# Patient Record
Sex: MALE | Race: WHITE | NOT HISPANIC OR LATINO | Employment: OTHER | URBAN - METROPOLITAN AREA
[De-identification: names, ages, dates, MRNs, and addresses within clinical notes are randomized per-mention and may not be internally consistent; named-entity substitution may affect disease eponyms.]

---

## 2017-02-21 ENCOUNTER — OFFICE VISIT (OUTPATIENT)
Dept: INTERNAL MEDICINE | Facility: CLINIC | Age: 82
End: 2017-02-21
Payer: COMMERCIAL

## 2017-02-21 VITALS
HEART RATE: 41 BPM | OXYGEN SATURATION: 99 % | BODY MASS INDEX: 27.32 KG/M2 | DIASTOLIC BLOOD PRESSURE: 72 MMHG | HEIGHT: 70 IN | TEMPERATURE: 97.6 F | SYSTOLIC BLOOD PRESSURE: 118 MMHG | WEIGHT: 190.8 LBS

## 2017-02-21 DIAGNOSIS — Z00.00 MEDICARE ANNUAL WELLNESS VISIT, SUBSEQUENT: Primary | ICD-10-CM

## 2017-02-21 DIAGNOSIS — I21.4 NON-STEMI (NON-ST ELEVATED MYOCARDIAL INFARCTION) (H): ICD-10-CM

## 2017-02-21 DIAGNOSIS — Z23 NEED FOR VACCINATION: ICD-10-CM

## 2017-02-21 DIAGNOSIS — D47.2 MGUS (MONOCLONAL GAMMOPATHY OF UNKNOWN SIGNIFICANCE): ICD-10-CM

## 2017-02-21 DIAGNOSIS — I71.21 ASCENDING AORTIC ANEURYSM (H): ICD-10-CM

## 2017-02-21 DIAGNOSIS — I25.810 CORONARY ARTERY DISEASE INVOLVING CORONARY BYPASS GRAFT OF NATIVE HEART WITHOUT ANGINA PECTORIS: ICD-10-CM

## 2017-02-21 DIAGNOSIS — E03.9 HYPOTHYROIDISM, UNSPECIFIED TYPE: ICD-10-CM

## 2017-02-21 DIAGNOSIS — G25.0 FAMILIAL TREMOR: ICD-10-CM

## 2017-02-21 DIAGNOSIS — E78.5 HYPERLIPIDEMIA LDL GOAL <100: ICD-10-CM

## 2017-02-21 LAB
ALBUMIN SERPL-MCNC: 3.7 G/DL (ref 3.4–5)
ALP SERPL-CCNC: 73 U/L (ref 40–150)
ALT SERPL W P-5'-P-CCNC: 30 U/L (ref 0–70)
ANION GAP SERPL CALCULATED.3IONS-SCNC: 7 MMOL/L (ref 3–14)
AST SERPL W P-5'-P-CCNC: 33 U/L (ref 0–45)
BASOPHILS # BLD AUTO: 0.1 10E9/L (ref 0–0.2)
BASOPHILS NFR BLD AUTO: 0.7 %
BILIRUB SERPL-MCNC: 1.2 MG/DL (ref 0.2–1.3)
BUN SERPL-MCNC: 23 MG/DL (ref 7–30)
CALCIUM SERPL-MCNC: 9.1 MG/DL (ref 8.5–10.1)
CHLORIDE SERPL-SCNC: 106 MMOL/L (ref 94–109)
CHOLEST SERPL-MCNC: 109 MG/DL
CO2 SERPL-SCNC: 28 MMOL/L (ref 20–32)
CREAT SERPL-MCNC: 1.14 MG/DL (ref 0.66–1.25)
CREAT UR-MCNC: 273 MG/DL
DIFFERENTIAL METHOD BLD: NORMAL
EOSINOPHIL # BLD AUTO: 0.4 10E9/L (ref 0–0.7)
EOSINOPHIL NFR BLD AUTO: 5.8 %
ERYTHROCYTE [DISTWIDTH] IN BLOOD BY AUTOMATED COUNT: 12.4 % (ref 10–15)
GFR SERPL CREATININE-BSD FRML MDRD: 61 ML/MIN/1.7M2
GLUCOSE SERPL-MCNC: 90 MG/DL (ref 70–99)
HCT VFR BLD AUTO: 43.4 % (ref 40–53)
HDLC SERPL-MCNC: 46 MG/DL
HGB BLD-MCNC: 14.6 G/DL (ref 13.3–17.7)
LDLC SERPL CALC-MCNC: 50 MG/DL
LYMPHOCYTES # BLD AUTO: 1.9 10E9/L (ref 0.8–5.3)
LYMPHOCYTES NFR BLD AUTO: 26.9 %
MCH RBC QN AUTO: 30.2 PG (ref 26.5–33)
MCHC RBC AUTO-ENTMCNC: 33.6 G/DL (ref 31.5–36.5)
MCV RBC AUTO: 90 FL (ref 78–100)
MICROALBUMIN UR-MCNC: 10 MG/L
MICROALBUMIN/CREAT UR: 3.59 MG/G CR (ref 0–17)
MONOCYTES # BLD AUTO: 0.6 10E9/L (ref 0–1.3)
MONOCYTES NFR BLD AUTO: 8.9 %
NEUTROPHILS # BLD AUTO: 4.2 10E9/L (ref 1.6–8.3)
NEUTROPHILS NFR BLD AUTO: 57.7 %
NONHDLC SERPL-MCNC: 63 MG/DL
PLATELET # BLD AUTO: 155 10E9/L (ref 150–450)
POTASSIUM SERPL-SCNC: 4 MMOL/L (ref 3.4–5.3)
PROT SERPL-MCNC: 7.2 G/DL (ref 6.8–8.8)
RBC # BLD AUTO: 4.83 10E12/L (ref 4.4–5.9)
SODIUM SERPL-SCNC: 141 MMOL/L (ref 133–144)
TRIGL SERPL-MCNC: 66 MG/DL
TSH SERPL DL<=0.005 MIU/L-ACNC: 3.78 MU/L (ref 0.4–4)
WBC # BLD AUTO: 7.2 10E9/L (ref 4–11)

## 2017-02-21 PROCEDURE — 80053 COMPREHEN METABOLIC PANEL: CPT | Performed by: INTERNAL MEDICINE

## 2017-02-21 PROCEDURE — 90670 PCV13 VACCINE IM: CPT | Performed by: INTERNAL MEDICINE

## 2017-02-21 PROCEDURE — G0009 ADMIN PNEUMOCOCCAL VACCINE: HCPCS | Performed by: INTERNAL MEDICINE

## 2017-02-21 PROCEDURE — 85025 COMPLETE CBC W/AUTO DIFF WBC: CPT | Performed by: INTERNAL MEDICINE

## 2017-02-21 PROCEDURE — 99397 PER PM REEVAL EST PAT 65+ YR: CPT | Mod: 25 | Performed by: INTERNAL MEDICINE

## 2017-02-21 PROCEDURE — 36415 COLL VENOUS BLD VENIPUNCTURE: CPT | Performed by: INTERNAL MEDICINE

## 2017-02-21 PROCEDURE — 82043 UR ALBUMIN QUANTITATIVE: CPT | Performed by: INTERNAL MEDICINE

## 2017-02-21 PROCEDURE — 84443 ASSAY THYROID STIM HORMONE: CPT | Performed by: INTERNAL MEDICINE

## 2017-02-21 PROCEDURE — 80061 LIPID PANEL: CPT | Performed by: INTERNAL MEDICINE

## 2017-02-21 RX ORDER — LEVOTHYROXINE SODIUM 100 UG/1
100 TABLET ORAL DAILY
Qty: 90 TABLET | Refills: 3 | Status: SHIPPED | OUTPATIENT
Start: 2017-02-21 | End: 2018-03-02

## 2017-02-21 RX ORDER — ATORVASTATIN CALCIUM 40 MG/1
40 TABLET, FILM COATED ORAL DAILY
Qty: 90 TABLET | Refills: 3 | Status: SHIPPED | OUTPATIENT
Start: 2017-02-21 | End: 2018-03-02

## 2017-02-21 RX ORDER — PROPRANOLOL HYDROCHLORIDE 20 MG/1
20 TABLET ORAL DAILY
Qty: 90 TABLET | Refills: 3 | Status: SHIPPED | OUTPATIENT
Start: 2017-02-21 | End: 2017-05-16

## 2017-02-21 NOTE — PATIENT INSTRUCTIONS
"  *  Continue all medications at the same doses.  Contact your usual pharmacy if you need refills.     *  Be sure to compare prices of your many medications and check specifically the prices at [employer] because you will find that these are MUCH cheaper    *  Follow up with Cardiology clinic as planned this May.       5 GOALS TO PREVENT VASCULAR DISEASE:     1.  Aggressive blood pressure control, under 130/80 ideally.  Using medications if needed.    Your blood pressure is under good control    BP Readings from Last 4 Encounters:   02/21/17 118/72   05/20/16 108/58   05/10/16 114/72   02/09/16 134/66       2.  Aggressive LDL cholesterol (\"bad cholesterol\") lowering as indicated.    Your goal is an LDL under 130 for sure, preferably under 100.  (If you have diabetes or previous vascular disease, the the LDL goals would be under 100 for sure, preferably under 70.)    New guidelines identify four high-risk groups who could benefit from statins:   *people with pre-existing heart disease, such as those who have had a heart attack;   *people ages 40 to 75 who have diabetes of any type  *patients ages 40 to 75 with at least a 7.5% risk of developing cardiovascular disease over the next decade, according to a formula described in the guidelines  *patients with the sort of super-high cholesterol that sometimes runs in families, as evidenced by an LDL of 190 milligrams per deciliter or higher    Your cholesterol levels are well controlled.    Recent Labs   Lab Test  02/09/16   0847  02/03/15   0845  07/08/14   0722  05/15/14   0522   CHOL  115   --   102  118   HDL  49   --   35*  34*   LDL  53  74  52*  63   TRIG  64   --   73  101   CHOLHDLRATIO   --    --   2.9  3.5       3.  Aggressive diabetic prevention, screening and/or management.      You do not have diabetes as of the most recent blood tests.     4.  No smoking    5.  Consider taking low dose aspirin (81 mg) tablet once per day over the age of 50, every " "day unless there is a specific reason that you cannot take aspirin (such as side effect, allergy, or you are on another \"blood thinner\").        --Based on your current cardiac risk factors, you should take Aspirin 81 mg once per day if you are over 50 years of age.           Preventive Health Recommendations:       Male Ages 65 and over    Yearly exam:             See your health care provider every year in order to  o   Review health changes.   o   Discuss preventive care.    o   Review your medicines if your doctor has prescribed any.    Talk with your health care provider about whether you should have a test to screen for prostate cancer (PSA).    Every 3 years, have a diabetes test (fasting glucose). If you are at risk for diabetes, you should have this test more often.    Every 5 years, have a cholesterol test. Have this test more often if you are at risk for high cholesterol or heart disease.     Every 10 years, have a colonoscopy. Or, have a yearly FIT test (stool test). These exams will check for colon cancer.    Talk to with your health care provider about screening for Abdominal Aortic Aneurysm if you have a family history of AAA or have a history of smoking.  Shots:     Get a flu shot each year.     Get a tetanus shot every 10 years.     Talk to your doctor about your pneumonia vaccines. There are now two you should receive - Pneumovax (PPSV 23) and Prevnar (PCV 13).    Talk to your doctor about a shingles vaccine.     Talk to your doctor about the hepatitis B vaccine.  Nutrition:     Eat at least 5 servings of fruits and vegetables each day.     Eat whole-grain bread, whole-wheat pasta and brown rice instead of white grains and rice.     Talk to your doctor about Calcium and Vitamin D.   Lifestyle    Exercise for at least 150 minutes a week (30 minutes a day, 5 days a week). This will help you control your weight and prevent disease.     Limit alcohol to one drink per day.     No smoking.     Wear " sunscreen to prevent skin cancer.     See your dentist every six months for an exam and cleaning.     See your eye doctor every 1 to 2 years to screen for conditions such as glaucoma, macular degeneration and cataracts.

## 2017-02-21 NOTE — NURSING NOTE
"Chief Complaint   Patient presents with     Medicare Visit     pt is fasting       Initial /72  Pulse (!) 41  Temp 97.6  F (36.4  C) (Oral)  Ht 5' 10\" (1.778 m)  Wt 190 lb 12.8 oz (86.5 kg)  SpO2 99%  BMI 27.38 kg/m2 Estimated body mass index is 27.38 kg/(m^2) as calculated from the following:    Height as of this encounter: 5' 10\" (1.778 m).    Weight as of this encounter: 190 lb 12.8 oz (86.5 kg).  Medication Reconciliation: complete.  Enriqueta Alvares CMA    Screening Questionnaire for Adult Immunization    Are you sick today?   No   Do you have allergies to medications, food, a vaccine component or latex?   No   Have you ever had a serious reaction after receiving a vaccination?   No   Do you have a long-term health problem with heart disease, lung disease, asthma, kidney disease, metabolic disease (e.g. diabetes), anemia, or other blood disorder?   No   Do you have cancer, leukemia, HIV/AIDS, or any other immune system problem?   No   In the past 3 months, have you taken medications that affect  your immune system, such as prednisone, other steroids, or anticancer drugs; drugs for the treatment of rheumatoid arthritis, Crohn s disease, or psoriasis; or have you had radiation treatments?   No   Have you had a seizure, or a brain or other nervous system problem?   No   During the past year, have you received a transfusion of blood or blood     products, or been given immune (gamma) globulin or antiviral drug?   No   For women: Are you pregnant or is there a chance you could become        pregnant during the next month?   No   Have you received any vaccinations in the past 4 weeks?   No     Immunization questionnaire answers were all negative.      MNVFC doesn't apply on this patient    Per orders of Dr. mckee, injection of prevnar given by Enriqueta Alvares. Patient instructed to remain in clinic for 20 minutes afterwards, and to report any adverse reaction to me immediately.       Screening performed by " Enriqueta Alvares on 2/21/2017 at 8:12 AM.

## 2017-02-21 NOTE — LETTER
[facility]  [address]  [address]  [phone]        [facility]  [address] [place] [id number]      2/24/2017      Dear Mr. Moses Sandhuman:    I am writing to inform you of the results of the laboratory tests you had done recently.    Total Cholesterol:   Lab Results   Component Value Date    CHOL 109 02/21/2017          (Recommended: below 200)        HDL (good) Cholesterol :   Lab Results   Component Value Date    HDL 46 02/21/2017         (Recommended: 40 or more)  LDL (bad) Cholesterol:    Lab Results   Component Value Date    LDL 50 02/21/2017    LDL 74 02/03/2015          (Recommended: below 130, below 100 if heart disease or diabetes is diagnosed)   Triglycerides:    Lab Results   Component Value Date    TRIG 66 02/21/2017       (Recommended: below 180)  Non HDL cholesterol (Cholesterol ratio:   Lab Results   Component Value Date    CHOLHDLRATIO 2.9 07/08/2014    NHDL 63 02/21/2017     (Ideally below 130, Acceptable below 160).    Additional results of your recent labs are as noted.   Liver function: NORMAL  Kidney  function: NORMAL  Hemoglobin: NORMAL  Thyroid function: NORMAL  Electrolytes: NORMAL  Glucose: NORMAL    Your labs all looked good.  Continue all medications at the same doses.  Contact your usual pharmacy if you need refills.     Thank you for allowing me to participate in your care. If you have any further questions or problems, please contact me via our nurse line at [phone]    Sincerely,          Steven Wagoner M.D.  Department of Internal Medicine  [facility]

## 2017-02-21 NOTE — MR AVS SNAPSHOT
"              After Visit Summary   2/21/2017    [facility]    MRN: [id number]           Patient Information     Date Of Birth          10/19/1933        Visit Information        Provider Department      2/21/2017 8:00 AM Steven Wagoner MD [facility]        Today's Diagnoses     Medicare annual wellness visit, subsequent    -  1    Hypothyroidism, unspecified type        Non-STEMI (non-ST elevated myocardial infarction) (H)        Coronary artery disease        Familial tremor        MGUS (monoclonal gammopathy of unknown significance)        Hyperlipidemia LDL goal <100        Need for vaccination        Ascending aortic aneurysm (H)          Care Instructions      *  Continue all medications at the same doses.  Contact your usual pharmacy if you need refills.     *  Be sure to compare prices of your many medications and check specifically the prices at [email or web address] because you will find that these are MUCH cheaper    *  Follow up with Cardiology clinic as planned this May.       5 GOALS TO PREVENT VASCULAR DISEASE:     1.  Aggressive blood pressure control, under 130/80 ideally.  Using medications if needed.    Your blood pressure is under good control    BP Readings from Last 4 Encounters:   02/21/17 118/72   05/20/16 108/58   05/10/16 114/72   02/09/16 134/66       2.  Aggressive LDL cholesterol (\"bad cholesterol\") lowering as indicated.    Your goal is an LDL under 130 for sure, preferably under 100.  (If you have diabetes or previous vascular disease, the the LDL goals would be under 100 for sure, preferably under 70.)    New guidelines identify four high-risk groups who could benefit from statins:   *people with pre-existing heart disease, such as those who have had a heart attack;   *people ages 40 to 75 who have diabetes of any type  *patients ages 40 to 75 with at least a 7.5% risk of developing cardiovascular disease over the next decade, according to a " "formula described in the guidelines  *patients with the sort of super-high cholesterol that sometimes runs in families, as evidenced by an LDL of 190 milligrams per deciliter or higher    Your cholesterol levels are well controlled.    Recent Labs   Lab Test  02/09/16   0847  02/03/15   0845  07/08/14   0722  05/15/14   0522   CHOL  115   --   102  118   HDL  49   --   35*  34*   LDL  53  74  52*  63   TRIG  64   --   73  101   CHOLHDLRATIO   --    --   2.9  3.5       3.  Aggressive diabetic prevention, screening and/or management.      You do not have diabetes as of the most recent blood tests.     4.  No smoking    5.  Consider taking low dose aspirin (81 mg) tablet once per day over the age of 50, every day unless there is a specific reason that you cannot take aspirin (such as side effect, allergy, or you are on another \"blood thinner\").        --Based on your current cardiac risk factors, you should take Aspirin 81 mg once per day if you are over 50 years of age.           Preventive Health Recommendations:       Male Ages 65 and over    Yearly exam:             See your health care provider every year in order to  o   Review health changes.   o   Discuss preventive care.    o   Review your medicines if your doctor has prescribed any.    Talk with your health care provider about whether you should have a test to screen for prostate cancer (PSA).    Every 3 years, have a diabetes test (fasting glucose). If you are at risk for diabetes, you should have this test more often.    Every 5 years, have a cholesterol test. Have this test more often if you are at risk for high cholesterol or heart disease.     Every 10 years, have a colonoscopy. Or, have a yearly FIT test (stool test). These exams will check for colon cancer.    Talk to with your health care provider about screening for Abdominal Aortic Aneurysm if you have a family history of AAA or have a history of smoking.  Shots:     Get a flu shot each year. "     Get a tetanus shot every 10 years.     Talk to your doctor about your pneumonia vaccines. There are now two you should receive - Pneumovax (PPSV 23) and Prevnar (PCV 13).    Talk to your doctor about a shingles vaccine.     Talk to your doctor about the hepatitis B vaccine.  Nutrition:     Eat at least 5 servings of fruits and vegetables each day.     Eat whole-grain bread, whole-wheat pasta and brown rice instead of white grains and rice.     Talk to your doctor about Calcium and Vitamin D.   Lifestyle    Exercise for at least 150 minutes a week (30 minutes a day, 5 days a week). This will help you control your weight and prevent disease.     Limit alcohol to one drink per day.     No smoking.     Wear sunscreen to prevent skin cancer.     See your dentist every six months for an exam and cleaning.     See your eye doctor every 1 to 2 years to screen for conditions such as glaucoma, macular degeneration and cataracts.        Follow-ups after your visit        Your next 10 appointments already scheduled     May 16, 2017  8:15 AM CDT   Return Visit with Unrluy Huggins MD   [facility] PHYSICIANS [facility] AT [place] ([facility])    [address] [place] [id number] [phone]              Who to contact     If you have questions or need follow up information about today's clinic visit or your schedule please contact [facility] directly at [phone].  Normal or non-critical lab and imaging results will be communicated to you by MyChart, letter or phone within 4 business days after the clinic has received the results. If you do not hear from us within 7 days, please contact the clinic through MyChart or phone. If you have a critical or abnormal lab result, we will notify you by phone as soon as possible.  Submit refill requests through [employer] or call your pharmacy and they will forward the refill request to us. Please allow 3 business days  "for your refill to be completed.          Additional Information About Your Visit        [employer] Information     "[employer] lets you send messages to your doctor, view your test results, renew your prescriptions, schedule appointments and more. To sign up, go to [email or web address] . Click on \"Log in\" on the left side of the screen, which will take you to the Welcome page. Then click on \"Sign up Now\" on the right side of the page.     You will be asked to enter the access code listed below, as well as some personal information. Please follow the directions to create your username and password.     Your access code is: [email or web address]  Expires: 2017  8:42 AM     Your access code will  in 90 days. If you need help or a new code, please call your [facility] clinic or [phone].        Care EveryWhere ID     This is your Care EveryWhere ID. This could be used by other organizations to access your [facility] medical records  [id number]        Your Vitals Were     Pulse Temperature Height Pulse Oximetry BMI (Body Mass Index)       41 97.6  F (36.4  C) (Oral) 5' 10\" (1.778 m) 99% 27.38 kg/m2        Blood Pressure from Last 3 Encounters:   17 118/72   16 108/58   05/10/16 114/72    Weight from Last 3 Encounters:   17 190 lb 12.8 oz (86.5 kg)   16 191 lb (86.6 kg)   05/10/16 194 lb 3.2 oz (88.1 kg)              We Performed the Following     **Lipid panel reflex to direct LDL FUTURE 6mo     Albumin Random Urine Quantitative     CBC with platelets and differential     Comprehensive metabolic panel (BMP + Alb, Alk Phos, ALT, AST, Total. Bili, TP)     PNEUMOCOCCAL CONJ VACCINE 13 VALENT IM     TSH with free T4 reflex          Where to get your medicines      Some of these will need a paper prescription and others can be bought over the counter.  Ask your nurse if you have questions.     Bring a paper prescription for each of these medications     atorvastatin 40 MG tablet    levothyroxine 100 " MCG tablet    propranolol 20 MG tablet          Primary Care Provider Office Phone # Fax #    Steven Rio Wagoner [phone]       [facility] [address] [facility] [address]        Goals        General    I will make a future appointment with Cardiac Rehabilitation  (pt-stated)     Notes - Note edited  5/23/2014 11:47 AM by Rita Gonzalez RN    As of today's date 5/23/2014 goal is met at 76 - 100%.   Goal Status:  Complete      I will review heart attack educational materials given to me by the hospital staff  (pt-stated)     Notes - Note edited  5/23/2014 11:48 AM by Rita Gonzalez, RN    As of today's date 5/23/2014 goal is met at 51 - 75%.   Goal Status:  Active        Thank you!     Thank you for choosing [facility]  for your care. Our goal is always to provide you with excellent care. Hearing back from our patients is one way we can continue to improve our services. Please take a few minutes to complete the written survey that you may receive in the mail after your visit with us. Thank you!             Your Updated Medication List - Protect others around you: Learn how to safely use, store and throw away your medicines at www.disposemymeds.org.          This list is accurate as of: 2/21/17  8:42 AM.  Always use your most recent med list.                   Brand Name Dispense Instructions for use    ASPIRIN EC PO      Take 81 mg by mouth daily       atorvastatin 40 MG tablet    LIPITOR    90 tablet    Take 1 tablet (40 mg) by mouth daily       CENTRUM SILVER PO      Take 1 tablet by mouth daily.       levothyroxine 100 MCG tablet    SYNTHROID/LEVOTHROID    90 tablet    Take 1 tablet (100 mcg) by mouth daily       MIRALAX PO      Take by mouth as needed       nitroglycerin 0.4 MG sublingual tablet    NITROSTAT    25 tablet    Place 1 tablet (0.4 mg) under the tongue every 5 minutes as needed for chest pain       propranolol 20 MG tablet    INDERAL     90 tablet    Take 1 tablet (20 mg) by mouth daily

## 2017-02-21 NOTE — PROGRESS NOTES
SUBJECTIVE:                                                            Moses Elizondo is a 83 year old male who presents for Preventive Visit.  Are you in the first 12 months of your Medicare Part B coverage?  No    Healthy Habits:    Do you get at least three servings of calcium containing foods daily (dairy, green leafy vegetables, etc.)? yes    Amount of exercise or daily activities, outside of work: tried to be active everyday    Problems taking medications regularly No    Medication side effects: No    Have you had an eye exam in the past two years? yes    Do you see a dentist twice per year? yes    Do you have sleep apnea, excessive snoring or daytime drowsiness?no    COGNITIVE SCREEN  1) Repeat 3 items (Banana, Sunrise, Chair)    2) Clock draw: NORMAL  3) 3 item recall: Recalls 3 objects  Results: 3 items recalled: COGNITIVE IMPAIRMENT LESS LIKELY    Mini-CogTM Copyright S Evelyn. Licensed by the author for use in [employer]; reprinted with permission (audelia@[facility]). All rights reserved.      C/O NEUROPATHY ON FEET. Has done laser treatments and massage w/ out relief for the past year        1.  Prior of coronary artery disease as listed in medical history.  No current or recent cardiovascaulr symptoms.   No shortness of breath, no episodes of chest pain/pressure, no dyspnea on exertion, no changes in his abiliy to perform physical exertion or tasks.  Takes the same amount of time to perform similar physical tasks.      2.  History of hypothyroidism.  On replacement therapy.  He has not experienced any significant side effects of this medication.   The patient denies of fatigue, weight changes, heat/cold intolerance, hair changes, nail changes, bowel changes.     Latest labs reviewed:    Lab Results   Component Value Date    TSH 3.67 02/09/2016    TSH 3.04 02/03/2015    TSH 2.39 01/27/2014    TSH 2.94 12/19/2012    TSH 2.35 01/25/2012    TSH 5.75 12/13/2011    TSH 4.52 12/09/2010    TSH  0.98 02/15/2010    TSH 3.22 02/17/2009    TSH 6.85 12/08/2008    TSH 4.73 09/30/2008    TSH 0.09 07/10/2008    TSH 1.13 05/28/2008    TSH 20.20 03/28/2008    TSH 1.02 12/21/2007    TSH 7.77 10/26/2007         3.  Has history of hyperlipidemia.  On statin for this, denies any significant side effects of this medication.      Latest labs reviewed:    Recent Labs   Lab Test  02/09/16   0847  02/03/15   0845  07/08/14   0722  05/15/14   0522   CHOL  115   --   102  118   HDL  49   --   35*  34*   LDL  53  74  52*  63   TRIG  64   --   73  101   CHOLHDLRATIO   --    --   2.9  3.5        Lab Results   Component Value Date    AST 27 02/09/2016           All Histories reviewed and updated in [facility] as appropriate.  Social History   Substance Use Topics     Smoking status: Former Smoker     Packs/day: 0.50     Years: 16.00     Quit date: 1/1/1967     Smokeless tobacco: Never Used     Alcohol use 0.0 oz/week     0 Standard drinks or equivalent per week      Comment: 2-3 times per week       The patient does not drink >3 drinks per day nor >7 drinks per week.    Today's PHQ-2 Score:   PHQ-2 ( 1999 [employer]) 2/21/2017 2/9/2016   Q1: Little interest or pleasure in doing things 0 0   Q2: Feeling down, depressed or hopeless 0 0   PHQ-2 Score 0 0       Do you feel safe in your environment - Yes    Do you have a Health Care Directive?: No: Advance care planning reviewed with patient; information given to patient to review.    Current providers sharing in care for this patient include:   Patient Care Team:  Steven Wagoner MD as PCP - General (Internal Medicine)  Rita Gonzalez RN as Clinic Care Coordinator      Hearing impairment: Yes, Feel that people are mumbling or not speaking clearly. VERY LITTLE    Ability to successfully perform activities of daily living: Yes, no assistance needed     Fall risk:  Fallen 2 or more times in the past year?: No  Any fall with injury in the past year?: No    Home safety:  none  identified      The following health maintenance items are reviewed in Epic and correct as of today:  Health Maintenance   Topic Date Due     ADVANCE DIRECTIVE PLANNING Q5 YRS (NO INBASKET)  10/19/1951     PNEUMOCOCCAL (2 of 2 - PCV13) 11/08/2006     COLONOSCOPY Q10 YR INBASKET MESSAGE  09/19/2011     INFLUENZA VACCINE (SYSTEM ASSIGNED)  09/01/2016     TSH Q1 YEAR (NO INBASKET)  02/09/2017     LIPID MONITORING Q1 YEAR( NO INBASKET)  02/09/2017     FALL RISK ASSESSMENT  02/09/2017     TETANUS IMMUNIZATION (SYSTEM ASSIGNED)  12/19/2022           Past Medical History:  ---------------------------  Past Medical History   Diagnosis Date     Bradycardia      CAD (coronary artery disease) 5/13/14     NSTEMI; PTCA/stent to OM1     Degeneration of lumbar or lumbosacral intervertebral disc      Familial tremor      Herpes zoster without mention of complication      Hyperlipidaemia      MGUS (monoclonal gammopathy of unknown significance)      Myocardial infarction (H)      NSTEMI 5/14     Other and unspecified hyperlipidemia      Prostatitis, unspecified      Sensorineural hearing loss, unspecified      Unspecified hypothyroidism        Past Surgical History:  ---------------------------  Past Surgical History   Procedure Laterality Date     C nonspecific procedure  2010     Laparoscopic cholecystectomy.      Cholecystectomy       Coronary angiography adult order  5/14/14     PTCA w/YOGESH to OM1     Heart cath, angioplasty  5/14/14     YOGESH to OM1       Current Medications:  ---------------------------  Current Outpatient Prescriptions   Medication Sig Dispense Refill     atorvastatin (LIPITOR) 40 MG tablet Take 1 tablet (40 mg) by mouth daily 90 tablet 3     levothyroxine (SYNTHROID,LEVOTHROID) 100 MCG tablet Take 1 tablet (100 mcg) by mouth daily 90 tablet 3     propranolol (INDERAL) 20 MG tablet Take 1 tablet (20 mg) by mouth daily 90 tablet 3     Polyethylene Glycol 3350 (MIRALAX PO) Take by mouth as needed        "nitroglycerin (NITROSTAT) 0.4 MG SL tablet Place 1 tablet (0.4 mg) under the tongue every 5 minutes as needed for chest pain 25 tablet 0     ASPIRIN EC PO Take 81 mg by mouth daily       Multiple Vitamins-Minerals (CENTRUM SILVER PO) Take 1 tablet by mouth daily.         Allergies:  -------------  Allergies   Allergen Reactions     No Known Drug Allergies        Social History:  -------------------  Social History     Social History     Marital status:      Spouse name: N/A     Number of children: N/A     Years of education: N/A     Occupational History     Not on file.     Social History Main Topics     Smoking status: Former Smoker     Packs/day: 0.50     Years: 16.00     Quit date: 1/1/1967     Smokeless tobacco: Never Used     Alcohol use 0.0 oz/week     0 Standard drinks or equivalent per week      Comment: 2-3 times per week     Drug use: No     Sexual activity: No     Other Topics Concern     Caffeine Concern Yes     2 cups coffee/day     Sleep Concern No     Weight Concern Yes     limiting alcohol to watch calories     Exercise Yes     Stationary bike  weights and aerobics 4 times week     Seat Belt Yes     Parent/Sibling W/ Cabg, Mi Or Angioplasty Before 65f 55m? No     Social History Narrative       Family Medical History:  ------------------------------  Family History   Problem Relation Age of Onset     CANCER Mother      Genitourinary Problems Father 99     complications from surgery     HEART DISEASE Brother 72     MI         ROS:  Constitutional, HEENT, cardiovascular, pulmonary, gi and gu systems are negative, except as otherwise noted.      OBJECTIVE:                                                            /72  Pulse (!) 41  Temp 97.6  F (36.4  C) (Oral)  Ht 5' 10\" (1.778 m)  Wt 190 lb 12.8 oz (86.5 kg)  SpO2 99%  BMI 27.38 kg/m2 Estimated body mass index is 27.38 kg/(m^2) as calculated from the following:    Height as of this encounter: 5' 10\" (1.778 m).    Weight as of this " encounter: 190 lb 12.8 oz (86.5 kg).  EXAM:   GENERAL alert and no distress.  EYES conjunctivae/corneas clear. PERRL, EOM's intact  HENT: NCAT,oral and posterior pharynx without lesions or erythema, facies symmetric  NECK: Neck supple. No LAD, without thyroidmegaly or JVD.  RESP: Clear to ausculation bilaterally without wheezes or crackles. Normal BS in all fields.  CV: RRR normal S1S2 without  murmurs, rubs or gallops. PMI normal  LYMPH: no cervical lymph adenopathy appreciated  GI: NTND, no organomegaly, normal BS in all quadrants, without rebound or guarding  MS: No cyanosis, clubbing or edema noted bilaterally in Upper and/or Lower Extremities  SKIN: no significant ulcers, lesions or rashes on the visualized portions of the skin  NEURO: Alert and Oriented x 3, Gait normal. Reflexes normal and symmetric. Sensation grossly WNL..  PSYCH: Alert and oriented times 3; speech- coherent , normal rate and volume; able to articulate logical thoughts, able to abstract reason, no tangential thoughts, no hallucinations or delusions, affect- normal     ASSESSMENT / PLAN:                                                              (Z00.00) Medicare annual wellness visit, subsequent  (primary encounter diagnosis)  Comment: Discussed cardiac disease risk factor modification including screening for and treating HTN, lipids, DM, and smoking cessation.  Also discussed age appropriate cancer screening recommendations including testicular, prostate, colon and lung cancer as dictated by age group.  Recommended low fat, low salt diet and moderation in any alcohol intake.  Recommended always using seatbelts when in a car.  Recommended never driving after drinking or riding with someone who has been drinking as well.       Plan:     (E03.9) Hypothyroidism, unspecified type  Comment: Discussed signs and symptoms of hypo and hyperthyroidism.  Reviewed treatment options.   Recommended absolute medication compliance to avoid adding any  additionial variance to the labs.   Plan: levothyroxine (SYNTHROID/LEVOTHROID) 100 MCG         tablet, TSH with free T4 reflex            (I21.4) Non-STEMI (non-ST elevated myocardial infarction) (H)  Comment: Discussed secondary risk factor modification and recommended continuing aggressive management of these items.   Plan: atorvastatin (LIPITOR) 40 MG tablet            (I25.810) Coronary artery disease  Comment: The patient does not report any signs or symptoms of angina or active cardiac ischemia.   They do not report any relative changes in their ability to perform physical activities over the past year.    We discussed aggressive secondary risk factor modification, including aggressive BP control (under 130/ideally), aggressive LDL lowering with statin (goal under 100 for sure/under 70 ideally), no smoking, diabetes prevention/management, no smoking, and use of either ASA or similar anti platelet agent if tolerated.     Plan: atorvastatin (LIPITOR) 40 MG tablet,         propranolol (INDERAL) 20 MG tablet, CBC with         platelets and differential, Comprehensive         metabolic panel (BMP + Alb, Alk Phos, ALT, AST,        Total. Bili, TP), **Lipid panel reflex to         direct LDL FUTURE 6mo            (G25.0) Familial tremor  Comment: This condition is currently controlled on the current medical regimen.  Continue current therapy.   Plan: propranolol (INDERAL) 20 MG tablet            (D47.2) MGUS (monoclonal gammopathy of unknown significance)  Comment: repeat urine  Plan: Comprehensive metabolic panel (BMP + Alb, Alk         Phos, ALT, AST, Total. Bili, TP), Albumin         Random Urine Quantitative            (E78.5) Hyperlipidemia LDL goal <100  Comment: Discussed new guidelines recommending a statin cholesterol lowering medication for any patient with either diabetes and/or vascular disease, aiming for a LDL goal under 100 for sure, ideally under 70.    Reviewed statins and their side effects  "including muscle pain, muscle inflammation, GI upset.  Told the patient to stop the medication in question and to call if any side effects develop.   Recommended CoQ10 200-300 mg at the same time as taking the statin medication to help reduce the chance of muscle side effects from the statin.    Plan: Comprehensive metabolic panel (BMP + Alb, Alk         Phos, ALT, AST, Total. Bili, TP), **Lipid panel        reflex to direct LDL FUTURE 6mo            (Z23) Need for vaccination  Comment:   Plan: PNEUMOCOCCAL CONJ VACCINE 13 VALENT IM            (I71.2) Ascending aortic aneurysm (H)  Comment: follow up Echo probably this year.   Plan:        End of Life Planning:  Patient currently has an advanced directive: Yes.  Practitioner is supportive of decision.    COUNSELING:  Reviewed preventive health counseling, as reflected in patient instructions       Regular exercise       Healthy diet/nutrition       Vision screening       Hearing screening       Colon cancer screening       Prostate cancer screening        Estimated body mass index is 27.38 kg/(m^2) as calculated from the following:    Height as of this encounter: 5' 10\" (1.778 m).    Weight as of this encounter: 190 lb 12.8 oz (86.5 kg).     reports that he quit smoking about 50 years ago. He has a 8.00 pack-year smoking history. He has never used smokeless tobacco.      Appropriate preventive services were discussed with this patient, including applicable screening as appropriate for cardiovascular disease, diabetes, osteopenia/osteoporosis, and glaucoma.  As appropriate for age/gender, discussed screening for colorectal cancer, prostate cancer, breast cancer, and cervical cancer. Checklist reviewing preventive services available has been given to the patient.    Reviewed patients plan of care and provided an AVS. The  sheri Lopes meets the Care Plan requirement. This Care Plan has been established and reviewed with the .    Counseling Resources:  ATP IV " Guidelines  Pooled Cohorts Equation Calculator  Breast Cancer Risk Calculator  FRAX Risk Assessment  ICSI Preventive Guidelines  Dietary Guidelines for Americans, 2010  [employer] My[employer]ate  ASA Prophylaxis  Lung CA Screening    Steven Wagoner MD  [facility]

## 2017-05-16 ENCOUNTER — OFFICE VISIT (OUTPATIENT)
Dept: CARDIOLOGY | Facility: CLINIC | Age: 82
End: 2017-05-16
Attending: INTERNAL MEDICINE
Payer: COMMERCIAL

## 2017-05-16 VITALS
HEIGHT: 72 IN | BODY MASS INDEX: 26.19 KG/M2 | HEART RATE: 44 BPM | WEIGHT: 193.4 LBS | DIASTOLIC BLOOD PRESSURE: 62 MMHG | SYSTOLIC BLOOD PRESSURE: 106 MMHG

## 2017-05-16 DIAGNOSIS — I25.10 CORONARY ARTERY DISEASE INVOLVING NATIVE CORONARY ARTERY OF NATIVE HEART WITHOUT ANGINA PECTORIS: ICD-10-CM

## 2017-05-16 DIAGNOSIS — I49.5 SICK SINUS SYNDROME (H): Primary | ICD-10-CM

## 2017-05-16 DIAGNOSIS — R00.1 SINUS BRADYCARDIA: ICD-10-CM

## 2017-05-16 DIAGNOSIS — E78.5 HYPERLIPIDEMIA LDL GOAL <100: ICD-10-CM

## 2017-05-16 DIAGNOSIS — G25.0 FAMILIAL TREMOR: ICD-10-CM

## 2017-05-16 PROCEDURE — 99214 OFFICE O/P EST MOD 30 MIN: CPT | Mod: 25 | Performed by: INTERNAL MEDICINE

## 2017-05-16 PROCEDURE — 93000 ELECTROCARDIOGRAM COMPLETE: CPT | Performed by: INTERNAL MEDICINE

## 2017-05-16 RX ORDER — NITROGLYCERIN 0.4 MG/1
0.4 TABLET SUBLINGUAL EVERY 5 MIN PRN
Qty: 25 TABLET | Refills: 0 | Status: SHIPPED | OUTPATIENT
Start: 2017-05-16 | End: 2019-05-15

## 2017-05-16 NOTE — PROGRESS NOTES
HPI and Plan:   See dictation  [id number]    Orders Placed This Encounter   Procedures     Follow-Up with Cardiologist     EKG 12-lead complete w/read - Clinics (performed today)     Holter Monitor 24 hour - Adult       Orders Placed This Encounter   Medications     nitroglycerin (NITROSTAT) 0.4 MG sublingual tablet     Sig: Place 1 tablet (0.4 mg) under the tongue every 5 minutes as needed for chest pain     Dispense:  25 tablet     Refill:  0       Medications Discontinued During This Encounter   Medication Reason     nitroglycerin (NITROSTAT) 0.4 MG SL tablet Reorder     propranolol (INDERAL) 20 MG tablet          Encounter Diagnoses   Name Primary?     Coronary artery disease involving native coronary artery of native heart without angina pectoris      Sinus bradycardia      Hyperlipidemia LDL goal <100      Sick sinus syndrome (H) Yes     Familial tremor        CURRENT MEDICATIONS:  Current Outpatient Prescriptions   Medication Sig Dispense Refill     nitroglycerin (NITROSTAT) 0.4 MG sublingual tablet Place 1 tablet (0.4 mg) under the tongue every 5 minutes as needed for chest pain 25 tablet 0     atorvastatin (LIPITOR) 40 MG tablet Take 1 tablet (40 mg) by mouth daily 90 tablet 3     levothyroxine (SYNTHROID/LEVOTHROID) 100 MCG tablet Take 1 tablet (100 mcg) by mouth daily 90 tablet 3     Polyethylene Glycol 3350 (MIRALAX PO) Take by mouth as needed       ASPIRIN EC PO Take 81 mg by mouth daily       Multiple Vitamins-Minerals (CENTRUM SILVER PO) Take 1 tablet by mouth daily.       [DISCONTINUED] nitroglycerin (NITROSTAT) 0.4 MG SL tablet Place 1 tablet (0.4 mg) under the tongue every 5 minutes as needed for chest pain 25 tablet 0       ALLERGIES     Allergies   Allergen Reactions     No Known Drug Allergies        PAST MEDICAL HISTORY:  Past Medical History:   Diagnosis Date     Bradycardia      CAD (coronary artery disease) 5/13/14    NSTEMI; PTCA/stent to OM1     Degeneration of lumbar or lumbosacral  intervertebral disc      Familial tremor      Herpes zoster without mention of complication      Hyperlipidaemia      MGUS (monoclonal gammopathy of unknown significance)      Myocardial infarction (H)     NSTEMI 5/14     Other and unspecified hyperlipidemia      Prostatitis, unspecified      Sensorineural hearing loss, unspecified      Unspecified hypothyroidism        PAST SURGICAL HISTORY:  Past Surgical History:   Procedure Laterality Date     C NONSPECIFIC PROCEDURE  2010    Laparoscopic cholecystectomy.      CHOLECYSTECTOMY       CORONARY ANGIOGRAPHY ADULT ORDER  5/14/14    PTCA w/YOGESH to OM1     HEART CATH, ANGIOPLASTY  5/14/14    YOGESH to OM1       FAMILY HISTORY:  Family History   Problem Relation Age of Onset     CANCER Mother      Genitourinary Problems Father 99     complications from surgery     HEART DISEASE Brother 72     MI       SOCIAL HISTORY:  Social History     Social History     Marital status:      Spouse name: N/A     Number of children: N/A     Years of education: N/A     Social History Main Topics     Smoking status: Former Smoker     Packs/day: 0.50     Years: 16.00     Quit date: 1/1/1967     Smokeless tobacco: Never Used     Alcohol use 0.0 oz/week     0 Standard drinks or equivalent per week      Comment: 4 drinks week     Drug use: No     Sexual activity: No     Other Topics Concern     Caffeine Concern Yes     2 cups coffee/day     Sleep Concern No     Weight Concern Yes     limiting alcohol to watch calories     Exercise Yes     Stationary bike  weights and aerobics 4 times week     Seat Belt Yes     Parent/Sibling W/ Cabg, Mi Or Angioplasty Before 65f 55m? No     Social History Narrative       Review of Systems:  Skin:  Positive for lumps or bumps     Eyes:  Positive for glasses    ENT:  Negative      Respiratory:  Negative       Cardiovascular:  Negative;chest pain;palpitations;fatigue;cyanosis;syncope or near-syncope;exercise intolerance;edema;lightheadedness;dizziness Positive  "for;lower extremity symptoms    Gastroenterology:        Genitourinary:  Positive for nocturia    Musculoskeletal:  Negative      Neurologic:  Positive for numbness or tingling of feet;tremors    Psychiatric:  Positive for sleep disturbances    Heme/Lymph/Imm:  Negative      Endocrine:  Positive for thyroid disorder      Physical Exam:  Vitals: /62 (BP Location: Left arm, Cuff Size: Adult Large)  Pulse (!) 44  Ht 1.816 m (5' 11.5\")  Wt 87.7 kg (193 lb 6.4 oz)  BMI 26.6 kg/m2    Constitutional:  cooperative;alert and oriented        Skin:  warm and dry to the touch        Head:  normocephalic, no masses or lesions        Eyes:  pupils equal and round;conjunctivae and lids unremarkable        ENT:  no pallor or cyanosis, dentition good        Neck:  JVP normal        Chest:  clear to auscultation          Cardiac: normal S1 and S2;no murmurs, gallops or rubs detected bradycardic                Abdomen:  abdomen soft;BS normoactive        Vascular: not assessed this visit                                        Extremities and Back:  no edema         varicosities    Neurological:  affect appropriate, oriented to time, person and place fine tremors            CC  Unruly Huggins MD   [facility]  [address]  [place] [address]              "

## 2017-05-16 NOTE — PROGRESS NOTES
HISTORY OF PRESENT ILLNESS:  I had the pleasure of seeing Mr. Elizondo in Cardiology Clinic in followup.  He is a pleasant 83-year-old male with history of non-ST elevation MI in 2014 with a circumflex stent.  He has a normal ejection fraction and mild dilatation of the ascending aorta.  He has been on propranolol for several years for essential tremor.  This is familial.  He has had previous sinus bradycardia but today, his heart rate was even lower in the 40s to 30s, therefore, an EKG was done which was reviewed by me and revealed sinus bradycardia with blocked PAC/sinus pauses.  He may have underlying sick sinus syndrome.        He denies any presyncope, near syncope, although gets occasional dizziness when he gets up from sitting or lying down position.  That is transient.      He is on levothyroxine for hypothyroidism, but his last TSH in February was normal.      PHYSICAL EXAMINATION:   VITAL SIGNS:  Blood pressure 106/62, pulse 40 per minute and regular.   CARDIAC:  Regular S1, S2 with distant heart sounds, no murmurs.   CHEST:  Clear to auscultation.      IMPRESSION:  Sinus bradycardia slices sinus syndrome, exacerbated by propranolol which is at low dose for familiar essential tremor.  He has occasional dizziness which I am not convinced this is clearly related to the sinus bradycardia, but he does have a sinus pause.  I am concerned that continuation of propranolol may worsen his sick sinus syndrome and may lead to long pauses and subsequent presyncopal or syncopal episodes.        I reviewed with him the possibility of stopping propranolol and seeing if his tremor worsens.  If his tremor does worsen and he needs propranolol, we might have to put in a pacemaker.        The other option is to continue propranolol and refer him to EP for a pacemaker.        At this time, the patient prefers to hold off on propranolol and see if he really has any major benefit from this medication with respect to his tremors.   We will take him off today.  I have recommended a Holter in a month to see what his heart rates were doing off beta blockers.  At next visit, we will review the Holter and decide further plan.  If there are significant pauses off propranolol, he might need a pacemaker anyway.  On the other hand, if his tremors get worse and he needs to go back on propranolol, we may have to put in pacemaker while on beta blockers.  If he is doing well without propranolol in terms of his tremor and his heart rates are not too slow, we can continue observation.        I will see him back in followup in a month with a repeat Holter.  Propranolol will be stopped today.      cc:      Steven Wagoner MD    [facility]   [address] [place] [id number]         KIRIT BALDWIN MD             D: 2017 08:44   T: 2017 13:18   MT: JONATHAN      Name:     DIANNA CUMMINGS   MRN:      [id number]        Account:      [id number]   :      10/19/1933           Service Date: 2017      Document: [id number]

## 2017-05-16 NOTE — MR AVS SNAPSHOT
"              After Visit Summary   5/16/2017    Moses Elizondo    MRN: [id number]           Patient Information     Date Of Birth          10/19/1933        Visit Information        Provider Department      5/16/2017 8:15 AM Unruly Huggins MD [facility] HEART AT [place]        Today's Diagnoses     Sick sinus syndrome (H)    -  1    Coronary artery disease involving native coronary artery of native heart without angina pectoris        Sinus bradycardia        Hyperlipidemia LDL goal <100        Familial tremor           Follow-ups after your visit        Additional Services     Follow-Up with Cardiologist                 Future tests that were ordered for you today     Open Future Orders        Priority Expected Expires Ordered    Holter Monitor 24 hour - Adult Routine 6/15/2017 5/16/2018 5/16/2017    Follow-Up with Cardiologist Routine 6/15/2017 5/16/2018 5/16/2017            Who to contact     If you have questions or need follow up information about today's clinic visit or your schedule please contact [facility] HEART [facility] directly at [phone].  Normal or non-critical lab and imaging results will be communicated to you by CamStenthart, letter or phone within 4 business days after the clinic has received the results. If you do not hear from us within 7 days, please contact the clinic through Icecreamlabst or phone. If you have a critical or abnormal lab result, we will notify you by phone as soon as possible.  Submit refill requests through OralWise or call your pharmacy and they will forward the refill request to us. Please allow 3 business days for your refill to be completed.          Additional Information About Your Visit        CamStenthart Information     OralWise lets you send messages to your doctor, view your test results, renew your prescriptions, schedule appointments and more. To sign up, go to [email or web address] . Click on \"Log in\" on the " "left side of the screen, which will take you to the Welcome page. Then click on \"Sign up Now\" on the right side of the page.     You will be asked to enter the access code listed below, as well as some personal information. Please follow the directions to create your username and password.     Your access code is: [email or web address]  Expires: 2017  9:42 AM     Your access code will  in 90 days. If you need help or a new code, please call your [place] clinic or [phone].        Care EveryWhere ID     This is your Care EveryWhere ID. This could be used by other organizations to access your [place] medical records  [id number]        Your Vitals Were     Pulse Height BMI (Body Mass Index)             44 1.816 m (5' 11.5\") 26.6 kg/m2          Blood Pressure from Last 3 Encounters:   17 106/62   17 118/72   16 108/58    Weight from Last 3 Encounters:   17 87.7 kg (193 lb 6.4 oz)   17 86.5 kg (190 lb 12.8 oz)   16 86.6 kg (191 lb)              We Performed the Following     EKG 12-lead complete w/read - Clinics (performed today)     Follow-Up with Cardiologist          Today's Medication Changes          These changes are accurate as of: 17  8:39 AM.  If you have any questions, ask your nurse or doctor.               Stop taking these medicines if you haven't already. Please contact your care team if you have questions.     propranolol 20 MG tablet   Commonly known as:  INDERAL   Stopped by:  Unruly Huggins MD                Where to get your medicines      These medications were sent to [facility] - [place], [place] - [address] AT [facility]  & [address], [facility] [address]    Hours:  24-hours Phone:  [phone]     nitroglycerin 0.4 MG sublingual tablet                Primary Care Provider Office Phone # Fax #    Steven Wagoner [phone]       [facility] [address] [facility] [address]        Goals        General    I will make a future appointment with Cardiac Rehabilitation  (pt-stated)     Notes - Note edited  5/23/2014 11:47 AM by Rita Gonzalez RN    As of today's date 5/23/2014 goal is met at 76 - 100%.   Goal Status:  Complete      I will review heart attack educational materials given to me by the hospital staff  (pt-stated)     Notes - Note edited  5/23/2014 11:48 AM by Rita Gonzalez RN    As of today's date 5/23/2014 goal is met at 51 - 75%.   Goal Status:  Active        Thank you!     Thank you for choosing [facility] HEART AT [place]  for your care. Our goal is always to provide you with excellent care. Hearing back from our patients is one way we can continue to improve our services. Please take a few minutes to complete the written survey that you may receive in the mail after your visit with us. Thank you!             Your Updated Medication List - Protect others around you: Learn how to safely use, store and throw away your medicines at www.disposemymeds.org.          This list is accurate as of: 5/16/17  8:39 AM.  Always use your most recent med list.                   Brand Name Dispense Instructions for use    ASPIRIN EC PO      Take 81 mg by mouth daily       atorvastatin 40 MG tablet    LIPITOR    90 tablet    Take 1 tablet (40 mg) by mouth daily       CENTRUM SILVER PO      Take 1 tablet by mouth daily.       levothyroxine 100 MCG tablet    SYNTHROID/LEVOTHROID    90 tablet    Take 1 tablet (100 mcg) by mouth daily       MIRALAX PO      Take by mouth as needed       nitroglycerin 0.4 MG sublingual tablet    NITROSTAT    25 tablet    Place 1 tablet (0.4 mg) under the tongue every 5 minutes as needed for chest pain

## 2017-05-16 NOTE — LETTER
5/16/2017    Steven Wagoner MD  [facility]   [address]  [facility] [address]    RE: Moses MONTERO Elizonod       Dear Colleague,    I had the pleasure of seeing Mr. Elizondo in Cardiology Clinic in followup.  He is a pleasant 83-year-old male with history of non-ST elevation MI in 2014 with a circumflex stent.  He has a normal ejection fraction and mild dilatation of the ascending aorta.  He has been on propranolol for several years for essential tremor.  This is familial.  He has had previous sinus bradycardia but today, his heart rate was even lower in the 40s to 30s, therefore, an EKG was done which was reviewed by me and revealed sinus bradycardia with blocked PAC/sinus pauses.  He may have underlying sick sinus syndrome.        He denies any presyncope, near syncope, although gets occasional dizziness when he gets up from sitting or lying down position.  That is transient.      He is on levothyroxine for hypothyroidism, but his last TSH in February was normal.      PHYSICAL EXAMINATION:   VITAL SIGNS:  Blood pressure 106/62, pulse 40 per minute and regular.   CARDIAC:  Regular S1, S2 with distant heart sounds, no murmurs.   CHEST:  Clear to auscultation.     Outpatient Encounter Prescriptions as of 5/16/2017   Medication Sig Dispense Refill     nitroglycerin (NITROSTAT) 0.4 MG sublingual tablet Place 1 tablet (0.4 mg) under the tongue every 5 minutes as needed for chest pain 25 tablet 0     atorvastatin (LIPITOR) 40 MG tablet Take 1 tablet (40 mg) by mouth daily 90 tablet 3     levothyroxine (SYNTHROID/LEVOTHROID) 100 MCG tablet Take 1 tablet (100 mcg) by mouth daily 90 tablet 3     Polyethylene Glycol 3350 (MIRALAX PO) Take by mouth as needed       ASPIRIN EC PO Take 81 mg by mouth daily       Multiple Vitamins-Minerals (CENTRUM SILVER PO) Take 1 tablet by mouth daily.       [DISCONTINUED] propranolol (INDERAL) 20 MG tablet Take 1 tablet (20 mg) by mouth daily 90 tablet 3     [DISCONTINUED]  nitroglycerin (NITROSTAT) 0.4 MG SL tablet Place 1 tablet (0.4 mg) under the tongue every 5 minutes as needed for chest pain 25 tablet 0     No facility-administered encounter medications on file as of 5/16/2017.       IMPRESSION:  Sinus bradycardia slices sinus syndrome, exacerbated by propranolol which is at low dose for familiar essential tremor.  He has occasional dizziness which I am not convinced this is clearly related to the sinus bradycardia, but he does have a sinus pause.  I am concerned that continuation of propranolol may worsen his sick sinus syndrome and may lead to long pauses and subsequent presyncopal or syncopal episodes.        I reviewed with him the possibility of stopping propranolol and seeing if his tremor worsens.  If his tremor does worsen and he needs propranolol, we might have to put in a pacemaker.        The other option is to continue propranolol and refer him to EP for a pacemaker.        At this time, the patient prefers to hold off on propranolol and see if he really has any major benefit from this medication with respect to his tremors.  We will take him off today.  I have recommended a Holter in a month to see what his heart rates were doing off beta blockers.  At next visit, we will review the Holter and decide further plan.  If there are significant pauses off propranolol, he might need a pacemaker anyway.  On the other hand, if his tremors get worse and he needs to go back on propranolol, we may have to put in pacemaker while on beta blockers.  If he is doing well without propranolol in terms of his tremor and his heart rates are not too slow, we can continue observation.        I will see him back in followup in a month with a repeat Holter.  Propranolol will be stopped today.     Again, thank you for allowing me to participate in the care of your patient.      Sincerely,    Unruly Huggins MD     [facility]

## 2017-06-13 ENCOUNTER — HOSPITAL ENCOUNTER (OUTPATIENT)
Dept: CARDIOLOGY | Facility: CLINIC | Age: 82
Discharge: HOME | End: 2017-06-13
Attending: INTERNAL MEDICINE | Admitting: INTERNAL MEDICINE
Payer: MEDICARE

## 2017-06-13 DIAGNOSIS — I25.10 CORONARY ARTERY DISEASE INVOLVING NATIVE CORONARY ARTERY OF NATIVE HEART WITHOUT ANGINA PECTORIS: ICD-10-CM

## 2017-06-13 DIAGNOSIS — I49.5 SICK SINUS SYNDROME (H): ICD-10-CM

## 2017-06-13 PROCEDURE — 93227 XTRNL ECG REC<48 HR R&I: CPT | Performed by: INTERNAL MEDICINE

## 2017-06-13 PROCEDURE — 93225 XTRNL ECG REC<48 HRS REC: CPT

## 2017-06-19 ENCOUNTER — OFFICE VISIT (OUTPATIENT)
Dept: CARDIOLOGY | Facility: CLINIC | Age: 82
End: 2017-06-19
Attending: INTERNAL MEDICINE
Payer: COMMERCIAL

## 2017-06-19 VITALS
SYSTOLIC BLOOD PRESSURE: 110 MMHG | DIASTOLIC BLOOD PRESSURE: 64 MMHG | WEIGHT: 192 LBS | BODY MASS INDEX: 26.01 KG/M2 | HEIGHT: 72 IN | HEART RATE: 56 BPM

## 2017-06-19 DIAGNOSIS — I77.810 AORTIC ROOT DILATATION (H): ICD-10-CM

## 2017-06-19 DIAGNOSIS — I49.5 SICK SINUS SYNDROME (H): Primary | ICD-10-CM

## 2017-06-19 DIAGNOSIS — I25.10 CORONARY ARTERY DISEASE INVOLVING NATIVE CORONARY ARTERY OF NATIVE HEART WITHOUT ANGINA PECTORIS: ICD-10-CM

## 2017-06-19 PROCEDURE — 99214 OFFICE O/P EST MOD 30 MIN: CPT | Performed by: INTERNAL MEDICINE

## 2017-06-19 NOTE — MR AVS SNAPSHOT
"              After Visit Summary   6/19/2017    Moses Elizondo    MRN: [id number]           Patient Information     Date Of Birth          10/19/1933        Visit Information        Provider Department      6/19/2017 1:45 PM Unruly Huggins MD [facility] AT [place]        Today's Diagnoses     Sick sinus syndrome (H)    -  1    Coronary artery disease involving native coronary artery of native heart without angina pectoris        Aortic root dilatation (H)           Follow-ups after your visit        Additional Services     Follow-Up with Cardiologist                 Future tests that were ordered for you today     Open Future Orders        Priority Expected Expires Ordered    Echocardiogram Routine 6/19/2018 7/24/2018 6/19/2017    Follow-Up with Cardiologist Routine 6/19/2018 11/1/2018 6/19/2017    Holter Monitor 24 hour - Adult Routine 6/19/2018 7/24/2018 6/19/2017            Who to contact     If you have questions or need follow up information about today's clinic visit or your schedule please contact [facility] HEART [facility] directly at [phone].  Normal or non-critical lab and imaging results will be communicated to you by Patronpathhart, letter or phone within 4 business days after the clinic has received the results. If you do not hear from us within 7 days, please contact the clinic through Mobuit or phone. If you have a critical or abnormal lab result, we will notify you by phone as soon as possible.  Submit refill requests through Coradiant or call your pharmacy and they will forward the refill request to us. Please allow 3 business days for your refill to be completed.          Additional Information About Your Visit        Patronpathhart Information     Coradiant lets you send messages to your doctor, view your test results, renew your prescriptions, schedule appointments and more. To sign up, go to [email or web address] . Click on \"Log in\" on " "the left side of the screen, which will take you to the Welcome page. Then click on \"Sign up Now\" on the right side of the page.     You will be asked to enter the access code listed below, as well as some personal information. Please follow the directions to create your username and password.     Your access code is: [email or web address]  Expires: 2017  2:01 PM     Your access code will  in 90 days. If you need help or a new code, please call your [place] clinic or [phone].        Care EveryWhere ID     This is your Care EveryWhere ID. This could be used by other organizations to access your [place] medical records  [id number]        Your Vitals Were     Pulse Height BMI (Body Mass Index)             56 1.816 m (5' 11.5\") 26.41 kg/m2          Blood Pressure from Last 3 Encounters:   17 110/64   17 106/62   17 118/72    Weight from Last 3 Encounters:   17 87.1 kg (192 lb)   17 87.7 kg (193 lb 6.4 oz)   17 86.5 kg (190 lb 12.8 oz)              We Performed the Following     Follow-Up with Cardiologist        Primary Care Provider Office Phone # Fax #    Steven Wagoner [phone]       [facility] [address] [facility] [address]        Goals        General    I will make a future appointment with Cardiac Rehabilitation  (pt-stated)     Notes - Note edited  2014 11:47 AM by Rita Gonzalez, RN    As of today's date 2014 goal is met at 76 - 100%.   Goal Status:  Complete      I will review heart attack educational materials given to me by the hospital staff  (pt-stated)     Notes - Note edited  2014 11:48 AM by Rita Gonzalez RN    As of today's date 2014 goal is met at 51 - 75%.   Goal Status:  Active        Thank you!     Thank you for choosing [facility] AT [place]  for your care. Our goal is always to provide you with excellent care. Hearing back from our patients is one way we " can continue to improve our services. Please take a few minutes to complete the written survey that you may receive in the mail after your visit with us. Thank you!             Your Updated Medication List - Protect others around you: Learn how to safely use, store and throw away your medicines at www.disposemymeds.org.          This list is accurate as of: 6/19/17  2:01 PM.  Always use your most recent med list.                   Brand Name Dispense Instructions for use    ASPIRIN EC PO      Take 81 mg by mouth daily       atorvastatin 40 MG tablet    LIPITOR    90 tablet    Take 1 tablet (40 mg) by mouth daily       CENTRUM SILVER PO      Take 1 tablet by mouth daily.       levothyroxine 100 MCG tablet    SYNTHROID/LEVOTHROID    90 tablet    Take 1 tablet (100 mcg) by mouth daily       MIRALAX PO      Take by mouth as needed       nitroglycerin 0.4 MG sublingual tablet    NITROSTAT    25 tablet    Place 1 tablet (0.4 mg) under the tongue every 5 minutes as needed for chest pain

## 2017-06-19 NOTE — PROGRESS NOTES
HPI and Plan:   See dictation  [id number]    Orders Placed This Encounter   Procedures     Follow-Up with Cardiologist     Holter Monitor 24 hour - Adult     Echocardiogram       No orders of the defined types were placed in this encounter.      There are no discontinued medications.      Encounter Diagnoses   Name Primary?     Coronary artery disease involving native coronary artery of native heart without angina pectoris      Sick sinus syndrome (H) Yes     Aortic root dilatation (H)        CURRENT MEDICATIONS:  Current Outpatient Prescriptions   Medication Sig Dispense Refill     nitroglycerin (NITROSTAT) 0.4 MG sublingual tablet Place 1 tablet (0.4 mg) under the tongue every 5 minutes as needed for chest pain 25 tablet 0     atorvastatin (LIPITOR) 40 MG tablet Take 1 tablet (40 mg) by mouth daily 90 tablet 3     levothyroxine (SYNTHROID/LEVOTHROID) 100 MCG tablet Take 1 tablet (100 mcg) by mouth daily 90 tablet 3     Polyethylene Glycol 3350 (MIRALAX PO) Take by mouth as needed       ASPIRIN EC PO Take 81 mg by mouth daily       Multiple Vitamins-Minerals (CENTRUM SILVER PO) Take 1 tablet by mouth daily.         ALLERGIES     Allergies   Allergen Reactions     No Known Drug Allergies        PAST MEDICAL HISTORY:  Past Medical History:   Diagnosis Date     Bradycardia      CAD (coronary artery disease) 5/13/14    NSTEMI; PTCA/stent to OM1     Degeneration of lumbar or lumbosacral intervertebral disc      Familial tremor      Herpes zoster without mention of complication      Hyperlipidaemia      MGUS (monoclonal gammopathy of unknown significance)      Myocardial infarction (H)     NSTEMI 5/14     Other and unspecified hyperlipidemia      Prostatitis, unspecified      Sensorineural hearing loss, unspecified      Unspecified hypothyroidism        PAST SURGICAL HISTORY:  Past Surgical History:   Procedure Laterality Date     C NONSPECIFIC PROCEDURE  2010    Laparoscopic cholecystectomy.      CHOLECYSTECTOMY        "CORONARY ANGIOGRAPHY ADULT ORDER  5/14/14    PTCA w/YOGESH to OM1     HEART CATH, ANGIOPLASTY  5/14/14    YOGESH to OM1       FAMILY HISTORY:  Family History   Problem Relation Age of Onset     CANCER Mother      Genitourinary Problems Father 99     complications from surgery     HEART DISEASE Brother 72     MI       SOCIAL HISTORY:  Social History     Social History     Marital status:      Spouse name: N/A     Number of children: N/A     Years of education: N/A     Social History Main Topics     Smoking status: Former Smoker     Packs/day: 0.50     Years: 16.00     Quit date: 1/1/1967     Smokeless tobacco: Never Used     Alcohol use 0.0 oz/week     0 Standard drinks or equivalent per week      Comment: 4 drinks week     Drug use: No     Sexual activity: No     Other Topics Concern     Caffeine Concern Yes     2 cups coffee/day     Sleep Concern No     Weight Concern Yes     limiting alcohol to watch calories     Exercise Yes     Stationary bike  weights and aerobics 4 times week     Seat Belt Yes     Parent/Sibling W/ Cabg, Mi Or Angioplasty Before 65f 55m? No     Social History Narrative       Review of Systems:  Skin:          Eyes:         ENT:         Respiratory:          Cardiovascular:         Gastroenterology:        Genitourinary:         Musculoskeletal:         Neurologic:         Psychiatric:         Heme/Lymph/Imm:         Endocrine:           Physical Exam:  Vitals: /64  Pulse 56  Ht 1.816 m (5' 11.5\")  Wt 87.1 kg (192 lb)  BMI 26.41 kg/m2    Constitutional:  cooperative;alert and oriented        Skin:  warm and dry to the touch        Head:  normocephalic, no masses or lesions        Eyes:  pupils equal and round;conjunctivae and lids unremarkable        ENT:  no pallor or cyanosis, dentition good        Neck:  JVP normal        Chest:  clear to auscultation          Cardiac: normal S1 and S2;no murmurs, gallops or rubs detected bradycardic                Abdomen:  abdomen soft;BS " normoactive        Vascular: not assessed this visit                                        Extremities and Back:  no edema         varicosities    Neurological:  affect appropriate, oriented to time, person and place fine tremors            CC  Unruly Huggins MD   [facility]  [address]  [address]  [place] [address]

## 2017-06-19 NOTE — LETTER
2017             Steven Wagoner MD   [facility]    [address] [address]      RE: Moses Elizondo   MRN: [id number]   : 10/19/1933      Dear Castro:       I had the pleasure of seeing Moses Elizondo in Cardiology Clinic in followup for his sick sinus syndrome.  He has a history of non-ST elevation MI in  with a circumflex stent.  Ejection fraction is normal.  He has mild ascending aorta and aortic root enlargement.  When I saw him about a month ago, he was having bradycardia, although asymptomatic.  EKG shows sinus bradycardia with blocked PACs.  His heart rates were in the low 40s to high 30s.  He was asymptomatic.  However, he was on Inderal for essential tremor.  His TSH was normal.  We talked about the Inderal causing bradycardia, and he was willing to come off it.  He is now off Inderal, and we did a Holter off Inderal, which showed sinus rhythm with heart rates into the 30s-40s at nighttime, but no significant pauses.  The longest pause was 2.77 seconds.  Short SVT runs were noted.  Occasional PVCs were seen.  Without the propranolol, his tremor is only very slightly worse.  He thinks he can stay off of propranolol or if required maybe start it instead of once a day to every other day.      PHYSICAL EXAMINATION:   VITAL SIGNS:  Blood pressure 110/64, pulse 56 per minute and regular.     CARDIOPULMONARY:  Unremarkable.        IMPRESSION:   1.  Sick sinus syndrome, worse on propranolol.  Now somewhat improved off Inderal.  His Holter does not show any significant pauses.  At this time he is willing to be off the Inderal.  If his tremors get worse, he might consider starting it every other day.  I told him if he does want to start it, he should call us.  If his tremors get significantly worse and he needs a beta blocker, we may have to send him to Electrophysiology to discuss the possibility of pacemaker placement.  He is somewhat reluctant in getting a  pacemaker, even if he needs it.   2.  Coronary artery disease, stable with no angina.  He is on aspirin.   3.  Hyperlipidemia, on atorvastatin 40 mg per day.  Last LDL was 50, and HDL was 46.   4.  Aortic root enlargement.  He has mild aortic root and ascending aorta enlargement on his echocardiogram in 2016.  We will repeat one next year.        PLAN:   1.  Hold off on Inderal.   2.  Continue observation.  Call me if there is worsening fatigue, dizziness or syncope.   3.  I will see him in a year with a repeat Holter and echocardiogram prior to visit with me.      Sincerely,      Unruly Huggins MD

## 2017-06-20 NOTE — PROGRESS NOTES
2017             Steven Wagoner MD   [facility]    [address] [address]      RE: Moses Elizondo   MRN: [id number]   : 10/19/1933      Dear Castro:       I had the pleasure of seeing Moses Elizondo in Cardiology Clinic in followup for his sick sinus syndrome.  He has a history of non-ST elevation MI in  with a circumflex stent.  Ejection fraction is normal.  He has mild ascending aorta and aortic root enlargement.  When I saw him about a month ago, he was having bradycardia, although asymptomatic.  EKG shows sinus bradycardia with blocked PACs.  His heart rates were in the low 40s to high 30s.  He was asymptomatic.  However, he was on Inderal for essential tremor.  His TSH was normal.  We talked about the Inderal causing bradycardia, and he was willing to come off it.  He is now off Inderal, and we did a Holter off Inderal, which showed sinus rhythm with heart rates into the 30s-40s at nighttime, but no significant pauses.  The longest pause was 2.77 seconds.  Short SVT runs were noted.  Occasional PVCs were seen.  Without the propranolol, his tremor is only very slightly worse.  He thinks he can stay off of propranolol or if required maybe start it instead of once a day to every other day.      PHYSICAL EXAMINATION:   VITAL SIGNS:  Blood pressure 110/64, pulse 56 per minute and regular.     CARDIOPULMONARY:  Unremarkable.        IMPRESSION:   1.  Sick sinus syndrome, worse on propranolol.  Now somewhat improved off Inderal.  His Holter does not show any significant pauses.  At this time he is willing to be off the Inderal.  If his tremors get worse, he might consider starting it every other day.  I told him if he does want to start it, he should call us.  If his tremors get significantly worse and he needs a beta blocker, we may have to send him to Electrophysiology to discuss the possibility of pacemaker placement.  He is somewhat reluctant in getting a  pacemaker, even if he needs it.   2.  Coronary artery disease, stable with no angina.  He is on aspirin.   3.  Hyperlipidemia, on atorvastatin 40 mg per day.  Last LDL was 50, and HDL was 46.   4.  Aortic root enlargement.  He has mild aortic root and ascending aorta enlargement on his echocardiogram in 2016.  We will repeat one next year.        PLAN:   1.  Hold off on Inderal.   2.  Continue observation.  Call me if there is worsening fatigue, dizziness or syncope.   3.  I will see him in a year with a repeat Holter and echocardiogram prior to visit with me.      Sincerely,      MD KIRIT Johnson MD             D: 2017 14:25   T: 2017 11:59   MT: virgie      Name:     DIANNA CUMMINGS   MRN:      [id number]        Account:      [id number]   :      10/19/1933           Service Date: 2017      Document: [id number]

## 2018-02-23 DIAGNOSIS — E78.5 HYPERLIPIDEMIA LDL GOAL <100: Primary | ICD-10-CM

## 2018-02-23 DIAGNOSIS — I21.4 NON-STEMI (NON-ST ELEVATED MYOCARDIAL INFARCTION) (H): ICD-10-CM

## 2018-02-23 DIAGNOSIS — E03.9 HYPOTHYROIDISM, UNSPECIFIED TYPE: ICD-10-CM

## 2018-02-23 DIAGNOSIS — I25.810 CORONARY ARTERY DISEASE INVOLVING CORONARY BYPASS GRAFT OF NATIVE HEART WITHOUT ANGINA PECTORIS: ICD-10-CM

## 2018-02-24 NOTE — TELEPHONE ENCOUNTER
"Requested Prescriptions   Pending Prescriptions Disp Refills     levothyroxine (SYNTHROID/LEVOTHROID) 100 MCG tablet [Pharmacy Med Name: LEVOTHYROXINE 0.100MG (100MCG) TAB] 90 tablet 0    Last Written Prescription Date:  02/21/17  Last Fill Quantity: 90 tab,  # refills: 3   Last office visit: 2/21/2017 with prescribing provider:  effie   Future Office Visit:     Sig: TAKE 1 TABLET BY MOUTH EVERY DAY    Thyroid Protocol Failed    2/23/2018  3:45 PM       Failed - Recent or future visit with authorizing provider's specialty    Patient had office visit in the last year or has a visit in the next 30 days with authorizing provider.  See \"Patient Info\" tab in inbasket, or \"Choose Columns\" in Meds & Orders section of the refill encounter.            Failed - Normal TSH on file in past 12 months    Recent Labs   Lab Test  02/21/17   0841   TSH  3.78             Passed - Patient is 12 years or older        atorvastatin (LIPITOR) 40 MG tablet [Pharmacy Med Name: ATORVASTATIN 40MG TABLETS] 90 tablet 0    Last Written Prescription Date:  02/21/17  Last Fill Quantity: 90 tab,  # refills: 3   Last office visit: 2/21/2017 with prescribing provider:  02/21/17   Future Office Visit:     Sig: TAKE 1 TABLET BY MOUTH EVERY DAY    Statins Protocol Failed    2/23/2018  3:45 PM       Failed - LDL on file in past 12 months    Recent Labs   Lab Test  02/21/17   0841   LDL  50            Failed - Recent or future visit with authorizing provider    Patient had office visit in the last year or has a visit in the next 30 days with authorizing provider.  See \"Patient Info\" tab in inbasket, or \"Choose Columns\" in Meds & Orders section of the refill encounter.            Passed - No abnormal creatine kinase in past 12 months    No lab results found.         Passed - Patient is age 18 or older          "

## 2018-02-26 RX ORDER — LEVOTHYROXINE SODIUM 100 UG/1
TABLET ORAL
Qty: 90 TABLET | Refills: 0 | OUTPATIENT
Start: 2018-02-26

## 2018-02-26 RX ORDER — ATORVASTATIN CALCIUM 40 MG/1
TABLET, FILM COATED ORAL
Qty: 90 TABLET | Refills: 0 | OUTPATIENT
Start: 2018-02-26

## 2018-02-27 DIAGNOSIS — E78.5 HYPERLIPIDEMIA LDL GOAL <100: ICD-10-CM

## 2018-02-27 DIAGNOSIS — E03.9 HYPOTHYROIDISM, UNSPECIFIED TYPE: ICD-10-CM

## 2018-02-27 DIAGNOSIS — I25.810 CORONARY ARTERY DISEASE INVOLVING CORONARY BYPASS GRAFT OF NATIVE HEART WITHOUT ANGINA PECTORIS: ICD-10-CM

## 2018-02-27 LAB
ALBUMIN SERPL-MCNC: 3.4 G/DL (ref 3.4–5)
ALP SERPL-CCNC: 67 U/L (ref 40–150)
ALT SERPL W P-5'-P-CCNC: 25 U/L (ref 0–70)
ANION GAP SERPL CALCULATED.3IONS-SCNC: 6 MMOL/L (ref 3–14)
AST SERPL W P-5'-P-CCNC: 34 U/L (ref 0–45)
BILIRUB SERPL-MCNC: 1.1 MG/DL (ref 0.2–1.3)
BUN SERPL-MCNC: 22 MG/DL (ref 7–30)
CALCIUM SERPL-MCNC: 9 MG/DL (ref 8.5–10.1)
CHLORIDE SERPL-SCNC: 107 MMOL/L (ref 94–109)
CHOLEST SERPL-MCNC: 101 MG/DL
CO2 SERPL-SCNC: 27 MMOL/L (ref 20–32)
CREAT SERPL-MCNC: 1.22 MG/DL (ref 0.66–1.25)
CREAT UR-MCNC: 221 MG/DL
ERYTHROCYTE [DISTWIDTH] IN BLOOD BY AUTOMATED COUNT: 12.4 % (ref 10–15)
GFR SERPL CREATININE-BSD FRML MDRD: 57 ML/MIN/1.7M2
GLUCOSE SERPL-MCNC: 84 MG/DL (ref 70–99)
HCT VFR BLD AUTO: 41 % (ref 40–53)
HDLC SERPL-MCNC: 49 MG/DL
HGB BLD-MCNC: 13.5 G/DL (ref 13.3–17.7)
LDLC SERPL CALC-MCNC: 40 MG/DL
MCH RBC QN AUTO: 29.5 PG (ref 26.5–33)
MCHC RBC AUTO-ENTMCNC: 32.9 G/DL (ref 31.5–36.5)
MCV RBC AUTO: 90 FL (ref 78–100)
MICROALBUMIN UR-MCNC: 8 MG/L
MICROALBUMIN/CREAT UR: 3.8 MG/G CR (ref 0–17)
NONHDLC SERPL-MCNC: 52 MG/DL
PLATELET # BLD AUTO: 137 10E9/L (ref 150–450)
POTASSIUM SERPL-SCNC: 3.9 MMOL/L (ref 3.4–5.3)
PROT SERPL-MCNC: 6.8 G/DL (ref 6.8–8.8)
RBC # BLD AUTO: 4.58 10E12/L (ref 4.4–5.9)
SODIUM SERPL-SCNC: 140 MMOL/L (ref 133–144)
TRIGL SERPL-MCNC: 59 MG/DL
TSH SERPL DL<=0.005 MIU/L-ACNC: 2.53 MU/L (ref 0.4–4)
WBC # BLD AUTO: 6.3 10E9/L (ref 4–11)

## 2018-02-27 PROCEDURE — 85027 COMPLETE CBC AUTOMATED: CPT | Performed by: INTERNAL MEDICINE

## 2018-02-27 PROCEDURE — 82043 UR ALBUMIN QUANTITATIVE: CPT | Performed by: INTERNAL MEDICINE

## 2018-02-27 PROCEDURE — 80053 COMPREHEN METABOLIC PANEL: CPT | Performed by: INTERNAL MEDICINE

## 2018-02-27 PROCEDURE — 84443 ASSAY THYROID STIM HORMONE: CPT | Performed by: INTERNAL MEDICINE

## 2018-02-27 PROCEDURE — 80061 LIPID PANEL: CPT | Performed by: INTERNAL MEDICINE

## 2018-02-27 PROCEDURE — 36415 COLL VENOUS BLD VENIPUNCTURE: CPT | Performed by: INTERNAL MEDICINE

## 2018-02-27 NOTE — TELEPHONE ENCOUNTER
Spoke to pt.  He is due for annual PE and fasting labs.  Lab appt scheduled for tomorrow morning and DR lopez schedule Friday morning.  Pt says he can wait until he sees Dr. Wagoner on Friday to get his RX's refilled.

## 2018-03-02 ENCOUNTER — OFFICE VISIT (OUTPATIENT)
Dept: INTERNAL MEDICINE | Facility: CLINIC | Age: 83
End: 2018-03-02
Payer: COMMERCIAL

## 2018-03-02 VITALS
HEIGHT: 70 IN | DIASTOLIC BLOOD PRESSURE: 60 MMHG | SYSTOLIC BLOOD PRESSURE: 114 MMHG | RESPIRATION RATE: 14 BRPM | HEART RATE: 47 BPM | OXYGEN SATURATION: 93 % | BODY MASS INDEX: 27.35 KG/M2 | WEIGHT: 191 LBS | TEMPERATURE: 98 F

## 2018-03-02 DIAGNOSIS — E03.9 HYPOTHYROIDISM, UNSPECIFIED TYPE: ICD-10-CM

## 2018-03-02 DIAGNOSIS — Z00.00 MEDICARE ANNUAL WELLNESS VISIT, SUBSEQUENT: Primary | ICD-10-CM

## 2018-03-02 DIAGNOSIS — I25.810 CORONARY ARTERY DISEASE INVOLVING CORONARY BYPASS GRAFT OF NATIVE HEART WITHOUT ANGINA PECTORIS: ICD-10-CM

## 2018-03-02 DIAGNOSIS — I21.4 NON-STEMI (NON-ST ELEVATED MYOCARDIAL INFARCTION) (H): ICD-10-CM

## 2018-03-02 PROCEDURE — 99397 PER PM REEVAL EST PAT 65+ YR: CPT | Performed by: INTERNAL MEDICINE

## 2018-03-02 RX ORDER — LEVOTHYROXINE SODIUM 100 UG/1
100 TABLET ORAL DAILY
Qty: 90 TABLET | Refills: 3 | Status: SHIPPED | OUTPATIENT
Start: 2018-03-02 | End: 2019-02-25

## 2018-03-02 RX ORDER — ATORVASTATIN CALCIUM 40 MG/1
40 TABLET, FILM COATED ORAL DAILY
Qty: 90 TABLET | Refills: 3 | Status: SHIPPED | OUTPATIENT
Start: 2018-03-02 | End: 2019-02-25

## 2018-03-02 NOTE — MR AVS SNAPSHOT
"              After Visit Summary   3/2/2018    Moses Elizondo    MRN: [id number]           Patient Information     Date Of Birth          10/19/1933        Visit Information        Provider Department      3/2/2018 8:20 AM Steven Wagoner MD [facility]        Today's Diagnoses     Medicare annual wellness visit, subsequent    -  1    Non-STEMI (non-ST elevated myocardial infarction) (H)        Coronary artery disease        Hypothyroidism, unspecified type          Care Instructions    *  Continue all medications at the same doses.  Contact your usual pharmacy if you need refills.       - Statin help prevent plaque rupture and more plaque from being made.     - Aspirin helps prevent the platelets from causing the acute clot from plaque rupture      5 GOALS TO PREVENT VASCULAR DISEASE:     1.  Aggressive blood pressure control, under 130/80 ideally.  Using medications if needed.    Your blood pressure is under good control    BP Readings from Last 4 Encounters:   03/02/18 114/60   06/19/17 110/64   05/16/17 106/62   02/21/17 118/72       2.  Aggressive LDL cholesterol (\"bad cholesterol\") lowering as indicated.    Your goal is an LDL under 130 for sure, preferably under 100.  (If you have diabetes or previous vascular disease, the the LDL goals would be under 100 for sure, preferably under 70.)    New guidelines identify four high-risk groups who could benefit from statins:   *people with pre-existing heart disease, such as those who have had a heart attack;   *people ages 40 to 75 who have diabetes of any type  *patients ages 40 to 75 with at least a 7.5% risk of developing cardiovascular disease over the next decade, according to a formula described in the guidelines  *patients with the sort of super-high cholesterol that sometimes runs in families, as evidenced by an LDL of 190 milligrams per deciliter or higher    Your cholesterol levels are well controlled.    - Statin help " "prevent plaque rupture and more plaque from being made.       Recent Labs   Lab Test  02/27/18   0817  02/21/17   0841   07/08/14   0722  05/15/14   0522   CHOL  101  109   < >  102  118   HDL  49  46   < >  35*  34*   LDL  40  50   < >  52*  63   TRIG  59  66   < >  73  101   CHOLHDLRATIO   --    --    --   2.9  3.5    < > = values in this interval not displayed.       3.  Aggressive diabetic prevention, screening and/or management.      You do not have diabetes as of the most recent blood tests.     4.  No smoking    5.  Consider taking low dose aspirin (81 mg) tablet once per day over the age of 50, every day unless there is a specific reason that you cannot take aspirin (such as side effect, allergy, or you are on another \"blood thinner\").        - Aspirin helps prevent the platelets from causing the acute clot from plaque rupture      --Based on your current cardiac risk factors, you should take Aspirin 81 mg once per day if you are over 50 years of age.                Preventive Health Recommendations:       Male Ages 65 and over    Yearly exam:             See your health care provider every year in order to  o   Review health changes.   o   Discuss preventive care.    o   Review your medicines if your doctor has prescribed any.    Talk with your health care provider about whether you should have a test to screen for prostate cancer (PSA).    Every 3 years, have a diabetes test (fasting glucose). If you are at risk for diabetes, you should have this test more often.    Every 5 years, have a cholesterol test. Have this test more often if you are at risk for high cholesterol or heart disease.     Every 10 years, have a colonoscopy. Or, have a yearly FIT test (stool test). These exams will check for colon cancer.    Talk to with your health care provider about screening for Abdominal Aortic Aneurysm if you have a family history of AAA or have a history of smoking.  Shots:     Get a flu shot each year.     Get a " "tetanus shot every 10 years.     Talk to your doctor about your pneumonia vaccines. There are now two you should receive - Pneumovax (PPSV 23) and Prevnar (PCV 13).    Talk to your doctor about a shingles vaccine.     Talk to your doctor about the hepatitis B vaccine.  Nutrition:     Eat at least 5 servings of fruits and vegetables each day.     Eat whole-grain bread, whole-wheat pasta and brown rice instead of white grains and rice.     Talk to your doctor about Calcium and Vitamin D.        --Good Grains:  Oats, brown rice, Quinoa (these do not raise the blood sugar as much)     --Bad grains:  Anything made from wheat or white rice     (because these raise the blood sugars significantly, and the possible gluten issue from wheat for some people).      --Proteins:  Aim for \"lean proteins\" including chicken, fish, seafood, pork, turkey, and eggs (in moderation); Eat red meat only occasionally      '    Moe Gilliam:                    Lifestyle    Exercise for at least 150 minutes a week (30 minutes a day, 5 days a week). This will help you control your weight and prevent disease.     Limit alcohol to one drink per day.     No smoking.     Wear sunscreen to prevent skin cancer.     See your dentist every six months for an exam and cleaning.     See your eye doctor every 1 to 2 years to screen for conditions such as glaucoma, macular degeneration and cataracts.          Follow-ups after your visit        Future tests that were ordered for you today     Open Future Orders        Priority Expected Expires Ordered    **CBC with platelets FUTURE 1yr Routine 1/31/2019 3/2/2019 3/2/2018    **Comprehensive metabolic panel FUTURE 1yr Routine 1/31/2019 3/2/2019 3/2/2018    **TSH with free T4 reflex FUTURE 1yr Routine 1/31/2019 3/2/2019 3/2/2018    Lipid panel reflex to direct LDL Fasting Routine 1/31/2019 3/2/2019 3/2/2018            Who to contact     If you have questions or need follow up information about today's clinic " "visit or your schedule please contact [facility] directly at [phone].  Normal or non-critical lab and imaging results will be communicated to you by MyChart, letter or phone within 4 business days after the clinic has received the results. If you do not hear from us within 7 days, please contact the clinic through MyChart or phone. If you have a critical or abnormal lab result, we will notify you by phone as soon as possible.  Submit refill requests through [employer] or call your pharmacy and they will forward the refill request to us. Please allow 3 business days for your refill to be completed.          Additional Information About Your Visit        MyChart Information     [employer] lets you send messages to your doctor, view your test results, renew your prescriptions, schedule appointments and more. To sign up, go to [email or web address] . Click on \"Log in\" on the left side of the screen, which will take you to the Welcome page. Then click on \"Sign up Now\" on the right side of the page.     You will be asked to enter the access code listed below, as well as some personal information. Please follow the directions to create your username and password.     Your access code is: [email or web address]  Expires: 2018  9:15 AM     Your access code will  in 90 days. If you need help or a new code, please call your [facility] clinic or [phone].        Care EveryWhere ID     This is your Care EveryWhere ID. This could be used by other organizations to access your [facility] medical records  [id number]        Your Vitals Were     Pulse Temperature Respirations Height Pulse Oximetry BMI (Body Mass Index)    47 98  F (36.7  C) (Oral) 14 5' 10\" (1.778 m) 93% 27.41 kg/m2       Blood Pressure from Last 3 Encounters:   18 114/60   17 110/64   17 106/62    Weight from Last 3 Encounters:   18 191 lb (86.6 kg)   17 192 lb (87.1 kg)   17 193 lb 6.4 oz (87.7 kg)    "              Where to get your medicines      These medications were sent to [id number] [place] [facility] [address] [place] [facility] [address], [facility] [address]    Hours:  24-hours Phone:  [phone]     atorvastatin 40 MG tablet    levothyroxine 100 MCG tablet          Primary Care Provider Office Phone # Fax #    Steven Rio Wagoner [phone]       [address]  [facility] [address]        Goals        General    I will make a future appointment with Cardiac Rehabilitation  (pt-stated)     Notes - Note edited  5/23/2014 11:47 AM by Rita Gonzalez, RN    As of today's date 5/23/2014 goal is met at 76 - 100%.   Goal Status:  Complete      I will review heart attack educational materials given to me by the hospital staff  (pt-stated)     Notes - Note edited  5/23/2014 11:48 AM by Rita Gonzalez, RN    As of today's date 5/23/2014 goal is met at 51 - 75%.   Goal Status:  Active        Equal Access to Services     [facility]: Hadii aad ku hadasho Soomaali, waaxda luqadaha, qaybta kaalmada adeegyada, brandt reis . So [facility] [phone].    ATENCIÓN: Si habla español, tiene a mclain disposición servicios gratmacoos de asistencia lingüística. Llame al [phone].    We comply with applicable federal civil rights laws and [place] laws. We do not discriminate on the basis of race, color, national origin, age, disability, sex, sexual orientation, or gender identity.            Thank you!     Thank you for choosing [facility]  for your care. Our goal is always to provide you with excellent care. Hearing back from our patients is one way we can continue to improve our services. Please take a few minutes to complete the written survey that you may receive in the mail after your visit with us. Thank you!             Your Updated Medication List - Protect others around you: Learn how to safely use,  store and throw away your medicines at www.disposemymeds.org.          This list is accurate as of 3/2/18  9:19 AM.  Always use your most recent med list.                   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        levothyroxine 100 MCG tablet    SYNTHROID/LEVOTHROID    90 tablet    Take 1 tablet (100 mcg) by mouth daily    Hypothyroidism, unspecified type       MIRALAX PO      Take by mouth as needed        nitroGLYcerin 0.4 MG sublingual tablet    NITROSTAT    25 tablet    Place 1 tablet (0.4 mg) under the tongue every 5 minutes as needed for chest pain

## 2018-03-02 NOTE — PATIENT INSTRUCTIONS
"*  Continue all medications at the same doses.  Contact your usual pharmacy if you need refills.       - Statin help prevent plaque rupture and more plaque from being made.     - Aspirin helps prevent the platelets from causing the acute clot from plaque rupture      5 GOALS TO PREVENT VASCULAR DISEASE:     1.  Aggressive blood pressure control, under 130/80 ideally.  Using medications if needed.    Your blood pressure is under good control    BP Readings from Last 4 Encounters:   03/02/18 114/60   06/19/17 110/64   05/16/17 106/62   02/21/17 118/72       2.  Aggressive LDL cholesterol (\"bad cholesterol\") lowering as indicated.    Your goal is an LDL under 130 for sure, preferably under 100.  (If you have diabetes or previous vascular disease, the the LDL goals would be under 100 for sure, preferably under 70.)    New guidelines identify four high-risk groups who could benefit from statins:   *people with pre-existing heart disease, such as those who have had a heart attack;   *people ages 40 to 75 who have diabetes of any type  *patients ages 40 to 75 with at least a 7.5% risk of developing cardiovascular disease over the next decade, according to a formula described in the guidelines  *patients with the sort of super-high cholesterol that sometimes runs in families, as evidenced by an LDL of 190 milligrams per deciliter or higher    Your cholesterol levels are well controlled.    - Statin help prevent plaque rupture and more plaque from being made.       Recent Labs   Lab Test  02/27/18   0817  02/21/17   0841   07/08/14   0722  05/15/14   0522   CHOL  101  109   < >  102  118   HDL  49  46   < >  35*  34*   LDL  40  50   < >  52*  63   TRIG  59  66   < >  73  101   CHOLHDLRATIO   --    --    --   2.9  3.5    < > = values in this interval not displayed.       3.  Aggressive diabetic prevention, screening and/or management.      You do not have diabetes as of the most recent blood tests.     4.  No smoking    5.  " "Consider taking low dose aspirin (81 mg) tablet once per day over the age of 50, every day unless there is a specific reason that you cannot take aspirin (such as side effect, allergy, or you are on another \"blood thinner\").        - Aspirin helps prevent the platelets from causing the acute clot from plaque rupture      --Based on your current cardiac risk factors, you should take Aspirin 81 mg once per day if you are over 50 years of age.                Preventive Health Recommendations:       Male Ages 65 and over    Yearly exam:             See your health care provider every year in order to  o   Review health changes.   o   Discuss preventive care.    o   Review your medicines if your doctor has prescribed any.    Talk with your health care provider about whether you should have a test to screen for prostate cancer (PSA).    Every 3 years, have a diabetes test (fasting glucose). If you are at risk for diabetes, you should have this test more often.    Every 5 years, have a cholesterol test. Have this test more often if you are at risk for high cholesterol or heart disease.     Every 10 years, have a colonoscopy. Or, have a yearly FIT test (stool test). These exams will check for colon cancer.    Talk to with your health care provider about screening for Abdominal Aortic Aneurysm if you have a family history of AAA or have a history of smoking.  Shots:     Get a flu shot each year.     Get a tetanus shot every 10 years.     Talk to your doctor about your pneumonia vaccines. There are now two you should receive - Pneumovax (PPSV 23) and Prevnar (PCV 13).    Talk to your doctor about a shingles vaccine.     Talk to your doctor about the hepatitis B vaccine.  Nutrition:     Eat at least 5 servings of fruits and vegetables each day.     Eat whole-grain bread, whole-wheat pasta and brown rice instead of white grains and rice.     Talk to your doctor about Calcium and Vitamin D.        --Good Grains:  Oats, brown " "rice, Quinoa (these do not raise the blood sugar as much)     --Bad grains:  Anything made from wheat or white rice     (because these raise the blood sugars significantly, and the possible gluten issue from wheat for some people).      --Proteins:  Aim for \"lean proteins\" including chicken, fish, seafood, pork, turkey, and eggs (in moderation); Eat red meat only occasionally      '    Moe Gilliam:                    Lifestyle    Exercise for at least 150 minutes a week (30 minutes a day, 5 days a week). This will help you control your weight and prevent disease.     Limit alcohol to one drink per day.     No smoking.     Wear sunscreen to prevent skin cancer.     See your dentist every six months for an exam and cleaning.     See your eye doctor every 1 to 2 years to screen for conditions such as glaucoma, macular degeneration and cataracts.  "

## 2018-03-02 NOTE — PROGRESS NOTES
SUBJECTIVE:   Moses Elizondo is a 84 year old male who presents for Preventive Visit.  Are you in the first 12 months of your Medicare Part B coverage?  No    Healthy Habits:    Do you get at least three servings of calcium containing foods daily (dairy, green leafy vegetables, etc.)? yes    Amount of exercise or daily activities, outside of work: 5 day(s) per week    Problems taking medications regularly No    Medication side effects: No    Have you had an eye exam in the past two years? yes    Do you see a dentist twice per year? yes    Do you have sleep apnea, excessive snoring or daytime drowsiness?no      Ability to successfully perform activities of daily living: Yes, no assistance needed    Home safety:  none identified     Hearing impairment: No    Fall risk:  Fallen 2 or more times in the past year?: No  Any fall with injury in the past year?: No    Pt has questions about cardiology visit regarding pulse and a suggested medication    COGNITIVE SCREEN  1) Repeat 3 items (Banana, Sunrise, Chair)    2) Clock draw: ABNORMAL large hand placed at :55minutes, not 2  3) 3 item recall: Recalls 2 objects   Results: ABNORMAL clock, 1-2 items recalled: PROBABLE COGNITIVE IMPAIRMENT, **INFORM PROVIDER**    Mini-CogTM Copyright S Evelyn. Licensed by the author for use in [facility]; reprinted with permission (soob@.[email or web address]). All rights reserved.        Reviewed and updated as needed this visit by clinical staff  Tobacco  Allergies  Meds         Reviewed and updated as needed this visit by Provider        Social History   Substance Use Topics     Smoking status: Former Smoker     Packs/day: 0.50     Years: 16.00     Quit date: 1/1/1967     Smokeless tobacco: Never Used     Alcohol use 0.0 oz/week     0 Standard drinks or equivalent per week      Comment: 4 drinks week       If you drink alcohol do you typically have >3 drinks per day or >7 drinks per week? No                        Today's PHQ-2  "Score:   PHQ-2 ( 1999 Pfizer) 3/2/2018 2/21/2017   Q1: Little interest or pleasure in doing things 0 0   Q2: Feeling down, depressed or hopeless 0 0   PHQ-2 Score 0 0     Do you feel safe in your environment - Yes    Do you have a Health Care Directive?: No: Advance care planning reviewed with patient; information given to patient to review.    Current providers sharing in care for this patient include:   Patient Care Team:  Steven Wagoner MD as PCP - General (Internal Medicine)  Rita Gonzalez RN as Clinic Care Coordinator    The following health maintenance items are reviewed in Epic and correct as of today:  Health Maintenance   Topic Date Due     COLONOSCOPY Q10 YR  09/19/2011     FALL RISK ASSESSMENT  02/21/2018     INFLUENZA VACCINE (SYSTEM ASSIGNED)  09/01/2018     TSH Q1 YEAR  02/27/2019     LIPID MONITORING Q1 YEAR  02/27/2019     TETANUS IMMUNIZATION (SYSTEM ASSIGNED)  12/19/2022     ADVANCE DIRECTIVE PLANNING Q5 YRS  03/02/2023     PNEUMOCOCCAL  Completed             ROS:  Constitutional, HEENT, cardiovascular, pulmonary, gi and gu systems are negative, except as otherwise noted.    OBJECTIVE:   /60  Pulse (!) 47  Temp 98  F (36.7  C) (Oral)  Resp 14  Ht 5' 10\" (1.778 m)  Wt 191 lb (86.6 kg)  SpO2 93%  BMI 27.41 kg/m2 Estimated body mass index is 27.41 kg/(m^2) as calculated from the following:    Height as of this encounter: 5' 10\" (1.778 m).    Weight as of this encounter: 191 lb (86.6 kg).  EXAM:     GENERAL alert and no distress  EYES:  Normal sclera,conjunctiva, EOMI  HENT: oral and posterior pharynx without lesions or erythema, facies symmetric  NECK: Neck supple. No LAD, without thyroidmegaly or JVD., Carotids without bruits.  RESP: Clear to ausculation bilaterally without wheezes or crackles. Normal BS in all fields.  CV: RRR normal S1S2 without murmurs, rubs or gallops. PMI normal  LYMPH: no cervical lymph adenopathy appreciated  MS: extremities- no gross deformities of " the visible extremities noted, no edema  PSYCH: Alert and oriented times 3; speech- coherent  SKIN:  No obvious significant skin lesions on visible portions of face     ASSESSMENT / PLAN:     (Z00.00) Medicare annual wellness visit, subsequent  (primary encounter diagnosis)  Comment: Discussed cardiac disease risk factors and cardiac disease risk factor modification, including diabetes screening, blood pressure screening (and management if indicated), and cholesterol screening.   Reviewed immunzation guidelines, including pneumococcal vaccines, annual influenza, and shingles vaccines.   Discussed routine cancer screenings, including skin cancer, colon cancer screening for everyone until age 80, prostate cancer screening in men until age 75, mammogram and PAP/pelvic for women until age 75.   Recommended regular dentist visits to care for remaining teeth.   Recommended regular screening for vision and glaucoma.   Recommended safe driving and accident avoidance.   Plan:     (I21.4) Non-STEMI (non-ST elevated myocardial infarction) (H)  Comment: The patient does not report any signs or symptoms of angina or active cardiac ischemia.   They do not report any relative changes in their ability to perform physical activities over the past year.    We discussed aggressive secondary risk factor modification, including aggressive BP control (under 130/ideally), aggressive LDL lowering with statin (goal under 100 for sure/under 70 ideally), no smoking, diabetes prevention/management, no smoking, and use of either ASA or similar anti platelet agent if tolerated.     Plan: atorvastatin (LIPITOR) 40 MG tablet, **CBC with        platelets FUTURE 1yr, **Comprehensive metabolic        panel FUTURE 1yr, Lipid panel reflex to direct         LDL Fasting            (I25.810) Coronary artery disease  Comment: The patient does not report any signs or symptoms of angina or active cardiac ischemia.   They do not report any relative changes in  "their ability to perform physical activities over the past year.    We discussed aggressive secondary risk factor modification, including aggressive BP control (under 130/ideally), aggressive LDL lowering with statin (goal under 100 for sure/under 70 ideally), no smoking, diabetes prevention/management, no smoking, and use of either ASA or similar anti platelet agent if tolerated.     Plan: atorvastatin (LIPITOR) 40 MG tablet, **CBC with        platelets FUTURE 1yr, **Comprehensive metabolic        panel FUTURE 1yr, Lipid panel reflex to direct         LDL Fasting            (E03.9) Hypothyroidism, unspecified type  Comment: Discussed signs and symptoms of hypo and hyperthyroidism.  Reviewed treatment options.   Recommended absolute medication compliance to avoid adding any additionial variance to the labs.   Plan: levothyroxine (SYNTHROID/LEVOTHROID) 100 MCG         tablet, **TSH with free T4 reflex FUTURE 1yr               End of Life Planning:  Patient currently has an advanced directive:     COUNSELING:  Reviewed preventive health counseling, as reflected in patient instructions       Regular exercise       Healthy diet/nutrition       Vision screening       Hearing screening        Estimated body mass index is 27.41 kg/(m^2) as calculated from the following:    Height as of this encounter: 5' 10\" (1.778 m).    Weight as of this encounter: 191 lb (86.6 kg).       reports that he quit smoking about 51 years ago. He has a 8.00 pack-year smoking history. He has never used smokeless tobacco.      Appropriate preventive services were discussed with this patient, including applicable screening as appropriate for cardiovascular disease, diabetes, osteopenia/osteoporosis, and glaucoma.  As appropriate for age/gender, discussed screening for colorectal cancer, prostate cancer, breast cancer, and cervical cancer. Checklist reviewing preventive services available has been given to the patient.    Reviewed patients plan of " care and provided an AVS. The  sheri Lopes meets the Care Plan requirement. This Care Plan has been established and reviewed with the .    Counseling Resources:  ATP IV Guidelines  Pooled Cohorts Equation Calculator  Breast Cancer Risk Calculator  FRAX Risk Assessment  ICSI Preventive Guidelines  Dietary Guidelines for Americans, 2010  USDA's MyPlate  ASA Prophylaxis  Lung CA Screening    Steven Wagoner MD  [facility]

## 2018-03-02 NOTE — LETTER
[address] [address]  [phone]        March 8, 2019    Moses Elizondo  [address] [place] [id number]              Dear Moses Elizondo    This is to remind you that your fasting labs is due.    You may call our office at [phone] to schedule an appointment.    Please disregard this notice if you have already had your labs drawn or made an appointment.        Sincerely,        Steven Wagoner MD

## 2018-09-12 DIAGNOSIS — G62.9 NEUROPATHY: ICD-10-CM

## 2018-09-12 DIAGNOSIS — L40.50 PSORIASIS WITH ARTHROPATHY (H): Primary | ICD-10-CM

## 2018-09-12 DIAGNOSIS — E56.9 VITAMIN DEFICIENCY: ICD-10-CM

## 2018-09-12 LAB
FOLATE SERPL-MCNC: 50 NG/ML
VIT B12 SERPL-MCNC: 344 PG/ML (ref 193–986)

## 2018-09-12 PROCEDURE — 82746 ASSAY OF FOLIC ACID SERUM: CPT

## 2018-09-12 PROCEDURE — 36415 COLL VENOUS BLD VENIPUNCTURE: CPT

## 2018-09-12 PROCEDURE — 82607 VITAMIN B-12: CPT

## 2019-02-25 DIAGNOSIS — E03.9 HYPOTHYROIDISM, UNSPECIFIED TYPE: ICD-10-CM

## 2019-02-25 DIAGNOSIS — I25.810 CORONARY ARTERY DISEASE INVOLVING CORONARY BYPASS GRAFT OF NATIVE HEART WITHOUT ANGINA PECTORIS: ICD-10-CM

## 2019-02-25 DIAGNOSIS — I21.4 NON-STEMI (NON-ST ELEVATED MYOCARDIAL INFARCTION) (H): ICD-10-CM

## 2019-02-26 RX ORDER — ATORVASTATIN CALCIUM 40 MG/1
TABLET, FILM COATED ORAL
Qty: 90 TABLET | Refills: 0 | Status: SHIPPED | OUTPATIENT
Start: 2019-02-26 | End: 2019-03-21

## 2019-02-26 RX ORDER — LEVOTHYROXINE SODIUM 100 UG/1
TABLET ORAL
Qty: 90 TABLET | Refills: 0 | Status: SHIPPED | OUTPATIENT
Start: 2019-02-26 | End: 2019-03-21

## 2019-02-26 NOTE — TELEPHONE ENCOUNTER
"Requested Prescriptions   Pending Prescriptions Disp Refills     atorvastatin (LIPITOR) 40 MG tablet [Pharmacy Med Name: ATORVASTATIN 40MG TABLETS]  Last Written Prescription Date:  03/02/2018  Last Fill Quantity: 90,  # refills: 03   Last Office Visit: 3/2/2018   Future Office Visit:      90 tablet 0     Sig: TAKE 1 TABLET(40 MG) BY MOUTH DAILY    Statins Protocol Passed - 2/25/2019  6:50 PM       Passed - LDL on file in past 12 months    Recent Labs   Lab Test 02/27/18  0817   LDL 40            Passed - No abnormal creatine kinase in past 12 months    No lab results found.            Passed - Recent (12 mo) or future (30 days) visit within the authorizing provider's specialty    Patient had office visit in the last 12 months or has a visit in the next 30 days with authorizing provider or within the authorizing provider's specialty.  See \"Patient Info\" tab in inbasket, or \"Choose Columns\" in Meds & Orders section of the refill encounter.             Passed - Medication is active on med list       Passed - Patient is age 18 or older        levothyroxine (SYNTHROID/LEVOTHROID) 100 MCG tablet [Pharmacy Med Name: LEVOTHYROXINE 0.100MG (100MCG) TAB]  Last Written Prescription Date:  03/02/2018  Last Fill Quantity: 90,  # refills: 3   Last Office Visit: 3/2/2018   Future Office Visit:      90 tablet 0     Sig: TAKE 1 TABLET(100 MCG) BY MOUTH DAILY    Thyroid Protocol Passed - 2/25/2019  6:50 PM       Passed - Patient is 12 years or older       Passed - Recent (12 mo) or future (30 days) visit within the authorizing provider's specialty    Patient had office visit in the last 12 months or has a visit in the next 30 days with authorizing provider or within the authorizing provider's specialty.  See \"Patient Info\" tab in inbasket, or \"Choose Columns\" in Meds & Orders section of the refill encounter.             Passed - Medication is active on med list       Passed - Normal TSH on file in past 12 months    Recent Labs   Lab " Test 02/27/18  0817   TSH 2.53

## 2019-03-20 DIAGNOSIS — I21.4 NON-STEMI (NON-ST ELEVATED MYOCARDIAL INFARCTION) (H): ICD-10-CM

## 2019-03-20 DIAGNOSIS — E03.9 HYPOTHYROIDISM, UNSPECIFIED TYPE: ICD-10-CM

## 2019-03-20 DIAGNOSIS — I25.810 CORONARY ARTERY DISEASE INVOLVING CORONARY BYPASS GRAFT OF NATIVE HEART WITHOUT ANGINA PECTORIS: ICD-10-CM

## 2019-03-20 LAB
ALBUMIN SERPL-MCNC: 3.4 G/DL (ref 3.4–5)
ALP SERPL-CCNC: 72 U/L (ref 40–150)
ALT SERPL W P-5'-P-CCNC: 25 U/L (ref 0–70)
ANION GAP SERPL CALCULATED.3IONS-SCNC: 5 MMOL/L (ref 3–14)
AST SERPL W P-5'-P-CCNC: 33 U/L (ref 0–45)
BILIRUB SERPL-MCNC: 0.8 MG/DL (ref 0.2–1.3)
BUN SERPL-MCNC: 22 MG/DL (ref 7–30)
CALCIUM SERPL-MCNC: 9.2 MG/DL (ref 8.5–10.1)
CHLORIDE SERPL-SCNC: 111 MMOL/L (ref 94–109)
CHOLEST SERPL-MCNC: 116 MG/DL
CO2 SERPL-SCNC: 27 MMOL/L (ref 20–32)
CREAT SERPL-MCNC: 1.21 MG/DL (ref 0.66–1.25)
ERYTHROCYTE [DISTWIDTH] IN BLOOD BY AUTOMATED COUNT: 12.1 % (ref 10–15)
GFR SERPL CREATININE-BSD FRML MDRD: 54 ML/MIN/{1.73_M2}
GLUCOSE SERPL-MCNC: 94 MG/DL (ref 70–99)
HCT VFR BLD AUTO: 40.7 % (ref 40–53)
HDLC SERPL-MCNC: 40 MG/DL
HGB BLD-MCNC: 13.9 G/DL (ref 13.3–17.7)
LDLC SERPL CALC-MCNC: 61 MG/DL
MCH RBC QN AUTO: 30.2 PG (ref 26.5–33)
MCHC RBC AUTO-ENTMCNC: 34.2 G/DL (ref 31.5–36.5)
MCV RBC AUTO: 89 FL (ref 78–100)
NONHDLC SERPL-MCNC: 76 MG/DL
PLATELET # BLD AUTO: 152 10E9/L (ref 150–450)
POTASSIUM SERPL-SCNC: 4.1 MMOL/L (ref 3.4–5.3)
PROT SERPL-MCNC: 6.7 G/DL (ref 6.8–8.8)
RBC # BLD AUTO: 4.6 10E12/L (ref 4.4–5.9)
SODIUM SERPL-SCNC: 143 MMOL/L (ref 133–144)
TRIGL SERPL-MCNC: 75 MG/DL
TSH SERPL DL<=0.005 MIU/L-ACNC: 2.2 MU/L (ref 0.4–4)
WBC # BLD AUTO: 5.8 10E9/L (ref 4–11)

## 2019-03-20 PROCEDURE — 80061 LIPID PANEL: CPT | Performed by: INTERNAL MEDICINE

## 2019-03-20 PROCEDURE — 80053 COMPREHEN METABOLIC PANEL: CPT | Performed by: INTERNAL MEDICINE

## 2019-03-20 PROCEDURE — 85027 COMPLETE CBC AUTOMATED: CPT | Performed by: INTERNAL MEDICINE

## 2019-03-20 PROCEDURE — 36415 COLL VENOUS BLD VENIPUNCTURE: CPT | Performed by: INTERNAL MEDICINE

## 2019-03-20 PROCEDURE — 84443 ASSAY THYROID STIM HORMONE: CPT | Performed by: INTERNAL MEDICINE

## 2019-03-21 ENCOUNTER — OFFICE VISIT (OUTPATIENT)
Dept: INTERNAL MEDICINE | Facility: CLINIC | Age: 84
End: 2019-03-21
Payer: COMMERCIAL

## 2019-03-21 VITALS
BODY MASS INDEX: 28.2 KG/M2 | TEMPERATURE: 97.6 F | DIASTOLIC BLOOD PRESSURE: 68 MMHG | WEIGHT: 197 LBS | HEART RATE: 45 BPM | HEIGHT: 70 IN | SYSTOLIC BLOOD PRESSURE: 114 MMHG | OXYGEN SATURATION: 98 %

## 2019-03-21 DIAGNOSIS — I77.810 AORTIC ROOT DILATATION (H): ICD-10-CM

## 2019-03-21 DIAGNOSIS — E03.9 HYPOTHYROIDISM, UNSPECIFIED TYPE: ICD-10-CM

## 2019-03-21 DIAGNOSIS — I21.4 NON-STEMI (NON-ST ELEVATED MYOCARDIAL INFARCTION) (H): ICD-10-CM

## 2019-03-21 DIAGNOSIS — I25.810 CORONARY ARTERY DISEASE INVOLVING CORONARY BYPASS GRAFT OF NATIVE HEART WITHOUT ANGINA PECTORIS: ICD-10-CM

## 2019-03-21 DIAGNOSIS — Z00.00 MEDICARE ANNUAL WELLNESS VISIT, SUBSEQUENT: Primary | ICD-10-CM

## 2019-03-21 DIAGNOSIS — G60.9 IDIOPATHIC PERIPHERAL NEUROPATHY: ICD-10-CM

## 2019-03-21 DIAGNOSIS — I49.5 SICK SINUS SYNDROME (H): ICD-10-CM

## 2019-03-21 PROCEDURE — 99214 OFFICE O/P EST MOD 30 MIN: CPT | Mod: 25 | Performed by: INTERNAL MEDICINE

## 2019-03-21 PROCEDURE — 99397 PER PM REEVAL EST PAT 65+ YR: CPT | Performed by: INTERNAL MEDICINE

## 2019-03-21 RX ORDER — LEVOTHYROXINE SODIUM 100 UG/1
100 TABLET ORAL DAILY
Qty: 90 TABLET | Refills: 3 | Status: SHIPPED | OUTPATIENT
Start: 2019-03-21 | End: 2020-05-26

## 2019-03-21 RX ORDER — ATORVASTATIN CALCIUM 40 MG/1
40 TABLET, FILM COATED ORAL DAILY
Qty: 90 TABLET | Refills: 3 | Status: SHIPPED | OUTPATIENT
Start: 2019-03-21 | End: 2019-08-01

## 2019-03-21 ASSESSMENT — MIFFLIN-ST. JEOR: SCORE: 1584.84

## 2019-03-21 NOTE — PATIENT INSTRUCTIONS
"*  Continue all medications at the same doses.  Contact your usual pharmacy if you need refills.     *  Return to see Cardiology Clinic this spring.     *  See Neurologist to re-evaluate the peripheral neuropathy and discuss any treatment recommendations (if any)     --[facility] [place] [phone]   [email or web address]     --[facility] [place] [phone]   [email or web address]      *  Return to see me in 1 year, sooner if needed.  Please get fasting labs done at the [facility] or any other [facility] lab 1-2 days before this appointment.  If you get the labs done at another [facility], make arrangements with them directly.  The orders will be in place.  Eat nothing for at least 8 hours prior to having these labs drawn.  Call [phone] to schedule appointments at [facility].       SHINGLES VACCINE:     I would recommend that you consider getting a \"shingles vaccine\".  The shingles vaccine does not stop you from getting shingles, but it decreases the intensity of the event, the duration of the event, and decreases the painful nerve condition that results     There are two options available:     --Shingrix (available starting early 2018), 2 shots, 2-6 months apart  **recommended**   OR   --Zostavax, one shot    --Based on the available data, the Shingrix vaccine has superior benefit and should be considered even if you have had the Zostavax vaccine before.      --Contact your insurance provider and ask them if either shingles vaccine is covered and is so, how much it will cost you.  Usually this will be cheaper to get in a pharmacy given by the pharmacist.    --Regardless of the coverage, I would recommend that you consider the vaccine since shingles is very painful, just ask anyone who has ever had it.               5 GOALS TO PREVENT VASCULAR DISEASE:     1.  Aggressive blood pressure control, under " "130/80 ideally.  Using medications if needed.    Your blood pressure is under good control    BP Readings from Last 4 Encounters:   03/21/19 114/68   03/02/18 114/60   06/19/17 110/64   05/16/17 106/62       2.  Aggressive LDL cholesterol (\"bad cholesterol\") lowering as indicated.    Your goal is an LDL under 130 for sure, preferably under 100.  (If you have diabetes or previous vascular disease, the the LDL goals would be under 100 for sure, preferably under 70.)    New guidelines identify four high-risk groups who could benefit from statins:   *people with pre-existing heart disease, such as those who have had a heart attack;   *people ages 40 to 75 who have diabetes of any type  *patients ages 40 to 75 with at least a 7.5% risk of developing cardiovascular disease over the next decade, according to a formula described in the guidelines  *patients with the sort of super-high cholesterol that sometimes runs in families, as evidenced by an LDL of 190 milligrams per deciliter or higher    Your cholesterol levels are well controlled.    Recent Labs   Lab Test 03/20/19  0823 02/27/18  0817  07/08/14  0722 05/15/14  0522   CHOL 116 101   < > 102 118   HDL 40 49   < > 35* 34*   LDL 61 40   < > 52* 63   TRIG 75 59   < > 73 101   CHOLHDLRATIO  --   --   --  2.9 3.5    < > = values in this interval not displayed.       3.  Aggressive diabetic prevention, screening and/or management.      You do not have diabetes as of the most recent blood tests.     4.  No smoking    5.  Consider Daily aspirin: Have a discussion about the relative benefits and risks of taking daily low dose aspirin (81 mg) tablet once per day over the age of 50, unless there is a specific reason that you cannot take aspirin (such as side effect, allergy, or you are on another \"blood thinner\").        --Based on your current cardiac risk factors, you should take regular daily Aspirin 81 mg once per day, unless you have any reasons (side effects, " intolerance, etc.) that you cannot take aspirin.         Preventive Health Recommendations:     See your health care provider every year to    Review health changes.     Discuss preventive care.      Review your medicines if your doctor has prescribed any.    Talk with your health care provider about whether you should have a test to screen for prostate cancer (PSA).    Every 3 years, have a diabetes test (fasting glucose). If you are at risk for diabetes, you should have this test more often.    Every 5 years, have a cholesterol test. Have this test more often if you are at risk for high cholesterol or heart disease.     Every 10 years, have a colonoscopy. Or, have a yearly FIT test (stool test). These exams will check for colon cancer.    Talk to with your health care provider about screening for Abdominal Aortic Aneurysm if you have a family history of AAA or have a history of smoking.  Shots:     Get a flu shot each year.     Get a tetanus shot every 10 years.     Talk to your doctor about your pneumonia vaccines. There are now two you should receive - Pneumovax (PPSV 23) and Prevnar (PCV 13).    Talk to your pharmacist about a shingles vaccine.     Talk to your doctor about the hepatitis B vaccine.  Nutrition:     Eat at least 5 servings of fruits and vegetables each day.     Eat whole-grain bread, whole-wheat pasta and brown rice instead of white grains and rice.     Get adequate Calcium and Vitamin D.   Lifestyle    Exercise for at least 150 minutes a week (30 minutes a day, 5 days a week). This will help you control your weight and prevent disease.     Limit alcohol to one drink per day.     No smoking.     Wear sunscreen to prevent skin cancer.     See your dentist every six months for an exam and cleaning.     See your eye doctor every 1 to 2 years to screen for conditions such as glaucoma, macular degeneration and cataracts.    Personalized Prevention Plan  You are due for the preventive services outlined  below.  Your care team is available to assist you in scheduling these services.  If you have already completed any of these items, please share that information with your care team to update in your medical record.    Health Maintenance Due   Topic Date Due     Zoster (Shingles) Vaccine (1 of 2) 10/19/1983     Thyroid Function Lab (TSH) - yearly  02/27/2019     Cholesterol Lab - yearly  02/27/2019     Depression Assessment 2 - yearly  03/02/2019     Annual Wellness Visit  03/02/2019     FALL RISK ASSESSMENT  03/02/2019

## 2019-03-21 NOTE — PROGRESS NOTES
"  SUBJECTIVE:   Moses Elizondo is a 85 year old male who presents for Preventive Visit.      Are you in the first 12 months of your Medicare Part B coverage?  No    Physical Health:    In general, how would you rate your overall physical health? good    Outside of work, how many days during the week do you exercise? 2-3 days/week    Outside of work, approximately how many minutes a day do you exercise?30-45 minutes    If you drink alcohol do you typically have >3 drinks per day or >7 drinks per week? No    Do you usually eat at least 4 servings of fruit and vegetables a day, include whole grains & fiber and avoid regularly eating high fat or \"junk\" foods? Yes    Do you have any problems taking medications regularly?  No    Do you have any side effects from medications? none    Needs assistance for the following daily activities: no assistance needed    Which of the following safety concerns are present in your home?  none identified     Hearing impairment: No    In the past 6 months, have you been bothered by leaking of urine? no    Mental Health:    In general, how would you rate your overall mental or emotional health? good  PHQ-2 Score: 0    Do you feel safe in your environment? Yes    Do you have a Health Care Directive? No: Advance care planning was reviewed with patient; patient declined at this time.    Additional concerns to address?  No    Fall risk:  Fallen 2 or more times in the past year?: No  Any fall with injury in the past year?: No    Cognitive Screenin) Repeat 3 items (Leader, Season, Table)    2) Clock draw: ABNORMAL   3) 3 item recall: Recalls 2 objects   Results: ABNORMAL clock, 1-2 items recalled: PROBABLE COGNITIVE IMPAIRMENT, **INFORM PROVIDER**    Mini-CogTM Copyright UMER Rivas. Licensed by the author for use in [facility]; reprinted with permission ([email or web address]@.[email or web address]). All rights reserved.      Do you have sleep apnea, excessive snoring or daytime drowsiness?: no      1.  " Prior of coronary artery disease as listed in medical history.  No current or recent cardiovascaulr symptoms.   No shortness of breath, no episodes of chest pain/pressure, no dyspnea on exertion, no changes in his abiliy to perform physical exertion or tasks.  Takes the same amount of time to perform similar physical tasks.        2.  History of intermittent bradycardia with a history of sick sinus syndrome.  The patient denies current dizziness, presyncope, symptomatic bradycardia or orthostatic hypotension.  He underwent Holter monitor 2 years ago which showed sinus bradycardia with resting heart rates while asleep in the 30s-40s.  Had occasional mild runs of SVT.  The symptoms were much worse while he was taking propranolol.  He was on propranolol for tremors.  A possible evaluation for a pacemaker was discussed but the patient was against it.      3.  Dilated aortic root per ECHO  Last echo from 5/10/16 reviewed:  Interpretation Summary     The visual ejection fraction is estimated at 50%.  The right ventricle is normal in size and function.  Thickened mitral valve anterior leaflet  There is no mitral valve stenosis.  Moderate aortic root dilatation (4.3 cm).  The ascending aorta is Moderately dilated (4.4 cm).  Compared to prior study, changes are noted.    Patient is recommended have an echocardiogram this past year at a subsequent follow-up appointment but this is not been done yet.      4. .History of hypothyroidism.  On replacement therapy.  He has not experienced any significant side effects of this medication.   The patient denies of fatigue, weight changes, heat/cold intolerance, hair changes, nail changes, bowel changes.     Latest labs reviewed:    Lab Results   Component Value Date    TSH 2.20 03/20/2019    TSH 2.53 02/27/2018    TSH 3.78 02/21/2017    TSH 3.67 02/09/2016    TSH 3.04 02/03/2015    TSH 2.39 01/27/2014    TSH 2.94 12/19/2012    TSH 2.35 01/25/2012    TSH 5.75 12/13/2011    TSH 4.52  2010    TSH 0.98 02/15/2010    TSH 3.22 2009    TSH 6.85 2008    TSH 4.73 2008    TSH 0.09 07/10/2008    TSH 1.13 2008    TSH 20.20 2008    TSH 1.02 2007    TSH 7.77 10/26/2007         5.  Patient is ongoing peripheral neuropathies in his distal extremities.  From proximal from the shins down.  The entire foot and ankle.  No weakness no incoordination.  Has occasional sensations of tingling and warmth.  His orthopedist sent him to neurologist for consideration for EMG, the neurologist felt testing was not indicated.  Laboratory studies have been checked the patient has normal labs including B12.    Start chiropractic clinic for a few laser treatments.  He felt that these helped a little bit but the effect was very short-lived.  He has questions about possible stem cell therapy for this condition.     Reviewed and updated as needed this visit by clinical staff  Tobacco  Allergies  Meds         Reviewed and updated as needed this visit by Provider        Social History     Tobacco Use     Smoking status: Former Smoker     Packs/day: 0.50     Years: 16.00     Pack years: 8.00     Last attempt to quit: 1967     Years since quittin.2     Smokeless tobacco: Never Used   Substance Use Topics     Alcohol use: Yes     Alcohol/week: 0.0 oz     Comment: 4 drinks week                           Current providers sharing in care for this patient include:   Patient Care Team:  Steven Wagoner MD as PCP - General (Internal Medicine)  Steven Wagoner MD as Assigned PCP    The following health maintenance items are reviewed in Epic and correct as of today:  Health Maintenance   Topic Date Due     ZOSTER IMMUNIZATION (1 of 2) 10/19/1983     TSH Q1 YEAR  2019     LIPID MONITORING Q1 YEAR  2019     PHQ-2 Q1 YR  2019     MEDICARE ANNUAL WELLNESS VISIT  2019     FALL RISK ASSESSMENT  2019     DTAP/TDAP/TD IMMUNIZATION (3 - Td) 2022      ADVANCE DIRECTIVE PLANNING Q5 YRS  03/02/2023     INFLUENZA VACCINE  Completed     IPV IMMUNIZATION  Aged Out     MENINGITIS IMMUNIZATION  Aged Out       Past Medical History:  ---------------------------  Past Medical History:   Diagnosis Date     Bradycardia      CAD (coronary artery disease) 5/13/14    NSTEMI; PTCA/stent to OM1     Degeneration of lumbar or lumbosacral intervertebral disc      Familial tremor      Herpes zoster without mention of complication      Hyperlipidaemia      MGUS (monoclonal gammopathy of unknown significance)      Myocardial infarction (H)     NSTEMI 5/14     Other and unspecified hyperlipidemia      Prostatitis, unspecified      Sensorineural hearing loss, unspecified      Unspecified hypothyroidism        Past Surgical History:  ---------------------------  Past Surgical History:   Procedure Laterality Date     C NONSPECIFIC PROCEDURE  2010    Laparoscopic cholecystectomy.      CHOLECYSTECTOMY       CORONARY ANGIOGRAPHY ADULT ORDER  5/14/14    PTCA w/YOGESH to OM1     HEART CATH, ANGIOPLASTY  5/14/14    YOGESH to OM1       Current Medications:  ---------------------------  Current Outpatient Medications   Medication Sig Dispense Refill     ASPIRIN EC PO Take 81 mg by mouth daily       atorvastatin (LIPITOR) 40 MG tablet Take 1 tablet (40 mg) by mouth daily 90 tablet 3     levothyroxine (SYNTHROID/LEVOTHROID) 100 MCG tablet Take 1 tablet (100 mcg) by mouth daily 90 tablet 3     Multiple Vitamins-Minerals (CENTRUM SILVER PO) Take 1 tablet by mouth daily.       Polyethylene Glycol 3350 (MIRALAX PO) Take by mouth as needed       nitroglycerin (NITROSTAT) 0.4 MG sublingual tablet Place 1 tablet (0.4 mg) under the tongue every 5 minutes as needed for chest pain (Patient not taking: Reported on 3/21/2019) 25 tablet 0       Allergies:  -------------  Allergies   Allergen Reactions     No Known Drug Allergies        Social History:  -------------------  Social History     Socioeconomic History      Marital status:      Spouse name: Not on file     Number of children: Not on file     Years of education: Not on file     Highest education level: Not on file   Occupational History     Not on file   Social Needs     Financial resource strain: Not on file     Food insecurity:     Worry: Not on file     Inability: Not on file     Transportation needs:     Medical: Not on file     Non-medical: Not on file   Tobacco Use     Smoking status: Former Smoker     Packs/day: 0.50     Years: 16.00     Pack years: 8.00     Last attempt to quit: 1967     Years since quittin.2     Smokeless tobacco: Never Used   Substance and Sexual Activity     Alcohol use: Yes     Alcohol/week: 0.0 oz     Comment: 4 drinks week     Drug use: No     Sexual activity: No   Lifestyle     Physical activity:     Days per week: Not on file     Minutes per session: Not on file     Stress: Not on file   Relationships     Social connections:     Talks on phone: Not on file     Gets together: Not on file     Attends Mosque service: Not on file     Active member of club or organization: Not on file     Attends meetings of clubs or organizations: Not on file     Relationship status: Not on file     Intimate partner violence:     Fear of current or ex partner: Not on file     Emotionally abused: Not on file     Physically abused: Not on file     Forced sexual activity: Not on file   Other Topics Concern      Service Not Asked     Blood Transfusions Not Asked     Caffeine Concern Yes     Comment: 2 cups coffee/day     Occupational Exposure Not Asked     Hobby Hazards Not Asked     Sleep Concern No     Stress Concern Not Asked     Weight Concern Yes     Comment: limiting alcohol to watch calories     Special Diet Not Asked     Back Care Not Asked     Exercise Yes     Comment: Stationary bike  weights and aerobics 4 times week     Bike Helmet Not Asked     Seat Belt Yes     Self-Exams Not Asked     Parent/sibling w/ CABG, MI or  "angioplasty before 65F 55M? No   Social History Narrative     Not on file       Family Medical History:  ------------------------------  Family History   Problem Relation Age of Onset     Cancer Mother      Genitourinary Problems Father 99        complications from surgery     Heart Disease Brother 72        MI        ROS:  Constitutional, HEENT, cardiovascular, pulmonary, gi and gu systems are negative, except as otherwise noted.    OBJECTIVE:   /68   Pulse (!) 45   Temp 97.6  F (36.4  C) (Oral)   Ht 1.778 m (5' 10\")   Wt 89.4 kg (197 lb)   SpO2 98%   BMI 28.27 kg/m   Estimated body mass index is 28.27 kg/m  as calculated from the following:    Height as of this encounter: 1.778 m (5' 10\").    Weight as of this encounter: 89.4 kg (197 lb).  EXAM:   GENERAL alert and no distress  EYES:  Normal sclera,conjunctiva, EOMI  HENT: oral and posterior pharynx without lesions or erythema, facies symmetric  NECK: Neck supple. No LAD, without thyroidmegaly or JVD., Carotids without bruits.  RESP: Clear to ausculation bilaterally without wheezes or crackles. Normal BS in all fields.  CV: RRR normal S1S2 without murmurs, rubs or gallops. PMI normal  LYMPH: no cervical lymph adenopathy appreciated  MS: extremities- no gross deformities of the visible extremities noted, no edema  PSYCH: Alert and oriented times 3; speech- coherent  SKIN:  No obvious significant skin lesions on visible portions of face         ASSESSMENT / PLAN:     (Z00.00) Medicare annual wellness visit, subsequent  (primary encounter diagnosis)  Comment: Discussed cardiac disease risk factors and cardiac disease risk factor modification, including diabetes screening, blood pressure screening (and management if indicated), and cholesterol screening.   Reviewed immunzation guidelines, including pneumococcal vaccines, annual influenza, and shingles vaccines.   Discussed routine cancer screenings, including skin cancer, colon cancer screening for " everyone until age 80, prostate cancer screening in men until age 75, mammogram and PAP/pelvic for women until age 75.   Recommended regular dentist visits to care for remaining teeth.   Recommended regular screening for vision and glaucoma.   Recommended safe driving and accident avoidance.   Plan:     (I21.4) Non-STEMI (non-ST elevated myocardial infarction) (H)  Comment: This condition is currently controlled on the current medical regimen.  Continue current therapy.   Plan: atorvastatin (LIPITOR) 40 MG tablet            (I25.810) Coronary artery disease  Comment: The patient does not report any signs or symptoms of angina or active cardiac ischemia.   They do not report any relative changes in their ability to perform physical activities over the past year.    We discussed aggressive secondary risk factor modification, including aggressive BP control (under 130/ideally), aggressive LDL lowering with statin (goal under 100 for sure/under 70 ideally), no smoking, diabetes prevention/management, no smoking, and use of either ASA or similar anti platelet agent if tolerated.     Plan: Lipid panel reflex to direct LDL Fasting, **CBC        with platelets FUTURE 1yr, **Comprehensive         metabolic panel FUTURE 1yr, atorvastatin         (LIPITOR) 40 MG tablet            (E03.9) Hypothyroidism, unspecified type  Comment: This condition is currently controlled on the current medical regimen.  Continue current therapy.    Discussed signs and symptoms of hypo and hyperthyroidism.  Reviewed treatment options.   Recommended absolute medication compliance to avoid adding any additionial variance to the labs.   Plan: TSH WITH FREE T4 REFLEX, levothyroxine         (SYNTHROID/LEVOTHROID) 100 MCG tablet            (I49.5) Sick sinus syndrome (H)  Comment: Patient reports that his symptoms overall improved when he stopped the propranolol.  He still is at risk for significant bradycardia by report.  We will have the patient  "discuss this further with his cardiology appointment this spring.  Plan:     (I77.810) Aortic root dilatation (H)  Comment: Patient is a repeat echocardiogram, this will be ordered through his cardiology visit.    Plan:     (G60.9) Idiopathic peripheral neuropathy  Comment: Ongoing symptoms of peripheral neuropathy in his lower extremities from the shins down.. He mentioned this to an orthopedist at [facility] orthopedic clinic, he was sent sent to a neurologist for possible EMG.  The neurologist felt that the EMG was not indicated.  He underwent laser treatments at a chiropractic clinic to try and treat the symptoms.  He felt helped somewhat but the effect was short-lived.  He has questions about possible stem cell therapy for neuropathy.  I told him that this is still a very experimental procedure with very few data available.  It can be potentially extremely expensive.  However I was not aware of any specific harmful issues regarding this therapy.  I recommended he visit with a neurologist to formally review and evaluate his peripheral neuropathy.  Plan: NEUROLOGY ADULT REFERRAL               End of Life Planning:  Patient currently has an advanced directive: Yes.  Practitioner is supportive of decision.    COUNSELING:  Reviewed preventive health counseling, as reflected in patient instructions       Regular exercise       Healthy diet/nutrition       Vision screening       Hearing screening       Dental care    BP Readings from Last 1 Encounters:   03/21/19 114/68     Estimated body mass index is 28.27 kg/m  as calculated from the following:    Height as of this encounter: 1.778 m (5' 10\").    Weight as of this encounter: 89.4 kg (197 lb).           reports that he quit smoking about 52 years ago. He has a 8.00 pack-year smoking history. he has never used smokeless tobacco.      Appropriate preventive services were discussed with this patient, including applicable screening as appropriate for cardiovascular disease, " diabetes, osteopenia/osteoporosis, and glaucoma.  As appropriate for age/gender, discussed screening for colorectal cancer, prostate cancer, breast cancer, and cervical cancer. Checklist reviewing preventive services available has been given to the patient.    Reviewed patients plan of care and provided an AVS. The Basic Care Plan (routine screening as documented in Health Maintenance) for Moses meets the Care Plan requirement. This Care Plan has been established and reviewed with the Patient.    Counseling Resources:  ATP IV Guidelines  Pooled Cohorts Equation Calculator  Breast Cancer Risk Calculator  FRAX Risk Assessment  ICSI Preventive Guidelines  Dietary Guidelines for Americans, 2010  USDA's MyPlate  ASA Prophylaxis  Lung CA Screening    Steven Wagoner MD  [facility]

## 2019-03-28 ENCOUNTER — TELEPHONE (OUTPATIENT)
Dept: INTERNAL MEDICINE | Facility: CLINIC | Age: 84
End: 2019-03-28

## 2019-03-28 NOTE — TELEPHONE ENCOUNTER
"Reason for Call:  Other call back    Detailed comments: Pt saw Dr Wagoner 3/2019.  At that appt, he was given his lab results.  He is concerned about the LDL results.  Dr Wagoner wrote \"bad\" on the report.  Please call pt to discuss.    Phone Number Patient can be reached at: Home number on file [phone] (home)    Best Time: 8-10 am Friday 3/29/19    Can we leave a detailed message on this number? NO    Call taken on 3/28/2019 at 11:05 AM by SANDRA CARBAJAL    "

## 2019-04-01 NOTE — TELEPHONE ENCOUNTER
"LDL cholesterol is often referred to as \"bad\" cholesterol which is what I meant when I wrote \"bad\" on his lab sheet.  The LDL should be below 70, which is where he is.    HDL cholesterol is referred to as \"good\" cholesterol.   I explained it to him this way.  His LDL and HDL cholesterol levels are all excellent on his current therapy.      Continue all medications at the same doses.  Contact your usual pharmacy if you need refills.   "

## 2019-05-02 ENCOUNTER — TRANSFERRED RECORDS (OUTPATIENT)
Dept: HEALTH INFORMATION MANAGEMENT | Facility: CLINIC | Age: 84
End: 2019-05-02

## 2019-05-02 LAB
HBA1C MFR BLD: 5.6 % (ref 4.8–5.6)
TSH SERPL-ACNC: 2.6 UIU/ML (ref 0.45–4.5)

## 2019-05-13 ENCOUNTER — TELEPHONE (OUTPATIENT)
Dept: CARDIOLOGY | Facility: CLINIC | Age: 84
End: 2019-05-13

## 2019-05-13 NOTE — TELEPHONE ENCOUNTER
Pt called in stating he was not seen last year no openings. Pt now c/o odd feelings in chest. Pt denies pain at present. Did send in new RX for nitroglycerin.  JYOTHI Valentino RN

## 2019-05-14 ENCOUNTER — TRANSFERRED RECORDS (OUTPATIENT)
Dept: HEALTH INFORMATION MANAGEMENT | Facility: CLINIC | Age: 84
End: 2019-05-14

## 2019-05-15 ENCOUNTER — OFFICE VISIT (OUTPATIENT)
Dept: CARDIOLOGY | Facility: CLINIC | Age: 84
End: 2019-05-15
Payer: COMMERCIAL

## 2019-05-15 ENCOUNTER — TELEPHONE (OUTPATIENT)
Dept: CARDIOLOGY | Facility: CLINIC | Age: 84
End: 2019-05-15

## 2019-05-15 VITALS
DIASTOLIC BLOOD PRESSURE: 58 MMHG | SYSTOLIC BLOOD PRESSURE: 118 MMHG | BODY MASS INDEX: 27.92 KG/M2 | WEIGHT: 195 LBS | HEIGHT: 70 IN | HEART RATE: 48 BPM

## 2019-05-15 DIAGNOSIS — I77.89 AORTIC ROOT ENLARGEMENT (H): ICD-10-CM

## 2019-05-15 DIAGNOSIS — I25.10 CORONARY ARTERY DISEASE INVOLVING NATIVE CORONARY ARTERY OF NATIVE HEART WITHOUT ANGINA PECTORIS: ICD-10-CM

## 2019-05-15 DIAGNOSIS — R00.1 BRADYCARDIA: Primary | ICD-10-CM

## 2019-05-15 DIAGNOSIS — I49.5 SSS (SICK SINUS SYNDROME) (H): ICD-10-CM

## 2019-05-15 DIAGNOSIS — R07.89 CHEST DISCOMFORT: ICD-10-CM

## 2019-05-15 DIAGNOSIS — I77.810 ASCENDING AORTA DILATATION (H): ICD-10-CM

## 2019-05-15 PROCEDURE — 99214 OFFICE O/P EST MOD 30 MIN: CPT | Performed by: NURSE PRACTITIONER

## 2019-05-15 PROCEDURE — 93000 ELECTROCARDIOGRAM COMPLETE: CPT | Performed by: NURSE PRACTITIONER

## 2019-05-15 RX ORDER — NITROGLYCERIN 0.4 MG/1
0.4 TABLET SUBLINGUAL EVERY 5 MIN PRN
Qty: 25 TABLET | Refills: 0 | Status: SHIPPED | OUTPATIENT
Start: 2019-05-15 | End: 2019-06-27

## 2019-05-15 RX ORDER — CALCIUM CARBONATE/VITAMIN D3 600 MG-10
1000 TABLET ORAL DAILY
COMMUNITY
End: 2021-11-12

## 2019-05-15 ASSESSMENT — MIFFLIN-ST. JEOR: SCORE: 1575.76

## 2019-05-15 NOTE — PROGRESS NOTES
HPI and Plan:   I had the pleasure of seeing Moses Elizondo today in cardiology clinic follow up. He is a pleasant 85 year old patient of Dr. Huggins.    Mr. 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    He previously experienced a substernal chest discomfort in 2014  For which he was admitted to [facility].  His EKG was negative for ischemia however his troponins socorro to a peak of 30.  He underwent a coronary angiogram with PCI and drug-eluting stent to the first obtuse marginal.      He also has a known history of sick sinus syndrome and bradycardia. He was previously on propanolol for essential tremor however once this was discontinued his heart rate did not recover.    Today he presents to the cardiology clinic reporting exertional substernal chest discomfort.  He notices the symptoms while he is gardening or walking upstairs or exerting himself in any fashion.  This discomfort subsides with rest.  It is not associated with any other symptoms.  He describes this chest discomfort as similar to the discomfort he experienced in 2015 prior to receiving his stent.  He has taken 1 sublingual nitroglycerin which has relieved his pain.  His EKG today shows sinus bradycardia at 33 bpm.    Physical Exam  Please see Below     Assessment and Plan  1. Substernal chest discomfort with known coronary artery disease and drug-eluting stent to the first obtuse marginal.  Today he presents with a recurrence of exertional chest discomfort that subsides with rest and nitroglycerin.  He denies associated symptoms.  We will have him undergo an exercise nuclear stress test to evaluate for ischemia as well as chronotropic incompetence.    2.  Sick  sinus syndrome and bradycardia with heart rates as low as 33 noted on today's EKG.  He continues to deny lightheaded, dizziness, syncope or presyncope.  If his target heart rate is not achieved and he is noted to have chronotropic competence following the exercise nuclear stress test, we will have him evaluated by EP for possible permanent pacemaker implantation.  I will also have him wear a 48-hour Holter monitor to further evaluate the bradycardia and sick sinus syndrome.    3.  Aortic root (4.3 cm) enlargement as well as a ascending aorta (4.4 cm) enlargement noted on echocardiogram from 2016. We will repeat echocardiogram in 1 to 2 weeks.    4. Hyperlipidemia.  His last LDL was 50, HDL was 46.  Continue atorvastatin 40 mg daily.      Thank you for allowing me to care for Moses Elizondo today.    ALPESH Barraza, CNP  Cardiology    Voice recognition software was used for this note, I have reviewed this note, but errors may have been missed.    Orders Placed This Encounter   Procedures     NM Lexiscan stress test (nuc card)     NM Exercise stress test (nuc card)     EKG 12-lead complete w/read - Clinics (performed today)     EKG 12-lead complete w/read - Clinics (performed today)     Holter Monitor 48 hour Adult Pediatric     Echocardiogram Complete     Orders Placed This Encounter   Medications     vitamin B-12 (CYANOCOBALAMIN) 250 MCG tablet     Sig: Take 1,000 mcg by mouth daily     nitroGLYcerin (NITROSTAT) 0.4 MG sublingual tablet     Sig: Place 1 tablet (0.4 mg) under the tongue every 5 minutes as needed for chest pain     Dispense:  25 tablet     Refill:  0     Medications Discontinued During This Encounter   Medication Reason     nitroglycerin (NITROSTAT) 0.4 MG sublingual tablet Reorder         CURRENT MEDICATIONS:  Current Outpatient Medications   Medication Sig Dispense Refill     ASPIRIN EC PO Take 81 mg by mouth daily       atorvastatin (LIPITOR) 40 MG tablet Take 1 tablet (40 mg) by mouth daily  (Patient taking differently: Take 20 mg by mouth daily ) 90 tablet 3     levothyroxine (SYNTHROID/LEVOTHROID) 100 MCG tablet Take 1 tablet (100 mcg) by mouth daily 90 tablet 3     Multiple Vitamins-Minerals (CENTRUM SILVER PO) Take 1 tablet by mouth daily.       nitroGLYcerin (NITROSTAT) 0.4 MG sublingual tablet Place 1 tablet (0.4 mg) under the tongue every 5 minutes as needed for chest pain 25 tablet 0     Polyethylene Glycol 3350 (MIRALAX PO) Take by mouth as needed       vitamin B-12 (CYANOCOBALAMIN) 250 MCG tablet Take 1,000 mcg by mouth daily         ALLERGIES     Allergies   Allergen Reactions     No Known Drug Allergies        PAST MEDICAL HISTORY:  Past Medical History:   Diagnosis Date     Aortic root dilatation (H)      Bradycardia      CAD (coronary artery disease) 05/13/2014    YOGESH to CFX     Chest discomfort      Degeneration of lumbar or lumbosacral intervertebral disc      Familial tremor      Former smoker      Herpes zoster without mention of complication      Hyperlipidaemia      Idiopathic peripheral neuropathy      MGUS (monoclonal gammopathy of unknown significance)      Mild ascending aorta dilatation (H)      NSTEMI (non-ST elevated myocardial infarction) (H) 2014     Other and unspecified hyperlipidemia      Prostatitis, unspecified      Sensorineural hearing loss, unspecified      SSS (sick sinus syndrome) (H)      Unspecified hypothyroidism        PAST SURGICAL HISTORY:  Past Surgical History:   Procedure Laterality Date     C NONSPECIFIC PROCEDURE  2010    Laparoscopic cholecystectomy.      CHOLECYSTECTOMY       CORONARY ANGIOGRAPHY ADULT ORDER  5/14/14    PTCA w/YOGESH to OM1     HEART CATH, ANGIOPLASTY  5/14/14    YOGESH to OM1       FAMILY HISTORY:  Family History   Problem Relation Age of Onset     Cancer Mother      Genitourinary Problems Father 99        complications from surgery     Heart Disease Brother 72        MI       SOCIAL HISTORY:  Social History     Socioeconomic History      Marital status:      Spouse name: None     Number of children: None     Years of education: None     Highest education level: None   Occupational History     None   Social Needs     Financial resource strain: None     Food insecurity:     Worry: None     Inability: None     Transportation needs:     Medical: None     Non-medical: None   Tobacco Use     Smoking status: Former Smoker     Packs/day: 0.50     Years: 16.00     Pack years: 8.00     Start date:      Last attempt to quit: 1967     Years since quittin.4     Smokeless tobacco: Never Used   Substance and Sexual Activity     Alcohol use: Yes     Alcohol/week: 0.0 oz     Comment: 4 drinks week     Drug use: No     Sexual activity: Never   Lifestyle     Physical activity:     Days per week: None     Minutes per session: None     Stress: None   Relationships     Social connections:     Talks on phone: None     Gets together: None     Attends [employer] service: None     Active member of club or organization: None     Attends meetings of clubs or organizations: None     Relationship status: None     Intimate partner violence:     Fear of current or ex partner: None     Emotionally abused: None     Physically abused: None     Forced sexual activity: None   Other Topics Concern      Service Not Asked     Blood Transfusions Not Asked     Caffeine Concern Yes     Comment: 2 cups coffee/day     Occupational Exposure Not Asked     Hobby Hazards Not Asked     Sleep Concern No     Stress Concern Not Asked     Weight Concern Yes     Comment: limiting alcohol to watch calories     Special Diet Not Asked     Back Care Not Asked     Exercise Yes     Comment: Stationary bike  weights and aerobics 4 times week     Bike Helmet Not Asked     Seat Belt Yes     Self-Exams Not Asked     Parent/sibling w/ CABG, MI or angioplasty before 65F 55M? No   Social History Narrative     None       Review of Systems:  Skin:  Positive for lumps or bumps     Eyes:   "Positive for glasses    ENT:  Negative      Respiratory:  Negative dyspnea on exertion(Increased)     Cardiovascular:    Positive for;palpitations;chest pain;exercise intolerance;fatigue    Gastroenterology: Negative      Genitourinary:  Positive for nocturia    Musculoskeletal:  Negative      Neurologic:  Positive for numbness or tingling of feet;tremors    Psychiatric:  Positive for sleep disturbances    Heme/Lymph/Imm:  Negative      Endocrine:  Positive for thyroid disorder      Physical Exam:  Vitals: /58   Pulse (!) 48   Ht 1.778 m (5' 10\")   Wt 88.5 kg (195 lb)   BMI 27.98 kg/m      Constitutional:  cooperative, alert and oriented, well developed, well nourished, in no acute distress        Skin:  warm and dry to the touch          Head:  normocephalic        Eyes:  pupils equal and round;sclera white        Lymph:      ENT:  no pallor or cyanosis        Neck:  carotid pulses are full and equal bilaterally;JVP normal        Respiratory:  normal breath sounds, clear to auscultation, normal A-P diameter, normal symmetry, normal respiratory excursion, no use of accessory muscles         Cardiac: regular rhythm, normal S1/S2, no S3 or S4, apical impulse not displaced, no murmurs, gallops or rubs                pulses full and equal     2+             2+                    GI:  abdomen soft;non-tender        Extremities and Muscular Skeletal:  no deformities, clubbing, cyanosis, erythema observed;no edema              Neurological:  no gross motor deficits;affect appropriate        Psych:  Alert and Oriented x 3    Encounter Diagnoses   Name Primary?     Coronary artery disease involving native coronary artery of native heart without angina pectoris      Bradycardia Yes     Chest discomfort      SSS (sick sinus syndrome) (H)      Aortic root enlargement (H)      Ascending aorta dilatation (H)        Recent Lab Results:  LIPID RESULTS:  Lab Results   Component Value Date    CHOL 116 03/20/2019    HDL 40 " 03/20/2019    LDL 61 03/20/2019    TRIG 75 03/20/2019    CHOLHDLRATIO 2.9 07/08/2014       LIVER ENZYME RESULTS:  Lab Results   Component Value Date    AST 33 03/20/2019    ALT 25 03/20/2019       CBC RESULTS:  Lab Results   Component Value Date    WBC 5.8 03/20/2019    RBC 4.60 03/20/2019    HGB 13.9 03/20/2019    HCT 40.7 03/20/2019    MCV 89 03/20/2019    MCH 30.2 03/20/2019    MCHC 34.2 03/20/2019    RDW 12.1 03/20/2019     03/20/2019       BMP RESULTS:  Lab Results   Component Value Date     03/20/2019    POTASSIUM 4.1 03/20/2019    CHLORIDE 111 (H) 03/20/2019    CO2 27 03/20/2019    ANIONGAP 5 03/20/2019    GLC 94 03/20/2019    BUN 22 03/20/2019    CR 1.21 03/20/2019    GFRESTIMATED 54 (L) 03/20/2019    GFRESTBLACK 63 03/20/2019    GABRIELA 9.2 03/20/2019        A1C RESULTS:  No results found for: A1C    INR RESULTS:  No results found for: INR        CC  No referring provider defined for this encounter.

## 2019-05-15 NOTE — TELEPHONE ENCOUNTER
Message received from Trevin:    Trevin Chicas, ALPESH CNP  [facility] Heart Team 4             After review with Dr. Huggins, he would like this pt to have an exercise nuc rather than lexiscan so we can look for both ischemia and chronotropic incompetence. Lets also do a holter and echo.     Thanks so much, trevin        Orders already placed for testing. Spoke with nuc med, they will change the order from lexiscan to treadmill. Contacted patient to review. Patient's wife answered, and stated that he is not available right now. Left message with wife to have patient call back when he gets a chance.

## 2019-05-15 NOTE — LETTER
5/15/2019    Steven Wagoner MD  [address] [facility] [address]    RE: Moses Elizondo       Dear Colleague,    I had the pleasure of seeing Moses Elizondo in the [facility].    HPI and Plan:   I had the pleasure of seeing Moses Elizondo today in cardiology clinic follow up. He is a pleasant 85 year old patient of Dr. Huggins.    Mr. 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    He previously experienced a substernal chest discomfort in 2014  For which he was admitted to [facility].  His EKG was negative for ischemia however his troponins socorro to a peak of 30.  He underwent a coronary angiogram with PCI and drug-eluting stent to the first obtuse marginal.      He also has a known history of sick sinus syndrome and bradycardia. He was previously on propanolol for essential tremor however once this was discontinued his heart rate did not recover.    Today he presents to the cardiology clinic reporting exertional substernal chest discomfort.  He notices the symptoms while he is gardening or walking upstairs or exerting himself in any fashion.  This discomfort subsides with rest.  It is not associated with any other symptoms.  He describes this chest discomfort as similar to the discomfort he experienced in 2015 prior to receiving his stent.  He has taken 1 sublingual nitroglycerin which has relieved his pain.  His EKG today shows sinus bradycardia at 33 bpm.    Physical Exam  Please see Below     Assessment and Plan  1. Substernal chest discomfort with known coronary artery disease and drug-eluting stent to the first obtuse marginal.  Today he presents with a recurrence of  exertional chest discomfort that subsides with rest and nitroglycerin.  He denies associated symptoms.  We will have him undergo an exercise nuclear stress test to evaluate for ischemia as well as chronotropic incompetence.    2.  Sick sinus syndrome and bradycardia with heart rates as low as 33 noted on today's EKG.  He continues to deny lightheaded, dizziness, syncope or presyncope.  If his target heart rate is not achieved and he is noted to have chronotropic competence following the exercise nuclear stress test, we will have him evaluated by EP for possible permanent pacemaker implantation.  I will also have him wear a 48-hour Holter monitor to further evaluate the bradycardia and sick sinus syndrome.    3.  Aortic root (4.3 cm) enlargement as well as a ascending aorta (4.4 cm) enlargement noted on echocardiogram from 2016. We will repeat echocardiogram in 1 to 2 weeks.    4. Hyperlipidemia.  His last LDL was 50, HDL was 46.  Continue atorvastatin 40 mg daily.      Thank you for allowing me to care for Moses Elizondo today.    ALPESH Barraza, CNP  Cardiology    Voice recognition software was used for this note, I have reviewed this note, but errors may have been missed.    Orders Placed This Encounter   Procedures     NM Lexiscan stress test (nuc card)     NM Exercise stress test (nuc card)     EKG 12-lead complete w/read - Clinics (performed today)     EKG 12-lead complete w/read - Clinics (performed today)     Holter Monitor 48 hour Adult Pediatric     Echocardiogram Complete     Orders Placed This Encounter   Medications     vitamin B-12 (CYANOCOBALAMIN) 250 MCG tablet     Sig: Take 1,000 mcg by mouth daily     nitroGLYcerin (NITROSTAT) 0.4 MG sublingual tablet     Sig: Place 1 tablet (0.4 mg) under the tongue every 5 minutes as needed for chest pain     Dispense:  25 tablet     Refill:  0     Medications Discontinued During This Encounter   Medication Reason     nitroglycerin (NITROSTAT) 0.4 MG  sublingual tablet Reorder         CURRENT MEDICATIONS:  Current Outpatient Medications   Medication Sig Dispense Refill     ASPIRIN EC PO Take 81 mg by mouth daily       atorvastatin (LIPITOR) 40 MG tablet Take 1 tablet (40 mg) by mouth daily (Patient taking differently: Take 20 mg by mouth daily ) 90 tablet 3     levothyroxine (SYNTHROID/LEVOTHROID) 100 MCG tablet Take 1 tablet (100 mcg) by mouth daily 90 tablet 3     Multiple Vitamins-Minerals (CENTRUM SILVER PO) Take 1 tablet by mouth daily.       nitroGLYcerin (NITROSTAT) 0.4 MG sublingual tablet Place 1 tablet (0.4 mg) under the tongue every 5 minutes as needed for chest pain 25 tablet 0     Polyethylene Glycol 3350 (MIRALAX PO) Take by mouth as needed       vitamin B-12 (CYANOCOBALAMIN) 250 MCG tablet Take 1,000 mcg by mouth daily         ALLERGIES     Allergies   Allergen Reactions     No Known Drug Allergies        PAST MEDICAL HISTORY:  Past Medical History:   Diagnosis Date     Aortic root dilatation (H)      Bradycardia      CAD (coronary artery disease) 05/13/2014    YOGESH to CFX     Chest discomfort      Degeneration of lumbar or lumbosacral intervertebral disc      Familial tremor      Former smoker      Herpes zoster without mention of complication      Hyperlipidaemia      Idiopathic peripheral neuropathy      MGUS (monoclonal gammopathy of unknown significance)      Mild ascending aorta dilatation (H)      NSTEMI (non-ST elevated myocardial infarction) (H) 2014     Other and unspecified hyperlipidemia      Prostatitis, unspecified      Sensorineural hearing loss, unspecified      SSS (sick sinus syndrome) (H)      Unspecified hypothyroidism        PAST SURGICAL HISTORY:  Past Surgical History:   Procedure Laterality Date     C NONSPECIFIC PROCEDURE  2010    Laparoscopic cholecystectomy.      CHOLECYSTECTOMY       CORONARY ANGIOGRAPHY ADULT ORDER  5/14/14    PTCA w/YOGESH to OM1     HEART CATH, ANGIOPLASTY  5/14/14    YOGESH to OM1       FAMILY  HISTORY:  Family History   Problem Relation Age of Onset     Cancer Mother      Genitourinary Problems Father 99        complications from surgery     Heart Disease Brother 72        MI       SOCIAL HISTORY:  Social History     Socioeconomic History     Marital status:      Spouse name: None     Number of children: None     Years of education: None     Highest education level: None   Occupational History     None   Social Needs     Financial resource strain: None     Food insecurity:     Worry: None     Inability: None     Transportation needs:     Medical: None     Non-medical: None   Tobacco Use     Smoking status: Former Smoker     Packs/day: 0.50     Years: 16.00     Pack years: 8.00     Start date:      Last attempt to quit: 1967     Years since quittin.4     Smokeless tobacco: Never Used   Substance and Sexual Activity     Alcohol use: Yes     Alcohol/week: 0.0 oz     Comment: 4 drinks week     Drug use: No     Sexual activity: Never   Lifestyle     Physical activity:     Days per week: None     Minutes per session: None     Stress: None   Relationships     Social connections:     Talks on phone: None     Gets together: None     Attends [employer] service: None     Active member of club or organization: None     Attends meetings of clubs or organizations: None     Relationship status: None     Intimate partner violence:     Fear of current or ex partner: None     Emotionally abused: None     Physically abused: None     Forced sexual activity: None   Other Topics Concern      Service Not Asked     Blood Transfusions Not Asked     Caffeine Concern Yes     Comment: 2 cups coffee/day     Occupational Exposure Not Asked     Hobby Hazards Not Asked     Sleep Concern No     Stress Concern Not Asked     Weight Concern Yes     Comment: limiting alcohol to watch calories     Special Diet Not Asked     Back Care Not Asked     Exercise Yes     Comment: Stationary bike  weights and aerobics 4  "times week     Bike Helmet Not Asked     Seat Belt Yes     Self-Exams Not Asked     Parent/sibling w/ CABG, MI or angioplasty before 65F 55M? No   Social History Narrative     None       Review of Systems:  Skin:  Positive for lumps or bumps     Eyes:  Positive for glasses    ENT:  Negative      Respiratory:  Negative dyspnea on exertion(Increased)     Cardiovascular:    Positive for;palpitations;chest pain;exercise intolerance;fatigue    Gastroenterology: Negative      Genitourinary:  Positive for nocturia    Musculoskeletal:  Negative      Neurologic:  Positive for numbness or tingling of feet;tremors    Psychiatric:  Positive for sleep disturbances    Heme/Lymph/Imm:  Negative      Endocrine:  Positive for thyroid disorder      Physical Exam:  Vitals: /58   Pulse (!) 48   Ht 1.778 m (5' 10\")   Wt 88.5 kg (195 lb)   BMI 27.98 kg/m       Constitutional:  cooperative, alert and oriented, well developed, well nourished, in no acute distress        Skin:  warm and dry to the touch          Head:  normocephalic        Eyes:  pupils equal and round;sclera white        Lymph:      ENT:  no pallor or cyanosis        Neck:  carotid pulses are full and equal bilaterally;JVP normal        Respiratory:  normal breath sounds, clear to auscultation, normal A-P diameter, normal symmetry, normal respiratory excursion, no use of accessory muscles         Cardiac: regular rhythm, normal S1/S2, no S3 or S4, apical impulse not displaced, no murmurs, gallops or rubs                pulses full and equal     2+             2+                    GI:  abdomen soft;non-tender        Extremities and Muscular Skeletal:  no deformities, clubbing, cyanosis, erythema observed;no edema              Neurological:  no gross motor deficits;affect appropriate        Psych:  Alert and Oriented x 3    Encounter Diagnoses   Name Primary?     Coronary artery disease involving native coronary artery of native heart without angina pectoris      " Bradycardia Yes     Chest discomfort      SSS (sick sinus syndrome) (H)      Aortic root enlargement (H)      Ascending aorta dilatation (H)        Recent Lab Results:  LIPID RESULTS:  Lab Results   Component Value Date    CHOL 116 03/20/2019    HDL 40 03/20/2019    LDL 61 03/20/2019    TRIG 75 03/20/2019    CHOLHDLRATIO 2.9 07/08/2014       LIVER ENZYME RESULTS:  Lab Results   Component Value Date    AST 33 03/20/2019    ALT 25 03/20/2019       CBC RESULTS:  Lab Results   Component Value Date    WBC 5.8 03/20/2019    RBC 4.60 03/20/2019    HGB 13.9 03/20/2019    HCT 40.7 03/20/2019    MCV 89 03/20/2019    MCH 30.2 03/20/2019    MCHC 34.2 03/20/2019    RDW 12.1 03/20/2019     03/20/2019       BMP RESULTS:  Lab Results   Component Value Date     03/20/2019    POTASSIUM 4.1 03/20/2019    CHLORIDE 111 (H) 03/20/2019    CO2 27 03/20/2019    ANIONGAP 5 03/20/2019    GLC 94 03/20/2019    BUN 22 03/20/2019    CR 1.21 03/20/2019    GFRESTIMATED 54 (L) 03/20/2019    GFRESTBLACK 63 03/20/2019    GABRIELA 9.2 03/20/2019        A1C RESULTS:  No results found for: A1C    INR RESULTS:  No results found for: INR          Thank you for allowing me to participate in the care of your patient.    Sincerely,     ALPESH Barraza [facility]

## 2019-05-15 NOTE — TELEPHONE ENCOUNTER
Pharmacy faxed request for medication refill.    Preferred pharmacy set up and verified.    Need for clarification form:     \"Notes to  Prescriber: Call for prior auth [phone]. Patient ID is [id number].\"   Spoke with patient about Dottie's recommendations. Patient verbalized understanding and agreed with plan of care. Patient connected with scheduling to set up echocardiogram and 48-hour holter monitor.

## 2019-05-15 NOTE — LETTER
5/15/2019    Steven Wagoner MD  [address] [facility] [address]    RE: Moses Elizondo       Dear Colleague,    I had the pleasure of seeing Moses Elizondo in the [facility].    HPI and Plan:   I had the pleasure of seeing Moses Elizondo today in cardiology clinic follow up. He is a pleasant 85 year old patient of Dr. Huggins.    Mr. 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    He previously experienced a substernal chest discomfort in 2014  For which he was admitted to [facility].  His EKG was negative for ischemia however his troponins socorro to a peak of 30.  He underwent a coronary angiogram with PCI and drug-eluting stent to the first obtuse marginal.      He also has a known history of sick sinus syndrome and bradycardia. He was previously on propanolol for essential tremor however once this was discontinued his heart rate did not recover.    Today he presents to the cardiology clinic reporting exertional substernal chest discomfort.  He notices the symptoms while he is gardening or walking upstairs or exerting himself in any fashion.  This discomfort subsides with rest.  It is not associated with any other symptoms.  He describes this chest discomfort as similar to the discomfort he experienced in 2015 prior to receiving his stent.  He has taken 1 sublingual nitroglycerin which has relieved his pain.  His EKG today shows sinus bradycardia at 33 bpm.    Physical Exam  Please see Below     Assessment and Plan  1. Substernal chest discomfort with known coronary artery disease and drug-eluting stent to the first obtuse marginal.  Today he presents with a recurrence of  exertional chest discomfort that subsides with rest and nitroglycerin.  He denies associated symptoms.  We will have him undergo an exercise nuclear stress test to evaluate for ischemia as well as chronotropic incompetence.    2.  Sick sinus syndrome and bradycardia with heart rates as low as 33 noted on today's EKG.  He continues to deny lightheaded, dizziness, syncope or presyncope.  If his target heart rate is not achieved and he is noted to have chronotropic competence following the exercise nuclear stress test, we will have him evaluated by EP for possible permanent pacemaker implantation.  I will also have him wear a 48-hour Holter monitor to further evaluate the bradycardia and sick sinus syndrome.    3.  Aortic root (4.3 cm) enlargement as well as a ascending aorta (4.4 cm) enlargement noted on echocardiogram from 2016. We will repeat echocardiogram in 1 to 2 weeks.    4. Hyperlipidemia.  His last LDL was 50, HDL was 46.  Continue atorvastatin 40 mg daily.      Thank you for allowing me to care for Moses Elizondo today.    ALPESH Barraza, CNP  Cardiology    Voice recognition software was used for this note, I have reviewed this note, but errors may have been missed.    Orders Placed This Encounter   Procedures     NM Lexiscan stress test (nuc card)     NM Exercise stress test (nuc card)     EKG 12-lead complete w/read - Clinics (performed today)     EKG 12-lead complete w/read - Clinics (performed today)     Holter Monitor 48 hour Adult Pediatric     Echocardiogram Complete     Orders Placed This Encounter   Medications     vitamin B-12 (CYANOCOBALAMIN) 250 MCG tablet     Sig: Take 1,000 mcg by mouth daily     nitroGLYcerin (NITROSTAT) 0.4 MG sublingual tablet     Sig: Place 1 tablet (0.4 mg) under the tongue every 5 minutes as needed for chest pain     Dispense:  25 tablet     Refill:  0     Medications Discontinued During This Encounter   Medication Reason     nitroglycerin (NITROSTAT) 0.4 MG  sublingual tablet Reorder         CURRENT MEDICATIONS:  Current Outpatient Medications   Medication Sig Dispense Refill     ASPIRIN EC PO Take 81 mg by mouth daily       atorvastatin (LIPITOR) 40 MG tablet Take 1 tablet (40 mg) by mouth daily (Patient taking differently: Take 20 mg by mouth daily ) 90 tablet 3     levothyroxine (SYNTHROID/LEVOTHROID) 100 MCG tablet Take 1 tablet (100 mcg) by mouth daily 90 tablet 3     Multiple Vitamins-Minerals (CENTRUM SILVER PO) Take 1 tablet by mouth daily.       nitroGLYcerin (NITROSTAT) 0.4 MG sublingual tablet Place 1 tablet (0.4 mg) under the tongue every 5 minutes as needed for chest pain 25 tablet 0     Polyethylene Glycol 3350 (MIRALAX PO) Take by mouth as needed       vitamin B-12 (CYANOCOBALAMIN) 250 MCG tablet Take 1,000 mcg by mouth daily         ALLERGIES     Allergies   Allergen Reactions     No Known Drug Allergies        PAST MEDICAL HISTORY:  Past Medical History:   Diagnosis Date     Aortic root dilatation (H)      Bradycardia      CAD (coronary artery disease) 05/13/2014    YOGESH to CFX     Chest discomfort      Degeneration of lumbar or lumbosacral intervertebral disc      Familial tremor      Former smoker      Herpes zoster without mention of complication      Hyperlipidaemia      Idiopathic peripheral neuropathy      MGUS (monoclonal gammopathy of unknown significance)      Mild ascending aorta dilatation (H)      NSTEMI (non-ST elevated myocardial infarction) (H) 2014     Other and unspecified hyperlipidemia      Prostatitis, unspecified      Sensorineural hearing loss, unspecified      SSS (sick sinus syndrome) (H)      Unspecified hypothyroidism        PAST SURGICAL HISTORY:  Past Surgical History:   Procedure Laterality Date     C NONSPECIFIC PROCEDURE  2010    Laparoscopic cholecystectomy.      CHOLECYSTECTOMY       CORONARY ANGIOGRAPHY ADULT ORDER  5/14/14    PTCA w/YOGESH to OM1     HEART CATH, ANGIOPLASTY  5/14/14    YOGESH to OM1       FAMILY  HISTORY:  Family History   Problem Relation Age of Onset     Cancer Mother      Genitourinary Problems Father 99        complications from surgery     Heart Disease Brother 72        MI       SOCIAL HISTORY:  Social History     Socioeconomic History     Marital status:      Spouse name: None     Number of children: None     Years of education: None     Highest education level: None   Occupational History     None   Social Needs     Financial resource strain: None     Food insecurity:     Worry: None     Inability: None     Transportation needs:     Medical: None     Non-medical: None   Tobacco Use     Smoking status: Former Smoker     Packs/day: 0.50     Years: 16.00     Pack years: 8.00     Start date:      Last attempt to quit: 1967     Years since quittin.4     Smokeless tobacco: Never Used   Substance and Sexual Activity     Alcohol use: Yes     Alcohol/week: 0.0 oz     Comment: 4 drinks week     Drug use: No     Sexual activity: Never   Lifestyle     Physical activity:     Days per week: None     Minutes per session: None     Stress: None   Relationships     Social connections:     Talks on phone: None     Gets together: None     Attends [employer] service: None     Active member of club or organization: None     Attends meetings of clubs or organizations: None     Relationship status: None     Intimate partner violence:     Fear of current or ex partner: None     Emotionally abused: None     Physically abused: None     Forced sexual activity: None   Other Topics Concern      Service Not Asked     Blood Transfusions Not Asked     Caffeine Concern Yes     Comment: 2 cups coffee/day     Occupational Exposure Not Asked     Hobby Hazards Not Asked     Sleep Concern No     Stress Concern Not Asked     Weight Concern Yes     Comment: limiting alcohol to watch calories     Special Diet Not Asked     Back Care Not Asked     Exercise Yes     Comment: Stationary bike  weights and aerobics 4  "times week     Bike Helmet Not Asked     Seat Belt Yes     Self-Exams Not Asked     Parent/sibling w/ CABG, MI or angioplasty before 65F 55M? No   Social History Narrative     None       Review of Systems:  Skin:  Positive for lumps or bumps     Eyes:  Positive for glasses    ENT:  Negative      Respiratory:  Negative dyspnea on exertion(Increased)     Cardiovascular:    Positive for;palpitations;chest pain;exercise intolerance;fatigue    Gastroenterology: Negative      Genitourinary:  Positive for nocturia    Musculoskeletal:  Negative      Neurologic:  Positive for numbness or tingling of feet;tremors    Psychiatric:  Positive for sleep disturbances    Heme/Lymph/Imm:  Negative      Endocrine:  Positive for thyroid disorder      Physical Exam:  Vitals: /58   Pulse (!) 48   Ht 1.778 m (5' 10\")   Wt 88.5 kg (195 lb)   BMI 27.98 kg/m       Constitutional:  cooperative, alert and oriented, well developed, well nourished, in no acute distress        Skin:  warm and dry to the touch          Head:  normocephalic        Eyes:  pupils equal and round;sclera white        Lymph:      ENT:  no pallor or cyanosis        Neck:  carotid pulses are full and equal bilaterally;JVP normal        Respiratory:  normal breath sounds, clear to auscultation, normal A-P diameter, normal symmetry, normal respiratory excursion, no use of accessory muscles         Cardiac: regular rhythm, normal S1/S2, no S3 or S4, apical impulse not displaced, no murmurs, gallops or rubs                pulses full and equal     2+             2+                    GI:  abdomen soft;non-tender        Extremities and Muscular Skeletal:  no deformities, clubbing, cyanosis, erythema observed;no edema              Neurological:  no gross motor deficits;affect appropriate        Psych:  Alert and Oriented x 3    Encounter Diagnoses   Name Primary?     Coronary artery disease involving native coronary artery of native heart without angina pectoris      " Bradycardia Yes     Chest discomfort      SSS (sick sinus syndrome) (H)      Aortic root enlargement (H)      Ascending aorta dilatation (H)        Recent Lab Results:  LIPID RESULTS:  Lab Results   Component Value Date    CHOL 116 03/20/2019    HDL 40 03/20/2019    LDL 61 03/20/2019    TRIG 75 03/20/2019    CHOLHDLRATIO 2.9 07/08/2014       LIVER ENZYME RESULTS:  Lab Results   Component Value Date    AST 33 03/20/2019    ALT 25 03/20/2019       CBC RESULTS:  Lab Results   Component Value Date    WBC 5.8 03/20/2019    RBC 4.60 03/20/2019    HGB 13.9 03/20/2019    HCT 40.7 03/20/2019    MCV 89 03/20/2019    MCH 30.2 03/20/2019    MCHC 34.2 03/20/2019    RDW 12.1 03/20/2019     03/20/2019       BMP RESULTS:  Lab Results   Component Value Date     03/20/2019    POTASSIUM 4.1 03/20/2019    CHLORIDE 111 (H) 03/20/2019    CO2 27 03/20/2019    ANIONGAP 5 03/20/2019    GLC 94 03/20/2019    BUN 22 03/20/2019    CR 1.21 03/20/2019    GFRESTIMATED 54 (L) 03/20/2019    GFRESTBLACK 63 03/20/2019    GABRIELA 9.2 03/20/2019        A1C RESULTS:  No results found for: A1C    INR RESULTS:  No results found for: INR        CC  No referring provider defined for this encounter.                      Thank you for allowing me to participate in the care of your patient.      Sincerely,     ALPESH Barraza [facility]    cc:   No referring provider defined for this encounter.

## 2019-05-16 ENCOUNTER — HOSPITAL ENCOUNTER (OUTPATIENT)
Dept: CARDIOLOGY | Facility: CLINIC | Age: 84
Discharge: HOME | End: 2019-05-16
Attending: NURSE PRACTITIONER | Admitting: NURSE PRACTITIONER
Payer: COMMERCIAL

## 2019-05-16 DIAGNOSIS — R07.89 CHEST DISCOMFORT: ICD-10-CM

## 2019-05-16 PROCEDURE — A9502 TC99M TETROFOSMIN: HCPCS | Performed by: NURSE PRACTITIONER

## 2019-05-16 PROCEDURE — 93018 CV STRESS TEST I&R ONLY: CPT | Performed by: INTERNAL MEDICINE

## 2019-05-16 PROCEDURE — 93016 CV STRESS TEST SUPVJ ONLY: CPT | Performed by: INTERNAL MEDICINE

## 2019-05-16 PROCEDURE — 34300033 ZZH RX 343: Performed by: NURSE PRACTITIONER

## 2019-05-16 PROCEDURE — 93017 CV STRESS TEST TRACING ONLY: CPT

## 2019-05-16 PROCEDURE — 78452 HT MUSCLE IMAGE SPECT MULT: CPT | Mod: 26 | Performed by: INTERNAL MEDICINE

## 2019-05-16 RX ADMIN — TETROFOSMIN 3.35 MCI.: 1.38 INJECTION, POWDER, LYOPHILIZED, FOR SOLUTION INTRAVENOUS at 08:47

## 2019-05-16 RX ADMIN — TETROFOSMIN 9.38 MCI.: 1.38 INJECTION, POWDER, LYOPHILIZED, FOR SOLUTION INTRAVENOUS at 10:29

## 2019-05-23 ENCOUNTER — HOSPITAL ENCOUNTER (OUTPATIENT)
Dept: CARDIOLOGY | Facility: CLINIC | Age: 84
End: 2019-05-23
Attending: NURSE PRACTITIONER
Payer: COMMERCIAL

## 2019-05-23 ENCOUNTER — HOSPITAL ENCOUNTER (OUTPATIENT)
Dept: CARDIOLOGY | Facility: CLINIC | Age: 84
Discharge: HOME | End: 2019-05-23
Attending: NURSE PRACTITIONER | Admitting: NURSE PRACTITIONER
Payer: COMMERCIAL

## 2019-05-23 DIAGNOSIS — R00.1 BRADYCARDIA: ICD-10-CM

## 2019-05-23 DIAGNOSIS — I25.10 CORONARY ARTERY DISEASE INVOLVING NATIVE CORONARY ARTERY OF NATIVE HEART WITHOUT ANGINA PECTORIS: ICD-10-CM

## 2019-05-23 DIAGNOSIS — I77.89 AORTIC ROOT ENLARGEMENT (H): ICD-10-CM

## 2019-05-23 DIAGNOSIS — I49.5 SSS (SICK SINUS SYNDROME) (H): ICD-10-CM

## 2019-05-23 DIAGNOSIS — I77.810 ASCENDING AORTA DILATATION (H): ICD-10-CM

## 2019-05-23 PROCEDURE — 93306 TTE W/DOPPLER COMPLETE: CPT

## 2019-05-23 PROCEDURE — 93306 TTE W/DOPPLER COMPLETE: CPT | Mod: 26 | Performed by: INTERNAL MEDICINE

## 2019-05-23 PROCEDURE — 93227 XTRNL ECG REC<48 HR R&I: CPT | Performed by: INTERNAL MEDICINE

## 2019-05-23 PROCEDURE — 93226 XTRNL ECG REC<48 HR SCAN A/R: CPT

## 2019-05-31 ENCOUNTER — TELEPHONE (OUTPATIENT)
Dept: CARDIOLOGY | Facility: CLINIC | Age: 84
End: 2019-05-31

## 2019-05-31 NOTE — TELEPHONE ENCOUNTER
Spoke with Dottie verbally about patient's results. Per Dottie and Dr. Huggins, patient to get set up for coronary angiogram. Spoke with patient about Dottie and Dr. Huggins's recommendations. Patient verbalized understanding and agreed with plan of care. Patient denies any current chest pain or shortness of breath. OV made for 6/5/19 at 8:10am with Dottie. Instructed patient to contact us in the meantime with any questions/concerns or worsening symptoms. Patient verbalized understanding and agreed with plan of care.

## 2019-05-31 NOTE — TELEPHONE ENCOUNTER
"VM from patient, requesting the results of his recent testing that was done.     48-hour holter done 5/23/19:    1. Moses Elizondo was monitored for 48:00 hours. Quality of tracing was excellent. Principle rhythm was sinus with frequent supraventricular ectopy.  Average HR was 50 bpm, minimum HR was 25 bpm (likely during sleep hours), maximum HR was 91 bpm. MA interval .188 seconds, QRS duration .117  seconds, QT interval .397 - 547 seconds. Single episode of one non conducted P wave (AV block) versus a minimally \"early\" PAC (ie non conducted  PAC) along with few episodes of more definite non conducted PACs  2. There were 71 isolated PVCs.  3. There were 63457 supraventricular ectopics. 84876 of these were isolated PACs and there were 437 couplets.  4. 8906 pauses greater than 2.0 seconds. These pauses were the result of blocked PACs. Maximum R-R interval was 2.73 seconds long.  5. No symptoms reported and patient event button was not pressed.    NM exercise stress test done 5/16/19:    Impression  1.  Myocardial perfusion imaging using single isotope technique  demonstrated a small to moderate, mild fixed defect in the  inferolateral wall consistent with prior infarction. Patient failed to  achieve target heart rate and this is a nondiagnostic study for  ischemia. No ischemia was present at the workload achieved.  2. Gated images demonstrated mild global hypokinesis.  The left  ventricular systolic function is mildly reduced with an ejection  fraction of 44%.  3. Compared to the prior study dated 4/2/2015, there has been no  significant change.  However, the patient only achieved 75% percent of  maximal age predicted heart rate making this a nondiagnostic study for  presence of ischemia. Recommend pharmacologic study to answer clinical  question.    Echocardiogram done 5/23/19:    Interpretation Summary     Moderate dilation of the ascending aorta and the aortic root. Both meassure  4.4 cms.  The left ventricle " is normal in size and wall thickness. Left ventricular  systolic function is borderline reduced. EF is estimated at 50-55%.  The right ventricle is normal in size and function.  Normal atria.  No hemodynamically significant valvular abnormalities on 2D or color flow  imaging.  Compared to the prior study dated 5/10/2016, there have been no changes.    Last OV 5/15/19 with Dottie:    Assessment and Plan  1. Substernal chest discomfort with known coronary artery disease and drug-eluting stent to the first obtuse marginal.  Today he presents with a recurrence of exertional chest discomfort that subsides with rest and nitroglycerin.  He denies associated symptoms.  We will have him undergo an exercise nuclear stress test to evaluate for ischemia as well as chronotropic incompetence.     2.  Sick sinus syndrome and bradycardia with heart rates as low as 33 noted on today's EKG.  He continues to deny lightheaded, dizziness, syncope or presyncope.  If his target heart rate is not achieved and he is noted to have chronotropic competence following the exercise nuclear stress test, we will have him evaluated by EP for possible permanent pacemaker implantation.  I will also have him wear a 48-hour Holter monitor to further evaluate the bradycardia and sick sinus syndrome.     3.  Aortic root (4.3 cm) enlargement as well as a ascending aorta (4.4 cm) enlargement noted on echocardiogram from 2016. We will repeat echocardiogram in 1 to 2 weeks.     4. Hyperlipidemia.  His last LDL was 50, HDL was 46.  Continue atorvastatin 40 mg daily.       Thank you for allowing me to care for Moses Elizondo today.     Dottie Chicas, ALPESH, CNP  Cardiology    Will route to Dottie for review.

## 2019-06-05 ENCOUNTER — OFFICE VISIT (OUTPATIENT)
Dept: CARDIOLOGY | Facility: CLINIC | Age: 84
End: 2019-06-05
Payer: COMMERCIAL

## 2019-06-05 VITALS
BODY MASS INDEX: 27.63 KG/M2 | HEART RATE: 61 BPM | DIASTOLIC BLOOD PRESSURE: 73 MMHG | WEIGHT: 193 LBS | HEIGHT: 70 IN | SYSTOLIC BLOOD PRESSURE: 122 MMHG

## 2019-06-05 DIAGNOSIS — R07.89 CHEST DISCOMFORT: Primary | ICD-10-CM

## 2019-06-05 PROCEDURE — 99214 OFFICE O/P EST MOD 30 MIN: CPT | Performed by: NURSE PRACTITIONER

## 2019-06-05 ASSESSMENT — MIFFLIN-ST. JEOR: SCORE: 1566.69

## 2019-06-05 NOTE — LETTER
6/5/2019    Steven Wagoner MD  [address] [facility] [address]    RE: Moses Elizondo       Dear Colleague,    I had the pleasure of seeing Moses Elizodno in the [facility].    HPI and Plan:   I had the pleasure of seeing Moses Elizondo today in cardiology clinic follow up. He is a pleasant 85 year old patient of Dr. Huggins.    Mr. 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    He previously experienced a substernal chest discomfort in 2014  For which he was admitted to [facility].  His EKG was negative for ischemia however his troponins socorro to a peak of 30.  He underwent a coronary angiogram with PCI and drug-eluting stent to the first obtuse marginal.      He also has a known history of sick sinus syndrome and bradycardia. He was previously on propanolol for essential tremor however once this was discontinued his heart rate did not recover.    He was recently seen in the cardiology clinic with complaints of exertional, substernal chest discomfort.  The symptoms were occurring with exertion such as gardening, walking up the stairs or while riding his bike.  his symptoms previously subsided with one sublingual nitroglycerin.  He  was also noted to have a heart rate of 33 bpm by EKG.  He underwent a nuclear stress test which demonstrated a small to moderate, mild fixed defect in the inferolateral wall consistent with prior infarction.  He failed to achieve target heart rate therefore this was a nondiagnostic study for ischemia.  He then underwent an echocardiogram which noted a mildly reduced left ventricular systolic function with an ejection fraction at 50 to 55%.  The right  ventricle is normal.  No valvular abnormalities.  Moderate dilatation of the ascending aorta at 4.4 cm as well as the aortic root.    Today he presents to the cardiology clinic reporting exertional substernal chest discomfort.  He notices the symptoms while he is gardening or walking upstairs or exerting himself in any fashion.  This discomfort subsides with rest.  It is not associated with any other symptoms.  He describes this chest discomfort as similar to the discomfort he experienced in 2015 prior to receiving his stent.  He has taken 1 sublingual nitroglycerin which has relieved his pain.  His EKG today shows sinus bradycardia at 33 bpm. He wore a 48-hour Holter monitor which showed an average heart rate at 50 bpm with a minimum heart rate and 25 bpm when he was sleeping.  There was some supra ventricular ectopy noted however his principal rhythm was sinus.  She had many pauses with the greatest pause being less than 2.5 seconds.    Today he returns to the clinic to review his recent testing.  He states that since his last office visit he has not experienced the exertional, substernal chest discomfort.  He has not required sublingual nitroglycerin since I last saw him.  He has been riding his bike without issues.  He states he is enjoying his summer in the nice weather outside.    Physical Exam  Please see Below     Assessment and Plan  1. Substernal chest discomfort with known coronary artery disease and drug-eluting stent to the first obtuse marginal.  He recently had an onset of exertional, substernal chest discomfort that subsided with the use of sublingual nitroglycerin.  The substernal chest discomfort is similar to the symptoms he experienced in 2015.  Since her last visit the symptoms have minimalized.  After review with Dr. Huggins, I believed he would benefit from a coronary angiogram. All risks and benefits for this procedure have been explained to this patient and accepted.  This includes but is not  limited to death, heart attack, stroke, blood clots, bleeding including the need for blood transfusion and the risk thereof, allergic reaction to x-ray dye, arrhythmia necessitating cardioversion, dye nephropathy.  We talked about intracoronary stenting and the risks thereof and bypass surgery.      No history of bleeding problems or current bleeding, and no scheduled surgeries or procedures in the next year. Patient understands and wishes to proceed with it.    2.  Sick sinus syndrome and bradycardia with heart rates as low as 33 noted on today's EKG.  He continues to deny lightheaded, dizziness, syncope or presyncope.  His target heart rate was not achieved during the nuclear stress test however his Holter monitor showed predominantly sinus rhythm with occasional pauses less than 2.5 seconds.  Today his heart rate is 61 bpm.  We will continue to monitor at this time.    3.  Aortic root (4.4 cm) enlargement as well as a ascending aorta (4.4 cm) enlargement noted on recent echocardiogram. We will continue to monitor.    4. Hyperlipidemia.  His last LDL was 50, HDL was 46.  Continue atorvastatin 40 mg daily.      Thank you for allowing me to care for Moses Elizondo today.    ALPESH Barraza, CNP  Cardiology    Voice recognition software was used for this note, I have reviewed this note, but errors may have been missed.    No orders of the defined types were placed in this encounter.    No orders of the defined types were placed in this encounter.    There are no discontinued medications.      CURRENT MEDICATIONS:  Current Outpatient Medications   Medication Sig Dispense Refill     ASPIRIN EC PO Take 81 mg by mouth daily       atorvastatin (LIPITOR) 40 MG tablet Take 1 tablet (40 mg) by mouth daily (Patient taking differently: Take 20 mg by mouth daily ) 90 tablet 3     levothyroxine (SYNTHROID/LEVOTHROID) 100 MCG tablet Take 1 tablet (100 mcg) by mouth daily 90 tablet 3     Multiple Vitamins-Minerals (CENTRUM  SILVER PO) Take 1 tablet by mouth daily.       nitroGLYcerin (NITROSTAT) 0.4 MG sublingual tablet Place 1 tablet (0.4 mg) under the tongue every 5 minutes as needed for chest pain 25 tablet 0     Polyethylene Glycol 3350 (MIRALAX PO) Take by mouth as needed       vitamin B-12 (CYANOCOBALAMIN) 250 MCG tablet Take 1,000 mcg by mouth daily         ALLERGIES     Allergies   Allergen Reactions     No Known Drug Allergies        PAST MEDICAL HISTORY:  Past Medical History:   Diagnosis Date     Aortic root dilatation (H)      Ascending aorta dilatation (H)      Bradycardia      CAD (coronary artery disease) 05/13/2014    YOGESH to CFX     Chest discomfort      Degeneration of lumbar or lumbosacral intervertebral disc      Familial tremor      Former smoker      Herpes zoster without mention of complication      Hyperlipidaemia      Idiopathic peripheral neuropathy      MGUS (monoclonal gammopathy of unknown significance)      Mild ascending aorta dilatation (H)      NSTEMI (non-ST elevated myocardial infarction) (H) 2014     Other and unspecified hyperlipidemia      Prostatitis, unspecified      Sensorineural hearing loss, unspecified      SSS (sick sinus syndrome) (H)      Unspecified hypothyroidism        PAST SURGICAL HISTORY:  Past Surgical History:   Procedure Laterality Date     C NONSPECIFIC PROCEDURE  2010    Laparoscopic cholecystectomy.      CHOLECYSTECTOMY       CORONARY ANGIOGRAPHY ADULT ORDER  5/14/14    PTCA w/YOGESH to OM1     HEART CATH, ANGIOPLASTY  5/14/14    YOGESH to OM1       FAMILY HISTORY:  Family History   Problem Relation Age of Onset     Cancer Mother      Genitourinary Problems Father 99        complications from surgery     Heart Disease Brother 72        MI       SOCIAL HISTORY:  Social History     Socioeconomic History     Marital status:      Spouse name: None     Number of children: None     Years of education: None     Highest education level: None   Occupational History     None   Social  Needs     Financial resource strain: None     Food insecurity:     Worry: None     Inability: None     Transportation needs:     Medical: None     Non-medical: None   Tobacco Use     Smoking status: Former Smoker     Packs/day: 0.50     Years: 16.00     Pack years: 8.00     Start date:      Last attempt to quit: 1967     Years since quittin.4     Smokeless tobacco: Never Used   Substance and Sexual Activity     Alcohol use: Yes     Alcohol/week: 0.0 oz     Comment: 4 drinks week     Drug use: No     Sexual activity: Never   Lifestyle     Physical activity:     Days per week: None     Minutes per session: None     Stress: None   Relationships     Social connections:     Talks on phone: None     Gets together: None     Attends Restorationism service: None     Active member of club or organization: None     Attends meetings of clubs or organizations: None     Relationship status: None     Intimate partner violence:     Fear of current or ex partner: None     Emotionally abused: None     Physically abused: None     Forced sexual activity: None   Other Topics Concern      Service Not Asked     Blood Transfusions Not Asked     Caffeine Concern Yes     Comment: 2 cups coffee/day     Occupational Exposure Not Asked     Hobby Hazards Not Asked     Sleep Concern No     Stress Concern Not Asked     Weight Concern Yes     Comment: limiting alcohol to watch calories     Special Diet Not Asked     Back Care Not Asked     Exercise Yes     Comment: Stationary bike  weights and aerobics 4 times week     Bike Helmet Not Asked     Seat Belt Yes     Self-Exams Not Asked     Parent/sibling w/ CABG, MI or angioplasty before 65F 55M? No   Social History Narrative     None       Review of Systems:  Skin:  Positive for lumps or bumps     Eyes:  Positive for glasses    ENT:  Negative      Respiratory:  Negative dyspnea on exertion(Increased)     Cardiovascular:    Positive for;palpitations;chest pain;exercise  "intolerance;fatigue    Gastroenterology: Negative      Genitourinary:  Positive for nocturia    Musculoskeletal:  Negative      Neurologic:  Positive for numbness or tingling of feet;tremors    Psychiatric:  Positive for sleep disturbances    Heme/Lymph/Imm:  Negative      Endocrine:  Positive for thyroid disorder      Physical Exam:  Vitals: /73   Pulse 61   Ht 1.778 m (5' 10\")   Wt 87.5 kg (193 lb)   BMI 27.69 kg/m       Constitutional:  cooperative, alert and oriented, well developed, well nourished, in no acute distress        Skin:  warm and dry to the touch          Head:  normocephalic        Eyes:  pupils equal and round;sclera white        Lymph:      ENT:  no pallor or cyanosis        Neck:  carotid pulses are full and equal bilaterally;JVP normal        Respiratory:  normal breath sounds, clear to auscultation, normal A-P diameter, normal symmetry, normal respiratory excursion, no use of accessory muscles         Cardiac: regular rhythm, normal S1/S2, no S3 or S4, apical impulse not displaced, no murmurs, gallops or rubs                pulses full and equal     2+             2+                    GI:  abdomen soft;non-tender        Extremities and Muscular Skeletal:  no deformities, clubbing, cyanosis, erythema observed;no edema              Neurological:  no gross motor deficits;affect appropriate        Psych:  Alert and Oriented x 3    Encounter Diagnosis   Name Primary?     Chest discomfort Yes       Recent Lab Results:  LIPID RESULTS:  Lab Results   Component Value Date    CHOL 116 03/20/2019    HDL 40 03/20/2019    LDL 61 03/20/2019    TRIG 75 03/20/2019    CHOLHDLRATIO 2.9 07/08/2014       LIVER ENZYME RESULTS:  Lab Results   Component Value Date    AST 33 03/20/2019    ALT 25 03/20/2019       CBC RESULTS:  Lab Results   Component Value Date    WBC 5.8 03/20/2019    RBC 4.60 03/20/2019    HGB 13.9 03/20/2019    HCT 40.7 03/20/2019    MCV 89 03/20/2019    MCH 30.2 03/20/2019    MCHC 34.2 " 03/20/2019    RDW 12.1 03/20/2019     03/20/2019       BMP RESULTS:  Lab Results   Component Value Date     03/20/2019    POTASSIUM 4.1 03/20/2019    CHLORIDE 111 (H) 03/20/2019    CO2 27 03/20/2019    ANIONGAP 5 03/20/2019    GLC 94 03/20/2019    BUN 22 03/20/2019    CR 1.21 03/20/2019    GFRESTIMATED 54 (L) 03/20/2019    GFRESTBLACK 63 03/20/2019    GABRIELA 9.2 03/20/2019        A1C RESULTS:  No results found for: A1C    INR RESULTS:  No results found for: INR          Thank you for allowing me to participate in the care of your patient.    Sincerely,     ALPESH Barraza CNP     [facility]

## 2019-06-05 NOTE — PROGRESS NOTES
HPI and Plan:   I had the pleasure of seeing Moses Elizondo today in cardiology clinic follow up. He is a pleasant 85 year old patient of Dr. Huggins.    Mr. 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    He previously experienced a substernal chest discomfort in 2014  For which he was admitted to [facility].  His EKG was negative for ischemia however his troponins socorro to a peak of 30.  He underwent a coronary angiogram with PCI and drug-eluting stent to the first obtuse marginal.      He also has a known history of sick sinus syndrome and bradycardia. He was previously on propanolol for essential tremor however once this was discontinued his heart rate did not recover.    He was recently seen in the cardiology clinic with complaints of exertional, substernal chest discomfort.  The symptoms were occurring with exertion such as gardening, walking up the stairs or while riding his bike.  his symptoms previously subsided with one sublingual nitroglycerin.  He  was also noted to have a heart rate of 33 bpm by EKG.  He underwent a nuclear stress test which demonstrated a small to moderate, mild fixed defect in the inferolateral wall consistent with prior infarction.  He failed to achieve target heart rate therefore this was a nondiagnostic study for ischemia.  He then underwent an echocardiogram which noted a mildly reduced left ventricular systolic function with an ejection fraction at 50 to 55%.  The right ventricle is normal.  No valvular abnormalities.  Moderate dilatation of the ascending aorta at 4.4 cm as well as the aortic root.    Today he presents to the cardiology clinic reporting exertional substernal chest discomfort.   He notices the symptoms while he is gardening or walking upstairs or exerting himself in any fashion.  This discomfort subsides with rest.  It is not associated with any other symptoms.  He describes this chest discomfort as similar to the discomfort he experienced in 2015 prior to receiving his stent.  He has taken 1 sublingual nitroglycerin which has relieved his pain.  His EKG today shows sinus bradycardia at 33 bpm. He wore a 48-hour Holter monitor which showed an average heart rate at 50 bpm with a minimum heart rate and 25 bpm when he was sleeping.  There was some supra ventricular ectopy noted however his principal rhythm was sinus.  She had many pauses with the greatest pause being less than 2.5 seconds.    Today he returns to the clinic to review his recent testing.  He states that since his last office visit he has not experienced the exertional, substernal chest discomfort.  He has not required sublingual nitroglycerin since I last saw him.  He has been riding his bike without issues.  He states he is enjoying his summer in the nice weather outside.    Physical Exam  Please see Below     Assessment and Plan  1. Substernal chest discomfort with known coronary artery disease and drug-eluting stent to the first obtuse marginal.  He recently had an onset of exertional, substernal chest discomfort that subsided with the use of sublingual nitroglycerin.  The substernal chest discomfort is similar to the symptoms he experienced in 2015.  Since her last visit the symptoms have minimalized.  After review with Dr. Huggins, I believed he would benefit from a coronary angiogram. All risks and benefits for this procedure have been explained to this patient and accepted.  This includes but is not limited to death, heart attack, stroke, blood clots, bleeding including the need for blood transfusion and the risk thereof, allergic reaction to x-ray dye, arrhythmia necessitating cardioversion, dye nephropathy.  We talked  about intracoronary stenting and the risks thereof and bypass surgery.      No history of bleeding problems or current bleeding, and no scheduled surgeries or procedures in the next year. Patient understands and wishes to proceed with it.    2.  Sick sinus syndrome and bradycardia with heart rates as low as 33 noted on today's EKG.  He continues to deny lightheaded, dizziness, syncope or presyncope.  His target heart rate was not achieved during the nuclear stress test however his Holter monitor showed predominantly sinus rhythm with occasional pauses less than 2.5 seconds.  Today his heart rate is 61 bpm.  We will continue to monitor at this time.    3.  Aortic root (4.4 cm) enlargement as well as a ascending aorta (4.4 cm) enlargement noted on recent echocardiogram. We will continue to monitor.    4. Hyperlipidemia.  His last LDL was 50, HDL was 46.  Continue atorvastatin 40 mg daily.      Thank you for allowing me to care for Moses Elizondo today.    ALPESH Barraza, CNP  Cardiology    Voice recognition software was used for this note, I have reviewed this note, but errors may have been missed.    No orders of the defined types were placed in this encounter.    No orders of the defined types were placed in this encounter.    There are no discontinued medications.      CURRENT MEDICATIONS:  Current Outpatient Medications   Medication Sig Dispense Refill     ASPIRIN EC PO Take 81 mg by mouth daily       atorvastatin (LIPITOR) 40 MG tablet Take 1 tablet (40 mg) by mouth daily (Patient taking differently: Take 20 mg by mouth daily ) 90 tablet 3     levothyroxine (SYNTHROID/LEVOTHROID) 100 MCG tablet Take 1 tablet (100 mcg) by mouth daily 90 tablet 3     Multiple Vitamins-Minerals (CENTRUM SILVER PO) Take 1 tablet by mouth daily.       nitroGLYcerin (NITROSTAT) 0.4 MG sublingual tablet Place 1 tablet (0.4 mg) under the tongue every 5 minutes as needed for chest pain 25 tablet 0     Polyethylene Glycol 3350  (MIRALAX PO) Take by mouth as needed       vitamin B-12 (CYANOCOBALAMIN) 250 MCG tablet Take 1,000 mcg by mouth daily         ALLERGIES     Allergies   Allergen Reactions     No Known Drug Allergies        PAST MEDICAL HISTORY:  Past Medical History:   Diagnosis Date     Aortic root dilatation (H)      Ascending aorta dilatation (H)      Bradycardia      CAD (coronary artery disease) 05/13/2014    YOGESH to CFX     Chest discomfort      Degeneration of lumbar or lumbosacral intervertebral disc      Familial tremor      Former smoker      Herpes zoster without mention of complication      Hyperlipidaemia      Idiopathic peripheral neuropathy      MGUS (monoclonal gammopathy of unknown significance)      Mild ascending aorta dilatation (H)      NSTEMI (non-ST elevated myocardial infarction) (H) 2014     Other and unspecified hyperlipidemia      Prostatitis, unspecified      Sensorineural hearing loss, unspecified      SSS (sick sinus syndrome) (H)      Unspecified hypothyroidism        PAST SURGICAL HISTORY:  Past Surgical History:   Procedure Laterality Date     C NONSPECIFIC PROCEDURE  2010    Laparoscopic cholecystectomy.      CHOLECYSTECTOMY       CORONARY ANGIOGRAPHY ADULT ORDER  5/14/14    PTCA w/YOGESH to OM1     HEART CATH, ANGIOPLASTY  5/14/14    YOGESH to OM1       FAMILY HISTORY:  Family History   Problem Relation Age of Onset     Cancer Mother      Genitourinary Problems Father 99        complications from surgery     Heart Disease Brother 72        MI       SOCIAL HISTORY:  Social History     Socioeconomic History     Marital status:      Spouse name: None     Number of children: None     Years of education: None     Highest education level: None   Occupational History     None   Social Needs     Financial resource strain: None     Food insecurity:     Worry: None     Inability: None     Transportation needs:     Medical: None     Non-medical: None   Tobacco Use     Smoking status: Former Smoker      Packs/day: 0.50     Years: 16.00     Pack years: 8.00     Start date:      Last attempt to quit: 1967     Years since quittin.4     Smokeless tobacco: Never Used   Substance and Sexual Activity     Alcohol use: Yes     Alcohol/week: 0.0 oz     Comment: 4 drinks week     Drug use: No     Sexual activity: Never   Lifestyle     Physical activity:     Days per week: None     Minutes per session: None     Stress: None   Relationships     Social connections:     Talks on phone: None     Gets together: None     Attends Rastafarian service: None     Active member of club or organization: None     Attends meetings of clubs or organizations: None     Relationship status: None     Intimate partner violence:     Fear of current or ex partner: None     Emotionally abused: None     Physically abused: None     Forced sexual activity: None   Other Topics Concern      Service Not Asked     Blood Transfusions Not Asked     Caffeine Concern Yes     Comment: 2 cups coffee/day     Occupational Exposure Not Asked     Hobby Hazards Not Asked     Sleep Concern No     Stress Concern Not Asked     Weight Concern Yes     Comment: limiting alcohol to watch calories     Special Diet Not Asked     Back Care Not Asked     Exercise Yes     Comment: Stationary bike  weights and aerobics 4 times week     Bike Helmet Not Asked     Seat Belt Yes     Self-Exams Not Asked     Parent/sibling w/ CABG, MI or angioplasty before 65F 55M? No   Social History Narrative     None       Review of Systems:  Skin:  Positive for lumps or bumps     Eyes:  Positive for glasses    ENT:  Negative      Respiratory:  Negative dyspnea on exertion(Increased)     Cardiovascular:    Positive for;palpitations;chest pain;exercise intolerance;fatigue    Gastroenterology: Negative      Genitourinary:  Positive for nocturia    Musculoskeletal:  Negative      Neurologic:  Positive for numbness or tingling of feet;tremors    Psychiatric:  Positive for sleep  "disturbances    Heme/Lymph/Imm:  Negative      Endocrine:  Positive for thyroid disorder      Physical Exam:  Vitals: /73   Pulse 61   Ht 1.778 m (5' 10\")   Wt 87.5 kg (193 lb)   BMI 27.69 kg/m      Constitutional:  cooperative, alert and oriented, well developed, well nourished, in no acute distress        Skin:  warm and dry to the touch          Head:  normocephalic        Eyes:  pupils equal and round;sclera white        Lymph:      ENT:  no pallor or cyanosis        Neck:  carotid pulses are full and equal bilaterally;JVP normal        Respiratory:  normal breath sounds, clear to auscultation, normal A-P diameter, normal symmetry, normal respiratory excursion, no use of accessory muscles         Cardiac: regular rhythm, normal S1/S2, no S3 or S4, apical impulse not displaced, no murmurs, gallops or rubs                pulses full and equal     2+             2+                    GI:  abdomen soft;non-tender        Extremities and Muscular Skeletal:  no deformities, clubbing, cyanosis, erythema observed;no edema              Neurological:  no gross motor deficits;affect appropriate        Psych:  Alert and Oriented x 3    Encounter Diagnosis   Name Primary?     Chest discomfort Yes       Recent Lab Results:  LIPID RESULTS:  Lab Results   Component Value Date    CHOL 116 03/20/2019    HDL 40 03/20/2019    LDL 61 03/20/2019    TRIG 75 03/20/2019    CHOLHDLRATIO 2.9 07/08/2014       LIVER ENZYME RESULTS:  Lab Results   Component Value Date    AST 33 03/20/2019    ALT 25 03/20/2019       CBC RESULTS:  Lab Results   Component Value Date    WBC 5.8 03/20/2019    RBC 4.60 03/20/2019    HGB 13.9 03/20/2019    HCT 40.7 03/20/2019    MCV 89 03/20/2019    MCH 30.2 03/20/2019    MCHC 34.2 03/20/2019    RDW 12.1 03/20/2019     03/20/2019       BMP RESULTS:  Lab Results   Component Value Date     03/20/2019    POTASSIUM 4.1 03/20/2019    CHLORIDE 111 (H) 03/20/2019    CO2 27 03/20/2019    ANIONGAP 5 " 03/20/2019    GLC 94 03/20/2019    BUN 22 03/20/2019    CR 1.21 03/20/2019    GFRESTIMATED 54 (L) 03/20/2019    GFRESTBLACK 63 03/20/2019    GABRIELA 9.2 03/20/2019        A1C RESULTS:  No results found for: A1C    INR RESULTS:  No results found for: INR        CC  No referring provider defined for this encounter.

## 2019-06-19 ENCOUNTER — TELEPHONE (OUTPATIENT)
Dept: CARDIOLOGY | Facility: CLINIC | Age: 84
End: 2019-06-19

## 2019-06-19 RX ORDER — LIDOCAINE 40 MG/G
CREAM TOPICAL
Status: CANCELLED | OUTPATIENT
Start: 2019-06-19

## 2019-06-19 RX ORDER — POTASSIUM CHLORIDE 1500 MG/1
20 TABLET, EXTENDED RELEASE ORAL
Status: CANCELLED | OUTPATIENT
Start: 2019-06-19

## 2019-06-19 RX ORDER — SODIUM CHLORIDE 9 MG/ML
INJECTION, SOLUTION INTRAVENOUS CONTINUOUS
Status: CANCELLED | OUTPATIENT
Start: 2019-06-19

## 2019-06-19 NOTE — TELEPHONE ENCOUNTER
Called Pt went over arrival 630 , procedure 830, NPO after midnight 325 mg aspirin tonight and tomorrow AM. Sips of water with pills, cliffordies mary, has  and 24 hr sitter. JYOTHI Valentino RN

## 2019-06-20 ENCOUNTER — HOSPITAL ENCOUNTER (OUTPATIENT)
Facility: CLINIC | Age: 84
Setting detail: OBSERVATION
Discharge: HOME | End: 2019-06-21
Attending: INTERNAL MEDICINE | Admitting: INTERNAL MEDICINE
Payer: COMMERCIAL

## 2019-06-20 DIAGNOSIS — Z98.890 STATUS POST CORONARY ANGIOGRAM: Primary | ICD-10-CM

## 2019-06-20 DIAGNOSIS — R07.89 CHEST DISCOMFORT: ICD-10-CM

## 2019-06-20 DIAGNOSIS — I25.110 CORONARY ARTERY DISEASE INVOLVING NATIVE CORONARY ARTERY OF NATIVE HEART WITH UNSTABLE ANGINA PECTORIS (H): ICD-10-CM

## 2019-06-20 DIAGNOSIS — Z98.61 POSTSURGICAL PERCUTANEOUS TRANSLUMINAL CORONARY ANGIOPLASTY STATUS: ICD-10-CM

## 2019-06-20 DIAGNOSIS — I25.810 CORONARY ARTERY DISEASE INVOLVING CORONARY BYPASS GRAFT OF NATIVE HEART WITHOUT ANGINA PECTORIS: ICD-10-CM

## 2019-06-20 PROBLEM — I44.1 SECOND DEGREE AV BLOCK: Status: ACTIVE | Noted: 2019-06-20

## 2019-06-20 LAB
ANION GAP SERPL CALCULATED.3IONS-SCNC: 5 MMOL/L (ref 3–14)
APTT PPP: 31 SEC (ref 22–37)
BUN SERPL-MCNC: 23 MG/DL (ref 7–30)
CALCIUM SERPL-MCNC: 9.1 MG/DL (ref 8.5–10.1)
CHLORIDE SERPL-SCNC: 109 MMOL/L (ref 94–109)
CO2 SERPL-SCNC: 26 MMOL/L (ref 20–32)
CREAT SERPL-MCNC: 1.12 MG/DL (ref 0.66–1.25)
ERYTHROCYTE [DISTWIDTH] IN BLOOD BY AUTOMATED COUNT: 11.9 % (ref 10–15)
GFR SERPL CREATININE-BSD FRML MDRD: 59 ML/MIN/{1.73_M2}
GLUCOSE SERPL-MCNC: 86 MG/DL (ref 70–99)
HCT VFR BLD AUTO: 39.9 % (ref 40–53)
HGB BLD-MCNC: 13.5 G/DL (ref 13.3–17.7)
INR PPP: 1 (ref 0.86–1.14)
KCT BLD-ACNC: 171 SEC (ref 75–150)
KCT BLD-ACNC: 215 SEC (ref 75–150)
KCT BLD-ACNC: 223 SEC (ref 75–150)
KCT BLD-ACNC: 243 SEC (ref 75–150)
KCT BLD-ACNC: 280 SEC (ref 75–150)
KCT BLD-ACNC: 288 SEC (ref 75–150)
MCH RBC QN AUTO: 29.3 PG (ref 26.5–33)
MCHC RBC AUTO-ENTMCNC: 33.8 G/DL (ref 31.5–36.5)
MCV RBC AUTO: 87 FL (ref 78–100)
PLATELET # BLD AUTO: 136 10E9/L (ref 150–450)
POTASSIUM SERPL-SCNC: 3.9 MMOL/L (ref 3.4–5.3)
RBC # BLD AUTO: 4.6 10E12/L (ref 4.4–5.9)
SODIUM SERPL-SCNC: 140 MMOL/L (ref 133–144)
WBC # BLD AUTO: 5.5 10E9/L (ref 4–11)

## 2019-06-20 PROCEDURE — 25000125 ZZHC RX 250: Performed by: INTERNAL MEDICINE

## 2019-06-20 PROCEDURE — C1898 LEAD, PMKR, OTHER THAN TRANS: HCPCS | Performed by: INTERNAL MEDICINE

## 2019-06-20 PROCEDURE — 85730 THROMBOPLASTIN TIME PARTIAL: CPT | Performed by: INTERNAL MEDICINE

## 2019-06-20 PROCEDURE — 25000132 ZZH RX MED GY IP 250 OP 250 PS 637: Performed by: INTERNAL MEDICINE

## 2019-06-20 PROCEDURE — 99152 MOD SED SAME PHYS/QHP 5/>YRS: CPT | Performed by: INTERNAL MEDICINE

## 2019-06-20 PROCEDURE — 27210794 ZZH OR GENERAL SUPPLY STERILE: Performed by: INTERNAL MEDICINE

## 2019-06-20 PROCEDURE — 92978 ENDOLUMINL IVUS OCT C 1ST: CPT | Performed by: INTERNAL MEDICINE

## 2019-06-20 PROCEDURE — 93010 ELECTROCARDIOGRAM REPORT: CPT | Mod: 76 | Performed by: INTERNAL MEDICINE

## 2019-06-20 PROCEDURE — 85347 COAGULATION TIME ACTIVATED: CPT

## 2019-06-20 PROCEDURE — 40000235 ZZH STATISTIC TELEMETRY

## 2019-06-20 PROCEDURE — 25000128 H RX IP 250 OP 636: Performed by: INTERNAL MEDICINE

## 2019-06-20 PROCEDURE — 99204 OFFICE O/P NEW MOD 45 MIN: CPT | Mod: 25 | Performed by: INTERNAL MEDICINE

## 2019-06-20 PROCEDURE — C1753 CATH, INTRAVAS ULTRASOUND: HCPCS | Performed by: INTERNAL MEDICINE

## 2019-06-20 PROCEDURE — 36415 COLL VENOUS BLD VENIPUNCTURE: CPT

## 2019-06-20 PROCEDURE — C1894 INTRO/SHEATH, NON-LASER: HCPCS | Performed by: INTERNAL MEDICINE

## 2019-06-20 PROCEDURE — C1725 CATH, TRANSLUMIN NON-LASER: HCPCS | Performed by: INTERNAL MEDICINE

## 2019-06-20 PROCEDURE — 80048 BASIC METABOLIC PNL TOTAL CA: CPT | Performed by: INTERNAL MEDICINE

## 2019-06-20 PROCEDURE — 99153 MOD SED SAME PHYS/QHP EA: CPT | Performed by: INTERNAL MEDICINE

## 2019-06-20 PROCEDURE — C1887 CATHETER, GUIDING: HCPCS | Performed by: INTERNAL MEDICINE

## 2019-06-20 PROCEDURE — C1874 STENT, COATED/COV W/DEL SYS: HCPCS | Performed by: INTERNAL MEDICINE

## 2019-06-20 PROCEDURE — 25800029 ZZH RX IP 258 OP 250: Performed by: INTERNAL MEDICINE

## 2019-06-20 PROCEDURE — 40000065 ZZH STATISTIC EKG NON-CHARGEABLE

## 2019-06-20 PROCEDURE — 40000852 ZZH STATISTIC HEART CATH LAB OR EP LAB

## 2019-06-20 PROCEDURE — 93454 CORONARY ARTERY ANGIO S&I: CPT | Performed by: INTERNAL MEDICINE

## 2019-06-20 PROCEDURE — C1757 CATH, THROMBECTOMY/EMBOLECT: HCPCS | Performed by: INTERNAL MEDICINE

## 2019-06-20 PROCEDURE — 33208 INSRT HEART PM ATRIAL & VENT: CPT | Mod: KX | Performed by: INTERNAL MEDICINE

## 2019-06-20 PROCEDURE — 85027 COMPLETE CBC AUTOMATED: CPT | Performed by: INTERNAL MEDICINE

## 2019-06-20 PROCEDURE — 36415 COLL VENOUS BLD VENIPUNCTURE: CPT | Performed by: INTERNAL MEDICINE

## 2019-06-20 PROCEDURE — C1769 GUIDE WIRE: HCPCS | Performed by: INTERNAL MEDICINE

## 2019-06-20 PROCEDURE — C1760 CLOSURE DEV, VASC: HCPCS | Performed by: INTERNAL MEDICINE

## 2019-06-20 PROCEDURE — 25800030 ZZH RX IP 258 OP 636: Performed by: INTERNAL MEDICINE

## 2019-06-20 PROCEDURE — G0378 HOSPITAL OBSERVATION PER HR: HCPCS

## 2019-06-20 PROCEDURE — 93005 ELECTROCARDIOGRAM TRACING: CPT | Mod: 77

## 2019-06-20 PROCEDURE — C9602 PERC D-E COR STENT ATHER S: HCPCS | Performed by: INTERNAL MEDICINE

## 2019-06-20 PROCEDURE — C1785 PMKR, DUAL, RATE-RESP: HCPCS | Performed by: INTERNAL MEDICINE

## 2019-06-20 PROCEDURE — 85610 PROTHROMBIN TIME: CPT | Performed by: INTERNAL MEDICINE

## 2019-06-20 DEVICE — PACEMAKER AZURE MRI XT DR
Type: IMPLANTABLE DEVICE | Status: NON-FUNCTIONAL
Removed: 2021-09-16

## 2019-06-20 DEVICE — IMP LEAD PACING BIPOLAR CAPSUREFIX NOVUS 45CM 5076-45: Type: IMPLANTABLE DEVICE | Status: FUNCTIONAL

## 2019-06-20 DEVICE — IMP LEAD PACING BIPOLAR CAPSUREFIX NOVUS 52CM 5076-52: Type: IMPLANTABLE DEVICE | Status: FUNCTIONAL

## 2019-06-20 DEVICE — STENT SYNERGY DRUG ELUTING 4.00X20MM  H7493926020400: Type: IMPLANTABLE DEVICE | Status: FUNCTIONAL

## 2019-06-20 RX ORDER — ARGATROBAN 1 MG/ML
350 INJECTION, SOLUTION INTRAVENOUS
Status: DISCONTINUED | OUTPATIENT
Start: 2019-06-20 | End: 2019-06-20 | Stop reason: HOSPADM

## 2019-06-20 RX ORDER — NALOXONE HYDROCHLORIDE 0.4 MG/ML
.1-.4 INJECTION, SOLUTION INTRAMUSCULAR; INTRAVENOUS; SUBCUTANEOUS
Status: DISCONTINUED | OUTPATIENT
Start: 2019-06-20 | End: 2019-06-20

## 2019-06-20 RX ORDER — HYDROCODONE BITARTRATE AND ACETAMINOPHEN 5; 325 MG/1; MG/1
1-2 TABLET ORAL EVERY 4 HOURS PRN
Status: DISCONTINUED | OUTPATIENT
Start: 2019-06-20 | End: 2019-06-20

## 2019-06-20 RX ORDER — CLOPIDOGREL BISULFATE 75 MG/1
75 TABLET ORAL DAILY
Status: DISCONTINUED | OUTPATIENT
Start: 2019-06-21 | End: 2019-06-21 | Stop reason: HOSPADM

## 2019-06-20 RX ORDER — OXYCODONE AND ACETAMINOPHEN 5; 325 MG/1; MG/1
1 TABLET ORAL EVERY 4 HOURS PRN
Status: DISCONTINUED | OUTPATIENT
Start: 2019-06-20 | End: 2019-06-21 | Stop reason: HOSPADM

## 2019-06-20 RX ORDER — NALOXONE HYDROCHLORIDE 0.4 MG/ML
.1-.4 INJECTION, SOLUTION INTRAMUSCULAR; INTRAVENOUS; SUBCUTANEOUS
Status: DISCONTINUED | OUTPATIENT
Start: 2019-06-20 | End: 2019-06-21 | Stop reason: HOSPADM

## 2019-06-20 RX ORDER — FLUMAZENIL 0.1 MG/ML
0.2 INJECTION, SOLUTION INTRAVENOUS
Status: ACTIVE | OUTPATIENT
Start: 2019-06-20 | End: 2019-06-20

## 2019-06-20 RX ORDER — SODIUM CHLORIDE 9 MG/ML
INJECTION, SOLUTION INTRAVENOUS CONTINUOUS
Status: DISCONTINUED | OUTPATIENT
Start: 2019-06-20 | End: 2019-06-20 | Stop reason: HOSPADM

## 2019-06-20 RX ORDER — HEPARIN SODIUM 200 [USP'U]/100ML
100-600 INJECTION, SOLUTION INTRAVENOUS CONTINUOUS PRN
Status: DISCONTINUED | OUTPATIENT
Start: 2019-06-20 | End: 2019-06-20 | Stop reason: HOSPADM

## 2019-06-20 RX ORDER — EPTIFIBATIDE 2 MG/ML
2 INJECTION, SOLUTION INTRAVENOUS CONTINUOUS PRN
Status: DISCONTINUED | OUTPATIENT
Start: 2019-06-20 | End: 2019-06-20 | Stop reason: HOSPADM

## 2019-06-20 RX ORDER — CEFAZOLIN SODIUM 2 G/100ML
2 INJECTION, SOLUTION INTRAVENOUS
Status: COMPLETED | OUTPATIENT
Start: 2019-06-20 | End: 2019-06-20

## 2019-06-20 RX ORDER — LISINOPRIL 5 MG/1
5 TABLET ORAL DAILY
Status: DISCONTINUED | OUTPATIENT
Start: 2019-06-20 | End: 2019-06-21 | Stop reason: HOSPADM

## 2019-06-20 RX ORDER — LIDOCAINE 40 MG/G
CREAM TOPICAL
Status: DISCONTINUED | OUTPATIENT
Start: 2019-06-20 | End: 2019-06-20 | Stop reason: HOSPADM

## 2019-06-20 RX ORDER — SODIUM CHLORIDE 9 MG/ML
INJECTION, SOLUTION INTRAVENOUS CONTINUOUS
Status: ACTIVE | OUTPATIENT
Start: 2019-06-20 | End: 2019-06-20

## 2019-06-20 RX ORDER — FENTANYL CITRATE 50 UG/ML
INJECTION, SOLUTION INTRAMUSCULAR; INTRAVENOUS
Status: DISCONTINUED | OUTPATIENT
Start: 2019-06-20 | End: 2019-06-20 | Stop reason: HOSPADM

## 2019-06-20 RX ORDER — FENTANYL CITRATE 50 UG/ML
25-50 INJECTION, SOLUTION INTRAMUSCULAR; INTRAVENOUS
Status: ACTIVE | OUTPATIENT
Start: 2019-06-20 | End: 2019-06-20

## 2019-06-20 RX ORDER — HEPARIN SODIUM 1000 [USP'U]/ML
INJECTION, SOLUTION INTRAVENOUS; SUBCUTANEOUS
Status: DISCONTINUED | OUTPATIENT
Start: 2019-06-20 | End: 2019-06-20 | Stop reason: HOSPADM

## 2019-06-20 RX ORDER — CLOPIDOGREL BISULFATE 75 MG/1
TABLET ORAL
Status: DISCONTINUED | OUTPATIENT
Start: 2019-06-20 | End: 2019-06-20 | Stop reason: HOSPADM

## 2019-06-20 RX ORDER — NITROGLYCERIN 20 MG/100ML
.07-2 INJECTION INTRAVENOUS CONTINUOUS PRN
Status: DISCONTINUED | OUTPATIENT
Start: 2019-06-20 | End: 2019-06-20 | Stop reason: HOSPADM

## 2019-06-20 RX ORDER — NITROGLYCERIN 0.4 MG/1
0.4 TABLET SUBLINGUAL EVERY 5 MIN PRN
Status: DISCONTINUED | OUTPATIENT
Start: 2019-06-20 | End: 2019-06-21 | Stop reason: HOSPADM

## 2019-06-20 RX ORDER — SODIUM CHLORIDE 450 MG/100ML
INJECTION, SOLUTION INTRAVENOUS CONTINUOUS
Status: DISCONTINUED | OUTPATIENT
Start: 2019-06-20 | End: 2019-06-20 | Stop reason: HOSPADM

## 2019-06-20 RX ORDER — POTASSIUM CHLORIDE 1500 MG/1
20 TABLET, EXTENDED RELEASE ORAL
Status: COMPLETED | OUTPATIENT
Start: 2019-06-20 | End: 2019-06-20

## 2019-06-20 RX ORDER — DOPAMINE HYDROCHLORIDE 160 MG/100ML
2-20 INJECTION, SOLUTION INTRAVENOUS CONTINUOUS PRN
Status: DISCONTINUED | OUTPATIENT
Start: 2019-06-20 | End: 2019-06-20 | Stop reason: HOSPADM

## 2019-06-20 RX ORDER — LIDOCAINE 40 MG/G
CREAM TOPICAL
Status: DISCONTINUED | OUTPATIENT
Start: 2019-06-20 | End: 2019-06-20

## 2019-06-20 RX ORDER — NALOXONE HYDROCHLORIDE 0.4 MG/ML
.2-.4 INJECTION, SOLUTION INTRAMUSCULAR; INTRAVENOUS; SUBCUTANEOUS
Status: ACTIVE | OUTPATIENT
Start: 2019-06-20 | End: 2019-06-20

## 2019-06-20 RX ORDER — ACETAMINOPHEN 325 MG/1
650 TABLET ORAL EVERY 4 HOURS PRN
Status: DISCONTINUED | OUTPATIENT
Start: 2019-06-20 | End: 2019-06-21 | Stop reason: HOSPADM

## 2019-06-20 RX ORDER — ASPIRIN 81 MG/1
81 TABLET ORAL DAILY
Status: DISCONTINUED | OUTPATIENT
Start: 2019-06-21 | End: 2019-06-20

## 2019-06-20 RX ORDER — EPTIFIBATIDE 2 MG/ML
180 INJECTION, SOLUTION INTRAVENOUS EVERY 10 MIN PRN
Status: DISCONTINUED | OUTPATIENT
Start: 2019-06-20 | End: 2019-06-20 | Stop reason: HOSPADM

## 2019-06-20 RX ORDER — ACETAMINOPHEN 325 MG/1
650 TABLET ORAL EVERY 4 HOURS PRN
Status: DISCONTINUED | OUTPATIENT
Start: 2019-06-20 | End: 2019-06-20

## 2019-06-20 RX ORDER — BUPIVACAINE HYDROCHLORIDE 2.5 MG/ML
INJECTION, SOLUTION EPIDURAL; INFILTRATION; INTRACAUDAL
Status: DISCONTINUED | OUTPATIENT
Start: 2019-06-20 | End: 2019-06-20 | Stop reason: HOSPADM

## 2019-06-20 RX ORDER — ARGATROBAN 1 MG/ML
150 INJECTION, SOLUTION INTRAVENOUS
Status: DISCONTINUED | OUTPATIENT
Start: 2019-06-20 | End: 2019-06-20 | Stop reason: HOSPADM

## 2019-06-20 RX ORDER — NOREPINEPHRINE BITARTRATE 0.06 MG/ML
.03-.4 INJECTION, SOLUTION INTRAVENOUS CONTINUOUS PRN
Status: DISCONTINUED | OUTPATIENT
Start: 2019-06-20 | End: 2019-06-20 | Stop reason: HOSPADM

## 2019-06-20 RX ORDER — CLOPIDOGREL BISULFATE 75 MG/1
75 TABLET ORAL DAILY
Status: DISCONTINUED | OUTPATIENT
Start: 2019-06-21 | End: 2019-06-20

## 2019-06-20 RX ORDER — DOBUTAMINE HYDROCHLORIDE 200 MG/100ML
2-20 INJECTION INTRAVENOUS CONTINUOUS PRN
Status: DISCONTINUED | OUTPATIENT
Start: 2019-06-20 | End: 2019-06-20 | Stop reason: HOSPADM

## 2019-06-20 RX ORDER — CLOPIDOGREL BISULFATE 75 MG/1
75 TABLET ORAL DAILY
Qty: 90 TABLET | Refills: 3 | Status: SHIPPED | OUTPATIENT
Start: 2019-06-21 | End: 2019-06-21

## 2019-06-20 RX ORDER — ASPIRIN 81 MG/1
81 TABLET ORAL DAILY
Status: DISCONTINUED | OUTPATIENT
Start: 2019-06-20 | End: 2019-06-21 | Stop reason: HOSPADM

## 2019-06-20 RX ORDER — LISINOPRIL 5 MG/1
5 TABLET ORAL DAILY
Qty: 90 TABLET | Refills: 3 | Status: SHIPPED | OUTPATIENT
Start: 2019-06-20 | End: 2019-06-21

## 2019-06-20 RX ORDER — ATROPINE SULFATE 0.1 MG/ML
0.5 INJECTION INTRAVENOUS EVERY 5 MIN PRN
Status: ACTIVE | OUTPATIENT
Start: 2019-06-20 | End: 2019-06-20

## 2019-06-20 RX ORDER — NITROGLYCERIN 0.4 MG/1
TABLET SUBLINGUAL
Qty: 25 TABLET | Refills: 3 | Status: SHIPPED | OUTPATIENT
Start: 2019-06-20 | End: 2019-06-21

## 2019-06-20 RX ORDER — HEPARIN SODIUM 200 [USP'U]/100ML
100-500 INJECTION, SOLUTION INTRAVENOUS CONTINUOUS PRN
Status: DISCONTINUED | OUTPATIENT
Start: 2019-06-20 | End: 2019-06-20 | Stop reason: HOSPADM

## 2019-06-20 RX ORDER — LIDOCAINE 40 MG/G
CREAM TOPICAL
Status: DISCONTINUED | OUTPATIENT
Start: 2019-06-20 | End: 2019-06-21 | Stop reason: HOSPADM

## 2019-06-20 RX ADMIN — OXYCODONE HYDROCHLORIDE AND ACETAMINOPHEN 1 TABLET: 5; 325 TABLET ORAL at 23:43

## 2019-06-20 RX ADMIN — POTASSIUM CHLORIDE 20 MEQ: 1500 TABLET, EXTENDED RELEASE ORAL at 07:52

## 2019-06-20 RX ADMIN — SODIUM CHLORIDE: 4.5 INJECTION, SOLUTION INTRAVENOUS at 14:03

## 2019-06-20 RX ADMIN — CEFAZOLIN SODIUM 2 G: 2 INJECTION, SOLUTION INTRAVENOUS at 16:06

## 2019-06-20 RX ADMIN — MIDAZOLAM HYDROCHLORIDE 0.5 MG: 1 INJECTION, SOLUTION INTRAMUSCULAR; INTRAVENOUS at 15:45

## 2019-06-20 RX ADMIN — ACETAMINOPHEN 650 MG: 325 TABLET, FILM COATED ORAL at 20:03

## 2019-06-20 RX ADMIN — FENTANYL CITRATE 50 MCG: 50 INJECTION INTRAMUSCULAR; INTRAVENOUS at 15:45

## 2019-06-20 RX ADMIN — LISINOPRIL 5 MG: 5 TABLET ORAL at 15:07

## 2019-06-20 RX ADMIN — SODIUM CHLORIDE: 9 INJECTION, SOLUTION INTRAVENOUS at 07:09

## 2019-06-20 ASSESSMENT — MIFFLIN-ST. JEOR: SCORE: 1566.69

## 2019-06-20 ASSESSMENT — COLUMBIA-SUICIDE SEVERITY RATING SCALE - C-SSRS
1. IN THE PAST MONTH, HAVE YOU WISHED YOU WERE DEAD OR WISHED YOU COULD GO TO SLEEP AND NOT WAKE UP?: NO
2. HAVE YOU ACTUALLY HAD ANY THOUGHTS OF KILLING YOURSELF IN THE PAST MONTH?: NO

## 2019-06-20 NOTE — PROGRESS NOTES
ACT at 1410= 215  Call placed to Cath lab, Vanesa to inform Dr Dowell of results.      1423- return call from Vanesa in cath lab.  Will redraw ACT in one hour, no new orders.  Pt and family updated.

## 2019-06-20 NOTE — Clinical Note
Atherectomy performed in the middle right coronary artery. Rotational atherectomy was performed. Pass duration = 30 seconds.

## 2019-06-20 NOTE — Clinical Note
Atherectomy performed in the right coronary artery. Rotational atherectomy was performed. Pass duration = 12 seconds.

## 2019-06-20 NOTE — PROGRESS NOTES
Spoke with Dr. Miller regarding EKG.  Will keep NPO anticipating a pacemaker implant later today.  Daughter Savanna informed via telephone of the plan. Patient is resting quietly now.

## 2019-06-20 NOTE — PROGRESS NOTES
Dictated.    Successful dual-chamber pacemaker implantation ([employer]).    Programmed DDDR 60/130 ppm.  No apparent complication.    EBL = 15 cc.      Plan:  - CXR and device interrogation in the am  - routine pot device precautions

## 2019-06-20 NOTE — PROGRESS NOTES
Care Suites Admission Nursing Note    Reason for admission: Coronary Angiogram  CS arrival time: 0635  Accompanied by: self  Name/phone of DC : Daughter Savanna will  after procedure- [phone], Wife Shannon at home.  Medications held: pt only took aspirin today, otherwise no orders to hold meds.  Consent signed: not at this time  Abnormal assessment/labs: pending labs.  EKG emily with 2nd degree HB, not new for pt, asymptomatic.  Will alert MD on arrival.  If abnormal, provider notified: on arrival to consent pt.  Education/questions answered: yes  Plan: coronary angiogram at 0830.  Call light in reach.  Will cont to monitor.  Valuables envelope sent to security.

## 2019-06-20 NOTE — PROGRESS NOTES
ACT at 1215= 243.    1250 Lauren in cath lab notified of recent ACT value.  Will call back after talking with Dr Dowell.  Family here now.

## 2019-06-20 NOTE — Clinical Note
Stent deployed in the middle right coronary artery. Max pressure = 16 klaudia. Total duration = 30 seconds. Balloon reinflated a second time: Max pressure = 16 klaudia. Total duration = 30 seconds.

## 2019-06-20 NOTE — PROGRESS NOTES
Patient has a documented Heart rate of 25.  12 lead was shown to Dr. Marr.  He is aware of this ongoing bradycardia and will consult with EP.  Keep patient NPO for now.

## 2019-06-20 NOTE — CONSULTS
[facility]    Cardiac Electrophysiology Consultation     Date of Admission:  6/20/2019  Date of Consult (When I saw the patient): 06/20/19    Assessment & Plan   Moses Elizondo is a 85 year old male who was admitted on 6/20/2019. I was asked to see the patient for sinus bradycardia. Ppm today. Noted dictated [id number]    Tre Van Miller    Code Status    Prior    Primary Care Physician   Steven Wagoner    History is obtained from the patient    Past Medical History   I have reviewed this patient's medical history and updated it with pertinent information if needed.   Past Medical History:   Diagnosis Date     Aortic root dilatation (H)      Ascending aorta dilatation (H)      Bradycardia      CAD (coronary artery disease) 05/13/2014    YOGESH to CFX     Chest discomfort      Degeneration of lumbar or lumbosacral intervertebral disc      Familial tremor      Former smoker      Herpes zoster without mention of complication      Hyperlipidaemia      Idiopathic peripheral neuropathy      MGUS (monoclonal gammopathy of unknown significance)      Mild ascending aorta dilatation (H)      NSTEMI (non-ST elevated myocardial infarction) (H) 2014     Other and unspecified hyperlipidemia      Prostatitis, unspecified      Sensorineural hearing loss, unspecified      SSS (sick sinus syndrome) (H)      Unspecified hypothyroidism        Past Surgical History   I have reviewed this patient's surgical history and updated it with pertinent information if needed.  Past Surgical History:   Procedure Laterality Date     C NONSPECIFIC PROCEDURE  2010    Laparoscopic cholecystectomy.      CHOLECYSTECTOMY       CORONARY ANGIOGRAPHY ADULT ORDER  5/14/14    PTCA w/YOGESH to OM1     HEART CATH, ANGIOPLASTY  5/14/14    YOGESH to OM1       Prior to Admission Medications   Prior to Admission Medications   Prescriptions Last Dose Informant Patient Reported? Taking?   ASPIRIN EC PO 6/20/2019 at Unknown time Self Yes Yes   Sig: Take  81 mg by mouth daily   Multiple Vitamins-Minerals (CENTRUM SILVER PO) Past Week at Unknown time  Yes Yes   Sig: Take 1 tablet by mouth daily.   Polyethylene Glycol 3350 (MIRALAX PO) 6/19/2019 at Unknown time  Yes Yes   Sig: Take by mouth as needed   atorvastatin (LIPITOR) 40 MG tablet 6/19/2019 at Unknown time  No Yes   Sig: Take 1 tablet (40 mg) by mouth daily   Patient taking differently: Take 20 mg by mouth daily    levothyroxine (SYNTHROID/LEVOTHROID) 100 MCG tablet 6/19/2019 at Unknown time  No Yes   Sig: Take 1 tablet (100 mcg) by mouth daily   nitroGLYcerin (NITROSTAT) 0.4 MG sublingual tablet Unknown at Unknown time  No No   Sig: Place 1 tablet (0.4 mg) under the tongue every 5 minutes as needed for chest pain   vitamin B-12 (CYANOCOBALAMIN) 250 MCG tablet 6/19/2019 at Unknown time  Yes Yes   Sig: Take 1,000 mcg by mouth daily      Facility-Administered Medications: None     Allergies   Allergies   Allergen Reactions     No Known Drug Allergies        Social History   I have reviewed this patient's social history and updated it with pertinent information if needed. Moses Elizondo  reports that he quit smoking about 52 years ago. He started smoking about 69 years ago. He has a 8.00 pack-year smoking history. He has never used smokeless tobacco. He reports that he drinks alcohol. He reports that he does not use drugs.    Family History   I have reviewed this patient's family history and updated it with pertinent information if needed.   Family History   Problem Relation Age of Onset     Cancer Mother      Genitourinary Problems Father 99        complications from surgery     Heart Disease Brother 72        MI       Review of Systems   Comprehensive review of systems was performed with pertinent positives and negatives listed in assessment and plan section.    Physical Exam   Temp: 97.6  F (36.4  C) Temp src: Oral BP: 120/67 Pulse: (!) 40 Heart Rate: (!) 43 Resp: 16 SpO2: 97 % O2 Device: Nasal cannula  Oxygen Delivery: 2 LPM  Vital Signs with Ranges  Temp:  [97.6  F (36.4  C)] 97.6  F (36.4  C)  Pulse:  [34-69] 40  Heart Rate:  [29-45] 43  Resp:  [8-31] 16  BP: (120-143)/(56-89) 120/67  SpO2:  [96 %-100 %] 97 %  192 lbs 15.99 oz    Constitutional: awake, alert, cooperative, no apparent distress, and appears stated age  Eyes: Lids and lashes normal, pupils equal, round and reactive to light, extra ocular muscles intact, sclera clear, conjunctiva normal  ENT: Normocephalic, without obvious abnormality, atraumatic, sinuses nontender on palpation, external ears without lesions, oral pharynx with moist mucous membranes, tonsils without erythema or exudates, gums normal and good dentition.  Hematologic / Lymphatic: no cervical lymphadenopathy  Respiratory: No increased work of breathing, good air exchange, clear to auscultation bilaterally, no crackles or wheezing  Cardiovascular: bradycardic with regular rhythm  GI: No scars, normal bowel sounds, soft, non-distended, non-tender, no masses palpated, no hepatosplenomegally  Skin: no bruising or bleeding  Musculoskeletal: There is no redness, warmth, or swelling of the joints.  Full range of motion noted.  Motor strength is 5 out of 5 all extremities bilaterally.  Tone is normal.  Neurologic: Awake, alert, oriented to name, place and time.  Cranial nerves II-XII are grossly intact.  Motor is 5 out of 5 bilaterally.  Cerebellar finger to nose, heel to shin intact.  Sensory is intact.  Babinski down going, Romberg negative, and gait is normal.  Neuropsychiatric: General: normal, calm and normal eye contact    Data   I personally reviewed all recent ECGs and images.  Results for orders placed or performed during the hospital encounter of 06/20/19 (from the past 24 hour(s))   Basic metabolic panel   Result Value Ref Range    Sodium 140 133 - 144 mmol/L    Potassium 3.9 3.4 - 5.3 mmol/L    Chloride 109 94 - 109 mmol/L    Carbon Dioxide 26 20 - 32 mmol/L    Anion Gap 5 3 - 14  mmol/L    Glucose 86 70 - 99 mg/dL    Urea Nitrogen 23 7 - 30 mg/dL    Creatinine 1.12 0.66 - 1.25 mg/dL    GFR Estimate 59 (L) >60 mL/min/[1.73_m2]    GFR Estimate If Black 69 >60 mL/min/[1.73_m2]    Calcium 9.1 8.5 - 10.1 mg/dL   CBC with platelets   Result Value Ref Range    WBC 5.5 4.0 - 11.0 10e9/L    RBC Count 4.60 4.4 - 5.9 10e12/L    Hemoglobin 13.5 13.3 - 17.7 g/dL    Hematocrit 39.9 (L) 40.0 - 53.0 %    MCV 87 78 - 100 fl    MCH 29.3 26.5 - 33.0 pg    MCHC 33.8 31.5 - 36.5 g/dL    RDW 11.9 10.0 - 15.0 %    Platelet Count 136 (L) 150 - 450 10e9/L   INR   Result Value Ref Range    INR 1.00 0.86 - 1.14   Partial thromboplastin time   Result Value Ref Range    PTT 31 22 - 37 sec   EKG 12-lead, tracing only   Result Value Ref Range    Interpretation ECG Click View Image link to view waveform and result    Activated clotting time POCT   Result Value Ref Range    Activated Clot Time 223 (H) 75 - 150 sec   Activated clotting time POCT   Result Value Ref Range    Activated Clot Time 288 (H) 75 - 150 sec   Activated clotting time POCT   Result Value Ref Range    Activated Clot Time 280 (H) 75 - 150 sec   Cardiac Catheterization    Addendum: 6/20/2019        Ost LM lesion is 30% stenosed.    Prox LAD lesion is 30% stenosed.    Mid LAD lesion is 30% stenosed.    Mid RCA lesion is 95% stenosed.    IVUS was performed on the lesion.    Ultrasound (IVUS) was performed.     1. No significant LCA disease (see discussion above re: stent in OM is fine, ?was there a diagonal stent as listed in HPI)  2. Very complex calcified RCA lesion. CHIPS case approach. CSI orbital atherectomy and YOGESH. 95% down to 0%  3. Pt with known sinus emily, PAC with block. Temp pacer used and it was set to 30 with occasional pacing. Pt should see his cardiologist to determine if DDD pacer indicated. No beta blockers given   4. Incidentally noted is PFO or ASD        Narrative      Ost LM lesion is 30% stenosed.    Prox LAD lesion is 30% stenosed.     Mid LAD lesion is 30% stenosed.    Mid RCA lesion is 95% stenosed.    IVUS was performed on the lesion.    Ultrasound (IVUS) was performed.     1. No significant LCA disease (see discussion above re: stent in OM is   fine, ?was there a diagonal stent as listed in HPI)  2. Very complex calcified RCA lesion. CHIPS case approach. CSI orbital   atherectomy and YOGESH. 95% down to 0%  3. Pt with known sinus emily, PAC with block. Temp pacer used and it was   set to 30 with occasional pacing. Pt should see his cardiologist to   determine if DDD pacer indicated. No beta blockers given      EKG 12-lead, tracing only    Result Value Ref Range    Interpretation ECG Click View Image link to view waveform and result    Activated clotting time POCT   Result Value Ref Range    Activated Clot Time 243 (H) 75 - 150 sec   Activated clotting time POCT   Result Value Ref Range    Activated Clot Time 215 (H) 75 - 150 sec   Activated clotting time POCT   Result Value Ref Range    Activated Clot Time 171 (H) 75 - 150 sec

## 2019-06-20 NOTE — Clinical Note
Atherectomy performed in the right coronary artery. Rotational atherectomy was performed. Pass rate = 1 RPM. Pass duration = 30 seconds.

## 2019-06-20 NOTE — Clinical Note
The first balloon was inserted into the right coronary artery and middle right coronary artery.Max pressure = 16 klaudia. Total duration = 30 seconds.     Max pressure = 16 klaudia. Total duration = 30 seconds.    Balloon reinflated a second time: Max pressure = 16 klaudia. Total duration = 30 seconds.  Balloon reinflated a third time: Max pressure = 20 klaudia. Total duration = 30 seconds.

## 2019-06-20 NOTE — Clinical Note
The first balloon was inserted into the right coronary artery and middle right coronary artery.Max pressure = 18 klaudia. Total duration = 32 seconds.     Max pressure = 20 klaudia. Total duration = 30 seconds.    Balloon reinflated a second time: Max pressure = 20 klaudia. Total duration = 30 seconds.  Balloon reinflated a third time: Max pressure = 20 klaudia. Total duration = 30 seconds.  Balloon reinflated a fourth time: Max pressure = 20 klaudia. Total duration = 30 seconds.  Balloon reinflated a fourth time: Max pressure = 20 klaudia. Total duration = 20 seconds.

## 2019-06-20 NOTE — CONSULTS
Consult Date:  2019      CARDIOLOGY CONSULTATION      REASON FOR CONSULTATION:  It was my pleasure to see Mr. Cummings today at the request of my partner, Dr. Coronado, for sinus bradycardia.      HISTORY OF PRESENT ILLNESS:  As you know, Mr. Cummings is a delightful 85-year-old gentleman with a history of coronary disease, status post PCI of the OM in the past and noted to have sinus bradycardia for some time, but recently has been having more chest discomfort and had a stress test for further evaluation, which was equivocal.  Coronary angiogram today demonstrated a tight lesion in the mid RCA and it was stented.  During the procedure, the patient was noted in severe sinus bradycardia with a rate in the 20s and required temporary pacemaker.  I wanted him in the recovery room.  In the care suites, he continued to exhibit severe sinus bradycardia at a rate of 21 beats per minute.      The patient otherwise is noted to have normal LV function in the past and currently is not on any medication that would cause a such a degree of sinus bradycardia.  He does admit of feeling more fatigued recently as well.      The patient exhibits signs of symptomatic sinus node dysfunction manifesting in severe sinus bradycardia due to intrinsic conduction disease from his age.  I would recommend a pacemaker at this point in time.  I went over the procedure in detail with risks including, but not limited to, vascular injury, infection.  After the pacemaker is implanted, a beta blocker can be started and if he does well, he can be discharged later today after pacemaker implantation.         CHAIM FELIZ MD             D: 2019   T: 2019   MT: PK      Name:     DIANNA CUMMINGS   MRN:      [id number]        Account:       [id number]   :      10/19/1933           Consult Date:  2019      Document: [id number]       cc: Steven Wagoner MD

## 2019-06-20 NOTE — Clinical Note
The first balloon was inserted into the right coronary artery and middle right coronary artery.Max pressure = 20 klaudia. Total duration = 20 seconds.     Max pressure = 20 klaudia. Total duration = 20 seconds.    Balloon reinflated a second time: Max pressure = 20 klaudia. Total duration = 20 seconds.

## 2019-06-20 NOTE — PROGRESS NOTES
Awaiting  to redraw ACT.  3 paper Rx placed into chart to be filled prior to discharge tomorrow.  Void 275 ml in urinal.  Lisinopril to be given, awaiting med from pharm.  No complaints, remains pain free, VSS.  Will cont to monitor, call light in reach, family at bedside.

## 2019-06-20 NOTE — PROGRESS NOTES
ACT is 171 at 1540.  Right groin venous 6 fr catheter removed at 1550 without difficulty.  Site remains CDI without hematoma or swelling- patient going to EP lab for PPM placement and then to [facility] for overnight observation.  Report called to [facility] RN.

## 2019-06-21 ENCOUNTER — TELEPHONE (OUTPATIENT)
Dept: CARDIOLOGY | Facility: CLINIC | Age: 84
End: 2019-06-21

## 2019-06-21 ENCOUNTER — APPOINTMENT (OUTPATIENT)
Dept: GENERAL RADIOLOGY | Facility: CLINIC | Age: 84
End: 2019-06-21
Attending: INTERNAL MEDICINE
Payer: COMMERCIAL

## 2019-06-21 VITALS
DIASTOLIC BLOOD PRESSURE: 64 MMHG | HEIGHT: 70 IN | WEIGHT: 189 LBS | OXYGEN SATURATION: 94 % | SYSTOLIC BLOOD PRESSURE: 121 MMHG | RESPIRATION RATE: 16 BRPM | HEART RATE: 62 BPM | TEMPERATURE: 98 F | BODY MASS INDEX: 27.06 KG/M2

## 2019-06-21 DIAGNOSIS — Z95.0 CARDIAC PACEMAKER IN SITU: Primary | ICD-10-CM

## 2019-06-21 LAB — INTERPRETATION ECG - MUSE: NORMAL

## 2019-06-21 PROCEDURE — 25000132 ZZH RX MED GY IP 250 OP 250 PS 637: Performed by: NURSE PRACTITIONER

## 2019-06-21 PROCEDURE — 40000986 XR CHEST 2 VW

## 2019-06-21 PROCEDURE — 93005 ELECTROCARDIOGRAM TRACING: CPT

## 2019-06-21 PROCEDURE — G0378 HOSPITAL OBSERVATION PER HR: HCPCS

## 2019-06-21 PROCEDURE — 99214 OFFICE O/P EST MOD 30 MIN: CPT | Performed by: INTERNAL MEDICINE

## 2019-06-21 PROCEDURE — 25000132 ZZH RX MED GY IP 250 OP 250 PS 637: Performed by: INTERNAL MEDICINE

## 2019-06-21 PROCEDURE — 93010 ELECTROCARDIOGRAM REPORT: CPT | Performed by: INTERNAL MEDICINE

## 2019-06-21 PROCEDURE — 99214 OFFICE O/P EST MOD 30 MIN: CPT | Mod: 25 | Performed by: INTERNAL MEDICINE

## 2019-06-21 RX ORDER — LISINOPRIL 5 MG/1
5 TABLET ORAL DAILY
Qty: 90 TABLET | Refills: 3 | Status: SHIPPED | OUTPATIENT
Start: 2019-06-21 | End: 2019-09-24

## 2019-06-21 RX ORDER — METOPROLOL SUCCINATE 25 MG/1
25 TABLET, EXTENDED RELEASE ORAL DAILY
Qty: 90 TABLET | Refills: 3 | Status: SHIPPED | OUTPATIENT
Start: 2019-06-21 | End: 2019-06-27

## 2019-06-21 RX ORDER — CLOPIDOGREL BISULFATE 75 MG/1
75 TABLET ORAL DAILY
Qty: 90 TABLET | Refills: 3 | Status: SHIPPED | OUTPATIENT
Start: 2019-06-21 | End: 2020-03-02

## 2019-06-21 RX ORDER — METOPROLOL SUCCINATE 25 MG/1
25 TABLET, EXTENDED RELEASE ORAL DAILY
Status: DISCONTINUED | OUTPATIENT
Start: 2019-06-21 | End: 2019-06-21 | Stop reason: HOSPADM

## 2019-06-21 RX ORDER — NITROGLYCERIN 0.4 MG/1
TABLET SUBLINGUAL
Qty: 25 TABLET | Refills: 3 | Status: SHIPPED | OUTPATIENT
Start: 2019-06-21 | End: 2022-10-17

## 2019-06-21 RX ADMIN — METOPROLOL SUCCINATE 25 MG: 25 TABLET, EXTENDED RELEASE ORAL at 08:38

## 2019-06-21 RX ADMIN — CLOPIDOGREL BISULFATE 75 MG: 75 TABLET ORAL at 08:37

## 2019-06-21 RX ADMIN — LISINOPRIL 5 MG: 5 TABLET ORAL at 08:37

## 2019-06-21 RX ADMIN — ASPIRIN 81 MG: 81 TABLET, COATED ORAL at 08:37

## 2019-06-21 ASSESSMENT — MIFFLIN-ST. JEOR: SCORE: 1548.55

## 2019-06-21 NOTE — PROGRESS NOTES
[facility]    EP Progress Note    Date of Service (when I saw the patient): 06/21/2019     Assessment & Plan   Moses Elizondo is a 85 year old male with h/o CAD s/p PCI of OM years ago and mRCA stent implantation this admission for worsening CP and equivocal stress test on 6/20. Has had SB, which has been watched as asx'c, but developed severe bradycardia into the 20s requiring a temporary PPM. Dr. Miller consulted and rec'd PPM implantation.    1. Symptomatic SND -  - No reversible cause noted  - Now s/p Medtronic dual chamber PPM 6/20/2019 with Dr. Dowell 60/130    - Device interrogation (Von) showed normal function. No changes made. 99.3%  and 90.8 AP.    - CXR PENDING. Reviewed by Dr. Miller and looks good  - Device teaching PENDING  - Tele AV paced at 60 bpm     PLAN:   * OK to discharge home after device teaching done and CXR shows no PTX   * EP follow-up will be arranged by Device RN - seeing GIGI Sinclair for CAD 6/27    2. CAD -    PLAN:   * See Danielle's note from today    Liat De La Cruz PA-C    Interval History   No c/o. RN was reviewing discharge instructions with pt when I arrived    Physical Exam   Temp: 98  F (36.7  C) Temp src: Oral BP: 121/64 Pulse: 62 Heart Rate: 63 Resp: 16 SpO2: 94 % O2 Device: None (Room air) Oxygen Delivery: 1 LPM  Vitals:    06/20/19 0637 06/21/19 0209   Weight: 87.5 kg (193 lb) 85.7 kg (189 lb)     Vital Signs with Ranges  Temp:  [97.4  F (36.3  C)-98.2  F (36.8  C)] 98  F (36.7  C)  Pulse:  [34-62] 62  Heart Rate:  [29-63] 63  Resp:  [8-31] 16  BP: (120-150)/(64-91) 121/64  SpO2:  [87 %-100 %] 94 %  I/O last 3 completed shifts:  In: 279.5 [P.O.:150; I.V.:129.5]  Out: 700 [Urine:700]    Telemetry: AV paced @ 60 bpm    Constitutional: awake, alert, cooperative, no apparent distress, and appears stated age  Respiratory: CTA B  Cardiovascular: Regular. PPM site looks c/d  Musculoskeletal: No edema.     Medications     - MEDICATION INSTRUCTIONS -        Percutaneous Coronary Intervention orders placed (this is information for BPA alerting)       BETA BLOCKER NOT PRESCRIBED         aspirin  81 mg Oral Daily     clopidogrel  75 mg Oral Daily     lisinopril  5 mg Oral Daily     metoprolol succinate ER  25 mg Oral Daily     sodium chloride (PF)  3 mL Intracatheter Q8H       Data   I personally reviewed no images or EKG's today.  Results for orders placed or performed during the hospital encounter of 06/20/19 (from the past 24 hour(s))   Activated clotting time POCT   Result Value Ref Range    Activated Clot Time 223 (H) 75 - 150 sec   Activated clotting time POCT   Result Value Ref Range    Activated Clot Time 288 (H) 75 - 150 sec   Activated clotting time POCT   Result Value Ref Range    Activated Clot Time 280 (H) 75 - 150 sec   Cardiac Catheterization    Addendum: 6/20/2019        Ost LM lesion is 30% stenosed.    Prox LAD lesion is 30% stenosed.    Mid LAD lesion is 30% stenosed.    Mid RCA lesion is 95% stenosed.    IVUS was performed on the lesion.    Ultrasound (IVUS) was performed.     1. No significant LCA disease (see discussion above re: stent in OM is fine, ?was there a diagonal stent as listed in HPI)  2. Very complex calcified RCA lesion. CHIPS case approach. CSI orbital atherectomy and YOGESH. 95% down to 0%  3. Pt with known sinus emily, PAC with block. Temp pacer used and it was set to 30 with occasional pacing. Pt should see his cardiologist to determine if DDD pacer indicated. No beta blockers given   4. Incidentally noted is PFO or ASD        Narrative      Ost LM lesion is 30% stenosed.    Prox LAD lesion is 30% stenosed.    Mid LAD lesion is 30% stenosed.    Mid RCA lesion is 95% stenosed.    IVUS was performed on the lesion.    Ultrasound (IVUS) was performed.     1. No significant LCA disease (see discussion above re: stent in OM is   fine, ?was there a diagonal stent as listed in HPI)  2. Very complex calcified RCA lesion. CHIPS case approach. CSI  orbital   atherectomy and YOGESH. 95% down to 0%  3. Pt with known sinus emily, PAC with block. Temp pacer used and it was   set to 30 with occasional pacing. Pt should see his cardiologist to   determine if DDD pacer indicated. No beta blockers given      EKG 12-lead, tracing only    Result Value Ref Range    Interpretation ECG Click View Image link to view waveform and result    Activated clotting time POCT   Result Value Ref Range    Activated Clot Time 243 (H) 75 - 150 sec   Activated clotting time POCT   Result Value Ref Range    Activated Clot Time 215 (H) 75 - 150 sec   Activated clotting time POCT   Result Value Ref Range    Activated Clot Time 171 (H) 75 - 150 sec   EP Device    Narrative    PROCEDURES PERFORMED:  1. Implantation of dual-chamber permanent pacemaker.  2. Cardiac fluoroscopy (2.8 minutes).  3. Conscious sedation, mild-moderate level.     CLINICAL HISTORY:  85-year-old male with symptomatic bradycardia due to a combination of sick   sinus syndrome and second-degree AV block.  He was electively admitted   today for cardiac catheterization and possible PCI today presented in the   care suites with a heart rate of 25 bpm!  He underwent RCA PCI earlier   today.  Implantation of a permanent pacemaker was recommended.  The risks   and benefits of the procedure were discussed in detail; the patient   expressed understanding and provided consent.     PROCEDURE:  The patient was brought to the cardiac electrophysiology laboratory at   [facility] on 6/20/2019. I determined this patient to be   an appropriate candidate for the planned sedation and procedure and have   reassessed the patient immediately prior to sedation and procedure.The   patient was in the fasting nonsedated state. Informed consent had been   obtained. The patient was placed on the procedure table and the anterior   chest was prepped and draped in the usual sterile fashion.  We   continuously monitored vital signs,  oxygenation and level of sedation.    Antibiotic prophylaxis was administered.  Intravenous sedation was given   to achieve patient comfort.     Using a fluoroscopic guided technique the left subclavian vein was   cannulated without difficulty.  Two separate guidewires were introduced   and retained.  Under local anesthesia an incision was created in the left   pectoral region. The incision was taken down to the pectoralis muscle and   fascia and a pocket was created for the pacemaker generator.    Using a peel-away introducer sheath the right ventricular lead was   initially introduced.  The tip of the lead was brought at the mid anterior   RV. Good sensing and pacing parameters were confirmed. 10 V pacing did not   stimulate the diaphragm. The lead was secured using interrupted Ethibond   sutures.    Using another peel-away introducer sheath the right atrial lead was   introduced. The tip of the lead was brought at th superior anterior RA.   Good sensing and pacing parameters were confirmed. 10 V pacing did not   stimulate the diaphragm. The lead was secured using interrupted Ethibond   sutures.    The device pocket was then irrigated with antibiotic solution. The leads   were then connected to the pacemaker generator which was placed in the   pocket. The generator was secured using silk.  The wound was closed in 2   layers using 2.0 and 4.0 Vicryl.  Steri-Strips, sterile gauze, and a   pressure dressing were placed over the wound.     There were no apparent complications. The patient was brought back to the   hospital room in stable condition.    Estimated blood loss was 15 ml.        RESULTS:  Implant Name Type Inv. Item Serial No.  Lot No. LRB No. Used   Action   IMP LEAD PACING BIPOLAR CAPSUREFIX NOVUS 52CM 5076-52CM Leads IMP LEAD   PACING BIPOLAR CAPSUREFIX NOVUS 52CM 5076-52CM [id number] [employer]   [id number]  1 Implanted   IMP LEAD PACING BIPOLAR CAPSUREFIX NOVUS 45CM 5076-45CM Leads IMP  LEAD   PACING BIPOLAR CAPSUREFIX NOVUS 45CM 5076-45CM [id number] [employer]   [id number]  1 Implanted   PACEMAKER COMFORT MRI XT DR Pacemaker PACEMAKER COMFORT MRI XT DR [id number]   Medtronic Cardiac [employer] [id number]  1 Implanted     A.  Implanted leads:  (i) RV lead:  as above.  R-wave sensing 7.6 mV.  Pacing threshold 0.875 V   at 0.4 msec.  Pacing impedance 703 ohms.   (ii) RA lead:  as above.  P-wave sensing 2.4 mV.  Pacing threshold 0.625 V   at 0.4 msec.  Pacing impedance 437 ohms.    B.  Pulse generator.  Medtronic Comfort XT DR MRI.  Details as above.    C.  Programming.  DDD 60/120 ppm.      CONCLUSIONS:  1.  Successful implantation of dual-chamber permanent pacemaker.  2.  No apparent in-lab complication.     EKG 12-lead, tracing only   Result Value Ref Range    Interpretation ECG Click View Image link to view waveform and result

## 2019-06-21 NOTE — PLAN OF CARE
Patient alert,, SBA. S/P stent to mid RCA, right groin site has no change small bruise laterally, otherwise WNL with angioseal. S/P pacemaker, dressing dry intact, medicated for pain with tylenol and oxycodone,able to sleep after doses. VSS, 100% AV pacing. Probable discharge today.

## 2019-06-21 NOTE — TELEPHONE ENCOUNTER
Pt called back and asked if it's okay to go up and down the basement stairs. I told him that was fine.

## 2019-06-21 NOTE — PROGRESS NOTES
[facility]  Cardiology Progress Note  Date of Service (when I saw the patient): 06/21/2019   Primary Cardiologist: Dr. Huggins     Assessment & Plan   Moses WINSTON Elizondo is a 85 year old male who was admitted on 6/20/2019 post PCI and YOGESH to his RCA.  Post his coronary angiogram and intervention he was bradycardic requiring a temporary pacemaker and subsequently underwent a permanent pacemaker. There was an incidental finding of an ASD or PFO.     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  Due to complaints of exertional substernal chest discomfort he underwent a nuclear stress test that was abnormal and subsequently was sent from coronary angiogram.    1. Coronary artery disease     Status post NSTEM and PCI to OM1 in 2014    Preserved LVEF at 50-55%    Coronary angiogram from 6/20/19 showed patent OM1 stent and 95% mid RCA lesion    Status post CSI orbital atherectomy and YOGESH to mid RCA    Dual antiplatelet therapy with ASA and Plavix for 1 year uninterrupted and ASA lifelong    2. Sick sinus syndrome    Know SSS with severe sinus bradycardia and second degree AVB during coronary angiogram with HR in 20s requiring a temporary pacemaker and subsequent dual chamber PPM.     CXR pending    Device RN will see patient prior to discharge to set up follow up and post procedure teaching    3. Hyperlipidemia    Last LDL 50 with HDL 46    Continue PTA atorvastatin 40 mg daily    4. Hypertension    Lisinopril 5 mg daily started post procedure    Add metoprolol as above.     5. Aortic root and ascending aortic enlargement    Both measuring at 4.4  cm    Hypertension management    Add metoprolol XL 25 mg daily as above      Plan:   1. Discharge home today with follow up on 6/27/19 with Dottie  2. Device RN to set up PPM follow up prior to discharge  3. DAPT with ASA and Plavix for 1 year uninterrupted  4. Hypertension management with lisinopril 5 mg and metoprolol XL 25 mg daily  5. Outpatient cardiac rehab      TESHA BORJAS, APRN, CNP    Interval History   No further chest discomfort and slight discomfort at PPM site.    Physical Exam   Temp: 98  F (36.7  C) Temp src: Oral BP: 121/64 Pulse: 62 Heart Rate: 63 Resp: 16 SpO2: 94 % O2 Device: None (Room air) Oxygen Delivery: 1 LPM  Vitals:    06/20/19 0637 06/21/19 0209   Weight: 87.5 kg (193 lb) 85.7 kg (189 lb)     Vital Signs with Ranges  Temp:  [97.4  F (36.3  C)-98.2  F (36.8  C)] 98  F (36.7  C)  Pulse:  [34-62] 62  Heart Rate:  [29-63] 63  Resp:  [8-31] 16  BP: (120-150)/(64-91) 121/64  SpO2:  [87 %-100 %] 94 %  I/O last 3 completed shifts:  In: 279.5 [P.O.:150; I.V.:129.5]  Out: 700 [Urine:700]    Constitutional:   NAD   Skin:   Warm and dry   Head:   Nontraumatic   Neck:   no JVD   Lungs:   symmetric, clear to auscultation   Cardiovascular:   regular rate and rhythm, normal S1 and S2 and no murmur noted   Abdomen:   Benign   Extremities and Back:   Right femoral access site with mild to moderate hematoma and brusing. No bruit or tenderness. Peripheral pulse 2+   Neurological:   Grossly nonfocal

## 2019-06-21 NOTE — DISCHARGE INSTRUCTIONS
Discharge Instructions for Cardiac Catheterization  Cardiac catheterization is a procedure to look for blocked areas in the blood vessels that send blood to the heart. A thin, flexible tube (catheter) is put in a blood vessel in your groin or arm. The healthcare provider injects contrast fluid into your blood, which then flows to your heart. X-rays pictures are taken of your heart. Your provider will review the results with you. Be sure to ask any questions you have before you leave. This sheet will help you take care of yourself at home.  Home care    Don't drive or make any important decisions for at least 24 hours after receiving any type of sedation or anesthesia.     Arrange to have a responsible adult drive you home after your procedure.    Only do light and easy activities for the next 2 to 3 days. Ask for help with chores and errands while you recover. Have someone drive you to your appointments.    Don't lift anything heavy for a while. Your healthcare team will tell you when it's safe to lift again.    Ask your healthcare team when you can expect to return to work. Unless your job involves lifting, you may be able to return to your normal activities within a couple of days.    Take your medicines as directed. Don't skip doses.    Drink 6 to 8 glasses of water a day. This is to help flush the contrast dye out of your body. Call your healthcare team if your urine has any change in color.    Take your temperature each day for 7 days. If you feel cold and clammy or start sweating, take your temperature right away and call your healthcare team.    Check your incisions every day for signs of infection. These include redness, swelling, and drainage. It is normal to have a small bruise or bump where the catheter was inserted. A bruise that is getting larger is not normal and should be reported to your healthcare team. If you see blood forming in the incision, call your healthcare team. Go to the emergency  department if you have uncontrolled bleeding from the artery site. This is especially true if you take medicines that make it hard for your blood to clot. Examples are aspirin, clopidogrel, and warfarin.    Eat a healthy diet. Make sure it is low in fat, salt, and cholesterol. Ask your healthcare team for diet information.    Stop smoking. Enroll in a stop-smoking program or ask your healthcare team for help. Stop-smoking programs can be life saving.    Exercise as your healthcare team tells you to. Your healthcare team may recommend you start a cardiac rehabilitation program. Cardiac rehab is an exercise program in which trained healthcare staff watch your progress and stress on your heart while you exercise. Ask your team how to enroll.    Don't swim or take baths until your healthcare team says it s OK. You can shower the day after the procedure. Keep the site clean and dry. This keeps the incision from getting wet and infected until the skin and artery can heal.    Be sure to follow all after-care instructions.   Follow-up care    Make a follow-up appointment as advised by our staff. It's common to have a follow-up appointment 2 to 4 weeks after an angioplasty or coronary stent procedure.    Make a yearly appointment, too. This is to make sure you are still doing well and not having any new symptoms.    Don't wait for a follow-up appointment if your medicines aren't working or you are having heart-related symptoms.  When to seek medical care  Call your healthcare provider right away if you have any of the following:    Chest pain    Constant or increasing pain or numbness in your leg    Fever of 100.4 F (38.0 C) or higher, or as directed by your healthcare provider    Symptoms of infection. These include redness, swelling, drainage, or warmth at the incision site.    Shortness of breath    A leg that feels cold or appears blue    Bleeding, bruising, or a lot of swelling where the catheter was inserted    Blood  in your urine    Black or tarry stools    Any unusual bleeding   Date Last Reviewed: 10/1/2016    5732-4550 [employer]. [address] [place] [address], [place] [address]. All rights reserved. This information is not intended as a substitute for professional medical care. Always follow your healthcare professional's instructions.

## 2019-06-21 NOTE — PLAN OF CARE
Alert and oriented x4. VSS. Denies pain. Up independently. Hematoma at site MD aware, okayed for discharge. Discharge paperwork gone over with pt and questions answered. Sent with belongings and paperwork.

## 2019-06-21 NOTE — PLAN OF CARE
A/O but drowsy, VSS ex BP 140s-150s/60-80s, O2sats 98% on 2 L. Tele 100% v paced, HR 60. Denies pain. Bedrest completed. R groin site with small bruise but unchanged, no hematoma or bruit, CMS intact. PPM site WDL, pressure dressing in place, CMS intact. Plan for CXR tomorrow with likely discharge.

## 2019-06-21 NOTE — TELEPHONE ENCOUNTER
Pt had dual chamber PPM implanted yesterday. He stayed overnight in the hospital, but was discharged before [facility] RN did education in the hospital. Called pt to reviewed discharge instructions and education.     Post device implant discharge phone call.    Reviewed the following:  No raising arm above shoulder on the side of implant for 3 weeks  Remove outer dressing 3 days after implant. May shower after outer dressing removed. Leave steri-strips in place, will be removed at 1 week device check  No driving for: 1 week (PPM)  Watch for redness, drainage, warmth, or fever. Call device clinic if any signs of infection.     1 week device check scheduled: same day as OV with Dottie YU, Thursday 6/27/2019. Device check at 8:15, OV at 10:10. Bhumi.     Pt states understanding of all instructions.

## 2019-06-23 LAB
INTERPRETATION ECG - MUSE: NORMAL
INTERPRETATION ECG - MUSE: NORMAL

## 2019-06-27 ENCOUNTER — ANCILLARY PROCEDURE (OUTPATIENT)
Dept: CARDIOLOGY | Facility: CLINIC | Age: 84
End: 2019-06-27
Attending: INTERNAL MEDICINE
Payer: COMMERCIAL

## 2019-06-27 ENCOUNTER — OFFICE VISIT (OUTPATIENT)
Dept: CARDIOLOGY | Facility: CLINIC | Age: 84
End: 2019-06-27
Payer: COMMERCIAL

## 2019-06-27 VITALS
HEIGHT: 70 IN | WEIGHT: 190 LBS | DIASTOLIC BLOOD PRESSURE: 71 MMHG | BODY MASS INDEX: 27.2 KG/M2 | SYSTOLIC BLOOD PRESSURE: 110 MMHG | HEART RATE: 62 BPM

## 2019-06-27 DIAGNOSIS — Z98.890 STATUS POST CORONARY ANGIOGRAM: ICD-10-CM

## 2019-06-27 DIAGNOSIS — Z95.0 CARDIAC PACEMAKER IN SITU: ICD-10-CM

## 2019-06-27 DIAGNOSIS — I25.810 CORONARY ARTERY DISEASE INVOLVING CORONARY BYPASS GRAFT OF NATIVE HEART WITHOUT ANGINA PECTORIS: ICD-10-CM

## 2019-06-27 DIAGNOSIS — I25.10 CORONARY ARTERY DISEASE INVOLVING NATIVE CORONARY ARTERY OF NATIVE HEART WITHOUT ANGINA PECTORIS: Primary | ICD-10-CM

## 2019-06-27 DIAGNOSIS — R07.89 CHEST DISCOMFORT: ICD-10-CM

## 2019-06-27 PROCEDURE — 93280 PM DEVICE PROGR EVAL DUAL: CPT | Performed by: INTERNAL MEDICINE

## 2019-06-27 PROCEDURE — 99214 OFFICE O/P EST MOD 30 MIN: CPT | Mod: 25 | Performed by: NURSE PRACTITIONER

## 2019-06-27 RX ORDER — METOPROLOL SUCCINATE 25 MG/1
25 TABLET, EXTENDED RELEASE ORAL DAILY
Qty: 90 TABLET | Refills: 3 | COMMUNITY
Start: 2019-06-27 | End: 2020-03-02

## 2019-06-27 ASSESSMENT — MIFFLIN-ST. JEOR: SCORE: 1553.08

## 2019-06-27 NOTE — LETTER
6/27/2019    Steven Wagoner MD  [address] [facility] [address]    RE: Moses Elizondo       Dear Colleague,    I had the pleasure of seeing Moses Elizondo in the [facility].    HPI and Plan:   I had the pleasure of seeing Moses Elizondo today in cardiology clinic follow up. He is a pleasant 85 year old patient of Dr. Huggins.    Mr. 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    He previously experienced a substernal chest discomfort in 2014  For which he was admitted to [facility].  His EKG was negative for ischemia however his troponins socorro to a peak of 30.  He underwent a coronary angiogram with PCI and drug-eluting stent to the first obtuse marginal.      He also has a known history of sick sinus syndrome and bradycardia. He was previously on propanolol for essential tremor however once this was discontinued his heart rate did not recover.    He was recently seen in the cardiology clinic with complaints of exertional, substernal chest discomfort.  The symptoms were occurring with exertion such as gardening, walking up the stairs or while riding his bike.  his symptoms previously subsided with one sublingual nitroglycerin.  He  was also noted to have a heart rate of 33 bpm by EKG.  He underwent a nuclear stress test which demonstrated a small to moderate, mild fixed defect in the inferolateral wall consistent with prior infarction.  He failed to achieve target heart rate therefore this was a nondiagnostic study for ischemia.  He then underwent an echocardiogram which noted a mildly reduced left ventricular systolic function with an ejection fraction at 50 to 55%.  The right  ventricle is normal.  No valvular abnormalities.  Moderate dilatation of the ascending aorta at 4.4 cm as well as the aortic root.    Today he presents to the cardiology clinic reporting exertional substernal chest discomfort.  He notices the symptoms while he is gardening or walking upstairs or exerting himself in any fashion.  This discomfort subsides with rest.  It is not associated with any other symptoms.  He describes this chest discomfort as similar to the discomfort he experienced in 2015 prior to receiving his stent.  He has taken 1 sublingual nitroglycerin which has relieved his pain.  His EKG today shows sinus bradycardia at 33 bpm. He wore a 48-hour Holter monitor which showed an average heart rate at 50 bpm with a minimum heart rate and 25 bpm when he was sleeping.  There was some supra ventricular ectopy noted however his principal rhythm was sinus.  She had many pauses with the greatest pause being less than 2.5 seconds.    He moved forward with a coronary angiogram which showed a very complex, calcified mid RCA lesion. CHIPS case approach. CSI orbital atherectomy and YOGESH to the mid RCA. During his angiogram and following his heart hearts were significantly bradycardic. A temporary pacemaker was used during the case. He was consulted by Dr. Miller with EP who recommended a permanent pacemaker. He is now status post a Medtronic dual chamber PPM by Dr. Dowell on 06/20/19.    Today her presents to the cardiology clinic following discharge.  He is doing well with improvement in his substernal chest discomfort.  He states following his pacemaker implantation that the bandages caused a rash and irritation.  He checks his heart rates at home and they are typically 60 bpm.  His biggest complaint today is fatigue.  He states since starting metoprolol 25 mg daily he has noticed an onset of fatigue.    Physical Exam  Please see Below     Assessment and Plan  1.  Coronary artery disease status post YOGESH to the  and  most recently to the mid RCA.  He denies recurrent angina.  He complains of fatigue since starting the metoprolol.  I have asked him to reduce this to 12.5 mg daily.  If his fatigue does not improve, he can stop the metoprolol completely.  Continue aspirin lifelong.  Continue Plavix uninterrupted for 1 year.  Continue  high intensity statin.    2.  Sick sinus syndrome and significant bradycardia status post permanent pacemaker.  His pacemaker site is ecchymotic and tender to touch however it is healing nicely.  His heart rate today is 62 bpm.  He has a device check in August.    3.  Aortic root (4.4 cm) enlargement as well as a ascending aorta (4.4 cm) enlargement noted on recent echocardiogram. We will continue to monitor.    4. Hyperlipidemia.  His last LDL was 50, HDL was 46.  Continue atorvastatin 40 mg daily.      Thank you for allowing me to care for Moses Elizondo today.    ALPESH Barraza, CNP  Cardiology    Voice recognition software was used for this note, I have reviewed this note, but errors may have been missed.    Orders Placed This Encounter   Procedures     Follow-Up with Cardiologist     Orders Placed This Encounter   Medications     metoprolol succinate ER (TOPROL-XL) 25 MG 24 hr tablet     Sig: Take 0.5 tablets (12.5 mg) by mouth daily     Dispense:  90 tablet     Refill:  3     Medications Discontinued During This Encounter   Medication Reason     nitroGLYcerin (NITROSTAT) 0.4 MG sublingual tablet Medication Reconciliation Clean Up     metoprolol succinate ER (TOPROL-XL) 25 MG 24 hr tablet          CURRENT MEDICATIONS:  Current Outpatient Medications   Medication Sig Dispense Refill     ASPIRIN EC PO Take 81 mg by mouth daily       atorvastatin (LIPITOR) 40 MG tablet Take 1 tablet (40 mg) by mouth daily (Patient taking differently: Take 20 mg by mouth daily ) 90 tablet 3     clopidogrel (PLAVIX) 75 MG tablet Take 1 tablet (75 mg) by mouth daily 90 tablet 3     levothyroxine  "(SYNTHROID/LEVOTHROID) 100 MCG tablet Take 1 tablet (100 mcg) by mouth daily 90 tablet 3     lisinopril (PRINIVIL/ZESTRIL) 5 MG tablet Take 1 tablet (5 mg) by mouth daily Hold if Systolic Blood Pressure is less than 85. 90 tablet 3     metoprolol succinate ER (TOPROL-XL) 25 MG 24 hr tablet Take 0.5 tablets (12.5 mg) by mouth daily 90 tablet 3     Multiple Vitamins-Minerals (CENTRUM SILVER PO) Take 1 tablet by mouth three times a week        nitroGLYcerin (NITROSTAT) 0.4 MG sublingual tablet One tablet under the tongue every 5 minutes if needed for chest pain. May repeat every 5 minutes for a maximum of 3 doses in 15 minutes\" 25 tablet 3     Polyethylene Glycol 3350 (MIRALAX PO) Take by mouth as needed       vitamin B-12 (CYANOCOBALAMIN) 250 MCG tablet Take 1,000 mcg by mouth daily         ALLERGIES     Allergies   Allergen Reactions     No Known Drug Allergies        PAST MEDICAL HISTORY:  Past Medical History:   Diagnosis Date     Aortic root dilatation (H)     4.4 cm     Ascending aorta dilatation (H)     4.4 cm     ASD (atrial septal defect)      Bradycardia      CAD (coronary artery disease)      YOGESH to RCA(6/20/19); YOGESH to OM1(5/15/14)     Cardiac pacemaker 06/20/2019    Medtronic PPM COMFORT MRI XT DR-implant 6/20/19     Chest discomfort      Degeneration of lumbar or lumbosacral intervertebral disc      Familial tremor      Former smoker      Herpes zoster without mention of complication      HTN (hypertension)      Hyperlipidaemia      Idiopathic peripheral neuropathy      MGUS (monoclonal gammopathy of unknown significance)      NSTEMI (non-ST elevated myocardial infarction) (H) 2014     Other and unspecified hyperlipidemia      PFO (patent foramen ovale)      Prostatitis, unspecified      Second degree heart block      Sensorineural hearing loss, unspecified      SSS (sick sinus syndrome) (H)      Status post coronary angiogram 6/20/2019     Unspecified hypothyroidism        PAST SURGICAL HISTORY:  Past " Surgical History:   Procedure Laterality Date     C NONSPECIFIC PROCEDURE  2010    Laparoscopic cholecystectomy.      CHOLECYSTECTOMY       CORONARY ANGIOGRAPHY ADULT ORDER  5/14/14    PTCA w/YOGESH to OM1     CV CORONARY ANGIOGRAM N/A 6/20/2019    Procedure: Coronary Angiogram;  Surgeon: Roime Coronado MD;  Location:  HEART [facility] CATH LAB     CV INTRAVASULAR ULTRASOUND N/A 6/20/2019    Procedure: Intravascular Ultrasound;  Surgeon: Romie Coronado MD;  Location:  [facility]     CV PCI ATHERECTOMY ORBITAL N/A 6/20/2019    Procedure: PCI Atherectomy Orbital;  Surgeon: Romie Coronado MD;  Location:  [facility]     CV PCI STENT DRUG ELUTING N/A 6/20/2019    Procedure: PCI Stent Drug Eluting;  Surgeon: Romie Coronado MD;  Location:  [facility] LAB     CV TEMPORARY PACEMAKER INSERTION N/A 6/20/2019    Procedure: Temporary Pacemaker Insertion;  Surgeon: Romie Coronado MD;  Location:  [facility] LAB     EP PACEMAKER N/A 6/20/2019    Procedure: EP Pacemaker;  Surgeon: Max Dowell MD;  Location:  HE[facility]ARD[facility]ATH LAB     HEART CATH, ANGIOPLASTY  5/14/14    YOGESH to OM1       FAMILY HISTORY:  Family History   Problem Relation Age of Onset     Cancer Mother      Genitourinary Problems Father 99        complications from surgery     Heart Disease Brother 72        MI       SOCIAL HISTORY:  Social History     Socioeconomic History     Marital status:      Spouse name: None     Number of children: None     Years of education: None     Highest education level: None   Occupational History     None   Social Needs     Financial resource strain: None     Food insecurity:     Worry: None     Inability: None     Transportation needs:     Medical: None     Non-medical: None   Tobacco Use     Smoking status: Former Smoker     Packs/day: 0.50     Years: 16.00     Pack years: 8.00     Start date: 1950     Last attempt to quit: 1/1/1967     Years  since quittin.5     Smokeless tobacco: Never Used   Substance and Sexual Activity     Alcohol use: Yes     Alcohol/week: 0.0 oz     Comment: 4 drinks week     Drug use: No     Sexual activity: Never   Lifestyle     Physical activity:     Days per week: None     Minutes per session: None     Stress: None   Relationships     Social connections:     Talks on phone: None     Gets together: None     Attends Anabaptism service: None     Active member of club or organization: None     Attends meetings of clubs or organizations: None     Relationship status: None     Intimate partner violence:     Fear of current or ex partner: None     Emotionally abused: None     Physically abused: None     Forced sexual activity: None   Other Topics Concern      Service Not Asked     Blood Transfusions Not Asked     Caffeine Concern Yes     Comment: 2 cups coffee/day     Occupational Exposure Not Asked     Hobby Hazards Not Asked     Sleep Concern No     Stress Concern Not Asked     Weight Concern Yes     Comment: limiting alcohol to watch calories     Special Diet Not Asked     Back Care Not Asked     Exercise Yes     Comment: Stationary bike  weights and aerobics 4 times week     Bike Helmet Not Asked     Seat Belt Yes     Self-Exams Not Asked     Parent/sibling w/ CABG, MI or angioplasty before 65F 55M? No   Social History Narrative     None       Review of Systems:  Skin:  Positive for lumps or bumps     Eyes:  Positive for glasses    ENT:  Negative      Respiratory:  Negative dyspnea on exertion(Increased)     Cardiovascular:    chest pain;exercise intolerance;fatigue;Positive for very slight chest pain on occasion - unsure if it is soreness from the implant  Gastroenterology: Negative      Genitourinary:  Positive for nocturia    Musculoskeletal:  Negative      Neurologic:  Positive for numbness or tingling of feet;tremors neuropathy in feet  Psychiatric:  Positive for sleep disturbances    Heme/Lymph/Imm:  Negative     "  Endocrine:  Positive for thyroid disorder      Physical Exam:  Vitals: /71   Pulse 62   Ht 1.778 m (5' 10\")   Wt 86.2 kg (190 lb)   BMI 27.26 kg/m       Constitutional:  cooperative, alert and oriented, well developed, well nourished, in no acute distress        Skin:  warm and dry to the touch          Head:  normocephalic        Eyes:  pupils equal and round;sclera white        Lymph:      ENT:  no pallor or cyanosis        Neck:  carotid pulses are full and equal bilaterally;JVP normal        Respiratory:  normal breath sounds, clear to auscultation, normal A-P diameter, normal symmetry, normal respiratory excursion, no use of accessory muscles         Cardiac: regular rhythm, normal S1/S2, no S3 or S4, apical impulse not displaced, no murmurs, gallops or rubs                pulses full and equal     2+             2+                    GI:  abdomen soft;non-tender        Extremities and Muscular Skeletal:  no deformities, clubbing, cyanosis, erythema observed;no edema              Neurological:  no gross motor deficits;affect appropriate        Psych:  Alert and Oriented x 3    Encounter Diagnoses   Name Primary?     Coronary artery disease involving native coronary artery of native heart without angina pectoris Yes     Chest discomfort      Status post coronary angiogram      Coronary artery disease        Recent Lab Results:  LIPID RESULTS:  Lab Results   Component Value Date    CHOL 116 03/20/2019    HDL 40 03/20/2019    LDL 61 03/20/2019    TRIG 75 03/20/2019    CHOLHDLRATIO 2.9 07/08/2014       LIVER ENZYME RESULTS:  Lab Results   Component Value Date    AST 33 03/20/2019    ALT 25 03/20/2019       CBC RESULTS:  Lab Results   Component Value Date    WBC 5.5 06/20/2019    RBC 4.60 06/20/2019    HGB 13.5 06/20/2019    HCT 39.9 (L) 06/20/2019    MCV 87 06/20/2019    MCH 29.3 06/20/2019    MCHC 33.8 06/20/2019    RDW 11.9 06/20/2019     (L) 06/20/2019       BMP RESULTS:  Lab Results "   Component Value Date     06/20/2019    POTASSIUM 3.9 06/20/2019    CHLORIDE 109 06/20/2019    CO2 26 06/20/2019    ANIONGAP 5 06/20/2019    GLC 86 06/20/2019    BUN 23 06/20/2019    CR 1.12 06/20/2019    GFRESTIMATED 59 (L) 06/20/2019    GFRESTBLACK 69 06/20/2019    GABRIELA 9.1 06/20/2019        A1C RESULTS:  No results found for: A1C    INR RESULTS:  Lab Results   Component Value Date    INR 1.00 06/20/2019           CC  No referring provider defined for this encounter.                  Thank you for allowing me to participate in the care of your patient.    Sincerely,     ALPESH Barraza CNP     [facility]

## 2019-06-27 NOTE — DISCHARGE SUMMARY
See heart cath stent result and the EP pacer note      [facility]     EP Progress Note     Date of Service (when I saw the patient): 06/21/2019        Assessment & Plan     Moses Elizondo is a 85 year old male with h/o CAD s/p PCI of OM years ago and mRCA stent implantation this admission for worsening CP and equivocal stress test on 6/20. Has had SB, which has been watched as asx'c, but developed severe bradycardia into the 20s requiring a temporary PPM. Dr. Miller consulted and rec'd PPM implantation.     1. Symptomatic SND -  - No reversible cause noted  - Now s/p Medtronic dual chamber PPM 6/20/2019 with Dr. Dowell 60/130     - Device interrogation (Von) showed normal function. No changes made. 99.3%  and 90.8 AP.    - CXR PENDING. Reviewed by Dr. Miller and looks good  - Device teaching PENDING  - Tele AV paced at 60 bpm                 PLAN:              * OK to discharge home after device teaching done and CXR shows no PTX              * EP follow-up will be arranged by Device RN - seeing GIGI Sinclair for CAD 6/27     2. CAD -               PLAN:              * See Danielle's note from today     Liat De La Cruz PA-C        Interval History     No c/o. RN was reviewing discharge instructions with pt when I arrived        Physical Exam     Temp: 98  F (36.7  C) Temp src: Oral BP: 121/64 Pulse: 62 Heart Rate: 63 Resp: 16 SpO2: 94 % O2 Device: None (Room air) Oxygen Delivery: 1 LPM       Vitals:     06/20/19 0637 06/21/19 0209   Weight: 87.5 kg (193 lb) 85.7 kg (189 lb)      Vital Signs with Ranges  Temp:  [97.4  F (36.3  C)-98.2  F (36.8  C)] 98  F (36.7  C)  Pulse:  [34-62] 62  Heart Rate:  [29-63] 63  Resp:  [8-31] 16  BP: (120-150)/(64-91) 121/64  SpO2:  [87 %-100 %] 94 %  I/O last 3 completed shifts:  In: 279.5 [P.O.:150; I.V.:129.5]  Out: 700 [Urine:700]     Telemetry: AV paced @ 60 bpm     Constitutional: awake, alert, cooperative, no apparent distress, and appears stated  age  Respiratory: CTA B  Cardiovascular: Regular. PPM site looks c/d  Musculoskeletal: No edema.         Medications        - MEDICATION INSTRUCTIONS -       Percutaneous Coronary Intervention orders placed (this is information for BPA alerting)       BETA BLOCKER NOT PRESCRIBED          aspirin  81 mg Oral Daily     clopidogrel  75 mg Oral Daily     lisinopril  5 mg Oral Daily     metoprolol succinate ER  25 mg Oral Daily     sodium chloride (PF)  3 mL Intracatheter Q8H            Data      I personally reviewed no images or EKG's today.        Results for orders placed or performed during the hospital encounter of 06/20/19 (from the past 24 hour(s))   Activated clotting time POCT   Result Value Ref Range     Activated Clot Time 223 (H) 75 - 150 sec   Activated clotting time POCT   Result Value Ref Range     Activated Clot Time 288 (H) 75 - 150 sec   Activated clotting time POCT   Result Value Ref Range     Activated Clot Time 280 (H) 75 - 150 sec   Cardiac Catheterization     Addendum: 6/20/2019          Ost LM lesion is 30% stenosed.    Prox LAD lesion is 30% stenosed.    Mid LAD lesion is 30% stenosed.    Mid RCA lesion is 95% stenosed.    IVUS was performed on the lesion.    Ultrasound (IVUS) was performed.     1. No significant LCA disease (see discussion above re: stent in OM is fine, ?was there a diagonal stent as listed in HPI)  2. Very complex calcified RCA lesion. CHIPS case approach. CSI orbital atherectomy and YOGESH. 95% down to 0%  3. Pt with known sinus emily, PAC with block. Temp pacer used and it was set to 30 with occasional pacing. Pt should see his cardiologist to determine if DDD pacer indicated. No beta blockers given   4. Incidentally noted is PFO or ASD           Narrative       Ost LM lesion is 30% stenosed.    Prox LAD lesion is 30% stenosed.    Mid LAD lesion is 30% stenosed.    Mid RCA lesion is 95% stenosed.    IVUS was performed on the lesion.    Ultrasound (IVUS) was performed.     1. No  significant LCA disease (see discussion above re: stent in OM is   fine, ?was there a diagonal stent as listed in HPI)  2. Very complex calcified RCA lesion. CHIPS case approach. CSI orbital   atherectomy and YOGESH. 95% down to 0%  3. Pt with known sinus emily, PAC with block. Temp pacer used and it was   set to 30 with occasional pacing. Pt should see his cardiologist to   determine if DDD pacer indicated. No beta blockers given       EKG 12-lead, tracing only    Result Value Ref Range     Interpretation ECG Click View Image link to view waveform and result     Activated clotting time POCT   Result Value Ref Range     Activated Clot Time 243 (H) 75 - 150 sec   Activated clotting time POCT   Result Value Ref Range     Activated Clot Time 215 (H) 75 - 150 sec   Activated clotting time POCT   Result Value Ref Range     Activated Clot Time 171 (H) 75 - 150 sec   EP Device     Narrative     PROCEDURES PERFORMED:  1. Implantation of dual-chamber permanent pacemaker.  2. Cardiac fluoroscopy (2.8 minutes).  3. Conscious sedation, mild-moderate level.      CLINICAL HISTORY:  85-year-old male with symptomatic bradycardia due to a combination of sick   sinus syndrome and second-degree AV block.  He was electively admitted   today for cardiac catheterization and possible PCI today presented in the   care suites with a heart rate of 25 bpm!  He underwent RCA PCI earlier   today.  Implantation of a permanent pacemaker was recommended.  The risks   and benefits of the procedure were discussed in detail; the patient   expressed understanding and provided consent.      PROCEDURE:  The patient was brought to the cardiac electrophysiology laboratory at   [facility] on 6/20/2019. I determined this patient to be   an appropriate candidate for the planned sedation and procedure and have   reassessed the patient immediately prior to sedation and procedure.The   patient was in the fasting nonsedated state. Informed consent  had been   obtained. The patient was placed on the procedure table and the anterior   chest was prepped and draped in the usual sterile fashion.  We   continuously monitored vital signs, oxygenation and level of sedation.    Antibiotic prophylaxis was administered.  Intravenous sedation was given   to achieve patient comfort.      Using a fluoroscopic guided technique the left subclavian vein was   cannulated without difficulty.  Two separate guidewires were introduced   and retained.  Under local anesthesia an incision was created in the left   pectoral region. The incision was taken down to the pectoralis muscle and   fascia and a pocket was created for the pacemaker generator.     Using a peel-away introducer sheath the right ventricular lead was   initially introduced.  The tip of the lead was brought at the mid anterior   RV. Good sensing and pacing parameters were confirmed. 10 V pacing did not   stimulate the diaphragm. The lead was secured using interrupted Ethibond   sutures.     Using another peel-away introducer sheath the right atrial lead was   introduced. The tip of the lead was brought at th superior anterior RA.   Good sensing and pacing parameters were confirmed. 10 V pacing did not   stimulate the diaphragm. The lead was secured using interrupted Ethibond   sutures.     The device pocket was then irrigated with antibiotic solution. The leads   were then connected to the pacemaker generator which was placed in the   pocket. The generator was secured using silk.  The wound was closed in 2   layers using 2.0 and 4.0 Vicryl.  Steri-Strips, sterile gauze, and a   pressure dressing were placed over the wound.      There were no apparent complications. The patient was brought back to the   hospital room in stable condition.     Estimated blood loss was 15 ml.          RESULTS:  Implant Name Type Inv. Item Serial No.  Lot No. LRB No. Used   Action   IMP LEAD PACING BIPOLAR CAPSUREFIX NOVUS 52CM  5076-52CM Leads IMP LEAD   PACING BIPOLAR CAPSUREFIX NOVUS 52CM 5076-52CM [id number] [employer]   [id number]  1 Implanted   IMP LEAD PACING BIPOLAR CAPSUREFIX NOVUS 45CM 5076-45CM Leads IMP LEAD   PACING BIPOLAR CAPSUREFIX NOVUS 45CM 5076-45CM [id number] [employer]   [id number]  1 Implanted   PACEMAKER COMFORT MRI XT DR Pacemaker PACEMAKER COMFORT MRI XT DR [id number]   [employer] [id number]  1 Implanted      A.  Implanted leads:  (i) RV lead:  as above.  R-wave sensing 7.6 mV.  Pacing threshold 0.875 V   at 0.4 msec.  Pacing impedance 703 ohms.   (ii) RA lead:  as above.  P-wave sensing 2.4 mV.  Pacing threshold 0.625 V   at 0.4 msec.  Pacing impedance 437 ohms.     B.  Pulse generator.  Medtronic [place] XT  MRI.  Details as above.     C.  Programming.  DDD 60/120 ppm.        CONCLUSIONS:  1.  Successful implantation of dual-chamber permanent pacemaker.  2.  No apparent in-lab complication.      EKG 12-lead, tracing only   Result Value Ref Range     Interpretation ECG Click View Image link to view waveform and result                  Cosigned by: Tre Miller MD at 6/21/2019 10:04 AM   Revision History

## 2019-06-27 NOTE — PROGRESS NOTES
HPI and Plan:   I had the pleasure of seeing Moses Elizondo today in cardiology clinic follow up. He is a pleasant 85 year old patient of Dr. Huggins.    Mr. 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    He previously experienced a substernal chest discomfort in 2014  For which he was admitted to [facility].  His EKG was negative for ischemia however his troponins socorro to a peak of 30.  He underwent a coronary angiogram with PCI and drug-eluting stent to the first obtuse marginal.      He also has a known history of sick sinus syndrome and bradycardia. He was previously on propanolol for essential tremor however once this was discontinued his heart rate did not recover.    He was recently seen in the cardiology clinic with complaints of exertional, substernal chest discomfort.  The symptoms were occurring with exertion such as gardening, walking up the stairs or while riding his bike.  his symptoms previously subsided with one sublingual nitroglycerin.  He  was also noted to have a heart rate of 33 bpm by EKG.  He underwent a nuclear stress test which demonstrated a small to moderate, mild fixed defect in the inferolateral wall consistent with prior infarction.  He failed to achieve target heart rate therefore this was a nondiagnostic study for ischemia.  He then underwent an echocardiogram which noted a mildly reduced left ventricular systolic function with an ejection fraction at 50 to 55%.  The right ventricle is normal.  No valvular abnormalities.  Moderate dilatation of the ascending aorta at 4.4 cm as well as the aortic root.    Today he presents to the cardiology clinic reporting exertional substernal chest discomfort.   He notices the symptoms while he is gardening or walking upstairs or exerting himself in any fashion.  This discomfort subsides with rest.  It is not associated with any other symptoms.  He describes this chest discomfort as similar to the discomfort he experienced in 2015 prior to receiving his stent.  He has taken 1 sublingual nitroglycerin which has relieved his pain.  His EKG today shows sinus bradycardia at 33 bpm. He wore a 48-hour Holter monitor which showed an average heart rate at 50 bpm with a minimum heart rate and 25 bpm when he was sleeping.  There was some supra ventricular ectopy noted however his principal rhythm was sinus.  She had many pauses with the greatest pause being less than 2.5 seconds.    He moved forward with a coronary angiogram which showed a very complex, calcified mid RCA lesion. CHIPS case approach. CSI orbital atherectomy and YOGESH to the mid RCA. During his angiogram and following his heart hearts were significantly bradycardic. A temporary pacemaker was used during the case. He was consulted by Dr. Miller with EP who recommended a permanent pacemaker. He is now status post a Medtronic dual chamber PPM by Dr. Dowell on 06/20/19.    Today her presents to the cardiology clinic following discharge.  He is doing well with improvement in his substernal chest discomfort.  He states following his pacemaker implantation that the bandages caused a rash and irritation.  He checks his heart rates at home and they are typically 60 bpm.  His biggest complaint today is fatigue.  He states since starting metoprolol 25 mg daily he has noticed an onset of fatigue.    Physical Exam  Please see Below     Assessment and Plan  1.  Coronary artery disease status post YOGESH to the OM and most recently to the mid RCA.  He denies recurrent angina.  He complains of fatigue since starting the metoprolol.  I have asked him to reduce this to 12.5 mg daily.  If his fatigue does not improve, he can stop the metoprolol  completely.  Continue aspirin lifelong.  Continue Plavix uninterrupted for 1 year.  Continue  high intensity statin.    2.  Sick sinus syndrome and significant bradycardia status post permanent pacemaker.  His pacemaker site is ecchymotic and tender to touch however it is healing nicely.  His heart rate today is 62 bpm.  He has a device check in August.    3.  Aortic root (4.4 cm) enlargement as well as a ascending aorta (4.4 cm) enlargement noted on recent echocardiogram. We will continue to monitor.    4. Hyperlipidemia.  His last LDL was 50, HDL was 46.  Continue atorvastatin 40 mg daily.      Thank you for allowing me to care for Moses Elizondo today.    ALPESH Barraza, CNP  Cardiology    Voice recognition software was used for this note, I have reviewed this note, but errors may have been missed.    Orders Placed This Encounter   Procedures     Follow-Up with Cardiologist     Orders Placed This Encounter   Medications     metoprolol succinate ER (TOPROL-XL) 25 MG 24 hr tablet     Sig: Take 0.5 tablets (12.5 mg) by mouth daily     Dispense:  90 tablet     Refill:  3     Medications Discontinued During This Encounter   Medication Reason     nitroGLYcerin (NITROSTAT) 0.4 MG sublingual tablet Medication Reconciliation Clean Up     metoprolol succinate ER (TOPROL-XL) 25 MG 24 hr tablet          CURRENT MEDICATIONS:  Current Outpatient Medications   Medication Sig Dispense Refill     ASPIRIN EC PO Take 81 mg by mouth daily       atorvastatin (LIPITOR) 40 MG tablet Take 1 tablet (40 mg) by mouth daily (Patient taking differently: Take 20 mg by mouth daily ) 90 tablet 3     clopidogrel (PLAVIX) 75 MG tablet Take 1 tablet (75 mg) by mouth daily 90 tablet 3     levothyroxine (SYNTHROID/LEVOTHROID) 100 MCG tablet Take 1 tablet (100 mcg) by mouth daily 90 tablet 3     lisinopril (PRINIVIL/ZESTRIL) 5 MG tablet Take 1 tablet (5 mg) by mouth daily Hold if Systolic Blood Pressure is less than 85. 90 tablet 3      "metoprolol succinate ER (TOPROL-XL) 25 MG 24 hr tablet Take 0.5 tablets (12.5 mg) by mouth daily 90 tablet 3     Multiple Vitamins-Minerals (CENTRUM SILVER PO) Take 1 tablet by mouth three times a week        nitroGLYcerin (NITROSTAT) 0.4 MG sublingual tablet One tablet under the tongue every 5 minutes if needed for chest pain. May repeat every 5 minutes for a maximum of 3 doses in 15 minutes\" 25 tablet 3     Polyethylene Glycol 3350 (MIRALAX PO) Take by mouth as needed       vitamin B-12 (CYANOCOBALAMIN) 250 MCG tablet Take 1,000 mcg by mouth daily         ALLERGIES     Allergies   Allergen Reactions     No Known Drug Allergies        PAST MEDICAL HISTORY:  Past Medical History:   Diagnosis Date     Aortic root dilatation (H)     4.4 cm     Ascending aorta dilatation (H)     4.4 cm     ASD (atrial septal defect)      Bradycardia      CAD (coronary artery disease)      YOGESH to RCA(6/20/19); YOGESH to OM1(5/15/14)     Cardiac pacemaker 06/20/2019    Medtronic PPM COMFORT MRI XT DR-implant 6/20/19     Chest discomfort      Degeneration of lumbar or lumbosacral intervertebral disc      Familial tremor      Former smoker      Herpes zoster without mention of complication      HTN (hypertension)      Hyperlipidaemia      Idiopathic peripheral neuropathy      MGUS (monoclonal gammopathy of unknown significance)      NSTEMI (non-ST elevated myocardial infarction) (H) 2014     Other and unspecified hyperlipidemia      PFO (patent foramen ovale)      Prostatitis, unspecified      Second degree heart block      Sensorineural hearing loss, unspecified      SSS (sick sinus syndrome) (H)      Status post coronary angiogram 6/20/2019     Unspecified hypothyroidism        PAST SURGICAL HISTORY:  Past Surgical History:   Procedure Laterality Date     C NONSPECIFIC PROCEDURE  2010    Laparoscopic cholecystectomy.      CHOLECYSTECTOMY       CORONARY ANGIOGRAPHY ADULT ORDER  5/14/14    PTCA w/YOGESH to OM1     CV CORONARY ANGIOGRAM N/A " 2019    Procedure: Coronary Angiogram;  Surgeon: Romie Coronado MD;  Location:  [facility]     CV INTRAVASULAR ULTRASOUND N/A 2019    Procedure: Intravascular Ultrasound;  Surgeon: Romie Coronado MD;  Location:  [facility]     CV PCI ATHERECTOMY ORBITAL N/A 2019    Procedure: PCI Atherectomy Orbital;  Surgeon: Romie Coronado MD;  Location:  [facility]     CV PCI STENT DRUG ELUTING N/A 2019    Procedure: PCI Stent Drug Eluting;  Surgeon: Romie Coronado MD;  Location:  [facility]     CV TEMPORARY PACEMAKER INSERTION N/A 2019    Procedure: Temporary Pacemaker Insertion;  Surgeon: Romie Coronado MD;  Location:  [facility]     EP PACEMAKER N/A 2019    Procedure: EP Pacemaker;  Surgeon: Max Dowell MD;  Location:  [facility]     HEART CATH, ANGIOPLASTY  14    YOGESH to OM1       FAMILY HISTORY:  Family History   Problem Relation Age of Onset     Cancer Mother      Genitourinary Problems Father 99        complications from surgery     Heart Disease Brother 72        MI       SOCIAL HISTORY:  Social History     Socioeconomic History     Marital status:      Spouse name: None     Number of children: None     Years of education: None     Highest education level: None   Occupational History     None   Social Needs     Financial resource strain: None     Food insecurity:     Worry: None     Inability: None     Transportation needs:     Medical: None     Non-medical: None   Tobacco Use     Smoking status: Former Smoker     Packs/day: 0.50     Years: 16.00     Pack years: 8.00     Start date:      Last attempt to quit: 1967     Years since quittin.5     Smokeless tobacco: Never Used   Substance and Sexual Activity     Alcohol use: Yes     Alcohol/week: 0.0 oz     Comment: 4 drinks week     Drug use: No     Sexual activity: Never   Lifestyle     Physical  "activity:     Days per week: None     Minutes per session: None     Stress: None   Relationships     Social connections:     Talks on phone: None     Gets together: None     Attends Synagogue service: None     Active member of club or organization: None     Attends meetings of clubs or organizations: None     Relationship status: None     Intimate partner violence:     Fear of current or ex partner: None     Emotionally abused: None     Physically abused: None     Forced sexual activity: None   Other Topics Concern      Service Not Asked     Blood Transfusions Not Asked     Caffeine Concern Yes     Comment: 2 cups coffee/day     Occupational Exposure Not Asked     Hobby Hazards Not Asked     Sleep Concern No     Stress Concern Not Asked     Weight Concern Yes     Comment: limiting alcohol to watch calories     Special Diet Not Asked     Back Care Not Asked     Exercise Yes     Comment: Stationary bike  weights and aerobics 4 times week     Bike Helmet Not Asked     Seat Belt Yes     Self-Exams Not Asked     Parent/sibling w/ CABG, MI or angioplasty before 65F 55M? No   Social History Narrative     None       Review of Systems:  Skin:  Positive for lumps or bumps     Eyes:  Positive for glasses    ENT:  Negative      Respiratory:  Negative dyspnea on exertion(Increased)     Cardiovascular:    chest pain;exercise intolerance;fatigue;Positive for very slight chest pain on occasion - unsure if it is soreness from the implant  Gastroenterology: Negative      Genitourinary:  Positive for nocturia    Musculoskeletal:  Negative      Neurologic:  Positive for numbness or tingling of feet;tremors neuropathy in feet  Psychiatric:  Positive for sleep disturbances    Heme/Lymph/Imm:  Negative      Endocrine:  Positive for thyroid disorder      Physical Exam:  Vitals: /71   Pulse 62   Ht 1.778 m (5' 10\")   Wt 86.2 kg (190 lb)   BMI 27.26 kg/m      Constitutional:  cooperative, alert and oriented, well developed, " well nourished, in no acute distress        Skin:  warm and dry to the touch          Head:  normocephalic        Eyes:  pupils equal and round;sclera white        Lymph:      ENT:  no pallor or cyanosis        Neck:  carotid pulses are full and equal bilaterally;JVP normal        Respiratory:  normal breath sounds, clear to auscultation, normal A-P diameter, normal symmetry, normal respiratory excursion, no use of accessory muscles         Cardiac: regular rhythm, normal S1/S2, no S3 or S4, apical impulse not displaced, no murmurs, gallops or rubs                pulses full and equal     2+             2+                    GI:  abdomen soft;non-tender        Extremities and Muscular Skeletal:  no deformities, clubbing, cyanosis, erythema observed;no edema              Neurological:  no gross motor deficits;affect appropriate        Psych:  Alert and Oriented x 3    Encounter Diagnoses   Name Primary?     Coronary artery disease involving native coronary artery of native heart without angina pectoris Yes     Chest discomfort      Status post coronary angiogram      Coronary artery disease        Recent Lab Results:  LIPID RESULTS:  Lab Results   Component Value Date    CHOL 116 03/20/2019    HDL 40 03/20/2019    LDL 61 03/20/2019    TRIG 75 03/20/2019    CHOLHDLRATIO 2.9 07/08/2014       LIVER ENZYME RESULTS:  Lab Results   Component Value Date    AST 33 03/20/2019    ALT 25 03/20/2019       CBC RESULTS:  Lab Results   Component Value Date    WBC 5.5 06/20/2019    RBC 4.60 06/20/2019    HGB 13.5 06/20/2019    HCT 39.9 (L) 06/20/2019    MCV 87 06/20/2019    MCH 29.3 06/20/2019    MCHC 33.8 06/20/2019    RDW 11.9 06/20/2019     (L) 06/20/2019       BMP RESULTS:  Lab Results   Component Value Date     06/20/2019    POTASSIUM 3.9 06/20/2019    CHLORIDE 109 06/20/2019    CO2 26 06/20/2019    ANIONGAP 5 06/20/2019    GLC 86 06/20/2019    BUN 23 06/20/2019    CR 1.12 06/20/2019    GFRESTIMATED 59 (L)  06/20/2019    GFRESTBLACK 69 06/20/2019    GABRIELA 9.1 06/20/2019        A1C RESULTS:  No results found for: A1C    INR RESULTS:  Lab Results   Component Value Date    INR 1.00 06/20/2019           CC  No referring provider defined for this encounter.

## 2019-07-02 LAB
MDC_IDC_LEAD_IMPLANT_DT: NORMAL
MDC_IDC_LEAD_IMPLANT_DT: NORMAL
MDC_IDC_LEAD_LOCATION: NORMAL
MDC_IDC_LEAD_LOCATION: NORMAL
MDC_IDC_LEAD_LOCATION_DETAIL_1: NORMAL
MDC_IDC_LEAD_LOCATION_DETAIL_1: NORMAL
MDC_IDC_LEAD_MFG: NORMAL
MDC_IDC_LEAD_MFG: NORMAL
MDC_IDC_LEAD_MODEL: NORMAL
MDC_IDC_LEAD_MODEL: NORMAL
MDC_IDC_LEAD_POLARITY_TYPE: NORMAL
MDC_IDC_LEAD_POLARITY_TYPE: NORMAL
MDC_IDC_LEAD_SERIAL: NORMAL
MDC_IDC_LEAD_SERIAL: NORMAL
MDC_IDC_MSMT_BATTERY_DTM: NORMAL
MDC_IDC_MSMT_BATTERY_REMAINING_LONGEVITY: 95 MO
MDC_IDC_MSMT_BATTERY_RRT_TRIGGER: 2.62
MDC_IDC_MSMT_BATTERY_STATUS: NORMAL
MDC_IDC_MSMT_BATTERY_VOLTAGE: 3.14 V
MDC_IDC_MSMT_LEADCHNL_RA_IMPEDANCE_VALUE: 380 OHM
MDC_IDC_MSMT_LEADCHNL_RA_IMPEDANCE_VALUE: 494 OHM
MDC_IDC_MSMT_LEADCHNL_RA_PACING_THRESHOLD_AMPLITUDE: 0.5 V
MDC_IDC_MSMT_LEADCHNL_RA_PACING_THRESHOLD_PULSEWIDTH: 0.4 MS
MDC_IDC_MSMT_LEADCHNL_RA_SENSING_INTR_AMPL: 3.25 MV
MDC_IDC_MSMT_LEADCHNL_RV_IMPEDANCE_VALUE: 399 OHM
MDC_IDC_MSMT_LEADCHNL_RV_IMPEDANCE_VALUE: 475 OHM
MDC_IDC_MSMT_LEADCHNL_RV_PACING_THRESHOLD_AMPLITUDE: 0.5 V
MDC_IDC_MSMT_LEADCHNL_RV_PACING_THRESHOLD_PULSEWIDTH: 0.4 MS
MDC_IDC_PG_IMPLANT_DTM: NORMAL
MDC_IDC_PG_MFG: NORMAL
MDC_IDC_PG_MODEL: NORMAL
MDC_IDC_PG_SERIAL: NORMAL
MDC_IDC_PG_TYPE: NORMAL
MDC_IDC_SESS_CLINIC_NAME: NORMAL
MDC_IDC_SESS_DTM: NORMAL
MDC_IDC_SESS_TYPE: NORMAL
MDC_IDC_SET_BRADY_AT_MODE_SWITCH_RATE: 171 {BEATS}/MIN
MDC_IDC_SET_BRADY_HYSTRATE: NORMAL
MDC_IDC_SET_BRADY_LOWRATE: 60 {BEATS}/MIN
MDC_IDC_SET_BRADY_MAX_SENSOR_RATE: 120 {BEATS}/MIN
MDC_IDC_SET_BRADY_MAX_TRACKING_RATE: 120 {BEATS}/MIN
MDC_IDC_SET_BRADY_MODE: NORMAL
MDC_IDC_SET_BRADY_PAV_DELAY_LOW: 180 MS
MDC_IDC_SET_BRADY_SAV_DELAY_LOW: 150 MS
MDC_IDC_SET_LEADCHNL_RA_PACING_AMPLITUDE: 3.5 V
MDC_IDC_SET_LEADCHNL_RA_PACING_ANODE_ELECTRODE_1: NORMAL
MDC_IDC_SET_LEADCHNL_RA_PACING_ANODE_LOCATION_1: NORMAL
MDC_IDC_SET_LEADCHNL_RA_PACING_CAPTURE_MODE: NORMAL
MDC_IDC_SET_LEADCHNL_RA_PACING_CATHODE_ELECTRODE_1: NORMAL
MDC_IDC_SET_LEADCHNL_RA_PACING_CATHODE_LOCATION_1: NORMAL
MDC_IDC_SET_LEADCHNL_RA_PACING_POLARITY: NORMAL
MDC_IDC_SET_LEADCHNL_RA_PACING_PULSEWIDTH: 0.4 MS
MDC_IDC_SET_LEADCHNL_RA_SENSING_ANODE_ELECTRODE_1: NORMAL
MDC_IDC_SET_LEADCHNL_RA_SENSING_ANODE_LOCATION_1: NORMAL
MDC_IDC_SET_LEADCHNL_RA_SENSING_CATHODE_ELECTRODE_1: NORMAL
MDC_IDC_SET_LEADCHNL_RA_SENSING_CATHODE_LOCATION_1: NORMAL
MDC_IDC_SET_LEADCHNL_RA_SENSING_POLARITY: NORMAL
MDC_IDC_SET_LEADCHNL_RA_SENSING_SENSITIVITY: 0.3 MV
MDC_IDC_SET_LEADCHNL_RV_PACING_AMPLITUDE: 2.75 V
MDC_IDC_SET_LEADCHNL_RV_PACING_ANODE_ELECTRODE_1: NORMAL
MDC_IDC_SET_LEADCHNL_RV_PACING_ANODE_LOCATION_1: NORMAL
MDC_IDC_SET_LEADCHNL_RV_PACING_CAPTURE_MODE: NORMAL
MDC_IDC_SET_LEADCHNL_RV_PACING_CATHODE_ELECTRODE_1: NORMAL
MDC_IDC_SET_LEADCHNL_RV_PACING_CATHODE_LOCATION_1: NORMAL
MDC_IDC_SET_LEADCHNL_RV_PACING_POLARITY: NORMAL
MDC_IDC_SET_LEADCHNL_RV_PACING_PULSEWIDTH: 0.4 MS
MDC_IDC_SET_LEADCHNL_RV_SENSING_ANODE_ELECTRODE_1: NORMAL
MDC_IDC_SET_LEADCHNL_RV_SENSING_ANODE_LOCATION_1: NORMAL
MDC_IDC_SET_LEADCHNL_RV_SENSING_CATHODE_ELECTRODE_1: NORMAL
MDC_IDC_SET_LEADCHNL_RV_SENSING_CATHODE_LOCATION_1: NORMAL
MDC_IDC_SET_LEADCHNL_RV_SENSING_POLARITY: NORMAL
MDC_IDC_SET_LEADCHNL_RV_SENSING_SENSITIVITY: 0.9 MV
MDC_IDC_SET_ZONE_DETECTION_INTERVAL: 350 MS
MDC_IDC_SET_ZONE_DETECTION_INTERVAL: 400 MS
MDC_IDC_SET_ZONE_TYPE: NORMAL
MDC_IDC_STAT_BRADY_AP_VP_PERCENT: 91.76 %
MDC_IDC_STAT_BRADY_AP_VS_PERCENT: 0.07 %
MDC_IDC_STAT_BRADY_AS_VP_PERCENT: 8 %
MDC_IDC_STAT_BRADY_AS_VS_PERCENT: 0.17 %
MDC_IDC_STAT_BRADY_DTM_END: NORMAL
MDC_IDC_STAT_BRADY_DTM_START: NORMAL
MDC_IDC_STAT_BRADY_RA_PERCENT_PACED: 91.73 %
MDC_IDC_STAT_BRADY_RV_PERCENT_PACED: 99.76 %
MDC_IDC_STAT_EPISODE_RECENT_COUNT: 0
MDC_IDC_STAT_EPISODE_RECENT_COUNT_DTM_END: NORMAL
MDC_IDC_STAT_EPISODE_RECENT_COUNT_DTM_START: NORMAL
MDC_IDC_STAT_EPISODE_TOTAL_COUNT: 0
MDC_IDC_STAT_EPISODE_TOTAL_COUNT_DTM_END: NORMAL
MDC_IDC_STAT_EPISODE_TOTAL_COUNT_DTM_START: NORMAL
MDC_IDC_STAT_EPISODE_TYPE: NORMAL

## 2019-08-01 ENCOUNTER — TELEPHONE (OUTPATIENT)
Dept: INTERNAL MEDICINE | Facility: CLINIC | Age: 84
End: 2019-08-01

## 2019-08-01 DIAGNOSIS — I25.810 CORONARY ARTERY DISEASE INVOLVING CORONARY BYPASS GRAFT OF NATIVE HEART WITHOUT ANGINA PECTORIS: ICD-10-CM

## 2019-08-01 DIAGNOSIS — I21.4 NON-STEMI (NON-ST ELEVATED MYOCARDIAL INFARCTION) (H): ICD-10-CM

## 2019-08-01 RX ORDER — ATORVASTATIN CALCIUM 40 MG/1
20 TABLET, FILM COATED ORAL DAILY
Qty: 90 TABLET | Refills: 1
Start: 2019-08-01 | End: 2019-08-19

## 2019-08-01 NOTE — TELEPHONE ENCOUNTER
Patient saw DR. Sampson in April and was tod after doing so blood work was told to take only 20 mg of Atorvastatin . Please clarify if current prescription is correct.

## 2019-08-08 ENCOUNTER — DOCUMENTATION ONLY (OUTPATIENT)
Dept: CARDIOLOGY | Facility: CLINIC | Age: 84
End: 2019-08-08

## 2019-08-08 ENCOUNTER — ANCILLARY PROCEDURE (OUTPATIENT)
Dept: CARDIOLOGY | Facility: CLINIC | Age: 84
End: 2019-08-08
Attending: INTERNAL MEDICINE
Payer: COMMERCIAL

## 2019-08-08 DIAGNOSIS — Z95.0 CARDIAC PACEMAKER IN SITU: ICD-10-CM

## 2019-08-08 DIAGNOSIS — I49.5 SSS (SICK SINUS SYNDROME) (H): Primary | ICD-10-CM

## 2019-08-08 PROCEDURE — 93280 PM DEVICE PROGR EVAL DUAL: CPT | Performed by: INTERNAL MEDICINE

## 2019-08-08 NOTE — PROGRESS NOTES
I believe Dottie is on vacation.  Please have him stop his lisinopril and continue to take his blood pressures.  We may need to stop his metoprolol as well.        I would be happy to see him sooner than 9/24 if this doesn't improve

## 2019-08-08 NOTE — TELEPHONE ENCOUNTER
"Pt came into clinic for his 6 week device check today.  During check, he stated that he has been feeling lightheaded and dizzy at times, particularly when he changes positions (sitting to standing).  Pt stated he was recently started on Metoprolol and Lisinopril.  BP was checked and was 106/65 today, HR in the 70's.  Device check showed no irregular episodes and leads were stable.    Pt also shared that on 7/4/19 he had \"passed out\" briefly.  He stated he \"came to\" right away and it hasn't happened since, so he has not notified anyone.     Pt instructed to notify the clinic right away if he is to pass out again.  It was also recommended that he wait a couple minutes after position changes prior to ambulating to make sure his blood pressure has stabilized.      Pt verbalized understanding and agreement to these instructions.  Pt is scheduled to see Mirna Chávez in September for his routine PPM follow-up and to see Dottie Chicas in follow-up in December.      Will route message to Dottie regarding above episodes.    ALY Luis  "

## 2019-08-08 NOTE — TELEPHONE ENCOUNTER
Called patient and instructed him to stop his Lisinopril and to check his blood pressure daily and PRN for any lightheaded spells.  Pt verbalized understanding and he will call the clinic if his symptoms do not improve, device clinic phone number provided.      ALY Luis

## 2019-08-08 NOTE — TELEPHONE ENCOUNTER
Pt came into clinic for his 6 week pacemaker device check.  On inspection of his incision, he had 1 visible external stitch and a small bump on the incision line that when lightly touched had a 2nd stitch and purulent fluid in it.  Stitches x2 removed.  Gently palpated small bump and removed bb sized amount of purulent drainage.  Wiped incision with a iodine pad.      Prior to stitch removal:       After stitch and purulent fluid removed:         Will route to Dr. Miller for notification and review.      ALY Luis

## 2019-08-09 NOTE — TELEPHONE ENCOUNTER
Dr. Miller reviewed information and no further orders at this time.      Called and spoke with pt's wife, Shannon (Zane was not home), and let her know there were no further recommendations at this time.  Instructed her to call the clinic if there was any drainage, redness, swelling, pain or other signs of infection around the site.  She verbalized understanding and will call the clinic if they have any questions or concerns.  Device RN phone number was provided.    ALY Luis

## 2019-08-12 ENCOUNTER — TELEPHONE (OUTPATIENT)
Dept: INTERNAL MEDICINE | Facility: CLINIC | Age: 84
End: 2019-08-12

## 2019-08-12 DIAGNOSIS — I21.4 NON-STEMI (NON-ST ELEVATED MYOCARDIAL INFARCTION) (H): ICD-10-CM

## 2019-08-12 DIAGNOSIS — I25.810 CORONARY ARTERY DISEASE INVOLVING CORONARY BYPASS GRAFT OF NATIVE HEART WITHOUT ANGINA PECTORIS: ICD-10-CM

## 2019-08-12 NOTE — TELEPHONE ENCOUNTER
"PER [facility]    \"Pt claims  Approved 20mg dose for atorvastatin.  Requesting that  evaluate and 20mg Rx if applicable\"    R[facility]   [address]  [phone]   [phone] fx     "

## 2019-08-19 RX ORDER — ATORVASTATIN CALCIUM 20 MG/1
20 TABLET, FILM COATED ORAL DAILY
Qty: 90 TABLET | Refills: 3 | Status: SHIPPED | OUTPATIENT
Start: 2019-08-19 | End: 2020-06-05

## 2019-08-24 LAB
MDC_IDC_LEAD_IMPLANT_DT: NORMAL
MDC_IDC_LEAD_IMPLANT_DT: NORMAL
MDC_IDC_LEAD_LOCATION: NORMAL
MDC_IDC_LEAD_LOCATION: NORMAL
MDC_IDC_LEAD_LOCATION_DETAIL_1: NORMAL
MDC_IDC_LEAD_LOCATION_DETAIL_1: NORMAL
MDC_IDC_LEAD_MFG: NORMAL
MDC_IDC_LEAD_MFG: NORMAL
MDC_IDC_LEAD_MODEL: NORMAL
MDC_IDC_LEAD_MODEL: NORMAL
MDC_IDC_LEAD_POLARITY_TYPE: NORMAL
MDC_IDC_LEAD_POLARITY_TYPE: NORMAL
MDC_IDC_LEAD_SERIAL: NORMAL
MDC_IDC_LEAD_SERIAL: NORMAL
MDC_IDC_MSMT_BATTERY_DTM: NORMAL
MDC_IDC_MSMT_BATTERY_REMAINING_LONGEVITY: 135 MO
MDC_IDC_MSMT_BATTERY_RRT_TRIGGER: 2.62
MDC_IDC_MSMT_BATTERY_STATUS: NORMAL
MDC_IDC_MSMT_BATTERY_VOLTAGE: 3.12 V
MDC_IDC_MSMT_LEADCHNL_RA_IMPEDANCE_VALUE: 380 OHM
MDC_IDC_MSMT_LEADCHNL_RA_IMPEDANCE_VALUE: 532 OHM
MDC_IDC_MSMT_LEADCHNL_RA_PACING_THRESHOLD_AMPLITUDE: 0.5 V
MDC_IDC_MSMT_LEADCHNL_RA_PACING_THRESHOLD_PULSEWIDTH: 0.4 MS
MDC_IDC_MSMT_LEADCHNL_RA_SENSING_INTR_AMPL: 2.5 MV
MDC_IDC_MSMT_LEADCHNL_RA_SENSING_INTR_AMPL: 3.38 MV
MDC_IDC_MSMT_LEADCHNL_RV_IMPEDANCE_VALUE: 399 OHM
MDC_IDC_MSMT_LEADCHNL_RV_IMPEDANCE_VALUE: 570 OHM
MDC_IDC_MSMT_LEADCHNL_RV_PACING_THRESHOLD_AMPLITUDE: 0.62 V
MDC_IDC_MSMT_LEADCHNL_RV_PACING_THRESHOLD_PULSEWIDTH: 0.4 MS
MDC_IDC_MSMT_LEADCHNL_RV_SENSING_INTR_AMPL: 14.38 MV
MDC_IDC_MSMT_LEADCHNL_RV_SENSING_INTR_AMPL: 8.88 MV
MDC_IDC_PG_IMPLANT_DTM: NORMAL
MDC_IDC_PG_MFG: NORMAL
MDC_IDC_PG_MODEL: NORMAL
MDC_IDC_PG_SERIAL: NORMAL
MDC_IDC_PG_TYPE: NORMAL
MDC_IDC_SESS_CLINIC_NAME: NORMAL
MDC_IDC_SESS_DTM: NORMAL
MDC_IDC_SESS_TYPE: NORMAL
MDC_IDC_SET_BRADY_AT_MODE_SWITCH_RATE: 171 {BEATS}/MIN
MDC_IDC_SET_BRADY_HYSTRATE: NORMAL
MDC_IDC_SET_BRADY_LOWRATE: 60 {BEATS}/MIN
MDC_IDC_SET_BRADY_MAX_SENSOR_RATE: 120 {BEATS}/MIN
MDC_IDC_SET_BRADY_MAX_TRACKING_RATE: 120 {BEATS}/MIN
MDC_IDC_SET_BRADY_MODE: NORMAL
MDC_IDC_SET_BRADY_PAV_DELAY_LOW: 180 MS
MDC_IDC_SET_BRADY_SAV_DELAY_LOW: 150 MS
MDC_IDC_SET_LEADCHNL_RA_PACING_AMPLITUDE: 2 V
MDC_IDC_SET_LEADCHNL_RA_PACING_ANODE_ELECTRODE_1: NORMAL
MDC_IDC_SET_LEADCHNL_RA_PACING_ANODE_LOCATION_1: NORMAL
MDC_IDC_SET_LEADCHNL_RA_PACING_CAPTURE_MODE: NORMAL
MDC_IDC_SET_LEADCHNL_RA_PACING_CATHODE_ELECTRODE_1: NORMAL
MDC_IDC_SET_LEADCHNL_RA_PACING_CATHODE_LOCATION_1: NORMAL
MDC_IDC_SET_LEADCHNL_RA_PACING_POLARITY: NORMAL
MDC_IDC_SET_LEADCHNL_RA_PACING_PULSEWIDTH: 0.4 MS
MDC_IDC_SET_LEADCHNL_RA_SENSING_ANODE_ELECTRODE_1: NORMAL
MDC_IDC_SET_LEADCHNL_RA_SENSING_ANODE_LOCATION_1: NORMAL
MDC_IDC_SET_LEADCHNL_RA_SENSING_CATHODE_ELECTRODE_1: NORMAL
MDC_IDC_SET_LEADCHNL_RA_SENSING_CATHODE_LOCATION_1: NORMAL
MDC_IDC_SET_LEADCHNL_RA_SENSING_POLARITY: NORMAL
MDC_IDC_SET_LEADCHNL_RA_SENSING_SENSITIVITY: 0.3 MV
MDC_IDC_SET_LEADCHNL_RV_PACING_AMPLITUDE: 2 V
MDC_IDC_SET_LEADCHNL_RV_PACING_ANODE_ELECTRODE_1: NORMAL
MDC_IDC_SET_LEADCHNL_RV_PACING_ANODE_LOCATION_1: NORMAL
MDC_IDC_SET_LEADCHNL_RV_PACING_CAPTURE_MODE: NORMAL
MDC_IDC_SET_LEADCHNL_RV_PACING_CATHODE_ELECTRODE_1: NORMAL
MDC_IDC_SET_LEADCHNL_RV_PACING_CATHODE_LOCATION_1: NORMAL
MDC_IDC_SET_LEADCHNL_RV_PACING_POLARITY: NORMAL
MDC_IDC_SET_LEADCHNL_RV_PACING_PULSEWIDTH: 0.4 MS
MDC_IDC_SET_LEADCHNL_RV_SENSING_ANODE_ELECTRODE_1: NORMAL
MDC_IDC_SET_LEADCHNL_RV_SENSING_ANODE_LOCATION_1: NORMAL
MDC_IDC_SET_LEADCHNL_RV_SENSING_CATHODE_ELECTRODE_1: NORMAL
MDC_IDC_SET_LEADCHNL_RV_SENSING_CATHODE_LOCATION_1: NORMAL
MDC_IDC_SET_LEADCHNL_RV_SENSING_POLARITY: NORMAL
MDC_IDC_SET_LEADCHNL_RV_SENSING_SENSITIVITY: 0.9 MV
MDC_IDC_SET_ZONE_DETECTION_INTERVAL: 350 MS
MDC_IDC_SET_ZONE_DETECTION_INTERVAL: 400 MS
MDC_IDC_SET_ZONE_TYPE: NORMAL
MDC_IDC_STAT_BRADY_AP_VP_PERCENT: 95.92 %
MDC_IDC_STAT_BRADY_AP_VS_PERCENT: 0.26 %
MDC_IDC_STAT_BRADY_AS_VP_PERCENT: 3.26 %
MDC_IDC_STAT_BRADY_AS_VS_PERCENT: 0.56 %
MDC_IDC_STAT_BRADY_DTM_END: NORMAL
MDC_IDC_STAT_BRADY_DTM_START: NORMAL
MDC_IDC_STAT_BRADY_RA_PERCENT_PACED: 96.62 %
MDC_IDC_STAT_BRADY_RV_PERCENT_PACED: 99.18 %
MDC_IDC_STAT_EPISODE_RECENT_COUNT: 0
MDC_IDC_STAT_EPISODE_RECENT_COUNT_DTM_END: NORMAL
MDC_IDC_STAT_EPISODE_RECENT_COUNT_DTM_START: NORMAL
MDC_IDC_STAT_EPISODE_TOTAL_COUNT: 0
MDC_IDC_STAT_EPISODE_TOTAL_COUNT_DTM_END: NORMAL
MDC_IDC_STAT_EPISODE_TOTAL_COUNT_DTM_START: NORMAL
MDC_IDC_STAT_EPISODE_TYPE: NORMAL

## 2019-09-23 PROBLEM — Z95.0 CARDIAC PACEMAKER IN SITU: Status: ACTIVE | Noted: 2019-09-23

## 2019-09-24 ENCOUNTER — OFFICE VISIT (OUTPATIENT)
Dept: CARDIOLOGY | Facility: CLINIC | Age: 84
End: 2019-09-24
Attending: INTERNAL MEDICINE
Payer: COMMERCIAL

## 2019-09-24 VITALS
HEART RATE: 66 BPM | WEIGHT: 191 LBS | SYSTOLIC BLOOD PRESSURE: 110 MMHG | DIASTOLIC BLOOD PRESSURE: 70 MMHG | HEIGHT: 70 IN | BODY MASS INDEX: 27.35 KG/M2

## 2019-09-24 DIAGNOSIS — Z95.0 CARDIAC PACEMAKER IN SITU: ICD-10-CM

## 2019-09-24 DIAGNOSIS — I49.5 SSS (SICK SINUS SYNDROME) (H): ICD-10-CM

## 2019-09-24 PROCEDURE — 99214 OFFICE O/P EST MOD 30 MIN: CPT | Performed by: NURSE PRACTITIONER

## 2019-09-24 RX ORDER — GABAPENTIN 100 MG/1
300 CAPSULE ORAL DAILY
COMMUNITY
End: 2020-07-23

## 2019-09-24 ASSESSMENT — MIFFLIN-ST. JEOR: SCORE: 1557.62

## 2019-09-24 NOTE — LETTER
9/24/2019    Steven Wagoner MD  [address] [facility] [address]    RE: Moses Elizondo       Dear Colleague,    I had the pleasure of seeing Moses Elizondo in the [facility].    HPI:  Moses Elizondo is a 85 year old male who presents after having a permanent pacemaker placed for second-degree heart block.  He is a patient of Dr. Huggins and his past medical history includes       1. Coronary artery disease. S/p YOGESH to first obtuse marginal.  Angiogram on 6/2019 revealed calcified mid RCA lesion, CSI orbital atherectomy and YOGESH to mid RCA  2. hyperlipidemia  3. Sick sinus syndrome s/p Medtronic dual chamber permanent pacemaker.    4. Tremors.  Hx of being on propanolol  5. Dilated Aortic root and ascending aorta.  Both measuring 4.4 cm on ECHO      Diagnostics:  Device check (8/2019) revealed 96.6% apaced, 99% vpaced.  Decreased exercise tolerance with work, having to sit down every 30-40 minutes.  No atrial or ventricular arrhythmias.      On 8/8/2019, pt had complained of lightheadedness and on 7/2019 had an episodes of syncope.    On 8/8/2019, his lisinopril was discontinued due to hypotension.    Today he presents stating he does not have any symptoms of dizziness, lightheadedness, chest pain/pressure, angina, repeat syncope or dyspnea at rest or with exertion.  He denies heart failure symptoms such as abdominal and pedal edema, PND and orthopnea.  He is taking his medications as prescribed including his Plavix and aspirin and does have some bruising on his forearms which would be expected.  He denies any repeat syncope  lightheadedness and dizziness since stopping the lisinopril.  He is very active and mows his lawn and his neighbors lawn which takes approximately 60 to 90 minutes.  At times he does stop but he can finish the lawn without stopping.   His biggest concern is when he wakes up from his nap and after sleeping overnight he lies in bed for 15 to 30  minutes before getting up.         ASSESSMENT AND PLAN    Coronary artery disease    S/p YOGESH to first obtuse marginal.      Angiogram on 6/2019 revealed calcified mid RCA lesion, CSI orbital atherectomy and YOGESH to mid RCA     currently taking Plavix 75 mg for at least one year    Currently taking aspirin 81 mg daily for lifetime    Currently taking metoprolol succinate 25 mg daily.      Currently taking atorvastatin 20 mg daily and his last LDL was 61 (3/2019)    He carries nitroglycerin in his pocket    He is currently asymptomatic     Sick sinus syndrome s/p Medtronic dual chamber permanent pacemaker (6/2019)     Stable leads    Normal device function    Follow with the device clinic on a quarterly basis    His rate response was increased from 3-4 on 8/8/2019    Dilated Aortic root and ascending aorta.  Both measuring 4.4 cm on ECHO (5/2019)    Repeat echo in 1 year from 5/2019    Plan:    Follow-up with Dr. Huggins in December as previously scheduled    Repeat an echo in 1 year from 5/2019 to assess aortic root    Follow with the device clinic as scheduled by them which is usually on a quarterly basis    Patient expresses understanding and agreement with the plan.     I appreciate the chance to help with Moses Elizondo Please let me know if you have any questions or concerns.    Mirna Chávez, ALPESH, CNP    This note was completed in part using Dragon voice recognition software. Although reviewed after completion, some word and grammatical errors may occur.    No orders of the defined types were placed in this encounter.    Orders Placed This Encounter   Medications     gabapentin (NEURONTIN) 100 MG capsule     Sig: Take 300 mg by mouth daily Pt take 2tabs by mouth daily     Medications Discontinued During This Encounter   Medication Reason     lisinopril (PRINIVIL/ZESTRIL) 5 MG tablet Medication Reconciliation Clean Up     Multiple Vitamins-Minerals (CENTRUM SILVER PO) Discontinued by another Health Care  "Provider         Encounter Diagnoses   Name Primary?     Cardiac pacemaker in situ      SSS (sick sinus syndrome) (H)        CURRENT MEDICATIONS:  Current Outpatient Medications   Medication Sig Dispense Refill     ASPIRIN EC PO Take 81 mg by mouth daily       atorvastatin (LIPITOR) 20 MG tablet Take 1 tablet (20 mg) by mouth daily 90 tablet 3     clopidogrel (PLAVIX) 75 MG tablet Take 1 tablet (75 mg) by mouth daily 90 tablet 3     gabapentin (NEURONTIN) 100 MG capsule Take 300 mg by mouth daily Pt take 2tabs by mouth daily       levothyroxine (SYNTHROID/LEVOTHROID) 100 MCG tablet Take 1 tablet (100 mcg) by mouth daily 90 tablet 3     metoprolol succinate ER (TOPROL-XL) 25 MG 24 hr tablet Take 25 mg by mouth daily  90 tablet 3     Polyethylene Glycol 3350 (MIRALAX PO) Take by mouth as needed       nitroGLYcerin (NITROSTAT) 0.4 MG sublingual tablet One tablet under the tongue every 5 minutes if needed for chest pain. May repeat every 5 minutes for a maximum of 3 doses in 15 minutes\" (Patient not taking: Reported on 9/24/2019) 25 tablet 3     vitamin B-12 (CYANOCOBALAMIN) 250 MCG tablet Take 1,000 mcg by mouth daily         ALLERGIES     Allergies   Allergen Reactions     No Known Drug Allergies        PAST MEDICAL HISTORY:  Past Medical History:   Diagnosis Date     Aortic root dilatation (H)     4.4 cm     Ascending aorta dilatation (H)     4.4 cm     ASD (atrial septal defect)      Bradycardia      CAD (coronary artery disease)      YOGESH to RCA(6/20/19); YOGESH to OM1(5/15/14)     Cardiac pacemaker 06/20/2019    Medtronic PPM COMFORT MRI XT DR-implant 6/20/19     Chest discomfort      Degeneration of lumbar or lumbosacral intervertebral disc      Familial tremor      Former smoker      Herpes zoster without mention of complication      HTN (hypertension)      Hyperlipidaemia      Idiopathic peripheral neuropathy      MGUS (monoclonal gammopathy of unknown significance)      NSTEMI (non-ST elevated myocardial infarction) " (H) 2014     Other and unspecified hyperlipidemia      PFO (patent foramen ovale)      Prostatitis, unspecified      Second degree heart block      Sensorineural hearing loss, unspecified      SSS (sick sinus syndrome) (H)      Status post coronary angiogram 6/20/2019     Unspecified hypothyroidism        PAST SURGICAL HISTORY:  Past Surgical History:   Procedure Laterality Date     C NONSPECIFIC PROCEDURE  2010    Laparoscopic cholecystectomy.      CHOLECYSTECTOMY       CORONARY ANGIOGRAPHY ADULT ORDER  5/14/14    PTCA w/YOGESH to OM1     CV CORONARY ANGIOGRAM N/A 6/20/2019    Procedure: Coronary Angiogram;  Surgeon: Romie Coronado MD;  Location:  HEART CARDIAC CATH LAB     CV INTRAVASULAR ULTRASOUND N/A 6/20/2019    Procedure: Intravascular Ultrasound;  Surgeon: Romie Coronado MD;  Location:  HEART CARDIAC CATH LAB     CV PCI ATHERECTOMY ORBITAL N/A 6/20/2019    Procedure: PCI Atherectomy Orbital;  Surgeon: Romie Coronado MD;  Location:  HEART CARDIAC CATH LAB     CV PCI STENT DRUG ELUTING N/A 6/20/2019    Procedure: PCI Stent Drug Eluting;  Surgeon: Romie Coronado MD;  Location:  HEART CARDIAC CATH LAB     CV TEMPORARY PACEMAKER INSERTION N/A 6/20/2019    Procedure: Temporary Pacemaker Insertion;  Surgeon: Romie Coronado MD;  Location:  HEART CARDIAC CATH LAB     EP PACEMAKER N/A 6/20/2019    Procedure: EP Pacemaker;  Surgeon: Max Dowell MD;  Location:  HEART CARDIAC CATH LAB     HEART CATH, ANGIOPLASTY  5/14/14    YOGESH to OM1       FAMILY HISTORY:  Family History   Problem Relation Age of Onset     Cancer Mother      Genitourinary Problems Father 99        complications from surgery     Heart Disease Brother 72        MI       SOCIAL HISTORY:  Social History     Socioeconomic History     Marital status:      Spouse name: None     Number of children: None     Years of education: None     Highest education level: None   Occupational History     None   Social  Needs     Financial resource strain: None     Food insecurity:     Worry: None     Inability: None     Transportation needs:     Medical: None     Non-medical: None   Tobacco Use     Smoking status: Former Smoker     Packs/day: 0.50     Years: 16.00     Pack years: 8.00     Start date:      Last attempt to quit: 1967     Years since quittin.7     Smokeless tobacco: Never Used   Substance and Sexual Activity     Alcohol use: Yes     Alcohol/week: 0.0 standard drinks     Comment: 4 drinks week     Drug use: No     Sexual activity: Never   Lifestyle     Physical activity:     Days per week: None     Minutes per session: None     Stress: None   Relationships     Social connections:     Talks on phone: None     Gets together: None     Attends Hinduism service: None     Active member of club or organization: None     Attends meetings of clubs or organizations: None     Relationship status: None     Intimate partner violence:     Fear of current or ex partner: None     Emotionally abused: None     Physically abused: None     Forced sexual activity: None   Other Topics Concern      Service Not Asked     Blood Transfusions Not Asked     Caffeine Concern Yes     Comment: 2 cups coffee/day     Occupational Exposure Not Asked     Hobby Hazards Not Asked     Sleep Concern No     Stress Concern Not Asked     Weight Concern Yes     Comment: limiting alcohol to watch calories     Special Diet Not Asked     Back Care Not Asked     Exercise Yes     Comment: Stationary bike  weights and aerobics 4 times week     Bike Helmet Not Asked     Seat Belt Yes     Self-Exams Not Asked     Parent/sibling w/ CABG, MI or angioplasty before 65F 55M? No   Social History Narrative     None       Review of Systems:  Skin:  Negative     Eyes:  Positive for glasses  ENT:  Negative    Respiratory:  Negative    Cardiovascular:    fatigue;exercise intolerance;Positive for  Gastroenterology: Negative    Genitourinary:  Positive for  "nocturia  Musculoskeletal:  Negative    Neurologic:  Positive for tremors  Psychiatric:  Positive for sleep disturbances  Heme/Lymph/Imm:  Negative    Endocrine:  Positive for thyroid disorder    Physical Exam:  Vitals: /70   Pulse 66   Ht 1.778 m (5' 10\")   Wt 86.6 kg (191 lb)   BMI 27.41 kg/m       Constitutional:  cooperative, alert and oriented, well developed, well nourished, in no acute distress        Skin:  warm and dry to the touch, no apparent skin lesions or masses noted        Head:  normocephalic        Eyes:  pupils equal and round        ENT:  no pallor or cyanosis        Neck:  JVP normal        Chest:  clear to auscultation        Cardiac: normal S1 and S2                  Abdomen:  abdomen soft        Vascular:       right radial artery;2+             left radial artery;2+                  Extremities and Back:           Neurological:  no gross motor deficits          Recent Lab Results:  LIPID RESULTS:  Lab Results   Component Value Date    CHOL 116 03/20/2019    HDL 40 03/20/2019    LDL 61 03/20/2019    TRIG 75 03/20/2019    CHOLHDLRATIO 2.9 07/08/2014       LIVER ENZYME RESULTS:  Lab Results   Component Value Date    AST 33 03/20/2019    ALT 25 03/20/2019       CBC RESULTS:  Lab Results   Component Value Date    WBC 5.5 06/20/2019    RBC 4.60 06/20/2019    HGB 13.5 06/20/2019    HCT 39.9 (L) 06/20/2019    MCV 87 06/20/2019    MCH 29.3 06/20/2019    MCHC 33.8 06/20/2019    RDW 11.9 06/20/2019     (L) 06/20/2019       BMP RESULTS:  Lab Results   Component Value Date     06/20/2019    POTASSIUM 3.9 06/20/2019    CHLORIDE 109 06/20/2019    CO2 26 06/20/2019    ANIONGAP 5 06/20/2019    GLC 86 06/20/2019    BUN 23 06/20/2019    CR 1.12 06/20/2019    GFRESTIMATED 59 (L) 06/20/2019    GFRESTBLACK 69 06/20/2019    GABRIELA 9.1 06/20/2019        A1C RESULTS:  Lab Results   Component Value Date    A1C 5.6 05/02/2019       INR RESULTS:  Lab Results   Component Value Date    INR 1.00 " 06/20/2019         Thank you for allowing me to participate in the care of your patient.    Sincerely,     ALPESH [facility]

## 2019-09-24 NOTE — LETTER
9/24/2019    Steven Wagoner MD  [address] [facility] [address]    RE: Moses Elizondo       Dear Colleague,    I had the pleasure of seeing Moses Elizondo in the [facility].    HPI:  Moses Elizondo is a 85 year old male who presents after having a permanent pacemaker placed for second-degree heart block.  He is a patient of Dr. Huggins and his past medical history includes       1. Coronary artery disease. S/p YOGESH to first obtuse marginal.  Angiogram on 6/2019 revealed calcified mid RCA lesion, CSI orbital atherectomy and YOGESH to mid RCA  2. hyperlipidemia  3. Sick sinus syndrome s/p Medtronic dual chamber permanent pacemaker.    4. Tremors.  Hx of being on propanolol  5. Dilated Aortic root and ascending aorta.  Both measuring 4.4 cm on ECHO      Diagnostics:  Device check (8/2019) revealed 96.6% apaced, 99% vpaced.  Decreased exercise tolerance with work, having to sit down every 30-40 minutes.  No atrial or ventricular arrhythmias.      On 8/8/2019, pt had complained of lightheadedness and on 7/2019 had an episodes of syncope.    On 8/8/2019, his lisinopril was discontinued due to hypotension.    Today he presents stating he does not have any symptoms of dizziness, lightheadedness, chest pain/pressure, angina, repeat syncope or dyspnea at rest or with exertion.  He denies heart failure symptoms such as abdominal and pedal edema, PND and orthopnea.  He is taking his medications as prescribed including his Plavix and aspirin and does have some bruising on his forearms which would be expected.  He denies any repeat syncope  lightheadedness and dizziness since stopping the lisinopril.  He is very active and mows his lawn and his neighbors lawn which takes approximately 60 to 90 minutes.  At times he does stop but he can finish the lawn without stopping.   His biggest concern is when he wakes up from his nap and after sleeping overnight he lies in bed for 15 to 30  minutes before getting up.         ASSESSMENT AND PLAN    Coronary artery disease    S/p YOGESH to first obtuse marginal.      Angiogram on 6/2019 revealed calcified mid RCA lesion, CSI orbital atherectomy and YOGESH to mid RCA     currently taking Plavix 75 mg for at least one year    Currently taking aspirin 81 mg daily for lifetime    Currently taking metoprolol succinate 25 mg daily.      Currently taking atorvastatin 20 mg daily and his last LDL was 61 (3/2019)    He carries nitroglycerin in his pocket    He is currently asymptomatic     Sick sinus syndrome s/p Medtronic dual chamber permanent pacemaker (6/2019)     Stable leads    Normal device function    Follow with the device clinic on a quarterly basis    His rate response was increased from 3-4 on 8/8/2019    Dilated Aortic root and ascending aorta.  Both measuring 4.4 cm on ECHO (5/2019)    Repeat echo in 1 year from 5/2019    Plan:    Follow-up with Dr. Huggins in December as previously scheduled    Repeat an echo in 1 year from 5/2019 to assess aortic root    Follow with the device clinic as scheduled by them which is usually on a quarterly basis    Patient expresses understanding and agreement with the plan.     I appreciate the chance to help with Moses Elizondo Please let me know if you have any questions or concerns.    Mirna Chávez, ALPESH, CNP    This note was completed in part using Dragon voice recognition software. Although reviewed after completion, some word and grammatical errors may occur.    No orders of the defined types were placed in this encounter.    Orders Placed This Encounter   Medications     gabapentin (NEURONTIN) 100 MG capsule     Sig: Take 300 mg by mouth daily Pt take 2tabs by mouth daily     Medications Discontinued During This Encounter   Medication Reason     lisinopril (PRINIVIL/ZESTRIL) 5 MG tablet Medication Reconciliation Clean Up     Multiple Vitamins-Minerals (CENTRUM SILVER PO) Discontinued by another Health Care  "Provider         Encounter Diagnoses   Name Primary?     Cardiac pacemaker in situ      SSS (sick sinus syndrome) (H)        CURRENT MEDICATIONS:  Current Outpatient Medications   Medication Sig Dispense Refill     ASPIRIN EC PO Take 81 mg by mouth daily       atorvastatin (LIPITOR) 20 MG tablet Take 1 tablet (20 mg) by mouth daily 90 tablet 3     clopidogrel (PLAVIX) 75 MG tablet Take 1 tablet (75 mg) by mouth daily 90 tablet 3     gabapentin (NEURONTIN) 100 MG capsule Take 300 mg by mouth daily Pt take 2tabs by mouth daily       levothyroxine (SYNTHROID/LEVOTHROID) 100 MCG tablet Take 1 tablet (100 mcg) by mouth daily 90 tablet 3     metoprolol succinate ER (TOPROL-XL) 25 MG 24 hr tablet Take 25 mg by mouth daily  90 tablet 3     Polyethylene Glycol 3350 (MIRALAX PO) Take by mouth as needed       nitroGLYcerin (NITROSTAT) 0.4 MG sublingual tablet One tablet under the tongue every 5 minutes if needed for chest pain. May repeat every 5 minutes for a maximum of 3 doses in 15 minutes\" (Patient not taking: Reported on 9/24/2019) 25 tablet 3     vitamin B-12 (CYANOCOBALAMIN) 250 MCG tablet Take 1,000 mcg by mouth daily         ALLERGIES     Allergies   Allergen Reactions     No Known Drug Allergies        PAST MEDICAL HISTORY:  Past Medical History:   Diagnosis Date     Aortic root dilatation (H)     4.4 cm     Ascending aorta dilatation (H)     4.4 cm     ASD (atrial septal defect)      Bradycardia      CAD (coronary artery disease)      YOGESH to RCA(6/20/19); YOGESH to OM1(5/15/14)     Cardiac pacemaker 06/20/2019    Medtronic PPM COMFORT MRI XT DR-implant 6/20/19     Chest discomfort      Degeneration of lumbar or lumbosacral intervertebral disc      Familial tremor      Former smoker      Herpes zoster without mention of complication      HTN (hypertension)      Hyperlipidaemia      Idiopathic peripheral neuropathy      MGUS (monoclonal gammopathy of unknown significance)      NSTEMI (non-ST elevated myocardial infarction) " (H) 2014     Other and unspecified hyperlipidemia      PFO (patent foramen ovale)      Prostatitis, unspecified      Second degree heart block      Sensorineural hearing loss, unspecified      SSS (sick sinus syndrome) (H)      Status post coronary angiogram 6/20/2019     Unspecified hypothyroidism        PAST SURGICAL HISTORY:  Past Surgical History:   Procedure Laterality Date     C NONSPECIFIC PROCEDURE  2010    Laparoscopic cholecystectomy.      CHOLECYSTECTOMY       CORONARY ANGIOGRAPHY ADULT ORDER  5/14/14    PTCA w/YOGESH to OM1     CV CORONARY ANGIOGRAM N/A 6/20/2019    Procedure: Coronary Angiogram;  Surgeon: Romie Coronado MD;  Location:  HEART CARDIAC CATH LAB     CV INTRAVASULAR ULTRASOUND N/A 6/20/2019    Procedure: Intravascular Ultrasound;  Surgeon: Romie Coronado MD;  Location:  HEART CARDIAC CATH LAB     CV PCI ATHERECTOMY ORBITAL N/A 6/20/2019    Procedure: PCI Atherectomy Orbital;  Surgeon: Romie Coronado MD;  Location:  HEART CARDIAC CATH LAB     CV PCI STENT DRUG ELUTING N/A 6/20/2019    Procedure: PCI Stent Drug Eluting;  Surgeon: Romie Coronado MD;  Location:  HEART CARDIAC CATH LAB     CV TEMPORARY PACEMAKER INSERTION N/A 6/20/2019    Procedure: Temporary Pacemaker Insertion;  Surgeon: Romie Coronado MD;  Location:  HEART CARDIAC CATH LAB     EP PACEMAKER N/A 6/20/2019    Procedure: EP Pacemaker;  Surgeon: Max Dowell MD;  Location:  HEART CARDIAC CATH LAB     HEART CATH, ANGIOPLASTY  5/14/14    YOGESH to OM1       FAMILY HISTORY:  Family History   Problem Relation Age of Onset     Cancer Mother      Genitourinary Problems Father 99        complications from surgery     Heart Disease Brother 72        MI       SOCIAL HISTORY:  Social History     Socioeconomic History     Marital status:      Spouse name: None     Number of children: None     Years of education: None     Highest education level: None   Occupational History     None   Social  Needs     Financial resource strain: None     Food insecurity:     Worry: None     Inability: None     Transportation needs:     Medical: None     Non-medical: None   Tobacco Use     Smoking status: Former Smoker     Packs/day: 0.50     Years: 16.00     Pack years: 8.00     Start date:      Last attempt to quit: 1967     Years since quittin.7     Smokeless tobacco: Never Used   Substance and Sexual Activity     Alcohol use: Yes     Alcohol/week: 0.0 standard drinks     Comment: 4 drinks week     Drug use: No     Sexual activity: Never   Lifestyle     Physical activity:     Days per week: None     Minutes per session: None     Stress: None   Relationships     Social connections:     Talks on phone: None     Gets together: None     Attends Hoahaoism service: None     Active member of club or organization: None     Attends meetings of clubs or organizations: None     Relationship status: None     Intimate partner violence:     Fear of current or ex partner: None     Emotionally abused: None     Physically abused: None     Forced sexual activity: None   Other Topics Concern      Service Not Asked     Blood Transfusions Not Asked     Caffeine Concern Yes     Comment: 2 cups coffee/day     Occupational Exposure Not Asked     Hobby Hazards Not Asked     Sleep Concern No     Stress Concern Not Asked     Weight Concern Yes     Comment: limiting alcohol to watch calories     Special Diet Not Asked     Back Care Not Asked     Exercise Yes     Comment: Stationary bike  weights and aerobics 4 times week     Bike Helmet Not Asked     Seat Belt Yes     Self-Exams Not Asked     Parent/sibling w/ CABG, MI or angioplasty before 65F 55M? No   Social History Narrative     None       Review of Systems:  Skin:  Negative     Eyes:  Positive for glasses  ENT:  Negative    Respiratory:  Negative    Cardiovascular:    fatigue;exercise intolerance;Positive for  Gastroenterology: Negative    Genitourinary:  Positive for  "nocturia  Musculoskeletal:  Negative    Neurologic:  Positive for tremors  Psychiatric:  Positive for sleep disturbances  Heme/Lymph/Imm:  Negative    Endocrine:  Positive for thyroid disorder    Physical Exam:  Vitals: /70   Pulse 66   Ht 1.778 m (5' 10\")   Wt 86.6 kg (191 lb)   BMI 27.41 kg/m       Constitutional:  cooperative, alert and oriented, well developed, well nourished, in no acute distress        Skin:  warm and dry to the touch, no apparent skin lesions or masses noted        Head:  normocephalic        Eyes:  pupils equal and round        ENT:  no pallor or cyanosis        Neck:  JVP normal        Chest:  clear to auscultation        Cardiac: normal S1 and S2                  Abdomen:  abdomen soft        Vascular:       right radial artery;2+             left radial artery;2+                  Extremities and Back:           Neurological:  no gross motor deficits          Recent Lab Results:  LIPID RESULTS:  Lab Results   Component Value Date    CHOL 116 03/20/2019    HDL 40 03/20/2019    LDL 61 03/20/2019    TRIG 75 03/20/2019    CHOLHDLRATIO 2.9 07/08/2014       LIVER ENZYME RESULTS:  Lab Results   Component Value Date    AST 33 03/20/2019    ALT 25 03/20/2019       CBC RESULTS:  Lab Results   Component Value Date    WBC 5.5 06/20/2019    RBC 4.60 06/20/2019    HGB 13.5 06/20/2019    HCT 39.9 (L) 06/20/2019    MCV 87 06/20/2019    MCH 29.3 06/20/2019    MCHC 33.8 06/20/2019    RDW 11.9 06/20/2019     (L) 06/20/2019       BMP RESULTS:  Lab Results   Component Value Date     06/20/2019    POTASSIUM 3.9 06/20/2019    CHLORIDE 109 06/20/2019    CO2 26 06/20/2019    ANIONGAP 5 06/20/2019    GLC 86 06/20/2019    BUN 23 06/20/2019    CR 1.12 06/20/2019    GFRESTIMATED 59 (L) 06/20/2019    GFRESTBLACK 69 06/20/2019    GABRIELA 9.1 06/20/2019        A1C RESULTS:  Lab Results   Component Value Date    A1C 5.6 05/02/2019       INR RESULTS:  Lab Results   Component Value Date    INR 1.00 " 06/20/2019           CC  Tre Ramirez MD  [id number]  [place] [id number]                  Thank you for allowing me to participate in the care of your patient.      Sincerely,     ALPESH [facility]    cc:   Tre Ramirez MD  [id number]  [place] [id number]

## 2019-09-24 NOTE — PROGRESS NOTES
HPI:  Moses Elizondo is a 85 year old male who presents after having a permanent pacemaker placed for second-degree heart block.  He is a patient of Dr. Huggins and his past medical history includes       1. Coronary artery disease. S/p YOGESH to first obtuse marginal.  Angiogram on 6/2019 revealed calcified mid RCA lesion, CSI orbital atherectomy and YOGESH to mid RCA  2. hyperlipidemia  3. Sick sinus syndrome s/p Medtronic dual chamber permanent pacemaker.    4. Tremors.  Hx of being on propanolol  5. Dilated Aortic root and ascending aorta.  Both measuring 4.4 cm on ECHO      Diagnostics:  Device check (8/2019) revealed 96.6% apaced, 99% vpaced.  Decreased exercise tolerance with work, having to sit down every 30-40 minutes.  No atrial or ventricular arrhythmias.      On 8/8/2019, pt had complained of lightheadedness and on 7/2019 had an episodes of syncope.    On 8/8/2019, his lisinopril was discontinued due to hypotension.    Today he presents stating he does not have any symptoms of dizziness, lightheadedness, chest pain/pressure, angina, repeat syncope or dyspnea at rest or with exertion.  He denies heart failure symptoms such as abdominal and pedal edema, PND and orthopnea.  He is taking his medications as prescribed including his Plavix and aspirin and does have some bruising on his forearms which would be expected.  He denies any repeat syncope  lightheadedness and dizziness since stopping the lisinopril.  He is very active and mows his lawn and his neighbors lawn which takes approximately 60 to 90 minutes.  At times he does stop but he can finish the lawn without stopping.   His biggest concern is when he wakes up from his nap and after sleeping overnight he lies in bed for 15 to 30 minutes before getting up.         ASSESSMENT AND PLAN    Coronary artery disease    S/p YOGESH to first obtuse marginal.      Angiogram on 6/2019 revealed calcified mid RCA lesion, CSI orbital atherectomy and YOGESH to mid RCA      currently taking Plavix 75 mg for at least one year    Currently taking aspirin 81 mg daily for lifetime    Currently taking metoprolol succinate 25 mg daily.      Currently taking atorvastatin 20 mg daily and his last LDL was 61 (3/2019)    He carries nitroglycerin in his pocket    He is currently asymptomatic     Sick sinus syndrome s/p Medtronic dual chamber permanent pacemaker (6/2019)     Stable leads    Normal device function    Follow with the device clinic on a quarterly basis    His rate response was increased from 3-4 on 8/8/2019    Dilated Aortic root and ascending aorta.  Both measuring 4.4 cm on ECHO (5/2019)    Repeat echo in 1 year from 5/2019    Plan:    Follow-up with Dr. Huggins in December as previously scheduled    Repeat an echo in 1 year from 5/2019 to assess aortic root    Follow with the device clinic as scheduled by them which is usually on a quarterly basis    Patient expresses understanding and agreement with the plan.     I appreciate the chance to help with Moses Elizondo Please let me know if you have any questions or concerns.    ALPESH Nath, CNP    This note was completed in part using Dragon voice recognition software. Although reviewed after completion, some word and grammatical errors may occur.    No orders of the defined types were placed in this encounter.    Orders Placed This Encounter   Medications     gabapentin (NEURONTIN) 100 MG capsule     Sig: Take 300 mg by mouth daily Pt take 2tabs by mouth daily     Medications Discontinued During This Encounter   Medication Reason     lisinopril (PRINIVIL/ZESTRIL) 5 MG tablet Medication Reconciliation Clean Up     Multiple Vitamins-Minerals (CENTRUM SILVER PO) Discontinued by another Health Care Provider         Encounter Diagnoses   Name Primary?     Cardiac pacemaker in situ      SSS (sick sinus syndrome) (H)        CURRENT MEDICATIONS:  Current Outpatient Medications   Medication Sig Dispense Refill     ASPIRIN EC PO  "Take 81 mg by mouth daily       atorvastatin (LIPITOR) 20 MG tablet Take 1 tablet (20 mg) by mouth daily 90 tablet 3     clopidogrel (PLAVIX) 75 MG tablet Take 1 tablet (75 mg) by mouth daily 90 tablet 3     gabapentin (NEURONTIN) 100 MG capsule Take 300 mg by mouth daily Pt take 2tabs by mouth daily       levothyroxine (SYNTHROID/LEVOTHROID) 100 MCG tablet Take 1 tablet (100 mcg) by mouth daily 90 tablet 3     metoprolol succinate ER (TOPROL-XL) 25 MG 24 hr tablet Take 25 mg by mouth daily  90 tablet 3     Polyethylene Glycol 3350 (MIRALAX PO) Take by mouth as needed       nitroGLYcerin (NITROSTAT) 0.4 MG sublingual tablet One tablet under the tongue every 5 minutes if needed for chest pain. May repeat every 5 minutes for a maximum of 3 doses in 15 minutes\" (Patient not taking: Reported on 9/24/2019) 25 tablet 3     vitamin B-12 (CYANOCOBALAMIN) 250 MCG tablet Take 1,000 mcg by mouth daily         ALLERGIES     Allergies   Allergen Reactions     No Known Drug Allergies        PAST MEDICAL HISTORY:  Past Medical History:   Diagnosis Date     Aortic root dilatation (H)     4.4 cm     Ascending aorta dilatation (H)     4.4 cm     ASD (atrial septal defect)      Bradycardia      CAD (coronary artery disease)      YOGESH to RCA(6/20/19); YOGESH to OM1(5/15/14)     Cardiac pacemaker 06/20/2019    Medtronic PPM COMFORT MRI XT DR-implant 6/20/19     Chest discomfort      Degeneration of lumbar or lumbosacral intervertebral disc      Familial tremor      Former smoker      Herpes zoster without mention of complication      HTN (hypertension)      Hyperlipidaemia      Idiopathic peripheral neuropathy      MGUS (monoclonal gammopathy of unknown significance)      NSTEMI (non-ST elevated myocardial infarction) (H) 2014     Other and unspecified hyperlipidemia      PFO (patent foramen ovale)      Prostatitis, unspecified      Second degree heart block      Sensorineural hearing loss, unspecified      SSS (sick sinus syndrome) (H)      " Status post coronary angiogram 6/20/2019     Unspecified hypothyroidism        PAST SURGICAL HISTORY:  Past Surgical History:   Procedure Laterality Date     C NONSPECIFIC PROCEDURE  2010    Laparoscopic cholecystectomy.      CHOLECYSTECTOMY       CORONARY ANGIOGRAPHY ADULT ORDER  5/14/14    PTCA w/YOGESH to OM1     CV CORONARY ANGIOGRAM N/A 6/20/2019    Procedure: Coronary Angiogram;  Surgeon: Romie Coronado MD;  Location:  [facility]     CV INTRAVASULAR ULTRASOUND N/A 6/20/2019    Procedure: Intravascular Ultrasound;  Surgeon: Romie Coronado MD;  Location:  [facility]     CV PCI ATHERECTOMY ORBITAL N/A 6/20/2019    Procedure: PCI Atherectomy Orbital;  Surgeon: Romie Coronado MD;  Location:  [facility]     CV PCI STENT DRUG ELUTING N/A 6/20/2019    Procedure: PCI Stent Drug Eluting;  Surgeon: Romie Coronado MD;  Location:  [facility]     CV TEMPORARY PACEMAKER INSERTION N/A 6/20/2019    Procedure: Temporary Pacemaker Insertion;  Surgeon: Romie Coronado MD;  Location:  [facility]     EP PACEMAKER N/A 6/20/2019    Procedure: EP Pacemaker;  Surgeon: Max Dowell MD;  Location:  [facility]     HEART CATH, ANGIOPLASTY  5/14/14    YOGESH to OM1       FAMILY HISTORY:  Family History   Problem Relation Age of Onset     Cancer Mother      Genitourinary Problems Father 99        complications from surgery     Heart Disease Brother 72        MI       SOCIAL HISTORY:  Social History     Socioeconomic History     Marital status:      Spouse name: None     Number of children: None     Years of education: None     Highest education level: None   Occupational History     None   Social Needs     Financial resource strain: None     Food insecurity:     Worry: None     Inability: None     Transportation needs:     Medical: None     Non-medical: None   Tobacco Use     Smoking status: Former Smoker     Packs/day:  0.50     Years: 16.00     Pack years: 8.00     Start date:      Last attempt to quit: 1967     Years since quittin.7     Smokeless tobacco: Never Used   Substance and Sexual Activity     Alcohol use: Yes     Alcohol/week: 0.0 standard drinks     Comment: 4 drinks week     Drug use: No     Sexual activity: Never   Lifestyle     Physical activity:     Days per week: None     Minutes per session: None     Stress: None   Relationships     Social connections:     Talks on phone: None     Gets together: None     Attends Alevism service: None     Active member of club or organization: None     Attends meetings of clubs or organizations: None     Relationship status: None     Intimate partner violence:     Fear of current or ex partner: None     Emotionally abused: None     Physically abused: None     Forced sexual activity: None   Other Topics Concern      Service Not Asked     Blood Transfusions Not Asked     Caffeine Concern Yes     Comment: 2 cups coffee/day     Occupational Exposure Not Asked     Hobby Hazards Not Asked     Sleep Concern No     Stress Concern Not Asked     Weight Concern Yes     Comment: limiting alcohol to watch calories     Special Diet Not Asked     Back Care Not Asked     Exercise Yes     Comment: Stationary bike  weights and aerobics 4 times week     Bike Helmet Not Asked     Seat Belt Yes     Self-Exams Not Asked     Parent/sibling w/ CABG, MI or angioplasty before 65F 55M? No   Social History Narrative     None       Review of Systems:  Skin:  Negative     Eyes:  Positive for glasses  ENT:  Negative    Respiratory:  Negative    Cardiovascular:    fatigue;exercise intolerance;Positive for  Gastroenterology: Negative    Genitourinary:  Positive for nocturia  Musculoskeletal:  Negative    Neurologic:  Positive for tremors  Psychiatric:  Positive for sleep disturbances  Heme/Lymph/Imm:  Negative    Endocrine:  Positive for thyroid disorder    Physical Exam:  Vitals: /70   " Pulse 66   Ht 1.778 m (5' 10\")   Wt 86.6 kg (191 lb)   BMI 27.41 kg/m      Constitutional:  cooperative, alert and oriented, well developed, well nourished, in no acute distress        Skin:  warm and dry to the touch, no apparent skin lesions or masses noted        Head:  normocephalic        Eyes:  pupils equal and round        ENT:  no pallor or cyanosis        Neck:  JVP normal        Chest:  clear to auscultation        Cardiac: normal S1 and S2                  Abdomen:  abdomen soft        Vascular:       right radial artery;2+             left radial artery;2+                  Extremities and Back:           Neurological:  no gross motor deficits          Recent Lab Results:  LIPID RESULTS:  Lab Results   Component Value Date    CHOL 116 03/20/2019    HDL 40 03/20/2019    LDL 61 03/20/2019    TRIG 75 03/20/2019    CHOLHDLRATIO 2.9 07/08/2014       LIVER ENZYME RESULTS:  Lab Results   Component Value Date    AST 33 03/20/2019    ALT 25 03/20/2019       CBC RESULTS:  Lab Results   Component Value Date    WBC 5.5 06/20/2019    RBC 4.60 06/20/2019    HGB 13.5 06/20/2019    HCT 39.9 (L) 06/20/2019    MCV 87 06/20/2019    MCH 29.3 06/20/2019    MCHC 33.8 06/20/2019    RDW 11.9 06/20/2019     (L) 06/20/2019       BMP RESULTS:  Lab Results   Component Value Date     06/20/2019    POTASSIUM 3.9 06/20/2019    CHLORIDE 109 06/20/2019    CO2 26 06/20/2019    ANIONGAP 5 06/20/2019    GLC 86 06/20/2019    BUN 23 06/20/2019    CR 1.12 06/20/2019    GFRESTIMATED 59 (L) 06/20/2019    GFRESTBLACK 69 06/20/2019    GABRIELA 9.1 06/20/2019        A1C RESULTS:  Lab Results   Component Value Date    A1C 5.6 05/02/2019       INR RESULTS:  Lab Results   Component Value Date    INR 1.00 06/20/2019           CC  Tre Ramirez MD  [id number]  [place] [id number]                "

## 2019-10-29 DIAGNOSIS — E53.1 PYRIDOXINE DEPENDENCY SYNDROME: ICD-10-CM

## 2019-10-29 DIAGNOSIS — I51.9 MYXEDEMA HEART DISEASE: ICD-10-CM

## 2019-10-29 DIAGNOSIS — E78.2 MIXED HYPERLIPIDEMIA: Primary | ICD-10-CM

## 2019-10-29 DIAGNOSIS — G62.9 PERIPHERAL NERVE DISORDER: ICD-10-CM

## 2019-10-29 DIAGNOSIS — E03.9 MYXEDEMA HEART DISEASE: ICD-10-CM

## 2019-10-31 DIAGNOSIS — E03.9 MYXEDEMA HEART DISEASE: ICD-10-CM

## 2019-10-31 DIAGNOSIS — I51.9 MYXEDEMA HEART DISEASE: ICD-10-CM

## 2019-10-31 DIAGNOSIS — E53.1 PYRIDOXINE DEPENDENCY SYNDROME: ICD-10-CM

## 2019-10-31 DIAGNOSIS — E78.2 MIXED HYPERLIPIDEMIA: Primary | ICD-10-CM

## 2019-10-31 DIAGNOSIS — G62.9 PERIPHERAL NERVE DISORDER: ICD-10-CM

## 2019-10-31 LAB
CHOLEST SERPL-MCNC: 126 MG/DL
HDLC SERPL-MCNC: 52 MG/DL
LDLC SERPL CALC-MCNC: 60 MG/DL
NONHDLC SERPL-MCNC: 74 MG/DL
TRIGL SERPL-MCNC: 70 MG/DL
TSH SERPL DL<=0.005 MIU/L-ACNC: 1.94 MU/L (ref 0.4–4)
VIT B12 SERPL-MCNC: 364 PG/ML (ref 193–986)

## 2019-10-31 PROCEDURE — 82607 VITAMIN B-12: CPT | Performed by: INTERNAL MEDICINE

## 2019-10-31 PROCEDURE — 36415 COLL VENOUS BLD VENIPUNCTURE: CPT | Performed by: INTERNAL MEDICINE

## 2019-10-31 PROCEDURE — 99000 SPECIMEN HANDLING OFFICE-LAB: CPT | Performed by: INTERNAL MEDICINE

## 2019-10-31 PROCEDURE — 80061 LIPID PANEL: CPT | Performed by: INTERNAL MEDICINE

## 2019-10-31 PROCEDURE — 84207 ASSAY OF VITAMIN B-6: CPT | Mod: 90 | Performed by: INTERNAL MEDICINE

## 2019-10-31 PROCEDURE — 86376 MICROSOMAL ANTIBODY EACH: CPT | Performed by: INTERNAL MEDICINE

## 2019-10-31 PROCEDURE — 83921 ORGANIC ACID SINGLE QUANT: CPT | Performed by: INTERNAL MEDICINE

## 2019-10-31 PROCEDURE — 84443 ASSAY THYROID STIM HORMONE: CPT | Performed by: INTERNAL MEDICINE

## 2019-11-01 LAB — THYROPEROXIDASE AB SERPL-ACNC: 39 IU/ML

## 2019-11-02 LAB — VIT B6 SERPL-MCNC: 21.8 NMOL/L (ref 20–125)

## 2019-11-07 LAB — METHYLMALONATE SERPL-SCNC: 0.21 UMOL/L (ref 0–0.4)

## 2019-11-14 ENCOUNTER — ANCILLARY PROCEDURE (OUTPATIENT)
Dept: CARDIOLOGY | Facility: CLINIC | Age: 84
End: 2019-11-14
Attending: INTERNAL MEDICINE
Payer: COMMERCIAL

## 2019-11-14 DIAGNOSIS — I49.5 SSS (SICK SINUS SYNDROME) (H): ICD-10-CM

## 2019-11-14 DIAGNOSIS — Z95.0 CARDIAC PACEMAKER IN SITU: ICD-10-CM

## 2019-11-14 PROCEDURE — 93294 REM INTERROG EVL PM/LDLS PM: CPT | Performed by: INTERNAL MEDICINE

## 2019-11-14 PROCEDURE — 93296 REM INTERROG EVL PM/IDS: CPT | Performed by: INTERNAL MEDICINE

## 2019-11-19 LAB
MDC_IDC_LEAD_IMPLANT_DT: NORMAL
MDC_IDC_LEAD_IMPLANT_DT: NORMAL
MDC_IDC_LEAD_LOCATION: NORMAL
MDC_IDC_LEAD_LOCATION: NORMAL
MDC_IDC_LEAD_LOCATION_DETAIL_1: NORMAL
MDC_IDC_LEAD_LOCATION_DETAIL_1: NORMAL
MDC_IDC_LEAD_MFG: NORMAL
MDC_IDC_LEAD_MFG: NORMAL
MDC_IDC_LEAD_MODEL: NORMAL
MDC_IDC_LEAD_MODEL: NORMAL
MDC_IDC_LEAD_POLARITY_TYPE: NORMAL
MDC_IDC_LEAD_POLARITY_TYPE: NORMAL
MDC_IDC_LEAD_SERIAL: NORMAL
MDC_IDC_LEAD_SERIAL: NORMAL
MDC_IDC_MSMT_BATTERY_DTM: NORMAL
MDC_IDC_MSMT_BATTERY_REMAINING_LONGEVITY: 134 MO
MDC_IDC_MSMT_BATTERY_RRT_TRIGGER: 2.62
MDC_IDC_MSMT_BATTERY_STATUS: NORMAL
MDC_IDC_MSMT_BATTERY_VOLTAGE: 3.05 V
MDC_IDC_MSMT_LEADCHNL_RA_IMPEDANCE_VALUE: 399 OHM
MDC_IDC_MSMT_LEADCHNL_RA_IMPEDANCE_VALUE: 608 OHM
MDC_IDC_MSMT_LEADCHNL_RA_PACING_THRESHOLD_AMPLITUDE: 0.5 V
MDC_IDC_MSMT_LEADCHNL_RA_PACING_THRESHOLD_PULSEWIDTH: 0.4 MS
MDC_IDC_MSMT_LEADCHNL_RA_SENSING_INTR_AMPL: 2 MV
MDC_IDC_MSMT_LEADCHNL_RA_SENSING_INTR_AMPL: 2 MV
MDC_IDC_MSMT_LEADCHNL_RV_IMPEDANCE_VALUE: 361 OHM
MDC_IDC_MSMT_LEADCHNL_RV_IMPEDANCE_VALUE: 551 OHM
MDC_IDC_MSMT_LEADCHNL_RV_PACING_THRESHOLD_AMPLITUDE: 0.62 V
MDC_IDC_MSMT_LEADCHNL_RV_PACING_THRESHOLD_PULSEWIDTH: 0.4 MS
MDC_IDC_MSMT_LEADCHNL_RV_SENSING_INTR_AMPL: 10.12 MV
MDC_IDC_MSMT_LEADCHNL_RV_SENSING_INTR_AMPL: 10.12 MV
MDC_IDC_PG_IMPLANT_DTM: NORMAL
MDC_IDC_PG_MFG: NORMAL
MDC_IDC_PG_MODEL: NORMAL
MDC_IDC_PG_SERIAL: NORMAL
MDC_IDC_PG_TYPE: NORMAL
MDC_IDC_SESS_CLINIC_NAME: NORMAL
MDC_IDC_SESS_DTM: NORMAL
MDC_IDC_SESS_TYPE: NORMAL
MDC_IDC_SET_BRADY_AT_MODE_SWITCH_RATE: 171 {BEATS}/MIN
MDC_IDC_SET_BRADY_HYSTRATE: NORMAL
MDC_IDC_SET_BRADY_LOWRATE: 60 {BEATS}/MIN
MDC_IDC_SET_BRADY_MAX_SENSOR_RATE: 120 {BEATS}/MIN
MDC_IDC_SET_BRADY_MAX_TRACKING_RATE: 120 {BEATS}/MIN
MDC_IDC_SET_BRADY_MODE: NORMAL
MDC_IDC_SET_BRADY_PAV_DELAY_LOW: 180 MS
MDC_IDC_SET_BRADY_SAV_DELAY_LOW: 150 MS
MDC_IDC_SET_LEADCHNL_RA_PACING_AMPLITUDE: 2 V
MDC_IDC_SET_LEADCHNL_RA_PACING_ANODE_ELECTRODE_1: NORMAL
MDC_IDC_SET_LEADCHNL_RA_PACING_ANODE_LOCATION_1: NORMAL
MDC_IDC_SET_LEADCHNL_RA_PACING_CAPTURE_MODE: NORMAL
MDC_IDC_SET_LEADCHNL_RA_PACING_CATHODE_ELECTRODE_1: NORMAL
MDC_IDC_SET_LEADCHNL_RA_PACING_CATHODE_LOCATION_1: NORMAL
MDC_IDC_SET_LEADCHNL_RA_PACING_POLARITY: NORMAL
MDC_IDC_SET_LEADCHNL_RA_PACING_PULSEWIDTH: 0.4 MS
MDC_IDC_SET_LEADCHNL_RA_SENSING_ANODE_ELECTRODE_1: NORMAL
MDC_IDC_SET_LEADCHNL_RA_SENSING_ANODE_LOCATION_1: NORMAL
MDC_IDC_SET_LEADCHNL_RA_SENSING_CATHODE_ELECTRODE_1: NORMAL
MDC_IDC_SET_LEADCHNL_RA_SENSING_CATHODE_LOCATION_1: NORMAL
MDC_IDC_SET_LEADCHNL_RA_SENSING_POLARITY: NORMAL
MDC_IDC_SET_LEADCHNL_RA_SENSING_SENSITIVITY: 0.3 MV
MDC_IDC_SET_LEADCHNL_RV_PACING_AMPLITUDE: 2 V
MDC_IDC_SET_LEADCHNL_RV_PACING_ANODE_ELECTRODE_1: NORMAL
MDC_IDC_SET_LEADCHNL_RV_PACING_ANODE_LOCATION_1: NORMAL
MDC_IDC_SET_LEADCHNL_RV_PACING_CAPTURE_MODE: NORMAL
MDC_IDC_SET_LEADCHNL_RV_PACING_CATHODE_ELECTRODE_1: NORMAL
MDC_IDC_SET_LEADCHNL_RV_PACING_CATHODE_LOCATION_1: NORMAL
MDC_IDC_SET_LEADCHNL_RV_PACING_POLARITY: NORMAL
MDC_IDC_SET_LEADCHNL_RV_PACING_PULSEWIDTH: 0.4 MS
MDC_IDC_SET_LEADCHNL_RV_SENSING_ANODE_ELECTRODE_1: NORMAL
MDC_IDC_SET_LEADCHNL_RV_SENSING_ANODE_LOCATION_1: NORMAL
MDC_IDC_SET_LEADCHNL_RV_SENSING_CATHODE_ELECTRODE_1: NORMAL
MDC_IDC_SET_LEADCHNL_RV_SENSING_CATHODE_LOCATION_1: NORMAL
MDC_IDC_SET_LEADCHNL_RV_SENSING_POLARITY: NORMAL
MDC_IDC_SET_LEADCHNL_RV_SENSING_SENSITIVITY: 0.9 MV
MDC_IDC_SET_ZONE_DETECTION_INTERVAL: 350 MS
MDC_IDC_SET_ZONE_DETECTION_INTERVAL: 400 MS
MDC_IDC_SET_ZONE_TYPE: NORMAL
MDC_IDC_STAT_BRADY_AP_VP_PERCENT: 94.44 %
MDC_IDC_STAT_BRADY_AP_VS_PERCENT: 0.09 %
MDC_IDC_STAT_BRADY_AS_VP_PERCENT: 5.36 %
MDC_IDC_STAT_BRADY_AS_VS_PERCENT: 0.11 %
MDC_IDC_STAT_BRADY_DTM_END: NORMAL
MDC_IDC_STAT_BRADY_DTM_START: NORMAL
MDC_IDC_STAT_BRADY_RA_PERCENT_PACED: 94.47 %
MDC_IDC_STAT_BRADY_RV_PERCENT_PACED: 99.8 %
MDC_IDC_STAT_EPISODE_RECENT_COUNT: 0
MDC_IDC_STAT_EPISODE_RECENT_COUNT_DTM_END: NORMAL
MDC_IDC_STAT_EPISODE_RECENT_COUNT_DTM_START: NORMAL
MDC_IDC_STAT_EPISODE_TOTAL_COUNT: 0
MDC_IDC_STAT_EPISODE_TOTAL_COUNT_DTM_END: NORMAL
MDC_IDC_STAT_EPISODE_TOTAL_COUNT_DTM_START: NORMAL
MDC_IDC_STAT_EPISODE_TYPE: NORMAL

## 2020-02-06 ENCOUNTER — TELEPHONE (OUTPATIENT)
Dept: INTERNAL MEDICINE | Facility: CLINIC | Age: 85
End: 2020-02-06

## 2020-02-06 NOTE — TELEPHONE ENCOUNTER
Reason for Call:  Request for results:    Name of test or procedure: Lab      Date of test of procedure: 10/31/19    Location of the test or procedure: [facility]    OK to leave the result message on voice mail or with a family member? YES    Phone number Patient can be reached at:  Home number on file [phone] (home)    Additional comments: please mail patients lab results from 10/31/19 to his home address. Address verified with patient.     Call taken on 2/6/2020 at 11:10 AM by Brenda Wagoner

## 2020-02-13 DIAGNOSIS — I71.21 ASCENDING AORTIC ANEURYSM (H): Primary | ICD-10-CM

## 2020-02-21 ENCOUNTER — ANCILLARY PROCEDURE (OUTPATIENT)
Dept: CARDIOLOGY | Facility: CLINIC | Age: 85
End: 2020-02-21
Attending: INTERNAL MEDICINE
Payer: COMMERCIAL

## 2020-02-21 DIAGNOSIS — Z95.0 CARDIAC PACEMAKER IN SITU: ICD-10-CM

## 2020-02-21 PROCEDURE — 93296 REM INTERROG EVL PM/IDS: CPT | Performed by: INTERNAL MEDICINE

## 2020-02-21 PROCEDURE — 93294 REM INTERROG EVL PM/LDLS PM: CPT | Performed by: INTERNAL MEDICINE

## 2020-02-25 LAB
MDC_IDC_LEAD_IMPLANT_DT: NORMAL
MDC_IDC_LEAD_IMPLANT_DT: NORMAL
MDC_IDC_LEAD_LOCATION: NORMAL
MDC_IDC_LEAD_LOCATION: NORMAL
MDC_IDC_LEAD_LOCATION_DETAIL_1: NORMAL
MDC_IDC_LEAD_LOCATION_DETAIL_1: NORMAL
MDC_IDC_LEAD_MFG: NORMAL
MDC_IDC_LEAD_MFG: NORMAL
MDC_IDC_LEAD_MODEL: NORMAL
MDC_IDC_LEAD_MODEL: NORMAL
MDC_IDC_LEAD_POLARITY_TYPE: NORMAL
MDC_IDC_LEAD_POLARITY_TYPE: NORMAL
MDC_IDC_LEAD_SERIAL: NORMAL
MDC_IDC_LEAD_SERIAL: NORMAL
MDC_IDC_MSMT_BATTERY_DTM: NORMAL
MDC_IDC_MSMT_BATTERY_REMAINING_LONGEVITY: 129 MO
MDC_IDC_MSMT_BATTERY_RRT_TRIGGER: 2.62
MDC_IDC_MSMT_BATTERY_STATUS: NORMAL
MDC_IDC_MSMT_BATTERY_VOLTAGE: 3.02 V
MDC_IDC_MSMT_LEADCHNL_RA_IMPEDANCE_VALUE: 361 OHM
MDC_IDC_MSMT_LEADCHNL_RA_IMPEDANCE_VALUE: 570 OHM
MDC_IDC_MSMT_LEADCHNL_RA_PACING_THRESHOLD_AMPLITUDE: 0.5 V
MDC_IDC_MSMT_LEADCHNL_RA_PACING_THRESHOLD_PULSEWIDTH: 0.4 MS
MDC_IDC_MSMT_LEADCHNL_RA_SENSING_INTR_AMPL: 2.62 MV
MDC_IDC_MSMT_LEADCHNL_RA_SENSING_INTR_AMPL: 2.62 MV
MDC_IDC_MSMT_LEADCHNL_RV_IMPEDANCE_VALUE: 380 OHM
MDC_IDC_MSMT_LEADCHNL_RV_IMPEDANCE_VALUE: 532 OHM
MDC_IDC_MSMT_LEADCHNL_RV_PACING_THRESHOLD_AMPLITUDE: 0.62 V
MDC_IDC_MSMT_LEADCHNL_RV_PACING_THRESHOLD_PULSEWIDTH: 0.4 MS
MDC_IDC_MSMT_LEADCHNL_RV_SENSING_INTR_AMPL: 11.25 MV
MDC_IDC_MSMT_LEADCHNL_RV_SENSING_INTR_AMPL: 11.25 MV
MDC_IDC_PG_IMPLANT_DTM: NORMAL
MDC_IDC_PG_MFG: NORMAL
MDC_IDC_PG_MODEL: NORMAL
MDC_IDC_PG_SERIAL: NORMAL
MDC_IDC_PG_TYPE: NORMAL
MDC_IDC_SESS_CLINIC_NAME: NORMAL
MDC_IDC_SESS_DTM: NORMAL
MDC_IDC_SESS_TYPE: NORMAL
MDC_IDC_SET_BRADY_AT_MODE_SWITCH_RATE: 171 {BEATS}/MIN
MDC_IDC_SET_BRADY_HYSTRATE: NORMAL
MDC_IDC_SET_BRADY_LOWRATE: 60 {BEATS}/MIN
MDC_IDC_SET_BRADY_MAX_SENSOR_RATE: 120 {BEATS}/MIN
MDC_IDC_SET_BRADY_MAX_TRACKING_RATE: 120 {BEATS}/MIN
MDC_IDC_SET_BRADY_MODE: NORMAL
MDC_IDC_SET_BRADY_PAV_DELAY_LOW: 180 MS
MDC_IDC_SET_BRADY_SAV_DELAY_LOW: 150 MS
MDC_IDC_SET_LEADCHNL_RA_PACING_AMPLITUDE: 2 V
MDC_IDC_SET_LEADCHNL_RA_PACING_ANODE_ELECTRODE_1: NORMAL
MDC_IDC_SET_LEADCHNL_RA_PACING_ANODE_LOCATION_1: NORMAL
MDC_IDC_SET_LEADCHNL_RA_PACING_CAPTURE_MODE: NORMAL
MDC_IDC_SET_LEADCHNL_RA_PACING_CATHODE_ELECTRODE_1: NORMAL
MDC_IDC_SET_LEADCHNL_RA_PACING_CATHODE_LOCATION_1: NORMAL
MDC_IDC_SET_LEADCHNL_RA_PACING_POLARITY: NORMAL
MDC_IDC_SET_LEADCHNL_RA_PACING_PULSEWIDTH: 0.4 MS
MDC_IDC_SET_LEADCHNL_RA_SENSING_ANODE_ELECTRODE_1: NORMAL
MDC_IDC_SET_LEADCHNL_RA_SENSING_ANODE_LOCATION_1: NORMAL
MDC_IDC_SET_LEADCHNL_RA_SENSING_CATHODE_ELECTRODE_1: NORMAL
MDC_IDC_SET_LEADCHNL_RA_SENSING_CATHODE_LOCATION_1: NORMAL
MDC_IDC_SET_LEADCHNL_RA_SENSING_POLARITY: NORMAL
MDC_IDC_SET_LEADCHNL_RA_SENSING_SENSITIVITY: 0.3 MV
MDC_IDC_SET_LEADCHNL_RV_PACING_AMPLITUDE: 2 V
MDC_IDC_SET_LEADCHNL_RV_PACING_ANODE_ELECTRODE_1: NORMAL
MDC_IDC_SET_LEADCHNL_RV_PACING_ANODE_LOCATION_1: NORMAL
MDC_IDC_SET_LEADCHNL_RV_PACING_CAPTURE_MODE: NORMAL
MDC_IDC_SET_LEADCHNL_RV_PACING_CATHODE_ELECTRODE_1: NORMAL
MDC_IDC_SET_LEADCHNL_RV_PACING_CATHODE_LOCATION_1: NORMAL
MDC_IDC_SET_LEADCHNL_RV_PACING_POLARITY: NORMAL
MDC_IDC_SET_LEADCHNL_RV_PACING_PULSEWIDTH: 0.4 MS
MDC_IDC_SET_LEADCHNL_RV_SENSING_ANODE_ELECTRODE_1: NORMAL
MDC_IDC_SET_LEADCHNL_RV_SENSING_ANODE_LOCATION_1: NORMAL
MDC_IDC_SET_LEADCHNL_RV_SENSING_CATHODE_ELECTRODE_1: NORMAL
MDC_IDC_SET_LEADCHNL_RV_SENSING_CATHODE_LOCATION_1: NORMAL
MDC_IDC_SET_LEADCHNL_RV_SENSING_POLARITY: NORMAL
MDC_IDC_SET_LEADCHNL_RV_SENSING_SENSITIVITY: 0.9 MV
MDC_IDC_SET_ZONE_DETECTION_INTERVAL: 350 MS
MDC_IDC_SET_ZONE_DETECTION_INTERVAL: 400 MS
MDC_IDC_SET_ZONE_TYPE: NORMAL
MDC_IDC_STAT_BRADY_AP_VP_PERCENT: 98.22 %
MDC_IDC_STAT_BRADY_AP_VS_PERCENT: 0.02 %
MDC_IDC_STAT_BRADY_AS_VP_PERCENT: 1.69 %
MDC_IDC_STAT_BRADY_AS_VS_PERCENT: 0.07 %
MDC_IDC_STAT_BRADY_DTM_END: NORMAL
MDC_IDC_STAT_BRADY_DTM_START: NORMAL
MDC_IDC_STAT_BRADY_RA_PERCENT_PACED: 98.25 %
MDC_IDC_STAT_BRADY_RV_PERCENT_PACED: 99.91 %
MDC_IDC_STAT_EPISODE_RECENT_COUNT: 0
MDC_IDC_STAT_EPISODE_RECENT_COUNT_DTM_END: NORMAL
MDC_IDC_STAT_EPISODE_RECENT_COUNT_DTM_START: NORMAL
MDC_IDC_STAT_EPISODE_TOTAL_COUNT: 0
MDC_IDC_STAT_EPISODE_TOTAL_COUNT_DTM_END: NORMAL
MDC_IDC_STAT_EPISODE_TOTAL_COUNT_DTM_START: NORMAL
MDC_IDC_STAT_EPISODE_TYPE: NORMAL

## 2020-02-27 ENCOUNTER — OFFICE VISIT (OUTPATIENT)
Dept: URGENT CARE | Facility: URGENT CARE | Age: 85
End: 2020-02-27
Payer: COMMERCIAL

## 2020-02-27 VITALS
SYSTOLIC BLOOD PRESSURE: 117 MMHG | HEART RATE: 65 BPM | WEIGHT: 194 LBS | BODY MASS INDEX: 27.84 KG/M2 | OXYGEN SATURATION: 98 % | DIASTOLIC BLOOD PRESSURE: 73 MMHG | TEMPERATURE: 97 F | RESPIRATION RATE: 16 BRPM

## 2020-02-27 DIAGNOSIS — H93.12 TINNITUS, LEFT: Primary | ICD-10-CM

## 2020-02-27 DIAGNOSIS — H61.22 IMPACTED CERUMEN OF LEFT EAR: ICD-10-CM

## 2020-02-27 PROCEDURE — 99213 OFFICE O/P EST LOW 20 MIN: CPT | Performed by: PHYSICIAN ASSISTANT

## 2020-02-27 NOTE — PROGRESS NOTES
SUBJECTIVE:   Moses Elizondo is a 86 year old male presenting with a chief complaint of having ringing in ears.  Onset of symptoms was 1 day(s) ago.  Course of illness is same.    Severity mild  Current and Associated symptoms: left ear ringing  Treatment measures tried include none.  Predisposing factors include none.    Past Medical History:   Diagnosis Date     Aortic root dilatation (H)     4.4 cm     Ascending aorta dilatation (H)     4.4 cm     ASD (atrial septal defect)      Bradycardia      CAD (coronary artery disease)      YOGESH to RCA(19); YOGESH to OM1(5/15/14)     Cardiac pacemaker 2019    Medtronic PPM COMFORT MRI XT DR-implant 19     Chest discomfort      Degeneration of lumbar or lumbosacral intervertebral disc      Familial tremor      Former smoker      Herpes zoster without mention of complication      HTN (hypertension)      Hyperlipidaemia      Idiopathic peripheral neuropathy      MGUS (monoclonal gammopathy of unknown significance)      NSTEMI (non-ST elevated myocardial infarction) (H)      Other and unspecified hyperlipidemia      PFO (patent foramen ovale)      Prostatitis, unspecified      Second degree heart block      Sensorineural hearing loss, unspecified      SSS (sick sinus syndrome) (H)      Status post coronary angiogram 2019     Unspecified hypothyroidism         Allergies   Allergen Reactions     No Known Drug Allergies      Family History   Problem Relation Age of Onset     Cancer Mother      Genitourinary Problems Father 99        complications from surgery     Heart Disease Brother 72        MI         Social History     Tobacco Use     Smoking status: Former Smoker     Packs/day: 0.50     Years: 16.00     Pack years: 8.00     Start date:      Last attempt to quit: 1967     Years since quittin.1     Smokeless tobacco: Never Used   Substance Use Topics     Alcohol use: Yes     Alcohol/week: 0.0 standard drinks     Comment: 4 drinks week        ROS:  CONSTITUTIONAL:NEGATIVE for fever, chills, change in weight  INTEGUMENTARY/SKIN: NEGATIVE for worrisome rashes, moles or lesions  EYES: NEGATIVE for vision changes or irritation  ENT/MOUTH: POSITIVE for left ear ringin  RESP:NEGATIVE for significant cough or SOB  CV: NEGATIVE for chest pain, palpitations or peripheral edema  GI: NEGATIVE for nausea, abdominal pain, heartburn, or change in bowel habits  MUSCULOSKELETAL: NEGATIVE for significant arthralgias or myalgia  NEURO: POSITIVE for ringing in left ear    OBJECTIVE  :/73   Pulse 65   Temp 97  F (36.1  C) (Oral)   Resp 16   Wt 88 kg (194 lb)   SpO2 98%   BMI 27.84 kg/m    GENERAL APPEARANCE: healthy, alert and no distress  EYES: EOMI,  PERRL, conjunctiva clear  HENT: ear canals and TM's normal.  Nose and mouth without ulcers, erythema or lesions.  Positive for hair in left ear setting up against eardrum  NECK: supple, nontender, no lymphadenopathy  RESP: lungs clear to auscultation - no rales, rhonchi or wheezes  CV: regular rates and rhythm, normal S1 S2, no murmur noted  NEURO: Normal strength and tone, sensory exam grossly normal,  normal speech and mentation  SKIN: no suspicious lesions or rashes      ASSESSMENT/PLAN:      ICD-10-CM    1. Tinnitus, left H93.12    2. Impacted cerumen of left ear H61.22 REMOVE FB, EXT AUDITORY CANAL       Orders Placed This Encounter     REMOVE FB, EXT AUDITORY CANAL     Patient had hair in left ear removed with irrigation by the nurse    Patent referred to ENT for further evaluation of tinnitus

## 2020-03-02 ENCOUNTER — OFFICE VISIT (OUTPATIENT)
Dept: CARDIOLOGY | Facility: CLINIC | Age: 85
End: 2020-03-02
Attending: NURSE PRACTITIONER
Payer: COMMERCIAL

## 2020-03-02 VITALS
SYSTOLIC BLOOD PRESSURE: 112 MMHG | HEART RATE: 64 BPM | DIASTOLIC BLOOD PRESSURE: 70 MMHG | WEIGHT: 196.4 LBS | BODY MASS INDEX: 28.12 KG/M2 | HEIGHT: 70 IN

## 2020-03-02 DIAGNOSIS — I25.810 CORONARY ARTERY DISEASE INVOLVING CORONARY BYPASS GRAFT OF NATIVE HEART WITHOUT ANGINA PECTORIS: ICD-10-CM

## 2020-03-02 DIAGNOSIS — I77.810 AORTIC ROOT DILATATION (H): Primary | ICD-10-CM

## 2020-03-02 DIAGNOSIS — I49.5 SICK SINUS SYNDROME (H): ICD-10-CM

## 2020-03-02 DIAGNOSIS — I25.10 CORONARY ARTERY DISEASE INVOLVING NATIVE CORONARY ARTERY OF NATIVE HEART WITHOUT ANGINA PECTORIS: ICD-10-CM

## 2020-03-02 DIAGNOSIS — I71.20 THORACIC AORTIC ANEURYSM WITHOUT RUPTURE (H): ICD-10-CM

## 2020-03-02 PROCEDURE — 99214 OFFICE O/P EST MOD 30 MIN: CPT | Performed by: INTERNAL MEDICINE

## 2020-03-02 RX ORDER — METOPROLOL SUCCINATE 25 MG/1
25 TABLET, EXTENDED RELEASE ORAL DAILY
Qty: 90 TABLET | Refills: 3 | Status: SHIPPED | OUTPATIENT
Start: 2020-03-02 | End: 2020-06-05

## 2020-03-02 RX ORDER — CLOPIDOGREL BISULFATE 75 MG/1
75 TABLET ORAL DAILY
Qty: 90 TABLET | Refills: 3 | Status: SHIPPED | OUTPATIENT
Start: 2020-03-02 | End: 2020-06-05

## 2020-03-02 ASSESSMENT — MIFFLIN-ST. JEOR: SCORE: 1577.11

## 2020-03-02 NOTE — LETTER
3/2/2020    Steven Wagoner MD  [address] [facility] [address]    RE: Moses Sandhuman       Dear Colleague,    I had the pleasure of seeing Moses Elizondo in the [facility].    HPI and Plan:   Mr. Elizondo has a past medical history significant for coronary artery disease status post drug-eluting stent to the first obtuse marginal (2014), hyperlipidemia, bradycardia, sick sinus syndrome, mild ascending aorta enlargement as well as aortic root enlargement, tremors, hypothyroidism, and idiopathic peripheral neuropathy.    He had exertional chest discomfort last year and a stress nuclear study was done which revealed inferolateral fixed defect consistent with prior infarction.  Target heart rate was not achieved.  Sinus bradycardia with heart rate of 33 at baseline.  Subsequently coronary angiography was done which showed a complex calcified mid RCA lesion and underwent CSI orbital atherectomy and YOGESH to the mid RCA.  He had slow heart rates during the procedure and a temporary pacemaker wire was used.  Subsequently was seen by EP and dual-chamber pacemaker was implanted.    He now returns for a follow-up.  His chest pain is resolved.  He is doing reasonably well.  He has questions regarding the the settings of his pacemaker with the lowest heart rate is set at 60.  He used to have all of his heart in the 50s and he was wondering why.  Explained to him that that is usually the settings for anyone who has got a pacemaker.  Is using the pacemaker 95 to 100% of the time.    He has occasional shortness of breath compared to last year but he is also getting older.  His chest pain has resolved.  His LDL was last 60.    He takes vitamins as well as gabapentin for his neuropathy.  He is on Plavix which will continue till June of this year.    His previous echocardiogram showed an ascending aorta and aortic root enlargement up to 4.4 cm.     Physical Exam  Please see Below       Assessment and Plan  1.  Coronary artery disease status post YOGESH to the OM and most recently to the mid RCA.  He denies recurrent angina.  On low-dose metoprolol and aspirin.  Continue Plavix till  of this year.    2.  Sick sinus syndrome and significant bradycardia status post permanent pacemaker.    Continue follow-up in the device clinic.  Pacemaker functioning well.    3.  Aortic root (4.4 cm) enlargement as well as a ascending aorta (4.4 cm) enlargement noted on recent echocardiogram.  Repeat echo this summer.     4. Hyperlipidemia.  His last LDL was 60, HDL was 46.  Continue atorvastatin 40 mg daily.       Thank you for allowing me to care for Moses Elizondo today.    He will return to see my nurse practitioner this summer with an echocardiogram prior to visit with her.  I would like to see her back in follow-up in a year's time since the NP visit.    Sincerely,    Unruly Huggins MD         No orders of the defined types were placed in this encounter.      Orders Placed This Encounter   Medications     Vitamin D-Vitamin K (VITAMIN K2-VITAMIN D3 PO)     Sig: Take by mouth daily     Valerian 450 MG CAPS     Si mg daily       There are no discontinued medications.      Encounter Diagnoses   Name Primary?     Coronary artery disease involving native coronary artery of native heart without angina pectoris      Coronary artery disease involving native coronary artery of native heart with unstable angina pectoris (H)      Status post coronary angiogram      Coronary artery disease        CURRENT MEDICATIONS:  Current Outpatient Medications   Medication Sig Dispense Refill     ASPIRIN EC PO Take 81 mg by mouth daily       atorvastatin (LIPITOR) 20 MG tablet Take 1 tablet (20 mg) by mouth daily 90 tablet 3     clopidogrel (PLAVIX) 75 MG tablet Take 1 tablet (75 mg) by mouth daily 90 tablet 3     gabapentin (NEURONTIN) 100 MG capsule Take 300 mg by mouth daily Pt take 2tabs by mouth daily        "levothyroxine (SYNTHROID/LEVOTHROID) 100 MCG tablet Take 1 tablet (100 mcg) by mouth daily 90 tablet 3     metoprolol succinate ER (TOPROL-XL) 25 MG 24 hr tablet Take 25 mg by mouth daily  90 tablet 3     nitroGLYcerin (NITROSTAT) 0.4 MG sublingual tablet One tablet under the tongue every 5 minutes if needed for chest pain. May repeat every 5 minutes for a maximum of 3 doses in 15 minutes\" 25 tablet 3     Polyethylene Glycol 3350 (MIRALAX PO) Take by mouth as needed       Valerian 450 MG CAPS 470 mg daily       vitamin B-12 (CYANOCOBALAMIN) 250 MCG tablet Take 1,000 mcg by mouth daily       Vitamin D-Vitamin K (VITAMIN K2-VITAMIN D3 PO) Take by mouth daily         ALLERGIES     Allergies   Allergen Reactions     No Known Drug Allergies        PAST MEDICAL HISTORY:  Past Medical History:   Diagnosis Date     Aortic root dilatation (H)     4.4 cm     Ascending aorta dilatation (H)     4.4 cm     ASD (atrial septal defect)      Bradycardia      CAD (coronary artery disease)      YOGESH to RCA(6/20/19); YOGESH to OM1(5/15/14)     Cardiac pacemaker 06/20/2019    [employer] PPM COMFORT MRI XT DR-implant 6/20/19     Chest discomfort      Degeneration of lumbar or lumbosacral intervertebral disc      Familial tremor      Former smoker      Herpes zoster without mention of complication      HTN (hypertension)      Hyperlipidaemia      Idiopathic peripheral neuropathy      MGUS (monoclonal gammopathy of unknown significance)      NSTEMI (non-ST elevated myocardial infarction) (H) 2014     Other and unspecified hyperlipidemia      PFO (patent foramen ovale)      Prostatitis, unspecified      Second degree heart block      Sensorineural hearing loss, unspecified      SSS (sick sinus syndrome) (H)      Status post coronary angiogram 6/20/2019     Unspecified hypothyroidism        PAST SURGICAL HISTORY:  Past Surgical History:   Procedure Laterality Date     C NONSPECIFIC PROCEDURE  2010    Laparoscopic cholecystectomy.      " CHOLECYSTECTOMY       CORONARY ANGIOGRAPHY ADULT ORDER  14    PTCA w/YOGESH to OM1     CV CORONARY ANGIOGRAM N/A 2019    Procedure: Coronary Angiogram;  Surgeon: Romie Coronado MD;  Location:  HEART CARDIAC CATH LAB     CV INTRAVASULAR ULTRASOUND N/A 2019    Procedure: Intravascular Ultrasound;  Surgeon: Romie Coronado MD;  Location:  HEART CARDIAC CATH LAB     CV PCI ATHERECTOMY ORBITAL N/A 2019    Procedure: PCI Atherectomy Orbital;  Surgeon: Romie Coronado MD;  Location:  HEART CARDIAC CATH LAB     CV PCI STENT DRUG ELUTING N/A 2019    Procedure: PCI Stent Drug Eluting;  Surgeon: Romie Coronado MD;  Location:  HEART CARDIAC CATH LAB     CV TEMPORARY PACEMAKER INSERTION N/A 2019    Procedure: Temporary Pacemaker Insertion;  Surgeon: Romie Coronado MD;  Location:  HEART CARDIAC CATH LAB     EP PACEMAKER N/A 2019    Procedure: EP Pacemaker;  Surgeon: Max Dowell MD;  Location:  HEART CARDIAC CATH LAB     HEART CATH, ANGIOPLASTY  14    YOGESH to OM1       FAMILY HISTORY:  Family History   Problem Relation Age of Onset     Cancer Mother      Genitourinary Problems Father 99        complications from surgery     Heart Disease Brother 72        MI       SOCIAL HISTORY:  Social History     Socioeconomic History     Marital status:      Spouse name: None     Number of children: None     Years of education: None     Highest education level: None   Occupational History     None   Social Needs     Financial resource strain: None     Food insecurity:     Worry: None     Inability: None     Transportation needs:     Medical: None     Non-medical: None   Tobacco Use     Smoking status: Former Smoker     Packs/day: 0.50     Years: 16.00     Pack years: 8.00     Start date:      Last attempt to quit: 1967     Years since quittin.2     Smokeless tobacco: Never Used   Substance and Sexual Activity     Alcohol use: Yes      "Alcohol/week: 0.0 standard drinks     Comment: 4 drinks week     Drug use: No     Sexual activity: Never   Lifestyle     Physical activity:     Days per week: None     Minutes per session: None     Stress: None   Relationships     Social connections:     Talks on phone: None     Gets together: None     Attends [employer] service: None     Active member of club or organization: None     Attends meetings of clubs or organizations: None     Relationship status: None     Intimate partner violence:     Fear of current or ex partner: None     Emotionally abused: None     Physically abused: None     Forced sexual activity: None   Other Topics Concern      Service Not Asked     Blood Transfusions Not Asked     Caffeine Concern Yes     Comment: 2 cups coffee/day     Occupational Exposure Not Asked     Hobby Hazards Not Asked     Sleep Concern No     Stress Concern Not Asked     Weight Concern Yes     Comment: limiting alcohol to watch calories     Special Diet Not Asked     Back Care Not Asked     Exercise Yes     Comment: Stationary bike  weights and aerobics 4 times week     Bike Helmet Not Asked     Seat Belt Yes     Self-Exams Not Asked     Parent/sibling w/ CABG, MI or angioplasty before 65F 55M? No   Social History Narrative     None       Review of Systems:  Skin:          Eyes:         ENT:         Respiratory:          Cardiovascular:         Gastroenterology:        Genitourinary:         Musculoskeletal:         Neurologic:         Psychiatric:         Heme/Lymph/Imm:         Endocrine:           Physical Exam:  Vitals: /70 (BP Location: Left arm, Cuff Size: Adult Large)   Pulse 74   Ht 1.778 m (5' 10\")   Wt 89.1 kg (196 lb 6.4 oz)   BMI 28.18 kg/m       Constitutional:  in no acute distress        Skin:  warm and dry to the touch   pacemaker incision in the left infraclavicular area was well-healed      Head:  normocephalic        Eyes:  sclera white        Lymph:      ENT:  no pallor or cyanosis "        Neck:  JVP normal        Respiratory:  clear to auscultation         Cardiac: normal S1 and S2                                                         GI:  not assessed this visit        Extremities and Muscular Skeletal:      bilateral LE edema;trace;varicose vein          Neurological:  no gross motor deficits        Psych:  Alert and Oriented x 3        Thank you for allowing me to participate in the care of your patient.    Sincerely,     Unruly Huggins MD     [facility]

## 2020-03-02 NOTE — PROGRESS NOTES
HPI and Plan:   Mr. Elizondo has a past medical history significant for coronary artery disease status post drug-eluting stent to the first obtuse marginal (2014), hyperlipidemia, bradycardia, sick sinus syndrome, mild ascending aorta enlargement as well as aortic root enlargement, tremors, hypothyroidism, and idiopathic peripheral neuropathy.    He had exertional chest discomfort last year and a stress nuclear study was done which revealed inferolateral fixed defect consistent with prior infarction.  Target heart rate was not achieved.  Sinus bradycardia with heart rate of 33 at baseline.  Subsequently coronary angiography was done which showed a complex calcified mid RCA lesion and underwent CSI orbital atherectomy and YOGESH to the mid RCA.  He had slow heart rates during the procedure and a temporary pacemaker wire was used.  Subsequently was seen by EP and dual-chamber pacemaker was implanted.    He now returns for a follow-up.  His chest pain is resolved.  He is doing reasonably well.  He has questions regarding the the settings of his pacemaker with the lowest heart rate is set at 60.  He used to have all of his heart in the 50s and he was wondering why.  Explained to him that that is usually the settings for anyone who has got a pacemaker.  Is using the pacemaker 95 to 100% of the time.    He has occasional shortness of breath compared to last year but he is also getting older.  His chest pain has resolved.  His LDL was last 60.    He takes vitamins as well as gabapentin for his neuropathy.  He is on Plavix which will continue till Sarika of this year.    His previous echocardiogram showed an ascending aorta and aortic root enlargement up to 4.4 cm.     Physical Exam  Please see Below      Assessment and Plan  1.  Coronary artery disease status post YOGESH to the OM and most recently to the mid RCA.  He denies recurrent angina.  On low-dose metoprolol and aspirin.  Continue Plavix till Sarika of this year.    2.  Sick  sinus syndrome and significant bradycardia status post permanent pacemaker.    Continue follow-up in the device clinic.  Pacemaker functioning well.    3.  Aortic root (4.4 cm) enlargement as well as a ascending aorta (4.4 cm) enlargement noted on recent echocardiogram.  Repeat echo this summer.     4. Hyperlipidemia.  His last LDL was 60, HDL was 46.  Continue atorvastatin 40 mg daily.       Thank you for allowing me to care for Moses Elizondo today.    He will return to see my nurse practitioner this summer with an echocardiogram prior to visit with her.  I would like to see her back in follow-up in a year's time since the NP visit.    Sincerely,    Unruly Huggins MD         No orders of the defined types were placed in this encounter.      Orders Placed This Encounter   Medications     Vitamin D-Vitamin K (VITAMIN K2-VITAMIN D3 PO)     Sig: Take by mouth daily     Valerian 450 MG CAPS     Si mg daily       There are no discontinued medications.      Encounter Diagnoses   Name Primary?     Coronary artery disease involving native coronary artery of native heart without angina pectoris      Coronary artery disease involving native coronary artery of native heart with unstable angina pectoris (H)      Status post coronary angiogram      Coronary artery disease        CURRENT MEDICATIONS:  Current Outpatient Medications   Medication Sig Dispense Refill     ASPIRIN EC PO Take 81 mg by mouth daily       atorvastatin (LIPITOR) 20 MG tablet Take 1 tablet (20 mg) by mouth daily 90 tablet 3     clopidogrel (PLAVIX) 75 MG tablet Take 1 tablet (75 mg) by mouth daily 90 tablet 3     gabapentin (NEURONTIN) 100 MG capsule Take 300 mg by mouth daily Pt take 2tabs by mouth daily       levothyroxine (SYNTHROID/LEVOTHROID) 100 MCG tablet Take 1 tablet (100 mcg) by mouth daily 90 tablet 3     metoprolol succinate ER (TOPROL-XL) 25 MG 24 hr tablet Take 25 mg by mouth daily  90 tablet 3     nitroGLYcerin (NITROSTAT) 0.4 MG  "sublingual tablet One tablet under the tongue every 5 minutes if needed for chest pain. May repeat every 5 minutes for a maximum of 3 doses in 15 minutes\" 25 tablet 3     Polyethylene Glycol 3350 (MIRALAX PO) Take by mouth as needed       Valerian 450 MG CAPS 470 mg daily       vitamin B-12 (CYANOCOBALAMIN) 250 MCG tablet Take 1,000 mcg by mouth daily       Vitamin D-Vitamin K (VITAMIN K2-VITAMIN D3 PO) Take by mouth daily         ALLERGIES     Allergies   Allergen Reactions     No Known Drug Allergies        PAST MEDICAL HISTORY:  Past Medical History:   Diagnosis Date     Aortic root dilatation (H)     4.4 cm     Ascending aorta dilatation (H)     4.4 cm     ASD (atrial septal defect)      Bradycardia      CAD (coronary artery disease)      YOGESH to RCA(6/20/19); YOGESH to OM1(5/15/14)     Cardiac pacemaker 06/20/2019    [employer] PPM COMFORT MRI XT DR-implant 6/20/19     Chest discomfort      Degeneration of lumbar or lumbosacral intervertebral disc      Familial tremor      Former smoker      Herpes zoster without mention of complication      HTN (hypertension)      Hyperlipidaemia      Idiopathic peripheral neuropathy      MGUS (monoclonal gammopathy of unknown significance)      NSTEMI (non-ST elevated myocardial infarction) (H) 2014     Other and unspecified hyperlipidemia      PFO (patent foramen ovale)      Prostatitis, unspecified      Second degree heart block      Sensorineural hearing loss, unspecified      SSS (sick sinus syndrome) (H)      Status post coronary angiogram 6/20/2019     Unspecified hypothyroidism        PAST SURGICAL HISTORY:  Past Surgical History:   Procedure Laterality Date     C NONSPECIFIC PROCEDURE  2010    Laparoscopic cholecystectomy.      CHOLECYSTECTOMY       CORONARY ANGIOGRAPHY ADULT ORDER  5/14/14    PTCA w/YOGESH to OM1     CV CORONARY ANGIOGRAM N/A 6/20/2019    Procedure: Coronary Angiogram;  Surgeon: Romie Coronado MD;  Location: [facility] CARDIAC CATH LAB     CV INTRAVASULAR " ULTRASOUND N/A 2019    Procedure: Intravascular Ultrasound;  Surgeon: Romie Coronado MD;  Location:  HEART CARDIAC CATH LAB     CV PCI ATHERECTOMY ORBITAL N/A 2019    Procedure: PCI Atherectomy Orbital;  Surgeon: Romie Coronado MD;  Location:  HEART CARDIAC CATH LAB     CV PCI STENT DRUG ELUTING N/A 2019    Procedure: PCI Stent Drug Eluting;  Surgeon: Romie Coronado MD;  Location:  HEART CARDIAC CATH LAB     CV TEMPORARY PACEMAKER INSERTION N/A 2019    Procedure: Temporary Pacemaker Insertion;  Surgeon: Romie Coronado MD;  Location:  HEART CARDIAC CATH LAB     EP PACEMAKER N/A 2019    Procedure: EP Pacemaker;  Surgeon: Max Dowell MD;  Location:  HEART CARDIAC CATH LAB     HEART CATH, ANGIOPLASTY  14    YOGESH to OM1       FAMILY HISTORY:  Family History   Problem Relation Age of Onset     Cancer Mother      Genitourinary Problems Father 99        complications from surgery     Heart Disease Brother 72        MI       SOCIAL HISTORY:  Social History     Socioeconomic History     Marital status:      Spouse name: None     Number of children: None     Years of education: None     Highest education level: None   Occupational History     None   Social Needs     Financial resource strain: None     Food insecurity:     Worry: None     Inability: None     Transportation needs:     Medical: None     Non-medical: None   Tobacco Use     Smoking status: Former Smoker     Packs/day: 0.50     Years: 16.00     Pack years: 8.00     Start date:      Last attempt to quit: 1967     Years since quittin.2     Smokeless tobacco: Never Used   Substance and Sexual Activity     Alcohol use: Yes     Alcohol/week: 0.0 standard drinks     Comment: 4 drinks week     Drug use: No     Sexual activity: Never   Lifestyle     Physical activity:     Days per week: None     Minutes per session: None     Stress: None   Relationships     Social connections:      "Talks on phone: None     Gets together: None     Attends [employer] service: None     Active member of club or organization: None     Attends meetings of clubs or organizations: None     Relationship status: None     Intimate partner violence:     Fear of current or ex partner: None     Emotionally abused: None     Physically abused: None     Forced sexual activity: None   Other Topics Concern      Service Not Asked     Blood Transfusions Not Asked     Caffeine Concern Yes     Comment: 2 cups coffee/day     Occupational Exposure Not Asked     Hobby Hazards Not Asked     Sleep Concern No     Stress Concern Not Asked     Weight Concern Yes     Comment: limiting alcohol to watch calories     Special Diet Not Asked     Back Care Not Asked     Exercise Yes     Comment: Stationary bike  weights and aerobics 4 times week     Bike Helmet Not Asked     Seat Belt Yes     Self-Exams Not Asked     Parent/sibling w/ CABG, MI or angioplasty before 65F 55M? No   Social History Narrative     None       Review of Systems:  Skin:          Eyes:         ENT:         Respiratory:          Cardiovascular:         Gastroenterology:        Genitourinary:         Musculoskeletal:         Neurologic:         Psychiatric:         Heme/Lymph/Imm:         Endocrine:           Physical Exam:  Vitals: /70 (BP Location: Left arm, Cuff Size: Adult Large)   Pulse 74   Ht 1.778 m (5' 10\")   Wt 89.1 kg (196 lb 6.4 oz)   BMI 28.18 kg/m      Constitutional:  in no acute distress        Skin:  warm and dry to the touch   pacemaker incision in the left infraclavicular area was well-healed      Head:  normocephalic        Eyes:  sclera white        Lymph:      ENT:  no pallor or cyanosis        Neck:  JVP normal        Respiratory:  clear to auscultation         Cardiac: normal S1 and S2                                                         GI:  not assessed this visit        Extremities and Muscular Skeletal:      bilateral LE " edema;trace;varicose vein          Neurological:  no gross motor deficits        Psych:  Alert and Oriented x 3        CC  Steven Wagoner MD  [address] [place] [address]

## 2020-05-18 DIAGNOSIS — E03.9 HYPOTHYROIDISM, UNSPECIFIED TYPE: ICD-10-CM

## 2020-05-26 RX ORDER — LEVOTHYROXINE SODIUM 100 UG/1
100 TABLET ORAL DAILY
Qty: 90 TABLET | Refills: 0 | Status: SHIPPED | OUTPATIENT
Start: 2020-05-26 | End: 2020-07-23

## 2020-05-26 NOTE — TELEPHONE ENCOUNTER
"Prescription(s) sent electronically to specified pharmacy.     Return to see me in approximately 2 months, sooner if needed.  Please get fasting labs done at the [facility] or any other [facility] lab 1-2 days before this appointment (schedule a \"lab appointment\").  If you get the labs done at another clinic, make arrangements with them directly.  The orders will be in place.  Use My[facility]ers or Call [phone] to schedule the appointment with me and lab appointment.      Future orders placed.     "

## 2020-05-26 NOTE — TELEPHONE ENCOUNTER
Routing refill request to provider for review/approval because:  Patient needs to be seen because it has been more than 1 year since last office visit.    SCHEDULED FOR 07/23

## 2020-05-26 NOTE — TELEPHONE ENCOUNTER
The patient called and scheduled an appointment with Dr. Wagoner on 7/23 to follow up after he has an appointment with a neurologist that he will be seeing 7/21. He is almost out of medication so he will need this filled as soon as possible to get him through until his appointment with Dr. Wagoner

## 2020-06-05 ENCOUNTER — TELEPHONE (OUTPATIENT)
Dept: CARDIOLOGY | Facility: CLINIC | Age: 85
End: 2020-06-05

## 2020-06-05 ENCOUNTER — ANCILLARY PROCEDURE (OUTPATIENT)
Dept: CARDIOLOGY | Facility: CLINIC | Age: 85
End: 2020-06-05
Attending: INTERNAL MEDICINE
Payer: COMMERCIAL

## 2020-06-05 DIAGNOSIS — I21.4 NON-STEMI (NON-ST ELEVATED MYOCARDIAL INFARCTION) (H): ICD-10-CM

## 2020-06-05 DIAGNOSIS — Z95.0 CARDIAC PACEMAKER IN SITU: ICD-10-CM

## 2020-06-05 DIAGNOSIS — I25.810 CORONARY ARTERY DISEASE INVOLVING CORONARY BYPASS GRAFT OF NATIVE HEART WITHOUT ANGINA PECTORIS: ICD-10-CM

## 2020-06-05 PROCEDURE — 93296 REM INTERROG EVL PM/IDS: CPT | Performed by: INTERNAL MEDICINE

## 2020-06-05 PROCEDURE — 93294 REM INTERROG EVL PM/LDLS PM: CPT | Performed by: INTERNAL MEDICINE

## 2020-06-05 RX ORDER — ATORVASTATIN CALCIUM 40 MG/1
40 TABLET, FILM COATED ORAL DAILY
Qty: 90 TABLET | Refills: 1 | Status: SHIPPED | OUTPATIENT
Start: 2020-06-05 | End: 2020-11-25

## 2020-06-05 RX ORDER — METOPROLOL SUCCINATE 25 MG/1
25 TABLET, EXTENDED RELEASE ORAL DAILY
Qty: 90 TABLET | Refills: 3 | Status: SHIPPED | OUTPATIENT
Start: 2020-06-05 | End: 2021-06-21

## 2020-06-05 NOTE — TELEPHONE ENCOUNTER
Addendum: Patient called at 2:40 and said he talked to a man this morning and he told him to stop one drug and that he ordered another drug for him. He wanted me to review this with him again because he was still mixed up about it. I told him he was to stop his Plavix. I told him to throw it away because he said he still had some left so he would probably just take it. I told him he was to  Atorvastatin and Metoprolol from [facility]. He asked me to spell Atorvastatin (I also spelled Lipitor) and he said he did not have that drug before. He asked if it was new. I looked back and he did have it prescribed before. I told him to just go ahead and  these two today and he said he would. Shanon Hernández RN    Patient called to confirm ok to stop Plavix now that it is June. RN confirmed for patient that per Dr. Huggins's last OV note that is correct. Patient had no further questions at this time.           3/2/20 OV Dr. Huggins  Assessment and Plan  1.  Coronary artery disease status post YOGESH to the OM and most recently to the mid RCA.  He denies recurrent angina.  On low-dose metoprolol and aspirin.  Continue Plavix till June of this year.     2.  Sick sinus syndrome and significant bradycardia status post permanent pacemaker.    Continue follow-up in the device clinic.  Pacemaker functioning well.     3.  Aortic root (4.4 cm) enlargement as well as a ascending aorta (4.4 cm) enlargement noted on recent echocardiogram.  Repeat echo this summer.     4. Hyperlipidemia.  His last LDL was 60, HDL was 46.  Continue atorvastatin 40 mg daily.       Thank you for allowing me to care for Moses Elizondo today.     He will return to see my nurse practitioner this summer with an echocardiogram prior to visit with her.  I would like to see her back in follow-up in a year's time since the NP visit.     Sincerely,     Unruly Huggins MD

## 2020-06-11 LAB
MDC_IDC_LEAD_IMPLANT_DT: NORMAL
MDC_IDC_LEAD_IMPLANT_DT: NORMAL
MDC_IDC_LEAD_LOCATION: NORMAL
MDC_IDC_LEAD_LOCATION: NORMAL
MDC_IDC_LEAD_LOCATION_DETAIL_1: NORMAL
MDC_IDC_LEAD_LOCATION_DETAIL_1: NORMAL
MDC_IDC_LEAD_MFG: NORMAL
MDC_IDC_LEAD_MFG: NORMAL
MDC_IDC_LEAD_MODEL: NORMAL
MDC_IDC_LEAD_MODEL: NORMAL
MDC_IDC_LEAD_POLARITY_TYPE: NORMAL
MDC_IDC_LEAD_POLARITY_TYPE: NORMAL
MDC_IDC_LEAD_SERIAL: NORMAL
MDC_IDC_LEAD_SERIAL: NORMAL
MDC_IDC_MSMT_BATTERY_DTM: NORMAL
MDC_IDC_MSMT_BATTERY_REMAINING_LONGEVITY: 125 MO
MDC_IDC_MSMT_BATTERY_RRT_TRIGGER: 2.62
MDC_IDC_MSMT_BATTERY_STATUS: NORMAL
MDC_IDC_MSMT_BATTERY_VOLTAGE: 3 V
MDC_IDC_MSMT_LEADCHNL_RA_IMPEDANCE_VALUE: 361 OHM
MDC_IDC_MSMT_LEADCHNL_RA_IMPEDANCE_VALUE: 551 OHM
MDC_IDC_MSMT_LEADCHNL_RA_PACING_THRESHOLD_AMPLITUDE: 0.5 V
MDC_IDC_MSMT_LEADCHNL_RA_PACING_THRESHOLD_PULSEWIDTH: 0.4 MS
MDC_IDC_MSMT_LEADCHNL_RA_SENSING_INTR_AMPL: 2 MV
MDC_IDC_MSMT_LEADCHNL_RA_SENSING_INTR_AMPL: 2 MV
MDC_IDC_MSMT_LEADCHNL_RV_IMPEDANCE_VALUE: 399 OHM
MDC_IDC_MSMT_LEADCHNL_RV_IMPEDANCE_VALUE: 532 OHM
MDC_IDC_MSMT_LEADCHNL_RV_PACING_THRESHOLD_AMPLITUDE: 0.62 V
MDC_IDC_MSMT_LEADCHNL_RV_PACING_THRESHOLD_PULSEWIDTH: 0.4 MS
MDC_IDC_MSMT_LEADCHNL_RV_SENSING_INTR_AMPL: 8.25 MV
MDC_IDC_MSMT_LEADCHNL_RV_SENSING_INTR_AMPL: 8.25 MV
MDC_IDC_PG_IMPLANT_DTM: NORMAL
MDC_IDC_PG_MFG: NORMAL
MDC_IDC_PG_MODEL: NORMAL
MDC_IDC_PG_SERIAL: NORMAL
MDC_IDC_PG_TYPE: NORMAL
MDC_IDC_SESS_CLINIC_NAME: NORMAL
MDC_IDC_SESS_DTM: NORMAL
MDC_IDC_SESS_TYPE: NORMAL
MDC_IDC_SET_BRADY_AT_MODE_SWITCH_RATE: 171 {BEATS}/MIN
MDC_IDC_SET_BRADY_HYSTRATE: NORMAL
MDC_IDC_SET_BRADY_LOWRATE: 60 {BEATS}/MIN
MDC_IDC_SET_BRADY_MAX_SENSOR_RATE: 120 {BEATS}/MIN
MDC_IDC_SET_BRADY_MAX_TRACKING_RATE: 120 {BEATS}/MIN
MDC_IDC_SET_BRADY_MODE: NORMAL
MDC_IDC_SET_BRADY_PAV_DELAY_LOW: 180 MS
MDC_IDC_SET_BRADY_SAV_DELAY_LOW: 150 MS
MDC_IDC_SET_LEADCHNL_RA_PACING_AMPLITUDE: 2 V
MDC_IDC_SET_LEADCHNL_RA_PACING_ANODE_ELECTRODE_1: NORMAL
MDC_IDC_SET_LEADCHNL_RA_PACING_ANODE_LOCATION_1: NORMAL
MDC_IDC_SET_LEADCHNL_RA_PACING_CAPTURE_MODE: NORMAL
MDC_IDC_SET_LEADCHNL_RA_PACING_CATHODE_ELECTRODE_1: NORMAL
MDC_IDC_SET_LEADCHNL_RA_PACING_CATHODE_LOCATION_1: NORMAL
MDC_IDC_SET_LEADCHNL_RA_PACING_POLARITY: NORMAL
MDC_IDC_SET_LEADCHNL_RA_PACING_PULSEWIDTH: 0.4 MS
MDC_IDC_SET_LEADCHNL_RA_SENSING_ANODE_ELECTRODE_1: NORMAL
MDC_IDC_SET_LEADCHNL_RA_SENSING_ANODE_LOCATION_1: NORMAL
MDC_IDC_SET_LEADCHNL_RA_SENSING_CATHODE_ELECTRODE_1: NORMAL
MDC_IDC_SET_LEADCHNL_RA_SENSING_CATHODE_LOCATION_1: NORMAL
MDC_IDC_SET_LEADCHNL_RA_SENSING_POLARITY: NORMAL
MDC_IDC_SET_LEADCHNL_RA_SENSING_SENSITIVITY: 0.3 MV
MDC_IDC_SET_LEADCHNL_RV_PACING_AMPLITUDE: 2 V
MDC_IDC_SET_LEADCHNL_RV_PACING_ANODE_ELECTRODE_1: NORMAL
MDC_IDC_SET_LEADCHNL_RV_PACING_ANODE_LOCATION_1: NORMAL
MDC_IDC_SET_LEADCHNL_RV_PACING_CAPTURE_MODE: NORMAL
MDC_IDC_SET_LEADCHNL_RV_PACING_CATHODE_ELECTRODE_1: NORMAL
MDC_IDC_SET_LEADCHNL_RV_PACING_CATHODE_LOCATION_1: NORMAL
MDC_IDC_SET_LEADCHNL_RV_PACING_POLARITY: NORMAL
MDC_IDC_SET_LEADCHNL_RV_PACING_PULSEWIDTH: 0.4 MS
MDC_IDC_SET_LEADCHNL_RV_SENSING_ANODE_ELECTRODE_1: NORMAL
MDC_IDC_SET_LEADCHNL_RV_SENSING_ANODE_LOCATION_1: NORMAL
MDC_IDC_SET_LEADCHNL_RV_SENSING_CATHODE_ELECTRODE_1: NORMAL
MDC_IDC_SET_LEADCHNL_RV_SENSING_CATHODE_LOCATION_1: NORMAL
MDC_IDC_SET_LEADCHNL_RV_SENSING_POLARITY: NORMAL
MDC_IDC_SET_LEADCHNL_RV_SENSING_SENSITIVITY: 0.9 MV
MDC_IDC_SET_ZONE_DETECTION_INTERVAL: 350 MS
MDC_IDC_SET_ZONE_DETECTION_INTERVAL: 400 MS
MDC_IDC_SET_ZONE_TYPE: NORMAL
MDC_IDC_STAT_BRADY_AP_VP_PERCENT: 98.15 %
MDC_IDC_STAT_BRADY_AP_VS_PERCENT: 0.02 %
MDC_IDC_STAT_BRADY_AS_VP_PERCENT: 1.43 %
MDC_IDC_STAT_BRADY_AS_VS_PERCENT: 0.4 %
MDC_IDC_STAT_BRADY_DTM_END: NORMAL
MDC_IDC_STAT_BRADY_DTM_START: NORMAL
MDC_IDC_STAT_BRADY_RA_PERCENT_PACED: 98.52 %
MDC_IDC_STAT_BRADY_RV_PERCENT_PACED: 99.58 %
MDC_IDC_STAT_EPISODE_RECENT_COUNT: 0
MDC_IDC_STAT_EPISODE_RECENT_COUNT_DTM_END: NORMAL
MDC_IDC_STAT_EPISODE_RECENT_COUNT_DTM_START: NORMAL
MDC_IDC_STAT_EPISODE_TOTAL_COUNT: 0
MDC_IDC_STAT_EPISODE_TOTAL_COUNT_DTM_END: NORMAL
MDC_IDC_STAT_EPISODE_TOTAL_COUNT_DTM_START: NORMAL
MDC_IDC_STAT_EPISODE_TYPE: NORMAL

## 2020-07-06 ENCOUNTER — OFFICE VISIT (OUTPATIENT)
Dept: URGENT CARE | Facility: URGENT CARE | Age: 85
End: 2020-07-06
Payer: COMMERCIAL

## 2020-07-06 VITALS
WEIGHT: 194.6 LBS | OXYGEN SATURATION: 96 % | TEMPERATURE: 98 F | SYSTOLIC BLOOD PRESSURE: 108 MMHG | BODY MASS INDEX: 27.92 KG/M2 | DIASTOLIC BLOOD PRESSURE: 68 MMHG | HEART RATE: 70 BPM
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
## 2020-07-06 PROCEDURE — 99213 OFFICE O/P EST LOW 20 MIN: CPT | Performed by: FAMILY MEDICINE

## 2020-07-06 RX ORDER — AMOXICILLIN AND CLAVULANATE POTASSIUM 500; 125 MG/1; MG/1
1 TABLET, FILM COATED ORAL 2 TIMES DAILY
Qty: 10 TABLET | Refills: 0 | Status: SHIPPED | OUTPATIENT
Start: 2020-07-06 | End: 2020-07-23

## 2020-07-06 NOTE — PROGRESS NOTES
SUBJECTIVE:  Moses Elizondo is a 86 year old male who presents with a chief complaint of an animal bite on the hands right.  He was bitten by a dog today.   Cicumstances of bite: unprovoked attack.  Severity: mild, moderate.  Animal's immunizations didn't ask but will check with neighbor who owns dog   Associated symptoms: immediate pain    last tetanus booster within 10 years    Past Medical History:   Diagnosis Date     Aortic root dilatation (H)     4.4 cm     Ascending aorta dilatation (H)     4.4 cm     ASD (atrial septal defect)      Bradycardia      CAD (coronary artery disease)      YOGESH to RCA(6/20/19); YOGESH to OM1(5/15/14)     Cardiac pacemaker 06/20/2019    [employer] PPM COMFORT MRI XT DR-implant 6/20/19     Chest discomfort      Degeneration of lumbar or lumbosacral intervertebral disc      Familial tremor      Former smoker      Herpes zoster without mention of complication      HTN (hypertension)      Hyperlipidaemia      Idiopathic peripheral neuropathy      MGUS (monoclonal gammopathy of unknown significance)      NSTEMI (non-ST elevated myocardial infarction) (H) 2014     Other and unspecified hyperlipidemia      PFO (patent foramen ovale)      Prostatitis, unspecified      Second degree heart block      Sensorineural hearing loss, unspecified      SSS (sick sinus syndrome) (H)      Status post coronary angiogram 6/20/2019     Unspecified hypothyroidism        Current Outpatient Medications:      amoxicillin-clavulanate (AUGMENTIN) 500-125 MG tablet, Take 1 tablet by mouth 2 times daily for 5 days, Disp: 10 tablet, Rfl: 0     ASPIRIN EC PO, Take 81 mg by mouth daily, Disp: , Rfl:      atorvastatin (LIPITOR) 40 MG tablet, Take 1 tablet (40 mg) by mouth daily, Disp: 90 tablet, Rfl: 1     gabapentin (NEURONTIN) 100 MG capsule, Take 300 mg by mouth daily Pt take 2tabs by mouth daily, Disp: , Rfl:      levothyroxine (SYNTHROID/LEVOTHROID) 100 MCG tablet, Take 1 tablet (100 mcg) by mouth daily, Disp: 90  "tablet, Rfl: 0     metoprolol succinate ER (TOPROL-XL) 25 MG 24 hr tablet, Take 1 tablet (25 mg) by mouth daily, Disp: 90 tablet, Rfl: 3     nitroGLYcerin (NITROSTAT) 0.4 MG sublingual tablet, One tablet under the tongue every 5 minutes if needed for chest pain. May repeat every 5 minutes for a maximum of 3 doses in 15 minutes\", Disp: 25 tablet, Rfl: 3     Polyethylene Glycol 3350 (MIRALAX PO), Take by mouth as needed, Disp: , Rfl:      Valerian 450 MG CAPS, 470 mg daily, Disp: , Rfl:      vitamin B-12 (CYANOCOBALAMIN) 250 MCG tablet, Take 1,000 mcg by mouth daily, Disp: , Rfl:      Vitamin D-Vitamin K (VITAMIN K2-VITAMIN D3 PO), Take by mouth daily, Disp: , Rfl:   Social History     Tobacco Use     Smoking status: Former Smoker     Packs/day: 0.50     Years: 16.00     Pack years: 8.00     Start date:      Last attempt to quit: 1967     Years since quittin.5     Smokeless tobacco: Never Used   Substance Use Topics     Alcohol use: Yes     Alcohol/week: 0.0 standard drinks     Comment: 4 drinks week       ROS:  CONSTITUTIONAL:NEGATIVE for fever, chills, change in weight    OBJECTIVE:  /68   Pulse 70   Temp 98  F (36.7  C) (Oral)   Wt 88.3 kg (194 lb 9.6 oz)   SpO2 96%   BMI 27.92 kg/m    GENERAL: healthy, alert no acute distress  SKIN: laceration of hands right  MS:extremities normal- no gross deformities noted,  FROM noted in all extremities  NEURO: Normal strength and tone, sensory exam grossly normal,  normal speech and mentation    ASSESSMENT:    ICD-10-CM    1. Dog bite, initial encounter  W54.0XXA amoxicillin-clavulanate (AUGMENTIN) 500-125 MG tablet       "

## 2020-07-07 ENCOUNTER — TRANSFERRED RECORDS (OUTPATIENT)
Dept: HEALTH INFORMATION MANAGEMENT | Facility: CLINIC | Age: 85
End: 2020-07-07

## 2020-07-07 LAB
CHOLEST SERPL-MCNC: 105 MG/DL (ref 100–199)
HDLC SERPL-MCNC: 42 MG/DL
LDLC SERPL CALC-MCNC: 49 MG/DL (ref 0–99)
TRIGL SERPL-MCNC: 71 MG/DL (ref 0–149)

## 2020-07-23 ENCOUNTER — OFFICE VISIT (OUTPATIENT)
Dept: INTERNAL MEDICINE | Facility: CLINIC | Age: 85
End: 2020-07-23
Payer: COMMERCIAL

## 2020-07-23 VITALS
WEIGHT: 193.1 LBS | OXYGEN SATURATION: 98 % | SYSTOLIC BLOOD PRESSURE: 110 MMHG | BODY MASS INDEX: 27.64 KG/M2 | TEMPERATURE: 97.3 F | HEIGHT: 70 IN | DIASTOLIC BLOOD PRESSURE: 70 MMHG | HEART RATE: 63 BPM

## 2020-07-23 DIAGNOSIS — E03.9 HYPOTHYROIDISM, UNSPECIFIED TYPE: ICD-10-CM

## 2020-07-23 DIAGNOSIS — Z00.00 ENCOUNTER FOR MEDICARE ANNUAL WELLNESS EXAM: Primary | ICD-10-CM

## 2020-07-23 DIAGNOSIS — G60.9 IDIOPATHIC PERIPHERAL NEUROPATHY: ICD-10-CM

## 2020-07-23 DIAGNOSIS — R79.89 ELEVATED LFTS: ICD-10-CM

## 2020-07-23 DIAGNOSIS — I21.4 NON-STEMI (NON-ST ELEVATED MYOCARDIAL INFARCTION) (H): ICD-10-CM

## 2020-07-23 DIAGNOSIS — I25.810 CORONARY ARTERY DISEASE INVOLVING CORONARY BYPASS GRAFT OF NATIVE HEART WITHOUT ANGINA PECTORIS: ICD-10-CM

## 2020-07-23 LAB
ALBUMIN SERPL-MCNC: 3.5 G/DL (ref 3.4–5)
ALP SERPL-CCNC: 158 U/L (ref 40–150)
ALT SERPL W P-5'-P-CCNC: 125 U/L (ref 0–70)
ANION GAP SERPL CALCULATED.3IONS-SCNC: 3 MMOL/L (ref 3–14)
AST SERPL W P-5'-P-CCNC: 111 U/L (ref 0–45)
BILIRUB SERPL-MCNC: 0.9 MG/DL (ref 0.2–1.3)
BUN SERPL-MCNC: 23 MG/DL (ref 7–30)
CALCIUM SERPL-MCNC: 8.9 MG/DL (ref 8.5–10.1)
CHLORIDE SERPL-SCNC: 109 MMOL/L (ref 94–109)
CO2 SERPL-SCNC: 27 MMOL/L (ref 20–32)
CREAT SERPL-MCNC: 1.14 MG/DL (ref 0.66–1.25)
ERYTHROCYTE [DISTWIDTH] IN BLOOD BY AUTOMATED COUNT: 12.5 % (ref 10–15)
GFR SERPL CREATININE-BSD FRML MDRD: 58 ML/MIN/{1.73_M2}
GLUCOSE SERPL-MCNC: 89 MG/DL (ref 70–99)
HCT VFR BLD AUTO: 41.4 % (ref 40–53)
HGB BLD-MCNC: 13.7 G/DL (ref 13.3–17.7)
MCH RBC QN AUTO: 29.9 PG (ref 26.5–33)
MCHC RBC AUTO-ENTMCNC: 33.1 G/DL (ref 31.5–36.5)
MCV RBC AUTO: 90 FL (ref 78–100)
PLATELET # BLD AUTO: 162 10E9/L (ref 150–450)
POTASSIUM SERPL-SCNC: 4.3 MMOL/L (ref 3.4–5.3)
PROT SERPL-MCNC: 7.5 G/DL (ref 6.8–8.8)
RBC # BLD AUTO: 4.58 10E12/L (ref 4.4–5.9)
SODIUM SERPL-SCNC: 139 MMOL/L (ref 133–144)
TSH SERPL DL<=0.005 MIU/L-ACNC: 2.87 MU/L (ref 0.4–4)
WBC # BLD AUTO: 6.2 10E9/L (ref 4–11)

## 2020-07-23 PROCEDURE — 85027 COMPLETE CBC AUTOMATED: CPT | Performed by: INTERNAL MEDICINE

## 2020-07-23 PROCEDURE — G0439 PPPS, SUBSEQ VISIT: HCPCS | Performed by: INTERNAL MEDICINE

## 2020-07-23 PROCEDURE — 36415 COLL VENOUS BLD VENIPUNCTURE: CPT | Performed by: INTERNAL MEDICINE

## 2020-07-23 PROCEDURE — 80053 COMPREHEN METABOLIC PANEL: CPT | Performed by: INTERNAL MEDICINE

## 2020-07-23 PROCEDURE — 99213 OFFICE O/P EST LOW 20 MIN: CPT | Mod: 25 | Performed by: INTERNAL MEDICINE

## 2020-07-23 PROCEDURE — 84443 ASSAY THYROID STIM HORMONE: CPT | Performed by: INTERNAL MEDICINE

## 2020-07-23 RX ORDER — GABAPENTIN 300 MG/1
300 CAPSULE ORAL AT BEDTIME
COMMUNITY
Start: 2020-07-23 | End: 2022-10-18

## 2020-07-23 RX ORDER — GABAPENTIN 300 MG/1
300 CAPSULE ORAL AT BEDTIME
COMMUNITY
Start: 2020-07-23 | End: 2020-07-23

## 2020-07-23 RX ORDER — LEVOTHYROXINE SODIUM 100 UG/1
100 TABLET ORAL DAILY
Qty: 90 TABLET | Refills: 1 | Status: SHIPPED | OUTPATIENT
Start: 2020-07-23 | End: 2021-02-22

## 2020-07-23 ASSESSMENT — ACTIVITIES OF DAILY LIVING (ADL): CURRENT_FUNCTION: NO ASSISTANCE NEEDED

## 2020-07-23 ASSESSMENT — MIFFLIN-ST. JEOR: SCORE: 1562.15

## 2020-07-23 NOTE — PROGRESS NOTES
" SUBJECTIVE:   Moses Elizondo is a 86 year old male who presents for Preventive Visit.    Are you in the first 12 months of your Medicare coverage?  No    Healthy Habits:    In general, how would you rate your overall health?  Very good    Frequency of exercise:  6-7 days/week    Duration of exercise:  15-30 minutes    Do you usually eat at least 4 servings of fruit and vegetables a day, include whole grains    & fiber and avoid regularly eating high fat or \"junk\" foods?  No    Taking medications regularly:  Yes    Barriers to taking medications:  None    Medication side effects:  None    Ability to successfully perform activities of daily living:  No assistance needed    Home Safety:  No safety concerns identified    Hearing Impairment:  No hearing concerns    In the past 6 months, have you been bothered by leaking of urine?  No    In general, how would you rate your overall mental or emotional health?  Good      PHQ-2 Total Score:    Additional concerns today:  No    Do you feel safe in your environment? Yes    Have you ever done Advance Care Planning? (For example, a Health Directive, POLST, or a discussion with a medical provider or your loved ones about your wishes): Yes, advance care planning is on file.      Fall risk  Fallen 2 or more times in the past year?: No  Any fall with injury in the past year?: No    Cognitive Screening   1) Repeat 3 items (Leader, Season, Table)    2) Clock draw: ABNORMAL   3) 3 item recall: Recalls 1 object   Results: ABNORMAL clock, 1-2 items recalled: PROBABLE COGNITIVE IMPAIRMENT, **INFORM PROVIDER**    Mini-CogTM Copyright UMER Rivas. Licensed by the author for use in [facility]; reprinted with permission ([email or web address]). All rights reserved.      Do you have sleep apnea, excessive snoring or daytime drowsiness?: no    Reviewed and updated as needed this visit by clinical staff  Tobacco  Allergies  Meds         Reviewed and updated as needed this visit by " Provider        Social History     Tobacco Use     Smoking status: Former Smoker     Packs/day: 0.50     Years: 16.00     Pack years: 8.00     Start date:      Last attempt to quit: 1967     Years since quittin.5     Smokeless tobacco: Never Used   Substance Use Topics     Alcohol use: Yes     Alcohol/week: 0.0 standard drinks     Comment: 4 drinks week     If you drink alcohol do you typically have >3 drinks per day or >7 drinks per week? No    No flowsheet data found.        1.  Prior of coronary artery disease as listed in medical history.  No current or recent cardiovascaulr symptoms.   No shortness of breath, no episodes of chest pain/pressure, no dyspnea on exertion, no changes in his abiliy to perform physical exertion or tasks.  Takes the same amount of time to perform similar physical tasks.        2.  Hyperlipidemia:  Has history of hyperlipidemia.    The patient is taking a medication for this.  Denies any significant side effects from his medication.      **In reviewing the patients pills bottles that he brought with im today, he did not realize that he has been taking Atorvastatin 20 mg ocne per day from [employer], AND atorvastatin 40 mg ocne per day from [facility]    Latest labs reviewed:    Recent Labs   Lab Test 10/31/19  0849 19  0823  14  0722 05/15/14  0522   CHOL 126 116   < > 102 118   HDL 52 40   < > 35* 34*   LDL 60 61   < > 52* 63   TRIG 70 75   < > 73 101   CHOLHDLRATIO  --   --   --  2.9 3.5    < > = values in this interval not displayed.        Lab Results   Component Value Date     2020         3.  Memory has been declining.  Has seen the [facility] about this. Recent labs (B12, thyroid, etc) have been normal.     4.  ongoign idiopathic perhiperal neuropathy.  Prescribed gabapentin from Neurology Clinic at bedtime which helps.     5.  History of hypothyroidism.  On replacement therapy.  He has not experienced any significant side effects  of this medication.   The patient denies of fatigue, weight changes, heat/cold intolerance, hair changes, nail changes, bowel changes.     Latest labs reviewed:    Lab Results   Component Value Date    TSH 2.87 07/23/2020    TSH 1.94 10/31/2019    TSH 2.600 05/02/2019    TSH 2.20 03/20/2019    TSH 2.53 02/27/2018    TSH 3.78 02/21/2017    TSH 3.67 02/09/2016    TSH 3.04 02/03/2015    TSH 2.39 01/27/2014    TSH 2.94 12/19/2012    TSH 2.35 01/25/2012    TSH 5.75 12/13/2011    TSH 4.52 12/09/2010    TSH 0.98 02/15/2010    TSH 3.22 02/17/2009    TSH 6.85 12/08/2008    TSH 4.73 09/30/2008    TSH 0.09 07/10/2008    TSH 1.13 05/28/2008    TSH 20.20 03/28/2008    TSH 1.02 12/21/2007    TSH 7.77 10/26/2007           Current providers sharing in care for this patient include:   Patient Care Team:  Steven Wagoner MD as PCP - General (Internal Medicine)  Steven Wagoner MD as Assigned PCP    The following health maintenance items are reviewed in Epic and correct as of today:  Health Maintenance   Topic Date Due     ZOSTER IMMUNIZATION (1 of 2) 10/19/1983     FALL RISK ASSESSMENT  03/21/2020     INFLUENZA VACCINE (1) 09/01/2020     LIPID  10/31/2020     MEDICARE ANNUAL WELLNESS VISIT  07/23/2021     TSH W/FREE T4 REFLEX  07/23/2021     DTAP/TDAP/TD IMMUNIZATION (3 - Td) 12/19/2022     ADVANCE CARE PLANNING  07/23/2025     PHQ-2  Completed     PNEUMOCOCCAL IMMUNIZATION 65+ LOW/MEDIUM RISK  Completed     IPV IMMUNIZATION  Aged Out     MENINGITIS IMMUNIZATION  Aged Out     HEPATITIS B IMMUNIZATION  Aged Out       Past Medical History:  ---------------------------  Past Medical History:   Diagnosis Date     Aortic root dilatation (H)     4.4 cm     Ascending aorta dilatation (H)     4.4 cm     ASD (atrial septal defect)      Bradycardia      CAD (coronary artery disease)      YOGESH to RCA(6/20/19); YOGESH to OM1(5/15/14)     Cardiac pacemaker 06/20/2019    [employer] PPM COMFORT MRI XT DR-implant 6/20/19     Chest  discomfort      Degeneration of lumbar or lumbosacral intervertebral disc      Familial tremor      Former smoker      Herpes zoster without mention of complication      HTN (hypertension)      Hyperlipidaemia      Idiopathic peripheral neuropathy      MGUS (monoclonal gammopathy of unknown significance)      NSTEMI (non-ST elevated myocardial infarction) (H) 2014     Other and unspecified hyperlipidemia      PFO (patent foramen ovale)      Prostatitis, unspecified      Second degree heart block      Sensorineural hearing loss, unspecified      SSS (sick sinus syndrome) (H)      Status post coronary angiogram 6/20/2019     Unspecified hypothyroidism        Past Surgical History:  ---------------------------  Past Surgical History:   Procedure Laterality Date     C NONSPECIFIC PROCEDURE  2010    Laparoscopic cholecystectomy.      CHOLECYSTECTOMY       CORONARY ANGIOGRAPHY ADULT ORDER  5/14/14    PTCA w/YOGESH to OM1     CV CORONARY ANGIOGRAM N/A 6/20/2019    Procedure: Coronary Angiogram;  Surgeon: Romie Coronado MD;  Location:  [facility]     CV INTRAVASULAR ULTRASOUND N/A 6/20/2019    Procedure: Intravascular Ultrasound;  Surgeon: Romie Coronado MD;  Location:  [facility]     CV PCI ATHERECTOMY ORBITAL N/A 6/20/2019    Procedure: PCI Atherectomy Orbital;  Surgeon: Romie Coronado MD;  Location:  [facility]     CV PCI STENT DRUG ELUTING N/A 6/20/2019    Procedure: PCI Stent Drug Eluting;  Surgeon: Romie Coronado MD;  Location:  [facility]     CV TEMPORARY PACEMAKER INSERTION N/A 6/20/2019    Procedure: Temporary Pacemaker Insertion;  Surgeon: Romie Coronado MD;  Location:  [facility]     EP PACEMAKER N/A 6/20/2019    Procedure: EP Pacemaker;  Surgeon: Max Dowell MD;  Location:  [facility]     HEART CATH, ANGIOPLASTY  5/14/14    YOGESH to OM1       Current  "Medications:  ---------------------------  Current Outpatient Medications   Medication Sig Dispense Refill     ASPIRIN EC PO Take 81 mg by mouth daily       atorvastatin (LIPITOR) 40 MG tablet Take 1 tablet (40 mg) by mouth daily 90 tablet 1     gabapentin (NEURONTIN) 300 MG capsule Take 1 capsule (300 mg) by mouth At Bedtime       levothyroxine (SYNTHROID/LEVOTHROID) 100 MCG tablet Take 1 tablet (100 mcg) by mouth daily 90 tablet 1     metoprolol succinate ER (TOPROL-XL) 25 MG 24 hr tablet Take 1 tablet (25 mg) by mouth daily 90 tablet 3     nitroGLYcerin (NITROSTAT) 0.4 MG sublingual tablet One tablet under the tongue every 5 minutes if needed for chest pain. May repeat every 5 minutes for a maximum of 3 doses in 15 minutes\" 25 tablet 3     Polyethylene Glycol 3350 (MIRALAX PO) Take by mouth as needed       Valerian 450 MG CAPS 470 mg daily       vitamin B-12 (CYANOCOBALAMIN) 250 MCG tablet Take 1,000 mcg by mouth daily       Vitamin D-Vitamin K (VITAMIN K2-VITAMIN D3 PO) Take by mouth daily         Allergies:  -------------  Allergies   Allergen Reactions     No Known Drug Allergies        Social History:  -------------------  Social History     Socioeconomic History     Marital status:      Spouse name: Not on file     Number of children: Not on file     Years of education: Not on file     Highest education level: Not on file   Occupational History     Not on file   Social Needs     Financial resource strain: Not on file     Food insecurity     Worry: Not on file     Inability: Not on file     Transportation needs     Medical: Not on file     Non-medical: Not on file   Tobacco Use     Smoking status: Former Smoker     Packs/day: 0.50     Years: 16.00     Pack years: 8.00     Start date:      Last attempt to quit: 1967     Years since quittin.5     Smokeless tobacco: Never Used   Substance and Sexual Activity     Alcohol use: Yes     Alcohol/week: 0.0 standard drinks     Comment: 4 drinks week " "    Drug use: No     Sexual activity: Never   Lifestyle     Physical activity     Days per week: Not on file     Minutes per session: Not on file     Stress: Not on file   Relationships     Social connections     Talks on phone: Not on file     Gets together: Not on file     Attends Voodoo service: Not on file     Active member of club or organization: Not on file     Attends meetings of clubs or organizations: Not on file     Relationship status: Not on file     Intimate partner violence     Fear of current or ex partner: Not on file     Emotionally abused: Not on file     Physically abused: Not on file     Forced sexual activity: Not on file   Other Topics Concern      Service Not Asked     Blood Transfusions Not Asked     Caffeine Concern Yes     Comment: 2 cups coffee/day     Occupational Exposure Not Asked     Hobby Hazards Not Asked     Sleep Concern No     Stress Concern Not Asked     Weight Concern Yes     Comment: limiting alcohol to watch calories     Special Diet Not Asked     Back Care Not Asked     Exercise Yes     Comment: Stationary bike  weights and aerobics 4 times week     Bike Helmet Not Asked     Seat Belt Yes     Self-Exams Not Asked     Parent/sibling w/ CABG, MI or angioplasty before 65F 55M? No   Social History Narrative     Not on file       Family Medical History:  ------------------------------  Family History   Problem Relation Age of Onset     Cancer Mother      Genitourinary Problems Father 99        complications from surgery     Heart Disease Brother 72        MI        Review of Systems  Constitutional, HEENT, cardiovascular, pulmonary, gi and gu systems are negative, except as otherwise noted.    OBJECTIVE:   /70   Pulse 63   Temp 97.3  F (36.3  C) (Temporal)   Ht 1.778 m (5' 10\")   Wt 87.6 kg (193 lb 1.6 oz)   SpO2 98%   BMI 27.71 kg/m   Estimated body mass index is 27.71 kg/m  as calculated from the following:    Height as of this encounter: 1.778 m (5' " "10\").    Weight as of this encounter: 87.6 kg (193 lb 1.6 oz).  Physical Exam  GENERAL alert and no distress  EYES:  Normal sclera,conjunctiva, EOMI  HENT: oral and posterior pharynx without lesions or erythema, facies symmetric  NECK: Neck supple. No LAD, without thyroidmegaly.  RESP: Clear to ausculation bilaterally without wheezes or crackles. Normal BS in all fields.  CV: RRR normal S1S2 without murmurs, rubs or gallops.  LYMPH: no cervical lymph adenopathy appreciated  MS: extremities- no gross deformities of the visible extremities noted,   EXT:  no lower extremity edema  PSYCH: Alert and oriented times 3; speech- coherent  SKIN:  No obvious significant skin lesions on visible portions of face     Diagnostic Test Results:  Labs reviewed in Epic    ASSESSMENT / PLAN:     (Z00.00) Encounter for Medicare annual wellness exam  (primary encounter diagnosis)  Comment: Discussed cardiac disease risk factors and cardiac disease risk factor modification, including diabetes screening, blood pressure screening (and management if indicated), and cholesterol screening.   Reviewed immunzation guidelines, including pneumococcal vaccines, annual influenza, and shingles vaccines.   Discussed routine cancer screenings, including skin cancer, colon cancer screening for everyone until age 80, prostate cancer screening in men until age 75, mammogram and PAP/pelvic for women until age 75.   Recommended regular dentist visits to care for remaining teeth.   Recommended regular screening for vision and glaucoma.   Recommended safe driving and accident avoidance.   Plan:     (I21.4) Non-STEMI (non-ST elevated myocardial infarction) (H)  Comment: Discussed secondary risk factor modification and recommended continuing aggressive management of these items.   Plan:     (I25.810) Coronary artery disease  Comment: The patient does not report any signs or symptoms of angina or active cardiac ischemia.   They do not report any relative changes " "in their ability to perform physical activities over the past year.    We discussed aggressive secondary risk factor modification, including aggressive BP control (under 130/ideally), aggressive LDL lowering with statin (goal under 100 for sure/under 70 ideally), no smoking, diabetes prevention/management, no smoking, and use of either ASA or similar anti platelet agent if tolerated.     Plan: **Hepatic panel FUTURE 2mo            (E03.9) Hypothyroidism, unspecified type  Comment: This condition is currently controlled on the current medical regimen.  Continue current therapy.   Discussed signs and symptoms of hypo and hyperthyroidism.  Reviewed treatment options.   Recommended absolute medication compliance to avoid adding any additionial variance to the labs.   Plan: levothyroxine (SYNTHROID/LEVOTHROID) 100 MCG         tablet            (G60.9) Idiopathic peripheral neuropathy  Comment: This condition is currently controlled on the current medical regimen.  Continue current therapy.   Plan: gabapentin (NEURONTIN) 300 MG capsule,         DISCONTINUED: gabapentin (NEURONTIN) 300 MG         capsule            (R79.89) Elevated LFTs  Comment: unclear reaosn, could be due to the excessive atorvastatin that he has inadvertantly taking.   Stop all Atorvastatin for 2 weeks, then resume just 40 mg once per day.    Return to our lab for a \"lab only \"appointment to recheck the liver tests in 5-6 weeks.  I will let you know the results and if anything further is needed.   Plan: **Hepatic panel FUTURE 2mo               COUNSELING:  Reviewed preventive health counseling, as reflected in patient instructions       Regular exercise       Healthy diet/nutrition       Vision screening       Hearing screening       Dental care       Bladder control       Fall risk prevention    Estimated body mass index is 27.71 kg/m  as calculated from the following:    Height as of this encounter: 1.778 m (5' 10\").    Weight as of this encounter: " 87.6 kg (193 lb 1.6 oz).         reports that he quit smoking about 53 years ago. He started smoking about 70 years ago. He has a 8.00 pack-year smoking history. He has never used smokeless tobacco.      Appropriate preventive services were discussed with this patient, including applicable screening as appropriate for cardiovascular disease, diabetes, osteopenia/osteoporosis, and glaucoma.  As appropriate for age/gender, discussed screening for colorectal cancer, prostate cancer, breast cancer, and cervical cancer. Checklist reviewing preventive services available has been given to the patient.    Reviewed patients plan of care and provided an AVS. The  sheri Lopes meets the Care Plan requirement. This Care Plan has been established and reviewed with the .    Counseling Resources:  ATP IV Guidelines  Pooled Cohorts Equation Calculator  Breast Cancer Risk Calculator  FRAX Risk Assessment  ICSI Preventive Guidelines  Dietary Guidelines for Americans, 2010  USDA's MyPlate  ASA Prophylaxis  Lung CA Screening    Steven Wagoner MD  [facility]    Identified Health Risks:

## 2020-07-23 NOTE — PATIENT INSTRUCTIONS
"*  You are accidentally taking too much atorvastatin, a 20 mg tablet from [facility] AND a 40 mg tablet from [facility].  This is more than you should be taking.       --Stop the Atorvastatin 20 mg tablets      --Take ONLY the 40 mg Atorvastatin tablet.     *  Continue all other medications at the same doses.  Contact your usual pharmacy if you need refills.     *  [facility] as ordered.     *  Consider a \"shingles vaccine\"    *  Get the annual     *  Return to see me in 6 months for routine follow, sooner if needed.  Use [facility] or Call [phone] to schedule the appointment with me.       Shingles Vaccine (SHINGRIX):        I would recommend that you consider getting the Shingrix shingles vaccine.  The shingles vaccine is recommended for anyone over age 50.       The Shingrix vaccine is a series of 2 vaccines given 2-6 months apart.       The shingles vaccine does not stop you from getting shingles, but it decreases the intensity of the event, the duration of the event, and decreases the painful nerve condition that results       Based on the available data, the Shingrix vaccine has superior benefit and should be considered even if you have had the old Zostavax shinglesvaccine before.        Contact your insurance provider and ask them if either shingles vaccine is covered and is so, how much it will cost you.  Usually this will be cheaper to get in a pharmacy given by the pharmacist.      Regardless of the coverage, I would recommend that you consider the vaccine since shingles is very painful, (just ask anyone who has ever had it)      For Medicare insurance, the vaccine is cheaper to receive from a pharmacist in a pharmacy than for us to give you in the clinic.        5 GOALS TO PREVENT VASCULAR DISEASE:     1.  Aggressive blood pressure control, under 130/80 ideally.  Using medications if needed.    Your blood pressure is under good control    BP Readings from Last 4 " "Encounters:   07/23/20 110/70   07/06/20 108/68   03/02/20 112/70   02/27/20 117/73       2.  Aggressive LDL cholesterol (\"bad cholesterol\") lowering as indicated.    Your goal is an LDL under 130 for sure, preferably under 100.  (If you have diabetes or previous vascular disease, the the LDL goals would be under 100 for sure, preferably under 70.)    New guidelines identify four high-risk groups who could benefit from statins:   *people with pre-existing heart disease, such as those who have had a heart attack;   *people ages 40 to 75 who have diabetes of any type  *patients ages 40 to 75 with at least a 7.5% risk of developing cardiovascular disease over the next decade, according to a formula described in the guidelines  *patients with the sort of super-high cholesterol that sometimes runs in families, as evidenced by an LDL of 190 milligrams per deciliter or higher    Your cholesterol levels are well controlled.    Recent Labs   Lab Test 10/31/19  0849 03/20/19  0823  07/08/14  0722 05/15/14  0522   CHOL 126 116   < > 102 118   HDL 52 40   < > 35* 34*   LDL 60 61   < > 52* 63   TRIG 70 75   < > 73 101   CHOLHDLRATIO  --   --   --  2.9 3.5    < > = values in this interval not displayed.       3.  Aggressive diabetic prevention, screening and/or management.      You do not have diabetes as of the most recent blood tests.     4.  No smoking    5.  Consider daily preventative aspirin over age 50 if you have enough cardiac risk factors to place you at higher risk for the presence of vascular disease.    If you have any reason not to take aspirin such easy bruising or bleeding, stomach problems, other anticoagulant medications, or any other side effects, then you should not take Aspirin.      --Based on your current cardiac risk factor profile, you should take regular daily Aspirin 81 mg once per day.             Preventive Health Recommendations:   Male Ages 65 and over    Yearly exam:             See your health " "care provider every year in order to  o   Review health changes.   o   Discuss preventive care.    o   Review your medicines if your doctor has prescribed any.    Talk with your health care provider about whether you should have a test to screen for prostate cancer (PSA).    Every 3 years, have a diabetes test (fasting glucose). If you are at risk for diabetes, you should have this test more often.    Every 5 years, have a cholesterol test. Have this test more often if you are at risk for high cholesterol or heart disease.     Every 10 years, have a colonoscopy. Or, have a yearly FIT test (stool test). These exams will check for colon cancer.    Talk to with your health care provider about screening for Abdominal Aortic Aneurysm if you have a family history of AAA or have a history of smoking.    Shots:     Get a flu shot each year.     Get a tetanus shot every 10 years.     Talk to your doctor about your pneumonia vaccines. There are now two you should receive - Pneumovax (PPSV 23) and Prevnar (PCV 13).     Talk to your doctor about a shingles vaccine.     Talk to your doctor about the hepatitis B vaccine.  Nutrition:     Eat at least 5 servings of fruits and vegetables each day.     Eat whole-grain bread, whole-wheat pasta and brown rice instead of white grains and rice.     Talk to your provider about Calcium and Vitamin D.      --Good Grains:  Oats, brown rice, Quinoa (these do not raise the blood sugar as much)     --Bad grains:  Anything made from wheat or white rice     (because these raise the blood sugars significantly, and the possible gluten issue from wheat for some people).      --Proteins:  Aim for \"lean proteins\" including chicken, fish, seafood, pork, turkey, and eggs (in moderation); Eat red meat only occasionally      Lifestyle    Exercise for at least 150 minutes a week (30 minutes a day, 5 days a week). This will help you control your weight and prevent disease.     Limit alcohol to one drink per " day.     No smoking.     Wear sunscreen to prevent skin cancer.     See your dentist every six months for an exam and cleaning.     See your eye doctor every 1 to 2 years to screen for conditions such as glaucoma, macular degeneration, cataracts, etc           Patient Education   Personalized Prevention Plan  You are due for the preventive services outlined below.  Your care team is available to assist you in scheduling these services.  If you have already completed any of these items, please share that information with your care team to update in your medical record.  Health Maintenance Due   Topic Date Due     Zoster (Shingles) Vaccine (1 of 2) 10/19/1983     PHQ-2  01/01/2020     Annual Wellness Visit  03/21/2020     FALL RISK ASSESSMENT  03/21/2020       Understanding [employer] MyPlate  The USDA (U.S. Department of Agriculture) has guidelines to help you make healthy food choices. These are called MyPlate. MyPlate shows the food groups that make up healthy meals using the image of a place setting. Before you eat, think about the healthiest choices for what to put onto your plate or into your cup or bowl. To learn more about building a healthy plate, visit www.choosemyplate.gov.    The food groups    Fruits. Any fruit or 100% fruit juice counts as part of the Fruit Group. Fruits may be fresh, canned, frozen, or dried, and may be whole, cut-up, or pureed. Make half your plate fruits and vegetables.    Vegetables. Any vegetable or 100% vegetable juice counts as a member of the Vegetable Group. Vegetables may be fresh, frozen, canned, or dried. They can be served raw or cooked and may be whole, cut-up, or mashed. Make half your plate fruits and vegetables.    Grains. All foods made from grains are part of the Grains Group. These include wheat, rice, oats, cornmeal, and barley such as bread, pasta, oatmeal, cereal, tortillas, and grits. Grains should be no more than a quarter of your plate. At least half of your grains  should be whole grains.    Protein. This group includes meat, poultry, seafood, beans and peas, eggs, processed soy products (like tofu), nuts (including nut butters), and seeds. Make protein choices no more than a quarter of your plate. Meat and poultry choices should be lean or low fat.    Dairy. All fluid milk products and foods made from milk that contain calcium, like yogurt and cheese, are part of the Dairy Group. (Foods that have little calcium, such as cream, butter, and cream cheese, are not part of the group.) Most dairy choices should be low-fat or fat-free.    Oils. These are fats that are liquid at room temperature. They include canola, corn, olive, soybean, and sunflower oil. Foods that are mainly oil include mayonnaise, certain salad dressings, and soft margarines. You should have only 5 to 7 teaspoons of oils a day. You probably already get this much from the food you eat.  Date Last Reviewed: 8/1/2017 2000-2019 [employer]. [address] [place] [address], [place] [address]. All rights reserved. This information is not intended as a substitute for professional medical care. Always follow your healthcare professional's instructions.

## 2020-07-23 NOTE — LETTER
"July 24, 2020      [facility]  [address] [place] [id number]        Dear ,    We are writing to inform you of your test results.    Your labs all looked good, except the liver tests were elevated.  This is most likely from the increased Atorvastatin that you were accidentally taking.      Stop all Atorvastatin for 2 weeks, then resume just 40 mg once per day.    Continue all other medications at the same doses.  Contact your usual pharmacy if you need refills.     Return to our lab for a \"lab only \"appointment to recheck the liver tests in 5-6 weeks.  I will let you know the results and if anything further is needed.     Resulted Orders   **Comprehensive metabolic panel FUTURE 1yr   Result Value Ref Range    Sodium 139 133 - 144 mmol/L    Potassium 4.3 3.4 - 5.3 mmol/L    Chloride 109 94 - 109 mmol/L    Carbon Dioxide 27 20 - 32 mmol/L    Anion Gap 3 3 - 14 mmol/L    Glucose 89 70 - 99 mg/dL    Urea Nitrogen 23 7 - 30 mg/dL    Creatinine 1.14 0.66 - 1.25 mg/dL    GFR Estimate 58 (L) >60 mL/min/[1.73_m2]      Comment:      Non  GFR Calc  Starting 12/18/2018, serum creatinine based estimated GFR (eGFR) will be   calculated using the Chronic Kidney Disease Epidemiology Collaboration   (CKD-EPI) equation.      GFR Estimate If Black 67 >60 mL/min/[1.73_m2]      Comment:       GFR Calc  Starting 12/18/2018, serum creatinine based estimated GFR (eGFR) will be   calculated using the Chronic Kidney Disease Epidemiology Collaboration   (CKD-EPI) equation.      Calcium 8.9 8.5 - 10.1 mg/dL    Bilirubin Total 0.9 0.2 - 1.3 mg/dL    Albumin 3.5 3.4 - 5.0 g/dL    Protein Total 7.5 6.8 - 8.8 g/dL    Alkaline Phosphatase 158 (H) 40 - 150 U/L     (H) 0 - 70 U/L     (H) 0 - 45 U/L   **CBC with platelets FUTURE 1yr   Result Value Ref Range    WBC 6.2 4.0 - 11.0 10e9/L    RBC Count 4.58 4.4 - 5.9 10e12/L    Hemoglobin 13.7 13.3 - 17.7 g/dL    Hematocrit " 41.4 40.0 - 53.0 %    MCV 90 78 - 100 fl    MCH 29.9 26.5 - 33.0 pg    MCHC 33.1 31.5 - 36.5 g/dL    RDW 12.5 10.0 - 15.0 %    Platelet Count 162 150 - 450 10e9/L   TSH WITH FREE T4 REFLEX   Result Value Ref Range    TSH 2.87 0.40 - 4.00 mU/L       If you have any questions or concerns, please call the clinic at the number listed above.       Sincerely,        Steven Wagoner MD

## 2020-07-24 ENCOUNTER — TELEPHONE (OUTPATIENT)
Dept: INTERNAL MEDICINE | Facility: CLINIC | Age: 85
End: 2020-07-24

## 2020-07-24 NOTE — TELEPHONE ENCOUNTER
"Please contact the patient.      His labs all looked good, except the liver tests were mildly elevated.  This is most likely from the increased Atorvastatin that he has been accidentally taking.      Stop all Atorvastatin for 2 weeks, then resume just 40 mg once per day.      Continue all other medications at the same doses.  Contact your usual pharmacy if you need refills.     Return to our lab for a \"lab only \"appointment to recheck the liver tests in 5-6 weeks.  I will let you know the results and if anything further is needed.     (I sent him a letter as well, but wanted to make sure that he got this message)  "

## 2020-07-24 NOTE — TELEPHONE ENCOUNTER
Informed patient of message below. He verbalized an understanding and agrees with plan.    Ita Bishop ROSETTE

## 2020-08-14 ENCOUNTER — HOSPITAL ENCOUNTER (OUTPATIENT)
Dept: ULTRASOUND IMAGING | Facility: CLINIC | Age: 85
Discharge: HOME | End: 2020-08-14
Attending: FAMILY MEDICINE | Admitting: FAMILY MEDICINE
Payer: COMMERCIAL

## 2020-08-14 ENCOUNTER — OFFICE VISIT (OUTPATIENT)
Dept: URGENT CARE | Facility: URGENT CARE | Age: 85
End: 2020-08-14
Payer: COMMERCIAL

## 2020-08-14 VITALS
OXYGEN SATURATION: 98 % | HEART RATE: 65 BPM | SYSTOLIC BLOOD PRESSURE: 124 MMHG | WEIGHT: 191 LBS | BODY MASS INDEX: 27.41 KG/M2 | DIASTOLIC BLOOD PRESSURE: 68 MMHG | RESPIRATION RATE: 18 BRPM | TEMPERATURE: 97.3 F

## 2020-08-14 DIAGNOSIS — M79.662 PAIN OF LEFT LOWER LEG: Primary | ICD-10-CM

## 2020-08-14 PROCEDURE — 93971 EXTREMITY STUDY: CPT | Mod: LT

## 2020-08-14 PROCEDURE — 99215 OFFICE O/P EST HI 40 MIN: CPT | Performed by: FAMILY MEDICINE

## 2020-08-14 NOTE — PATIENT INSTRUCTIONS
1. Ultrasound at [facility]    2. If positive may need visit to ER for evaluation and medication.

## 2020-08-14 NOTE — PROGRESS NOTES
SUBJECTIVE:   [facility] is a 86 year old male presenting with a chief complaint of   Chief Complaint   Patient presents with     Musculoskeletal Problem     Left knee pain x5 days. [place] cabin over the weekend not sure if it is the cause.      He had a friend who had something very similar and it appeared to be that it was more of her hip.  He is not complaining of any hip pain he is ambulating without particular problem other than the knee seems to hurt.  The pain is in the back of the knee  He is an established patient of [facility].        Review of Systems   Musculoskeletal:        Knee pain, posterior knee.   All other systems reviewed and are negative.      Past Medical History:   Diagnosis Date     Aortic root dilatation (H)     4.4 cm     Ascending aorta dilatation (H)     4.4 cm     ASD (atrial septal defect)      Bradycardia      CAD (coronary artery disease)      YOGESH to RCA(6/20/19); YOGESH to OM1(5/15/14)     Cardiac pacemaker 06/20/2019    [employer] PPM COMFORT MRI XT DR-implant 6/20/19     Chest discomfort      Degeneration of lumbar or lumbosacral intervertebral disc      Familial tremor      Former smoker      Herpes zoster without mention of complication      HTN (hypertension)      Hyperlipidaemia      Idiopathic peripheral neuropathy      MGUS (monoclonal gammopathy of unknown significance)      NSTEMI (non-ST elevated myocardial infarction) (H) 2014     Other and unspecified hyperlipidemia      PFO (patent foramen ovale)      Prostatitis, unspecified      Second degree heart block      Sensorineural hearing loss, unspecified      SSS (sick sinus syndrome) (H)      Status post coronary angiogram 6/20/2019     Unspecified hypothyroidism      Family History   Problem Relation Age of Onset     Cancer Mother      Genitourinary Problems Father 99        complications from surgery     Heart Disease Brother 72        MI     Current Outpatient Medications   Medication Sig Dispense Refill  "    ASPIRIN EC PO Take 81 mg by mouth daily       atorvastatin (LIPITOR) 40 MG tablet Take 1 tablet (40 mg) by mouth daily 90 tablet 1     gabapentin (NEURONTIN) 300 MG capsule Take 1 capsule (300 mg) by mouth At Bedtime       levothyroxine (SYNTHROID/LEVOTHROID) 100 MCG tablet Take 1 tablet (100 mcg) by mouth daily 90 tablet 1     metoprolol succinate ER (TOPROL-XL) 25 MG 24 hr tablet Take 1 tablet (25 mg) by mouth daily 90 tablet 3     nitroGLYcerin (NITROSTAT) 0.4 MG sublingual tablet One tablet under the tongue every 5 minutes if needed for chest pain. May repeat every 5 minutes for a maximum of 3 doses in 15 minutes\" 25 tablet 3     Polyethylene Glycol 3350 (MIRALAX PO) Take by mouth as needed       Valerian 450 MG CAPS 470 mg daily       vitamin B-12 (CYANOCOBALAMIN) 250 MCG tablet Take 1,000 mcg by mouth daily       Vitamin D-Vitamin K (VITAMIN K2-VITAMIN D3 PO) Take by mouth daily       Social History     Tobacco Use     Smoking status: Former Smoker     Packs/day: 0.50     Years: 16.00     Pack years: 8.00     Start date:      Last attempt to quit: 1967     Years since quittin.6     Smokeless tobacco: Never Used   Substance Use Topics     Alcohol use: Yes     Alcohol/week: 0.0 standard drinks     Comment: 4 drinks week       OBJECTIVE  /68 (BP Location: Right arm, Patient Position: Chair, Cuff Size: Adult Regular)   Pulse 65   Temp 97.3  F (36.3  C) (Tympanic)   Resp 18   Wt 86.6 kg (191 lb)   SpO2 98%   BMI 27.41 kg/m      Physical Exam  Vitals signs and nursing note reviewed.   Constitutional:       Appearance: Normal appearance.   HENT:      Head: Normocephalic.      Nose: Nose normal.   Eyes:      Pupils: Pupils are equal, round, and reactive to light.   Neck:      Musculoskeletal: Normal range of motion.   Cardiovascular:      Rate and Rhythm: Normal rate.   Pulmonary:      Effort: Pulmonary effort is normal.   Musculoskeletal:      Comments: Bilateral lower extremities he has " varicose veins that are evident.  Left popliteal area has a mass that feels hard center of the popliteal area.    No calf swelling negative Homans   Skin:     General: Skin is warm.   Neurological:      Mental Status: He is alert.   Psychiatric:         Mood and Affect: Mood normal.         Labs:  Results for orders placed or performed in visit on 08/14/20 (from the past 24 hour(s))   US Lower Extremity Venous Duplex Left    Narrative    VENOUS ULTRASOUND LEFT LEG  8/14/2020 2:39 PM     HISTORY: Pain of left lower leg    COMPARISON: None.    FINDINGS:  Examination of the deep veins with graded compression and  color flow Doppler with spectral wave form analysis was performed.   There is no evidence for DVT in the left lower extremity.    There is a complex fluid collection in the left popliteal fossa that  measures 7.1 x 2.6 x 1.7 cm. This most likely represents a Baker's  cyst.      Impression    IMPRESSION: No evidence of deep venous thrombosis.    DARREN TORREZ MD           ASSESSMENT:      ICD-10-CM    1. Pain of left lower leg  M79.662 US Lower Extremity Venous Duplex Left   We need to rule out a DVT however if we do rule out a DVT this is probably more musculoskeletal and I do not think that he has an unstable knee to exam.    We will try a wrap we will try some Tylenol ice on a daily basis heat if it seems to help and he should follow-up with his primary care within the week    Medical Decision Making:    Differential Diagnosis:  ACL tear, deep vein thrombosis,     Serious Comorbid Conditions:  Adult:  None    PLAN:  1.  Ultrasound left lower extremity; we did send him to radiology to rule out DVT.  This indeed was negative  2.  Tylenol 3 times a day  3.  Wrap around the knee either a knee sleeve knee wrap or an Ace wrap.      Followup:    I would like him to be seen by primary care in the next week    Patient Instructions   1. Ultrasound at [facility] today    2. If positive may need visit to ER for  evaluation and medication.

## 2020-09-14 ENCOUNTER — TELEPHONE (OUTPATIENT)
Dept: CARDIOLOGY | Facility: CLINIC | Age: 85
End: 2020-09-14

## 2020-09-15 NOTE — TELEPHONE ENCOUNTER
Informed Pt he does need to keep taking medications. Pt c/o knee pain and swelling after working in yard. Pt will see [facility] for issue. Informed Pt would not be from medication if one leg. Pt understood. JYOTHI Valentino RN

## 2020-10-06 ENCOUNTER — DOCUMENTATION ONLY (OUTPATIENT)
Dept: CARDIOLOGY | Facility: CLINIC | Age: 85
End: 2020-10-06

## 2020-10-06 NOTE — PROGRESS NOTES
Wellness Screening Tool    Symptom Screening:    Do you have one of the following NEW symptoms:      Fever (subjective or >100.0)?  No    New cough? No    Shortness of breath? No    Chills? No    New loss of taste or smell? No    Generalized body aches? No    New persistent headache? No    New sore throat? No    Nausea, vomiting or diarrhea? No    Within the past 2 weeks, have you been exposed to someone with a known positive illness below?      COVID - 19 (known or suspected) No    Chicken pox?  No    Measles? No    Pertussis? No    Have you had a positive COVID-19 diagnostic test (nasal swab test) in the last 14 days or are you currently   on self-quarantine restrictions (i.e.travel restriction, exposure, etc?) No        Patient notified of visitor restriction: Yes  Patient informed to wear a mask: Yes    Patient's appointment status: Patient will be seen in clinic as scheduled on 10/7/20 @ 9:45am. Raj BRYANT

## 2020-10-07 ENCOUNTER — ANCILLARY PROCEDURE (OUTPATIENT)
Dept: CARDIOLOGY | Facility: CLINIC | Age: 85
End: 2020-10-07
Attending: INTERNAL MEDICINE
Payer: COMMERCIAL

## 2020-10-07 DIAGNOSIS — Z95.0 CARDIAC PACEMAKER IN SITU: ICD-10-CM

## 2020-10-07 DIAGNOSIS — R00.1 BRADYCARDIA: Primary | ICD-10-CM

## 2020-10-07 PROCEDURE — 93280 PM DEVICE PROGR EVAL DUAL: CPT | Performed by: INTERNAL MEDICINE

## 2020-10-14 LAB
MDC_IDC_LEAD_IMPLANT_DT: NORMAL
MDC_IDC_LEAD_IMPLANT_DT: NORMAL
MDC_IDC_LEAD_LOCATION: NORMAL
MDC_IDC_LEAD_LOCATION: NORMAL
MDC_IDC_LEAD_LOCATION_DETAIL_1: NORMAL
MDC_IDC_LEAD_LOCATION_DETAIL_1: NORMAL
MDC_IDC_LEAD_MFG: NORMAL
MDC_IDC_LEAD_MFG: NORMAL
MDC_IDC_LEAD_MODEL: NORMAL
MDC_IDC_LEAD_MODEL: NORMAL
MDC_IDC_LEAD_POLARITY_TYPE: NORMAL
MDC_IDC_LEAD_POLARITY_TYPE: NORMAL
MDC_IDC_LEAD_SERIAL: NORMAL
MDC_IDC_LEAD_SERIAL: NORMAL
MDC_IDC_MSMT_BATTERY_DTM: NORMAL
MDC_IDC_MSMT_BATTERY_REMAINING_LONGEVITY: 119 MO
MDC_IDC_MSMT_BATTERY_RRT_TRIGGER: 2.62
MDC_IDC_MSMT_BATTERY_STATUS: NORMAL
MDC_IDC_MSMT_BATTERY_VOLTAGE: 2.98 V
MDC_IDC_MSMT_LEADCHNL_RA_IMPEDANCE_VALUE: 380 OHM
MDC_IDC_MSMT_LEADCHNL_RA_IMPEDANCE_VALUE: 475 OHM
MDC_IDC_MSMT_LEADCHNL_RA_PACING_THRESHOLD_AMPLITUDE: 0.5 V
MDC_IDC_MSMT_LEADCHNL_RA_PACING_THRESHOLD_PULSEWIDTH: 0.4 MS
MDC_IDC_MSMT_LEADCHNL_RA_SENSING_INTR_AMPL: 1.38 MV
MDC_IDC_MSMT_LEADCHNL_RA_SENSING_INTR_AMPL: 2.62 MV
MDC_IDC_MSMT_LEADCHNL_RV_IMPEDANCE_VALUE: 418 OHM
MDC_IDC_MSMT_LEADCHNL_RV_IMPEDANCE_VALUE: 532 OHM
MDC_IDC_MSMT_LEADCHNL_RV_PACING_THRESHOLD_AMPLITUDE: 0.62 V
MDC_IDC_MSMT_LEADCHNL_RV_PACING_THRESHOLD_PULSEWIDTH: 0.4 MS
MDC_IDC_MSMT_LEADCHNL_RV_SENSING_INTR_AMPL: 10.62 MV
MDC_IDC_MSMT_LEADCHNL_RV_SENSING_INTR_AMPL: 17.38 MV
MDC_IDC_PG_IMPLANT_DTM: NORMAL
MDC_IDC_PG_MFG: NORMAL
MDC_IDC_PG_MODEL: NORMAL
MDC_IDC_PG_SERIAL: NORMAL
MDC_IDC_PG_TYPE: NORMAL
MDC_IDC_SESS_CLINIC_NAME: NORMAL
MDC_IDC_SESS_DTM: NORMAL
MDC_IDC_SESS_TYPE: NORMAL
MDC_IDC_SET_BRADY_AT_MODE_SWITCH_RATE: 171 {BEATS}/MIN
MDC_IDC_SET_BRADY_HYSTRATE: NORMAL
MDC_IDC_SET_BRADY_LOWRATE: 60 {BEATS}/MIN
MDC_IDC_SET_BRADY_MAX_SENSOR_RATE: 120 {BEATS}/MIN
MDC_IDC_SET_BRADY_MAX_TRACKING_RATE: 120 {BEATS}/MIN
MDC_IDC_SET_BRADY_MODE: NORMAL
MDC_IDC_SET_BRADY_PAV_DELAY_LOW: 180 MS
MDC_IDC_SET_BRADY_SAV_DELAY_LOW: 150 MS
MDC_IDC_SET_LEADCHNL_RA_PACING_AMPLITUDE: 2 V
MDC_IDC_SET_LEADCHNL_RA_PACING_ANODE_ELECTRODE_1: NORMAL
MDC_IDC_SET_LEADCHNL_RA_PACING_ANODE_LOCATION_1: NORMAL
MDC_IDC_SET_LEADCHNL_RA_PACING_CAPTURE_MODE: NORMAL
MDC_IDC_SET_LEADCHNL_RA_PACING_CATHODE_ELECTRODE_1: NORMAL
MDC_IDC_SET_LEADCHNL_RA_PACING_CATHODE_LOCATION_1: NORMAL
MDC_IDC_SET_LEADCHNL_RA_PACING_POLARITY: NORMAL
MDC_IDC_SET_LEADCHNL_RA_PACING_PULSEWIDTH: 0.4 MS
MDC_IDC_SET_LEADCHNL_RA_SENSING_ANODE_ELECTRODE_1: NORMAL
MDC_IDC_SET_LEADCHNL_RA_SENSING_ANODE_LOCATION_1: NORMAL
MDC_IDC_SET_LEADCHNL_RA_SENSING_CATHODE_ELECTRODE_1: NORMAL
MDC_IDC_SET_LEADCHNL_RA_SENSING_CATHODE_LOCATION_1: NORMAL
MDC_IDC_SET_LEADCHNL_RA_SENSING_POLARITY: NORMAL
MDC_IDC_SET_LEADCHNL_RA_SENSING_SENSITIVITY: 0.3 MV
MDC_IDC_SET_LEADCHNL_RV_PACING_AMPLITUDE: 2 V
MDC_IDC_SET_LEADCHNL_RV_PACING_ANODE_ELECTRODE_1: NORMAL
MDC_IDC_SET_LEADCHNL_RV_PACING_ANODE_LOCATION_1: NORMAL
MDC_IDC_SET_LEADCHNL_RV_PACING_CAPTURE_MODE: NORMAL
MDC_IDC_SET_LEADCHNL_RV_PACING_CATHODE_ELECTRODE_1: NORMAL
MDC_IDC_SET_LEADCHNL_RV_PACING_CATHODE_LOCATION_1: NORMAL
MDC_IDC_SET_LEADCHNL_RV_PACING_POLARITY: NORMAL
MDC_IDC_SET_LEADCHNL_RV_PACING_PULSEWIDTH: 0.4 MS
MDC_IDC_SET_LEADCHNL_RV_SENSING_ANODE_ELECTRODE_1: NORMAL
MDC_IDC_SET_LEADCHNL_RV_SENSING_ANODE_LOCATION_1: NORMAL
MDC_IDC_SET_LEADCHNL_RV_SENSING_CATHODE_ELECTRODE_1: NORMAL
MDC_IDC_SET_LEADCHNL_RV_SENSING_CATHODE_LOCATION_1: NORMAL
MDC_IDC_SET_LEADCHNL_RV_SENSING_POLARITY: NORMAL
MDC_IDC_SET_LEADCHNL_RV_SENSING_SENSITIVITY: 0.9 MV
MDC_IDC_SET_ZONE_DETECTION_INTERVAL: 350 MS
MDC_IDC_SET_ZONE_DETECTION_INTERVAL: 400 MS
MDC_IDC_SET_ZONE_TYPE: NORMAL
MDC_IDC_STAT_AT_BURDEN_PERCENT: 0 %
MDC_IDC_STAT_AT_DTM_END: NORMAL
MDC_IDC_STAT_AT_DTM_START: NORMAL
MDC_IDC_STAT_BRADY_AP_VP_PERCENT: 97.43 %
MDC_IDC_STAT_BRADY_AP_VS_PERCENT: 0.03 %
MDC_IDC_STAT_BRADY_AS_VP_PERCENT: 2.35 %
MDC_IDC_STAT_BRADY_AS_VS_PERCENT: 0.19 %
MDC_IDC_STAT_BRADY_DTM_END: NORMAL
MDC_IDC_STAT_BRADY_DTM_START: NORMAL
MDC_IDC_STAT_BRADY_RA_PERCENT_PACED: 97.57 %
MDC_IDC_STAT_BRADY_RV_PERCENT_PACED: 99.78 %
MDC_IDC_STAT_EPISODE_RECENT_COUNT: 0
MDC_IDC_STAT_EPISODE_RECENT_COUNT_DTM_END: NORMAL
MDC_IDC_STAT_EPISODE_RECENT_COUNT_DTM_START: NORMAL
MDC_IDC_STAT_EPISODE_TOTAL_COUNT: 0
MDC_IDC_STAT_EPISODE_TOTAL_COUNT_DTM_END: NORMAL
MDC_IDC_STAT_EPISODE_TOTAL_COUNT_DTM_START: NORMAL
MDC_IDC_STAT_EPISODE_TYPE: NORMAL

## 2020-10-30 ENCOUNTER — TELEPHONE (OUTPATIENT)
Dept: INTERNAL MEDICINE | Facility: CLINIC | Age: 85
End: 2020-10-30

## 2020-10-30 DIAGNOSIS — G89.29 CHRONIC PAIN OF LEFT KNEE: Primary | ICD-10-CM

## 2020-10-30 DIAGNOSIS — M25.562 CHRONIC PAIN OF LEFT KNEE: Primary | ICD-10-CM

## 2020-10-30 NOTE — TELEPHONE ENCOUNTER
Patient seen his chiropractor and was told he should call his provider and get a referral for his knee to an ortho provider.

## 2020-10-30 NOTE — TELEPHONE ENCOUNTER
Referral to PT at [facility] ordered, they will be contacting him to arrange an appointment.     If he has not heard from anyone by Monday, then call [phone] to schedule an appointment at any of our locations.       Close encounter when done.

## 2020-10-30 NOTE — TELEPHONE ENCOUNTER
Called patient to clarify. Says his chiropractor told him to see a physical therapist for left knee- bothering him for several months but getting better. Went to [facility] 2-3 times for it before and saw a doctor there who told him to use ice, gave him shot in knee, knee brace, and tylenol for pain. When walks something is not right. Dealing with issues since August. Ok for PT referral?     Please call back patient when referral placed.

## 2020-11-05 ENCOUNTER — THERAPY VISIT (OUTPATIENT)
Dept: PHYSICAL THERAPY | Facility: CLINIC | Age: 85
End: 2020-11-05
Attending: INTERNAL MEDICINE
Payer: COMMERCIAL

## 2020-11-05 DIAGNOSIS — M25.562 LEFT KNEE PAIN: ICD-10-CM

## 2020-11-05 PROCEDURE — 97110 THERAPEUTIC EXERCISES: CPT | Mod: GP | Performed by: PHYSICAL THERAPIST

## 2020-11-05 PROCEDURE — 97161 PT EVAL LOW COMPLEX 20 MIN: CPT | Mod: GP | Performed by: PHYSICAL THERAPIST
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
## 2020-11-12 ENCOUNTER — THERAPY VISIT (OUTPATIENT)
Dept: PHYSICAL THERAPY | Facility: CLINIC | Age: 85
End: 2020-11-12
Payer: COMMERCIAL

## 2020-11-12 DIAGNOSIS — M25.562 LEFT KNEE PAIN: ICD-10-CM

## 2020-11-12 PROCEDURE — 97110 THERAPEUTIC EXERCISES: CPT | Mod: GP | Performed by: PHYSICAL THERAPIST

## 2020-11-19 ENCOUNTER — THERAPY VISIT (OUTPATIENT)
Dept: PHYSICAL THERAPY | Facility: CLINIC | Age: 85
End: 2020-11-19
Payer: COMMERCIAL

## 2020-11-19 DIAGNOSIS — M25.562 LEFT KNEE PAIN: ICD-10-CM

## 2020-11-19 PROCEDURE — 97110 THERAPEUTIC EXERCISES: CPT | Mod: GP | Performed by: PHYSICAL THERAPIST

## 2020-11-25 DIAGNOSIS — I21.4 NON-STEMI (NON-ST ELEVATED MYOCARDIAL INFARCTION) (H): ICD-10-CM

## 2020-11-25 DIAGNOSIS — I25.810 CORONARY ARTERY DISEASE INVOLVING CORONARY BYPASS GRAFT OF NATIVE HEART WITHOUT ANGINA PECTORIS: ICD-10-CM

## 2020-11-25 RX ORDER — ATORVASTATIN CALCIUM 40 MG/1
40 TABLET, FILM COATED ORAL DAILY
Qty: 90 TABLET | Refills: 0 | Status: SHIPPED | OUTPATIENT
Start: 2020-11-25 | End: 2020-11-25

## 2020-11-25 RX ORDER — ATORVASTATIN CALCIUM 40 MG/1
40 TABLET, FILM COATED ORAL DAILY
Qty: 90 TABLET | Refills: 0 | Status: SHIPPED | OUTPATIENT
Start: 2020-11-25 | End: 2021-03-16

## 2020-12-03 ENCOUNTER — THERAPY VISIT (OUTPATIENT)
Dept: PHYSICAL THERAPY | Facility: CLINIC | Age: 85
End: 2020-12-03
Payer: COMMERCIAL

## 2020-12-03 DIAGNOSIS — M25.562 LEFT KNEE PAIN: ICD-10-CM

## 2020-12-03 PROCEDURE — 97110 THERAPEUTIC EXERCISES: CPT | Mod: GP | Performed by: PHYSICAL THERAPIST

## 2020-12-21 ENCOUNTER — TELEPHONE (OUTPATIENT)
Dept: CARDIOLOGY | Facility: CLINIC | Age: 85
End: 2020-12-21

## 2020-12-21 NOTE — TELEPHONE ENCOUNTER
Return call to Pt he says wife has stomach issues with diarrhea. Informed Him those can be covid symptoms and he and wife may want to test. Informed him would have scheduling call him to reschedule. JYOTHI Valentino RN

## 2020-12-28 ENCOUNTER — TELEPHONE (OUTPATIENT)
Dept: CARDIOLOGY | Facility: CLINIC | Age: 85
End: 2020-12-28

## 2020-12-28 NOTE — TELEPHONE ENCOUNTER
Per chart review, pt did not have echocardiogram done on 12/22/20 due to his wife being ill with COVID-like symptoms. Called and spoke with pt and he confirms that he would like to get echo rescheduled and then reschedule his phone visit with EVANGELISTA Vázquez to a time after echo is done. Transferred pt to scheduling.    ALY Wilcox 9:04 AM 12/28/2020

## 2021-01-06 ENCOUNTER — HOSPITAL ENCOUNTER (OUTPATIENT)
Dept: CARDIOLOGY | Facility: CLINIC | Age: 86
Discharge: HOME | End: 2021-01-06
Attending: INTERNAL MEDICINE | Admitting: INTERNAL MEDICINE
Payer: COMMERCIAL

## 2021-01-06 ENCOUNTER — TELEPHONE (OUTPATIENT)
Dept: CARDIOLOGY | Facility: CLINIC | Age: 86
End: 2021-01-06

## 2021-01-06 PROCEDURE — 93306 TTE W/DOPPLER COMPLETE: CPT

## 2021-01-06 PROCEDURE — 93306 TTE W/DOPPLER COMPLETE: CPT | Mod: 26 | Performed by: INTERNAL MEDICINE

## 2021-01-06 NOTE — TELEPHONE ENCOUNTER
PT has OV with NP 1/8/21. Echo results:    The aortic root is mildly dilated for age at 4.4 cms, not significantly  changed compared to prior echo from 2019.  LVEF 38% based on biplane 2D tracing. This appears reduced compared to prior  study from 2019.  There is moderate to severe global hypokinesis perhaps slightly worse in the  mid-distal inferior/ inferolateral/ and inferoapical segments of the LV. There  is ventricular dyschrony suggestive of conduction abnormality.  The right ventricle is normal in size and function.  There is a pacemaker lead in the right ventricle.  There is mild MR and mild AI.  PA pressure estimates are normal. IVC was not well seen, but likely normal in  size.     Compared to the prior study, EF is lower.

## 2021-01-07 NOTE — TELEPHONE ENCOUNTER
I would optimizing cardiomyopathy meds. EF may have fallen because of RV pacing. Also, would do stress MRI if pacer and lead are MRI compatible. If not, do lexiscan stress nuc.

## 2021-01-13 ENCOUNTER — ANCILLARY PROCEDURE (OUTPATIENT)
Dept: CARDIOLOGY | Facility: CLINIC | Age: 86
End: 2021-01-13
Attending: INTERNAL MEDICINE
Payer: COMMERCIAL

## 2021-01-13 DIAGNOSIS — Z95.0 CARDIAC PACEMAKER IN SITU: ICD-10-CM

## 2021-01-13 DIAGNOSIS — R00.1 BRADYCARDIA: ICD-10-CM

## 2021-01-13 PROCEDURE — 93296 REM INTERROG EVL PM/IDS: CPT | Performed by: INTERNAL MEDICINE

## 2021-01-13 PROCEDURE — 93294 REM INTERROG EVL PM/LDLS PM: CPT | Performed by: INTERNAL MEDICINE

## 2021-01-18 LAB
MDC_IDC_LEAD_IMPLANT_DT: NORMAL
MDC_IDC_LEAD_IMPLANT_DT: NORMAL
MDC_IDC_LEAD_LOCATION: NORMAL
MDC_IDC_LEAD_LOCATION: NORMAL
MDC_IDC_LEAD_LOCATION_DETAIL_1: NORMAL
MDC_IDC_LEAD_LOCATION_DETAIL_1: NORMAL
MDC_IDC_LEAD_MFG: NORMAL
MDC_IDC_LEAD_MFG: NORMAL
MDC_IDC_LEAD_MODEL: NORMAL
MDC_IDC_LEAD_MODEL: NORMAL
MDC_IDC_LEAD_POLARITY_TYPE: NORMAL
MDC_IDC_LEAD_POLARITY_TYPE: NORMAL
MDC_IDC_LEAD_SERIAL: NORMAL
MDC_IDC_LEAD_SERIAL: NORMAL
MDC_IDC_MSMT_BATTERY_DTM: NORMAL
MDC_IDC_MSMT_BATTERY_REMAINING_LONGEVITY: 116 MO
MDC_IDC_MSMT_BATTERY_RRT_TRIGGER: 2.62
MDC_IDC_MSMT_BATTERY_STATUS: NORMAL
MDC_IDC_MSMT_BATTERY_VOLTAGE: 2.96 V
MDC_IDC_MSMT_LEADCHNL_RA_IMPEDANCE_VALUE: 361 OHM
MDC_IDC_MSMT_LEADCHNL_RA_IMPEDANCE_VALUE: 513 OHM
MDC_IDC_MSMT_LEADCHNL_RA_PACING_THRESHOLD_AMPLITUDE: 0.5 V
MDC_IDC_MSMT_LEADCHNL_RA_PACING_THRESHOLD_PULSEWIDTH: 0.4 MS
MDC_IDC_MSMT_LEADCHNL_RA_SENSING_INTR_AMPL: 3.12 MV
MDC_IDC_MSMT_LEADCHNL_RA_SENSING_INTR_AMPL: 3.12 MV
MDC_IDC_MSMT_LEADCHNL_RV_IMPEDANCE_VALUE: 361 OHM
MDC_IDC_MSMT_LEADCHNL_RV_IMPEDANCE_VALUE: 494 OHM
MDC_IDC_MSMT_LEADCHNL_RV_PACING_THRESHOLD_AMPLITUDE: 0.62 V
MDC_IDC_MSMT_LEADCHNL_RV_PACING_THRESHOLD_PULSEWIDTH: 0.4 MS
MDC_IDC_MSMT_LEADCHNL_RV_SENSING_INTR_AMPL: 13.88 MV
MDC_IDC_MSMT_LEADCHNL_RV_SENSING_INTR_AMPL: 13.88 MV
MDC_IDC_PG_IMPLANT_DTM: NORMAL
MDC_IDC_PG_MFG: NORMAL
MDC_IDC_PG_MODEL: NORMAL
MDC_IDC_PG_SERIAL: NORMAL
MDC_IDC_PG_TYPE: NORMAL
MDC_IDC_SESS_CLINIC_NAME: NORMAL
MDC_IDC_SESS_DTM: NORMAL
MDC_IDC_SESS_TYPE: NORMAL
MDC_IDC_SET_BRADY_AT_MODE_SWITCH_RATE: 171 {BEATS}/MIN
MDC_IDC_SET_BRADY_HYSTRATE: NORMAL
MDC_IDC_SET_BRADY_LOWRATE: 60 {BEATS}/MIN
MDC_IDC_SET_BRADY_MAX_SENSOR_RATE: 120 {BEATS}/MIN
MDC_IDC_SET_BRADY_MAX_TRACKING_RATE: 120 {BEATS}/MIN
MDC_IDC_SET_BRADY_MODE: NORMAL
MDC_IDC_SET_BRADY_PAV_DELAY_LOW: 180 MS
MDC_IDC_SET_BRADY_SAV_DELAY_LOW: 150 MS
MDC_IDC_SET_LEADCHNL_RA_PACING_AMPLITUDE: 2 V
MDC_IDC_SET_LEADCHNL_RA_PACING_ANODE_ELECTRODE_1: NORMAL
MDC_IDC_SET_LEADCHNL_RA_PACING_ANODE_LOCATION_1: NORMAL
MDC_IDC_SET_LEADCHNL_RA_PACING_CAPTURE_MODE: NORMAL
MDC_IDC_SET_LEADCHNL_RA_PACING_CATHODE_ELECTRODE_1: NORMAL
MDC_IDC_SET_LEADCHNL_RA_PACING_CATHODE_LOCATION_1: NORMAL
MDC_IDC_SET_LEADCHNL_RA_PACING_POLARITY: NORMAL
MDC_IDC_SET_LEADCHNL_RA_PACING_PULSEWIDTH: 0.4 MS
MDC_IDC_SET_LEADCHNL_RA_SENSING_ANODE_ELECTRODE_1: NORMAL
MDC_IDC_SET_LEADCHNL_RA_SENSING_ANODE_LOCATION_1: NORMAL
MDC_IDC_SET_LEADCHNL_RA_SENSING_CATHODE_ELECTRODE_1: NORMAL
MDC_IDC_SET_LEADCHNL_RA_SENSING_CATHODE_LOCATION_1: NORMAL
MDC_IDC_SET_LEADCHNL_RA_SENSING_POLARITY: NORMAL
MDC_IDC_SET_LEADCHNL_RA_SENSING_SENSITIVITY: 0.3 MV
MDC_IDC_SET_LEADCHNL_RV_PACING_AMPLITUDE: 2 V
MDC_IDC_SET_LEADCHNL_RV_PACING_ANODE_ELECTRODE_1: NORMAL
MDC_IDC_SET_LEADCHNL_RV_PACING_ANODE_LOCATION_1: NORMAL
MDC_IDC_SET_LEADCHNL_RV_PACING_CAPTURE_MODE: NORMAL
MDC_IDC_SET_LEADCHNL_RV_PACING_CATHODE_ELECTRODE_1: NORMAL
MDC_IDC_SET_LEADCHNL_RV_PACING_CATHODE_LOCATION_1: NORMAL
MDC_IDC_SET_LEADCHNL_RV_PACING_POLARITY: NORMAL
MDC_IDC_SET_LEADCHNL_RV_PACING_PULSEWIDTH: 0.4 MS
MDC_IDC_SET_LEADCHNL_RV_SENSING_ANODE_ELECTRODE_1: NORMAL
MDC_IDC_SET_LEADCHNL_RV_SENSING_ANODE_LOCATION_1: NORMAL
MDC_IDC_SET_LEADCHNL_RV_SENSING_CATHODE_ELECTRODE_1: NORMAL
MDC_IDC_SET_LEADCHNL_RV_SENSING_CATHODE_LOCATION_1: NORMAL
MDC_IDC_SET_LEADCHNL_RV_SENSING_POLARITY: NORMAL
MDC_IDC_SET_LEADCHNL_RV_SENSING_SENSITIVITY: 0.9 MV
MDC_IDC_SET_ZONE_DETECTION_INTERVAL: 350 MS
MDC_IDC_SET_ZONE_DETECTION_INTERVAL: 400 MS
MDC_IDC_SET_ZONE_TYPE: NORMAL
MDC_IDC_STAT_BRADY_AP_VP_PERCENT: 98.39 %
MDC_IDC_STAT_BRADY_AP_VS_PERCENT: 0.02 %
MDC_IDC_STAT_BRADY_AS_VP_PERCENT: 1.34 %
MDC_IDC_STAT_BRADY_AS_VS_PERCENT: 0.24 %
MDC_IDC_STAT_BRADY_DTM_END: NORMAL
MDC_IDC_STAT_BRADY_DTM_START: NORMAL
MDC_IDC_STAT_BRADY_RA_PERCENT_PACED: 98.6 %
MDC_IDC_STAT_BRADY_RV_PERCENT_PACED: 99.73 %
MDC_IDC_STAT_EPISODE_RECENT_COUNT: 0
MDC_IDC_STAT_EPISODE_RECENT_COUNT_DTM_END: NORMAL
MDC_IDC_STAT_EPISODE_RECENT_COUNT_DTM_START: NORMAL
MDC_IDC_STAT_EPISODE_TOTAL_COUNT: 0
MDC_IDC_STAT_EPISODE_TOTAL_COUNT_DTM_END: NORMAL
MDC_IDC_STAT_EPISODE_TOTAL_COUNT_DTM_START: NORMAL
MDC_IDC_STAT_EPISODE_TYPE: NORMAL

## 2021-01-25 ENCOUNTER — VIRTUAL VISIT (OUTPATIENT)
Dept: CARDIOLOGY | Facility: CLINIC | Age: 86
End: 2021-01-25
Attending: INTERNAL MEDICINE
Payer: COMMERCIAL

## 2021-01-25 DIAGNOSIS — I77.810 AORTIC ROOT DILATATION (H): ICD-10-CM

## 2021-01-25 DIAGNOSIS — I50.21 ACUTE SYSTOLIC HEART FAILURE (H): Primary | ICD-10-CM

## 2021-01-25 PROCEDURE — 99214 OFFICE O/P EST MOD 30 MIN: CPT | Mod: 95 | Performed by: PHYSICIAN ASSISTANT

## 2021-01-25 RX ORDER — LISINOPRIL 2.5 MG/1
2.5 TABLET ORAL DAILY
Qty: 90 TABLET | Refills: 3 | Status: SHIPPED | OUTPATIENT
Start: 2021-01-25 | End: 2021-03-03

## 2021-01-25 NOTE — LETTER
1/25/2021    Steven Wagoner MD  [address] [facility] [address]    RE: Moses WINSTON Elizondo       Dear Colleague,    I had the pleasure of seeing Moses Elizondo in the [facility].    Zane is a 87 year old who is being evaluated via a billable telephone visit.      What phone number would you like to be contacted at? [phone]  How would you like to obtain your AVS? Mail a copy    Patient unable to assess vitals     Review Of Systems  Skin: NEGATIVE  Eyes:Ears/Nose/Throat: NEGATIVE  Respiratory: NEGATIVE  Cardiovascular:NEGATIVE  Gastrointestinal: NEGATIVE  Genitourinary:NEGATIVE   Musculoskeletal: NEGATIVE  Neurologic: NEGATIVE  Psychiatric: NEGATIVE  Hematologic/Lymphatic/Immunologic: NEGATIVE  Endocrine:  NEGATIVE  _________________________________________________  Moses Elizondo is a 87 year old male who is being evaluated via a billable telephone visit.  This visit is being conducted as a virtual visit due to the emphasis on mitigation of the COVID-19 virus pandemic. The clinician has decided that the risk of an in-office visit outweighs the benefit for this patient.     Moses MONTERO Elizondo complains of    Chief Complaint   Patient presents with     FU Cardiac testing       I have reviewed and updated the patient's Past Medical History, Social History, Family History and Medication List.    ALLERGIES  No known drug allergies    MEDICATIONS  Current Outpatient Medications   Medication Sig Dispense Refill     ASPIRIN EC PO Take 81 mg by mouth daily       atorvastatin (LIPITOR) 40 MG tablet Take 1 tablet (40 mg) by mouth daily 90 tablet 0     gabapentin (NEURONTIN) 300 MG capsule Take 1 capsule (300 mg) by mouth At Bedtime       levothyroxine (SYNTHROID/LEVOTHROID) 100 MCG tablet Take 1 tablet (100 mcg) by mouth daily 90 tablet 1     metoprolol succinate ER (TOPROL-XL) 25 MG 24 hr tablet Take 1 tablet (25 mg) by mouth daily 90 tablet 3     nitroGLYcerin (NITROSTAT)  Dialed [phone], Spoke with patient, she stated that she will keep her appointment that is scheduled on 07/21/20 at 10:40am, she stated no sooner appointment, she will keep what is scheduled.   "0.4 MG sublingual tablet One tablet under the tongue every 5 minutes if needed for chest pain. May repeat every 5 minutes for a maximum of 3 doses in 15 minutes\" 25 tablet 3     Polyethylene Glycol 3350 (MIRALAX PO) Take by mouth as needed       Valerian 450 MG CAPS Take 2 capsules by mouth        vitamin B-12 (CYANOCOBALAMIN) 250 MCG tablet Take 1,000 mcg by mouth daily       Vitamin D-Vitamin K (VITAMIN K2-VITAMIN D3 PO) Take by mouth daily       Zinc 50 MG CAPS          HPI: (Please see Dr. Huggins's note from 2020 for the patient's detailed history)    In brief, Zane presents via telephone to review a routine echocardiogram performed 21. This reveals a new cardiomyopathy with an LVEF of 38%. Appears globally reduced with possible worsened hypokinesis inferiorly.   Denies chest pain, dyspnea, peripheral edema, near-syncope.   States \"I had to take a couple breaks when I was shoveling yesterday but otherwise I feel good!\"   BP at time of echo was 115/80.     Assessment/Plan:  1. New cardiomyopathy, LVEF 38%. 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 FC II sxs. Denies sxs of volume overload.     - Start lisinopril 2.5 mg daily. Continue Toprol 25 mg daily.   - Stress MRI (if device is compatible), otherwise lexiscan.   - CMP, proBNP, Hgb and TSH reflex.  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  - Follow up with me in 2 weeks in clinic, after testing is complete.   - Has BP cuff at home.   - Advised no shoveling.     Phone call duration: 25 minutes  Time spent on ordering, discussing with staff and chartin minutes.     Lacey Puckett PA-C  [facility]  Pager: [phone]  Text Page  (7:30am - 4pm M-F)       Thank you for allowing me to participate in the care of your patient.    Sincerely,     Lacey Puckett PA-C     [facility]  "

## 2021-01-25 NOTE — PROGRESS NOTES
"Zane is a 87 year old who is being evaluated via a billable telephone visit.      What phone number would you like to be contacted at? [phone]  How would you like to obtain your AVS? Mail a copy    Patient unable to assess vitals     Review Of Systems  Skin: NEGATIVE  Eyes:Ears/Nose/Throat: NEGATIVE  Respiratory: NEGATIVE  Cardiovascular:NEGATIVE  Gastrointestinal: NEGATIVE  Genitourinary:NEGATIVE   Musculoskeletal: NEGATIVE  Neurologic: NEGATIVE  Psychiatric: NEGATIVE  Hematologic/Lymphatic/Immunologic: NEGATIVE  Endocrine:  NEGATIVE  _________________________________________________  Moses Elizondo is a 87 year old male who is being evaluated via a billable telephone visit.  This visit is being conducted as a virtual visit due to the emphasis on mitigation of the COVID-19 virus pandemic. The clinician has decided that the risk of an in-office visit outweighs the benefit for this patient.     Moses Elizondo complains of    Chief Complaint   Patient presents with     FU Cardiac testing       I have reviewed and updated the patient's Past Medical History, Social History, Family History and Medication List.    ALLERGIES  No known drug allergies    MEDICATIONS  Current Outpatient Medications   Medication Sig Dispense Refill     ASPIRIN EC PO Take 81 mg by mouth daily       atorvastatin (LIPITOR) 40 MG tablet Take 1 tablet (40 mg) by mouth daily 90 tablet 0     gabapentin (NEURONTIN) 300 MG capsule Take 1 capsule (300 mg) by mouth At Bedtime       levothyroxine (SYNTHROID/LEVOTHROID) 100 MCG tablet Take 1 tablet (100 mcg) by mouth daily 90 tablet 1     metoprolol succinate ER (TOPROL-XL) 25 MG 24 hr tablet Take 1 tablet (25 mg) by mouth daily 90 tablet 3     nitroGLYcerin (NITROSTAT) 0.4 MG sublingual tablet One tablet under the tongue every 5 minutes if needed for chest pain. May repeat every 5 minutes for a maximum of 3 doses in 15 minutes\" 25 tablet 3     Polyethylene Glycol 3350 (MIRALAX PO) Take by " "mouth as needed       Valerian 450 MG CAPS Take 2 capsules by mouth        vitamin B-12 (CYANOCOBALAMIN) 250 MCG tablet Take 1,000 mcg by mouth daily       Vitamin D-Vitamin K (VITAMIN K2-VITAMIN D3 PO) Take by mouth daily       Zinc 50 MG CAPS          HPI: (Please see Dr. Huggins's note from 2020 for the patient's detailed history)    In brief, Zane presents via telephone to review a routine echocardiogram performed 21. This reveals a new cardiomyopathy with an LVEF of 38%. Appears globally reduced with possible worsened hypokinesis inferiorly.   Denies chest pain, dyspnea, peripheral edema, near-syncope.   States \"I had to take a couple breaks when I was shoveling yesterday but otherwise I feel good!\"   BP at time of echo was 115/80.     Assessment/Plan:  1. New cardiomyopathy, LVEF 38%. 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 FC II sxs. Denies sxs of volume overload.     - Start lisinopril 2.5 mg daily. Continue Toprol 25 mg daily.   - Stress MRI (if device is compatible), otherwise lexiscan.   - CMP, proBNP, Hgb and TSH reflex.  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  - Follow up with me in 2 weeks in clinic, after testing is complete.   - Has BP cuff at home.   - Advised no shoveling.     Phone call duration: 25 minutes  Time spent on ordering, discussing with staff and chartin minutes.     Lacey Puckett PA-C  [facility]  Pager: [phone]  Text Page  (7:30am - 4pm M-F)   "

## 2021-01-29 ENCOUNTER — HOSPITAL ENCOUNTER (OUTPATIENT)
Dept: CARDIOLOGY | Facility: CLINIC | Age: 86
Discharge: HOME | End: 2021-01-29
Attending: PHYSICIAN ASSISTANT | Admitting: PHYSICIAN ASSISTANT
Payer: COMMERCIAL

## 2021-01-29 VITALS — OXYGEN SATURATION: 97 % | HEART RATE: 70 BPM | SYSTOLIC BLOOD PRESSURE: 124 MMHG | DIASTOLIC BLOOD PRESSURE: 72 MMHG

## 2021-01-29 DIAGNOSIS — I50.21 ACUTE SYSTOLIC HEART FAILURE (H): ICD-10-CM

## 2021-01-29 LAB
CREAT BLD-MCNC: 1.3 MG/DL (ref 0.66–1.25)
GFR SERPL CREATININE-BSD FRML MDRD: 52 ML/MIN/{1.73_M2}

## 2021-01-29 PROCEDURE — 255N000002 HC RX 255 OP 636: Performed by: PHYSICIAN ASSISTANT

## 2021-01-29 PROCEDURE — 75563 CARD MRI W/STRESS IMG & DYE: CPT

## 2021-01-29 PROCEDURE — 82565 ASSAY OF CREATININE: CPT

## 2021-01-29 PROCEDURE — 250N000011 HC RX IP 250 OP 636: Performed by: INTERNAL MEDICINE

## 2021-01-29 PROCEDURE — 75563 CARD MRI W/STRESS IMG & DYE: CPT | Mod: 26 | Performed by: INTERNAL MEDICINE

## 2021-01-29 PROCEDURE — A9585 GADOBUTROL INJECTION: HCPCS | Performed by: PHYSICIAN ASSISTANT

## 2021-01-29 RX ORDER — DIAZEPAM 5 MG
5 TABLET ORAL EVERY 30 MIN PRN
Status: DISCONTINUED | OUTPATIENT
Start: 2021-01-29 | End: 2021-01-30 | Stop reason: HOSPADM

## 2021-01-29 RX ORDER — DIPHENHYDRAMINE HCL 25 MG
25 CAPSULE ORAL
Status: DISCONTINUED | OUTPATIENT
Start: 2021-01-29 | End: 2021-01-30 | Stop reason: HOSPADM

## 2021-01-29 RX ORDER — AMINOPHYLLINE 25 MG/ML
100 INJECTION, SOLUTION INTRAVENOUS ONCE
Status: DISCONTINUED | OUTPATIENT
Start: 2021-01-29 | End: 2021-01-30 | Stop reason: HOSPADM

## 2021-01-29 RX ORDER — ALBUTEROL SULFATE 90 UG/1
2 AEROSOL, METERED RESPIRATORY (INHALATION) EVERY 5 MIN PRN
Status: DISCONTINUED | OUTPATIENT
Start: 2021-01-29 | End: 2021-01-30 | Stop reason: HOSPADM

## 2021-01-29 RX ORDER — REGADENOSON 0.08 MG/ML
0.4 INJECTION, SOLUTION INTRAVENOUS ONCE
Status: COMPLETED | OUTPATIENT
Start: 2021-01-29 | End: 2021-01-29

## 2021-01-29 RX ORDER — ONDANSETRON 2 MG/ML
4 INJECTION INTRAMUSCULAR; INTRAVENOUS
Status: DISCONTINUED | OUTPATIENT
Start: 2021-01-29 | End: 2021-01-30 | Stop reason: HOSPADM

## 2021-01-29 RX ORDER — GADOBUTROL 604.72 MG/ML
22 INJECTION INTRAVENOUS ONCE
Status: COMPLETED | OUTPATIENT
Start: 2021-01-29 | End: 2021-01-29

## 2021-01-29 RX ORDER — CAFFEINE CITRATE 20 MG/ML
60 SOLUTION INTRAVENOUS
Status: DISCONTINUED | OUTPATIENT
Start: 2021-01-29 | End: 2021-01-30 | Stop reason: HOSPADM

## 2021-01-29 RX ORDER — ACYCLOVIR 200 MG/1
0-1 CAPSULE ORAL
Status: DISCONTINUED | OUTPATIENT
Start: 2021-01-29 | End: 2021-01-30 | Stop reason: HOSPADM

## 2021-01-29 RX ORDER — DIPHENHYDRAMINE HYDROCHLORIDE 50 MG/ML
25-50 INJECTION INTRAMUSCULAR; INTRAVENOUS
Status: DISCONTINUED | OUTPATIENT
Start: 2021-01-29 | End: 2021-01-30 | Stop reason: HOSPADM

## 2021-01-29 RX ORDER — METHYLPREDNISOLONE SODIUM SUCCINATE 125 MG/2ML
125 INJECTION, POWDER, LYOPHILIZED, FOR SOLUTION INTRAMUSCULAR; INTRAVENOUS
Status: DISCONTINUED | OUTPATIENT
Start: 2021-01-29 | End: 2021-01-30 | Stop reason: HOSPADM

## 2021-01-29 RX ADMIN — GADOBUTROL 22 ML: 604.72 INJECTION INTRAVENOUS at 09:15

## 2021-01-29 RX ADMIN — REGADENOSON 0.4 MG: 0.08 INJECTION, SOLUTION INTRAVENOUS at 08:55

## 2021-02-22 DIAGNOSIS — E03.9 HYPOTHYROIDISM, UNSPECIFIED TYPE: ICD-10-CM

## 2021-02-22 RX ORDER — LEVOTHYROXINE SODIUM 100 UG/1
100 TABLET ORAL DAILY
Qty: 90 TABLET | Refills: 1 | Status: SHIPPED | OUTPATIENT
Start: 2021-02-22 | End: 2021-08-25

## 2021-03-01 ENCOUNTER — IMMUNIZATION (OUTPATIENT)
Dept: NURSING | Facility: CLINIC | Age: 86
End: 2021-03-01
Payer: COMMERCIAL

## 2021-03-01 PROCEDURE — 91300 PR COVID VAC PFIZER DIL RECON 30 MCG/0.3 ML IM: CPT

## 2021-03-01 PROCEDURE — 0001A PR COVID VAC PFIZER DIL RECON 30 MCG/0.3 ML IM: CPT

## 2021-03-02 ENCOUNTER — OFFICE VISIT (OUTPATIENT)
Dept: CARDIOLOGY | Facility: CLINIC | Age: 86
End: 2021-03-02
Payer: COMMERCIAL

## 2021-03-02 VITALS
SYSTOLIC BLOOD PRESSURE: 113 MMHG | BODY MASS INDEX: 27.72 KG/M2 | WEIGHT: 198 LBS | DIASTOLIC BLOOD PRESSURE: 71 MMHG | HEIGHT: 71 IN | HEART RATE: 68 BPM | OXYGEN SATURATION: 95 %

## 2021-03-02 DIAGNOSIS — I50.21 ACUTE SYSTOLIC HEART FAILURE (H): ICD-10-CM

## 2021-03-02 LAB
ALBUMIN SERPL-MCNC: 3.6 G/DL (ref 3.4–5)
ALP SERPL-CCNC: 90 U/L (ref 40–150)
ALT SERPL W P-5'-P-CCNC: 30 U/L (ref 0–70)
ANION GAP SERPL CALCULATED.3IONS-SCNC: 4 MMOL/L (ref 3–14)
AST SERPL W P-5'-P-CCNC: 32 U/L (ref 0–45)
BILIRUB SERPL-MCNC: 0.8 MG/DL (ref 0.2–1.3)
BUN SERPL-MCNC: 27 MG/DL (ref 7–30)
CALCIUM SERPL-MCNC: 9.4 MG/DL (ref 8.5–10.1)
CHLORIDE SERPL-SCNC: 111 MMOL/L (ref 94–109)
CO2 SERPL-SCNC: 27 MMOL/L (ref 20–32)
CREAT SERPL-MCNC: 1.24 MG/DL (ref 0.66–1.25)
GFR SERPL CREATININE-BSD FRML MDRD: 52 ML/MIN/{1.73_M2}
GLUCOSE SERPL-MCNC: 78 MG/DL (ref 70–99)
HGB BLD-MCNC: 13.2 G/DL (ref 13.3–17.7)
NT-PROBNP SERPL-MCNC: 804 PG/ML (ref 0–450)
POTASSIUM SERPL-SCNC: 4.2 MMOL/L (ref 3.4–5.3)
PROT SERPL-MCNC: 6.8 G/DL (ref 6.8–8.8)
SODIUM SERPL-SCNC: 142 MMOL/L (ref 133–144)
TSH SERPL DL<=0.005 MIU/L-ACNC: 2.27 MU/L (ref 0.4–4)

## 2021-03-02 PROCEDURE — 83880 ASSAY OF NATRIURETIC PEPTIDE: CPT | Performed by: PHYSICIAN ASSISTANT

## 2021-03-02 PROCEDURE — 85018 HEMOGLOBIN: CPT | Performed by: PHYSICIAN ASSISTANT

## 2021-03-02 PROCEDURE — 99214 OFFICE O/P EST MOD 30 MIN: CPT | Performed by: PHYSICIAN ASSISTANT

## 2021-03-02 PROCEDURE — 80053 COMPREHEN METABOLIC PANEL: CPT | Performed by: PHYSICIAN ASSISTANT

## 2021-03-02 PROCEDURE — 36415 COLL VENOUS BLD VENIPUNCTURE: CPT | Performed by: PHYSICIAN ASSISTANT

## 2021-03-02 PROCEDURE — 84443 ASSAY THYROID STIM HORMONE: CPT | Performed by: PHYSICIAN ASSISTANT

## 2021-03-02 ASSESSMENT — MIFFLIN-ST. JEOR: SCORE: 1595.25

## 2021-03-02 NOTE — LETTER
3/2/2021    Steven Wagoner MD  [address] [facility] [address]    RE: Moses Elizondo       Dear Colleague,    I had the pleasure of seeing Moses Elizondo in the [facility].  Cardiology Clinic Progress Note    Moses Elizondo MRN# [id number]   YOB: 1933 Age: 87 year old   Primary cardiologist: Dr. Huggins         Assessment and Plan:     In summary, Moses Elizondo presents today for follow up after the addition of lisinopril to his regimen, and cardiac MRI.     1. 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 No evidence of ischemia on stress perfusion imaging. FC II sxs. On Toprol 25, lisinopril 2.5.   2. HTN, controlled.   3. Pacemaker-dependent.    Plan:  - Labs today are pending. As long as his renal fn is stable, we will plan to increase lisinopril to 2.5 mg BID. Repeat BMP in 3 weeks, and follow up with me in 4 weeks.        History of Presenting Illness:      Moses Elizondo is a pleasant 87 year old patient who presents today for follow-up on his newly diagnosed cardiomyopathy. (Please see Dr. Huggins's note from 5/2/2020 for the patient's detailed history)    In brief, Zane had a routine echocardiogram performed 1/6/21. This reveals a new cardiomyopathy with an LVEF of 38%. Appears globally reduced with possible worsened hypokinesis inferiorly.  His blood pressure and echo was well controlled.  He is thankfully maintained a very adequate functional capacity, and denied any symptoms of fluid overload or angina.  To investigate this further, I obtained a stress MRI which actually showed a better LVEF at 44%, and low normal RV EF of 53%, a mild mid ascending aorta dilation at 4.2 cm, no evidence of MI, fibrosis, or infiltrative disease, with a trivial patchy non-CAD scar in the basal anterior septum, and no evidence of ischemia on stress perfusion imaging.  This appears most consistent with a  "pacemaker induced cardiomyopathy.  I started him on GDMT including lisinopril 2.5 mg daily, and continued his current dose of metoprolol 25 mg daily.  Of note, we also considered lowering his LRL, however after discussion with the device nurses they have told me that his underlying rhythm is now complete heart block, and therefore making that change would not reduce the amount of pacing.    Today, Zane presents to clinic stating he's feeling the same. No major cardiovascular concerns. He does notice that he fatigues easier than he did a year ago. He notices this mainly with shoveling (which he continues to do against advisement), but otherwise doesn't feel limited in what he's able to do. This worries him though, because he wants to be able to do his spring yardwork comfortably. He denies chest pain, orthopnea, edema, claudication, palpitations, near syncope or syncope. BP is well-controlled at 113/71. Labs today including CMP, TFT's, proBNP and Hgb are pending.         Review of Systems:     12-pt ROS is negative except for as noted in the HPI.          Physical Exam:     Vitals: /71   Pulse 68   Ht 1.803 m (5' 11\")   Wt 89.8 kg (198 lb)   SpO2 95%   BMI 27.62 kg/m    Wt Readings from Last 10 Encounters:   03/02/21 89.8 kg (198 lb)   08/14/20 86.6 kg (191 lb)   07/23/20 87.6 kg (193 lb 1.6 oz)   07/06/20 88.3 kg (194 lb 9.6 oz)   03/02/20 89.1 kg (196 lb 6.4 oz)   02/27/20 88 kg (194 lb)   09/24/19 86.6 kg (191 lb)   06/27/19 86.2 kg (190 lb)   06/21/19 85.7 kg (189 lb)   06/05/19 87.5 kg (193 lb)       Constitutional:  Patient is pleasant, alert, cooperative, and in NAD.  HEENT:  NCAT. PERRLA. EOM's intact.   Neck:  CVP appears normal. No carotid bruits.   Pulmonary: Normal respiratory effort. CTAB.   Cardiac: RRR, normal S1/S2, no S3/S4, no murmur or rub.   Abdomen:  Non-tender abdomen, no hepatosplenomegaly appreciated.   Vascular: Pulses in the upper and lower extremities are 2+ and equal " bilaterally.  Extremities: No edema, erythema, cyanosis or tenderness appreciated.  Skin:  No rashes or lesions appreciated.   Neurological:  No gross motor or sensory deficits.   Psych: Appropriate affect.        Data:     Labs reviewed:  Recent Labs   Lab Test 07/23/20  1008 07/07/20 10/31/19  0849 05/02/19 03/20/19  0823 02/27/18  0817   LDL  --  49 60  --  61 40   HDL  --  42 52  --  40 49   NHDL  --   --  74  --  76 52   CHOL  --  105 126  --  116 101   TRIG  --  71 70  --  75 59   TSH 2.87  --  1.94 2.600 2.20 2.53       Lab Results   Component Value Date    WBC 6.2 07/23/2020    RBC 4.58 07/23/2020    HGB 13.7 07/23/2020    HCT 41.4 07/23/2020    MCV 90 07/23/2020    MCH 29.9 07/23/2020    MCHC 33.1 07/23/2020    RDW 12.5 07/23/2020     07/23/2020       Lab Results   Component Value Date     07/23/2020    POTASSIUM 4.3 07/23/2020    CHLORIDE 109 07/23/2020    CO2 27 07/23/2020    ANIONGAP 3 07/23/2020    GLC 89 07/23/2020    BUN 23 07/23/2020    CR 1.14 07/23/2020    GFRESTIMATED 52 (L) 01/29/2021    GFRESTBLACK 63 01/29/2021    GABRIELA 8.9 07/23/2020      Lab Results   Component Value Date     (H) 07/23/2020     (H) 07/23/2020       Lab Results   Component Value Date    A1C 5.6 05/02/2019       Lab Results   Component Value Date    INR 1.00 06/20/2019           Problem List:     Patient Active Problem List   Diagnosis     Hypothyroidism, unspecified type     Degeneration of lumbar or lumbosacral intervertebral disc     MGUS (monoclonal gammopathy of unknown significance)     Familial tremor     Hyperlipidemia LDL goal <100     NSTEMI (non-ST elevated myocardial infarction) (H)     [facility]     Coronary artery disease     Bradycardia     Aneurysm of thoracic aorta (H)     Sinus bradycardia     Coronary artery disease involving native coronary artery of native heart without angina pectoris     Ascending aortic aneurysm (H)     Sick sinus syndrome (H)     Aortic root dilatation  "(H)     Medicare annual wellness visit, subsequent     Idiopathic peripheral neuropathy     Chest discomfort     Status post coronary angiogram     Second degree AV block     Cardiac pacemaker in situ     Left knee pain           Medications:     Current Outpatient Medications   Medication Sig Dispense Refill     ASPIRIN EC PO Take 81 mg by mouth daily       atorvastatin (LIPITOR) 40 MG tablet Take 1 tablet (40 mg) by mouth daily 90 tablet 0     gabapentin (NEURONTIN) 300 MG capsule Take 1 capsule (300 mg) by mouth At Bedtime       levothyroxine (SYNTHROID/LEVOTHROID) 100 MCG tablet TAKE 1 TABLET (100 MCG) BY MOUTH DAILY 90 tablet 1     lisinopril (ZESTRIL) 2.5 MG tablet Take 1 tablet (2.5 mg) by mouth daily 90 tablet 3     metoprolol succinate ER (TOPROL-XL) 25 MG 24 hr tablet Take 1 tablet (25 mg) by mouth daily 90 tablet 3     Polyethylene Glycol 3350 (MIRALAX PO) Take by mouth as needed       Valerian 450 MG CAPS Take 2 capsules by mouth        vitamin B-12 (CYANOCOBALAMIN) 250 MCG tablet Take 1,000 mcg by mouth daily       Vitamin D-Vitamin K (VITAMIN K2-VITAMIN D3 PO) Take by mouth daily       Zinc 50 MG CAPS        nitroGLYcerin (NITROSTAT) 0.4 MG sublingual tablet One tablet under the tongue every 5 minutes if needed for chest pain. May repeat every 5 minutes for a maximum of 3 doses in 15 minutes\" (Patient not taking: Reported on 3/2/2021) 25 tablet 3           Past Medical History:     Past Medical History:   Diagnosis Date     Aortic root dilatation (H)     4.4 cm     Ascending aorta dilatation (H)     4.4 cm     ASD (atrial septal defect)      Bradycardia      CAD (coronary artery disease)      YOGESH to RCA(6/20/19); YOGESH to OM1(5/15/14)     Cardiac pacemaker 06/20/2019    [employer] PPM COMFORT MRI XT DR-implant 6/20/19     Chest discomfort      Degeneration of lumbar or lumbosacral intervertebral disc      Familial tremor      Former smoker      Herpes zoster without mention of complication      HTN " (hypertension)      Hyperlipidaemia      Idiopathic peripheral neuropathy      MGUS (monoclonal gammopathy of unknown significance)      NSTEMI (non-ST elevated myocardial infarction) (H) 2014     Other and unspecified hyperlipidemia      PFO (patent foramen ovale)      Prostatitis, unspecified      Second degree heart block      Sensorineural hearing loss, unspecified      SSS (sick sinus syndrome) (H)      Status post coronary angiogram 6/20/2019     Unspecified hypothyroidism      Past Surgical History:   Procedure Laterality Date     CHOLECYSTECTOMY       CORONARY ANGIOGRAPHY ADULT ORDER  5/14/14    PTCA w/YOGESH to OM1     CV CORONARY ANGIOGRAM N/A 6/20/2019    Procedure: Coronary Angiogram;  Surgeon: Romie Coronado MD;  Location:  [facility]     CV INTRAVASULAR ULTRASOUND N/A 6/20/2019    Procedure: Intravascular Ultrasound;  Surgeon: Romie Coronado MD;  Location:  [facility]     CV PCI ATHERECTOMY ORBITAL N/A 6/20/2019    Procedure: PCI Atherectomy Orbital;  Surgeon: Romie Coronado MD;  Location:  [facility]ARD[facility] C[facility] LAB     CV PCI STENT DRUG ELUTING N/A 6/20/2019    Procedure: PCI Stent Drug Eluting;  Surgeon: Romie Coronado MD;  Location:  [facility]ARD[facility] C[facility] LAB     CV TEMPORARY PACEMAKER INSERTION N/A 6/20/2019    Procedure: Temporary Pacemaker Insertion;  Surgeon: Romie Coronado MD;  Location:  HE[facility]ARD[facility] C[facility] LAB     EP PACEMAKER N/A 6/20/2019    Procedure: EP Pacemaker;  Surgeon: Max Dowell MD;  Location:  HEART CARDIAC CATH LAB     HEART CATH, ANGIOPLASTY  5/14/14    YOGESH to OM1     Z NONSPECIFIC PROCEDURE  2010    Laparoscopic cholecystectomy.      Family History   Problem Relation Age of Onset     Cancer Mother      Genitourinary Problems Father 99        complications from surgery     Heart Disease Brother 72        MI     Social History     Socioeconomic History     Marital status:      Spouse name: Not on file      Number of children: Not on file     Years of education: Not on file     Highest education level: Not on file   Occupational History     Not on file   Social Needs     Financial resource strain: Not on file     Food insecurity     Worry: Not on file     Inability: Not on file     Transportation needs     Medical: Not on file     Non-medical: Not on file   Tobacco Use     Smoking status: Former Smoker     Packs/day: 0.50     Years: 16.00     Pack years: 8.00     Start date:      Quit date: 1967     Years since quittin.2     Smokeless tobacco: Never Used   Substance and Sexual Activity     Alcohol use: Yes     Alcohol/week: 0.0 standard drinks     Comment: 5 drinks week     Drug use: No     Sexual activity: Never   Lifestyle     Physical activity     Days per week: Not on file     Minutes per session: Not on file     Stress: Not on file   Relationships     Social connections     Talks on phone: Not on file     Gets together: Not on file     Attends [employer] service: Not on file     Active member of club or organization: Not on file     Attends meetings of clubs or organizations: Not on file     Relationship status: Not on file     Intimate partner violence     Fear of current or ex partner: Not on file     Emotionally abused: Not on file     Physically abused: Not on file     Forced sexual activity: Not on file   Other Topics Concern      Service Not Asked     Blood Transfusions Not Asked     Caffeine Concern Yes     Comment: 2 cups coffee/day     Occupational Exposure Not Asked     Hobby Hazards Not Asked     Sleep Concern No     Stress Concern Not Asked     Weight Concern Yes     Comment: limiting alcohol to watch calories     Special Diet Not Asked     Back Care Not Asked     Exercise Yes     Comment: Stationary bike  weights and aerobics 4 times week     Bike Helmet Not Asked     Seat Belt Yes     Self-Exams Not Asked     Parent/sibling w/ CABG, MI or angioplasty before 65F 55M? No   Social  History Narrative     Not on file           Allergies:   No known drug allergies      Lacey Puckett PA-C  [facility]  Pager: [phone]    cc:   Lacey Puckett PA-C  [id number] [place]  [place] [address]

## 2021-03-02 NOTE — PROGRESS NOTES
Cardiology Clinic Progress Note    Moses Elizondo MRN# [id number]   YOB: 1933 Age: 87 year old   Primary cardiologist: Dr. Huggins         Assessment and Plan:     In summary, Moses Elizondo presents today for follow up after the addition of lisinopril to his regimen, and cardiac MRI.     1. 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 No evidence of ischemia on stress perfusion imaging. FC II sxs. On Toprol 25, lisinopril 2.5.   2. HTN, controlled.   3. Pacemaker-dependent.    Plan:  - Labs today are pending. As long as his renal fn is stable, we will plan to increase lisinopril to 2.5 mg BID. Repeat BMP in 3 weeks, and follow up with me in 4 weeks.        History of Presenting Illness:      Moses Elizondo is a pleasant 87 year old patient who presents today for follow-up on his newly diagnosed cardiomyopathy. (Please see Dr. Huggins's note from 5/2/2020 for the patient's detailed history)    In brief, Zane had a routine echocardiogram performed 1/6/21. This reveals a new cardiomyopathy with an LVEF of 38%. Appears globally reduced with possible worsened hypokinesis inferiorly.  His blood pressure and echo was well controlled.  He is thankfully maintained a very adequate functional capacity, and denied any symptoms of fluid overload or angina.  To investigate this further, I obtained a stress MRI which actually showed a better LVEF at 44%, and low normal RV EF of 53%, a mild mid ascending aorta dilation at 4.2 cm, no evidence of MI, fibrosis, or infiltrative disease, with a trivial patchy non-CAD scar in the basal anterior septum, and no evidence of ischemia on stress perfusion imaging.  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  I started him on GDMT including lisinopril 2.5 mg daily, and continued his current dose of metoprolol 25 mg daily.  Of note, we also considered lowering his LRL, however after discussion with the device nurses they  "have told me that his underlying rhythm is now complete heart block, and therefore making that change would not reduce the amount of pacing.    Today, Zane presents to clinic stating he's feeling the same. No major cardiovascular concerns. He does notice that he fatigues easier than he did a year ago. He notices this mainly with shoveling (which he continues to do against advisement), but otherwise doesn't feel limited in what he's able to do. This worries him though, because he wants to be able to do his spring yardwork comfortably. He denies chest pain, orthopnea, edema, claudication, palpitations, near syncope or syncope. BP is well-controlled at 113/71. Labs today including CMP, TFT's, proBNP and Hgb are pending.         Review of Systems:     12-pt ROS is negative except for as noted in the HPI.          Physical Exam:     Vitals: /71   Pulse 68   Ht 1.803 m (5' 11\")   Wt 89.8 kg (198 lb)   SpO2 95%   BMI 27.62 kg/m    Wt Readings from Last 10 Encounters:   03/02/21 89.8 kg (198 lb)   08/14/20 86.6 kg (191 lb)   07/23/20 87.6 kg (193 lb 1.6 oz)   07/06/20 88.3 kg (194 lb 9.6 oz)   03/02/20 89.1 kg (196 lb 6.4 oz)   02/27/20 88 kg (194 lb)   09/24/19 86.6 kg (191 lb)   06/27/19 86.2 kg (190 lb)   06/21/19 85.7 kg (189 lb)   06/05/19 87.5 kg (193 lb)       Constitutional:  Patient is pleasant, alert, cooperative, and in NAD.  HEENT:  NCAT. PERRLA. EOM's intact.   Neck:  CVP appears normal. No carotid bruits.   Pulmonary: Normal respiratory effort. CTAB.   Cardiac: RRR, normal S1/S2, no S3/S4, no murmur or rub.   Abdomen:  Non-tender abdomen, no hepatosplenomegaly appreciated.   Vascular: Pulses in the upper and lower extremities are 2+ and equal bilaterally.  Extremities: No edema, erythema, cyanosis or tenderness appreciated.  Skin:  No rashes or lesions appreciated.   Neurological:  No gross motor or sensory deficits.   Psych: Appropriate affect.        Data:     Labs reviewed:  Recent Labs   Lab " Test 07/23/20  1008 07/07/20 10/31/19  0849 05/02/19 03/20/19  0823 02/27/18  0817   LDL  --  49 60  --  61 40   HDL  --  42 52  --  40 49   NHDL  --   --  74  --  76 52   CHOL  --  105 126  --  116 101   TRIG  --  71 70  --  75 59   TSH 2.87  --  1.94 2.600 2.20 2.53       Lab Results   Component Value Date    WBC 6.2 07/23/2020    RBC 4.58 07/23/2020    HGB 13.7 07/23/2020    HCT 41.4 07/23/2020    MCV 90 07/23/2020    MCH 29.9 07/23/2020    MCHC 33.1 07/23/2020    RDW 12.5 07/23/2020     07/23/2020       Lab Results   Component Value Date     07/23/2020    POTASSIUM 4.3 07/23/2020    CHLORIDE 109 07/23/2020    CO2 27 07/23/2020    ANIONGAP 3 07/23/2020    GLC 89 07/23/2020    BUN 23 07/23/2020    CR 1.14 07/23/2020    GFRESTIMATED 52 (L) 01/29/2021    GFRESTBLACK 63 01/29/2021    GABRIELA 8.9 07/23/2020      Lab Results   Component Value Date     (H) 07/23/2020     (H) 07/23/2020       Lab Results   Component Value Date    A1C 5.6 05/02/2019       Lab Results   Component Value Date    INR 1.00 06/20/2019           Problem List:     Patient Active Problem List   Diagnosis     Hypothyroidism, unspecified type     Degeneration of lumbar or lumbosacral intervertebral disc     MGUS (monoclonal gammopathy of unknown significance)     Familial tremor     Hyperlipidemia LDL goal <100     NSTEMI (non-ST elevated myocardial infarction) (H)     [facility]     Coronary artery disease     Bradycardia     Aneurysm of thoracic aorta (H)     Sinus bradycardia     Coronary artery disease involving native coronary artery of native heart without angina pectoris     Ascending aortic aneurysm (H)     Sick sinus syndrome (H)     Aortic root dilatation (H)     Medicare annual wellness visit, subsequent     Idiopathic peripheral neuropathy     Chest discomfort     Status post coronary angiogram     Second degree AV block     Cardiac pacemaker in situ     Left knee pain           Medications:     Current  "Outpatient Medications   Medication Sig Dispense Refill     ASPIRIN EC PO Take 81 mg by mouth daily       atorvastatin (LIPITOR) 40 MG tablet Take 1 tablet (40 mg) by mouth daily 90 tablet 0     gabapentin (NEURONTIN) 300 MG capsule Take 1 capsule (300 mg) by mouth At Bedtime       levothyroxine (SYNTHROID/LEVOTHROID) 100 MCG tablet TAKE 1 TABLET (100 MCG) BY MOUTH DAILY 90 tablet 1     lisinopril (ZESTRIL) 2.5 MG tablet Take 1 tablet (2.5 mg) by mouth daily 90 tablet 3     metoprolol succinate ER (TOPROL-XL) 25 MG 24 hr tablet Take 1 tablet (25 mg) by mouth daily 90 tablet 3     Polyethylene Glycol 3350 (MIRALAX PO) Take by mouth as needed       Valerian 450 MG CAPS Take 2 capsules by mouth        vitamin B-12 (CYANOCOBALAMIN) 250 MCG tablet Take 1,000 mcg by mouth daily       Vitamin D-Vitamin K (VITAMIN K2-VITAMIN D3 PO) Take by mouth daily       Zinc 50 MG CAPS        nitroGLYcerin (NITROSTAT) 0.4 MG sublingual tablet One tablet under the tongue every 5 minutes if needed for chest pain. May repeat every 5 minutes for a maximum of 3 doses in 15 minutes\" (Patient not taking: Reported on 3/2/2021) 25 tablet 3           Past Medical History:     Past Medical History:   Diagnosis Date     Aortic root dilatation (H)     4.4 cm     Ascending aorta dilatation (H)     4.4 cm     ASD (atrial septal defect)      Bradycardia      CAD (coronary artery disease)      YOGESH to RCA(6/20/19); YOGESH to OM1(5/15/14)     Cardiac pacemaker 06/20/2019    [employer] PPM COMFORT MRI XT DR-implant 6/20/19     Chest discomfort      Degeneration of lumbar or lumbosacral intervertebral disc      Familial tremor      Former smoker      Herpes zoster without mention of complication      HTN (hypertension)      Hyperlipidaemia      Idiopathic peripheral neuropathy      MGUS (monoclonal gammopathy of unknown significance)      NSTEMI (non-ST elevated myocardial infarction) (H) 2014     Other and unspecified hyperlipidemia      PFO (patent foramen " ovale)      Prostatitis, unspecified      Second degree heart block      Sensorineural hearing loss, unspecified      SSS (sick sinus syndrome) (H)      Status post coronary angiogram 6/20/2019     Unspecified hypothyroidism      Past Surgical History:   Procedure Laterality Date     CHOLECYSTECTOMY       CORONARY ANGIOGRAPHY ADULT ORDER  5/14/14    PTCA w/YOGESH to OM1     CV CORONARY ANGIOGRAM N/A 6/20/2019    Procedure: Coronary Angiogram;  Surgeon: Romie Coronado MD;  Location:  [facility]ARD[facility] C[facility] LAB     CV INTRAVASULAR ULTRASOUND N/A 6/20/2019    Procedure: Intravascular Ultrasound;  Surgeon: Romie Coronado MD;  Location:  [facility]ARD[facility] CATH LAB     CV PCI ATHERECTOMY ORBITAL N/A 6/20/2019    Procedure: PCI Atherectomy Orbital;  Surgeon: Romie Coronado MD;  Location:  [facility]ARD[facility] CATH LAB     CV PCI STENT DRUG ELUTING N/A 6/20/2019    Procedure: PCI Stent Drug Eluting;  Surgeon: Romie Coronado MD;  Location:  HE[facility]ARD[facility] CATH LAB     CV TEMPORARY PACEMAKER INSERTION N/A 6/20/2019    Procedure: Temporary Pacemaker Insertion;  Surgeon: Romie Coronado MD;  Location:  HE[facility]ARD[facility] CATH LAB     EP PACEMAKER N/A 6/20/2019    Procedure: EP Pacemaker;  Surgeon: Max Dowell MD;  Location:  HEART CARDI[facility] CATH LAB     HEART CATH, ANGIOPLASTY  5/14/14    YOGESH to OM1     ZZC NONSPECIFIC PROCEDURE  2010    Laparoscopic cholecystectomy.      Family History   Problem Relation Age of Onset     Cancer Mother      Genitourinary Problems Father 99        complications from surgery     Heart Disease Brother 72        MI     Social History     Socioeconomic History     Marital status:      Spouse name: Not on file     Number of children: Not on file     Years of education: Not on file     Highest education level: Not on file   Occupational History     Not on file   Social Needs     Financial resource strain: Not on file     Food insecurity     Worry: Not on file      Inability: Not on file     Transportation needs     Medical: Not on file     Non-medical: Not on file   Tobacco Use     Smoking status: Former Smoker     Packs/day: 0.50     Years: 16.00     Pack years: 8.00     Start date:      Quit date: 1967     Years since quittin.2     Smokeless tobacco: Never Used   Substance and Sexual Activity     Alcohol use: Yes     Alcohol/week: 0.0 standard drinks     Comment: 5 drinks week     Drug use: No     Sexual activity: Never   Lifestyle     Physical activity     Days per week: Not on file     Minutes per session: Not on file     Stress: Not on file   Relationships     Social connections     Talks on phone: Not on file     Gets together: Not on file     Attends [employer] service: Not on file     Active member of club or organization: Not on file     Attends meetings of clubs or organizations: Not on file     Relationship status: Not on file     Intimate partner violence     Fear of current or ex partner: Not on file     Emotionally abused: Not on file     Physically abused: Not on file     Forced sexual activity: Not on file   Other Topics Concern      Service Not Asked     Blood Transfusions Not Asked     Caffeine Concern Yes     Comment: 2 cups coffee/day     Occupational Exposure Not Asked     Hobby Hazards Not Asked     Sleep Concern No     Stress Concern Not Asked     Weight Concern Yes     Comment: limiting alcohol to watch calories     Special Diet Not Asked     Back Care Not Asked     Exercise Yes     Comment: Stationary bike  weights and aerobics 4 times week     Bike Helmet Not Asked     Seat Belt Yes     Self-Exams Not Asked     Parent/sibling w/ CABG, MI or angioplasty before 65F 55M? No   Social History Narrative     Not on file           Allergies:   No known drug allergies      EVANGELISTA [facility]  Pager: [phone]

## 2021-03-02 NOTE — PATIENT INSTRUCTIONS
Today, we discussed the following:   - Results: Your blood tests today are still pending.    The MRI showed no evidence of a new heart blockage. Your ejection fraction (heart pumping function) is up a little already at 44%. Your heart failure is probably related to the pacemaker.  - Medication changes:  I will notify you of med changes after your labs return. I anticipate we will increase the lisinopril to twice daily, but wait to hear from me before making this change.   - Follow up: With me in 1 month with a blood draw prior.     Please, remember to continue the followin.  Weigh yourself daily. Call if your weight is up > than 2 pounds in one day, or 5 pounds in one week; if you feel more short of breath or have worsening swelling in your legs or abdomen.  2.  Eat a low sodium diet (less than 2,000mg or 2g daily). If you eat less salt, you will retain less fluid.   3.  Avoid alcohol, as this can worsen heart failure.   4.  Avoid NSAIDs as able (For example, Ibuprofen / aleve / advil / naprosen / diclofenac).    If you have questions or concerns please call my nurse team at [phone].  Scheduling phone number: [phone]  For after hours urgent concerns call [phone] option 2.   Reminder: Please bring in all current medications, over the counter supplements and vitamin bottles to your next appointment.    It was a pleasure seeing you today!     Lacey Puckett PA-C

## 2021-03-03 ENCOUNTER — CARE COORDINATION (OUTPATIENT)
Dept: CARDIOLOGY | Facility: CLINIC | Age: 86
End: 2021-03-03

## 2021-03-03 DIAGNOSIS — I50.21 ACUTE SYSTOLIC HEART FAILURE (H): ICD-10-CM

## 2021-03-03 RX ORDER — LISINOPRIL 2.5 MG/1
2.5 TABLET ORAL 2 TIMES DAILY
Qty: 180 TABLET | Refills: 3 | Status: SHIPPED | OUTPATIENT
Start: 2021-03-03 | End: 2021-11-12

## 2021-03-03 NOTE — PROGRESS NOTES
Called and spoke with pt and discussed that DENAE Vázquez reviewed his lab results from yesterday and recommends he increase lisinopril from 2.5mg daily to 2.5mg twice a day. Pt wrote down instructions and read back correctly. Confirmed with pt that he is scheduled for repeat lab and follow up with Denae Vázquez on 4/7/21 and will send an updated lisinopril Rx to his pharmacy so when refill is needed they won't think it is too soon- pt verbalized understanding and agrees with this plan. Provided call back number if any questions/concerns arise.     ALY Wilcox 11:48 AM 3/3/2021

## 2021-03-03 NOTE — PROGRESS NOTES
Received voicemail from pt requesting to know what times he should be taking AM and PM doses of lisinopril and also wondered if he can take with other medications or not.     Called and spoke with pt's wife, Shannon, who reports that pt is currently out running an errand but requests to write down response to his questions. Reviewed with Shannon that pt can take AM dose of lisinopril with his other AM medicaitons and he can take PM dose of lisinopril with his other bedtime medications. She wrote down recommendations and states she will have pt call back if he has any other questions/concerns.     ALY Wilcox 2:54 PM 3/3/2021

## 2021-03-10 ENCOUNTER — OFFICE VISIT (OUTPATIENT)
Dept: INTERNAL MEDICINE | Facility: CLINIC | Age: 86
End: 2021-03-10
Payer: COMMERCIAL

## 2021-03-10 VITALS
HEART RATE: 63 BPM | WEIGHT: 196.3 LBS | TEMPERATURE: 97.3 F | SYSTOLIC BLOOD PRESSURE: 100 MMHG | HEIGHT: 71 IN | DIASTOLIC BLOOD PRESSURE: 64 MMHG | BODY MASS INDEX: 27.48 KG/M2 | OXYGEN SATURATION: 99 %

## 2021-03-10 DIAGNOSIS — I25.118 ATHEROSCLEROTIC HEART DISEASE OF NATIVE CORONARY ARTERY WITH OTHER FORMS OF ANGINA PECTORIS (H): ICD-10-CM

## 2021-03-10 DIAGNOSIS — I49.5 SICK SINUS SYNDROME (H): ICD-10-CM

## 2021-03-10 DIAGNOSIS — M25.562 LEFT KNEE PAIN, UNSPECIFIED CHRONICITY: Primary | ICD-10-CM

## 2021-03-10 PROCEDURE — 99213 OFFICE O/P EST LOW 20 MIN: CPT | Performed by: INTERNAL MEDICINE

## 2021-03-10 ASSESSMENT — MIFFLIN-ST. JEOR: SCORE: 1587.54

## 2021-03-10 NOTE — PATIENT INSTRUCTIONS
*  Your lingering knee pain is either due to continued osteoarthritis/degenerative joint disease or else could be due to torn knee cartilage (torn meniscus).    *  Advanced imaging would help determine what is and what is not causing your symptoms.     *  MRI of the knee (if the pacemaker is compatible with MRIs).    If unable to do MRI, then will refer to Orthopedic clinic    *  Referral to Orthopedic Clinic based on the results.

## 2021-03-10 NOTE — PROGRESS NOTES
(M25.562) Left knee pain, unspecified chronicity  (primary encounter diagnosis)  Comment: *  Your lingering knee pain is either due to continued osteoarthritis/degenerative joint disease or else could be due to torn knee cartilage (torn meniscus).    *  Advanced imaging would help determine what is and what is not causing your symptoms.     *  MRI of the knee (if the pacemaker is compatible with MRIs).    If unable to do MRI, then will refer to Ortho    *  Referral to Orthopedic Clinic based on the results.      Plan: MR Knee Left w/o Contrast            (I49.5) Sick sinus syndrome (H)  Comment: This condition is currently controlled on the current medical regimen.  Continue current therapy.   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  Continue current therapy.   No new cardiac symptosm.   Plan:        Austin Degroot is a 87 year old who presents for the following health issues     HPI       Musculoskeletal problem/pain  Onset/Duration: x7 months   Description  Location: knee - left  Joint Swelling: YES- mild  Redness: no  Pain: YES- dull pain  Warmth: no  Intensity:  mild  Progression of Symptoms:  improving  Accompanying signs and symptoms:   Fevers: no  Numbness/tingling/weakness: no  History  Trauma to the area: no  Recent illness:  no  Previous similar problem: no  Previous evaluation:  Ultrasound done on 8/14/2020  Precipitating or alleviating factors:  Aggravating factors include: walking and climbing stairs  Therapies tried and outcome: physical therapy - helped with sx, pain relief cream       Seen [facility] August 2020.   Reviewed notes.  osteoarthritis knee per knee xray there.     Reviewed notes:        2.  sicj sinus siyndrome, s/p PPP.   No recent dizziness, no shortness of breath    3.  Prior of coronary artery disease as listed in medical  "history.  No current or recent cardiovascaulr symptoms.   No shortness of breath, no episodes of chest pain/pressure, no dyspnea on exertion, no changes in his abiliy to perform physical exertion or tasks.  Takes the same amount of time to perform similar physical tasks.        **I reviewed the information recorded in the patient's EPIC chart (including but not limited to medical history, surgical history, family history, problem list, medication list, and allergy list) and updated the information as indicated based on the patients reported information.         Review of Systems   Constitutional, HEENT, cardiovascular, pulmonary, gi and gu systems are negative, except as otherwise noted.      Objective    /64   Pulse 63   Temp 97.3  F (36.3  C) (Temporal)   Ht 1.803 m (5' 11\")   Wt 89 kg (196 lb 4.8 oz)   SpO2 99%   BMI 27.38 kg/m    Body mass index is 27.38 kg/m .  Physical Exam   GENERAL alert and no distress  EYES:  Normal sclera,conjunctiva, EOMI  HENT: oral and posterior pharynx without lesions or erythema, facies symmetric  NECK: Neck supple. No LAD, without thyroidmegaly.  RESP: Clear to ausculation bilaterally without wheezes or crackles. Normal BS in all fields.  CV: RRR normal S1S2 without murmurs, rubs or gallops.  LYMPH: no cervical lymph adenopathy appreciated  MS: extremities- no gross deformities of the visible extremities noted,   EXT:  no lower extremity edema  PSYCH: Alert and oriented times 3; speech- coherent  SKIN:  No obvious significant skin lesions on visible portions of face   KNEE:  Right knee FROM without pain    Left knee:  Mild swelling in medial joint line, normal ROM with minimal pain at full flexion  ;mildly positive macmurrys exam.  lachmans test negative, mild tendenress along MCL                "

## 2021-03-16 DIAGNOSIS — I21.4 NON-STEMI (NON-ST ELEVATED MYOCARDIAL INFARCTION) (H): ICD-10-CM

## 2021-03-16 DIAGNOSIS — I25.810 CORONARY ARTERY DISEASE INVOLVING CORONARY BYPASS GRAFT OF NATIVE HEART WITHOUT ANGINA PECTORIS: ICD-10-CM

## 2021-03-16 RX ORDER — ATORVASTATIN CALCIUM 40 MG/1
40 TABLET, FILM COATED ORAL DAILY
Qty: 90 TABLET | Refills: 3 | Status: SHIPPED | OUTPATIENT
Start: 2021-03-16 | End: 2022-03-17

## 2021-03-22 ENCOUNTER — IMMUNIZATION (OUTPATIENT)
Dept: NURSING | Facility: CLINIC | Age: 86
End: 2021-03-22
Attending: INTERNAL MEDICINE
Payer: COMMERCIAL

## 2021-03-22 PROCEDURE — 0002A PR COVID VAC PFIZER DIL RECON 30 MCG/0.3 ML IM: CPT

## 2021-03-22 PROCEDURE — 91300 PR COVID VAC PFIZER DIL RECON 30 MCG/0.3 ML IM: CPT

## 2021-03-24 ENCOUNTER — HOSPITAL ENCOUNTER (OUTPATIENT)
Dept: MRI IMAGING | Facility: CLINIC | Age: 86
End: 2021-03-24
Attending: INTERNAL MEDICINE
Payer: COMMERCIAL

## 2021-03-24 ENCOUNTER — HOSPITAL ENCOUNTER (OUTPATIENT)
Facility: CLINIC | Age: 86
Discharge: HOME | End: 2021-03-24
Admitting: RADIOLOGY
Payer: COMMERCIAL

## 2021-03-24 VITALS — OXYGEN SATURATION: 97 % | HEART RATE: 80 BPM

## 2021-03-24 DIAGNOSIS — M25.562 LEFT KNEE PAIN, UNSPECIFIED CHRONICITY: ICD-10-CM

## 2021-03-24 PROCEDURE — 73721 MRI JNT OF LWR EXTRE W/O DYE: CPT | Mod: LT

## 2021-03-24 PROCEDURE — 999N000154 HC STATISTIC RADIOLOGY XRAY, US, CT, MAR, NM

## 2021-03-25 ENCOUNTER — TELEPHONE (OUTPATIENT)
Dept: INTERNAL MEDICINE | Facility: CLINIC | Age: 86
End: 2021-03-25

## 2021-03-25 DIAGNOSIS — M17.12 PRIMARY OSTEOARTHRITIS OF LEFT KNEE: ICD-10-CM

## 2021-03-25 DIAGNOSIS — S83.207A ACUTE MENISCAL TEAR OF LEFT KNEE, INITIAL ENCOUNTER: Primary | ICD-10-CM

## 2021-03-25 NOTE — TELEPHONE ENCOUNTER
Please call patient.    His MRI shows a tear in the meniscus of the left knee.  The MRI also reveals a fair amount of degenerative changes consistent with osteoarthritis.    I recommend seeing the orthopedic clinic to evaluate the MRI and make additional recommendations, which may include arthroscopic repair of the meniscus tear or even possible discussion of joint replacement depending on her symptoms.    Start with simply seeing the orthopedic surgeon.    Referral ordered to [facility] orth[facility]    [facility] - [place] [phone]   [email or web address]

## 2021-03-25 NOTE — TELEPHONE ENCOUNTER
Attempted to call patient about MRI results, phone kept ringing and no VM. Will try calling again later or tomorrow.    ZULEYMA Tamez

## 2021-04-02 ENCOUNTER — TRANSFERRED RECORDS (OUTPATIENT)
Dept: HEALTH INFORMATION MANAGEMENT | Facility: CLINIC | Age: 86
End: 2021-04-02

## 2021-04-07 ENCOUNTER — OFFICE VISIT (OUTPATIENT)
Dept: CARDIOLOGY | Facility: CLINIC | Age: 86
End: 2021-04-07
Attending: PHYSICIAN ASSISTANT
Payer: COMMERCIAL

## 2021-04-07 VITALS
SYSTOLIC BLOOD PRESSURE: 112 MMHG | HEIGHT: 71 IN | HEART RATE: 67 BPM | DIASTOLIC BLOOD PRESSURE: 64 MMHG | OXYGEN SATURATION: 98 % | BODY MASS INDEX: 27.73 KG/M2 | WEIGHT: 198.1 LBS

## 2021-04-07 DIAGNOSIS — I25.810 CORONARY ARTERY DISEASE INVOLVING CORONARY BYPASS GRAFT OF NATIVE HEART WITHOUT ANGINA PECTORIS: Primary | ICD-10-CM

## 2021-04-07 DIAGNOSIS — I50.21 ACUTE SYSTOLIC HEART FAILURE (H): ICD-10-CM

## 2021-04-07 LAB
ANION GAP SERPL CALCULATED.3IONS-SCNC: 2 MMOL/L (ref 3–14)
BUN SERPL-MCNC: 25 MG/DL (ref 7–30)
CALCIUM SERPL-MCNC: 8.8 MG/DL (ref 8.5–10.1)
CHLORIDE SERPL-SCNC: 109 MMOL/L (ref 94–109)
CO2 SERPL-SCNC: 28 MMOL/L (ref 20–32)
CREAT SERPL-MCNC: 1.28 MG/DL (ref 0.66–1.25)
GFR SERPL CREATININE-BSD FRML MDRD: 50 ML/MIN/{1.73_M2}
GLUCOSE SERPL-MCNC: 75 MG/DL (ref 70–99)
POTASSIUM SERPL-SCNC: 4.1 MMOL/L (ref 3.4–5.3)
SODIUM SERPL-SCNC: 139 MMOL/L (ref 133–144)

## 2021-04-07 PROCEDURE — 36415 COLL VENOUS BLD VENIPUNCTURE: CPT | Performed by: PHYSICIAN ASSISTANT

## 2021-04-07 PROCEDURE — 99214 OFFICE O/P EST MOD 30 MIN: CPT | Performed by: PHYSICIAN ASSISTANT

## 2021-04-07 PROCEDURE — 80048 BASIC METABOLIC PNL TOTAL CA: CPT | Performed by: PHYSICIAN ASSISTANT

## 2021-04-07 ASSESSMENT — MIFFLIN-ST. JEOR: SCORE: 1595.71

## 2021-04-07 NOTE — PROGRESS NOTES
Cardiology Clinic Progress Note    Moses Elizondo MRN# [id number]   YOB: 1933 Age: 87 year old   Primary cardiologist: Dr. Huggins         Assessment and Plan:     In summary, Moses Elizondo presents today for follow up after the up-titration of lisinopril about a month ago.     1. 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 No evidence of ischemia on stress perfusion imaging. Now on Toprol 25, lisinopril 2.5 BID. Appears well-compensated without diuretic therapy. FC II sxs.   2. HTN, controlled.   3. Pacemaker-dependent.    Plan:  - No changes today. He is aware of when to call us, if he experiences weight gain, dyspnea, leg swelling, or with exercise intolerance working in the garden this summer. We could always consider upgrading his device, if his functional capacity becomes unsatisfactory.   - If everything goes well, he'll follow up with Dr. Huggins in 3 months.        History of Presenting Illness:      Moses Elizondo is a pleasant 87 year old patient who presents today for follow-up on his newly diagnosed cardiomyopathy. (Please see Dr. Huggins's note from 5/2/2020 for the patient's detailed history)    In brief, Zane had a routine echocardiogram performed 1/6/21. This revealed a new cardiomyopathy with an LVEF of 38%. Appears globally reduced with possible worsened hypokinesis inferiorly.  His blood pressure on echo was well controlled.  He had thankfully maintained a very adequate functional capacity, did feel he would fatigue quicker with activity than a year ago. To investigate this further, I obtained a stress MRI which actually showed a better LVEF at 44%, and low normal RV EF of 53%, a mild mid ascending aorta dilation at 4.2 cm, no evidence of MI, fibrosis, or infiltrative disease, with a trivial patchy non-CAD scar in the basal anterior septum, and no evidence of ischemia on stress perfusion imaging.  This appears most consistent with a pacemaker induced  "cardiomyopathy.  We considered lowering his LRL, however after discussion with the device nurses they have told me that his underlying rhythm is now complete heart block, and therefore making that change would not reduce the amount of pacing. I started him on GDMT including lisinopril 2.5 mg daily, which I later increased to BID. He's also continued to take Toprol 25 mg daily.     Today, Zane presents to clinic for reassessment on his current medication regimen. He continues to feel well from a cardiac standpoint. Mostly limited at this point by L knee pain for which he's following at [facility]. He's hoping to be active out in the yard this summer as he typically is, and wants to get back to biking now that the weather is getting nice. He continues to deny chest pain, orthopnea, edema, claudication, palpitations, near syncope or syncope. BP is well-controlled at 112/64 mmHg. Weight is stable. Labs today show stable renal function.          Review of Systems:     12-pt ROS is negative except for as noted in the HPI.          Physical Exam:     Vitals: /64   Pulse 67   Ht 1.803 m (5' 11\")   Wt 89.9 kg (198 lb 1.6 oz)   SpO2 98%   BMI 27.63 kg/m    Wt Readings from Last 10 Encounters:   04/07/21 89.9 kg (198 lb 1.6 oz)   03/10/21 89 kg (196 lb 4.8 oz)   03/02/21 89.8 kg (198 lb)   08/14/20 86.6 kg (191 lb)   07/23/20 87.6 kg (193 lb 1.6 oz)   07/06/20 88.3 kg (194 lb 9.6 oz)   03/02/20 89.1 kg (196 lb 6.4 oz)   02/27/20 88 kg (194 lb)   09/24/19 86.6 kg (191 lb)   06/27/19 86.2 kg (190 lb)       Constitutional:  Patient is pleasant, alert, cooperative, and in NAD.  HEENT:  NCAT. PERRLA. EOM's intact.   Neck:  CVP appears normal. No carotid bruits.   Pulmonary: Normal respiratory effort. CTAB.   Cardiac: RRR, normal S1/S2, no S3/S4, no murmur or rub.   Abdomen:  Non-tender abdomen, no hepatosplenomegaly appreciated.   Vascular: Pulses in the upper and lower extremities are 2+ and equal bilaterally.  Extremities: " No edema, erythema, cyanosis or tenderness appreciated.  Skin:  No rashes or lesions appreciated.   Neurological:  No gross motor or sensory deficits.   Psych: Appropriate affect.        Data:     Labs reviewed:  Recent Labs   Lab Test 03/02/21  0927 07/23/20  1008 07/07/20 10/31/19  0849 03/20/19  0823 03/20/19  0823 02/27/18  0817   LDL  --   --  49 60  --  61 40   HDL  --   --  42 52  --  40 49   NHDL  --   --   --  74  --  76 52   CHOL  --   --  105 126  --  116 101   TRIG  --   --  71 70  --  75 59   TSH 2.27 2.87  --  1.94   < > 2.20 2.53   NTBNP 804*  --   --   --   --   --   --     < > = values in this interval not displayed.       Lab Results   Component Value Date    WBC 6.2 07/23/2020    RBC 4.58 07/23/2020    HGB 13.2 (L) 03/02/2021    HCT 41.4 07/23/2020    MCV 90 07/23/2020    MCH 29.9 07/23/2020    MCHC 33.1 07/23/2020    RDW 12.5 07/23/2020     07/23/2020       Lab Results   Component Value Date     04/07/2021    POTASSIUM 4.1 04/07/2021    CHLORIDE 109 04/07/2021    CO2 28 04/07/2021    ANIONGAP 2 (L) 04/07/2021    GLC 75 04/07/2021    BUN 25 04/07/2021    CR 1.28 (H) 04/07/2021    GFRESTIMATED 50 (L) 04/07/2021    GFRESTBLACK 58 (L) 04/07/2021    GABRIELA 8.8 04/07/2021      Lab Results   Component Value Date    AST 32 03/02/2021    ALT 30 03/02/2021       Lab Results   Component Value Date    A1C 5.6 05/02/2019       Lab Results   Component Value Date    INR 1.00 06/20/2019           Problem List:     Patient Active Problem List   Diagnosis     Hypothyroidism, unspecified type     Degeneration of lumbar or lumbosacral intervertebral disc     MGUS (monoclonal gammopathy of unknown significance)     Familial tremor     Hyperlipidemia LDL goal <100     NSTEMI (non-ST elevated myocardial infarction) (H)     Health long-term     Coronary artery disease     Bradycardia     Aneurysm of thoracic aorta (H)     Sinus bradycardia     Atherosclerotic heart disease of native coronary artery with other  "forms of angina pectoris (H)     Ascending aortic aneurysm (H)     Sick sinus syndrome (H)     Aortic root dilatation (H)     Medicare annual wellness visit, subsequent     Idiopathic peripheral neuropathy     Chest discomfort     Status post coronary angiogram     Second degree AV block     Cardiac pacemaker in situ     Left knee pain           Medications:     Current Outpatient Medications   Medication Sig Dispense Refill     ASPIRIN EC PO Take 81 mg by mouth daily       atorvastatin (LIPITOR) 40 MG tablet Take 1 tablet (40 mg) by mouth daily 90 tablet 3     gabapentin (NEURONTIN) 300 MG capsule Take 1 capsule (300 mg) by mouth At Bedtime       levothyroxine (SYNTHROID/LEVOTHROID) 100 MCG tablet TAKE 1 TABLET (100 MCG) BY MOUTH DAILY 90 tablet 1     lisinopril (ZESTRIL) 2.5 MG tablet Take 1 tablet (2.5 mg) by mouth 2 times daily 180 tablet 3     metoprolol succinate ER (TOPROL-XL) 25 MG 24 hr tablet Take 1 tablet (25 mg) by mouth daily 90 tablet 3     nitroGLYcerin (NITROSTAT) 0.4 MG sublingual tablet One tablet under the tongue every 5 minutes if needed for chest pain. May repeat every 5 minutes for a maximum of 3 doses in 15 minutes\" 25 tablet 3     Polyethylene Glycol 3350 (MIRALAX PO) Take by mouth as needed       Valerian 450 MG CAPS Take 2 capsules by mouth        vitamin B-12 (CYANOCOBALAMIN) 250 MCG tablet Take 1,000 mcg by mouth daily       Vitamin D-Vitamin K (VITAMIN K2-VITAMIN D3 PO) Take by mouth daily       Zinc 50 MG CAPS              Past Medical History:     Past Medical History:   Diagnosis Date     Aortic root dilatation (H)     4.4 cm     Ascending aorta dilatation (H)     4.4 cm     ASD (atrial septal defect)      Bradycardia      CAD (coronary artery disease)      YOGESH to RCA(6/20/19); YOGESH to OM1(5/15/14)     Cardiac pacemaker 06/20/2019    [employer] PPM COMFORT MRI XT DR-implant 6/20/19     Chest discomfort      Degeneration of lumbar or lumbosacral intervertebral disc      Familial tremor  "     Former smoker      Herpes zoster without mention of complication      HTN (hypertension)      Hyperlipidaemia      Idiopathic peripheral neuropathy      MGUS (monoclonal gammopathy of unknown significance)      NSTEMI (non-ST elevated myocardial infarction) (H) 2014     Other and unspecified hyperlipidemia      PFO (patent foramen ovale)      Prostatitis, unspecified      Second degree heart block      Sensorineural hearing loss, unspecified      SSS (sick sinus syndrome) (H)      Status post coronary angiogram 6/20/2019     Unspecified hypothyroidism      Past Surgical History:   Procedure Laterality Date     CHOLECYSTECTOMY       CORONARY ANGIOGRAPHY ADULT ORDER  5/14/14    PTCA w/YOGESH to OM1     CV CORONARY ANGIOGRAM N/A 6/20/2019    Procedure: Coronary Angiogram;  Surgeon: Romie Coronado MD;  Location:  [facility]     CV INTRAVASULAR ULTRASOUND N/A 6/20/2019    Procedure: Intravascular Ultrasound;  Surgeon: Romie Coronado MD;  Location:  [facility]     CV PCI ATHERECTOMY ORBITAL N/A 6/20/2019    Procedure: PCI Atherectomy Orbital;  Surgeon: Romie Coronado MD;  Location:  [facility]     CV PCI STENT DRUG ELUTING N/A 6/20/2019    Procedure: PCI Stent Drug Eluting;  Surgeon: Romie Coronado MD;  Location:  [facility]     CV TEMPORARY PACEMAKER INSERTION N/A 6/20/2019    Procedure: Temporary Pacemaker Insertion;  Surgeon: Romie Coronado MD;  Location:  [facility]     EP PACEMAKER N/A 6/20/2019    Procedure: EP Pacemaker;  Surgeon: Max Dowell MD;  Location:  [facility]     HEART CATH, ANGIOPLASTY  5/14/14    YOGESH to OM1     [facility] NONSPECIFIC PROCEDURE  2010    Laparoscopic cholecystectomy.      Family History   Problem Relation Age of Onset     Cancer Mother      Genitourinary Problems Father 99        complications from surgery     Heart Disease Brother 72        MI     Social History      Socioeconomic History     Marital status:      Spouse name: Not on file     Number of children: Not on file     Years of education: Not on file     Highest education level: Not on file   Occupational History     Not on file   Social Needs     Financial resource strain: Not on file     Food insecurity     Worry: Not on file     Inability: Not on file     Transportation needs     Medical: Not on file     Non-medical: Not on file   Tobacco Use     Smoking status: Former Smoker     Packs/day: 0.50     Years: 16.00     Pack years: 8.00     Types: Cigarettes     Start date:      Quit date: 1967     Years since quittin.3     Smokeless tobacco: Never Used   Substance and Sexual Activity     Alcohol use: Yes     Alcohol/week: 0.0 standard drinks     Comment: 5 drinks week     Drug use: No     Sexual activity: Never   Lifestyle     Physical activity     Days per week: Not on file     Minutes per session: Not on file     Stress: Not on file   Relationships     Social connections     Talks on phone: Not on file     Gets together: Not on file     Attends Restorationism service: Not on file     Active member of club or organization: Not on file     Attends meetings of clubs or organizations: Not on file     Relationship status: Not on file     Intimate partner violence     Fear of current or ex partner: Not on file     Emotionally abused: Not on file     Physically abused: Not on file     Forced sexual activity: Not on file   Other Topics Concern      Service Not Asked     Blood Transfusions Not Asked     Caffeine Concern Yes     Comment: 2 cups coffee/day     Occupational Exposure Not Asked     Hobby Hazards Not Asked     Sleep Concern No     Stress Concern Not Asked     Weight Concern Yes     Comment: limiting alcohol to watch calories     Special Diet Not Asked     Back Care Not Asked     Exercise Yes     Comment: Stationary bike  weights and aerobics 4 times week     Bike Helmet Not Asked     Seat  Belt Yes     Self-Exams Not Asked     Parent/sibling w/ CABG, MI or angioplasty before 65F 55M? No   Social History Narrative     Not on file           Allergies:   No known drug allergies      Lacey Puckett PA-C  [facility]  Pager: [phone]

## 2021-04-07 NOTE — PATIENT INSTRUCTIONS
Today's Plan:   - No changes today.   - Please call me if you notice progressive leg swelling, rapid weight gain, or difficulty working outside or riding your bike this summer.   - If everything goes well, please return to see Dr. Huggins in 3 months.     If you have questions or concerns please call my nurse team at [phone].  Scheduling phone number: [phone]  For after hours urgent concerns call [phone] option 2.   Reminder: Please bring in all current medications, over the counter supplements and vitamin bottles to your next appointment.    It was a pleasure seeing you today!     Lacey Puckett PA-C

## 2021-04-07 NOTE — LETTER
4/7/2021    Steven Wagoner MD  [address] [facility] [address]    RE: Moses Elizondo       Dear Colleague,    I had the pleasure of seeing Moses Elizondo in the [facility].  Cardiology Clinic Progress Note    Moses Elizondo MRN# [id number]   YOB: 1933 Age: 87 year old   Primary cardiologist: Dr. Huggins         Assessment and Plan:     In summary, Moses Elizondo presents today for follow up after the up-titration of lisinopril about a month ago.     1. 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 No evidence of ischemia on stress perfusion imaging. Now on Toprol 25, lisinopril 2.5 BID. Appears well-compensated without diuretic therapy. FC II sxs.   2. HTN, controlled.   3. Pacemaker-dependent.    Plan:  - No changes today. He is aware of when to call us, if he experiences weight gain, dyspnea, leg swelling, or with exercise intolerance working in the garden this summer. We could always consider upgrading his device, if his functional capacity becomes unsatisfactory.   - If everything goes well, he'll follow up with Dr. Huggins in 3 months.        History of Presenting Illness:      Moses Elizondo is a pleasant 87 year old patient who presents today for follow-up on his newly diagnosed cardiomyopathy. (Please see Dr. Huggins's note from 5/2/2020 for the patient's detailed history)    In brief, Zane had a routine echocardiogram performed 1/6/21. This revealed a new cardiomyopathy with an LVEF of 38%. Appears globally reduced with possible worsened hypokinesis inferiorly.  His blood pressure on echo was well controlled.  He had thankfully maintained a very adequate functional capacity, did feel he would fatigue quicker with activity than a year ago. To investigate this further, I obtained a stress MRI which actually showed a better LVEF at 44%, and low normal RV EF of 53%, a mild mid ascending aorta  "dilation at 4.2 cm, no evidence of MI, fibrosis, or infiltrative disease, with a trivial patchy non-CAD scar in the basal anterior septum, and no evidence of ischemia on stress perfusion imaging.  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  We considered lowering his LRL, however after discussion with the device nurses they have told me that his underlying rhythm is now complete heart block, and therefore making that change would not reduce the amount of pacing. I started him on GDMT including lisinopril 2.5 mg daily, which I later increased to BID. He's also continued to take Toprol 25 mg daily.     Today, Zane presents to clinic for reassessment on his current medication regimen. He continues to feel well from a cardiac standpoint. Mostly limited at this point by L knee pain for which he's following at [facility]. He's hoping to be active out in the yard this summer as he typically is, and wants to get back to biking now that the weather is getting nice. He continues to deny chest pain, orthopnea, edema, claudication, palpitations, near syncope or syncope. BP is well-controlled at 112/64 mmHg. Weight is stable. Labs today show stable renal function.          Review of Systems:     12-pt ROS is negative except for as noted in the HPI.          Physical Exam:     Vitals: /64   Pulse 67   Ht 1.803 m (5' 11\")   Wt 89.9 kg (198 lb 1.6 oz)   SpO2 98%   BMI 27.63 kg/m    Wt Readings from Last 10 Encounters:   04/07/21 89.9 kg (198 lb 1.6 oz)   03/10/21 89 kg (196 lb 4.8 oz)   03/02/21 89.8 kg (198 lb)   08/14/20 86.6 kg (191 lb)   07/23/20 87.6 kg (193 lb 1.6 oz)   07/06/20 88.3 kg (194 lb 9.6 oz)   03/02/20 89.1 kg (196 lb 6.4 oz)   02/27/20 88 kg (194 lb)   09/24/19 86.6 kg (191 lb)   06/27/19 86.2 kg (190 lb)       Constitutional:  Patient is pleasant, alert, cooperative, and in NAD.  HEENT:  NCAT. PERRLA. EOM's intact.   Neck:  CVP appears normal. No carotid bruits.   Pulmonary: Normal " respiratory effort. CTAB.   Cardiac: RRR, normal S1/S2, no S3/S4, no murmur or rub.   Abdomen:  Non-tender abdomen, no hepatosplenomegaly appreciated.   Vascular: Pulses in the upper and lower extremities are 2+ and equal bilaterally.  Extremities: No edema, erythema, cyanosis or tenderness appreciated.  Skin:  No rashes or lesions appreciated.   Neurological:  No gross motor or sensory deficits.   Psych: Appropriate affect.        Data:     Labs reviewed:  Recent Labs   Lab Test 03/02/21  0927 07/23/20  1008 07/07/20 10/31/19  0849 03/20/19  0823 03/20/19  0823 02/27/18  0817   LDL  --   --  49 60  --  61 40   HDL  --   --  42 52  --  40 49   NHDL  --   --   --  74  --  76 52   CHOL  --   --  105 126  --  116 101   TRIG  --   --  71 70  --  75 59   TSH 2.27 2.87  --  1.94   < > 2.20 2.53   NTBNP 804*  --   --   --   --   --   --     < > = values in this interval not displayed.       Lab Results   Component Value Date    WBC 6.2 07/23/2020    RBC 4.58 07/23/2020    HGB 13.2 (L) 03/02/2021    HCT 41.4 07/23/2020    MCV 90 07/23/2020    MCH 29.9 07/23/2020    MCHC 33.1 07/23/2020    RDW 12.5 07/23/2020     07/23/2020       Lab Results   Component Value Date     04/07/2021    POTASSIUM 4.1 04/07/2021    CHLORIDE 109 04/07/2021    CO2 28 04/07/2021    ANIONGAP 2 (L) 04/07/2021    GLC 75 04/07/2021    BUN 25 04/07/2021    CR 1.28 (H) 04/07/2021    GFRESTIMATED 50 (L) 04/07/2021    GFRESTBLACK 58 (L) 04/07/2021    GABRIELA 8.8 04/07/2021      Lab Results   Component Value Date    AST 32 03/02/2021    ALT 30 03/02/2021       Lab Results   Component Value Date    A1C 5.6 05/02/2019       Lab Results   Component Value Date    INR 1.00 06/20/2019           Problem List:     Patient Active Problem List   Diagnosis     Hypothyroidism, unspecified type     Degeneration of lumbar or lumbosacral intervertebral disc     MGUS (monoclonal gammopathy of unknown significance)     Familial tremor     Hyperlipidemia LDL goal <100  "    NSTEMI (non-ST elevated myocardial infarction) (H)     Health detention     Coronary artery disease     Bradycardia     Aneurysm of thoracic aorta (H)     Sinus bradycardia     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)     Ascending aortic aneurysm (H)     Sick sinus syndrome (H)     Aortic root dilatation (H)     Medicare annual wellness visit, subsequent     Idiopathic peripheral neuropathy     Chest discomfort     Status post coronary angiogram     Second degree AV block     Cardiac pacemaker in situ     Left knee pain           Medications:     Current Outpatient Medications   Medication Sig Dispense Refill     ASPIRIN EC PO Take 81 mg by mouth daily       atorvastatin (LIPITOR) 40 MG tablet Take 1 tablet (40 mg) by mouth daily 90 tablet 3     gabapentin (NEURONTIN) 300 MG capsule Take 1 capsule (300 mg) by mouth At Bedtime       levothyroxine (SYNTHROID/LEVOTHROID) 100 MCG tablet TAKE 1 TABLET (100 MCG) BY MOUTH DAILY 90 tablet 1     lisinopril (ZESTRIL) 2.5 MG tablet Take 1 tablet (2.5 mg) by mouth 2 times daily 180 tablet 3     metoprolol succinate ER (TOPROL-XL) 25 MG 24 hr tablet Take 1 tablet (25 mg) by mouth daily 90 tablet 3     nitroGLYcerin (NITROSTAT) 0.4 MG sublingual tablet One tablet under the tongue every 5 minutes if needed for chest pain. May repeat every 5 minutes for a maximum of 3 doses in 15 minutes\" 25 tablet 3     Polyethylene Glycol 3350 (MIRALAX PO) Take by mouth as needed       Valerian 450 MG CAPS Take 2 capsules by mouth        vitamin B-12 (CYANOCOBALAMIN) 250 MCG tablet Take 1,000 mcg by mouth daily       Vitamin D-Vitamin K (VITAMIN K2-VITAMIN D3 PO) Take by mouth daily       Zinc 50 MG CAPS              Past Medical History:     Past Medical History:   Diagnosis Date     Aortic root dilatation (H)     4.4 cm     Ascending aorta dilatation (H)     4.4 cm     ASD (atrial septal defect)      Bradycardia      CAD (coronary " artery disease)      YOGESH to RCA(6/20/19); YOGESH to OM1(5/15/14)     Cardiac pacemaker 06/20/2019    [employer] PPM COMFORT MRI XT DR-implant 6/20/19     Chest discomfort      Degeneration of lumbar or lumbosacral intervertebral disc      Familial tremor      Former smoker      Herpes zoster without mention of complication      HTN (hypertension)      Hyperlipidaemia      Idiopathic peripheral neuropathy      MGUS (monoclonal gammopathy of unknown significance)      NSTEMI (non-ST elevated myocardial infarction) (H) 2014     Other and unspecified hyperlipidemia      PFO (patent foramen ovale)      Prostatitis, unspecified      Second degree heart block      Sensorineural hearing loss, unspecified      SSS (sick sinus syndrome) (H)      Status post coronary angiogram 6/20/2019     Unspecified hypothyroidism      Past Surgical History:   Procedure Laterality Date     CHOLECYSTECTOMY       CORONARY ANGIOGRAPHY ADULT ORDER  5/14/14    PTCA w/YOGESH to OM1     CV CORONARY ANGIOGRAM N/A 6/20/2019    Procedure: Coronary Angiogram;  Surgeon: Romie Coronado MD;  Location:  [facility]     CV INTRAVASULAR ULTRASOUND N/A 6/20/2019    Procedure: Intravascular Ultrasound;  Surgeon: Romie Coronado MD;  Location:  [facility]     CV PCI ATHERECTOMY ORBITAL N/A 6/20/2019    Procedure: PCI Atherectomy Orbital;  Surgeon: Romie Coronado MD;  Location:  [facility]     CV PCI STENT DRUG ELUTING N/A 6/20/2019    Procedure: PCI Stent Drug Eluting;  Surgeon: Romie Coronado MD;  Location:  [facility]     CV TEMPORARY PACEMAKER INSERTION N/A 6/20/2019    Procedure: Temporary Pacemaker Insertion;  Surgeon: Romie Coronado MD;  Location:  [facility]     EP PACEMAKER N/A 6/20/2019    Procedure: EP Pacemaker;  Surgeon: Max Dowell MD;  Location:  [facility] LAB     HEART CATH, ANGIOPLASTY  5/14/14    YOGESH to OM1     ZZC NONSPECIFIC PROCEDURE       Laparoscopic cholecystectomy.      Family History   Problem Relation Age of Onset     Cancer Mother      Genitourinary Problems Father 99        complications from surgery     Heart Disease Brother 72        MI     Social History     Socioeconomic History     Marital status:      Spouse name: Not on file     Number of children: Not on file     Years of education: Not on file     Highest education level: Not on file   Occupational History     Not on file   Social Needs     Financial resource strain: Not on file     Food insecurity     Worry: Not on file     Inability: Not on file     Transportation needs     Medical: Not on file     Non-medical: Not on file   Tobacco Use     Smoking status: Former Smoker     Packs/day: 0.50     Years: 16.00     Pack years: 8.00     Types: Cigarettes     Start date:      Quit date: 1967     Years since quittin.3     Smokeless tobacco: Never Used   Substance and Sexual Activity     Alcohol use: Yes     Alcohol/week: 0.0 standard drinks     Comment: 5 drinks week     Drug use: No     Sexual activity: Never   Lifestyle     Physical activity     Days per week: Not on file     Minutes per session: Not on file     Stress: Not on file   Relationships     Social connections     Talks on phone: Not on file     Gets together: Not on file     Attends Mandaeism service: Not on file     Active member of club or organization: Not on file     Attends meetings of clubs or organizations: Not on file     Relationship status: Not on file     Intimate partner violence     Fear of current or ex partner: Not on file     Emotionally abused: Not on file     Physically abused: Not on file     Forced sexual activity: Not on file   Other Topics Concern      Service Not Asked     Blood Transfusions Not Asked     Caffeine Concern Yes     Comment: 2 cups coffee/day     Occupational Exposure Not Asked     Hobby Hazards Not Asked     Sleep Concern No     Stress Concern Not Asked      Weight Concern Yes     Comment: limiting alcohol to watch calories     Special Diet Not Asked     Back Care Not Asked     Exercise Yes     Comment: Stationary bike  weights and aerobics 4 times week     Bike Helmet Not Asked     Seat Belt Yes     Self-Exams Not Asked     Parent/sibling w/ CABG, MI or angioplasty before 65F 55M? No   Social History Narrative     Not on file           Allergies:   No known drug allergies      Lacey Puckett PA-C  [facility]  Pager: [phone]    cc:   Lacey Puckett PA-C  [address]  [place] [address]

## 2021-04-26 PROBLEM — M25.562 LEFT KNEE PAIN: Status: RESOLVED | Noted: 2020-11-05 | Resolved: 2021-04-26

## 2021-04-26 NOTE — PROGRESS NOTES
"Discharge Note     Progress reporting period is from 11-5-20 to Dec 3, 2020.      [facility] failed to follow up and current status is unknown.  Please see information below for last relevant information on current status.  Patient seen for 4 visits.    SUBJECTIVE  At last visit patient reported he had not been wearing brace for the last 2-3 days.  Going up and down stairs reciprocally, \"pressure\" knee with going up stairs, does not last. Continues to have minimal discomfort with sit -stand and intermittent feels \"wobbly\".  Still having intermittent \"clicking\" - which bothers him more than pain or feeling of weakness.  Doing rep extension seated with  heel on floor/quad set every couple hours.  Overall intensity of pain is improving  .  Current pain level is 0/10(at rest ).     Previous pain level was  (0-5/10).   Changes in function:  Yes (See Goal flowsheet attached for changes in current functional level)  Adverse reaction to treatment or activity: None    OBJECTIVE  No brace.  Scab lateral knee from brace.  AROM left knee extension sitting 2 degrees.  Able to go up/down stairs reciprocally with railing \"pressure\" left knee with ascending.      ASSESSMENT/PLAN  Diagnosis: Left knee pain   STG/LTGs have been met or progress has been made towards goals:  Yes, please see goal flowsheet for most current information  Assessment of Progress: current status is unknown.    Last current status: Pt is progressing slower than anticipated   Patient did not return for further visits so problems/goal status is unknown.    Recommendations:  Patient did not return for follow-up visits as expected.  Will discharge physical therapy chart at this time.      Please refer to the daily flowsheet for treatment today, total treatment time and time spent performing 1:1 timed codes.        "

## 2021-05-05 ENCOUNTER — ANCILLARY PROCEDURE (OUTPATIENT)
Dept: CARDIOLOGY | Facility: CLINIC | Age: 86
End: 2021-05-05
Attending: INTERNAL MEDICINE
Payer: COMMERCIAL

## 2021-05-05 DIAGNOSIS — Z95.0 CARDIAC PACEMAKER IN SITU: ICD-10-CM

## 2021-05-05 LAB
MDC_IDC_LEAD_IMPLANT_DT: NORMAL
MDC_IDC_LEAD_IMPLANT_DT: NORMAL
MDC_IDC_LEAD_LOCATION: NORMAL
MDC_IDC_LEAD_LOCATION: NORMAL
MDC_IDC_LEAD_LOCATION_DETAIL_1: NORMAL
MDC_IDC_LEAD_LOCATION_DETAIL_1: NORMAL
MDC_IDC_LEAD_MFG: NORMAL
MDC_IDC_LEAD_MFG: NORMAL
MDC_IDC_LEAD_MODEL: NORMAL
MDC_IDC_LEAD_MODEL: NORMAL
MDC_IDC_LEAD_POLARITY_TYPE: NORMAL
MDC_IDC_LEAD_POLARITY_TYPE: NORMAL
MDC_IDC_LEAD_SERIAL: NORMAL
MDC_IDC_LEAD_SERIAL: NORMAL
MDC_IDC_MSMT_BATTERY_DTM: NORMAL
MDC_IDC_MSMT_BATTERY_REMAINING_LONGEVITY: 112 MO
MDC_IDC_MSMT_BATTERY_RRT_TRIGGER: 2.62
MDC_IDC_MSMT_BATTERY_STATUS: NORMAL
MDC_IDC_MSMT_BATTERY_VOLTAGE: 2.95 V
MDC_IDC_MSMT_LEADCHNL_RA_IMPEDANCE_VALUE: 361 OHM
MDC_IDC_MSMT_LEADCHNL_RA_IMPEDANCE_VALUE: 551 OHM
MDC_IDC_MSMT_LEADCHNL_RA_PACING_THRESHOLD_AMPLITUDE: 0.5 V
MDC_IDC_MSMT_LEADCHNL_RA_PACING_THRESHOLD_PULSEWIDTH: 0.4 MS
MDC_IDC_MSMT_LEADCHNL_RA_SENSING_INTR_AMPL: 2.25 MV
MDC_IDC_MSMT_LEADCHNL_RA_SENSING_INTR_AMPL: 2.25 MV
MDC_IDC_MSMT_LEADCHNL_RV_IMPEDANCE_VALUE: 342 OHM
MDC_IDC_MSMT_LEADCHNL_RV_IMPEDANCE_VALUE: 475 OHM
MDC_IDC_MSMT_LEADCHNL_RV_PACING_THRESHOLD_AMPLITUDE: 0.62 V
MDC_IDC_MSMT_LEADCHNL_RV_PACING_THRESHOLD_PULSEWIDTH: 0.4 MS
MDC_IDC_MSMT_LEADCHNL_RV_SENSING_INTR_AMPL: 13.88 MV
MDC_IDC_MSMT_LEADCHNL_RV_SENSING_INTR_AMPL: 13.88 MV
MDC_IDC_PG_IMPLANT_DTM: NORMAL
MDC_IDC_PG_MFG: NORMAL
MDC_IDC_PG_MODEL: NORMAL
MDC_IDC_PG_SERIAL: NORMAL
MDC_IDC_PG_TYPE: NORMAL
MDC_IDC_SESS_CLINIC_NAME: NORMAL
MDC_IDC_SESS_DTM: NORMAL
MDC_IDC_SESS_TYPE: NORMAL
MDC_IDC_SET_BRADY_AT_MODE_SWITCH_RATE: 171 {BEATS}/MIN
MDC_IDC_SET_BRADY_HYSTRATE: NORMAL
MDC_IDC_SET_BRADY_LOWRATE: 60 {BEATS}/MIN
MDC_IDC_SET_BRADY_MAX_SENSOR_RATE: 120 {BEATS}/MIN
MDC_IDC_SET_BRADY_MAX_TRACKING_RATE: 120 {BEATS}/MIN
MDC_IDC_SET_BRADY_MODE: NORMAL
MDC_IDC_SET_BRADY_PAV_DELAY_LOW: 180 MS
MDC_IDC_SET_BRADY_SAV_DELAY_LOW: 150 MS
MDC_IDC_SET_LEADCHNL_RA_PACING_AMPLITUDE: 2 V
MDC_IDC_SET_LEADCHNL_RA_PACING_ANODE_ELECTRODE_1: NORMAL
MDC_IDC_SET_LEADCHNL_RA_PACING_ANODE_LOCATION_1: NORMAL
MDC_IDC_SET_LEADCHNL_RA_PACING_CAPTURE_MODE: NORMAL
MDC_IDC_SET_LEADCHNL_RA_PACING_CATHODE_ELECTRODE_1: NORMAL
MDC_IDC_SET_LEADCHNL_RA_PACING_CATHODE_LOCATION_1: NORMAL
MDC_IDC_SET_LEADCHNL_RA_PACING_POLARITY: NORMAL
MDC_IDC_SET_LEADCHNL_RA_PACING_PULSEWIDTH: 0.4 MS
MDC_IDC_SET_LEADCHNL_RA_SENSING_ANODE_ELECTRODE_1: NORMAL
MDC_IDC_SET_LEADCHNL_RA_SENSING_ANODE_LOCATION_1: NORMAL
MDC_IDC_SET_LEADCHNL_RA_SENSING_CATHODE_ELECTRODE_1: NORMAL
MDC_IDC_SET_LEADCHNL_RA_SENSING_CATHODE_LOCATION_1: NORMAL
MDC_IDC_SET_LEADCHNL_RA_SENSING_POLARITY: NORMAL
MDC_IDC_SET_LEADCHNL_RA_SENSING_SENSITIVITY: 0.3 MV
MDC_IDC_SET_LEADCHNL_RV_PACING_AMPLITUDE: 2 V
MDC_IDC_SET_LEADCHNL_RV_PACING_ANODE_ELECTRODE_1: NORMAL
MDC_IDC_SET_LEADCHNL_RV_PACING_ANODE_LOCATION_1: NORMAL
MDC_IDC_SET_LEADCHNL_RV_PACING_CAPTURE_MODE: NORMAL
MDC_IDC_SET_LEADCHNL_RV_PACING_CATHODE_ELECTRODE_1: NORMAL
MDC_IDC_SET_LEADCHNL_RV_PACING_CATHODE_LOCATION_1: NORMAL
MDC_IDC_SET_LEADCHNL_RV_PACING_POLARITY: NORMAL
MDC_IDC_SET_LEADCHNL_RV_PACING_PULSEWIDTH: 0.4 MS
MDC_IDC_SET_LEADCHNL_RV_SENSING_ANODE_ELECTRODE_1: NORMAL
MDC_IDC_SET_LEADCHNL_RV_SENSING_ANODE_LOCATION_1: NORMAL
MDC_IDC_SET_LEADCHNL_RV_SENSING_CATHODE_ELECTRODE_1: NORMAL
MDC_IDC_SET_LEADCHNL_RV_SENSING_CATHODE_LOCATION_1: NORMAL
MDC_IDC_SET_LEADCHNL_RV_SENSING_POLARITY: NORMAL
MDC_IDC_SET_LEADCHNL_RV_SENSING_SENSITIVITY: 0.9 MV
MDC_IDC_SET_ZONE_DETECTION_INTERVAL: 350 MS
MDC_IDC_SET_ZONE_DETECTION_INTERVAL: 400 MS
MDC_IDC_SET_ZONE_TYPE: NORMAL
MDC_IDC_STAT_BRADY_AP_VP_PERCENT: 98.67 %
MDC_IDC_STAT_BRADY_AP_VS_PERCENT: 0.02 %
MDC_IDC_STAT_BRADY_AS_VP_PERCENT: 0.94 %
MDC_IDC_STAT_BRADY_AS_VS_PERCENT: 0.37 %
MDC_IDC_STAT_BRADY_DTM_END: NORMAL
MDC_IDC_STAT_BRADY_DTM_START: NORMAL
MDC_IDC_STAT_BRADY_RA_PERCENT_PACED: 99.02 %
MDC_IDC_STAT_BRADY_RV_PERCENT_PACED: 99.61 %
MDC_IDC_STAT_EPISODE_RECENT_COUNT: 0
MDC_IDC_STAT_EPISODE_RECENT_COUNT_DTM_END: NORMAL
MDC_IDC_STAT_EPISODE_RECENT_COUNT_DTM_START: NORMAL
MDC_IDC_STAT_EPISODE_TOTAL_COUNT: 0
MDC_IDC_STAT_EPISODE_TOTAL_COUNT_DTM_END: NORMAL
MDC_IDC_STAT_EPISODE_TOTAL_COUNT_DTM_START: NORMAL
MDC_IDC_STAT_EPISODE_TYPE: NORMAL

## 2021-05-05 PROCEDURE — 93296 REM INTERROG EVL PM/IDS: CPT | Performed by: INTERNAL MEDICINE

## 2021-05-05 PROCEDURE — 93294 REM INTERROG EVL PM/LDLS PM: CPT | Performed by: INTERNAL MEDICINE

## 2021-05-26 ENCOUNTER — TRANSFERRED RECORDS (OUTPATIENT)
Dept: HEALTH INFORMATION MANAGEMENT | Facility: CLINIC | Age: 86
End: 2021-05-26

## 2021-06-21 DIAGNOSIS — I25.810 CORONARY ARTERY DISEASE INVOLVING CORONARY BYPASS GRAFT OF NATIVE HEART WITHOUT ANGINA PECTORIS: ICD-10-CM

## 2021-06-21 RX ORDER — METOPROLOL SUCCINATE 25 MG/1
25 TABLET, EXTENDED RELEASE ORAL DAILY
Qty: 90 TABLET | Refills: 0 | Status: SHIPPED | OUTPATIENT
Start: 2021-06-21 | End: 2021-09-23

## 2021-07-07 ENCOUNTER — TRANSFERRED RECORDS (OUTPATIENT)
Dept: HEALTH INFORMATION MANAGEMENT | Facility: CLINIC | Age: 86
End: 2021-07-07

## 2021-07-14 ENCOUNTER — LAB (OUTPATIENT)
Dept: LAB | Facility: CLINIC | Age: 86
End: 2021-07-14
Payer: COMMERCIAL

## 2021-07-14 ENCOUNTER — OFFICE VISIT (OUTPATIENT)
Dept: CARDIOLOGY | Facility: CLINIC | Age: 86
End: 2021-07-14
Attending: PHYSICIAN ASSISTANT
Payer: COMMERCIAL

## 2021-07-14 VITALS
HEIGHT: 71 IN | SYSTOLIC BLOOD PRESSURE: 113 MMHG | DIASTOLIC BLOOD PRESSURE: 68 MMHG | BODY MASS INDEX: 27.31 KG/M2 | HEART RATE: 68 BPM | WEIGHT: 195.1 LBS

## 2021-07-14 DIAGNOSIS — R60.9 EDEMA, UNSPECIFIED TYPE: ICD-10-CM

## 2021-07-14 DIAGNOSIS — I49.5 SICK SINUS SYNDROME (H): ICD-10-CM

## 2021-07-14 DIAGNOSIS — I71.20 THORACIC AORTIC ANEURYSM WITHOUT RUPTURE (H): ICD-10-CM

## 2021-07-14 DIAGNOSIS — I50.21 ACUTE SYSTOLIC HEART FAILURE (H): ICD-10-CM

## 2021-07-14 DIAGNOSIS — I50.42 CHRONIC COMBINED SYSTOLIC AND DIASTOLIC HRT FAIL (H): Primary | ICD-10-CM

## 2021-07-14 DIAGNOSIS — G60.9 IDIOPATHIC PERIPHERAL NEUROPATHY: ICD-10-CM

## 2021-07-14 DIAGNOSIS — N18.31 STAGE 3A CHRONIC KIDNEY DISEASE (H): ICD-10-CM

## 2021-07-14 DIAGNOSIS — Z95.0 CARDIAC PACEMAKER IN SITU: ICD-10-CM

## 2021-07-14 DIAGNOSIS — I25.10 CORONARY ARTERY DISEASE INVOLVING NATIVE CORONARY ARTERY OF NATIVE HEART WITHOUT ANGINA PECTORIS: ICD-10-CM

## 2021-07-14 DIAGNOSIS — E03.9 HYPOTHYROIDISM, UNSPECIFIED TYPE: ICD-10-CM

## 2021-07-14 PROBLEM — N18.30 CHRONIC KIDNEY DISEASE, STAGE 3 (H): Status: ACTIVE | Noted: 2021-07-14

## 2021-07-14 LAB
ANION GAP SERPL CALCULATED.3IONS-SCNC: 2 MMOL/L (ref 3–14)
BUN SERPL-MCNC: 23 MG/DL (ref 7–30)
CALCIUM SERPL-MCNC: 9.1 MG/DL (ref 8.5–10.1)
CHLORIDE BLD-SCNC: 110 MMOL/L (ref 94–109)
CO2 SERPL-SCNC: 28 MMOL/L (ref 20–32)
CREAT SERPL-MCNC: 1.23 MG/DL (ref 0.66–1.25)
GFR SERPL CREATININE-BSD FRML MDRD: 52 ML/MIN/1.73M2
GLUCOSE BLD-MCNC: 79 MG/DL (ref 70–99)
NT-PROBNP SERPL-MCNC: 1009 PG/ML (ref 0–450)
POTASSIUM BLD-SCNC: 4.1 MMOL/L (ref 3.4–5.3)
SODIUM SERPL-SCNC: 140 MMOL/L (ref 133–144)

## 2021-07-14 PROCEDURE — 80048 BASIC METABOLIC PNL TOTAL CA: CPT | Performed by: PHYSICIAN ASSISTANT

## 2021-07-14 PROCEDURE — 36415 COLL VENOUS BLD VENIPUNCTURE: CPT | Performed by: PHYSICIAN ASSISTANT

## 2021-07-14 PROCEDURE — 99215 OFFICE O/P EST HI 40 MIN: CPT | Performed by: INTERNAL MEDICINE

## 2021-07-14 PROCEDURE — 83880 ASSAY OF NATRIURETIC PEPTIDE: CPT | Performed by: INTERNAL MEDICINE

## 2021-07-14 RX ORDER — FUROSEMIDE 20 MG
20 TABLET ORAL DAILY
Qty: 30 TABLET | Refills: 11 | Status: SHIPPED | OUTPATIENT
Start: 2021-07-14 | End: 2022-01-18

## 2021-07-14 RX ORDER — SENNOSIDES 8.6 MG
650 CAPSULE ORAL AT BEDTIME
COMMUNITY

## 2021-07-14 ASSESSMENT — MIFFLIN-ST. JEOR: SCORE: 1582.1

## 2021-07-14 NOTE — LETTER
7/14/2021    Steven Wagoner MD  [address] [facility] [address]    RE: Moses Sandhuman       Dear Colleague,    I had the pleasure of seeing Moses Elizondo in the [facility].    HPI and Plan:   Mr. Elizondo has a past medical history significant for coronary artery disease status post drug-eluting stent to the first obtuse marginal (2014), hyperlipidemia, bradycardia, sick sinus syndrome, mild ascending aorta enlargement as well as aortic root enlargement, tremors, hypothyroidism, and idiopathic peripheral neuropathy.     He had exertional chest discomfort in 2019 and a stress nuclear study was done which revealed inferolateral fixed defect consistent with prior infarction.   Subsequently coronary angiography was done which showed a complex calcified mid RCA lesion and underwent CSI orbital atherectomy and YOGESH to the mid RCA.  He had slow heart rates during the procedure and a temporary pacemaker wire was used.  Subsequently was seen by EP and dual-chamber pacemaker was implanted.     Subsequently, he had an echocardiogram this January which EF was noted to be decreased at 38%.  He underwent a stress MRI earlier this year which was negative for ischemia.  EF was 44%.  Since then we are titrating his medications.  He is on Toprol-XL 25 mg daily and lisinopril is now a 2.5 mg twice daily.  He now returns for follow-up.  He is accompanied by his daughter.    He does have peripheral neuropathy and is been attending some center where they are injecting substance in his foot and applying suction devices on the shins.  Since then his leg edema is worsened.  He also has some dragging pain in his lower extremities.  He has exertional shortness of breath.  His weights have remained stable.  There is no chest pain.  He also is on gabapentin for his peripheral neuropathy which can occasionally worsen leg edema.    His BMP today revealed normal  creatinine.  Potassium was 4.1.    Physical Exam  Please see Below      Assessment and Plan  1.  Chronic systolic and diastolic dysfunction with increasing leg edema   Patient has increased leg edema compared to last time we saw him.  I wonder if it is a combination of venous insufficiency, mild heart failure as well as underlying peripheral neuropathy and the interventions he is having.  I suggested that we check an N-terminal proBNP today.  I have added a low-dose Lasix at 20 mg.  I have asked him to eat a banana a day to replenish potassium.  In addition, we will check an echocardiogram to reassess his EF as well as diastolic function.  I have also referred him for venous competency testing.  Meanwhile, have asked him to start wearing her VERNA stockings.  There is significant venous incompetency, he may need Jobst compression stockings at 20 to 30 mm pressure.  We also reviewed the importance of lowering salt intake in his diet.      2. Coronary artery disease status post YOGESH to the OM and most recently to the mid RCA.  He denies recurrent angina.  On low-dose metoprolol and aspirin.    Recent stress MRI revealed no ischemia.    3.  Sick sinus syndrome and significant bradycardia status post permanent pacemaker.    Continue follow-up in the device clinic.  Pacemaker functioning well.  He is having 99% atrial ventricular pacing.  He also has underlying complete heart block.     4.  Aortic root (4.2cm) enlargement as well as a ascending aorta (4.2 cm) enlargement noted on recent RI     5. Hyperlipidemia.  His last LDL was 49, HDL was 42.  Continue atorvastatin 40 mg daily.      6.  Venous insufficiency, patient has some varicosities and probably has underlying venous insufficiency.  We will consider testing for venous competency.    7.  Peripheral neuropathy  Remain per primary care physician.  I was told today by him that he is going to center where they are injecting certain substance of it medication to help with  his neuropathy in his dorsum of the foot.  He also is using suction devices.  I told him that I was not familiar with this form of treatment and he should check with his primary care physician and probably neurologist.       Thank you for allowing me to care for Moses Elizondo today with multiple complex issues.  I will ask him to follow-up with our PA, Lacey Puckett after trying Lasix for 2 weeks and to review results of his venous insufficiency testing, echocardiogram and review his follow-up BMP.        Sincerely,     Unruly Huggins MD     42 minutes spent on the date of the encounter doing chart review, review of test results, patient visit, documentation and discussion with family   {Provider  Link to MDM Help Grid :1  The level of medical decision making during this visit was of high complexity.    Orders Placed This Encounter   Procedures     US Venous Competency Bilateral     Basic metabolic panel     N terminal pro BNP outpatient     Follow-Up with Cardiac Advanced Practice Provider     Echocardiogram Complete     Orders Placed This Encounter   Medications     Glucosamine-Chondroitin (GLUCOSAMINE CHONDROITIN COMPLX) 500-250 MG CAPS     Sig: Take by mouth daily     acetaminophen (TYLENOL 8 HOUR) 650 MG CR tablet     Sig: Take 650 mg by mouth daily     furosemide (LASIX) 20 MG tablet     Sig: Take 1 tablet (20 mg) by mouth daily     Dispense:  30 tablet     Refill:  11     There are no discontinued medications.    Encounter Diagnoses   Name Primary?     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) Yes     Stage 3a chronic kidney disease      Sick sinus syndrome (H)      Thoracic aortic aneurysm without rupture (H)      Coronary artery disease involving native coronary artery of native heart without angina pectoris      Hypothyroidism, unspecified type      Cardiac pacemaker in situ      Edema, unspecified type      Idiopathic peripheral neuropathy        CURRENT MEDICATIONS:  Current Outpatient  "Medications   Medication Sig Dispense Refill     acetaminophen (TYLENOL 8 HOUR) 650 MG CR tablet Take 650 mg by mouth daily       ASPIRIN EC PO Take 81 mg by mouth daily       atorvastatin (LIPITOR) 40 MG tablet Take 1 tablet (40 mg) by mouth daily 90 tablet 3     furosemide (LASIX) 20 MG tablet Take 1 tablet (20 mg) by mouth daily 30 tablet 11     gabapentin (NEURONTIN) 300 MG capsule Take 1 capsule (300 mg) by mouth At Bedtime       Glucosamine-Chondroitin (GLUCOSAMINE CHONDROITIN COMPLX) 500-250 MG CAPS Take by mouth daily       levothyroxine (SYNTHROID/LEVOTHROID) 100 MCG tablet TAKE 1 TABLET (100 MCG) BY MOUTH DAILY 90 tablet 1     lisinopril (ZESTRIL) 2.5 MG tablet Take 1 tablet (2.5 mg) by mouth 2 times daily 180 tablet 3     metoprolol succinate ER (TOPROL-XL) 25 MG 24 hr tablet Take 1 tablet (25 mg) by mouth daily 90 tablet 0     nitroGLYcerin (NITROSTAT) 0.4 MG sublingual tablet One tablet under the tongue every 5 minutes if needed for chest pain. May repeat every 5 minutes for a maximum of 3 doses in 15 minutes\" 25 tablet 3     Polyethylene Glycol 3350 (MIRALAX PO) Take by mouth as needed       Valerian 450 MG CAPS Take 2 capsules by mouth daily        vitamin B-12 (CYANOCOBALAMIN) 250 MCG tablet Take 1,000 mcg by mouth daily       Vitamin D-Vitamin K (VITAMIN K2-VITAMIN D3 PO) Take by mouth daily       Zinc 50 MG CAPS          ALLERGIES     Allergies   Allergen Reactions     No Known Drug Allergies        PAST MEDICAL HISTORY:  Past Medical History:   Diagnosis Date     Aortic root dilatation (H)     4.4 cm     Ascending aorta dilatation (H)     4.4 cm     ASD (atrial septal defect)      Bradycardia      CAD (coronary artery disease)      YOGESH to RCA(6/20/19); YOGESH to OM1(5/15/14)     Cardiac pacemaker 06/20/2019    [employer] PPM COMFORT MRI XT DR-implant 6/20/19     Chest discomfort      Degeneration of lumbar or lumbosacral intervertebral disc      Familial tremor      Former smoker      Herpes zoster " without mention of complication      HTN (hypertension)      Hyperlipidaemia      Idiopathic peripheral neuropathy      MGUS (monoclonal gammopathy of unknown significance)      NSTEMI (non-ST elevated myocardial infarction) (H) 2014     Other and unspecified hyperlipidemia      PFO (patent foramen ovale)      Prostatitis, unspecified      Second degree heart block      Sensorineural hearing loss, unspecified      SSS (sick sinus syndrome) (H)      Status post coronary angiogram 6/20/2019     Unspecified hypothyroidism        PAST SURGICAL HISTORY:  Past Surgical History:   Procedure Laterality Date     CHOLECYSTECTOMY       CORONARY ANGIOGRAPHY ADULT ORDER  5/14/14    PTCA w/YOGESH to OM1     CV CORONARY ANGIOGRAM N/A 6/20/2019    Procedure: Coronary Angiogram;  Surgeon: Romie Coronado MD;  Location:  [facility]     CV INTRAVASULAR ULTRASOUND N/A 6/20/2019    Procedure: Intravascular Ultrasound;  Surgeon: Romie Coronado MD;  Location:  [facility]     CV PCI ATHERECTOMY ORBITAL N/A 6/20/2019    Procedure: PCI Atherectomy Orbital;  Surgeon: Romie Coronado MD;  Location:  [facility]     CV PCI STENT DRUG ELUTING N/A 6/20/2019    Procedure: PCI Stent Drug Eluting;  Surgeon: Romie Coronado MD;  Location:  [facility]     CV TEMPORARY PACEMAKER INSERTION N/A 6/20/2019    Procedure: Temporary Pacemaker Insertion;  Surgeon: Romie Coronado MD;  Location:  [facility]     EP PACEMAKER N/A 6/20/2019    Procedure: EP Pacemaker;  Surgeon: Max Dowell MD;  Location:  [facility]     HEART CATH, ANGIOPLASTY  5/14/14    YOGESH to OM1     [facility] NONSPECIFIC PROCEDURE  2010    Laparoscopic cholecystectomy.        FAMILY HISTORY:  Family History   Problem Relation Age of Onset     Cancer Mother      Genitourinary Problems Father 99        complications from surgery     Heart Disease Brother 72        MI       SOCIAL  HISTORY:  Social History     Socioeconomic History     Marital status:      Spouse name: None     Number of children: None     Years of education: None     Highest education level: None   Occupational History     None   Tobacco Use     Smoking status: Former Smoker     Packs/day: 0.50     Years: 16.00     Pack years: 8.00     Types: Cigarettes     Start date:      Quit date: 1967     Years since quittin.5     Smokeless tobacco: Never Used   Substance and Sexual Activity     Alcohol use: Yes     Alcohol/week: 0.0 standard drinks     Comment: 5 drinks week     Drug use: No     Sexual activity: Never   Other Topics Concern      Service Not Asked     Blood Transfusions Not Asked     Caffeine Concern Yes     Comment: 2 cups coffee/day     Occupational Exposure Not Asked     Hobby Hazards Not Asked     Sleep Concern No     Stress Concern Not Asked     Weight Concern Yes     Comment: limiting alcohol to watch calories     Special Diet Not Asked     Back Care Not Asked     Exercise Yes     Comment: Stationary bike  weights and aerobics 4 times week     Bike Helmet Not Asked     Seat Belt Yes     Self-Exams Not Asked     Parent/sibling w/ CABG, MI or angioplasty before 65F 55M? No   Social History Narrative     None     Social Determinants of Health     Financial Resource Strain:      Difficulty of Paying Living Expenses:    Food Insecurity:      Worried About Running Out of Food in the Last Year:      Ran Out of Food in the Last Year:    Transportation Needs:      Lack of Transportation (Medical):      Lack of Transportation (Non-Medical):    Physical Activity:      Days of Exercise per Week:      Minutes of Exercise per Session:    Stress:      Feeling of Stress :    Social Connections:      Frequency of Communication with Friends and Family:      Frequency of Social Gatherings with Friends and Family:      Attends [employer] Services:      Active Member of Clubs or Organizations:      Attends  "Club or Organization Meetings:      Marital Status:    Intimate Partner Violence:      Fear of Current or Ex-Partner:      Emotionally Abused:      Physically Abused:      Sexually Abused:        Review of Systems:  Skin:  Negative     Eyes:  Positive for glasses  ENT:  Negative    Respiratory:  Positive for    Cardiovascular:  Negative;palpitations;chest pain;syncope or near-syncope;lightheadedness;dizziness;cyanosis Positive for;edema;fatigue;exercise intolerance  Gastroenterology: Negative    Genitourinary:  Positive for nocturia  Musculoskeletal:  Positive for joint pain  Neurologic:  Negative numbness or tingling of feet;memory problems  Psychiatric:  Positive for sleep disturbances  Heme/Lymph/Imm:  Negative    Endocrine:  Positive for thyroid disorder    Physical Exam:  Vitals: /68   Pulse 68   Ht 1.803 m (5' 11\")   Wt 88.5 kg (195 lb 1.6 oz)   BMI 27.21 kg/m      Constitutional:  in no acute distress        Skin:  warm and dry to the touch   pacemaker incision in the left infraclavicular area was well-healed      Head:           Eyes:  sclera white        Lymph:      ENT:  no pallor or cyanosis        Neck:  JVP normal        Respiratory:  clear to auscultation         Cardiac: normal S1 and S2 bradycardic              not assessed this visit                                   no right femoral bruit over right femoral artery access    GI:  not assessed this visit        Extremities and Muscular Skeletal:      bilateral LE edema;varicose vein;2+;1+     varicosities    Neurological:    fine tremors      Psych:  Alert and Oriented x 3      Recent Lab Results:  LIPID RESULTS:  Lab Results   Component Value Date    CHOL 105 07/07/2020    HDL 42 07/07/2020    LDL 49 07/07/2020    TRIG 71 07/07/2020    CHOLHDLRATIO 2.9 07/08/2014       LIVER ENZYME RESULTS:  Lab Results   Component Value Date    AST 32 03/02/2021    ALT 30 03/02/2021       CBC RESULTS:  Lab Results   Component Value Date    WBC 6.2 " 07/23/2020    RBC 4.58 07/23/2020    HGB 13.2 (L) 03/02/2021    HCT 41.4 07/23/2020    MCV 90 07/23/2020    MCH 29.9 07/23/2020    MCHC 33.1 07/23/2020    RDW 12.5 07/23/2020     07/23/2020       BMP RESULTS:  Lab Results   Component Value Date     07/14/2021     04/07/2021    POTASSIUM 4.1 07/14/2021    POTASSIUM 4.1 04/07/2021    CHLORIDE 110 (H) 07/14/2021    CHLORIDE 109 04/07/2021    CO2 28 07/14/2021    CO2 28 04/07/2021    ANIONGAP 2 (L) 07/14/2021    ANIONGAP 2 (L) 04/07/2021    GLC 79 07/14/2021    GLC 75 04/07/2021    BUN 23 07/14/2021    BUN 25 04/07/2021    CR 1.23 07/14/2021    CR 1.28 (H) 04/07/2021    GFRESTIMATED 52 (L) 07/14/2021    GFRESTIMATED 50 (L) 04/07/2021    GFRESTBLACK 58 (L) 04/07/2021    GABRIELA 9.1 07/14/2021    GABRIELA 8.8 04/07/2021        A1C RESULTS:  Lab Results   Component Value Date    A1C 5.6 05/02/2019       INR RESULTS:  Lab Results   Component Value Date    INR 1.00 06/20/2019         CC  Lacey Puckett PA-C  [address]  [place] [id number]                      Thank you for allowing me to participate in the care of your patient.      Sincerely,     Unruly Huggins MD     [facility]  cc:   Lacey Puckett PA-C  [address]  [place] [id number]

## 2021-07-14 NOTE — PROGRESS NOTES
HPI and Plan:   Mr. Elizondo has a past medical history significant for coronary artery disease status post drug-eluting stent to the first obtuse marginal (2014), hyperlipidemia, bradycardia, sick sinus syndrome, mild ascending aorta enlargement as well as aortic root enlargement, tremors, hypothyroidism, and idiopathic peripheral neuropathy.     He had exertional chest discomfort in 2019 and a stress nuclear study was done which revealed inferolateral fixed defect consistent with prior infarction.   Subsequently coronary angiography was done which showed a complex calcified mid RCA lesion and underwent CSI orbital atherectomy and YOGESH to the mid RCA.  He had slow heart rates during the procedure and a temporary pacemaker wire was used.  Subsequently was seen by EP and dual-chamber pacemaker was implanted.     Subsequently, he had an echocardiogram this January which EF was noted to be decreased at 38%.  He underwent a stress MRI earlier this year which was negative for ischemia.  EF was 44%.  Since then we are titrating his medications.  He is on Toprol-XL 25 mg daily and lisinopril is now a 2.5 mg twice daily.  He now returns for follow-up.  He is accompanied by his daughter.    He does have peripheral neuropathy and is been attending some center where they are injecting substance in his foot and applying suction devices on the shins.  Since then his leg edema is worsened.  He also has some dragging pain in his lower extremities.  He has exertional shortness of breath.  His weights have remained stable.  There is no chest pain.  He also is on gabapentin for his peripheral neuropathy which can occasionally worsen leg edema.    His BMP today revealed normal creatinine.  Potassium was 4.1.    Physical Exam  Please see Below      Assessment and Plan  1.  Chronic systolic and diastolic dysfunction with increasing leg edema   Patient has increased leg edema compared to last time we saw him.  I wonder if it is a  combination of venous insufficiency, mild heart failure as well as underlying peripheral neuropathy and the interventions he is having.  I suggested that we check an N-terminal proBNP today.  I have added a low-dose Lasix at 20 mg.  I have asked him to eat a banana a day to replenish potassium.  In addition, we will check an echocardiogram to reassess his EF as well as diastolic function.  I have also referred him for venous competency testing.  Meanwhile, have asked him to start wearing her VERNA stockings.  There is significant venous incompetency, he may need Jobst compression stockings at 20 to 30 mm pressure.  We also reviewed the importance of lowering salt intake in his diet.      2. Coronary artery disease status post YOGESH to the OM and most recently to the mid RCA.  He denies recurrent angina.  On low-dose metoprolol and aspirin.    Recent stress MRI revealed no ischemia.    3.  Sick sinus syndrome and significant bradycardia status post permanent pacemaker.    Continue follow-up in the device clinic.  Pacemaker functioning well.  He is having 99% atrial ventricular pacing.  He also has underlying complete heart block.     4.  Aortic root (4.2cm) enlargement as well as a ascending aorta (4.2 cm) enlargement noted on recent RI     5. Hyperlipidemia.  His last LDL was 49, HDL was 42.  Continue atorvastatin 40 mg daily.      6.  Venous insufficiency, patient has some varicosities and probably has underlying venous insufficiency.  We will consider testing for venous competency.    7.  Peripheral neuropathy  Remain per primary care physician.  I was told today by him that he is going to center where they are injecting certain substance of it medication to help with his neuropathy in his dorsum of the foot.  He also is using suction devices.  I told him that I was not familiar with this form of treatment and he should check with his primary care physician and probably neurologist.       Thank you for allowing me to  care for Moses Elizondo today with multiple complex issues.  I will ask him to follow-up with our PA, Lacey Puckett after trying Lasix for 2 weeks and to review results of his venous insufficiency testing, echocardiogram and review his follow-up BMP.        Sincerely,     Unruly Huggins MD     42 minutes spent on the date of the encounter doing chart review, review of test results, patient visit, documentation and discussion with family   {Provider  Link to MDM Help Grid :1  The level of medical decision making during this visit was of high complexity.    Orders Placed This Encounter   Procedures     US Venous Competency Bilateral     Basic metabolic panel     N terminal pro BNP outpatient     Follow-Up with Cardiac Advanced Practice Provider     Echocardiogram Complete     Orders Placed This Encounter   Medications     Glucosamine-Chondroitin (GLUCOSAMINE CHONDROITIN COMPLX) 500-250 MG CAPS     Sig: Take by mouth daily     acetaminophen (TYLENOL 8 HOUR) 650 MG CR tablet     Sig: Take 650 mg by mouth daily     furosemide (LASIX) 20 MG tablet     Sig: Take 1 tablet (20 mg) by mouth daily     Dispense:  30 tablet     Refill:  11     There are no discontinued medications.    Encounter Diagnoses   Name Primary?     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) Yes     Stage 3a chronic kidney disease      Sick sinus syndrome (H)      Thoracic aortic aneurysm without rupture (H)      Coronary artery disease involving native coronary artery of native heart without angina pectoris      Hypothyroidism, unspecified type      Cardiac pacemaker in situ      Edema, unspecified type      Idiopathic peripheral neuropathy        CURRENT MEDICATIONS:  Current Outpatient Medications   Medication Sig Dispense Refill     acetaminophen (TYLENOL 8 HOUR) 650 MG CR tablet Take 650 mg by mouth daily       ASPIRIN EC PO Take 81 mg by mouth daily       atorvastatin (LIPITOR) 40 MG tablet Take 1 tablet (40 mg) by mouth daily 90 tablet  "3     furosemide (LASIX) 20 MG tablet Take 1 tablet (20 mg) by mouth daily 30 tablet 11     gabapentin (NEURONTIN) 300 MG capsule Take 1 capsule (300 mg) by mouth At Bedtime       Glucosamine-Chondroitin (GLUCOSAMINE CHONDROITIN COMPLX) 500-250 MG CAPS Take by mouth daily       levothyroxine (SYNTHROID/LEVOTHROID) 100 MCG tablet TAKE 1 TABLET (100 MCG) BY MOUTH DAILY 90 tablet 1     lisinopril (ZESTRIL) 2.5 MG tablet Take 1 tablet (2.5 mg) by mouth 2 times daily 180 tablet 3     metoprolol succinate ER (TOPROL-XL) 25 MG 24 hr tablet Take 1 tablet (25 mg) by mouth daily 90 tablet 0     nitroGLYcerin (NITROSTAT) 0.4 MG sublingual tablet One tablet under the tongue every 5 minutes if needed for chest pain. May repeat every 5 minutes for a maximum of 3 doses in 15 minutes\" 25 tablet 3     Polyethylene Glycol 3350 (MIRALAX PO) Take by mouth as needed       Valerian 450 MG CAPS Take 2 capsules by mouth daily        vitamin B-12 (CYANOCOBALAMIN) 250 MCG tablet Take 1,000 mcg by mouth daily       Vitamin D-Vitamin K (VITAMIN K2-VITAMIN D3 PO) Take by mouth daily       Zinc 50 MG CAPS          ALLERGIES     Allergies   Allergen Reactions     No Known Drug Allergies        PAST MEDICAL HISTORY:  Past Medical History:   Diagnosis Date     Aortic root dilatation (H)     4.4 cm     Ascending aorta dilatation (H)     4.4 cm     ASD (atrial septal defect)      Bradycardia      CAD (coronary artery disease)      YOGESH to RCA(6/20/19); YOGESH to OM1(5/15/14)     Cardiac pacemaker 06/20/2019    [employer] PPM COMFORT MRI XT DR-implant 6/20/19     Chest discomfort      Degeneration of lumbar or lumbosacral intervertebral disc      Familial tremor      Former smoker      Herpes zoster without mention of complication      HTN (hypertension)      Hyperlipidaemia      Idiopathic peripheral neuropathy      MGUS (monoclonal gammopathy of unknown significance)      NSTEMI (non-ST elevated myocardial infarction) (H) 2014     Other and unspecified " hyperlipidemia      PFO (patent foramen ovale)      Prostatitis, unspecified      Second degree heart block      Sensorineural hearing loss, unspecified      SSS (sick sinus syndrome) (H)      Status post coronary angiogram 6/20/2019     Unspecified hypothyroidism        PAST SURGICAL HISTORY:  Past Surgical History:   Procedure Laterality Date     CHOLECYSTECTOMY       CORONARY ANGIOGRAPHY ADULT ORDER  5/14/14    PTCA w/YOGESH to OM1     CV CORONARY ANGIOGRAM N/A 6/20/2019    Procedure: Coronary Angiogram;  Surgeon: Romie Coronado MD;  Location:  [facility]     CV INTRAVASULAR ULTRASOUND N/A 6/20/2019    Procedure: Intravascular Ultrasound;  Surgeon: Romie Coronado MD;  Location:  [facility]     CV PCI ATHERECTOMY ORBITAL N/A 6/20/2019    Procedure: PCI Atherectomy Orbital;  Surgeon: Romie Coronado MD;  Location:  [facility]     CV PCI STENT DRUG ELUTING N/A 6/20/2019    Procedure: PCI Stent Drug Eluting;  Surgeon: Romie Coronado MD;  Location:  [facility]     CV TEMPORARY PACEMAKER INSERTION N/A 6/20/2019    Procedure: Temporary Pacemaker Insertion;  Surgeon: Romie Coronado MD;  Location:  [facility]     EP PACEMAKER N/A 6/20/2019    Procedure: EP Pacemaker;  Surgeon: Max Dowell MD;  Location:  [facility]     HEART CATH, ANGIOPLASTY  5/14/14    YOGESH to OM1     Z NONSPECIFIC PROCEDURE  2010    Laparoscopic cholecystectomy.        FAMILY HISTORY:  Family History   Problem Relation Age of Onset     Cancer Mother      Genitourinary Problems Father 99        complications from surgery     Heart Disease Brother 72        MI       SOCIAL HISTORY:  Social History     Socioeconomic History     Marital status:      Spouse name: None     Number of children: None     Years of education: None     Highest education level: None   Occupational History     None   Tobacco Use     Smoking status: Former  Smoker     Packs/day: 0.50     Years: 16.00     Pack years: 8.00     Types: Cigarettes     Start date:      Quit date: 1967     Years since quittin.5     Smokeless tobacco: Never Used   Substance and Sexual Activity     Alcohol use: Yes     Alcohol/week: 0.0 standard drinks     Comment: 5 drinks week     Drug use: No     Sexual activity: Never   Other Topics Concern      Service Not Asked     Blood Transfusions Not Asked     Caffeine Concern Yes     Comment: 2 cups coffee/day     Occupational Exposure Not Asked     Hobby Hazards Not Asked     Sleep Concern No     Stress Concern Not Asked     Weight Concern Yes     Comment: limiting alcohol to watch calories     Special Diet Not Asked     Back Care Not Asked     Exercise Yes     Comment: Stationary bike  weights and aerobics 4 times week     Bike Helmet Not Asked     Seat Belt Yes     Self-Exams Not Asked     Parent/sibling w/ CABG, MI or angioplasty before 65F 55M? No   Social History Narrative     None     Social Determinants of Health     Financial Resource Strain:      Difficulty of Paying Living Expenses:    Food Insecurity:      Worried About Running Out of Food in the Last Year:      Ran Out of Food in the Last Year:    Transportation Needs:      Lack of Transportation (Medical):      Lack of Transportation (Non-Medical):    Physical Activity:      Days of Exercise per Week:      Minutes of Exercise per Session:    Stress:      Feeling of Stress :    Social Connections:      Frequency of Communication with Friends and Family:      Frequency of Social Gatherings with Friends and Family:      Attends Yazidi Services:      Active Member of Clubs or Organizations:      Attends Club or Organization Meetings:      Marital Status:    Intimate Partner Violence:      Fear of Current or Ex-Partner:      Emotionally Abused:      Physically Abused:      Sexually Abused:        Review of Systems:  Skin:  Negative     Eyes:  Positive for  "glasses  ENT:  Negative    Respiratory:  Positive for    Cardiovascular:  Negative;palpitations;chest pain;syncope or near-syncope;lightheadedness;dizziness;cyanosis Positive for;edema;fatigue;exercise intolerance  Gastroenterology: Negative    Genitourinary:  Positive for nocturia  Musculoskeletal:  Positive for joint pain  Neurologic:  Negative numbness or tingling of feet;memory problems  Psychiatric:  Positive for sleep disturbances  Heme/Lymph/Imm:  Negative    Endocrine:  Positive for thyroid disorder    Physical Exam:  Vitals: /68   Pulse 68   Ht 1.803 m (5' 11\")   Wt 88.5 kg (195 lb 1.6 oz)   BMI 27.21 kg/m      Constitutional:  in no acute distress        Skin:  warm and dry to the touch   pacemaker incision in the left infraclavicular area was well-healed      Head:           Eyes:  sclera white        Lymph:      ENT:  no pallor or cyanosis        Neck:  JVP normal        Respiratory:  clear to auscultation         Cardiac: normal S1 and S2 bradycardic              not assessed this visit                                   no right femoral bruit over right femoral artery access    GI:  not assessed this visit        Extremities and Muscular Skeletal:      bilateral LE edema;varicose vein;2+;1+     varicosities    Neurological:    fine tremors      Psych:  Alert and Oriented x 3      Recent Lab Results:  LIPID RESULTS:  Lab Results   Component Value Date    CHOL 105 07/07/2020    HDL 42 07/07/2020    LDL 49 07/07/2020    TRIG 71 07/07/2020    CHOLHDLRATIO 2.9 07/08/2014       LIVER ENZYME RESULTS:  Lab Results   Component Value Date    AST 32 03/02/2021    ALT 30 03/02/2021       CBC RESULTS:  Lab Results   Component Value Date    WBC 6.2 07/23/2020    RBC 4.58 07/23/2020    HGB 13.2 (L) 03/02/2021    HCT 41.4 07/23/2020    MCV 90 07/23/2020    MCH 29.9 07/23/2020    MCHC 33.1 07/23/2020    RDW 12.5 07/23/2020     07/23/2020       BMP RESULTS:  Lab Results   Component Value Date     " 07/14/2021     04/07/2021    POTASSIUM 4.1 07/14/2021    POTASSIUM 4.1 04/07/2021    CHLORIDE 110 (H) 07/14/2021    CHLORIDE 109 04/07/2021    CO2 28 07/14/2021    CO2 28 04/07/2021    ANIONGAP 2 (L) 07/14/2021    ANIONGAP 2 (L) 04/07/2021    GLC 79 07/14/2021    GLC 75 04/07/2021    BUN 23 07/14/2021    BUN 25 04/07/2021    CR 1.23 07/14/2021    CR 1.28 (H) 04/07/2021    GFRESTIMATED 52 (L) 07/14/2021    GFRESTIMATED 50 (L) 04/07/2021    GFRESTBLACK 58 (L) 04/07/2021    GABRIELA 9.1 07/14/2021    GABRIELA 8.8 04/07/2021        A1C RESULTS:  Lab Results   Component Value Date    A1C 5.6 05/02/2019       INR RESULTS:  Lab Results   Component Value Date    INR 1.00 06/20/2019         CC  Lacey Puckett, PA-C  [address]  [place] [address]

## 2021-08-09 ENCOUNTER — HOSPITAL ENCOUNTER (OUTPATIENT)
Dept: CARDIOLOGY | Facility: CLINIC | Age: 86
Discharge: HOME | End: 2021-08-09
Attending: INTERNAL MEDICINE | Admitting: INTERNAL MEDICINE
Payer: COMMERCIAL

## 2021-08-09 ENCOUNTER — ANCILLARY PROCEDURE (OUTPATIENT)
Dept: VASCULAR ULTRASOUND | Facility: CLINIC | Age: 86
End: 2021-08-09
Attending: INTERNAL MEDICINE
Payer: COMMERCIAL

## 2021-08-09 ENCOUNTER — LAB (OUTPATIENT)
Dept: LAB | Facility: CLINIC | Age: 86
End: 2021-08-09
Attending: INTERNAL MEDICINE
Payer: COMMERCIAL

## 2021-08-09 ENCOUNTER — TELEPHONE (OUTPATIENT)
Dept: CARDIOLOGY | Facility: CLINIC | Age: 86
End: 2021-08-09

## 2021-08-09 DIAGNOSIS — R60.9 EDEMA, UNSPECIFIED TYPE: ICD-10-CM

## 2021-08-09 DIAGNOSIS — I50.42 CHRONIC COMBINED SYSTOLIC AND DIASTOLIC HRT FAIL (H): ICD-10-CM

## 2021-08-09 LAB
ANION GAP SERPL CALCULATED.3IONS-SCNC: 4 MMOL/L (ref 3–14)
BUN SERPL-MCNC: 28 MG/DL (ref 7–30)
CALCIUM SERPL-MCNC: 9 MG/DL (ref 8.5–10.1)
CHLORIDE BLD-SCNC: 107 MMOL/L (ref 94–109)
CO2 SERPL-SCNC: 28 MMOL/L (ref 20–32)
CREAT SERPL-MCNC: 1.24 MG/DL (ref 0.66–1.25)
GFR SERPL CREATININE-BSD FRML MDRD: 52 ML/MIN/1.73M2
GLUCOSE BLD-MCNC: 85 MG/DL (ref 70–99)
LVEF ECHO: NORMAL
POTASSIUM BLD-SCNC: 4.1 MMOL/L (ref 3.4–5.3)
SODIUM SERPL-SCNC: 139 MMOL/L (ref 133–144)

## 2021-08-09 PROCEDURE — 80048 BASIC METABOLIC PNL TOTAL CA: CPT | Performed by: INTERNAL MEDICINE

## 2021-08-09 PROCEDURE — 36415 COLL VENOUS BLD VENIPUNCTURE: CPT | Performed by: INTERNAL MEDICINE

## 2021-08-09 PROCEDURE — 93306 TTE W/DOPPLER COMPLETE: CPT | Mod: 26 | Performed by: INTERNAL MEDICINE

## 2021-08-09 PROCEDURE — 93306 TTE W/DOPPLER COMPLETE: CPT

## 2021-08-09 PROCEDURE — 93970 EXTREMITY STUDY: CPT | Performed by: INTERNAL MEDICINE

## 2021-08-09 NOTE — TELEPHONE ENCOUNTER
Pt has OV 8/16/21 with NP  CONCLUSION:  Right lower extremity veins and Limited iliac vein:  1. Nonocclusive thrombus of the GSV from the mid thigh to the calf.   Tributaries of the GSV at the level of the distal thigh to mid calf  with nonocclusive thrombus and reflux up to 2.6 seconds.  2. Moderate to severe venous reflux of the GSV the from the proximal  thigh to the proximal calf. GSV from the mid calf to the ankle appears  occluded  3. Baker's cyst in the popliteal fossa, 3.6 x 2.3 cm     Left lower extremity veins and limited iliac vein:  1. No DVT or superficial thrombus  2. Moderate to severe reflux throughout the entire length of the GSV,  ranging from 1.2 up to 5.6 seconds.  3. Baker's cyst in the popliteal fossa, 3.7 x 2.6 cm

## 2021-08-09 NOTE — TELEPHONE ENCOUNTER
Spoke with Pt informed him of bakers cyst and that PMD has had message sent to him and Pt can discuss it with PMD as to management of cyst. Pt informed of nonocclussive thrombus in right leg. Pt says he has odd sensation in that leg , but says no pain. Pt says he also has neuropathy in legs. Pt denies any true pain with walking. Asked Pt if that changes or he notices anything changing to inform this nurse or discuss with NP/PA at next week office visit. Informed Pt an appointment is going to be made with vein specialist to discuss ablation procedure DR Fry. JYOTHI Valentino RN

## 2021-08-10 NOTE — TELEPHONE ENCOUNTER
Spoke with patient and explained PCP message below.  Discuss both knee injection and joint replacement.  He will contact [facility].    Lien Moran, MSN, RN   [facility] Triage

## 2021-08-11 ENCOUNTER — ANCILLARY PROCEDURE (OUTPATIENT)
Dept: CARDIOLOGY | Facility: CLINIC | Age: 86
End: 2021-08-11
Attending: INTERNAL MEDICINE
Payer: COMMERCIAL

## 2021-08-11 DIAGNOSIS — Z95.0 CARDIAC PACEMAKER IN SITU: ICD-10-CM

## 2021-08-11 PROCEDURE — 93294 REM INTERROG EVL PM/LDLS PM: CPT | Performed by: INTERNAL MEDICINE

## 2021-08-11 PROCEDURE — 93296 REM INTERROG EVL PM/IDS: CPT | Performed by: INTERNAL MEDICINE

## 2021-08-12 LAB
MDC_IDC_LEAD_IMPLANT_DT: NORMAL
MDC_IDC_LEAD_IMPLANT_DT: NORMAL
MDC_IDC_LEAD_LOCATION: NORMAL
MDC_IDC_LEAD_LOCATION: NORMAL
MDC_IDC_LEAD_LOCATION_DETAIL_1: NORMAL
MDC_IDC_LEAD_LOCATION_DETAIL_1: NORMAL
MDC_IDC_LEAD_MFG: NORMAL
MDC_IDC_LEAD_MFG: NORMAL
MDC_IDC_LEAD_MODEL: NORMAL
MDC_IDC_LEAD_MODEL: NORMAL
MDC_IDC_LEAD_POLARITY_TYPE: NORMAL
MDC_IDC_LEAD_POLARITY_TYPE: NORMAL
MDC_IDC_LEAD_SERIAL: NORMAL
MDC_IDC_LEAD_SERIAL: NORMAL
MDC_IDC_MSMT_BATTERY_DTM: NORMAL
MDC_IDC_MSMT_BATTERY_REMAINING_LONGEVITY: 106 MO
MDC_IDC_MSMT_BATTERY_RRT_TRIGGER: 2.62
MDC_IDC_MSMT_BATTERY_STATUS: NORMAL
MDC_IDC_MSMT_BATTERY_VOLTAGE: 2.94 V
MDC_IDC_MSMT_LEADCHNL_RA_IMPEDANCE_VALUE: 361 OHM
MDC_IDC_MSMT_LEADCHNL_RA_IMPEDANCE_VALUE: 513 OHM
MDC_IDC_MSMT_LEADCHNL_RA_PACING_THRESHOLD_AMPLITUDE: 0.5 V
MDC_IDC_MSMT_LEADCHNL_RA_PACING_THRESHOLD_PULSEWIDTH: 0.4 MS
MDC_IDC_MSMT_LEADCHNL_RA_SENSING_INTR_AMPL: 3.25 MV
MDC_IDC_MSMT_LEADCHNL_RA_SENSING_INTR_AMPL: 3.25 MV
MDC_IDC_MSMT_LEADCHNL_RV_IMPEDANCE_VALUE: 342 OHM
MDC_IDC_MSMT_LEADCHNL_RV_IMPEDANCE_VALUE: 418 OHM
MDC_IDC_MSMT_LEADCHNL_RV_PACING_THRESHOLD_AMPLITUDE: 0.5 V
MDC_IDC_MSMT_LEADCHNL_RV_PACING_THRESHOLD_PULSEWIDTH: 0.4 MS
MDC_IDC_MSMT_LEADCHNL_RV_SENSING_INTR_AMPL: 12.62 MV
MDC_IDC_MSMT_LEADCHNL_RV_SENSING_INTR_AMPL: 12.62 MV
MDC_IDC_PG_IMPLANT_DTM: NORMAL
MDC_IDC_PG_MFG: NORMAL
MDC_IDC_PG_MODEL: NORMAL
MDC_IDC_PG_SERIAL: NORMAL
MDC_IDC_PG_TYPE: NORMAL
MDC_IDC_SESS_CLINIC_NAME: NORMAL
MDC_IDC_SESS_DTM: NORMAL
MDC_IDC_SESS_TYPE: NORMAL
MDC_IDC_SET_BRADY_AT_MODE_SWITCH_RATE: 171 {BEATS}/MIN
MDC_IDC_SET_BRADY_HYSTRATE: NORMAL
MDC_IDC_SET_BRADY_LOWRATE: 60 {BEATS}/MIN
MDC_IDC_SET_BRADY_MAX_SENSOR_RATE: 120 {BEATS}/MIN
MDC_IDC_SET_BRADY_MAX_TRACKING_RATE: 120 {BEATS}/MIN
MDC_IDC_SET_BRADY_MODE: NORMAL
MDC_IDC_SET_BRADY_PAV_DELAY_LOW: 180 MS
MDC_IDC_SET_BRADY_SAV_DELAY_LOW: 150 MS
MDC_IDC_SET_LEADCHNL_RA_PACING_AMPLITUDE: 2 V
MDC_IDC_SET_LEADCHNL_RA_PACING_ANODE_ELECTRODE_1: NORMAL
MDC_IDC_SET_LEADCHNL_RA_PACING_ANODE_LOCATION_1: NORMAL
MDC_IDC_SET_LEADCHNL_RA_PACING_CAPTURE_MODE: NORMAL
MDC_IDC_SET_LEADCHNL_RA_PACING_CATHODE_ELECTRODE_1: NORMAL
MDC_IDC_SET_LEADCHNL_RA_PACING_CATHODE_LOCATION_1: NORMAL
MDC_IDC_SET_LEADCHNL_RA_PACING_POLARITY: NORMAL
MDC_IDC_SET_LEADCHNL_RA_PACING_PULSEWIDTH: 0.4 MS
MDC_IDC_SET_LEADCHNL_RA_SENSING_ANODE_ELECTRODE_1: NORMAL
MDC_IDC_SET_LEADCHNL_RA_SENSING_ANODE_LOCATION_1: NORMAL
MDC_IDC_SET_LEADCHNL_RA_SENSING_CATHODE_ELECTRODE_1: NORMAL
MDC_IDC_SET_LEADCHNL_RA_SENSING_CATHODE_LOCATION_1: NORMAL
MDC_IDC_SET_LEADCHNL_RA_SENSING_POLARITY: NORMAL
MDC_IDC_SET_LEADCHNL_RA_SENSING_SENSITIVITY: 0.3 MV
MDC_IDC_SET_LEADCHNL_RV_PACING_AMPLITUDE: 2 V
MDC_IDC_SET_LEADCHNL_RV_PACING_ANODE_ELECTRODE_1: NORMAL
MDC_IDC_SET_LEADCHNL_RV_PACING_ANODE_LOCATION_1: NORMAL
MDC_IDC_SET_LEADCHNL_RV_PACING_CAPTURE_MODE: NORMAL
MDC_IDC_SET_LEADCHNL_RV_PACING_CATHODE_ELECTRODE_1: NORMAL
MDC_IDC_SET_LEADCHNL_RV_PACING_CATHODE_LOCATION_1: NORMAL
MDC_IDC_SET_LEADCHNL_RV_PACING_POLARITY: NORMAL
MDC_IDC_SET_LEADCHNL_RV_PACING_PULSEWIDTH: 0.4 MS
MDC_IDC_SET_LEADCHNL_RV_SENSING_ANODE_ELECTRODE_1: NORMAL
MDC_IDC_SET_LEADCHNL_RV_SENSING_ANODE_LOCATION_1: NORMAL
MDC_IDC_SET_LEADCHNL_RV_SENSING_CATHODE_ELECTRODE_1: NORMAL
MDC_IDC_SET_LEADCHNL_RV_SENSING_CATHODE_LOCATION_1: NORMAL
MDC_IDC_SET_LEADCHNL_RV_SENSING_POLARITY: NORMAL
MDC_IDC_SET_LEADCHNL_RV_SENSING_SENSITIVITY: 0.9 MV
MDC_IDC_SET_ZONE_DETECTION_INTERVAL: 350 MS
MDC_IDC_SET_ZONE_DETECTION_INTERVAL: 400 MS
MDC_IDC_SET_ZONE_TYPE: NORMAL
MDC_IDC_STAT_BRADY_AP_VP_PERCENT: 98.73 %
MDC_IDC_STAT_BRADY_AP_VS_PERCENT: 0.06 %
MDC_IDC_STAT_BRADY_AS_VP_PERCENT: 0.91 %
MDC_IDC_STAT_BRADY_AS_VS_PERCENT: 0.3 %
MDC_IDC_STAT_BRADY_DTM_END: NORMAL
MDC_IDC_STAT_BRADY_DTM_START: NORMAL
MDC_IDC_STAT_BRADY_RA_PERCENT_PACED: 99.04 %
MDC_IDC_STAT_BRADY_RV_PERCENT_PACED: 99.64 %
MDC_IDC_STAT_EPISODE_RECENT_COUNT: 0
MDC_IDC_STAT_EPISODE_RECENT_COUNT_DTM_END: NORMAL
MDC_IDC_STAT_EPISODE_RECENT_COUNT_DTM_START: NORMAL
MDC_IDC_STAT_EPISODE_TOTAL_COUNT: 0
MDC_IDC_STAT_EPISODE_TOTAL_COUNT_DTM_END: NORMAL
MDC_IDC_STAT_EPISODE_TOTAL_COUNT_DTM_START: NORMAL
MDC_IDC_STAT_EPISODE_TYPE: NORMAL

## 2021-08-16 ENCOUNTER — CARE COORDINATION (OUTPATIENT)
Dept: CARDIOLOGY | Facility: CLINIC | Age: 86
End: 2021-08-16

## 2021-08-16 ENCOUNTER — OFFICE VISIT (OUTPATIENT)
Dept: CARDIOLOGY | Facility: CLINIC | Age: 86
End: 2021-08-16
Attending: INTERNAL MEDICINE
Payer: COMMERCIAL

## 2021-08-16 VITALS
DIASTOLIC BLOOD PRESSURE: 60 MMHG | SYSTOLIC BLOOD PRESSURE: 98 MMHG | BODY MASS INDEX: 26.56 KG/M2 | WEIGHT: 189.7 LBS | OXYGEN SATURATION: 99 % | HEIGHT: 71 IN | HEART RATE: 66 BPM

## 2021-08-16 DIAGNOSIS — I82.811 CHRONIC SUPERFICIAL VENOUS THROMBOSIS OF LOWER EXTREMITY, RIGHT: ICD-10-CM

## 2021-08-16 DIAGNOSIS — I50.42 CHRONIC COMBINED SYSTOLIC AND DIASTOLIC HRT FAIL (H): ICD-10-CM

## 2021-08-16 DIAGNOSIS — R60.9 EDEMA, UNSPECIFIED TYPE: ICD-10-CM

## 2021-08-16 DIAGNOSIS — I87.2 VENOUS (PERIPHERAL) INSUFFICIENCY: Primary | ICD-10-CM

## 2021-08-16 PROCEDURE — 99214 OFFICE O/P EST MOD 30 MIN: CPT | Performed by: PHYSICIAN ASSISTANT

## 2021-08-16 ASSESSMENT — MIFFLIN-ST. JEOR: SCORE: 1557.6

## 2021-08-16 NOTE — PROGRESS NOTES
Thanks -- I did refer him to meet with Dr. Fry in the vein clinic. I will also order the repeat venous ultrasound in 2 months. Rut, can you let Zane/his daughter know that they should schedule this at their convenience?

## 2021-08-16 NOTE — PROGRESS NOTES
Dr. Joseluis Chavez. I saw Zane today. His peripheral edema has improved with diuresis and a 6-lb wt loss. However, he continues to have limiting exertional dyspnea. TTE last week showed stable LVEF 35-40%. We suspect this is pacemaker-induced. I recommended consideration for pacemaker upgrade to CRT. Can you take a look and let me know if this seems reasonable to you? Thanks.

## 2021-08-16 NOTE — PATIENT INSTRUCTIONS
Today's Plan:   - Zane, I have referred you to the vein clinic (Dr. Fry) to discuss an ablation procedure if in the future you feel that the swelling is uncontrolled. Continue your water pill and VERNA stockings as able in the meantime.   - I have referred you to an electrophysiologist (Dr. Miller) to discuss upgrading your pacemaker to a biventricular device.   - We'll keep your other medications the same. Please discuss the gabapentin and other medications with your PCP Dr. Wagoner.   - Return to see me in 3 months.     If you have questions or concerns please call my nurse team at [phone].  Scheduling phone number: [phone]  For after hours urgent concerns call [phone] option 2.   Reminder: Please bring in all current medications, over the counter supplements and vitamin bottles to your next appointment.    It was a pleasure seeing you today!     Lacey Puckett PA-C

## 2021-08-16 NOTE — LETTER
8/16/2021    Steven Wagoner MD  [address] [facility] [address]    RE: Moses Elizondo       Dear Colleague,    I had the pleasure of seeing Moses Elizondo in the [facility].      Cardiology Clinic Progress Note    Moses Elizondo MRN# [id number]   YOB: 1933 Age: 87 year old   Primary cardiologist: Dr. Huggins         Assessment and Plan:     In summary, Moses Elizondo presents today to follow up on peripheral edema after the initiation of furosemide, and to review review venous competency study and echocardiogram.     1. Severe bilateral venous insufficiency, symptoms well-controlled with low-dose diuretic therapy (6 lb wt loss) and intermittent compression.    2. 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 Reports FC II-III sxs, worsening over past four months. GDMT includes Toprol 25, lisinopril 2.5 BID. Further medication optimization is limited by marginal blood pressure. Appears well-compensated on exam.   3. Pacemaker-dependency.  4. Chronic peripheral neuropathy.  5. Limiting knee pain. Anticipates he may need surgery in the future.     Plan:  - Referral to vein clinic placed. His swelling is currently under good control, but he has other leg sxs such as neuropathy that may benefit from ablation. In the meantime, he will continue furosemide and compression stockings, as tolerated.   - Referral to EP Dr. Miller for consideration of upgrade to CRT-P.  - Return to see me in 3 months.         History of Presenting Illness:      Moses Elizondo is a pleasant 87 year old patient who presents today for follow-up on recent testing.     His pertinent cardiac history includes:  # CAD, s/p PCI to OM1 (2014)  # Newly diagnosed cardiomyopathy, LVEF 38% per routine TTE on 1/6/21. 44% per MRI. Suspect pacemaker-induced.   # No evidence of ischemia or infiltrative disease on 1/2021  cMRI.   # SSS, which has now progressed to CHB. S/p PPM.  # Chronic knee pain, arthritis, and Baker's cysts  # Peripheral edema, venous insufficiency  # Mild ascending aortic enlargement  # Hypothyroidism  # Hyperlipidemia  # Idiopathic peripheral neuropathy    In brief, Zane saw Dr. Huggins on July 14 for a routine follow up visit.  He complained of worsening leg edema for that time. proBNP was elevated ~1,000 and therefore furosemide 40 mg daily was initiated, an echocardiogram and venous competency study ordered.  He asked him to wear VERNA stockings as well.    His testing placed on August 9.  Echocardiogram was stable from that in January, showing EF estimated at 47%, with moderate global hypokinesis, mild AI, and moderately dilated ascending aorta at 4.4 cm.  His competency study showed a nonocclusive thrombus of the right GSV from the mid thigh to the calf, with severe venous reflux down to the mid calf, where it appeared occluded.  There was also a Baker's cyst incidentally noted.  On the left, there is moderate to severe reflux throughout the entire length of the GSV, also with a Baker's cyst noted. BMP that day was within normal limits, showing a creatinine of 1.24, potassium of 4.1, sodium of 139, BUN of 28.    Today, Zane returns to clinic with his daughter. We reviewed his recent testing. His weight is down six lbs from his last visit. BP is marginal. His leg swelling has improved with the furosemide and intermittent compression stockings, now trace on exam. Unfortunately, he is still unsatisfied with his functional capacity. He feels more winded with activity than he did four months ago. He walks around the house and garden and observes things, but can't do much in the way of active gardening without limiting dyspnea. He can do his ADL's independently. Of course, he is also limited by chronic knee pain and neuropathy, especially with stair-climbing or when going to bed at night.          Review of  "Systems:     12-pt ROS is negative except for as noted in the HPI.          Physical Exam:     Vitals: BP 98/60   Pulse 66   Ht 1.803 m (5' 11\")   Wt 86 kg (189 lb 11.2 oz)   SpO2 99%   BMI 26.46 kg/m    Wt Readings from Last 10 Encounters:   08/16/21 86 kg (189 lb 11.2 oz)   07/14/21 88.5 kg (195 lb 1.6 oz)   04/07/21 89.9 kg (198 lb 1.6 oz)   03/10/21 89 kg (196 lb 4.8 oz)   03/02/21 89.8 kg (198 lb)   08/14/20 86.6 kg (191 lb)   07/23/20 87.6 kg (193 lb 1.6 oz)   07/06/20 88.3 kg (194 lb 9.6 oz)   03/02/20 89.1 kg (196 lb 6.4 oz)   02/27/20 88 kg (194 lb)       Constitutional:  Patient is pleasant, alert, cooperative, and in NAD.  HEENT:  NCAT. PERRLA. EOM's intact.   Neck:  CVP appears normal. No carotid bruits.   Pulmonary: Normal respiratory effort. CTAB.   Cardiac: RRR, normal S1/S2, no S3/S4, no murmur or rub.   Abdomen:  Non-tender abdomen, no hepatosplenomegaly appreciated.   Vascular: Pulses in the upper and lower extremities are 2+ and equal bilaterally.  Extremities: Trace lower extremity edema, erythema, cyanosis or tenderness appreciated.  Skin:  No rashes or lesions appreciated.   Neurological:  No gross motor or sensory deficits.   Psych: Appropriate affect.        Data:     Labs reviewed:  Recent Labs   Lab Test 07/14/21  0827 03/02/21  0927 07/23/20  1008 07/07/20  0000 10/31/19  0849 03/20/19  0823 02/27/18  0817   LDL  --   --   --  49 60 61 40   HDL  --   --   --  42 52 40 49   NHDL  --   --   --   --  74 76 52   CHOL  --   --   --  105 126 116 101   TRIG  --   --   --  71 70 75 59   TSH  --  2.27 2.87  --  1.94 2.20 2.53   NTBNP 1,009* 804*  --   --   --   --   --        Lab Results   Component Value Date    WBC 6.2 07/23/2020    RBC 4.58 07/23/2020    HGB 13.2 (L) 03/02/2021    HCT 41.4 07/23/2020    MCV 90 07/23/2020    MCH 29.9 07/23/2020    MCHC 33.1 07/23/2020    RDW 12.5 07/23/2020     07/23/2020       Lab Results   Component Value Date     08/09/2021     " 04/07/2021    POTASSIUM 4.1 08/09/2021    POTASSIUM 4.1 04/07/2021    CHLORIDE 107 08/09/2021    CHLORIDE 109 04/07/2021    CO2 28 08/09/2021    CO2 28 04/07/2021    ANIONGAP 4 08/09/2021    ANIONGAP 2 (L) 04/07/2021    GLC 85 08/09/2021    GLC 75 04/07/2021    BUN 28 08/09/2021    BUN 25 04/07/2021    CR 1.24 08/09/2021    CR 1.28 (H) 04/07/2021    GFRESTIMATED 52 (L) 08/09/2021    GFRESTIMATED 50 (L) 04/07/2021    GFRESTBLACK 58 (L) 04/07/2021    GABRIELA 9.0 08/09/2021    GABRIELA 8.8 04/07/2021      Lab Results   Component Value Date    AST 32 03/02/2021    ALT 30 03/02/2021       Lab Results   Component Value Date    A1C 5.6 05/02/2019       Lab Results   Component Value Date    INR 1.00 06/20/2019           Problem List:     Patient Active Problem List   Diagnosis     Hypothyroidism, unspecified type     Degeneration of lumbar or lumbosacral intervertebral disc     MGUS (monoclonal gammopathy of unknown significance)     Familial tremor     Hyperlipidemia LDL goal <100     NSTEMI (non-ST elevated myocardial infarction) (H)     [facility] care     Coronary artery disease     Bradycardia     Aneurysm of thoracic aorta (H)     Sinus bradycardia     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)     Ascending aortic aneurysm (H)     Sick sinus syndrome (H)     Aortic root dilatation (H)     Medicare annual wellness visit, subsequent     Idiopathic peripheral neuropathy     Chest discomfort     Status post coronary angiogram     Second degree AV block     Cardiac pacemaker in situ     Chronic kidney disease, stage 3     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)           Medications:     Current Outpatient Medications   Medication Sig Dispense Refill     acetaminophen (TYLENOL 8 HOUR) 650 MG CR tablet Take 650 mg by mouth daily       ASPIRIN EC PO Take 81 mg by mouth daily       atorvastatin (LIPITOR) 40 MG tablet Take 1 tablet (40 mg) by mouth daily 90 tablet 3  "    furosemide (LASIX) 20 MG tablet Take 1 tablet (20 mg) by mouth daily 30 tablet 11     gabapentin (NEURONTIN) 300 MG capsule Take 1 capsule (300 mg) by mouth At Bedtime       Glucosamine-Chondroitin (GLUCOSAMINE CHONDROITIN COMPLX) 500-250 MG CAPS Take by mouth daily       levothyroxine (SYNTHROID/LEVOTHROID) 100 MCG tablet TAKE 1 TABLET (100 MCG) BY MOUTH DAILY 90 tablet 1     lisinopril (ZESTRIL) 2.5 MG tablet Take 1 tablet (2.5 mg) by mouth 2 times daily 180 tablet 3     metoprolol succinate ER (TOPROL-XL) 25 MG 24 hr tablet Take 1 tablet (25 mg) by mouth daily 90 tablet 0     nitroGLYcerin (NITROSTAT) 0.4 MG sublingual tablet One tablet under the tongue every 5 minutes if needed for chest pain. May repeat every 5 minutes for a maximum of 3 doses in 15 minutes\" 25 tablet 3     Polyethylene Glycol 3350 (MIRALAX PO) Take by mouth as needed       Valerian 450 MG CAPS Take 2 capsules by mouth daily        vitamin B-12 (CYANOCOBALAMIN) 250 MCG tablet Take 1,000 mcg by mouth daily       Vitamin D-Vitamin K (VITAMIN K2-VITAMIN D3 PO) Take by mouth daily       Zinc 50 MG CAPS              Past Medical History:     Past Medical History:   Diagnosis Date     Aortic root dilatation (H)     4.4 cm     Ascending aorta dilatation (H)     4.4 cm     ASD (atrial septal defect)      Bradycardia      CAD (coronary artery disease)      YOGESH to RCA(6/20/19); YOGESH to OM1(5/15/14)     Cardiac pacemaker 06/20/2019    [employer] PPM COMFORT MRI XT DR-implant 6/20/19     Chest discomfort      Degeneration of lumbar or lumbosacral intervertebral disc      Familial tremor      Former smoker      Herpes zoster without mention of complication      HTN (hypertension)      Hyperlipidaemia      Idiopathic peripheral neuropathy      MGUS (monoclonal gammopathy of unknown significance)      NSTEMI (non-ST elevated myocardial infarction) (H) 2014     Other and unspecified hyperlipidemia      PFO (patent foramen ovale)      Prostatitis, unspecified  "     Second degree heart block      Sensorineural hearing loss, unspecified      SSS (sick sinus syndrome) (H)      Status post coronary angiogram 6/20/2019     Unspecified hypothyroidism      Past Surgical History:   Procedure Laterality Date     CHOLECYSTECTOMY       CORONARY ANGIOGRAPHY ADULT ORDER  5/14/14    PTCA w/YOGESH to OM1     CV CORONARY ANGIOGRAM N/A 6/20/2019    Procedure: Coronary Angiogram;  Surgeon: Romie Coronado MD;  Location:  [facility]     CV INTRAVASULAR ULTRASOUND N/A 6/20/2019    Procedure: Intravascular Ultrasound;  Surgeon: oRmie Coronado MD;  Location:  [facility]     CV PCI ATHERECTOMY ORBITAL N/A 6/20/2019    Procedure: PCI Atherectomy Orbital;  Surgeon: Romie Cornoado MD;  Location:  [facility]     CV PCI STENT DRUG ELUTING N/A 6/20/2019    Procedure: PCI Stent Drug Eluting;  Surgeon: Romie Coronado MD;  Location:  [facility]     CV TEMPORARY PACEMAKER INSERTION N/A 6/20/2019    Procedure: Temporary Pacemaker Insertion;  Surgeon: Romie Coronado MD;  Location:  [facility]     EP PACEMAKER N/A 6/20/2019    Procedure: EP Pacemaker;  Surgeon: Max Dowell MD;  Location:  [facility]     HEART CATH, ANGIOPLASTY  5/14/14    YOGESH to OM1     ZZC NONSPECIFIC PROCEDURE  2010    Laparoscopic cholecystectomy.      Family History   Problem Relation Age of Onset     Cancer Mother      Genitourinary Problems Father 99        complications from surgery     Heart Disease Brother 72        MI     Social History     Socioeconomic History     Marital status:      Spouse name: Not on file     Number of children: Not on file     Years of education: Not on file     Highest education level: Not on file   Occupational History     Not on file   Tobacco Use     Smoking status: Former Smoker     Packs/day: 0.50     Years: 16.00     Pack years: 8.00     Types: Cigarettes     Start date: 1950      Quit date: 1967     Years since quittin.6     Smokeless tobacco: Never Used   Substance and Sexual Activity     Alcohol use: Yes     Alcohol/week: 0.0 standard drinks     Comment: 5 drinks week     Drug use: No     Sexual activity: Never   Other Topics Concern      Service Not Asked     Blood Transfusions Not Asked     Caffeine Concern Yes     Comment: 2 cups coffee/day     Occupational Exposure Not Asked     Hobby Hazards Not Asked     Sleep Concern No     Stress Concern Not Asked     Weight Concern Yes     Comment: limiting alcohol to watch calories     Special Diet Not Asked     Back Care Not Asked     Exercise Yes     Comment: Stationary bike  weights and aerobics 4 times week     Bike Helmet Not Asked     Seat Belt Yes     Self-Exams Not Asked     Parent/sibling w/ CABG, MI or angioplasty before 65F 55M? No   Social History Narrative     Not on file     Social Determinants of Health     Financial Resource Strain:      Difficulty of Paying Living Expenses:    Food Insecurity:      Worried About Running Out of Food in the Last Year:      Ran Out of Food in the Last Year:    Transportation Needs:      Lack of Transportation (Medical):      Lack of Transportation (Non-Medical):    Physical Activity:      Days of Exercise per Week:      Minutes of Exercise per Session:    Stress:      Feeling of Stress :    Social Connections:      Frequency of Communication with Friends and Family:      Frequency of Social Gatherings with Friends and Family:      Attends Muslim Services:      Active Member of Clubs or Organizations:      Attends Club or Organization Meetings:      Marital Status:    Intimate Partner Violence:      Fear of Current or Ex-Partner:      Emotionally Abused:      Physically Abused:      Sexually Abused:            Allergies:   No known drug allergies      EVANGELISTA [facility]  Pager: [phone]      Thank you for allowing me to participate in the  care of your patient.      Sincerely,     EVANGELISTA [facility]  cc:   Unruly Huggins MD  [id number]  [id number]  [place] [id number]

## 2021-08-16 NOTE — PROGRESS NOTES
Cardiology Clinic Progress Note    Moses Elizondo MRN# [id number]   YOB: 1933 Age: 87 year old   Primary cardiologist: Dr. Huggins         Assessment and Plan:     In summary, Moses Elizondo presents today to follow up on peripheral edema after the initiation of furosemide, and to review review venous competency study and echocardiogram.     1. Severe bilateral venous insufficiency, symptoms well-controlled with low-dose diuretic therapy (6 lb wt loss) and intermittent compression.    2. 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 Reports FC II-III sxs, worsening over past four months. GDMT includes Toprol 25, lisinopril 2.5 BID. Further medication optimization is limited by marginal blood pressure. Appears well-compensated on exam.   3. Pacemaker-dependency.  4. Chronic peripheral neuropathy.  5. Limiting knee pain. Anticipates he may need surgery in the future.     Plan:  - Referral to vein clinic placed. His swelling is currently under good control, but he has other leg sxs such as neuropathy that may benefit from ablation. In the meantime, he will continue furosemide and compression stockings, as tolerated.   - Referral to EP Dr. Miller for consideration of upgrade to CRT-P.  - Return to see me in 3 months.         History of Presenting Illness:      Moses Elizondo is a pleasant 87 year old patient who presents today for follow-up on recent testing.     His pertinent cardiac history includes:  # CAD, s/p PCI to OM1 (2014)  # Newly diagnosed cardiomyopathy, LVEF 38% per routine TTE on 1/6/21. 44% per MRI. Suspect pacemaker-induced.   # No evidence of ischemia or infiltrative disease on 1/2021 cMRI.   # SSS, which has now progressed to CHB. S/p PPM.  # Chronic knee pain, arthritis, and Baker's cysts  # Peripheral edema, venous insufficiency  # Mild ascending aortic enlargement  # Hypothyroidism  # Hyperlipidemia  # Idiopathic peripheral  "neuropathy    In brief, Zane saw Dr. Huggins on July 14 for a routine follow up visit.  He complained of worsening leg edema for that time. proBNP was elevated ~1,000 and therefore furosemide 40 mg daily was initiated, an echocardiogram and venous competency study ordered.  He asked him to wear VERNA stockings as well.    His testing placed on August 9.  Echocardiogram was stable from that in January, showing EF estimated at 47%, with moderate global hypokinesis, mild AI, and moderately dilated ascending aorta at 4.4 cm.  His competency study showed a nonocclusive thrombus of the right GSV from the mid thigh to the calf, with severe venous reflux down to the mid calf, where it appeared occluded.  There was also a Baker's cyst incidentally noted.  On the left, there is moderate to severe reflux throughout the entire length of the GSV, also with a Baker's cyst noted. BMP that day was within normal limits, showing a creatinine of 1.24, potassium of 4.1, sodium of 139, BUN of 28.    Today, Zane returns to clinic with his daughter. We reviewed his recent testing. His weight is down six lbs from his last visit. BP is marginal. His leg swelling has improved with the furosemide and intermittent compression stockings, now trace on exam. Unfortunately, he is still unsatisfied with his functional capacity. He feels more winded with activity than he did four months ago. He walks around the house and garden and observes things, but can't do much in the way of active gardening without limiting dyspnea. He can do his ADL's independently. Of course, he is also limited by chronic knee pain and neuropathy, especially with stair-climbing or when going to bed at night.          Review of Systems:     12-pt ROS is negative except for as noted in the HPI.          Physical Exam:     Vitals: BP 98/60   Pulse 66   Ht 1.803 m (5' 11\")   Wt 86 kg (189 lb 11.2 oz)   SpO2 99%   BMI 26.46 kg/m    Wt Readings from Last 10 Encounters: "   08/16/21 86 kg (189 lb 11.2 oz)   07/14/21 88.5 kg (195 lb 1.6 oz)   04/07/21 89.9 kg (198 lb 1.6 oz)   03/10/21 89 kg (196 lb 4.8 oz)   03/02/21 89.8 kg (198 lb)   08/14/20 86.6 kg (191 lb)   07/23/20 87.6 kg (193 lb 1.6 oz)   07/06/20 88.3 kg (194 lb 9.6 oz)   03/02/20 89.1 kg (196 lb 6.4 oz)   02/27/20 88 kg (194 lb)       Constitutional:  Patient is pleasant, alert, cooperative, and in NAD.  HEENT:  NCAT. PERRLA. EOM's intact.   Neck:  CVP appears normal. No carotid bruits.   Pulmonary: Normal respiratory effort. CTAB.   Cardiac: RRR, normal S1/S2, no S3/S4, no murmur or rub.   Abdomen:  Non-tender abdomen, no hepatosplenomegaly appreciated.   Vascular: Pulses in the upper and lower extremities are 2+ and equal bilaterally.  Extremities: Trace lower extremity edema, erythema, cyanosis or tenderness appreciated.  Skin:  No rashes or lesions appreciated.   Neurological:  No gross motor or sensory deficits.   Psych: Appropriate affect.        Data:     Labs reviewed:  Recent Labs   Lab Test 07/14/21  0827 03/02/21  0927 07/23/20  1008 07/07/20  0000 10/31/19  0849 03/20/19  0823 02/27/18  0817   LDL  --   --   --  49 60 61 40   HDL  --   --   --  42 52 40 49   NHDL  --   --   --   --  74 76 52   CHOL  --   --   --  105 126 116 101   TRIG  --   --   --  71 70 75 59   TSH  --  2.27 2.87  --  1.94 2.20 2.53   NTBNP 1,009* 804*  --   --   --   --   --        Lab Results   Component Value Date    WBC 6.2 07/23/2020    RBC 4.58 07/23/2020    HGB 13.2 (L) 03/02/2021    HCT 41.4 07/23/2020    MCV 90 07/23/2020    MCH 29.9 07/23/2020    MCHC 33.1 07/23/2020    RDW 12.5 07/23/2020     07/23/2020       Lab Results   Component Value Date     08/09/2021     04/07/2021    POTASSIUM 4.1 08/09/2021    POTASSIUM 4.1 04/07/2021    CHLORIDE 107 08/09/2021    CHLORIDE 109 04/07/2021    CO2 28 08/09/2021    CO2 28 04/07/2021    ANIONGAP 4 08/09/2021    ANIONGAP 2 (L) 04/07/2021    GLC 85 08/09/2021    GLC 75  04/07/2021    BUN 28 08/09/2021    BUN 25 04/07/2021    CR 1.24 08/09/2021    CR 1.28 (H) 04/07/2021    GFRESTIMATED 52 (L) 08/09/2021    GFRESTIMATED 50 (L) 04/07/2021    GFRESTBLACK 58 (L) 04/07/2021    GABRIELA 9.0 08/09/2021    GABRIELA 8.8 04/07/2021      Lab Results   Component Value Date    AST 32 03/02/2021    ALT 30 03/02/2021       Lab Results   Component Value Date    A1C 5.6 05/02/2019       Lab Results   Component Value Date    INR 1.00 06/20/2019           Problem List:     Patient Active Problem List   Diagnosis     Hypothyroidism, unspecified type     Degeneration of lumbar or lumbosacral intervertebral disc     MGUS (monoclonal gammopathy of unknown significance)     Familial tremor     Hyperlipidemia LDL goal <100     NSTEMI (non-ST elevated myocardial infarction) (H)     Health retirement     Coronary artery disease     Bradycardia     Aneurysm of thoracic aorta (H)     Sinus bradycardia     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)     Ascending aortic aneurysm (H)     Sick sinus syndrome (H)     Aortic root dilatation (H)     Medicare annual wellness visit, subsequent     Idiopathic peripheral neuropathy     Chest discomfort     Status post coronary angiogram     Second degree AV block     Cardiac pacemaker in situ     Chronic kidney disease, stage 3     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)           Medications:     Current Outpatient Medications   Medication Sig Dispense Refill     acetaminophen (TYLENOL 8 HOUR) 650 MG CR tablet Take 650 mg by mouth daily       ASPIRIN EC PO Take 81 mg by mouth daily       atorvastatin (LIPITOR) 40 MG tablet Take 1 tablet (40 mg) by mouth daily 90 tablet 3     furosemide (LASIX) 20 MG tablet Take 1 tablet (20 mg) by mouth daily 30 tablet 11     gabapentin (NEURONTIN) 300 MG capsule Take 1 capsule (300 mg) by mouth At Bedtime       Glucosamine-Chondroitin (GLUCOSAMINE CHONDROITIN COMPLX) 500-250 MG CAPS  "Take by mouth daily       levothyroxine (SYNTHROID/LEVOTHROID) 100 MCG tablet TAKE 1 TABLET (100 MCG) BY MOUTH DAILY 90 tablet 1     lisinopril (ZESTRIL) 2.5 MG tablet Take 1 tablet (2.5 mg) by mouth 2 times daily 180 tablet 3     metoprolol succinate ER (TOPROL-XL) 25 MG 24 hr tablet Take 1 tablet (25 mg) by mouth daily 90 tablet 0     nitroGLYcerin (NITROSTAT) 0.4 MG sublingual tablet One tablet under the tongue every 5 minutes if needed for chest pain. May repeat every 5 minutes for a maximum of 3 doses in 15 minutes\" 25 tablet 3     Polyethylene Glycol 3350 (MIRALAX PO) Take by mouth as needed       Valerian 450 MG CAPS Take 2 capsules by mouth daily        vitamin B-12 (CYANOCOBALAMIN) 250 MCG tablet Take 1,000 mcg by mouth daily       Vitamin D-Vitamin K (VITAMIN K2-VITAMIN D3 PO) Take by mouth daily       Zinc 50 MG CAPS              Past Medical History:     Past Medical History:   Diagnosis Date     Aortic root dilatation (H)     4.4 cm     Ascending aorta dilatation (H)     4.4 cm     ASD (atrial septal defect)      Bradycardia      CAD (coronary artery disease)      YOGESH to RCA(6/20/19); YOGESH to OM1(5/15/14)     Cardiac pacemaker 06/20/2019    [employer]tronic PPM COMFORT MRI XT DR-implant 6/20/19     Chest discomfort      Degeneration of lumbar or lumbosacral intervertebral disc      Familial tremor      Former smoker      Herpes zoster without mention of complication      HTN (hypertension)      Hyperlipidaemia      Idiopathic peripheral neuropathy      MGUS (monoclonal gammopathy of unknown significance)      NSTEMI (non-ST elevated myocardial infarction) (H) 2014     Other and unspecified hyperlipidemia      PFO (patent foramen ovale)      Prostatitis, unspecified      Second degree heart block      Sensorineural hearing loss, unspecified      SSS (sick sinus syndrome) (H)      Status post coronary angiogram 6/20/2019     Unspecified hypothyroidism      Past Surgical History:   Procedure Laterality Date     " CHOLECYSTECTOMY       CORONARY ANGIOGRAPHY ADULT ORDER  14    PTCA w/YOGESH to OM1     CV CORONARY ANGIOGRAM N/A 2019    Procedure: Coronary Angiogram;  Surgeon: Romie Coronado MD;  Location:  [facility]     CV INTRAVASULAR ULTRASOUND N/A 2019    Procedure: Intravascular Ultrasound;  Surgeon: Romie Coronado MD;  Location:  [facility]     CV PCI ATHERECTOMY ORBITAL N/A 2019    Procedure: PCI Atherectomy Orbital;  Surgeon: Romie Coronado MD;  Location:  [facility]     CV PCI STENT DRUG ELUTING N/A 2019    Procedure: PCI Stent Drug Eluting;  Surgeon: Romie Coronado MD;  Location:  [facility]     CV TEMPORARY PACEMAKER INSERTION N/A 2019    Procedure: Temporary Pacemaker Insertion;  Surgeon: Romie Coronado MD;  Location:  HE[facility]ARD[facility] CATH LAB     EP PACEMAKER N/A 2019    Procedure: EP Pacemaker;  Surgeon: Max Dowell MD;  Location:  HEART CARDIAC CATH LAB     HEART CATH, ANGIOPLASTY  14    YOGESH to OM1     ZZC NONSPECIFIC PROCEDURE  2010    Laparoscopic cholecystectomy.      Family History   Problem Relation Age of Onset     Cancer Mother      Genitourinary Problems Father 99        complications from surgery     Heart Disease Brother 72        MI     Social History     Socioeconomic History     Marital status:      Spouse name: Not on file     Number of children: Not on file     Years of education: Not on file     Highest education level: Not on file   Occupational History     Not on file   Tobacco Use     Smoking status: Former Smoker     Packs/day: 0.50     Years: 16.00     Pack years: 8.00     Types: Cigarettes     Start date:      Quit date: 1967     Years since quittin.6     Smokeless tobacco: Never Used   Substance and Sexual Activity     Alcohol use: Yes     Alcohol/week: 0.0 standard drinks     Comment: 5 drinks week     Drug use: No     Sexual activity: Never    Other Topics Concern      Service Not Asked     Blood Transfusions Not Asked     Caffeine Concern Yes     Comment: 2 cups coffee/day     Occupational Exposure Not Asked     Hobby Hazards Not Asked     Sleep Concern No     Stress Concern Not Asked     Weight Concern Yes     Comment: limiting alcohol to watch calories     Special Diet Not Asked     Back Care Not Asked     Exercise Yes     Comment: Stationary bike  weights and aerobics 4 times week     Bike Helmet Not Asked     Seat Belt Yes     Self-Exams Not Asked     Parent/sibling w/ CABG, MI or angioplasty before 65F 55M? No   Social History Narrative     Not on file     Social Determinants of Health     Financial Resource Strain:      Difficulty of Paying Living Expenses:    Food Insecurity:      Worried About Running Out of Food in the Last Year:      Ran Out of Food in the Last Year:    Transportation Needs:      Lack of Transportation (Medical):      Lack of Transportation (Non-Medical):    Physical Activity:      Days of Exercise per Week:      Minutes of Exercise per Session:    Stress:      Feeling of Stress :    Social Connections:      Frequency of Communication with Friends and Family:      Frequency of Social Gatherings with Friends and Family:      Attends Scientologist Services:      Active Member of Clubs or Organizations:      Attends Club or Organization Meetings:      Marital Status:    Intimate Partner Violence:      Fear of Current or Ex-Partner:      Emotionally Abused:      Physically Abused:      Sexually Abused:            Allergies:   No known drug allergies      Lacey Puckett PA-C  [facility]  Pager: [phone]

## 2021-08-16 NOTE — TELEPHONE ENCOUNTER
In addition to referral to EP for Biv upgrade, we should make sure he is seen by venous expert to address his venous insufficiency. The latter is probably most likely cause of his edema.  With respect to superficial vein clot, I talked to hematology. Unless he has having symptoms from clot in legs, we can manage conservatively without anticoagulation. We should get repeat venous doppler in 2m to ensure that clot is not progressing.

## 2021-08-18 NOTE — PROGRESS NOTES
Called and spoke with pt and discussed recommendations to get right LE venous US done in 2 months per EVANGELISTA Vázquez's recommendations. Pt verbalized understanding and will await call from scheduling to get it set up.    ALY Wilcox 4:34 PM 8/18/2021

## 2021-08-23 DIAGNOSIS — E03.9 HYPOTHYROIDISM, UNSPECIFIED TYPE: ICD-10-CM

## 2021-08-25 RX ORDER — LEVOTHYROXINE SODIUM 100 UG/1
100 TABLET ORAL DAILY
Qty: 90 TABLET | Refills: 1 | Status: SHIPPED | OUTPATIENT
Start: 2021-08-25 | End: 2021-11-12

## 2021-08-25 NOTE — TELEPHONE ENCOUNTER
TSH   Date Value Ref Range Status   03/02/2021 2.27 0.40 - 4.00 mU/L Final     Creatinine   Date Value Ref Range Status   08/09/2021 1.24 0.66 - 1.25 mg/dL Final   04/07/2021 1.28 (H) 0.66 - 1.25 mg/dL Final     Please refill as appropriate.    Luana Schrader RN  [facility]

## 2021-08-30 ENCOUNTER — OFFICE VISIT (OUTPATIENT)
Dept: CARDIOLOGY | Facility: CLINIC | Age: 86
End: 2021-08-30
Payer: COMMERCIAL

## 2021-08-30 VITALS
SYSTOLIC BLOOD PRESSURE: 97 MMHG | WEIGHT: 188 LBS | HEIGHT: 71 IN | HEART RATE: 64 BPM | BODY MASS INDEX: 26.32 KG/M2 | DIASTOLIC BLOOD PRESSURE: 61 MMHG

## 2021-08-30 DIAGNOSIS — Z11.59 ENCOUNTER FOR SCREENING FOR OTHER VIRAL DISEASES: ICD-10-CM

## 2021-08-30 DIAGNOSIS — I50.42 CHRONIC COMBINED SYSTOLIC AND DIASTOLIC HRT FAIL (H): ICD-10-CM

## 2021-08-30 PROCEDURE — 99214 OFFICE O/P EST MOD 30 MIN: CPT | Performed by: INTERNAL MEDICINE

## 2021-08-30 ASSESSMENT — MIFFLIN-ST. JEOR: SCORE: 1549.63

## 2021-08-30 NOTE — LETTER
8/30/2021    Steven Wgaoner MD  [address] [facility] [address]    RE: Moses Elizondo       Dear Colleague,    I had the pleasure of seeing Moses Elizondo in the [facility].    HPI and Plan:   See dictation  [id number]  No orders of the defined types were placed in this encounter.      No orders of the defined types were placed in this encounter.      There are no discontinued medications.      Encounter Diagnosis   Name Primary?     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)        CURRENT MEDICATIONS:  Current Outpatient Medications   Medication Sig Dispense Refill     acetaminophen (TYLENOL 8 HOUR) 650 MG CR tablet Take 650 mg by mouth daily       ASPIRIN EC PO Take 81 mg by mouth daily       atorvastatin (LIPITOR) 40 MG tablet Take 1 tablet (40 mg) by mouth daily 90 tablet 3     furosemide (LASIX) 20 MG tablet Take 1 tablet (20 mg) by mouth daily 30 tablet 11     gabapentin (NEURONTIN) 300 MG capsule Take 1 capsule (300 mg) by mouth At Bedtime       Glucosamine-Chondroitin (GLUCOSAMINE CHONDROITIN COMPLX) 500-250 MG CAPS Take by mouth daily       levothyroxine (SYNTHROID/LEVOTHROID) 100 MCG tablet TAKE 1 TABLET (100 MCG) BY MOUTH DAILY 90 tablet 1     lisinopril (ZESTRIL) 2.5 MG tablet Take 1 tablet (2.5 mg) by mouth 2 times daily 180 tablet 3     metoprolol succinate ER (TOPROL-XL) 25 MG 24 hr tablet Take 1 tablet (25 mg) by mouth daily 90 tablet 0     Polyethylene Glycol 3350 (MIRALAX PO) Take by mouth as needed       Valerian 450 MG CAPS Take 2 capsules by mouth daily        vitamin B-12 (CYANOCOBALAMIN) 250 MCG tablet Take 1,000 mcg by mouth daily       Vitamin D-Vitamin K (VITAMIN K2-VITAMIN D3 PO) Take by mouth daily       Zinc 50 MG CAPS        nitroGLYcerin (NITROSTAT) 0.4 MG sublingual tablet One tablet under the tongue every 5 minutes if needed for chest pain. May repeat every 5 minutes for a maximum of 3 doses in 15  "minutes\" 25 tablet 3       ALLERGIES     Allergies   Allergen Reactions     No Known Drug Allergies        PAST MEDICAL HISTORY:  Past Medical History:   Diagnosis Date     Aortic root dilatation (H)     4.4 cm     Ascending aorta dilatation (H)     4.4 cm     ASD (atrial septal defect)      Bradycardia      CAD (coronary artery disease)      YOGESH to RCA(6/20/19); YOGESH to OM1(5/15/14)     Cardiac pacemaker 06/20/2019    [employer] PPM COMFORT MRI XT DR-implant 6/20/19     Chest discomfort      Degeneration of lumbar or lumbosacral intervertebral disc      Familial tremor      Former smoker      Herpes zoster without mention of complication      HTN (hypertension)      Hyperlipidaemia      Idiopathic peripheral neuropathy      MGUS (monoclonal gammopathy of unknown significance)      NSTEMI (non-ST elevated myocardial infarction) (H) 2014     Other and unspecified hyperlipidemia      PFO (patent foramen ovale)      Prostatitis, unspecified      Second degree heart block      Sensorineural hearing loss, unspecified      SSS (sick sinus syndrome) (H)      Status post coronary angiogram 6/20/2019     Unspecified hypothyroidism        PAST SURGICAL HISTORY:  Past Surgical History:   Procedure Laterality Date     CHOLECYSTECTOMY       CORONARY ANGIOGRAPHY ADULT ORDER  5/14/14    PTCA w/YOGESH to OM1     CV CORONARY ANGIOGRAM N/A 6/20/2019    Procedure: Coronary Angiogram;  Surgeon: Romie Coronado MD;  Location: [facility] CARDIAC CATH LAB     CV INTRAVASULAR ULTRASOUND N/A 6/20/2019    Procedure: Intravascular Ultrasound;  Surgeon: Romie Coronado MD;  Location: [facility] CARDIAC CATH LAB     CV PCI ATHERECTOMY ORBITAL N/A 6/20/2019    Procedure: PCI Atherectomy Orbital;  Surgeon: Romie Coronado MD;  Location: [facility] CARDIAC CATH LAB     CV PCI STENT DRUG ELUTING N/A 6/20/2019    Procedure: PCI Stent Drug Eluting;  Surgeon: Romie Coronado MD;  Location: [facility] CARDIAC CATH LAB     CV TEMPORARY PACEMAKER INSERTION " N/A 2019    Procedure: Temporary Pacemaker Insertion;  Surgeon: Romie Coronado MD;  Location:  HEART CARDIAC CATH LAB     EP PACEMAKER N/A 2019    Procedure: EP Pacemaker;  Surgeon: Max Dowell MD;  Location:  HE[facility] CARDIAC CATH LAB     HEART CATH, ANGIOPLASTY  14    YOGESH to OM1     ZZC NONSPECIFIC PROCEDURE      Laparoscopic cholecystectomy.        FAMILY HISTORY:  Family History   Problem Relation Age of Onset     Cancer Mother      Genitourinary Problems Father 99        complications from surgery     Heart Disease Brother 72        MI       SOCIAL HISTORY:  Social History     Socioeconomic History     Marital status:      Spouse name: None     Number of children: None     Years of education: None     Highest education level: None   Occupational History     None   Tobacco Use     Smoking status: Former Smoker     Packs/day: 0.50     Years: 16.00     Pack years: 8.00     Types: Cigarettes     Start date:      Quit date: 1967     Years since quittin.6     Smokeless tobacco: Never Used   Substance and Sexual Activity     Alcohol use: Yes     Alcohol/week: 0.0 standard drinks     Comment: 5 drinks week     Drug use: No     Sexual activity: Never   Other Topics Concern      Service Not Asked     Blood Transfusions Not Asked     Caffeine Concern Yes     Comment: 2 cups coffee/day     Occupational Exposure Not Asked     Hobby Hazards Not Asked     Sleep Concern No     Stress Concern Not Asked     Weight Concern Yes     Comment: limiting alcohol to watch calories     Special Diet Not Asked     Back Care Not Asked     Exercise Yes     Comment: Stationary bike  weights and aerobics 4 times week     Bike Helmet Not Asked     Seat Belt Yes     Self-Exams Not Asked     Parent/sibling w/ CABG, MI or angioplasty before 65F 55M? No   Social History Narrative     None     Social Determinants of Health     Financial Resource Strain:      Difficulty of Paying  "Living Expenses:    Food Insecurity:      Worried About Running Out of Food in the Last Year:      Ran Out of Food in the Last Year:    Transportation Needs:      Lack of Transportation (Medical):      Lack of Transportation (Non-Medical):    Physical Activity:      Days of Exercise per Week:      Minutes of Exercise per Session:    Stress:      Feeling of Stress :    Social Connections:      Frequency of Communication with Friends and Family:      Frequency of Social Gatherings with Friends and Family:      Attends Islam Services:      Active Member of Clubs or Organizations:      Attends Club or Organization Meetings:      Marital Status:    Intimate Partner Violence:      Fear of Current or Ex-Partner:      Emotionally Abused:      Physically Abused:      Sexually Abused:        Review of Systems:  Skin:  Negative       Eyes:  Positive for glasses    ENT:  Negative      Respiratory:  Positive for dyspnea on exertion tires easily, increased BOWMAN walking around   Cardiovascular:    Positive for;edema;fatigue;exercise intolerance;dizziness bad knees  Gastroenterology: Negative      Genitourinary:  Positive for nocturia    Musculoskeletal:  Positive for joint pain    Neurologic:  Positive for numbness or tingling of feet;memory problems neuropathy  Psychiatric:  Positive for sleep disturbances up to urinate  Heme/Lymph/Imm:  Negative      Endocrine:  Positive for thyroid disorder      Physical Exam:  Vitals: BP 97/61   Pulse 64   Ht 1.803 m (5' 10.98\")   Wt 85.3 kg (188 lb)   BMI 26.23 kg/m      Constitutional:  cooperative, alert and oriented, well developed, well nourished, in no acute distress        Skin:      pacemaker incision in the left infraclavicular area was well-healed      Head:  normocephalic, no masses or lesions        Eyes:  pupils equal and round, conjunctivae and lids unremarkable, sclera white, no xanthalasma, EOMS intact, no nystagmus        Lymph:      ENT:           Neck:  carotid pulses " are full and equal bilaterally, JVP normal, no carotid bruit        Respiratory:            Cardiac: regular rhythm, normal S1/S2, no S3 or S4, apical impulse not displaced, no murmurs, gallops or rubs                pulses full and equal, no bruits auscultated                                        GI:  abdomen soft, non-tender, BS normoactive, no mass, no HSM, no bruits        Extremities and Muscular Skeletal:  no deformities, clubbing, cyanosis, erythema observed              Neurological:  no gross motor deficits        Psych:  Alert and Oriented x 3        CC  Lacey Puckett PA-C  [address]  [place] [address]                  Thank you for allowing me to participate in the care of your patient.      Sincerely,     Tre Ramirez MD     [facility]  cc:   Lacey Puckett PA-C  [address]  [place] [address]

## 2021-08-30 NOTE — PROGRESS NOTES
"HPI and Plan:   See dictation  [id number]  No orders of the defined types were placed in this encounter.      No orders of the defined types were placed in this encounter.      There are no discontinued medications.      Encounter Diagnosis   Name Primary?     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)        CURRENT MEDICATIONS:  Current Outpatient Medications   Medication Sig Dispense Refill     acetaminophen (TYLENOL 8 HOUR) 650 MG CR tablet Take 650 mg by mouth daily       ASPIRIN EC PO Take 81 mg by mouth daily       atorvastatin (LIPITOR) 40 MG tablet Take 1 tablet (40 mg) by mouth daily 90 tablet 3     furosemide (LASIX) 20 MG tablet Take 1 tablet (20 mg) by mouth daily 30 tablet 11     gabapentin (NEURONTIN) 300 MG capsule Take 1 capsule (300 mg) by mouth At Bedtime       Glucosamine-Chondroitin (GLUCOSAMINE CHONDROITIN COMPLX) 500-250 MG CAPS Take by mouth daily       levothyroxine (SYNTHROID/LEVOTHROID) 100 MCG tablet TAKE 1 TABLET (100 MCG) BY MOUTH DAILY 90 tablet 1     lisinopril (ZESTRIL) 2.5 MG tablet Take 1 tablet (2.5 mg) by mouth 2 times daily 180 tablet 3     metoprolol succinate ER (TOPROL-XL) 25 MG 24 hr tablet Take 1 tablet (25 mg) by mouth daily 90 tablet 0     Polyethylene Glycol 3350 (MIRALAX PO) Take by mouth as needed       Valerian 450 MG CAPS Take 2 capsules by mouth daily        vitamin B-12 (CYANOCOBALAMIN) 250 MCG tablet Take 1,000 mcg by mouth daily       Vitamin D-Vitamin K (VITAMIN K2-VITAMIN D3 PO) Take by mouth daily       Zinc 50 MG CAPS        nitroGLYcerin (NITROSTAT) 0.4 MG sublingual tablet One tablet under the tongue every 5 minutes if needed for chest pain. May repeat every 5 minutes for a maximum of 3 doses in 15 minutes\" 25 tablet 3       ALLERGIES     Allergies   Allergen Reactions     No Known Drug Allergies        PAST MEDICAL HISTORY:  Past Medical History:   Diagnosis Date     Aortic root dilatation (H)     4.4 cm     Ascending aorta dilatation (H)     4.4 " cm     ASD (atrial septal defect)      Bradycardia      CAD (coronary artery disease)      YOGESH to RCA(6/20/19); YOGESH to OM1(5/15/14)     Cardiac pacemaker 06/20/2019    [employer] PPM COMFORT MRI XT DR-implant 6/20/19     Chest discomfort      Degeneration of lumbar or lumbosacral intervertebral disc      Familial tremor      Former smoker      Herpes zoster without mention of complication      HTN (hypertension)      Hyperlipidaemia      Idiopathic peripheral neuropathy      MGUS (monoclonal gammopathy of unknown significance)      NSTEMI (non-ST elevated myocardial infarction) (H) 2014     Other and unspecified hyperlipidemia      PFO (patent foramen ovale)      Prostatitis, unspecified      Second degree heart block      Sensorineural hearing loss, unspecified      SSS (sick sinus syndrome) (H)      Status post coronary angiogram 6/20/2019     Unspecified hypothyroidism        PAST SURGICAL HISTORY:  Past Surgical History:   Procedure Laterality Date     CHOLECYSTECTOMY       CORONARY ANGIOGRAPHY ADULT ORDER  5/14/14    PTCA w/YOGESH to OM1     CV CORONARY ANGIOGRAM N/A 6/20/2019    Procedure: Coronary Angiogram;  Surgeon: Romie Coronado MD;  Location: [facility] CARDIAC CATH LAB     CV INTRAVASULAR ULTRASOUND N/A 6/20/2019    Procedure: Intravascular Ultrasound;  Surgeon: Romie Coronado MD;  Location: [facility] CARDIAC CATH LAB     CV PCI ATHERECTOMY ORBITAL N/A 6/20/2019    Procedure: PCI Atherectomy Orbital;  Surgeon: Romie Coronado MD;  Location: [facility] CARDIAC CATH LAB     CV PCI STENT DRUG ELUTING N/A 6/20/2019    Procedure: PCI Stent Drug Eluting;  Surgeon: Romie Coronado MD;  Location: [facility] CARDIAC CATH LAB     CV TEMPORARY PACEMAKER INSERTION N/A 6/20/2019    Procedure: Temporary Pacemaker Insertion;  Surgeon: Romie Coronado MD;  Location: [facility] CARDIAC CATH LAB     EP PACEMAKER N/A 6/20/2019    Procedure: EP Pacemaker;  Surgeon: Max Dowell MD;  Location: [facility]  CARDIAC CATH LAB     HEART CATH, ANGIOPLASTY  14    YOGESH to OM1     ZC NONSPECIFIC PROCEDURE  2010    Laparoscopic cholecystectomy.        FAMILY HISTORY:  Family History   Problem Relation Age of Onset     Cancer Mother      Genitourinary Problems Father 99        complications from surgery     Heart Disease Brother 72        MI       SOCIAL HISTORY:  Social History     Socioeconomic History     Marital status:      Spouse name: None     Number of children: None     Years of education: None     Highest education level: None   Occupational History     None   Tobacco Use     Smoking status: Former Smoker     Packs/day: 0.50     Years: 16.00     Pack years: 8.00     Types: Cigarettes     Start date:      Quit date: 1967     Years since quittin.6     Smokeless tobacco: Never Used   Substance and Sexual Activity     Alcohol use: Yes     Alcohol/week: 0.0 standard drinks     Comment: 5 drinks week     Drug use: No     Sexual activity: Never   Other Topics Concern      Service Not Asked     Blood Transfusions Not Asked     Caffeine Concern Yes     Comment: 2 cups coffee/day     Occupational Exposure Not Asked     Hobby Hazards Not Asked     Sleep Concern No     Stress Concern Not Asked     Weight Concern Yes     Comment: limiting alcohol to watch calories     Special Diet Not Asked     Back Care Not Asked     Exercise Yes     Comment: Stationary bike  weights and aerobics 4 times week     Bike Helmet Not Asked     Seat Belt Yes     Self-Exams Not Asked     Parent/sibling w/ CABG, MI or angioplasty before 65F 55M? No   Social History Narrative     None     Social Determinants of Health     Financial Resource Strain:      Difficulty of Paying Living Expenses:    Food Insecurity:      Worried About Running Out of Food in the Last Year:      Ran Out of Food in the Last Year:    Transportation Needs:      Lack of Transportation (Medical):      Lack of Transportation (Non-Medical):    Physical  "Activity:      Days of Exercise per Week:      Minutes of Exercise per Session:    Stress:      Feeling of Stress :    Social Connections:      Frequency of Communication with Friends and Family:      Frequency of Social Gatherings with Friends and Family:      Attends Nondenominational Services:      Active Member of Clubs or Organizations:      Attends Club or Organization Meetings:      Marital Status:    Intimate Partner Violence:      Fear of Current or Ex-Partner:      Emotionally Abused:      Physically Abused:      Sexually Abused:        Review of Systems:  Skin:  Negative       Eyes:  Positive for glasses    ENT:  Negative      Respiratory:  Positive for dyspnea on exertion tires easily, increased BOWMAN walking around   Cardiovascular:    Positive for;edema;fatigue;exercise intolerance;dizziness bad knees  Gastroenterology: Negative      Genitourinary:  Positive for nocturia    Musculoskeletal:  Positive for joint pain    Neurologic:  Positive for numbness or tingling of feet;memory problems neuropathy  Psychiatric:  Positive for sleep disturbances up to urinate  Heme/Lymph/Imm:  Negative      Endocrine:  Positive for thyroid disorder      Physical Exam:  Vitals: BP 97/61   Pulse 64   Ht 1.803 m (5' 10.98\")   Wt 85.3 kg (188 lb)   BMI 26.23 kg/m      Constitutional:  cooperative, alert and oriented, well developed, well nourished, in no acute distress        Skin:      pacemaker incision in the left infraclavicular area was well-healed      Head:  normocephalic, no masses or lesions        Eyes:  pupils equal and round, conjunctivae and lids unremarkable, sclera white, no xanthalasma, EOMS intact, no nystagmus        Lymph:      ENT:           Neck:  carotid pulses are full and equal bilaterally, JVP normal, no carotid bruit        Respiratory:            Cardiac: regular rhythm, normal S1/S2, no S3 or S4, apical impulse not displaced, no murmurs, gallops or rubs                pulses full and equal, no bruits " auscultated                                        GI:  abdomen soft, non-tender, BS normoactive, no mass, no HSM, no bruits        Extremities and Muscular Skeletal:  no deformities, clubbing, cyanosis, erythema observed              Neurological:  no gross motor deficits        Psych:  Alert and Oriented x 3        CC  Lacey Puckett, PA-C  [address]  [place] [address]

## 2021-08-30 NOTE — PROGRESS NOTES
Service Date: 08/30/2021    Thank you for allowing me to participate in the care of your delightful patient.  As you know, Zane is an 87-year-old gentleman with history of stable CAD with drug-eluting stent placed in the OM in 2014 and 5 years later had another one placed in the RCA associated with LV dysfunction with ejection fraction hovering around 40%.  The patient also had a pacemaker implanted a few years ago for symptomatic second-degree AV block and since then has been essentially pacer dependent.  The patient was seen by Lacey Puckett recently who asked the patient to see me for consideration of CRT upgrade.    Zane mentioned that over the past several months he has been feeling more shortness of breath, although is somewhat associated with worsening knee pain as well.  The patient was recommended to have a knee replacement because of severe osteoarthritis.  On top of that, he does have idiopathic peripheral neuropathy, mostly at night, as well.    I checked his pacemaker today as I could not determine what was his intrinsic rhythm.  When I lower the rate down to 30, there appeared to be no heart rate above 30 beats per minute.  When I turn the MVP on, there was 1 non-conducted atrial vent suggesting the patient also has a high degree of AV block at least.    The patient's most recent echocardiogram was done a few weeks ago and showed ejection fraction of 37%, which is unchanged from before.    We discussed at length about the possibility of CRT upgrade given that he is already on optimal medical therapy including ACE inhibitor and beta blocker.  Two-thirds of the time it may be helpful for the patient.  I went over the procedure in details with risks including but not limited to vascular injury and infection.  Should the patient have no suitable coronary sinus, I may consider implanting the lead in His or direct bundle pacing.    Tre Miller MD        D: 08/30/2021   T: 08/30/2021   MT: [email or web address]    Name:      DIANNA CUMMINGS  MRN:      [id number]        Account:      [id number]   :      10/19/1933           Service Date: 2021       Document: [id number]

## 2021-09-13 ENCOUNTER — LAB (OUTPATIENT)
Dept: URGENT CARE | Facility: URGENT CARE | Age: 86
End: 2021-09-13
Payer: COMMERCIAL

## 2021-09-13 DIAGNOSIS — Z95.0 CARDIAC PACEMAKER IN SITU: Primary | ICD-10-CM

## 2021-09-13 DIAGNOSIS — Z11.59 ENCOUNTER FOR SCREENING FOR OTHER VIRAL DISEASES: ICD-10-CM

## 2021-09-13 LAB — SARS-COV-2 RNA RESP QL NAA+PROBE: NEGATIVE

## 2021-09-13 PROCEDURE — U0005 INFEC AGEN DETEC AMPLI PROBE: HCPCS

## 2021-09-13 PROCEDURE — U0003 INFECTIOUS AGENT DETECTION BY NUCLEIC ACID (DNA OR RNA); SEVERE ACUTE RESPIRATORY SYNDROME CORONAVIRUS 2 (SARS-COV-2) (CORONAVIRUS DISEASE [COVID-19]), AMPLIFIED PROBE TECHNIQUE, MAKING USE OF HIGH THROUGHPUT TECHNOLOGIES AS DESCRIBED BY CMS-2020-01-R: HCPCS

## 2021-09-13 RX ORDER — LIDOCAINE 40 MG/G
CREAM TOPICAL
Status: CANCELLED | OUTPATIENT
Start: 2021-09-13

## 2021-09-13 RX ORDER — CEFAZOLIN SODIUM 2 G/100ML
2 INJECTION, SOLUTION INTRAVENOUS
Status: CANCELLED | OUTPATIENT
Start: 2021-09-13

## 2021-09-13 RX ORDER — SODIUM CHLORIDE 450 MG/100ML
INJECTION, SOLUTION INTRAVENOUS CONTINUOUS
Status: CANCELLED | OUTPATIENT
Start: 2021-09-13

## 2021-09-13 NOTE — PROGRESS NOTES
Called patient with pre-procedure instructions for device implant:     Anticoagulation: N/A  Oral diabetes meds: N/A  Insulin: N/A  Diuretic: Lasix, hold the morning of procedure  Contrast allergy: No Known Drug Allergies  Pt informed to be NPO at midnight      Instructed pt to shower the morning of the procedure, and then put on a clean shirt in order to help prevent infection.     Pt has post-procedure transportation and 24 hours monitoring set up.   Pt aware of no driving for 24 hours post procedure due to sedation.     COVID test scheduled: 9/13/21    Pt reminded to self-quarantine from the time of the COVID test to the procedure.    Pt aware of arrival time of 6:30am and location. Pt verbalized understanding of instructions.

## 2021-09-16 ENCOUNTER — APPOINTMENT (OUTPATIENT)
Dept: GENERAL RADIOLOGY | Facility: CLINIC | Age: 86
End: 2021-09-16
Attending: INTERNAL MEDICINE
Payer: COMMERCIAL

## 2021-09-16 ENCOUNTER — HOSPITAL ENCOUNTER (OUTPATIENT)
Facility: CLINIC | Age: 86
Discharge: HOME | End: 2021-09-16
Admitting: INTERNAL MEDICINE
Payer: COMMERCIAL

## 2021-09-16 VITALS
WEIGHT: 190.6 LBS | HEIGHT: 71 IN | HEART RATE: 59 BPM | BODY MASS INDEX: 26.68 KG/M2 | TEMPERATURE: 96.9 F | OXYGEN SATURATION: 100 % | SYSTOLIC BLOOD PRESSURE: 118 MMHG | DIASTOLIC BLOOD PRESSURE: 72 MMHG | RESPIRATION RATE: 16 BRPM

## 2021-09-16 DIAGNOSIS — Z95.0 CARDIAC PACEMAKER IN SITU: Primary | ICD-10-CM

## 2021-09-16 DIAGNOSIS — I50.42 CHRONIC COMBINED SYSTOLIC AND DIASTOLIC HRT FAIL (H): ICD-10-CM

## 2021-09-16 DIAGNOSIS — Z95.0 CARDIAC PACEMAKER IN SITU: ICD-10-CM

## 2021-09-16 LAB
ANION GAP SERPL CALCULATED.3IONS-SCNC: 3 MMOL/L (ref 3–14)
BUN SERPL-MCNC: 26 MG/DL (ref 7–30)
CALCIUM SERPL-MCNC: 9 MG/DL (ref 8.5–10.1)
CHLORIDE BLD-SCNC: 107 MMOL/L (ref 94–109)
CO2 SERPL-SCNC: 28 MMOL/L (ref 20–32)
CREAT SERPL-MCNC: 1.26 MG/DL (ref 0.66–1.25)
ERYTHROCYTE [DISTWIDTH] IN BLOOD BY AUTOMATED COUNT: 12.2 % (ref 10–15)
GFR SERPL CREATININE-BSD FRML MDRD: 51 ML/MIN/1.73M2
GLUCOSE BLD-MCNC: 85 MG/DL (ref 70–99)
HCT VFR BLD AUTO: 39.5 % (ref 40–53)
HGB BLD-MCNC: 12.9 G/DL (ref 13.3–17.7)
MCH RBC QN AUTO: 29 PG (ref 26.5–33)
MCHC RBC AUTO-ENTMCNC: 32.7 G/DL (ref 31.5–36.5)
MCV RBC AUTO: 89 FL (ref 78–100)
PLATELET # BLD AUTO: 153 10E3/UL (ref 150–450)
POTASSIUM BLD-SCNC: 4 MMOL/L (ref 3.4–5.3)
RBC # BLD AUTO: 4.45 10E6/UL (ref 4.4–5.9)
SODIUM SERPL-SCNC: 138 MMOL/L (ref 133–144)
WBC # BLD AUTO: 5.8 10E3/UL (ref 4–11)

## 2021-09-16 PROCEDURE — 33229 REMV&REPLC PM GEN MULT LEADS: CPT

## 2021-09-16 PROCEDURE — 272N000001 HC OR GENERAL SUPPLY STERILE: Performed by: INTERNAL MEDICINE

## 2021-09-16 PROCEDURE — 99152 MOD SED SAME PHYS/QHP 5/>YRS: CPT | Performed by: INTERNAL MEDICINE

## 2021-09-16 PROCEDURE — C1730 CATH, EP, 19 OR FEW ELECT: HCPCS | Performed by: INTERNAL MEDICINE

## 2021-09-16 PROCEDURE — C1900 LEAD, CORONARY VENOUS: HCPCS | Performed by: INTERNAL MEDICINE

## 2021-09-16 PROCEDURE — C1769 GUIDE WIRE: HCPCS | Performed by: INTERNAL MEDICINE

## 2021-09-16 PROCEDURE — C2621 PMKR, OTHER THAN SING/DUAL: HCPCS | Performed by: INTERNAL MEDICINE

## 2021-09-16 PROCEDURE — 250N000011 HC RX IP 250 OP 636: Performed by: INTERNAL MEDICINE

## 2021-09-16 PROCEDURE — 99153 MOD SED SAME PHYS/QHP EA: CPT | Performed by: INTERNAL MEDICINE

## 2021-09-16 PROCEDURE — 999N000071 HC STATISTIC HEART CATH LAB OR EP LAB

## 2021-09-16 PROCEDURE — 250N000009 HC RX 250: Performed by: INTERNAL MEDICINE

## 2021-09-16 PROCEDURE — C1894 INTRO/SHEATH, NON-LASER: HCPCS | Performed by: INTERNAL MEDICINE

## 2021-09-16 PROCEDURE — 33229 REMV&REPLC PM GEN MULT LEADS: CPT | Performed by: INTERNAL MEDICINE

## 2021-09-16 PROCEDURE — 33225 L VENTRIC PACING LEAD ADD-ON: CPT | Performed by: INTERNAL MEDICINE

## 2021-09-16 PROCEDURE — 36415 COLL VENOUS BLD VENIPUNCTURE: CPT | Performed by: INTERNAL MEDICINE

## 2021-09-16 PROCEDURE — 999N000184 HC STATISTIC TELEMETRY

## 2021-09-16 PROCEDURE — 93005 ELECTROCARDIOGRAM TRACING: CPT

## 2021-09-16 PROCEDURE — 258N000002 HC RX IP 258 OP 250: Performed by: INTERNAL MEDICINE

## 2021-09-16 PROCEDURE — 999N000065 XR CHEST 2 VW

## 2021-09-16 PROCEDURE — 80048 BASIC METABOLIC PNL TOTAL CA: CPT | Performed by: INTERNAL MEDICINE

## 2021-09-16 PROCEDURE — C1887 CATHETER, GUIDING: HCPCS | Performed by: INTERNAL MEDICINE

## 2021-09-16 PROCEDURE — 85027 COMPLETE CBC AUTOMATED: CPT | Performed by: INTERNAL MEDICINE

## 2021-09-16 DEVICE — IMPLANTABLE DEVICE: Type: IMPLANTABLE DEVICE | Status: FUNCTIONAL

## 2021-09-16 DEVICE — CRT-P PERCEPTA MRI QUAD W4TR01: Type: IMPLANTABLE DEVICE | Status: FUNCTIONAL

## 2021-09-16 RX ORDER — HEPARIN SODIUM 200 [USP'U]/100ML
100-600 INJECTION, SOLUTION INTRAVENOUS CONTINUOUS PRN
Status: DISCONTINUED | OUTPATIENT
Start: 2021-09-16 | End: 2021-09-16 | Stop reason: HOSPADM

## 2021-09-16 RX ORDER — NALOXONE HYDROCHLORIDE 0.4 MG/ML
0.2 INJECTION, SOLUTION INTRAMUSCULAR; INTRAVENOUS; SUBCUTANEOUS
Status: DISCONTINUED | OUTPATIENT
Start: 2021-09-16 | End: 2021-09-16 | Stop reason: HOSPADM

## 2021-09-16 RX ORDER — FENTANYL CITRATE 50 UG/ML
INJECTION, SOLUTION INTRAMUSCULAR; INTRAVENOUS
Status: DISCONTINUED | OUTPATIENT
Start: 2021-09-16 | End: 2021-09-16 | Stop reason: HOSPADM

## 2021-09-16 RX ORDER — NALOXONE HYDROCHLORIDE 0.4 MG/ML
0.4 INJECTION, SOLUTION INTRAMUSCULAR; INTRAVENOUS; SUBCUTANEOUS
Status: DISCONTINUED | OUTPATIENT
Start: 2021-09-16 | End: 2021-09-16 | Stop reason: HOSPADM

## 2021-09-16 RX ORDER — MIDAZOLAM HCL IN 0.9 % NACL/PF 1 MG/ML
.5-6 PLASTIC BAG, INJECTION (ML) INTRAVENOUS CONTINUOUS PRN
Status: DISCONTINUED | OUTPATIENT
Start: 2021-09-16 | End: 2021-09-16 | Stop reason: HOSPADM

## 2021-09-16 RX ORDER — DOBUTAMINE HYDROCHLORIDE 200 MG/100ML
5-40 INJECTION INTRAVENOUS CONTINUOUS PRN
Status: DISCONTINUED | OUTPATIENT
Start: 2021-09-16 | End: 2021-09-16 | Stop reason: HOSPADM

## 2021-09-16 RX ORDER — HEPARIN SODIUM 200 [USP'U]/100ML
100-500 INJECTION, SOLUTION INTRAVENOUS CONTINUOUS PRN
Status: DISCONTINUED | OUTPATIENT
Start: 2021-09-16 | End: 2021-09-16 | Stop reason: HOSPADM

## 2021-09-16 RX ORDER — OXYCODONE AND ACETAMINOPHEN 5; 325 MG/1; MG/1
1 TABLET ORAL EVERY 4 HOURS PRN
Status: DISCONTINUED | OUTPATIENT
Start: 2021-09-16 | End: 2021-09-16 | Stop reason: HOSPADM

## 2021-09-16 RX ORDER — IOPAMIDOL 755 MG/ML
INJECTION, SOLUTION INTRAVASCULAR
Status: DISCONTINUED | OUTPATIENT
Start: 2021-09-16 | End: 2021-09-16 | Stop reason: HOSPADM

## 2021-09-16 RX ORDER — SODIUM CHLORIDE 450 MG/100ML
INJECTION, SOLUTION INTRAVENOUS CONTINUOUS
Status: DISCONTINUED | OUTPATIENT
Start: 2021-09-16 | End: 2021-09-16 | Stop reason: HOSPADM

## 2021-09-16 RX ORDER — BUPIVACAINE HYDROCHLORIDE 2.5 MG/ML
INJECTION, SOLUTION EPIDURAL; INFILTRATION; INTRACAUDAL
Status: DISCONTINUED | OUTPATIENT
Start: 2021-09-16 | End: 2021-09-16 | Stop reason: HOSPADM

## 2021-09-16 RX ORDER — CEFAZOLIN SODIUM 1 G/3ML
1 INJECTION, POWDER, FOR SOLUTION INTRAMUSCULAR; INTRAVENOUS
Status: DISCONTINUED | OUTPATIENT
Start: 2021-09-16 | End: 2021-09-16 | Stop reason: HOSPADM

## 2021-09-16 RX ORDER — LIDOCAINE 40 MG/G
CREAM TOPICAL
Status: DISCONTINUED | OUTPATIENT
Start: 2021-09-16 | End: 2021-09-16 | Stop reason: HOSPADM

## 2021-09-16 RX ORDER — CEFAZOLIN SODIUM 2 G/100ML
2 INJECTION, SOLUTION INTRAVENOUS
Status: COMPLETED | OUTPATIENT
Start: 2021-09-16 | End: 2021-09-16

## 2021-09-16 RX ADMIN — SODIUM CHLORIDE: 4.5 INJECTION, SOLUTION INTRAVENOUS at 07:50

## 2021-09-16 RX ADMIN — CEFAZOLIN SODIUM 2 G: 2 INJECTION, SOLUTION INTRAVENOUS at 08:04

## 2021-09-16 ASSESSMENT — MIFFLIN-ST. JEOR: SCORE: 1561.69

## 2021-09-16 NOTE — Clinical Note
Lead advanced under fluoro to the 78CM ATTAIN PERFORMA CARDIAC VEIN PACING LEAD, MODEL 4598, STEROID ELUTING, QUADRIPOLAR ELECTRODE, TRANVENOUS, OTW. Advanced to LV..

## 2021-09-16 NOTE — DISCHARGE INSTRUCTIONS
BIV Pacemaker Implant Discharge Instructions     After you go home:      Have an adult stay with you until tomorrow.    You may resume your normal diet.       For 24 hours - due to the sedation you received:    Relax and take it easy.    Do NOT make any important or legal decisions.    Do NOT drive or operate machines at home or at work.    Do NOT drink alcohol.    Care of Chest Incision:      Keep the bandage on at least 3 days. You may remove the dressing on Sunday, 9/19/21. Change it only if it gets loose or soaked. If you need to change it, use 4x4-inch gauze and a large clear bandage.     If there is a pressure dressing (gauze & tape) - 24 hours after your procedure you may remove ONLY the top dressing. Leave the bottom dressing on.    Leave the strips of tape on. They will fall off on their own, or we will remove them at your first check-up.    Check your wound daily for signs of infection, such as increased redness, severe swelling or draining. Fever may also be a sign of infection. Call us if you see any of these signs.    If there are no signs of infection, you may shower after the bandage comes off in 3 days. If you take a tub bath, keep the wound dry.    No soaking the incision (swimming pool, bathtub, hot tub) for 2 weeks.    You may have mild to medium pain for 3 to 5 days. Take Acetaminophen (Tylenol) or Ibuprofen (Advil) for the pain. If the pain persists or is severe, call us.    Activity:      For at least 2 weeks: Do not raise your elbow above your shoulder. You can begin to use your arm as it feels comfortable to you.    Do not use arm on implant side to lift more than 10 pounds for 2 weeks.    In 6 to 8 weeks: You may begin to golf, play tennis, swim and do similar activities.    No driving for one day & limit to necessary driving for one week.    Bleeding:      If you start bleeding from the incision site, sit down and press firmly on the site for 10 minutes.     Once bleeding stops, call [facility] as soon as you can.       Call 911 right away if you have heavy bleeding or bleeding that does not stop.      Medicines:      Take your medications, including blood thinners, unless your provider tells you not to.    If you have stopped any medicines, check with your provider about when to restart them.    Follow Up Appointments:      Follow up with Device Clinic at [facility] of patient preference in 7-10 days.    Call the clinic if:      You have a large or growing hard lump around the site.    The site is red, swollen, hot or tender.    Blood or fluid is draining from the site.    You have chills or a fever greater than 101 F (38 C).    You feel dizzy or light-headed.    Questions or concerns    Telling others about your device:      Before you leave the hospital, you will receive a temporary ID card. A permanent card will be mailed to you about 6 to 8 weeks later. Always carry the ID card with you. It has important details about your device.    You may also get a Medical Alert bracelet or tag that says you have a pacemaker.  Go to [email or web address].     Always tell doctors, dentists and other care providers that you have a device implanted in you.    Let us know before you plan any surgeries. Your care team must take special steps to keep you safe during certain procedures. These steps will depend on the type of device you have. Your provider will need to see your ID card. They may need to call us for instructions.    Device Safety:      Please refer to device  s booklet for further information.        [facility] [place]:    [phone] [facility] (7 days a week)

## 2021-09-16 NOTE — Clinical Note
Tested the left ventricular lead. See vendor printout/MD dictation. 78CM LEILANI PERFORMA CARDIAC VEIN PACING LEAD, MODEL 4598, STEROID ELUTING, QUADRIPOLAR ELECTRODE, TRANVENOUS, OTW

## 2021-09-16 NOTE — PROGRESS NOTES
Care Suites Admission Nursing Note    Patient Information  Name: Moses Elizondo  Age: 87 year old  Reason for admission: Upgrade to BIV [facility]  Care Suites arrival time: 0640    Visitor Information  Name: Oliver maría elena Corrales  Informed of visitor restrictions: Yes  1 visitor allowed per patient   Visitor must screen negative for COVID symptoms   Visitor must wear a mask  Waiting rooms closed to visitors    Patient Admission/Assessment   Pre-procedure assessment complete: Yes  If abnormal assessment/labs, provider notified: N/A  NPO: N/A  Medications held per instructions/orders: Yes  Consent: deferred  If applicable, pregnancy test status: deferred  Patient oriented to room: Yes  Education/questions answered: Yes  Plan/other: Proceed as scheduled.    Discharge Planning  Discharge name/phone number: Oliver\[facility]\"" [phone], or [place]-dtr. [phone]  Overnight post sedation caregiver: Spouse.  Discharge location: home    Sugey Casillas RN

## 2021-09-16 NOTE — PROGRESS NOTES
Care Suites Discharge Nursing Note     Patient Information  Name: Moses Elizondo  Age: 87 year old    Chest X-ray completed and WDL.    Discharge Education:  Discharge instructions reviewed: Yes  Additional education/resources provided: PPM pamphlet and temporary card  Patient/patient representative verbalizes understanding: Yes  Patient discharging on new medications: No  Medication education completed: Yes    Discharge Plans:   Discharge location: home  Discharge ride contacted: Yes  Approximate discharge time: 12:12    Discharge Criteria:  Discharge criteria met and vital signs stable: Yes    Patient Belongs:  Patient belongings returned to patient: Yes

## 2021-09-16 NOTE — PRE-PROCEDURE
GENERAL PRE-PROCEDURE:     Written consent obtained?: Yes    Risks and benefits: Risks, benefits and alternatives were discussed    Consent given by:  Patient  Patient states understanding of procedure being performed: Yes    Patient's understanding of procedure matches consent: Yes    Procedure consent matches procedure scheduled: Yes    Expected level of sedation:  Moderate  Appropriately NPO:  Yes  Mallampati  :  Grade 1- soft palate, uvula, tonsillar pillars, and posterior pharyngeal wall visible  Lungs:  Lungs clear with good breath sounds bilaterally  Heart:  Normal heart sounds and rate  History & Physical reviewed:  History and physical reviewed and no updates needed  Statement of review:  I have reviewed the lab findings, diagnostic data, medications, and the plan for sedation

## 2021-09-16 NOTE — PROGRESS NOTES
Care Suites Post Procedure Note    Patient Information  Name: Moses Elizondo  Age: 87 year old    Post Procedure  Time patient returned to Care Suites: 1000  Concerns/abnormal assessment: None at this time.  Left upper chest/PPM incision site covered with dressing and pressure dressing.  Site CDI, no swelling.  Denies pain.  If abnormal assessment, provider notified: N/A  Plan/Other: Per orders.    1130 CxR      Sugey Casillas RN

## 2021-09-17 ENCOUNTER — TELEPHONE (OUTPATIENT)
Dept: CARDIOLOGY | Facility: CLINIC | Age: 86
End: 2021-09-17

## 2021-09-17 NOTE — TELEPHONE ENCOUNTER
Post device implant discharge phone call.    Reviewed the following:  No raising  Left arm above shoulder on the side of implant for 3 weeks    Remove outer dressing 3 days after implant. May shower after outer dressing removed. Leave steri-strips in place, will be removed at 1 week device check      Watch for redness, drainage, warmth, or fever. Call device clinic if any signs of infection.     1 week device check scheduled: 9/23/21 at 0815. Patient aware of location.     Pt states understanding of all instructions.

## 2021-09-20 ENCOUNTER — TELEPHONE (OUTPATIENT)
Dept: CARDIOLOGY | Facility: CLINIC | Age: 86
End: 2021-09-20

## 2021-09-20 NOTE — TELEPHONE ENCOUNTER
Referral message received stating patient has been denied coverage for an upgrade from a pacemaker to a biventricular pacemaker. Patient has a diagnosis of cadiomyopathy for the procedure with an EF of 37% as on 08/09/21.    Called phone number provided in referral message and talked with Jm at the appeal office for [employer]. Jm stated office visit notes, results, and written physician letters can be written and faxed to them at [phone] to start that appeal.    Provided [employer] with office visit note f with Dr. Miller from 09/30/2021, echo results from 08/09/2021, and procedure note from 09/16/2021 when upgrade was performed.  These documents were all faxed to provided faxed number with Attn: Appeal written on the front along with case number: [id number].

## 2021-09-20 NOTE — Clinical Note
Atherectomy removed over the wire.   Verbal/written post procedure instructions were given to patient/caregiver./Instructed patient/caregiver to follow-up with primary care physician./Instructed patient/caregiver regarding signs and symptoms of infection./Keep the cast/splint/dressing clean and dry.

## 2021-09-23 ENCOUNTER — ANCILLARY PROCEDURE (OUTPATIENT)
Dept: CARDIOLOGY | Facility: CLINIC | Age: 86
End: 2021-09-23
Attending: INTERNAL MEDICINE
Payer: COMMERCIAL

## 2021-09-23 DIAGNOSIS — Z95.0 CARDIAC PACEMAKER IN SITU: ICD-10-CM

## 2021-09-23 DIAGNOSIS — I25.810 CORONARY ARTERY DISEASE INVOLVING CORONARY BYPASS GRAFT OF NATIVE HEART WITHOUT ANGINA PECTORIS: ICD-10-CM

## 2021-09-23 PROCEDURE — 93281 PM DEVICE PROGR EVAL MULTI: CPT | Performed by: INTERNAL MEDICINE

## 2021-09-23 RX ORDER — METOPROLOL SUCCINATE 25 MG/1
25 TABLET, EXTENDED RELEASE ORAL DAILY
Qty: 90 TABLET | Refills: 0 | Status: SHIPPED | OUTPATIENT
Start: 2021-09-23 | End: 2021-11-12

## 2021-09-24 LAB
ATRIAL RATE - MUSE: 60 BPM
DIASTOLIC BLOOD PRESSURE - MUSE: NORMAL MMHG
INTERPRETATION ECG - MUSE: NORMAL
P AXIS - MUSE: NORMAL DEGREES
PR INTERVAL - MUSE: 182 MS
QRS DURATION - MUSE: 178 MS
QT - MUSE: 508 MS
QTC - MUSE: 508 MS
R AXIS - MUSE: -49 DEGREES
SYSTOLIC BLOOD PRESSURE - MUSE: NORMAL MMHG
T AXIS - MUSE: 106 DEGREES
VENTRICULAR RATE- MUSE: 60 BPM

## 2021-10-04 LAB
MDC_IDC_LEAD_IMPLANT_DT: NORMAL
MDC_IDC_LEAD_IMPLANT_DT: NORMAL
MDC_IDC_LEAD_LOCATION: NORMAL
MDC_IDC_LEAD_LOCATION: NORMAL
MDC_IDC_LEAD_LOCATION_DETAIL_1: NORMAL
MDC_IDC_LEAD_LOCATION_DETAIL_1: NORMAL
MDC_IDC_LEAD_MFG: NORMAL
MDC_IDC_LEAD_MFG: NORMAL
MDC_IDC_LEAD_MODEL: NORMAL
MDC_IDC_LEAD_MODEL: NORMAL
MDC_IDC_LEAD_POLARITY_TYPE: NORMAL
MDC_IDC_LEAD_POLARITY_TYPE: NORMAL
MDC_IDC_LEAD_SERIAL: NORMAL
MDC_IDC_LEAD_SERIAL: NORMAL
MDC_IDC_MSMT_BATTERY_DTM: NORMAL
MDC_IDC_MSMT_BATTERY_REMAINING_LONGEVITY: 122 MO
MDC_IDC_MSMT_BATTERY_RRT_TRIGGER: 2.6
MDC_IDC_MSMT_BATTERY_STATUS: NORMAL
MDC_IDC_MSMT_BATTERY_VOLTAGE: 3.16 V
MDC_IDC_MSMT_LEADCHNL_LV_IMPEDANCE_VALUE: 304 OHM
MDC_IDC_MSMT_LEADCHNL_LV_IMPEDANCE_VALUE: 380 OHM
MDC_IDC_MSMT_LEADCHNL_LV_IMPEDANCE_VALUE: 399 OHM
MDC_IDC_MSMT_LEADCHNL_LV_IMPEDANCE_VALUE: 456 OHM
MDC_IDC_MSMT_LEADCHNL_LV_IMPEDANCE_VALUE: 513 OHM
MDC_IDC_MSMT_LEADCHNL_LV_IMPEDANCE_VALUE: 570 OHM
MDC_IDC_MSMT_LEADCHNL_LV_IMPEDANCE_VALUE: 665 OHM
MDC_IDC_MSMT_LEADCHNL_LV_IMPEDANCE_VALUE: 684 OHM
MDC_IDC_MSMT_LEADCHNL_LV_IMPEDANCE_VALUE: 703 OHM
MDC_IDC_MSMT_LEADCHNL_LV_IMPEDANCE_VALUE: 741 OHM
MDC_IDC_MSMT_LEADCHNL_LV_PACING_THRESHOLD_AMPLITUDE: 0.75 V
MDC_IDC_MSMT_LEADCHNL_LV_PACING_THRESHOLD_PULSEWIDTH: 0.4 MS
MDC_IDC_MSMT_LEADCHNL_RA_IMPEDANCE_VALUE: 361 OHM
MDC_IDC_MSMT_LEADCHNL_RA_IMPEDANCE_VALUE: 494 OHM
MDC_IDC_MSMT_LEADCHNL_RA_SENSING_INTR_AMPL: 2.25 MV
MDC_IDC_MSMT_LEADCHNL_RA_SENSING_INTR_AMPL: 3 MV
MDC_IDC_MSMT_LEADCHNL_RV_IMPEDANCE_VALUE: 361 OHM
MDC_IDC_MSMT_LEADCHNL_RV_IMPEDANCE_VALUE: 532 OHM
MDC_IDC_MSMT_LEADCHNL_RV_PACING_THRESHOLD_AMPLITUDE: 0.62 V
MDC_IDC_MSMT_LEADCHNL_RV_PACING_THRESHOLD_PULSEWIDTH: 0.4 MS
MDC_IDC_MSMT_LEADCHNL_RV_SENSING_INTR_AMPL: 8.88 MV
MDC_IDC_PG_IMPLANT_DTM: NORMAL
MDC_IDC_PG_MFG: NORMAL
MDC_IDC_PG_MODEL: NORMAL
MDC_IDC_PG_SERIAL: NORMAL
MDC_IDC_PG_TYPE: NORMAL
MDC_IDC_SESS_CLINIC_NAME: NORMAL
MDC_IDC_SESS_DTM: NORMAL
MDC_IDC_SESS_TYPE: NORMAL
MDC_IDC_SET_BRADY_AT_MODE_SWITCH_RATE: 171 {BEATS}/MIN
MDC_IDC_SET_BRADY_LOWRATE: 60 {BEATS}/MIN
MDC_IDC_SET_BRADY_MAX_SENSOR_RATE: 120 {BEATS}/MIN
MDC_IDC_SET_BRADY_MAX_TRACKING_RATE: 120 {BEATS}/MIN
MDC_IDC_SET_BRADY_MODE: NORMAL
MDC_IDC_SET_BRADY_PAV_DELAY_LOW: 170 MS
MDC_IDC_SET_BRADY_SAV_DELAY_LOW: 110 MS
MDC_IDC_SET_CRT_LVRV_DELAY: 0 MS
MDC_IDC_SET_CRT_PACED_CHAMBERS: NORMAL
MDC_IDC_SET_LEADCHNL_LV_PACING_AMPLITUDE: 2.25 V
MDC_IDC_SET_LEADCHNL_LV_PACING_ANODE_ELECTRODE_1: NORMAL
MDC_IDC_SET_LEADCHNL_LV_PACING_ANODE_LOCATION_1: NORMAL
MDC_IDC_SET_LEADCHNL_LV_PACING_CAPTURE_MODE: NORMAL
MDC_IDC_SET_LEADCHNL_LV_PACING_CATHODE_ELECTRODE_1: NORMAL
MDC_IDC_SET_LEADCHNL_LV_PACING_CATHODE_LOCATION_1: NORMAL
MDC_IDC_SET_LEADCHNL_LV_PACING_POLARITY: NORMAL
MDC_IDC_SET_LEADCHNL_LV_PACING_PULSEWIDTH: 0.4 MS
MDC_IDC_SET_LEADCHNL_RA_PACING_AMPLITUDE: 1.5 V
MDC_IDC_SET_LEADCHNL_RA_PACING_ANODE_ELECTRODE_1: NORMAL
MDC_IDC_SET_LEADCHNL_RA_PACING_ANODE_LOCATION_1: NORMAL
MDC_IDC_SET_LEADCHNL_RA_PACING_CAPTURE_MODE: NORMAL
MDC_IDC_SET_LEADCHNL_RA_PACING_CATHODE_ELECTRODE_1: NORMAL
MDC_IDC_SET_LEADCHNL_RA_PACING_CATHODE_LOCATION_1: NORMAL
MDC_IDC_SET_LEADCHNL_RA_PACING_POLARITY: NORMAL
MDC_IDC_SET_LEADCHNL_RA_PACING_PULSEWIDTH: 0.4 MS
MDC_IDC_SET_LEADCHNL_RA_SENSING_ANODE_ELECTRODE_1: NORMAL
MDC_IDC_SET_LEADCHNL_RA_SENSING_ANODE_LOCATION_1: NORMAL
MDC_IDC_SET_LEADCHNL_RA_SENSING_CATHODE_ELECTRODE_1: NORMAL
MDC_IDC_SET_LEADCHNL_RA_SENSING_CATHODE_LOCATION_1: NORMAL
MDC_IDC_SET_LEADCHNL_RA_SENSING_POLARITY: NORMAL
MDC_IDC_SET_LEADCHNL_RA_SENSING_SENSITIVITY: 0.3 MV
MDC_IDC_SET_LEADCHNL_RV_PACING_AMPLITUDE: 2 V
MDC_IDC_SET_LEADCHNL_RV_PACING_ANODE_ELECTRODE_1: NORMAL
MDC_IDC_SET_LEADCHNL_RV_PACING_ANODE_LOCATION_1: NORMAL
MDC_IDC_SET_LEADCHNL_RV_PACING_CAPTURE_MODE: NORMAL
MDC_IDC_SET_LEADCHNL_RV_PACING_CATHODE_ELECTRODE_1: NORMAL
MDC_IDC_SET_LEADCHNL_RV_PACING_CATHODE_LOCATION_1: NORMAL
MDC_IDC_SET_LEADCHNL_RV_PACING_POLARITY: NORMAL
MDC_IDC_SET_LEADCHNL_RV_PACING_PULSEWIDTH: 0.4 MS
MDC_IDC_SET_LEADCHNL_RV_SENSING_ANODE_ELECTRODE_1: NORMAL
MDC_IDC_SET_LEADCHNL_RV_SENSING_ANODE_LOCATION_1: NORMAL
MDC_IDC_SET_LEADCHNL_RV_SENSING_CATHODE_ELECTRODE_1: NORMAL
MDC_IDC_SET_LEADCHNL_RV_SENSING_CATHODE_LOCATION_1: NORMAL
MDC_IDC_SET_LEADCHNL_RV_SENSING_POLARITY: NORMAL
MDC_IDC_SET_LEADCHNL_RV_SENSING_SENSITIVITY: 0.9 MV
MDC_IDC_SET_ZONE_DETECTION_INTERVAL: 350 MS
MDC_IDC_SET_ZONE_DETECTION_INTERVAL: 400 MS
MDC_IDC_SET_ZONE_TYPE: NORMAL
MDC_IDC_STAT_AT_BURDEN_PERCENT: 0 %
MDC_IDC_STAT_AT_DTM_END: NORMAL
MDC_IDC_STAT_AT_DTM_START: NORMAL
MDC_IDC_STAT_BRADY_AP_VP_PERCENT: 98.92 %
MDC_IDC_STAT_BRADY_AP_VS_PERCENT: 0.02 %
MDC_IDC_STAT_BRADY_AS_VP_PERCENT: 0.71 %
MDC_IDC_STAT_BRADY_AS_VS_PERCENT: 0.35 %
MDC_IDC_STAT_BRADY_DTM_END: NORMAL
MDC_IDC_STAT_BRADY_DTM_START: NORMAL
MDC_IDC_STAT_BRADY_RA_PERCENT_PACED: 99.24 %
MDC_IDC_STAT_BRADY_RV_PERCENT_PACED: 99.62 %
MDC_IDC_STAT_CRT_DTM_END: NORMAL
MDC_IDC_STAT_CRT_DTM_START: NORMAL
MDC_IDC_STAT_CRT_LV_PERCENT_PACED: 99.59 %
MDC_IDC_STAT_CRT_PERCENT_PACED: 99.59 %
MDC_IDC_STAT_EPISODE_RECENT_COUNT: 0
MDC_IDC_STAT_EPISODE_RECENT_COUNT_DTM_END: NORMAL
MDC_IDC_STAT_EPISODE_RECENT_COUNT_DTM_START: NORMAL
MDC_IDC_STAT_EPISODE_TOTAL_COUNT: 0
MDC_IDC_STAT_EPISODE_TOTAL_COUNT_DTM_END: NORMAL
MDC_IDC_STAT_EPISODE_TOTAL_COUNT_DTM_START: NORMAL
MDC_IDC_STAT_EPISODE_TYPE: NORMAL

## 2021-11-10 ENCOUNTER — ANCILLARY PROCEDURE (OUTPATIENT)
Dept: VASCULAR ULTRASOUND | Facility: CLINIC | Age: 86
End: 2021-11-10
Attending: PHYSICIAN ASSISTANT
Payer: COMMERCIAL

## 2021-11-10 DIAGNOSIS — I82.811 CHRONIC SUPERFICIAL VENOUS THROMBOSIS OF LOWER EXTREMITY, RIGHT: ICD-10-CM

## 2021-11-10 PROCEDURE — 93971 EXTREMITY STUDY: CPT | Mod: RT | Performed by: INTERNAL MEDICINE

## 2021-11-12 ENCOUNTER — OFFICE VISIT (OUTPATIENT)
Dept: CARDIOLOGY | Facility: CLINIC | Age: 86
End: 2021-11-12
Payer: COMMERCIAL

## 2021-11-12 ENCOUNTER — LAB (OUTPATIENT)
Dept: LAB | Facility: CLINIC | Age: 86
End: 2021-11-12
Payer: COMMERCIAL

## 2021-11-12 ENCOUNTER — ANCILLARY PROCEDURE (OUTPATIENT)
Dept: CARDIOLOGY | Facility: CLINIC | Age: 86
End: 2021-11-12
Attending: INTERNAL MEDICINE
Payer: COMMERCIAL

## 2021-11-12 VITALS
WEIGHT: 189 LBS | HEIGHT: 71 IN | HEART RATE: 62 BPM | DIASTOLIC BLOOD PRESSURE: 66 MMHG | OXYGEN SATURATION: 94 % | BODY MASS INDEX: 26.46 KG/M2 | SYSTOLIC BLOOD PRESSURE: 123 MMHG

## 2021-11-12 DIAGNOSIS — I87.2 VENOUS (PERIPHERAL) INSUFFICIENCY: ICD-10-CM

## 2021-11-12 DIAGNOSIS — I25.810 CORONARY ARTERY DISEASE INVOLVING CORONARY BYPASS GRAFT OF NATIVE HEART WITHOUT ANGINA PECTORIS: ICD-10-CM

## 2021-11-12 DIAGNOSIS — I50.42 CHRONIC COMBINED SYSTOLIC AND DIASTOLIC HRT FAIL (H): ICD-10-CM

## 2021-11-12 DIAGNOSIS — E03.9 HYPOTHYROIDISM, UNSPECIFIED TYPE: ICD-10-CM

## 2021-11-12 DIAGNOSIS — I50.21 ACUTE SYSTOLIC HEART FAILURE (H): ICD-10-CM

## 2021-11-12 DIAGNOSIS — Z95.0 CARDIAC PACEMAKER IN SITU: ICD-10-CM

## 2021-11-12 LAB
ANION GAP SERPL CALCULATED.3IONS-SCNC: 6 MMOL/L (ref 3–14)
BUN SERPL-MCNC: 24 MG/DL (ref 7–30)
CALCIUM SERPL-MCNC: 8.8 MG/DL (ref 8.5–10.1)
CHLORIDE BLD-SCNC: 110 MMOL/L (ref 94–109)
CO2 SERPL-SCNC: 24 MMOL/L (ref 20–32)
CREAT SERPL-MCNC: 1.26 MG/DL (ref 0.66–1.25)
ERYTHROCYTE [DISTWIDTH] IN BLOOD BY AUTOMATED COUNT: 12 % (ref 10–15)
GFR SERPL CREATININE-BSD FRML MDRD: 51 ML/MIN/1.73M2
GLUCOSE BLD-MCNC: 79 MG/DL (ref 70–99)
HCT VFR BLD AUTO: 37.8 % (ref 40–53)
HGB BLD-MCNC: 12.3 G/DL (ref 13.3–17.7)
MCH RBC QN AUTO: 29.4 PG (ref 26.5–33)
MCHC RBC AUTO-ENTMCNC: 32.5 G/DL (ref 31.5–36.5)
MCV RBC AUTO: 90 FL (ref 78–100)
NT-PROBNP SERPL-MCNC: 715 PG/ML (ref 0–450)
PLATELET # BLD AUTO: 171 10E3/UL (ref 150–450)
POTASSIUM BLD-SCNC: 4.5 MMOL/L (ref 3.4–5.3)
RBC # BLD AUTO: 4.18 10E6/UL (ref 4.4–5.9)
SODIUM SERPL-SCNC: 140 MMOL/L (ref 133–144)
WBC # BLD AUTO: 6.6 10E3/UL (ref 4–11)

## 2021-11-12 PROCEDURE — 80048 BASIC METABOLIC PNL TOTAL CA: CPT | Performed by: PHYSICIAN ASSISTANT

## 2021-11-12 PROCEDURE — 36415 COLL VENOUS BLD VENIPUNCTURE: CPT | Performed by: PHYSICIAN ASSISTANT

## 2021-11-12 PROCEDURE — 93281 PM DEVICE PROGR EVAL MULTI: CPT | Performed by: INTERNAL MEDICINE

## 2021-11-12 PROCEDURE — 85027 COMPLETE CBC AUTOMATED: CPT | Performed by: PHYSICIAN ASSISTANT

## 2021-11-12 PROCEDURE — 99214 OFFICE O/P EST MOD 30 MIN: CPT | Mod: 25 | Performed by: PHYSICIAN ASSISTANT

## 2021-11-12 PROCEDURE — 83880 ASSAY OF NATRIURETIC PEPTIDE: CPT | Performed by: PHYSICIAN ASSISTANT

## 2021-11-12 RX ORDER — LISINOPRIL 2.5 MG/1
TABLET ORAL
Qty: 180 TABLET | Refills: 3 | COMMUNITY
Start: 2021-11-12 | End: 2022-01-03 | Stop reason: DRUGHIGH

## 2021-11-12 RX ORDER — METOPROLOL SUCCINATE 25 MG/1
25 TABLET, EXTENDED RELEASE ORAL DAILY
Qty: 90 TABLET | Refills: 3 | COMMUNITY
Start: 2021-11-12 | End: 2021-11-23

## 2021-11-12 RX ORDER — LEVOTHYROXINE SODIUM 100 UG/1
100 TABLET ORAL DAILY
Qty: 90 TABLET | Refills: 1 | COMMUNITY
Start: 2021-11-12 | End: 2022-02-15

## 2021-11-12 ASSESSMENT — MIFFLIN-ST. JEOR: SCORE: 1549.43

## 2021-11-12 NOTE — LETTER
11/12/2021    Steven Wagoner MD  [address] [facility] [address]    RE: Moses Elizondo       Dear Colleague,     I had the pleasure of seeing Moses Elizondo in the [facility].    Cardiology Clinic Progress Note    Moses Elizondo MRN# [id number]   YOB: 1933 Age: 88 year old   Primary cardiologist: Dr. Huggins         Assessment and Plan:     In summary, Moses Elizondo presents today for follow up after upgrade to CRT-P on 9/16/21.     1. Upgrade to CRT-P on 9/16/21. No improvement in FC as of yet.   2. Severe bilateral venous insufficiency with chronic superficial venous thrombosis.   -- Swelling currently well-controlled with low-dose diuretic therapy and intermittent compression. However he has other leg sxs such as neuropathy that may benefit from ablation, and is seeing Dr. Myles in this capacity on 11/22.   3. 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 Reports FC II-III sxs, worsening over past four months. GDMT includes Toprol 25, lisinopril 2.5 BID. Appears well-compensated on exam.   4. Limiting knee pain. Anticipates he may need surgery in the future.     Plan:  - Device check today to ensure adequate BiVP burden and rate response.  - Increase lisinopril to 7.5 mg daily.   - Advised to start exercising on recumbent bike to help build exercise tolerance.   - Return to see me in 6 weeks + BMP.  - He will call me in the meantime if he decides to pursue knee surgery. Would discuss with Dr. Huggins whether stress test is needed, in that case.   - Follow up with Dr. Huggins this summer with repeat TTE prior.         History of Presenting Illness:      Moses Elizondo is a pleasant 88 year old patient who presents today for follow-up after device upgrade.     His pertinent cardiac history includes:  # CAD, s/p PCI to OM1 (2014)  # Newly diagnosed cardiomyopathy, LVEF 38% per routine TTE on 1/6/21. 44% per MRI.  Suspect pacemaker-induced.   # No evidence of ischemia or infiltrative disease on 1/2021 cMRI.   # SSS, which has now progressed to CHB. S/p PPM.  # Chronic knee pain, arthritis, and Baker's cysts  # Peripheral edema, venous insufficiency  # Mild ascending aortic enlargement  # Hypothyroidism  # Hyperlipidemia  # Idiopathic peripheral neuropathy    In brief, Zane saw Dr. Huggins on July 14 for a routine follow up visit.  He complained of worsening leg edema for that time. proBNP was elevated ~1,000 and therefore furosemide 40 mg daily was initiated, an echocardiogram and venous competency study ordered.  He asked him to wear VERNA stockings as well.    His testing placed on August 9.  Echocardiogram was stable from that in January, showing EF estimated at 37%, with moderate global hypokinesis, mild AI, and moderately dilated ascending aorta at 4.4 cm.  His competency study showed a nonocclusive thrombus of the right GSV from the mid thigh to the calf, with severe venous reflux down to the mid calf, where it appeared occluded.  There was also a Baker's cyst incidentally noted.  On the left, there is moderate to severe reflux throughout the entire length of the GSV, also with a Baker's cyst noted. BMP that day was within normal limits, showing a creatinine of 1.24, potassium of 4.1, sodium of 139, BUN of 28.    I saw him in August: Today, Zane returns to clinic with his daughter. We reviewed his recent testing. His weight is down six lbs from his last visit. BP is marginal. His leg swelling has improved with the furosemide and intermittent compression stockings, now trace on exam. Unfortunately, he is still unsatisfied with his functional capacity. He feels more winded with activity than he did four months ago. He walks around the house and garden and observes things, but can't do much in the way of active gardening without limiting dyspnea. He can do his ADL's independently. Of course, he is also limited by chronic knee  "pain and neuropathy, especially with stair-climbing or when going to bed at night.   - Referral to vein clinic placed. His swelling is currently under good control, but he has other leg sxs such as neuropathy that may benefit from ablation. In the meantime, he will continue furosemide and compression stockings, as tolerated.   - Referral to EP Dr. Miller for consideration of upgrade to CRT-P.  - Return to see me in 3 months.     On September 16, he went underwent upgrade to biventricular pacemaker.     He had a RLE venous ultrasound on 11/10 for follow-up of his chronic superficial venous thrombosis, and is scheduled to see Dr. Myles on November 22nd.    Today, Zane returns to clinic stating he feels the same, overall. Still not noticing improvement in his FC. Winded with stair-climbing, feels a lack of motivation. Really isn't exercising, though. Does have a bike at home he could use. Weight is stable. Limited by knee pain, wants surgery.         Review of Systems:     12-pt ROS is negative except for as noted in the HPI.          Physical Exam:     Vitals: /66 (BP Location: Left arm, Cuff Size: Adult Regular)   Pulse 62   Ht 1.803 m (5' 11\")   Wt 85.7 kg (189 lb)   SpO2 94%   BMI 26.36 kg/m    Wt Readings from Last 10 Encounters:   11/12/21 85.7 kg (189 lb)   09/16/21 86.5 kg (190 lb 9.6 oz)   08/30/21 85.3 kg (188 lb)   08/16/21 86 kg (189 lb 11.2 oz)   07/14/21 88.5 kg (195 lb 1.6 oz)   04/07/21 89.9 kg (198 lb 1.6 oz)   03/10/21 89 kg (196 lb 4.8 oz)   03/02/21 89.8 kg (198 lb)   08/14/20 86.6 kg (191 lb)   07/23/20 87.6 kg (193 lb 1.6 oz)       Constitutional:  Patient is pleasant, alert, cooperative, and in NAD.  HEENT:  NCAT. PERRLA. EOM's intact.   Neck:  CVP appears normal. No carotid bruits.   Pulmonary: Normal respiratory effort. CTAB.   Cardiac: RRR, normal S1/S2, no S3/S4, no murmur or rub.   Abdomen:  Non-tender abdomen, no hepatosplenomegaly appreciated.   Vascular: Pulses in the upper and " lower extremities are 2+ and equal bilaterally.  Extremities: Trace lower extremity edema, erythema, cyanosis or tenderness appreciated.  Skin:  No rashes or lesions appreciated.   Neurological:  No gross motor or sensory deficits.   Psych: Appropriate affect.        Data:     Labs reviewed:  Recent Labs   Lab Test 11/12/21  1204 07/14/21  0827 03/02/21  0927 07/23/20  1008 07/07/20  0000 10/31/19  0849 05/02/19  0000 03/20/19  0823 02/27/18  0817   LDL  --   --   --   --  49 60  --  61 40   HDL  --   --   --   --  42 52  --  40 49   NHDL  --   --   --   --   --  74  --  76 52   CHOL  --   --   --   --  105 126  --  116 101   TRIG  --   --   --   --  71 70  --  75 59   TSH  --   --  2.27 2.87  --  1.94   < > 2.20 2.53   NTBNP 715* 1,009* 804*  --   --   --   --   --   --     < > = values in this interval not displayed.       Lab Results   Component Value Date    WBC 6.6 11/12/2021    WBC 6.2 07/23/2020    RBC 4.18 (L) 11/12/2021    RBC 4.58 07/23/2020    HGB 12.3 (L) 11/12/2021    HGB 13.2 (L) 03/02/2021    HCT 37.8 (L) 11/12/2021    HCT 41.4 07/23/2020    MCV 90 11/12/2021    MCV 90 07/23/2020    MCH 29.4 11/12/2021    MCH 29.9 07/23/2020    MCHC 32.5 11/12/2021    MCHC 33.1 07/23/2020    RDW 12.0 11/12/2021    RDW 12.5 07/23/2020     11/12/2021     07/23/2020       Lab Results   Component Value Date     11/12/2021     04/07/2021    POTASSIUM 4.5 11/12/2021    POTASSIUM 4.1 04/07/2021    CHLORIDE 110 (H) 11/12/2021    CHLORIDE 109 04/07/2021    CO2 24 11/12/2021    CO2 28 04/07/2021    ANIONGAP 6 11/12/2021    ANIONGAP 2 (L) 04/07/2021    GLC 79 11/12/2021    GLC 75 04/07/2021    BUN 24 11/12/2021    BUN 25 04/07/2021    CR 1.26 (H) 11/12/2021    CR 1.28 (H) 04/07/2021    GFRESTIMATED 51 (L) 11/12/2021    GFRESTIMATED 50 (L) 04/07/2021    GFRESTBLACK 58 (L) 04/07/2021    GABRIELA 8.8 11/12/2021    GABRIELA 8.8 04/07/2021      Lab Results   Component Value Date    AST 32 03/02/2021    ALT 30  03/02/2021       Lab Results   Component Value Date    A1C 5.6 05/02/2019       Lab Results   Component Value Date    INR 1.00 06/20/2019           Problem List:     Patient Active Problem List   Diagnosis     Hypothyroidism, unspecified type     Degeneration of lumbar or lumbosacral intervertebral disc     MGUS (monoclonal gammopathy of unknown significance)     Familial tremor     Hyperlipidemia LDL goal <100     NSTEMI (non-ST elevated myocardial infarction) (H)     Health retirement     Coronary artery disease     Bradycardia     Aneurysm of thoracic aorta (H)     Sinus bradycardia     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)     Ascending aortic aneurysm (H)     Sick sinus syndrome (H)     Aortic root dilatation (H)     Medicare annual wellness visit, subsequent     Idiopathic peripheral neuropathy     Chest discomfort     Status post coronary angiogram     Second degree AV block     Cardiac pacemaker in situ     Chronic kidney disease, stage 3 (H)     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)           Medications:     Current Outpatient Medications   Medication Sig Dispense Refill     acetaminophen (TYLENOL 8 HOUR) 650 MG CR tablet Take 650 mg by mouth daily       ASPIRIN EC PO Take 81 mg by mouth daily       atorvastatin (LIPITOR) 40 MG tablet Take 1 tablet (40 mg) by mouth daily 90 tablet 3     furosemide (LASIX) 20 MG tablet Take 1 tablet (20 mg) by mouth daily 30 tablet 11     gabapentin (NEURONTIN) 300 MG capsule Take 1 capsule (300 mg) by mouth At Bedtime       Glucosamine-Chondroitin (GLUCOSAMINE CHONDROITIN COMPLX) 500-250 MG CAPS Take by mouth daily       levothyroxine (SYNTHROID/LEVOTHROID) 100 MCG tablet TAKE 1 TABLET (100 MCG) BY MOUTH DAILY 90 tablet 1     lisinopril (ZESTRIL) 2.5 MG tablet Take 1 tablet (2.5 mg) by mouth 2 times daily 180 tablet 3     metoprolol succinate ER (TOPROL-XL) 25 MG 24 hr tablet Take 1 tablet (25 mg) by  "mouth daily 90 tablet 0     nitroGLYcerin (NITROSTAT) 0.4 MG sublingual tablet One tablet under the tongue every 5 minutes if needed for chest pain. May repeat every 5 minutes for a maximum of 3 doses in 15 minutes\" 25 tablet 3     Polyethylene Glycol 3350 (MIRALAX PO) Take by mouth as needed       Valerian 450 MG CAPS Take 2 capsules by mouth daily        Zinc 50 MG CAPS              Past Medical History:     Past Medical History:   Diagnosis Date     Aortic root dilatation (H)     4.4 cm     Ascending aorta dilatation (H)     4.4 cm     ASD (atrial septal defect)      Bradycardia      CAD (coronary artery disease)      YOGESH to RCA(6/20/19); YOGESH to OM1(5/15/14)     Cardiac pacemaker 06/20/2019    [employer] PPM COMFORT MRI XT DR-implant 6/20/19     Chest discomfort      Degeneration of lumbar or lumbosacral intervertebral disc      Familial tremor      Former smoker      Herpes zoster without mention of complication      HTN (hypertension)      Hyperlipidaemia      Idiopathic peripheral neuropathy      MGUS (monoclonal gammopathy of unknown significance)      NSTEMI (non-ST elevated myocardial infarction) (H) 2014     Other and unspecified hyperlipidemia      PFO (patent foramen ovale)      Prostatitis, unspecified      Second degree heart block      Sensorineural hearing loss, unspecified      SSS (sick sinus syndrome) (H)      Status post coronary angiogram 6/20/2019     Unspecified hypothyroidism      Past Surgical History:   Procedure Laterality Date     CHOLECYSTECTOMY       CORONARY ANGIOGRAPHY ADULT ORDER  5/14/14    PTCA w/YOGESH to OM1     CV CORONARY ANGIOGRAM N/A 6/20/2019    Procedure: Coronary Angiogram;  Surgeon: Romie Coronado MD;  Location:  HEART CARDIAC CATH LAB     CV INTRAVASULAR ULTRASOUND N/A 6/20/2019    Procedure: Intravascular Ultrasound;  Surgeon: Romie Coronado MD;  Location: [facility] CARDIAC CATH LAB     CV PCI ATHERECTOMY ORBITAL N/A 6/20/2019    Procedure: PCI Atherectomy Orbital;  " Surgeon: Romie Coronado MD;  Location:  HEART CARDIAC CATH LAB     CV PCI STENT DRUG ELUTING N/A 2019    Procedure: PCI Stent Drug Eluting;  Surgeon: Romie Coronado MD;  Location:  HEART CARDIAC CATH LAB     CV TEMPORARY PACEMAKER INSERTION N/A 2019    Procedure: Temporary Pacemaker Insertion;  Surgeon: Romie Coronado MD;  Location:  HEART CARDIAC CATH LAB     EP PACEMAKER N/A 2019    Procedure: EP Pacemaker;  Surgeon: Max Dowell MD;  Location:  HEART CARDIAC CATH LAB     EP PPM UPGRADE TO BIVENT N/A 2021    Procedure: EP PPM UPGRADE TO BIVENT;  Surgeon: Tre Miller MD;  Location:  HEART CARDIAC CATH LAB     HEART CATH, ANGIOPLASTY  14    YOGESH to OM1     ZZC NONSPECIFIC PROCEDURE      Laparoscopic cholecystectomy.      Family History   Problem Relation Age of Onset     Cancer Mother      Genitourinary Problems Father 99        complications from surgery     Heart Disease Brother 72        MI     Social History     Socioeconomic History     Marital status:      Spouse name: Not on file     Number of children: Not on file     Years of education: Not on file     Highest education level: Not on file   Occupational History     Not on file   Tobacco Use     Smoking status: Former Smoker     Packs/day: 0.50     Years: 16.00     Pack years: 8.00     Types: Cigarettes     Start date:      Quit date: 1967     Years since quittin.9     Smokeless tobacco: Never Used   Substance and Sexual Activity     Alcohol use: Yes     Alcohol/week: 0.0 standard drinks     Comment: 5 drinks week     Drug use: No     Sexual activity: Never   Other Topics Concern      Service Not Asked     Blood Transfusions Not Asked     Caffeine Concern Yes     Comment: 2 cups coffee/day     Occupational Exposure Not Asked     Hobby Hazards Not Asked     Sleep Concern No     Stress Concern Not Asked     Weight Concern Yes     Comment: limiting alcohol to watch  calories     Special Diet Not Asked     Back Care Not Asked     Exercise Yes     Comment: Stationary bike  weights and aerobics 4 times week     Bike Helmet Not Asked     Seat Belt Yes     Self-Exams Not Asked     Parent/sibling w/ CABG, MI or angioplasty before 65F 55M? No   Social History Narrative     Not on file     Social Determinants of Health     Financial Resource Strain: Not on file   Food Insecurity: Not on file   Transportation Needs: Not on file   Physical Activity: Not on file   Stress: Not on file   Social Connections: Not on file   Intimate Partner Violence: Not on file   Housing Stability: Not on file           Allergies:   No known drug allergies      EVANGELISTA Wren [facility]  Pager: [phone]    Thank you for allowing me to participate in the care of your patient.      Sincerely,     EVANGELISTA Wren [facility]  cc:   Lacey Puckett PA-C  [address]  [place] [address]

## 2021-11-12 NOTE — PATIENT INSTRUCTIONS
Today, we discussed the following:   - Medication changes:      Increase the PM dose of lisinopril to 5 mg (2 tabs). Keep the AM dose at 2.5 mg (1 tab).   - Follow up:     With me in 6 weeks, with a blood draw for the kidneys prior.     Try to exercise on the bike to build your endurance.     Please, remember to continue the followin.  Weigh yourself daily. Call if your weight is up > than 2 pounds in one day, or 5 pounds in one week; if you feel more short of breath or have worsening swelling in your legs or abdomen.  2.  Eat a low sodium diet (less than 2,000mg or 2g daily). If you eat less salt, you will retain less fluid.   3.  Avoid alcohol, as this can worsen heart failure.   4.  Avoid NSAIDs as able (For example, Ibuprofen / aleve / advil / naprosen / diclofenac).    If you have questions or concerns please call my nurse team at [phone].   Scheduling phone number: [phone]  For after hours urgent concerns call [phone] option 2.   Reminder: Please bring in all current medications, over the counter supplements and vitamin bottles to your next appointment.    It was a pleasure seeing you today!     Lacey Puckett PA-C

## 2021-11-12 NOTE — PROGRESS NOTES
Cardiology Clinic Progress Note    Moses Elizondo MRN# [id number]   YOB: 1933 Age: 88 year old   Primary cardiologist: Dr. Huggins         Assessment and Plan:     In summary, Moses Elizondo presents today for follow up after upgrade to CRT-P on 9/16/21.     1. Upgrade to CRT-P on 9/16/21. No improvement in FC as of yet.   2. Severe bilateral venous insufficiency with chronic superficial venous thrombosis.   -- Swelling currently well-controlled with low-dose diuretic therapy and intermittent compression. However he has other leg sxs such as neuropathy that may benefit from ablation, and is seeing Dr. Myles in this capacity on 11/22.   3. 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 Reports FC II-III sxs, worsening over past four months. GDMT includes Toprol 25, lisinopril 2.5 BID. Appears well-compensated on exam.   4. Limiting knee pain. Anticipates he may need surgery in the future.     Plan:  - Device check today to ensure adequate BiVP burden and rate response.  - Increase lisinopril to 7.5 mg daily.   - Advised to start exercising on recumbent bike to help build exercise tolerance.   - Return to see me in 6 weeks + BMP.  - He will call me in the meantime if he decides to pursue knee surgery. Would discuss with Dr. Huggins whether stress test is needed, in that case.   - Follow up with Dr. Huggins this summer with repeat TTE prior.         History of Presenting Illness:      Moses Elizondo is a pleasant 88 year old patient who presents today for follow-up after device upgrade.     His pertinent cardiac history includes:  # CAD, s/p PCI to OM1 (2014)  # Newly diagnosed cardiomyopathy, LVEF 38% per routine TTE on 1/6/21. 44% per MRI. Suspect pacemaker-induced.   # No evidence of ischemia or infiltrative disease on 1/2021 cMRI.   # SSS, which has now progressed to CHB. S/p PPM.  # Chronic knee pain, arthritis, and Baker's cysts  # Peripheral edema,  venous insufficiency  # Mild ascending aortic enlargement  # Hypothyroidism  # Hyperlipidemia  # Idiopathic peripheral neuropathy    In brief, Zane saw Dr. Huggins on July 14 for a routine follow up visit.  He complained of worsening leg edema for that time. proBNP was elevated ~1,000 and therefore furosemide 40 mg daily was initiated, an echocardiogram and venous competency study ordered.  He asked him to wear VERNA stockings as well.    His testing placed on August 9.  Echocardiogram was stable from that in January, showing EF estimated at 37%, with moderate global hypokinesis, mild AI, and moderately dilated ascending aorta at 4.4 cm.  His competency study showed a nonocclusive thrombus of the right GSV from the mid thigh to the calf, with severe venous reflux down to the mid calf, where it appeared occluded.  There was also a Baker's cyst incidentally noted.  On the left, there is moderate to severe reflux throughout the entire length of the GSV, also with a Baker's cyst noted. BMP that day was within normal limits, showing a creatinine of 1.24, potassium of 4.1, sodium of 139, BUN of 28.    I saw him in August: Today, Zaen returns to clinic with his daughter. We reviewed his recent testing. His weight is down six lbs from his last visit. BP is marginal. His leg swelling has improved with the furosemide and intermittent compression stockings, now trace on exam. Unfortunately, he is still unsatisfied with his functional capacity. He feels more winded with activity than he did four months ago. He walks around the house and garden and observes things, but can't do much in the way of active gardening without limiting dyspnea. He can do his ADL's independently. Of course, he is also limited by chronic knee pain and neuropathy, especially with stair-climbing or when going to bed at night.   - Referral to vein clinic placed. His swelling is currently under good control, but he has other leg sxs such as neuropathy that may  "benefit from ablation. In the meantime, he will continue furosemide and compression stockings, as tolerated.   - Referral to EP Dr. Miller for consideration of upgrade to CRT-P.  - Return to see me in 3 months.     On September 16, he went underwent upgrade to biventricular pacemaker.     He had a RLE venous ultrasound on 11/10 for follow-up of his chronic superficial venous thrombosis, and is scheduled to see Dr. Myles on November 22nd.    Today, Zane returns to clinic stating he feels the same, overall. Still not noticing improvement in his FC. Winded with stair-climbing, feels a lack of motivation. Really isn't exercising, though. Does have a bike at home he could use. Weight is stable. Limited by knee pain, wants surgery.         Review of Systems:     12-pt ROS is negative except for as noted in the HPI.          Physical Exam:     Vitals: /66 (BP Location: Left arm, Cuff Size: Adult Regular)   Pulse 62   Ht 1.803 m (5' 11\")   Wt 85.7 kg (189 lb)   SpO2 94%   BMI 26.36 kg/m    Wt Readings from Last 10 Encounters:   11/12/21 85.7 kg (189 lb)   09/16/21 86.5 kg (190 lb 9.6 oz)   08/30/21 85.3 kg (188 lb)   08/16/21 86 kg (189 lb 11.2 oz)   07/14/21 88.5 kg (195 lb 1.6 oz)   04/07/21 89.9 kg (198 lb 1.6 oz)   03/10/21 89 kg (196 lb 4.8 oz)   03/02/21 89.8 kg (198 lb)   08/14/20 86.6 kg (191 lb)   07/23/20 87.6 kg (193 lb 1.6 oz)       Constitutional:  Patient is pleasant, alert, cooperative, and in NAD.  HEENT:  NCAT. PERRLA. EOM's intact.   Neck:  CVP appears normal. No carotid bruits.   Pulmonary: Normal respiratory effort. CTAB.   Cardiac: RRR, normal S1/S2, no S3/S4, no murmur or rub.   Abdomen:  Non-tender abdomen, no hepatosplenomegaly appreciated.   Vascular: Pulses in the upper and lower extremities are 2+ and equal bilaterally.  Extremities: Trace lower extremity edema, erythema, cyanosis or tenderness appreciated.  Skin:  No rashes or lesions appreciated.   Neurological:  No gross motor or " sensory deficits.   Psych: Appropriate affect.        Data:     Labs reviewed:  Recent Labs   Lab Test 11/12/21  1204 07/14/21  0827 03/02/21  0927 07/23/20  1008 07/07/20  0000 10/31/19  0849 05/02/19  0000 03/20/19  0823 02/27/18  0817   LDL  --   --   --   --  49 60  --  61 40   HDL  --   --   --   --  42 52  --  40 49   NHDL  --   --   --   --   --  74  --  76 52   CHOL  --   --   --   --  105 126  --  116 101   TRIG  --   --   --   --  71 70  --  75 59   TSH  --   --  2.27 2.87  --  1.94   < > 2.20 2.53   NTBNP 715* 1,009* 804*  --   --   --   --   --   --     < > = values in this interval not displayed.       Lab Results   Component Value Date    WBC 6.6 11/12/2021    WBC 6.2 07/23/2020    RBC 4.18 (L) 11/12/2021    RBC 4.58 07/23/2020    HGB 12.3 (L) 11/12/2021    HGB 13.2 (L) 03/02/2021    HCT 37.8 (L) 11/12/2021    HCT 41.4 07/23/2020    MCV 90 11/12/2021    MCV 90 07/23/2020    MCH 29.4 11/12/2021    MCH 29.9 07/23/2020    MCHC 32.5 11/12/2021    MCHC 33.1 07/23/2020    RDW 12.0 11/12/2021    RDW 12.5 07/23/2020     11/12/2021     07/23/2020       Lab Results   Component Value Date     11/12/2021     04/07/2021    POTASSIUM 4.5 11/12/2021    POTASSIUM 4.1 04/07/2021    CHLORIDE 110 (H) 11/12/2021    CHLORIDE 109 04/07/2021    CO2 24 11/12/2021    CO2 28 04/07/2021    ANIONGAP 6 11/12/2021    ANIONGAP 2 (L) 04/07/2021    GLC 79 11/12/2021    GLC 75 04/07/2021    BUN 24 11/12/2021    BUN 25 04/07/2021    CR 1.26 (H) 11/12/2021    CR 1.28 (H) 04/07/2021    GFRESTIMATED 51 (L) 11/12/2021    GFRESTIMATED 50 (L) 04/07/2021    GFRESTBLACK 58 (L) 04/07/2021    GABRIELA 8.8 11/12/2021    GABRIELA 8.8 04/07/2021      Lab Results   Component Value Date    AST 32 03/02/2021    ALT 30 03/02/2021       Lab Results   Component Value Date    A1C 5.6 05/02/2019       Lab Results   Component Value Date    INR 1.00 06/20/2019           Problem List:     Patient Active Problem List   Diagnosis      Hypothyroidism, unspecified type     Degeneration of lumbar or lumbosacral intervertebral disc     MGUS (monoclonal gammopathy of unknown significance)     Familial tremor     Hyperlipidemia LDL goal <100     NSTEMI (non-ST elevated myocardial infarction) (H)     Health [facility]     Coronary artery disease     Bradycardia     Aneurysm of thoracic aorta (H)     Sinus bradycardia     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)     Ascending aortic aneurysm (H)     Sick sinus syndrome (H)     Aortic root dilatation (H)     Medicare annual wellness visit, subsequent     Idiopathic peripheral neuropathy     Chest discomfort     Status post coronary angiogram     Second degree AV block     Cardiac pacemaker in situ     Chronic kidney disease, stage 3 (H)     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)           Medications:     Current Outpatient Medications   Medication Sig Dispense Refill     acetaminophen (TYLENOL 8 HOUR) 650 MG CR tablet Take 650 mg by mouth daily       ASPIRIN EC PO Take 81 mg by mouth daily       atorvastatin (LIPITOR) 40 MG tablet Take 1 tablet (40 mg) by mouth daily 90 tablet 3     furosemide (LASIX) 20 MG tablet Take 1 tablet (20 mg) by mouth daily 30 tablet 11     gabapentin (NEURONTIN) 300 MG capsule Take 1 capsule (300 mg) by mouth At Bedtime       Glucosamine-Chondroitin (GLUCOSAMINE CHONDROITIN COMPLX) 500-250 MG CAPS Take by mouth daily       levothyroxine (SYNTHROID/LEVOTHROID) 100 MCG tablet TAKE 1 TABLET (100 MCG) BY MOUTH DAILY 90 tablet 1     lisinopril (ZESTRIL) 2.5 MG tablet Take 1 tablet (2.5 mg) by mouth 2 times daily 180 tablet 3     metoprolol succinate ER (TOPROL-XL) 25 MG 24 hr tablet Take 1 tablet (25 mg) by mouth daily 90 tablet 0     nitroGLYcerin (NITROSTAT) 0.4 MG sublingual tablet One tablet under the tongue every 5 minutes if needed for chest pain. May repeat every 5 minutes for a maximum of 3 doses in 15  "minutes\" 25 tablet 3     Polyethylene Glycol 3350 (MIRALAX PO) Take by mouth as needed       Valerian 450 MG CAPS Take 2 capsules by mouth daily        Zinc 50 MG CAPS              Past Medical History:     Past Medical History:   Diagnosis Date     Aortic root dilatation (H)     4.4 cm     Ascending aorta dilatation (H)     4.4 cm     ASD (atrial septal defect)      Bradycardia      CAD (coronary artery disease)      YOGESH to RCA(6/20/19); YOGESH to OM1(5/15/14)     Cardiac pacemaker 06/20/2019    [employer] PPM COMFORT MRI XT DR-implant 6/20/19     Chest discomfort      Degeneration of lumbar or lumbosacral intervertebral disc      Familial tremor      Former smoker      Herpes zoster without mention of complication      HTN (hypertension)      Hyperlipidaemia      Idiopathic peripheral neuropathy      MGUS (monoclonal gammopathy of unknown significance)      NSTEMI (non-ST elevated myocardial infarction) (H) 2014     Other and unspecified hyperlipidemia      PFO (patent foramen ovale)      Prostatitis, unspecified      Second degree heart block      Sensorineural hearing loss, unspecified      SSS (sick sinus syndrome) (H)      Status post coronary angiogram 6/20/2019     Unspecified hypothyroidism      Past Surgical History:   Procedure Laterality Date     CHOLECYSTECTOMY       CORONARY ANGIOGRAPHY ADULT ORDER  5/14/14    PTCA w/YOGESH to OM1     CV CORONARY ANGIOGRAM N/A 6/20/2019    Procedure: Coronary Angiogram;  Surgeon: Romie Coronado MD;  Location: [facility] CARDIAC CATH LAB     CV INTRAVASULAR ULTRASOUND N/A 6/20/2019    Procedure: Intravascular Ultrasound;  Surgeon: Romie Coronado MD;  Location: [facility] CARDIAC CATH LAB     CV PCI ATHERECTOMY ORBITAL N/A 6/20/2019    Procedure: PCI Atherectomy Orbital;  Surgeon: Romie Coronado MD;  Location: [facility] CARDIAC CATH LAB     CV PCI STENT DRUG ELUTING N/A 6/20/2019    Procedure: PCI Stent Drug Eluting;  Surgeon: Romie Coronado MD;  Location: [facility] " CARDIAC CATH LAB     CV TEMPORARY PACEMAKER INSERTION N/A 2019    Procedure: Temporary Pacemaker Insertion;  Surgeon: Romie Coronado MD;  Location:  HEART CARDIAC CATH LAB     EP PACEMAKER N/A 2019    Procedure: EP Pacemaker;  Surgeon: Max Dowell MD;  Location:  HEART CARDIAC CATH LAB     EP PPM UPGRADE TO BIVENT N/A 2021    Procedure: EP PPM UPGRADE TO BIVENT;  Surgeon: Tre Miller MD;  Location:  HEART CARDIAC CATH LAB     HEART CATH, ANGIOPLASTY  14    YOGESH to OM1     ZZC NONSPECIFIC PROCEDURE      Laparoscopic cholecystectomy.      Family History   Problem Relation Age of Onset     Cancer Mother      Genitourinary Problems Father 99        complications from surgery     Heart Disease Brother 72        MI     Social History     Socioeconomic History     Marital status:      Spouse name: Not on file     Number of children: Not on file     Years of education: Not on file     Highest education level: Not on file   Occupational History     Not on file   Tobacco Use     Smoking status: Former Smoker     Packs/day: 0.50     Years: 16.00     Pack years: 8.00     Types: Cigarettes     Start date:      Quit date: 1967     Years since quittin.9     Smokeless tobacco: Never Used   Substance and Sexual Activity     Alcohol use: Yes     Alcohol/week: 0.0 standard drinks     Comment: 5 drinks week     Drug use: No     Sexual activity: Never   Other Topics Concern      Service Not Asked     Blood Transfusions Not Asked     Caffeine Concern Yes     Comment: 2 cups coffee/day     Occupational Exposure Not Asked     Hobby Hazards Not Asked     Sleep Concern No     Stress Concern Not Asked     Weight Concern Yes     Comment: limiting alcohol to watch calories     Special Diet Not Asked     Back Care Not Asked     Exercise Yes     Comment: Stationary bike  weights and aerobics 4 times week     Bike Helmet Not Asked     Seat Belt Yes     Self-Exams Not  Asked     Parent/sibling w/ CABG, MI or angioplasty before 65F 55M? No   Social History Narrative     Not on file     Social Determinants of Health     Financial Resource Strain: Not on file   Food Insecurity: Not on file   Transportation Needs: Not on file   Physical Activity: Not on file   Stress: Not on file   Social Connections: Not on file   Intimate Partner Violence: Not on file   Housing Stability: Not on file           Allergies:   No known drug allergies      Lacey Puckett PA-C  [facility]  Pager: [phone]

## 2021-11-15 LAB
MDC_IDC_LEAD_IMPLANT_DT: NORMAL
MDC_IDC_LEAD_LOCATION: NORMAL
MDC_IDC_LEAD_LOCATION_DETAIL_1: NORMAL
MDC_IDC_LEAD_MFG: NORMAL
MDC_IDC_LEAD_MODEL: NORMAL
MDC_IDC_LEAD_POLARITY_TYPE: NORMAL
MDC_IDC_LEAD_SERIAL: NORMAL
MDC_IDC_MSMT_BATTERY_DTM: NORMAL
MDC_IDC_MSMT_BATTERY_REMAINING_LONGEVITY: 132 MO
MDC_IDC_MSMT_BATTERY_RRT_TRIGGER: 2.6
MDC_IDC_MSMT_BATTERY_STATUS: NORMAL
MDC_IDC_MSMT_BATTERY_VOLTAGE: 3.15 V
MDC_IDC_MSMT_LEADCHNL_LV_IMPEDANCE_VALUE: 342 OHM
MDC_IDC_MSMT_LEADCHNL_LV_IMPEDANCE_VALUE: 342 OHM
MDC_IDC_MSMT_LEADCHNL_LV_IMPEDANCE_VALUE: 380 OHM
MDC_IDC_MSMT_LEADCHNL_LV_IMPEDANCE_VALUE: 399 OHM
MDC_IDC_MSMT_LEADCHNL_LV_IMPEDANCE_VALUE: 437 OHM
MDC_IDC_MSMT_LEADCHNL_LV_IMPEDANCE_VALUE: 551 OHM
MDC_IDC_MSMT_LEADCHNL_LV_IMPEDANCE_VALUE: 589 OHM
MDC_IDC_MSMT_LEADCHNL_LV_IMPEDANCE_VALUE: 608 OHM
MDC_IDC_MSMT_LEADCHNL_LV_IMPEDANCE_VALUE: 608 OHM
MDC_IDC_MSMT_LEADCHNL_LV_IMPEDANCE_VALUE: 684 OHM
MDC_IDC_MSMT_LEADCHNL_LV_PACING_THRESHOLD_AMPLITUDE: 0.62 V
MDC_IDC_MSMT_LEADCHNL_LV_PACING_THRESHOLD_PULSEWIDTH: 0.4 MS
MDC_IDC_MSMT_LEADCHNL_RA_IMPEDANCE_VALUE: 418 OHM
MDC_IDC_MSMT_LEADCHNL_RA_IMPEDANCE_VALUE: 608 OHM
MDC_IDC_MSMT_LEADCHNL_RA_SENSING_INTR_AMPL: 3.25 MV
MDC_IDC_MSMT_LEADCHNL_RA_SENSING_INTR_AMPL: 3.38 MV
MDC_IDC_MSMT_LEADCHNL_RV_IMPEDANCE_VALUE: 380 OHM
MDC_IDC_MSMT_LEADCHNL_RV_IMPEDANCE_VALUE: 551 OHM
MDC_IDC_MSMT_LEADCHNL_RV_PACING_THRESHOLD_AMPLITUDE: 0.62 V
MDC_IDC_MSMT_LEADCHNL_RV_PACING_THRESHOLD_PULSEWIDTH: 0.4 MS
MDC_IDC_MSMT_LEADCHNL_RV_SENSING_INTR_AMPL: 8.88 MV
MDC_IDC_PG_IMPLANT_DTM: NORMAL
MDC_IDC_PG_MFG: NORMAL
MDC_IDC_PG_MODEL: NORMAL
MDC_IDC_PG_SERIAL: NORMAL
MDC_IDC_PG_TYPE: NORMAL
MDC_IDC_SESS_CLINIC_NAME: NORMAL
MDC_IDC_SESS_DTM: NORMAL
MDC_IDC_SESS_TYPE: NORMAL
MDC_IDC_SET_BRADY_AT_MODE_SWITCH_RATE: 171 {BEATS}/MIN
MDC_IDC_SET_BRADY_LOWRATE: 60 {BEATS}/MIN
MDC_IDC_SET_BRADY_MAX_SENSOR_RATE: 120 {BEATS}/MIN
MDC_IDC_SET_BRADY_MAX_TRACKING_RATE: 120 {BEATS}/MIN
MDC_IDC_SET_BRADY_MODE: NORMAL
MDC_IDC_SET_BRADY_PAV_DELAY_LOW: 170 MS
MDC_IDC_SET_BRADY_SAV_DELAY_LOW: 110 MS
MDC_IDC_SET_CRT_LVRV_DELAY: 0 MS
MDC_IDC_SET_CRT_PACED_CHAMBERS: NORMAL
MDC_IDC_SET_LEADCHNL_LV_PACING_AMPLITUDE: 1.25 V
MDC_IDC_SET_LEADCHNL_LV_PACING_ANODE_ELECTRODE_1: NORMAL
MDC_IDC_SET_LEADCHNL_LV_PACING_ANODE_LOCATION_1: NORMAL
MDC_IDC_SET_LEADCHNL_LV_PACING_CAPTURE_MODE: NORMAL
MDC_IDC_SET_LEADCHNL_LV_PACING_CATHODE_ELECTRODE_1: NORMAL
MDC_IDC_SET_LEADCHNL_LV_PACING_CATHODE_LOCATION_1: NORMAL
MDC_IDC_SET_LEADCHNL_LV_PACING_POLARITY: NORMAL
MDC_IDC_SET_LEADCHNL_LV_PACING_PULSEWIDTH: 0.4 MS
MDC_IDC_SET_LEADCHNL_RA_PACING_AMPLITUDE: 1.5 V
MDC_IDC_SET_LEADCHNL_RA_PACING_ANODE_ELECTRODE_1: NORMAL
MDC_IDC_SET_LEADCHNL_RA_PACING_ANODE_LOCATION_1: NORMAL
MDC_IDC_SET_LEADCHNL_RA_PACING_CAPTURE_MODE: NORMAL
MDC_IDC_SET_LEADCHNL_RA_PACING_CATHODE_ELECTRODE_1: NORMAL
MDC_IDC_SET_LEADCHNL_RA_PACING_CATHODE_LOCATION_1: NORMAL
MDC_IDC_SET_LEADCHNL_RA_PACING_POLARITY: NORMAL
MDC_IDC_SET_LEADCHNL_RA_PACING_PULSEWIDTH: 0.4 MS
MDC_IDC_SET_LEADCHNL_RA_SENSING_ANODE_ELECTRODE_1: NORMAL
MDC_IDC_SET_LEADCHNL_RA_SENSING_ANODE_LOCATION_1: NORMAL
MDC_IDC_SET_LEADCHNL_RA_SENSING_CATHODE_ELECTRODE_1: NORMAL
MDC_IDC_SET_LEADCHNL_RA_SENSING_CATHODE_LOCATION_1: NORMAL
MDC_IDC_SET_LEADCHNL_RA_SENSING_POLARITY: NORMAL
MDC_IDC_SET_LEADCHNL_RA_SENSING_SENSITIVITY: 0.3 MV
MDC_IDC_SET_LEADCHNL_RV_PACING_AMPLITUDE: 2 V
MDC_IDC_SET_LEADCHNL_RV_PACING_ANODE_ELECTRODE_1: NORMAL
MDC_IDC_SET_LEADCHNL_RV_PACING_ANODE_LOCATION_1: NORMAL
MDC_IDC_SET_LEADCHNL_RV_PACING_CAPTURE_MODE: NORMAL
MDC_IDC_SET_LEADCHNL_RV_PACING_CATHODE_ELECTRODE_1: NORMAL
MDC_IDC_SET_LEADCHNL_RV_PACING_CATHODE_LOCATION_1: NORMAL
MDC_IDC_SET_LEADCHNL_RV_PACING_POLARITY: NORMAL
MDC_IDC_SET_LEADCHNL_RV_PACING_PULSEWIDTH: 0.4 MS
MDC_IDC_SET_LEADCHNL_RV_SENSING_ANODE_ELECTRODE_1: NORMAL
MDC_IDC_SET_LEADCHNL_RV_SENSING_ANODE_LOCATION_1: NORMAL
MDC_IDC_SET_LEADCHNL_RV_SENSING_CATHODE_ELECTRODE_1: NORMAL
MDC_IDC_SET_LEADCHNL_RV_SENSING_CATHODE_LOCATION_1: NORMAL
MDC_IDC_SET_LEADCHNL_RV_SENSING_POLARITY: NORMAL
MDC_IDC_SET_LEADCHNL_RV_SENSING_SENSITIVITY: 0.9 MV
MDC_IDC_SET_ZONE_DETECTION_INTERVAL: 350 MS
MDC_IDC_SET_ZONE_DETECTION_INTERVAL: 400 MS
MDC_IDC_SET_ZONE_TYPE: NORMAL
MDC_IDC_STAT_AT_BURDEN_PERCENT: 0 %
MDC_IDC_STAT_AT_DTM_END: NORMAL
MDC_IDC_STAT_AT_DTM_START: NORMAL
MDC_IDC_STAT_BRADY_AP_VP_PERCENT: 98.98 %
MDC_IDC_STAT_BRADY_AP_VS_PERCENT: 0.04 %
MDC_IDC_STAT_BRADY_AS_VP_PERCENT: 0.7 %
MDC_IDC_STAT_BRADY_AS_VS_PERCENT: 0.28 %
MDC_IDC_STAT_BRADY_DTM_END: NORMAL
MDC_IDC_STAT_BRADY_DTM_START: NORMAL
MDC_IDC_STAT_BRADY_RA_PERCENT_PACED: 99.25 %
MDC_IDC_STAT_BRADY_RV_PERCENT_PACED: 99.68 %
MDC_IDC_STAT_CRT_DTM_END: NORMAL
MDC_IDC_STAT_CRT_DTM_START: NORMAL
MDC_IDC_STAT_CRT_LV_PERCENT_PACED: 99.65 %
MDC_IDC_STAT_CRT_PERCENT_PACED: 99.65 %
MDC_IDC_STAT_EPISODE_RECENT_COUNT: 0
MDC_IDC_STAT_EPISODE_RECENT_COUNT_DTM_END: NORMAL
MDC_IDC_STAT_EPISODE_RECENT_COUNT_DTM_START: NORMAL
MDC_IDC_STAT_EPISODE_TOTAL_COUNT: 0
MDC_IDC_STAT_EPISODE_TOTAL_COUNT_DTM_END: NORMAL
MDC_IDC_STAT_EPISODE_TOTAL_COUNT_DTM_START: NORMAL
MDC_IDC_STAT_EPISODE_TYPE: NORMAL

## 2021-11-19 ENCOUNTER — TELEPHONE (OUTPATIENT)
Dept: CARDIOLOGY | Facility: CLINIC | Age: 86
End: 2021-11-19
Payer: COMMERCIAL

## 2021-11-19 NOTE — TELEPHONE ENCOUNTER
Patient called asking if his heart is healing ?  Patient had Lacey BRIAN LISS visit 11-12-21. Device check done, Recommendation was f/Up echo and 3 month visit.    Patient states he still notices still some shortness of breath.    Patient will call sooner with any questions or concerns.

## 2021-11-23 DIAGNOSIS — I25.810 CORONARY ARTERY DISEASE INVOLVING CORONARY BYPASS GRAFT OF NATIVE HEART WITHOUT ANGINA PECTORIS: ICD-10-CM

## 2021-11-23 RX ORDER — METOPROLOL SUCCINATE 25 MG/1
25 TABLET, EXTENDED RELEASE ORAL DAILY
Qty: 90 TABLET | Refills: 3 | Status: SHIPPED | OUTPATIENT
Start: 2021-11-23 | End: 2022-11-29

## 2022-01-03 ENCOUNTER — OFFICE VISIT (OUTPATIENT)
Dept: CARDIOLOGY | Facility: CLINIC | Age: 87
End: 2022-01-03
Attending: PHYSICIAN ASSISTANT
Payer: COMMERCIAL

## 2022-01-03 VITALS
HEART RATE: 84 BPM | WEIGHT: 187.4 LBS | HEIGHT: 71 IN | BODY MASS INDEX: 26.23 KG/M2 | SYSTOLIC BLOOD PRESSURE: 118 MMHG | DIASTOLIC BLOOD PRESSURE: 74 MMHG

## 2022-01-03 DIAGNOSIS — I87.2 VENOUS (PERIPHERAL) INSUFFICIENCY: ICD-10-CM

## 2022-01-03 PROCEDURE — 99213 OFFICE O/P EST LOW 20 MIN: CPT | Performed by: INTERNAL MEDICINE

## 2022-01-03 RX ORDER — LISINOPRIL 2.5 MG/1
2.5 TABLET ORAL
COMMUNITY
End: 2022-01-18

## 2022-01-03 ASSESSMENT — MIFFLIN-ST. JEOR: SCORE: 1542.17

## 2022-01-03 NOTE — LETTER
"1/3/2022    Steven Wagoner MD  [address] [facility] [address]    RE: Moses Elizondo       Dear Colleague,     I had the pleasure of seeing Moses Elizondo in the [facility].  Vascular Cardiology Consultation      Moses Elizondo MRN# [id number]   YOB: 1933 Age: 88 year old   Date of Visit 01/03/2022     Reason for consult:  Venous reflux           Assessment and Plan:     1. Bilateral greater saphenous vein reflux, severe    Currently tolerating conservative management.  We discussed that if his restless leg symptoms worsen despite conservative management, we could do bilateral saphenous vein ablation to see if improvement in his neuropathy and restless legs.  Currently the edema is under control.  Follow-up as needed, he should notify us if worsening symptoms and we can order ablation.      This note was transcribed using electronic voice recognition software, typographical errors may be present.                Chief Complaint:   Heart Problem (New Vein Consult )           History of Present Illness:   This patient is a very pleasant 88 year old male who follows with Lacey Puckett for coronary artery disease and had upgrade of his pacemaker and addition of diuretic with improvement in his lower extremity edema and symptoms.  He had venous competency testing which did show severe bilateral greater saphenous vein reflux.  He did not tolerate compression stockings as they made his restless legs feel even worse at night.    The neuropathy and restless legs are his main potentially venous symptoms at this time.  He feels like they are reasonably under control with conservative management.             Physical Exam:     Vitals: /74   Pulse 84   Ht 1.803 m (5' 11\")   Wt 85 kg (187 lb 6.4 oz)   BMI 26.14 kg/m    Constitutional:  cooperative, alert and oriented, well developed, well nourished, in no acute distress        Skin:  warm and dry to the touch    "     Head:  normocephalic, no masses or lesions        Eyes:  pupils equal and round, conjunctivae and lids unremarkable, sclera white, no xanthalasma, EOMS intact, no nystagmus        ENT:  no pallor or cyanosis        Neck:  JVP normal        Neurological:  no gross motor deficits;affect appropriate                      Past Medical History:   I have reviewed this patient's past medical history  Past Medical History:   Diagnosis Date     Aortic root dilatation (H)     4.4 cm     Ascending aorta dilatation (H)     4.4 cm     ASD (atrial septal defect)      Bradycardia      CAD (coronary artery disease)      YOGESH to RCA(6/20/19); YOGESH to OM1(5/15/14)     Cardiac pacemaker 06/20/2019    [employer] PPM COMFORT MRI XT DR-implant 6/20/19     Chest discomfort      Degeneration of lumbar or lumbosacral intervertebral disc      Familial tremor      Former smoker      Herpes zoster without mention of complication      HTN (hypertension)      Hyperlipidaemia      Idiopathic peripheral neuropathy      MGUS (monoclonal gammopathy of unknown significance)      NSTEMI (non-ST elevated myocardial infarction) (H) 2014     Other and unspecified hyperlipidemia      PFO (patent foramen ovale)      Prostatitis, unspecified      Second degree heart block      Sensorineural hearing loss, unspecified      SSS (sick sinus syndrome) (H)      Status post coronary angiogram 6/20/2019     Unspecified hypothyroidism              Past Surgical History:   I have reviewed this patient's past surgical history  Past Surgical History:   Procedure Laterality Date     CHOLECYSTECTOMY       CORONARY ANGIOGRAPHY ADULT ORDER  5/14/14    PTCA w/YOGESH to OM1     CV CORONARY ANGIOGRAM N/A 6/20/2019    Procedure: Coronary Angiogram;  Surgeon: Romie Coronado MD;  Location: [facility] CARDIAC CATH LAB     CV INTRAVASULAR ULTRASOUND N/A 6/20/2019    Procedure: Intravascular Ultrasound;  Surgeon: Romie Coronado MD;  Location: [facility] CARDIAC CATH LAB     CV PCI  ATHERECTOMY ORBITAL N/A 2019    Procedure: PCI Atherectomy Orbital;  Surgeon: Romie Coronado MD;  Location:  HEART CARDIAC CATH LAB     CV PCI STENT DRUG ELUTING N/A 2019    Procedure: PCI Stent Drug Eluting;  Surgeon: Romie Coronado MD;  Location:  HEART CARDIAC CATH LAB     CV TEMPORARY PACEMAKER INSERTION N/A 2019    Procedure: Temporary Pacemaker Insertion;  Surgeon: Romie Coronado MD;  Location:  HEART CARDIAC CATH LAB     EP PACEMAKER N/A 2019    Procedure: EP Pacemaker;  Surgeon: Max Dowell MD;  Location:  HEART CARDIAC CATH LAB     EP PPM UPGRADE TO BIVENT N/A 2021    Procedure: EP PPM UPGRADE TO BIVENT;  Surgeon: Tre Miller MD;  Location:  HEART CARDIAC CATH LAB     HEART CATH, ANGIOPLASTY  14    YOGESH to OM1     ZZC NONSPECIFIC PROCEDURE      Laparoscopic cholecystectomy.                Social History:   I have reviewed this patient's social history  Social History     Tobacco Use     Smoking status: Former Smoker     Packs/day: 0.50     Years: 16.00     Pack years: 8.00     Types: Cigarettes     Start date:      Quit date: 1967     Years since quittin.0     Smokeless tobacco: Never Used   Substance Use Topics     Alcohol use: Yes     Comment: 5-7 drinks week - at most one drink daily             Family History:   I have reviewed this patient's family history  Family History   Problem Relation Age of Onset     Cancer Mother      Genitourinary Problems Father 99        complications from surgery     Heart Disease Brother 72        MI             Allergies:     Allergies   Allergen Reactions     No Known Drug Allergies              Medications:   I have reviewed this patient's current medications  Current Outpatient Medications   Medication Sig Dispense Refill     acetaminophen (TYLENOL 8 HOUR) 650 MG CR tablet Take 650 mg by mouth daily       ASPIRIN EC PO Take 81 mg by mouth daily       atorvastatin (LIPITOR) 40 MG  "tablet Take 1 tablet (40 mg) by mouth daily 90 tablet 3     furosemide (LASIX) 20 MG tablet Take 1 tablet (20 mg) by mouth daily 30 tablet 11     gabapentin (NEURONTIN) 300 MG capsule Take 1 capsule (300 mg) by mouth At Bedtime       levothyroxine (SYNTHROID/LEVOTHROID) 100 MCG tablet Take 1 tablet (100 mcg) by mouth daily Upon waking 90 tablet 1     lisinopril (ZESTRIL) 2.5 MG tablet Take 2.5 mg by mouth Take 2.5 mg in AM and 5 mg in PM       metoprolol succinate ER (TOPROL-XL) 25 MG 24 hr tablet Take 1 tablet (25 mg) by mouth daily At lunch 90 tablet 3     nitroGLYcerin (NITROSTAT) 0.4 MG sublingual tablet One tablet under the tongue every 5 minutes if needed for chest pain. May repeat every 5 minutes for a maximum of 3 doses in 15 minutes\" 25 tablet 3     Polyethylene Glycol 3350 (MIRALAX PO) Take by mouth as needed       Glucosamine-Chondroitin (GLUCOSAMINE CHONDROITIN COMPLX) 500-250 MG CAPS Take by mouth daily (Patient not taking: Reported on 1/3/2022)       Valerian 450 MG CAPS Take 2 capsules by mouth daily  (Patient not taking: Reported on 1/3/2022)       Zinc 50 MG CAPS  (Patient not taking: Reported on 1/3/2022)                 Review of Systems:       Review of Systems:  Skin:  Negative     Eyes:  Positive for glasses  ENT:  Negative    Respiratory:  Positive for dyspnea on exertion  Cardiovascular:    Positive for;exercise intolerance;edema  Gastroenterology: Negative    Genitourinary:  Negative    Musculoskeletal:  Positive for joint pain  Neurologic:  Positive for numbness or tingling of feet;memory problems  Psychiatric:  Negative sleep disturbances  Heme/Lymph/Imm:  Negative    Endocrine:  Positive for thyroid disorder                     Data:   All laboratory data reviewed  Lab Results   Component Value Date    CHOL 105 07/07/2020     Lab Results   Component Value Date    HDL 42 07/07/2020     Lab Results   Component Value Date    LDL 49 07/07/2020     Lab Results   Component Value Date    TRIG 71 " 07/07/2020     Lab Results   Component Value Date    CHOLHDLRATIO 2.9 07/08/2014     TSH   Date Value Ref Range Status   03/02/2021 2.27 0.40 - 4.00 mU/L Final     Last Basic Metabolic Panel:  Lab Results   Component Value Date     11/12/2021     04/07/2021      Lab Results   Component Value Date    POTASSIUM 4.5 11/12/2021    POTASSIUM 4.1 04/07/2021     Lab Results   Component Value Date    CHLORIDE 110 11/12/2021    CHLORIDE 109 04/07/2021     Lab Results   Component Value Date    GABRIELA 8.8 11/12/2021    GABRIELA 8.8 04/07/2021     Lab Results   Component Value Date    CO2 24 11/12/2021    CO2 28 04/07/2021     Lab Results   Component Value Date    BUN 24 11/12/2021    BUN 25 04/07/2021     Lab Results   Component Value Date    CR 1.26 11/12/2021    CR 1.28 04/07/2021     Lab Results   Component Value Date    GLC 79 11/12/2021    GLC 75 04/07/2021     Lab Results   Component Value Date    WBC 6.6 11/12/2021    WBC 6.2 07/23/2020     Lab Results   Component Value Date    RBC 4.18 11/12/2021    RBC 4.58 07/23/2020     Lab Results   Component Value Date    HGB 12.3 11/12/2021    HGB 13.2 03/02/2021     Lab Results   Component Value Date    HCT 37.8 11/12/2021    HCT 41.4 07/23/2020     Lab Results   Component Value Date    MCV 90 11/12/2021    MCV 90 07/23/2020     Lab Results   Component Value Date    MCH 29.4 11/12/2021    MCH 29.9 07/23/2020     Lab Results   Component Value Date    MCHC 32.5 11/12/2021    MCHC 33.1 07/23/2020     Lab Results   Component Value Date    RDW 12.0 11/12/2021    RDW 12.5 07/23/2020     Lab Results   Component Value Date     11/12/2021     07/23/2020     Thank you for allowing me to participate in the care of your patient.    Sincerely,     Spencer Myles MD   [facility]  cc:   Lacey Puckett PA-C  [address]  [place] [address]

## 2022-01-03 NOTE — PROGRESS NOTES
"Vascular Cardiology Consultation      Moses Elizondo MRN# [id number]   YOB: 1933 Age: 88 year old   Date of Visit 01/03/2022     Reason for consult:  Venous reflux           Assessment and Plan:     1. Bilateral greater saphenous vein reflux, severe    Currently tolerating conservative management.  We discussed that if his restless leg symptoms worsen despite conservative management, we could do bilateral saphenous vein ablation to see if improvement in his neuropathy and restless legs.  Currently the edema is under control.  Follow-up as needed, he should notify us if worsening symptoms and we can order ablation.      This note was transcribed using electronic voice recognition software, typographical errors may be present.                Chief Complaint:   Heart Problem (New Vein Consult )           History of Present Illness:   This patient is a very pleasant 88 year old male who follows with Lacey Puckett for coronary artery disease and had upgrade of his pacemaker and addition of diuretic with improvement in his lower extremity edema and symptoms.  He had venous competency testing which did show severe bilateral greater saphenous vein reflux.  He did not tolerate compression stockings as they made his restless legs feel even worse at night.    The neuropathy and restless legs are his main potentially venous symptoms at this time.  He feels like they are reasonably under control with conservative management.             Physical Exam:     Vitals: /74   Pulse 84   Ht 1.803 m (5' 11\")   Wt 85 kg (187 lb 6.4 oz)   BMI 26.14 kg/m    Constitutional:  cooperative, alert and oriented, well developed, well nourished, in no acute distress        Skin:  warm and dry to the touch        Head:  normocephalic, no masses or lesions        Eyes:  pupils equal and round, conjunctivae and lids unremarkable, sclera white, no xanthalasma, EOMS intact, no nystagmus        ENT:  no pallor or cyanosis    "     Neck:  JVP normal        Neurological:  no gross motor deficits;affect appropriate                      Past Medical History:   I have reviewed this patient's past medical history  Past Medical History:   Diagnosis Date     Aortic root dilatation (H)     4.4 cm     Ascending aorta dilatation (H)     4.4 cm     ASD (atrial septal defect)      Bradycardia      CAD (coronary artery disease)      YOGESH to RCA(6/20/19); YOGESH to OM1(5/15/14)     Cardiac pacemaker 06/20/2019    [employer] PPM COMFORT MRI XT DR-implant 6/20/19     Chest discomfort      Degeneration of lumbar or lumbosacral intervertebral disc      Familial tremor      Former smoker      Herpes zoster without mention of complication      HTN (hypertension)      Hyperlipidaemia      Idiopathic peripheral neuropathy      MGUS (monoclonal gammopathy of unknown significance)      NSTEMI (non-ST elevated myocardial infarction) (H) 2014     Other and unspecified hyperlipidemia      PFO (patent foramen ovale)      Prostatitis, unspecified      Second degree heart block      Sensorineural hearing loss, unspecified      SSS (sick sinus syndrome) (H)      Status post coronary angiogram 6/20/2019     Unspecified hypothyroidism              Past Surgical History:   I have reviewed this patient's past surgical history  Past Surgical History:   Procedure Laterality Date     CHOLECYSTECTOMY       CORONARY ANGIOGRAPHY ADULT ORDER  5/14/14    PTCA w/YOGESH to OM1     CV CORONARY ANGIOGRAM N/A 6/20/2019    Procedure: Coronary Angiogram;  Surgeon: Romie Coronado MD;  Location: [facility] CARDIAC CATH LAB     CV INTRAVASULAR ULTRASOUND N/A 6/20/2019    Procedure: Intravascular Ultrasound;  Surgeon: Romie Coronado MD;  Location: [facility] CARDIAC CATH LAB     CV PCI ATHERECTOMY ORBITAL N/A 6/20/2019    Procedure: PCI Atherectomy Orbital;  Surgeon: Romie Coronado MD;  Location: [facility] CARDIAC CATH LAB     CV PCI STENT DRUG ELUTING N/A 6/20/2019    Procedure: PCI Stent Drug  Eluting;  Surgeon: Romie Coronado MD;  Location:  HEART CARDIAC CATH LAB     CV TEMPORARY PACEMAKER INSERTION N/A 2019    Procedure: Temporary Pacemaker Insertion;  Surgeon: Romie Coronado MD;  Location:  HEART CARDIAC CATH LAB     EP PACEMAKER N/A 2019    Procedure: EP Pacemaker;  Surgeon: Max Dowell MD;  Location:  HEART CARDIAC CATH LAB     EP PPM UPGRADE TO BIVENT N/A 2021    Procedure: EP PPM UPGRADE TO BIVENT;  Surgeon: Tre iMller MD;  Location:  HEART CARDIAC CATH LAB     HEART CATH, ANGIOPLASTY  14    YOGESH to OM1     ZZC NONSPECIFIC PROCEDURE      Laparoscopic cholecystectomy.                Social History:   I have reviewed this patient's social history  Social History     Tobacco Use     Smoking status: Former Smoker     Packs/day: 0.50     Years: 16.00     Pack years: 8.00     Types: Cigarettes     Start date:      Quit date: 1967     Years since quittin.0     Smokeless tobacco: Never Used   Substance Use Topics     Alcohol use: Yes     Comment: 5-7 drinks week - at most one drink daily             Family History:   I have reviewed this patient's family history  Family History   Problem Relation Age of Onset     Cancer Mother      Genitourinary Problems Father 99        complications from surgery     Heart Disease Brother 72        MI             Allergies:     Allergies   Allergen Reactions     No Known Drug Allergies              Medications:   I have reviewed this patient's current medications  Current Outpatient Medications   Medication Sig Dispense Refill     acetaminophen (TYLENOL 8 HOUR) 650 MG CR tablet Take 650 mg by mouth daily       ASPIRIN EC PO Take 81 mg by mouth daily       atorvastatin (LIPITOR) 40 MG tablet Take 1 tablet (40 mg) by mouth daily 90 tablet 3     furosemide (LASIX) 20 MG tablet Take 1 tablet (20 mg) by mouth daily 30 tablet 11     gabapentin (NEURONTIN) 300 MG capsule Take 1 capsule (300 mg) by mouth  "At Bedtime       levothyroxine (SYNTHROID/LEVOTHROID) 100 MCG tablet Take 1 tablet (100 mcg) by mouth daily Upon waking 90 tablet 1     lisinopril (ZESTRIL) 2.5 MG tablet Take 2.5 mg by mouth Take 2.5 mg in AM and 5 mg in PM       metoprolol succinate ER (TOPROL-XL) 25 MG 24 hr tablet Take 1 tablet (25 mg) by mouth daily At lunch 90 tablet 3     nitroGLYcerin (NITROSTAT) 0.4 MG sublingual tablet One tablet under the tongue every 5 minutes if needed for chest pain. May repeat every 5 minutes for a maximum of 3 doses in 15 minutes\" 25 tablet 3     Polyethylene Glycol 3350 (MIRALAX PO) Take by mouth as needed       Glucosamine-Chondroitin (GLUCOSAMINE CHONDROITIN COMPLX) 500-250 MG CAPS Take by mouth daily (Patient not taking: Reported on 1/3/2022)       Valerian 450 MG CAPS Take 2 capsules by mouth daily  (Patient not taking: Reported on 1/3/2022)       Zinc 50 MG CAPS  (Patient not taking: Reported on 1/3/2022)                 Review of Systems:       Review of Systems:  Skin:  Negative     Eyes:  Positive for glasses  ENT:  Negative    Respiratory:  Positive for dyspnea on exertion  Cardiovascular:    Positive for;exercise intolerance;edema  Gastroenterology: Negative    Genitourinary:  Negative    Musculoskeletal:  Positive for joint pain  Neurologic:  Positive for numbness or tingling of feet;memory problems  Psychiatric:  Negative sleep disturbances  Heme/Lymph/Imm:  Negative    Endocrine:  Positive for thyroid disorder                     Data:   All laboratory data reviewed  Lab Results   Component Value Date    CHOL 105 07/07/2020     Lab Results   Component Value Date    HDL 42 07/07/2020     Lab Results   Component Value Date    LDL 49 07/07/2020     Lab Results   Component Value Date    TRIG 71 07/07/2020     Lab Results   Component Value Date    CHOLHDLRATIO 2.9 07/08/2014     TSH   Date Value Ref Range Status   03/02/2021 2.27 0.40 - 4.00 mU/L Final     Last Basic Metabolic Panel:  Lab Results   Component " Value Date     11/12/2021     04/07/2021      Lab Results   Component Value Date    POTASSIUM 4.5 11/12/2021    POTASSIUM 4.1 04/07/2021     Lab Results   Component Value Date    CHLORIDE 110 11/12/2021    CHLORIDE 109 04/07/2021     Lab Results   Component Value Date    GABRIELA 8.8 11/12/2021    GABRIELA 8.8 04/07/2021     Lab Results   Component Value Date    CO2 24 11/12/2021    CO2 28 04/07/2021     Lab Results   Component Value Date    BUN 24 11/12/2021    BUN 25 04/07/2021     Lab Results   Component Value Date    CR 1.26 11/12/2021    CR 1.28 04/07/2021     Lab Results   Component Value Date    GLC 79 11/12/2021    GLC 75 04/07/2021     Lab Results   Component Value Date    WBC 6.6 11/12/2021    WBC 6.2 07/23/2020     Lab Results   Component Value Date    RBC 4.18 11/12/2021    RBC 4.58 07/23/2020     Lab Results   Component Value Date    HGB 12.3 11/12/2021    HGB 13.2 03/02/2021     Lab Results   Component Value Date    HCT 37.8 11/12/2021    HCT 41.4 07/23/2020     Lab Results   Component Value Date    MCV 90 11/12/2021    MCV 90 07/23/2020     Lab Results   Component Value Date    MCH 29.4 11/12/2021    MCH 29.9 07/23/2020     Lab Results   Component Value Date    MCHC 32.5 11/12/2021    MCHC 33.1 07/23/2020     Lab Results   Component Value Date    RDW 12.0 11/12/2021    RDW 12.5 07/23/2020     Lab Results   Component Value Date     11/12/2021     07/23/2020

## 2022-01-18 ENCOUNTER — OFFICE VISIT (OUTPATIENT)
Dept: CARDIOLOGY | Facility: CLINIC | Age: 87
End: 2022-01-18
Payer: COMMERCIAL

## 2022-01-18 ENCOUNTER — LAB (OUTPATIENT)
Dept: LAB | Facility: CLINIC | Age: 87
End: 2022-01-18
Payer: COMMERCIAL

## 2022-01-18 VITALS
SYSTOLIC BLOOD PRESSURE: 110 MMHG | OXYGEN SATURATION: 98 % | HEIGHT: 71 IN | DIASTOLIC BLOOD PRESSURE: 72 MMHG | HEART RATE: 68 BPM | BODY MASS INDEX: 26.46 KG/M2 | WEIGHT: 189 LBS

## 2022-01-18 DIAGNOSIS — I50.42 CHRONIC COMBINED SYSTOLIC AND DIASTOLIC HRT FAIL (H): ICD-10-CM

## 2022-01-18 LAB
ANION GAP SERPL CALCULATED.3IONS-SCNC: 4 MMOL/L (ref 3–14)
BUN SERPL-MCNC: 25 MG/DL (ref 7–30)
CALCIUM SERPL-MCNC: 9.5 MG/DL (ref 8.5–10.1)
CHLORIDE BLD-SCNC: 105 MMOL/L (ref 94–109)
CO2 SERPL-SCNC: 27 MMOL/L (ref 20–32)
CREAT SERPL-MCNC: 1.15 MG/DL (ref 0.66–1.25)
GFR SERPL CREATININE-BSD FRML MDRD: 61 ML/MIN/1.73M2
GLUCOSE BLD-MCNC: 89 MG/DL (ref 70–99)
POTASSIUM BLD-SCNC: 4.6 MMOL/L (ref 3.4–5.3)
SODIUM SERPL-SCNC: 136 MMOL/L (ref 133–144)

## 2022-01-18 PROCEDURE — 36415 COLL VENOUS BLD VENIPUNCTURE: CPT

## 2022-01-18 PROCEDURE — 99214 OFFICE O/P EST MOD 30 MIN: CPT | Performed by: PHYSICIAN ASSISTANT

## 2022-01-18 PROCEDURE — 80048 BASIC METABOLIC PNL TOTAL CA: CPT

## 2022-01-18 RX ORDER — SACUBITRIL AND VALSARTAN 49; 51 MG/1; MG/1
1 TABLET, FILM COATED ORAL 2 TIMES DAILY
Qty: 60 TABLET | Refills: 5 | Status: SHIPPED | OUTPATIENT
Start: 2022-01-18 | End: 2022-07-19

## 2022-01-18 ASSESSMENT — MIFFLIN-ST. JEOR: SCORE: 1549.43

## 2022-01-18 NOTE — LETTER
1/18/2022    Steven Wagoner MD  [address] [facility] [address]    RE: Moses Elizondo       Dear Colleague,     I had the pleasure of seeing Moses Elizondo in the [facility].    Cardiology Clinic Progress Note    Moses Elizondo MRN# [id number]   YOB: 1933 Age: 88 year old   Primary cardiologist: Dr. Huggins         Assessment and Plan:     In summary, Moses Elizondo presents today for a six week follow up visit after the up-titration of lisinopril for CHF med optimization. He continues to complain of an inadequate functional capacity for the past ~6 months.     1. Upgrade to CRT-P on 9/16/21. Stable FC II-III sxs despite this.   2. 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 GDMT includes Toprol 25, lisinopril 7.5 daily. Appears well-compensated on exam.   3. Severe bilateral venous insufficiency with chronic superficial venous thrombosis.    -- Conservative management recommended by Dr. Myles (vein clinic) and hematology unless symptoms worsen. Currently well-controlled with low-dose diuretic therapy and intermittent compression.   4. Limiting knee pain. Anticipates he may need surgery in the future.     Plan:  - STOP lisinopril + furosemide, START Entresto 49/51 mg BID tomorrow evening.    Instructed to notify me with any lightheadedness/dizziness, in which case we would have him cut his dose of Entresto in half.   - Follow up with me in 3 weeks with a BMP.   - Follow up with Dr. Huggins in four months with repeat TTE prior.   - He will call me in the meantime if he decides to pursue knee surgery. Would discuss with Dr. Huggins whether stress test is needed, in that case.         History of Presenting Illness:      Moses Elizondo is a pleasant 88 year old patient who presents today for a six week follow up visit.     His pertinent cardiac history includes:  # CAD, s/p PCI to OM1 (2014)  # Newly diagnosed  cardiomyopathy, LVEF 38% per routine TTE on 1/6/21. 44% per MRI. Suspect pacemaker-induced.   # No evidence of ischemia or infiltrative disease on 1/2021 cMRI.   # SSS, which has now progressed to CHB. S/p PPM.  # Chronic knee pain, arthritis, and Baker's cysts  # Peripheral edema, venous insufficiency  # Mild ascending aortic enlargement  # Hypothyroidism  # Hyperlipidemia  # Idiopathic peripheral neuropathy    In brief, Zane saw Dr. Huggins on July 14 for a routine follow up visit.  He complained of worsening leg edema for that time. proBNP was elevated ~1,000 and therefore furosemide 40 mg daily was initiated, an echocardiogram and venous competency study ordered.  He asked him to wear VERNA stockings as well.    His testing took place on August 9.  Echocardiogram was stable from that in January, showing EF estimated at 37%, with moderate global hypokinesis, mild AI, and moderately dilated ascending aorta at 4.4 cm.  His competency study showed a nonocclusive thrombus of the right GSV from the mid thigh to the calf, with severe venous reflux down to the mid calf, where it appeared occluded.  There was also a Baker's cyst incidentally noted.  On the left, there is moderate to severe reflux throughout the entire length of the GSV, also with a Baker's cyst noted. He was referred to see Dr. Myles in the vein clinic for this, and ultimatelty met with him on January 3rd. Dr. Myles recommended ongoing conservative management for his venous insufficiency, but with worsening symptoms could consider bilateral saphenous vein ablation. Regarding his superficial thrombus, Dr. Huggins spoke with hematology who recommended conservative management unless he developed symptoms from it. He had a follow-up RLE venous ultrasound on 11/10 for reassessment and this appeared stable.     When he continued to note no improvement in his functional capacity, he was referred to EP for consideration of CRT. On September 16, he went underwent upgrade to  "biventricular pacemaker.     When I saw him last in November, he was feeling no different -- still quite winded with activity. I checked his device which showed he was biventricular paced 99.7%, and atrial paced 99.2%.  His ADL response was increased to see if this would help his exertional dyspnea. I encouraged him to increase exercise on his recumbent bike and increased his lisinopril to 7.5 mg daily for GDMT optimization.     Today, Zane returns to clinic with his daughter stating he feels no different. Weight is stable. BP is well-controlled. BMP is WNL. Hasn't been exercising on his stationary bike out of worry that it might bother his knees. I encouraged him to try it.          Review of Systems:     12-pt ROS is negative except for as noted in the HPI.          Physical Exam:     Vitals: /72 (BP Location: Right arm, Cuff Size: Adult Regular)   Pulse 68   Ht 1.803 m (5' 11\")   Wt 85.7 kg (189 lb)   SpO2 98%   BMI 26.36 kg/m    Wt Readings from Last 10 Encounters:   01/18/22 85.7 kg (189 lb)   01/03/22 85 kg (187 lb 6.4 oz)   11/12/21 85.7 kg (189 lb)   09/16/21 86.5 kg (190 lb 9.6 oz)   08/30/21 85.3 kg (188 lb)   08/16/21 86 kg (189 lb 11.2 oz)   07/14/21 88.5 kg (195 lb 1.6 oz)   04/07/21 89.9 kg (198 lb 1.6 oz)   03/10/21 89 kg (196 lb 4.8 oz)   03/02/21 89.8 kg (198 lb)       Constitutional:  Patient is pleasant, alert, cooperative, and in NAD.  HEENT:  NCAT. PERRLA. EOM's intact.   Neck:  CVP appears normal. No carotid bruits.   Pulmonary: Normal respiratory effort. CTAB.   Cardiac: RRR, normal S1/S2, no S3/S4, no murmur or rub.   Abdomen:  Non-tender abdomen, no hepatosplenomegaly appreciated.   Vascular: Pulses in the upper and lower extremities are 2+ and equal bilaterally.  Extremities: Trace lower extremity edema, erythema, cyanosis or tenderness appreciated.  Skin:  No rashes or lesions appreciated.   Neurological:  No gross motor or sensory deficits.   Psych: Appropriate affect.        " Data:     Labs reviewed:  Recent Labs   Lab Test 11/12/21  1204 07/14/21  0827 03/02/21  0927 07/23/20  1008 07/07/20  0000 10/31/19  0849 05/02/19  0000 03/20/19  0823 02/27/18  0817   LDL  --   --   --   --  49 60  --  61 40   HDL  --   --   --   --  42 52  --  40 49   NHDL  --   --   --   --   --  74  --  76 52   CHOL  --   --   --   --  105 126  --  116 101   TRIG  --   --   --   --  71 70  --  75 59   TSH  --   --  2.27 2.87  --  1.94   < > 2.20 2.53   NTBNP 715* 1,009* 804*  --   --   --   --   --   --     < > = values in this interval not displayed.       Lab Results   Component Value Date    WBC 6.6 11/12/2021    WBC 6.2 07/23/2020    RBC 4.18 (L) 11/12/2021    RBC 4.58 07/23/2020    HGB 12.3 (L) 11/12/2021    HGB 13.2 (L) 03/02/2021    HCT 37.8 (L) 11/12/2021    HCT 41.4 07/23/2020    MCV 90 11/12/2021    MCV 90 07/23/2020    MCH 29.4 11/12/2021    MCH 29.9 07/23/2020    MCHC 32.5 11/12/2021    MCHC 33.1 07/23/2020    RDW 12.0 11/12/2021    RDW 12.5 07/23/2020     11/12/2021     07/23/2020       Lab Results   Component Value Date     01/18/2022     04/07/2021    POTASSIUM 4.6 01/18/2022    POTASSIUM 4.1 04/07/2021    CHLORIDE 105 01/18/2022    CHLORIDE 109 04/07/2021    CO2 27 01/18/2022    CO2 28 04/07/2021    ANIONGAP 4 01/18/2022    ANIONGAP 2 (L) 04/07/2021    GLC 89 01/18/2022    GLC 75 04/07/2021    BUN 25 01/18/2022    BUN 25 04/07/2021    CR 1.15 01/18/2022    CR 1.28 (H) 04/07/2021    GFRESTIMATED 61 01/18/2022    GFRESTIMATED 50 (L) 04/07/2021    GFRESTBLACK 58 (L) 04/07/2021    GABRIELA 9.5 01/18/2022    GABRIELA 8.8 04/07/2021      Lab Results   Component Value Date    AST 32 03/02/2021    ALT 30 03/02/2021       Lab Results   Component Value Date    A1C 5.6 05/02/2019       Lab Results   Component Value Date    INR 1.00 06/20/2019           Problem List:     Patient Active Problem List   Diagnosis     Hypothyroidism, unspecified type     Degeneration of lumbar or lumbosacral  "intervertebral disc     MGUS (monoclonal gammopathy of unknown significance)     Familial tremor     Hyperlipidemia LDL goal <100     NSTEMI (non-ST elevated myocardial infarction) (H)     Health residential     Coronary artery disease     Bradycardia     Aneurysm of thoracic aorta (H)     Sinus bradycardia     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)     Ascending aortic aneurysm (H)     Sick sinus syndrome (H)     Aortic root dilatation (H)     Medicare annual wellness visit, subsequent     Idiopathic peripheral neuropathy     Chest discomfort     Status post coronary angiogram     Second degree AV block     Cardiac pacemaker in situ     Chronic kidney disease, stage 3 (H)     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)           Medications:     Current Outpatient Medications   Medication Sig Dispense Refill     acetaminophen (TYLENOL 8 HOUR) 650 MG CR tablet Take 650 mg by mouth daily       ASPIRIN EC PO Take 81 mg by mouth daily       atorvastatin (LIPITOR) 40 MG tablet Take 1 tablet (40 mg) by mouth daily 90 tablet 3     furosemide (LASIX) 20 MG tablet Take 1 tablet (20 mg) by mouth daily 30 tablet 11     gabapentin (NEURONTIN) 300 MG capsule Take 1 capsule (300 mg) by mouth At Bedtime       levothyroxine (SYNTHROID/LEVOTHROID) 100 MCG tablet Take 1 tablet (100 mcg) by mouth daily Upon waking 90 tablet 1     lisinopril (ZESTRIL) 2.5 MG tablet Take 2.5 mg by mouth Take 2.5 mg in AM and 5 mg in PM       metoprolol succinate ER (TOPROL-XL) 25 MG 24 hr tablet Take 1 tablet (25 mg) by mouth daily At lunch 90 tablet 3     nitroGLYcerin (NITROSTAT) 0.4 MG sublingual tablet One tablet under the tongue every 5 minutes if needed for chest pain. May repeat every 5 minutes for a maximum of 3 doses in 15 minutes\" 25 tablet 3     Polyethylene Glycol 3350 (MIRALAX PO) Take by mouth as needed       Glucosamine-Chondroitin (GLUCOSAMINE CHONDROITIN COMPLX) 500-250 MG " CAPS Take by mouth daily (Patient not taking: Reported on 1/3/2022)             Past Medical History:     Past Medical History:   Diagnosis Date     Aortic root dilatation (H)     4.4 cm     Ascending aorta dilatation (H)     4.4 cm     ASD (atrial septal defect)      Bradycardia      CAD (coronary artery disease)      YOGESH to RCA(6/20/19); YOGESH to OM1(5/15/14)     Cardiac pacemaker 06/20/2019    [employer] PPM COMFORT MRI XT DR-implant 6/20/19     Chest discomfort      Degeneration of lumbar or lumbosacral intervertebral disc      Familial tremor      Former smoker      Herpes zoster without mention of complication      HTN (hypertension)      Hyperlipidaemia      Idiopathic peripheral neuropathy      MGUS (monoclonal gammopathy of unknown significance)      NSTEMI (non-ST elevated myocardial infarction) (H) 2014     Other and unspecified hyperlipidemia      PFO (patent foramen ovale)      Prostatitis, unspecified      Second degree heart block      Sensorineural hearing loss, unspecified      SSS (sick sinus syndrome) (H)      Status post coronary angiogram 6/20/2019     Unspecified hypothyroidism      Past Surgical History:   Procedure Laterality Date     CHOLECYSTECTOMY       CORONARY ANGIOGRAPHY ADULT ORDER  5/14/14    PTCA w/YOGESH to OM1     CV CORONARY ANGIOGRAM N/A 6/20/2019    Procedure: Coronary Angiogram;  Surgeon: Romie Coronado MD;  Location:  [facility]     CV INTRAVASULAR ULTRASOUND N/A 6/20/2019    Procedure: Intravascular Ultrasound;  Surgeon: Romie Coronado MD;  Location:  [facility]     CV PCI ATHERECTOMY ORBITAL N/A 6/20/2019    Procedure: PCI Atherectomy Orbital;  Surgeon: Romie Coronado MD;  Location:  [facility]     CV PCI STENT DRUG ELUTING N/A 6/20/2019    Procedure: PCI Stent Drug Eluting;  Surgeon: Romie Coronado MD;  Location:  [facility]     CV TEMPORARY PACEMAKER INSERTION N/A 6/20/2019    Procedure: Temporary Pacemaker  Insertion;  Surgeon: Romie Coronado MD;  Location:  [facility]     EP PACEMAKER N/A 2019    Procedure: EP Pacemaker;  Surgeon: Max Dowell MD;  Location:  [facility]     EP PPM UPGRADE TO BIVENT N/A 2021    Procedure: EP PPM UPGRADE TO BIVENT;  Surgeon: Tre Miller MD;  Location:  [facility]     HEART CATH, ANGIOPLASTY  14    YOGESH to OM1     ZZC NONSPECIFIC PROCEDURE      Laparoscopic cholecystectomy.      Family History   Problem Relation Age of Onset     Cancer Mother      Genitourinary Problems Father 99        complications from surgery     Heart Disease Brother 72        MI     Social History     Socioeconomic History     Marital status:      Spouse name: Not on file     Number of children: Not on file     Years of education: Not on file     Highest education level: Not on file   Occupational History     Not on file   Tobacco Use     Smoking status: Former Smoker     Packs/day: 0.50     Years: 16.00     Pack years: 8.00     Types: Cigarettes     Start date:      Quit date: 1967     Years since quittin.0     Smokeless tobacco: Never Used   Substance and Sexual Activity     Alcohol use: Yes     Comment: 5-7 drinks week - at most one drink daily     Drug use: No     Sexual activity: Never   Other Topics Concern      Service Not Asked     Blood Transfusions Not Asked     Caffeine Concern Yes     Comment: 2 cups coffee/day     Occupational Exposure Not Asked     Hobby Hazards Not Asked     Sleep Concern No     Stress Concern Not Asked     Weight Concern Yes     Comment: limiting alcohol to watch calories     Special Diet Not Asked     Back Care Not Asked     Exercise Yes     Comment: Stationary bike  weights and aerobics 4 times week     Bike Helmet Not Asked     Seat Belt Yes     Self-Exams Not Asked     Parent/sibling w/ CABG, MI or angioplasty before 65F 55M? No   Social History Narrative     Not on  file     Social Determinants of Health     Financial Resource Strain: Not on file   Food Insecurity: Not on file   Transportation Needs: Not on file   Physical Activity: Not on file   Stress: Not on file   Social Connections: Not on file   Intimate Partner Violence: Not on file   Housing Stability: Not on file           Allergies:   No known drug allergies      Lacey Puckett PA-C  [facility]  Pager: [phone]    Today's clinic visit entailed:  I spent a total of 45 minutes on the day of the visit.   Time spent doing chart review, history and exam, documentation and further activities per the note  Provider  Link to MDM Help Grid

## 2022-01-18 NOTE — PROGRESS NOTES
Cardiology Clinic Progress Note    Moses Elizondo MRN# [id number]   YOB: 1933 Age: 88 year old   Primary cardiologist: Dr. Huggins         Assessment and Plan:     In summary, Moses Elizondo presents today for a six week follow up visit after the up-titration of lisinopril for CHF med optimization. He continues to complain of an inadequate functional capacity for the past ~6 months.     1. Upgrade to CRT-P on 9/16/21. Stable FC II-III sxs despite this.   2. 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 GDMT includes Toprol 25, lisinopril 7.5 daily. Appears well-compensated on exam.   3. Severe bilateral venous insufficiency with chronic superficial venous thrombosis.    -- Conservative management recommended by Dr. Myles (vein clinic) and hematology unless symptoms worsen. Currently well-controlled with low-dose diuretic therapy and intermittent compression.   4. Limiting knee pain. Anticipates he may need surgery in the future.     Plan:  - STOP lisinopril + furosemide, START Entresto 49/51 mg BID tomorrow evening.    Instructed to notify me with any lightheadedness/dizziness, in which case we would have him cut his dose of Entresto in half.   - Follow up with me in 3 weeks with a BMP.   - Follow up with Dr. Huggins in four months with repeat TTE prior.   - He will call me in the meantime if he decides to pursue knee surgery. Would discuss with Dr. Huggins whether stress test is needed, in that case.         History of Presenting Illness:      Moses Elizondo is a pleasant 88 year old patient who presents today for a six week follow up visit.     His pertinent cardiac history includes:  # CAD, s/p PCI to OM1 (2014)  # Newly diagnosed cardiomyopathy, LVEF 38% per routine TTE on 1/6/21. 44% per MRI. Suspect pacemaker-induced.   # No evidence of ischemia or infiltrative disease on 1/2021 cMRI.   # SSS, which has now progressed to CHB. S/p PPM.  # Chronic knee  pain, arthritis, and Baker's cysts  # Peripheral edema, venous insufficiency  # Mild ascending aortic enlargement  # Hypothyroidism  # Hyperlipidemia  # Idiopathic peripheral neuropathy    In brief, Zane saw Dr. Huggins on July 14 for a routine follow up visit.  He complained of worsening leg edema for that time. proBNP was elevated ~1,000 and therefore furosemide 40 mg daily was initiated, an echocardiogram and venous competency study ordered.  He asked him to wear VERNA stockings as well.    His testing took place on August 9.  Echocardiogram was stable from that in January, showing EF estimated at 37%, with moderate global hypokinesis, mild AI, and moderately dilated ascending aorta at 4.4 cm.  His competency study showed a nonocclusive thrombus of the right GSV from the mid thigh to the calf, with severe venous reflux down to the mid calf, where it appeared occluded.  There was also a Baker's cyst incidentally noted.  On the left, there is moderate to severe reflux throughout the entire length of the GSV, also with a Baker's cyst noted. He was referred to see Dr. Myles in the vein clinic for this, and ultimatelty met with him on January 3rd. Dr. Myles recommended ongoing conservative management for his venous insufficiency, but with worsening symptoms could consider bilateral saphenous vein ablation. Regarding his superficial thrombus, Dr. Huggins spoke with hematology who recommended conservative management unless he developed symptoms from it. He had a follow-up RLE venous ultrasound on 11/10 for reassessment and this appeared stable.     When he continued to note no improvement in his functional capacity, he was referred to EP for consideration of CRT. On September 16, he went underwent upgrade to biventricular pacemaker.     When I saw him last in November, he was feeling no different -- still quite winded with activity. I checked his device which showed he was biventricular paced 99.7%, and atrial paced 99.2%.  His  "ADL response was increased to see if this would help his exertional dyspnea. I encouraged him to increase exercise on his recumbent bike and increased his lisinopril to 7.5 mg daily for GDMT optimization.     Today, Zane returns to clinic with his daughter stating he feels no different. Weight is stable. BP is well-controlled. BMP is WNL. Hasn't been exercising on his stationary bike out of worry that it might bother his knees. I encouraged him to try it.          Review of Systems:     12-pt ROS is negative except for as noted in the HPI.          Physical Exam:     Vitals: /72 (BP Location: Right arm, Cuff Size: Adult Regular)   Pulse 68   Ht 1.803 m (5' 11\")   Wt 85.7 kg (189 lb)   SpO2 98%   BMI 26.36 kg/m    Wt Readings from Last 10 Encounters:   01/18/22 85.7 kg (189 lb)   01/03/22 85 kg (187 lb 6.4 oz)   11/12/21 85.7 kg (189 lb)   09/16/21 86.5 kg (190 lb 9.6 oz)   08/30/21 85.3 kg (188 lb)   08/16/21 86 kg (189 lb 11.2 oz)   07/14/21 88.5 kg (195 lb 1.6 oz)   04/07/21 89.9 kg (198 lb 1.6 oz)   03/10/21 89 kg (196 lb 4.8 oz)   03/02/21 89.8 kg (198 lb)       Constitutional:  Patient is pleasant, alert, cooperative, and in NAD.  HEENT:  NCAT. PERRLA. EOM's intact.   Neck:  CVP appears normal. No carotid bruits.   Pulmonary: Normal respiratory effort. CTAB.   Cardiac: RRR, normal S1/S2, no S3/S4, no murmur or rub.   Abdomen:  Non-tender abdomen, no hepatosplenomegaly appreciated.   Vascular: Pulses in the upper and lower extremities are 2+ and equal bilaterally.  Extremities: Trace lower extremity edema, erythema, cyanosis or tenderness appreciated.  Skin:  No rashes or lesions appreciated.   Neurological:  No gross motor or sensory deficits.   Psych: Appropriate affect.        Data:     Labs reviewed:  Recent Labs   Lab Test 11/12/21  1204 07/14/21  0827 03/02/21  0927 07/23/20  1008 07/07/20  0000 10/31/19  0849 05/02/19  0000 03/20/19  0823 02/27/18  0817   LDL  --   --   --   --  49 60  --  61 " 40   HDL  --   --   --   --  42 52  --  40 49   NHDL  --   --   --   --   --  74  --  76 52   CHOL  --   --   --   --  105 126  --  116 101   TRIG  --   --   --   --  71 70  --  75 59   TSH  --   --  2.27 2.87  --  1.94   < > 2.20 2.53   NTBNP 715* 1,009* 804*  --   --   --   --   --   --     < > = values in this interval not displayed.       Lab Results   Component Value Date    WBC 6.6 11/12/2021    WBC 6.2 07/23/2020    RBC 4.18 (L) 11/12/2021    RBC 4.58 07/23/2020    HGB 12.3 (L) 11/12/2021    HGB 13.2 (L) 03/02/2021    HCT 37.8 (L) 11/12/2021    HCT 41.4 07/23/2020    MCV 90 11/12/2021    MCV 90 07/23/2020    MCH 29.4 11/12/2021    MCH 29.9 07/23/2020    MCHC 32.5 11/12/2021    MCHC 33.1 07/23/2020    RDW 12.0 11/12/2021    RDW 12.5 07/23/2020     11/12/2021     07/23/2020       Lab Results   Component Value Date     01/18/2022     04/07/2021    POTASSIUM 4.6 01/18/2022    POTASSIUM 4.1 04/07/2021    CHLORIDE 105 01/18/2022    CHLORIDE 109 04/07/2021    CO2 27 01/18/2022    CO2 28 04/07/2021    ANIONGAP 4 01/18/2022    ANIONGAP 2 (L) 04/07/2021    GLC 89 01/18/2022    GLC 75 04/07/2021    BUN 25 01/18/2022    BUN 25 04/07/2021    CR 1.15 01/18/2022    CR 1.28 (H) 04/07/2021    GFRESTIMATED 61 01/18/2022    GFRESTIMATED 50 (L) 04/07/2021    GFRESTBLACK 58 (L) 04/07/2021    GABRIELA 9.5 01/18/2022    GABRIELA 8.8 04/07/2021      Lab Results   Component Value Date    AST 32 03/02/2021    ALT 30 03/02/2021       Lab Results   Component Value Date    A1C 5.6 05/02/2019       Lab Results   Component Value Date    INR 1.00 06/20/2019           Problem List:     Patient Active Problem List   Diagnosis     Hypothyroidism, unspecified type     Degeneration of lumbar or lumbosacral intervertebral disc     MGUS (monoclonal gammopathy of unknown significance)     Familial tremor     Hyperlipidemia LDL goal <100     NSTEMI (non-ST elevated myocardial infarction) (H)     Health [facility]     Coronary artery  "disease     Bradycardia     Aneurysm of thoracic aorta (H)     Sinus bradycardia     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)     Ascending aortic aneurysm (H)     Sick sinus syndrome (H)     Aortic root dilatation (H)     Medicare annual wellness visit, subsequent     Idiopathic peripheral neuropathy     Chest discomfort     Status post coronary angiogram     Second degree AV block     Cardiac pacemaker in situ     Chronic kidney disease, stage 3 (H)     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)           Medications:     Current Outpatient Medications   Medication Sig Dispense Refill     acetaminophen (TYLENOL 8 HOUR) 650 MG CR tablet Take 650 mg by mouth daily       ASPIRIN EC PO Take 81 mg by mouth daily       atorvastatin (LIPITOR) 40 MG tablet Take 1 tablet (40 mg) by mouth daily 90 tablet 3     furosemide (LASIX) 20 MG tablet Take 1 tablet (20 mg) by mouth daily 30 tablet 11     gabapentin (NEURONTIN) 300 MG capsule Take 1 capsule (300 mg) by mouth At Bedtime       levothyroxine (SYNTHROID/LEVOTHROID) 100 MCG tablet Take 1 tablet (100 mcg) by mouth daily Upon waking 90 tablet 1     lisinopril (ZESTRIL) 2.5 MG tablet Take 2.5 mg by mouth Take 2.5 mg in AM and 5 mg in PM       metoprolol succinate ER (TOPROL-XL) 25 MG 24 hr tablet Take 1 tablet (25 mg) by mouth daily At lunch 90 tablet 3     nitroGLYcerin (NITROSTAT) 0.4 MG sublingual tablet One tablet under the tongue every 5 minutes if needed for chest pain. May repeat every 5 minutes for a maximum of 3 doses in 15 minutes\" 25 tablet 3     Polyethylene Glycol 3350 (MIRALAX PO) Take by mouth as needed       Glucosamine-Chondroitin (GLUCOSAMINE CHONDROITIN COMPLX) 500-250 MG CAPS Take by mouth daily (Patient not taking: Reported on 1/3/2022)             Past Medical History:     Past Medical History:   Diagnosis Date     Aortic root dilatation (H)     4.4 cm     Ascending aorta dilatation (H)     " 4.4 cm     ASD (atrial septal defect)      Bradycardia      CAD (coronary artery disease)      YOGESH to RCA(6/20/19); YOGESH to OM1(5/15/14)     Cardiac pacemaker 06/20/2019    [employer] PPM COMFORT MRI XT DR-implant 6/20/19     Chest discomfort      Degeneration of lumbar or lumbosacral intervertebral disc      Familial tremor      Former smoker      Herpes zoster without mention of complication      HTN (hypertension)      Hyperlipidaemia      Idiopathic peripheral neuropathy      MGUS (monoclonal gammopathy of unknown significance)      NSTEMI (non-ST elevated myocardial infarction) (H) 2014     Other and unspecified hyperlipidemia      PFO (patent foramen ovale)      Prostatitis, unspecified      Second degree heart block      Sensorineural hearing loss, unspecified      SSS (sick sinus syndrome) (H)      Status post coronary angiogram 6/20/2019     Unspecified hypothyroidism      Past Surgical History:   Procedure Laterality Date     CHOLECYSTECTOMY       CORONARY ANGIOGRAPHY ADULT ORDER  5/14/14    PTCA w/YOGESH to OM1     CV CORONARY ANGIOGRAM N/A 6/20/2019    Procedure: Coronary Angiogram;  Surgeon: Romie Coronado MD;  Location: [facility] CARDIAC CATH LAB     CV INTRAVASULAR ULTRASOUND N/A 6/20/2019    Procedure: Intravascular Ultrasound;  Surgeon: Romie Coronado MD;  Location: [facility] CARDIAC CATH LAB     CV PCI ATHERECTOMY ORBITAL N/A 6/20/2019    Procedure: PCI Atherectomy Orbital;  Surgeon: Romie Coronado MD;  Location: [facility] CARDIAC CATH LAB     CV PCI STENT DRUG ELUTING N/A 6/20/2019    Procedure: PCI Stent Drug Eluting;  Surgeon: Romie Coronado MD;  Location:  HEART CARDIAC CATH LAB     CV TEMPORARY PACEMAKER INSERTION N/A 6/20/2019    Procedure: Temporary Pacemaker Insertion;  Surgeon: Romie Coronado MD;  Location: [facility] CARDIAC CATH LAB     EP PACEMAKER N/A 6/20/2019    Procedure: EP Pacemaker;  Surgeon: Max Dowell MD;  Location:  HEART CARDIAC CATH LAB     EP  PPM UPGRADE TO BIVENT N/A 2021    Procedure: EP PPM UPGRADE TO BIVENT;  Surgeon: Tre Miller MD;  Location:  HEART CARDIAC CATH LAB     HEART CATH, ANGIOPLASTY  14    YOGESH to OM1     ZZC NONSPECIFIC PROCEDURE      Laparoscopic cholecystectomy.      Family History   Problem Relation Age of Onset     Cancer Mother      Genitourinary Problems Father 99        complications from surgery     Heart Disease Brother 72        MI     Social History     Socioeconomic History     Marital status:      Spouse name: Not on file     Number of children: Not on file     Years of education: Not on file     Highest education level: Not on file   Occupational History     Not on file   Tobacco Use     Smoking status: Former Smoker     Packs/day: 0.50     Years: 16.00     Pack years: 8.00     Types: Cigarettes     Start date:      Quit date: 1967     Years since quittin.0     Smokeless tobacco: Never Used   Substance and Sexual Activity     Alcohol use: Yes     Comment: 5-7 drinks week - at most one drink daily     Drug use: No     Sexual activity: Never   Other Topics Concern      Service Not Asked     Blood Transfusions Not Asked     Caffeine Concern Yes     Comment: 2 cups coffee/day     Occupational Exposure Not Asked     Hobby Hazards Not Asked     Sleep Concern No     Stress Concern Not Asked     Weight Concern Yes     Comment: limiting alcohol to watch calories     Special Diet Not Asked     Back Care Not Asked     Exercise Yes     Comment: Stationary bike  weights and aerobics 4 times week     Bike Helmet Not Asked     Seat Belt Yes     Self-Exams Not Asked     Parent/sibling w/ CABG, MI or angioplasty before 65F 55M? No   Social History Narrative     Not on file     Social Determinants of Health     Financial Resource Strain: Not on file   Food Insecurity: Not on file   Transportation Needs: Not on file   Physical Activity: Not on file   Stress: Not on file   Social Connections: Not  on file   Intimate Partner Violence: Not on file   Housing Stability: Not on file           Allergies:   No known drug allergies      Lacey Puckett PA-C  [facility]  Pager: [phone]    Today's clinic visit entailed:  I spent a total of 45 minutes on the day of the visit.   Time spent doing chart review, history and exam, documentation and further activities per the note  Provider  Link to [facility] Help Grid

## 2022-01-18 NOTE — PATIENT INSTRUCTIONS
Today, we discussed the following:   - Results:     The kidney function looks great.   - Medication changes:      TODAY, STOP lisinopril (last dose 1/18 AM) and STOP furosemide.     Tomorrow EVENING, START Entresto twice daily.   - Follow up:     With me in 3 weeks with a blood draw.     With Dr. Huggins in 4 months with a repeat echocardiogram.     If you have questions or concerns please call my nurse team at [phone].   Scheduling phone number: [phone]  For after hours urgent concerns call [phone] option 2.   Reminder: Please bring in all current medications, over the counter supplements and vitamin bottles to your next appointment.    It was a pleasure seeing you today!     Lacey Puckett PA-C

## 2022-02-04 ENCOUNTER — OFFICE VISIT (OUTPATIENT)
Dept: CARDIOLOGY | Facility: CLINIC | Age: 87
End: 2022-02-04
Payer: COMMERCIAL

## 2022-02-04 ENCOUNTER — LAB (OUTPATIENT)
Dept: LAB | Facility: CLINIC | Age: 87
End: 2022-02-04
Payer: COMMERCIAL

## 2022-02-04 VITALS
HEART RATE: 71 BPM | DIASTOLIC BLOOD PRESSURE: 61 MMHG | HEIGHT: 71 IN | WEIGHT: 192.6 LBS | SYSTOLIC BLOOD PRESSURE: 99 MMHG | BODY MASS INDEX: 26.96 KG/M2 | OXYGEN SATURATION: 98 %

## 2022-02-04 DIAGNOSIS — I50.42 CHRONIC COMBINED SYSTOLIC AND DIASTOLIC HRT FAIL (H): ICD-10-CM

## 2022-02-04 LAB
ANION GAP SERPL CALCULATED.3IONS-SCNC: 2 MMOL/L (ref 3–14)
BUN SERPL-MCNC: 22 MG/DL (ref 7–30)
CALCIUM SERPL-MCNC: 9.4 MG/DL (ref 8.5–10.1)
CHLORIDE BLD-SCNC: 108 MMOL/L (ref 94–109)
CO2 SERPL-SCNC: 27 MMOL/L (ref 20–32)
CREAT SERPL-MCNC: 1.12 MG/DL (ref 0.66–1.25)
GFR SERPL CREATININE-BSD FRML MDRD: 63 ML/MIN/1.73M2
GLUCOSE BLD-MCNC: 92 MG/DL (ref 70–99)
POTASSIUM BLD-SCNC: 4.3 MMOL/L (ref 3.4–5.3)
SODIUM SERPL-SCNC: 137 MMOL/L (ref 133–144)

## 2022-02-04 PROCEDURE — 99214 OFFICE O/P EST MOD 30 MIN: CPT | Performed by: PHYSICIAN ASSISTANT

## 2022-02-04 PROCEDURE — 36415 COLL VENOUS BLD VENIPUNCTURE: CPT | Performed by: INTERNAL MEDICINE

## 2022-02-04 PROCEDURE — 80048 BASIC METABOLIC PNL TOTAL CA: CPT | Performed by: INTERNAL MEDICINE

## 2022-02-04 ASSESSMENT — MIFFLIN-ST. JEOR: SCORE: 1565.76

## 2022-02-04 NOTE — PROGRESS NOTES
Cardiology Clinic Progress Note    Moses Elizondo MRN# [id number]   YOB: 1933 Age: 88 year old   Primary cardiologist: Dr. Huggins         Assessment and Plan:     In summary, Moses Elizondo presents today for a 3 week follow up visit after lisinopril and furosemide were stopped, Entresto initiated.     1. Upgrade to CRT-P on 9/16/21 with adequate BiVp. Was experiencing borderline FC III sxs despite this. Now states improvement with change to Entresto.   2. 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 GDMT includes Toprol 25, Entresto 49/51 mg BID. Appears well-compensated on exam.   3. Severe bilateral venous insufficiency with chronic superficial venous thrombosis.    -- Conservative management recommended by Dr. Myles (vein clinic) and hematology unless symptoms worsen. Currently well-controlled with low-dose diuretic therapy and intermittent compression.   4. Limiting knee pain. Anticipates he may need surgery in the future.   5. Mild facial itching which preceded Entresto use; now slightly more notable. NO history of facial swelling.   6. Marginal BP. Asymptomatic.     Plan:  - Referral to an allergist was recommended to assess his facial itching; patient declines this. I reviewed this with Dr. Huggins who feels it is OK to continue the Entresto in this case, with close patient monitoring for worsening symptoms. In that case, he would need to notify us immediately. Zane is in agreement. Likewise, he will notify us if his breathing worsens in the future, or if he develops any lightheadedness.   - He will follow up with Dr. Huggins in May with repeat TTE prior.   - He will call me in the meantime if he decides to pursue knee surgery. Would discuss with Dr. Huggins whether stress test is needed, in that case.         History of Presenting Illness:      Moses Elizondo is a pleasant 88 year old patient who presents today for a 3 week follow up visit.      His pertinent cardiac history includes:  # CAD, s/p PCI to OM1 (2014)  # Newly diagnosed cardiomyopathy, LVEF 38% per routine TTE on 1/6/21. 44% per MRI. Suspect pacemaker-induced.   # No evidence of ischemia or infiltrative disease on 1/2021 cMRI.   # SSS, which has now progressed to CHB. S/p PPM.  # Chronic knee pain, arthritis, and Baker's cysts  # Peripheral edema, venous insufficiency  # Mild ascending aortic enlargement  # Hypothyroidism  # Hyperlipidemia  # Idiopathic peripheral neuropathy    In brief, Zane saw Dr. Huggins on July 14 for a routine follow up visit.  He complained of worsening leg edema for that time. proBNP was elevated ~1,000 and therefore furosemide 40 mg daily was initiated, an echocardiogram and venous competency study ordered.  He asked him to wear VERNA stockings as well.    His testing took place on August 9.  Echocardiogram was stable from that in January, showing EF estimated at 37%, with moderate global hypokinesis, mild AI, and moderately dilated ascending aorta at 4.4 cm.  His competency study showed a nonocclusive thrombus of the right GSV from the mid thigh to the calf, with severe venous reflux down to the mid calf, where it appeared occluded.  There was also a Baker's cyst incidentally noted.  On the left, there is moderate to severe reflux throughout the entire length of the GSV, also with a Baker's cyst noted. He was referred to see Dr. Myles in the vein clinic for this, and ultimatelty met with him on January 3rd. Dr. Myles recommended ongoing conservative management for his venous insufficiency, but with worsening symptoms could consider bilateral saphenous vein ablation. Regarding his superficial thrombus, Dr. Huggins spoke with hematology who recommended conservative management unless he developed symptoms from it. He had a follow-up RLE venous ultrasound on 11/10 for reassessment and this appeared stable.     When he continued to note no improvement in his functional capacity,  "he was referred to EP for consideration of CRT. On September 16, he went underwent upgrade to biventricular pacemaker.     Unfortunately, after that, he noted no improvement. At our last visit on 1/18/22, I stopped his lisinopril and furosemide, and started Entresto 49/51 mg BID.     Today, Zane is feeling better. Back on the Stationary bike for ~15 minutes/day and feels pretty good with this. Is able to do his household chores and relaxes when he needs to. States \"I always finish what I want to do!\" He does mention that he's had some diffuse facial itching x years that he's noticed a little more since changing to Entresto. Absolutely no history of facial swelling. Takes Tylenol PM (which contains Benadryl) every night and that doesn't seem to help it - he actually notices it most at night. It isn't bothersome to him. BMP is WNL. BP is marginal. He has no symptoms with this. Weight is stable ~ 190 lbs.          Review of Systems:     12-pt ROS is negative except for as noted in the HPI.          Physical Exam:     Vitals: BP 99/61 (BP Location: Left arm, Patient Position: Sitting)   Pulse 71   Ht 1.803 m (5' 11\")   Wt 87.4 kg (192 lb 9.6 oz)   SpO2 98%   BMI 26.86 kg/m    Wt Readings from Last 10 Encounters:   02/04/22 87.4 kg (192 lb 9.6 oz)   01/18/22 85.7 kg (189 lb)   01/03/22 85 kg (187 lb 6.4 oz)   11/12/21 85.7 kg (189 lb)   09/16/21 86.5 kg (190 lb 9.6 oz)   08/30/21 85.3 kg (188 lb)   08/16/21 86 kg (189 lb 11.2 oz)   07/14/21 88.5 kg (195 lb 1.6 oz)   04/07/21 89.9 kg (198 lb 1.6 oz)   03/10/21 89 kg (196 lb 4.8 oz)       Constitutional:  Patient is pleasant, alert, cooperative, and in NAD.  HEENT:  NCAT. PERRLA. EOM's intact.   Neck:  CVP appears normal. No carotid bruits.   Pulmonary: Normal respiratory effort. CTAB.   Cardiac: RRR, normal S1/S2, no S3/S4, no murmur or rub.   Abdomen:  Non-tender abdomen, no hepatosplenomegaly appreciated.   Vascular: Pulses in the upper and lower extremities are " 2+ and equal bilaterally.  Extremities: Trace lower extremity edema, erythema, cyanosis or tenderness appreciated.  Skin:  No rashes or lesions appreciated.   Neurological:  No gross motor or sensory deficits.   Psych: Appropriate affect.        Data:     Labs reviewed:  Recent Labs   Lab Test 11/12/21  1204 07/14/21  0827 03/02/21  0927 07/23/20  1008 07/07/20  0000 10/31/19  0849 05/02/19  0000 03/20/19  0823 02/27/18  0817   LDL  --   --   --   --  49 60  --  61 40   HDL  --   --   --   --  42 52  --  40 49   NHDL  --   --   --   --   --  74  --  76 52   CHOL  --   --   --   --  105 126  --  116 101   TRIG  --   --   --   --  71 70  --  75 59   TSH  --   --  2.27 2.87  --  1.94   < > 2.20 2.53   NTBNP 715* 1,009* 804*  --   --   --   --   --   --     < > = values in this interval not displayed.       Lab Results   Component Value Date    WBC 6.6 11/12/2021    WBC 6.2 07/23/2020    RBC 4.18 (L) 11/12/2021    RBC 4.58 07/23/2020    HGB 12.3 (L) 11/12/2021    HGB 13.2 (L) 03/02/2021    HCT 37.8 (L) 11/12/2021    HCT 41.4 07/23/2020    MCV 90 11/12/2021    MCV 90 07/23/2020    MCH 29.4 11/12/2021    MCH 29.9 07/23/2020    MCHC 32.5 11/12/2021    MCHC 33.1 07/23/2020    RDW 12.0 11/12/2021    RDW 12.5 07/23/2020     11/12/2021     07/23/2020       Lab Results   Component Value Date     02/04/2022     04/07/2021    POTASSIUM 4.3 02/04/2022    POTASSIUM 4.1 04/07/2021    CHLORIDE 108 02/04/2022    CHLORIDE 109 04/07/2021    CO2 27 02/04/2022    CO2 28 04/07/2021    ANIONGAP 2 (L) 02/04/2022    ANIONGAP 2 (L) 04/07/2021    GLC 92 02/04/2022    GLC 75 04/07/2021    BUN 22 02/04/2022    BUN 25 04/07/2021    CR 1.12 02/04/2022    CR 1.28 (H) 04/07/2021    GFRESTIMATED 63 02/04/2022    GFRESTIMATED 50 (L) 04/07/2021    GFRESTBLACK 58 (L) 04/07/2021    GABRIELA 9.4 02/04/2022    GABRIELA 8.8 04/07/2021      Lab Results   Component Value Date    AST 32 03/02/2021    ALT 30 03/02/2021       Lab Results  "  Component Value Date    A1C 5.6 05/02/2019       Lab Results   Component Value Date    INR 1.00 06/20/2019           Problem List:     Patient Active Problem List   Diagnosis     Hypothyroidism, unspecified type     Degeneration of lumbar or lumbosacral intervertebral disc     MGUS (monoclonal gammopathy of unknown significance)     Familial tremor     Hyperlipidemia LDL goal <100     NSTEMI (non-ST elevated myocardial infarction) (H)     Health group home     Coronary artery disease     Bradycardia     Aneurysm of thoracic aorta (H)     Sinus bradycardia     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)     Ascending aortic aneurysm (H)     Sick sinus syndrome (H)     Aortic root dilatation (H)     Medicare annual wellness visit, subsequent     Idiopathic peripheral neuropathy     Chest discomfort     Status post coronary angiogram     Second degree AV block     Cardiac pacemaker in situ     Chronic kidney disease, stage 3 (H)     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)           Medications:     Current Outpatient Medications   Medication Sig Dispense Refill     acetaminophen (TYLENOL 8 HOUR) 650 MG CR tablet Take 650 mg by mouth daily       ASPIRIN EC PO Take 81 mg by mouth daily       atorvastatin (LIPITOR) 40 MG tablet Take 1 tablet (40 mg) by mouth daily 90 tablet 3     gabapentin (NEURONTIN) 300 MG capsule Take 1 capsule (300 mg) by mouth At Bedtime       levothyroxine (SYNTHROID/LEVOTHROID) 100 MCG tablet Take 1 tablet (100 mcg) by mouth daily Upon waking 90 tablet 1     metoprolol succinate ER (TOPROL-XL) 25 MG 24 hr tablet Take 1 tablet (25 mg) by mouth daily At lunch 90 tablet 3     nitroGLYcerin (NITROSTAT) 0.4 MG sublingual tablet One tablet under the tongue every 5 minutes if needed for chest pain. May repeat every 5 minutes for a maximum of 3 doses in 15 minutes\" 25 tablet 3     Polyethylene Glycol 3350 (MIRALAX PO) Take by mouth as " needed       sacubitril-valsartan (ENTRESTO) 49-51 MG per tablet Take 1 tablet by mouth 2 times daily 60 tablet 5     Glucosamine-Chondroitin (GLUCOSAMINE CHONDROITIN COMPLX) 500-250 MG CAPS Take by mouth daily (Patient not taking: Reported on 1/3/2022)             Past Medical History:     Past Medical History:   Diagnosis Date     Aortic root dilatation (H)     4.4 cm     Ascending aorta dilatation (H)     4.4 cm     ASD (atrial septal defect)      Bradycardia      CAD (coronary artery disease)      YOGESH to RCA(6/20/19); YOGESH to OM1(5/15/14)     Cardiac pacemaker 06/20/2019    [employer] PPM COMFORT MRI XT DR-implant 6/20/19     Chest discomfort      Degeneration of lumbar or lumbosacral intervertebral disc      Familial tremor      Former smoker      Herpes zoster without mention of complication      HTN (hypertension)      Hyperlipidaemia      Idiopathic peripheral neuropathy      MGUS (monoclonal gammopathy of unknown significance)      NSTEMI (non-ST elevated myocardial infarction) (H) 2014     Other and unspecified hyperlipidemia      PFO (patent foramen ovale)      Prostatitis, unspecified      Second degree heart block      Sensorineural hearing loss, unspecified      SSS (sick sinus syndrome) (H)      Status post coronary angiogram 6/20/2019     Unspecified hypothyroidism      Past Surgical History:   Procedure Laterality Date     CHOLECYSTECTOMY       CORONARY ANGIOGRAPHY ADULT ORDER  5/14/14    PTCA w/YOGESH to OM1     CV CORONARY ANGIOGRAM N/A 6/20/2019    Procedure: Coronary Angiogram;  Surgeon: Romie Coronado MD;  Location:  [facility]     CV INTRAVASULAR ULTRASOUND N/A 6/20/2019    Procedure: Intravascular Ultrasound;  Surgeon: Romie Coronado MD;  Location:  [facility]     CV PCI ATHERECTOMY ORBITAL N/A 6/20/2019    Procedure: PCI Atherectomy Orbital;  Surgeon: Romie Coronado MD;  Location:  [facility]     CV PCI STENT DRUG ELUTING N/A 6/20/2019     Procedure: PCI Stent Drug Eluting;  Surgeon: Romie Coronado MD;  Location:  [facility]     CV TEMPORARY PACEMAKER INSERTION N/A 2019    Procedure: Temporary Pacemaker Insertion;  Surgeon: Romie Coronado MD;  Location:  [facility]     EP PACEMAKER N/A 2019    Procedure: EP Pacemaker;  Surgeon: Max Dowell MD;  Location:  [facility]     EP PPM UPGRADE TO BIVENT N/A 2021    Procedure: EP PPM UPGRADE TO BIVENT;  Surgeon: Tre Miller MD;  Location:  [facility]     HEART CATH, ANGIOPLASTY  14    YOGESH to OM1     ZZC NONSPECIFIC PROCEDURE      Laparoscopic cholecystectomy.      Family History   Problem Relation Age of Onset     Cancer Mother      Genitourinary Problems Father 99        complications from surgery     Heart Disease Brother 72        MI     Social History     Socioeconomic History     Marital status:      Spouse name: Not on file     Number of children: Not on file     Years of education: Not on file     Highest education level: Not on file   Occupational History     Not on file   Tobacco Use     Smoking status: Former Smoker     Packs/day: 0.50     Years: 16.00     Pack years: 8.00     Types: Cigarettes     Start date:      Quit date: 1967     Years since quittin.1     Smokeless tobacco: Never Used   Substance and Sexual Activity     Alcohol use: Yes     Comment: 5-7 drinks week - at most one drink daily     Drug use: No     Sexual activity: Never   Other Topics Concern      Service Not Asked     Blood Transfusions Not Asked     Caffeine Concern Yes     Comment: 2 cups coffee/day     Occupational Exposure Not Asked     Hobby Hazards Not Asked     Sleep Concern No     Stress Concern Not Asked     Weight Concern Yes     Comment: limiting alcohol to watch calories     Special Diet Not Asked     Back Care Not Asked     Exercise Yes     Comment: Stationary bike  weights and  aerobics 4 times week     Bike Helmet Not Asked     Seat Belt Yes     Self-Exams Not Asked     Parent/sibling w/ CABG, MI or angioplasty before 65F 55M? No   Social History Narrative     Not on file     Social Determinants of Health     Financial Resource Strain: Not on file   Food Insecurity: Not on file   Transportation Needs: Not on file   Physical Activity: Not on file   Stress: Not on file   Social Connections: Not on file   Intimate Partner Violence: Not on file   Housing Stability: Not on file           Allergies:   No known drug allergies      Lacey Puckett PA-C  [facility]  Pager: [phone]    Today's clinic visit entailed:  I spent a total of 35 minutes on the day of the visit.   Time spent doing chart review, history and exam, documentation and further activities per the note  Provider  Link to MDM Help Grid

## 2022-02-04 NOTE — LETTER
2/4/2022    Steven Wagoner MD  [address] [facility] [address]    RE: Moses Elizondo       Dear Colleague,     I had the pleasure of seeing Moses Elizondo in the [facility].    Cardiology Clinic Progress Note    Moses Elizondo MRN# [id number]   YOB: 1933 Age: 88 year old   Primary cardiologist: Dr. Huggins         Assessment and Plan:     In summary, Moses Elizondo presents today for a 3 week follow up visit after lisinopril and furosemide were stopped, Entresto initiated.     1. Upgrade to CRT-P on 9/16/21 with adequate BiVp. Was experiencing borderline FC III sxs despite this. Now states improvement with change to Entresto.   2. 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 GDMT includes Toprol 25, Entresto 49/51 mg BID. Appears well-compensated on exam.   3. Severe bilateral venous insufficiency with chronic superficial venous thrombosis.    -- Conservative management recommended by Dr. Myles (vein clinic) and hematology unless symptoms worsen. Currently well-controlled with low-dose diuretic therapy and intermittent compression.   4. Limiting knee pain. Anticipates he may need surgery in the future.   5. Mild facial itching which preceded Entresto use; now slightly more notable. NO history of facial swelling.   6. Marginal BP. Asymptomatic.     Plan:  - Referral to an allergist was recommended to assess his facial itching; patient declines this. I reviewed this with Dr. Huggins who feels it is OK to continue the Entresto in this case, with close patient monitoring for worsening symptoms. In that case, he would need to notify us immediately. Zane is in agreement. Likewise, he will notify us if his breathing worsens in the future, or if he develops any lightheadedness.   - He will follow up with Dr. Huggins in May with repeat TTE prior.   - He will call me in the meantime if he decides to pursue knee surgery. Would  discuss with Dr. Huggins whether stress test is needed, in that case.         History of Presenting Illness:      Moses Elizondo is a pleasant 88 year old patient who presents today for a 3 week follow up visit.     His pertinent cardiac history includes:  # CAD, s/p PCI to OM1 (2014)  # Newly diagnosed cardiomyopathy, LVEF 38% per routine TTE on 1/6/21. 44% per MRI. Suspect pacemaker-induced.   # No evidence of ischemia or infiltrative disease on 1/2021 cMRI.   # SSS, which has now progressed to CHB. S/p PPM.  # Chronic knee pain, arthritis, and Baker's cysts  # Peripheral edema, venous insufficiency  # Mild ascending aortic enlargement  # Hypothyroidism  # Hyperlipidemia  # Idiopathic peripheral neuropathy    In brief, Zane saw Dr. Huggins on July 14 for a routine follow up visit.  He complained of worsening leg edema for that time. proBNP was elevated ~1,000 and therefore furosemide 40 mg daily was initiated, an echocardiogram and venous competency study ordered.  He asked him to wear VERNA stockings as well.    His testing took place on August 9.  Echocardiogram was stable from that in January, showing EF estimated at 37%, with moderate global hypokinesis, mild AI, and moderately dilated ascending aorta at 4.4 cm.  His competency study showed a nonocclusive thrombus of the right GSV from the mid thigh to the calf, with severe venous reflux down to the mid calf, where it appeared occluded.  There was also a Baker's cyst incidentally noted.  On the left, there is moderate to severe reflux throughout the entire length of the GSV, also with a Baker's cyst noted. He was referred to see Dr. Myles in the vein clinic for this, and ultimatelty met with him on January 3rd. Dr. Myles recommended ongoing conservative management for his venous insufficiency, but with worsening symptoms could consider bilateral saphenous vein ablation. Regarding his superficial thrombus, Dr. Huggins spoke with hematology who recommended conservative  "management unless he developed symptoms from it. He had a follow-up RLE venous ultrasound on 11/10 for reassessment and this appeared stable.     When he continued to note no improvement in his functional capacity, he was referred to EP for consideration of CRT. On September 16, he went underwent upgrade to biventricular pacemaker.     Unfortunately, after that, he noted no improvement. At our last visit on 1/18/22, I stopped his lisinopril and furosemide, and started Entresto 49/51 mg BID.     Today, Zane is feeling better. Back on the Stationary bike for ~15 minutes/day and feels pretty good with this. Is able to do his household chores and relaxes when he needs to. States \"I always finish what I want to do!\" He does mention that he's had some diffuse facial itching x years that he's noticed a little more since changing to Entresto. Absolutely no history of facial swelling. Takes Tylenol PM (which contains Benadryl) every night and that doesn't seem to help it - he actually notices it most at night. It isn't bothersome to him. BMP is WNL. BP is marginal. He has no symptoms with this. Weight is stable ~ 190 lbs.          Review of Systems:     12-pt ROS is negative except for as noted in the HPI.          Physical Exam:     Vitals: BP 99/61 (BP Location: Left arm, Patient Position: Sitting)   Pulse 71   Ht 1.803 m (5' 11\")   Wt 87.4 kg (192 lb 9.6 oz)   SpO2 98%   BMI 26.86 kg/m    Wt Readings from Last 10 Encounters:   02/04/22 87.4 kg (192 lb 9.6 oz)   01/18/22 85.7 kg (189 lb)   01/03/22 85 kg (187 lb 6.4 oz)   11/12/21 85.7 kg (189 lb)   09/16/21 86.5 kg (190 lb 9.6 oz)   08/30/21 85.3 kg (188 lb)   08/16/21 86 kg (189 lb 11.2 oz)   07/14/21 88.5 kg (195 lb 1.6 oz)   04/07/21 89.9 kg (198 lb 1.6 oz)   03/10/21 89 kg (196 lb 4.8 oz)       Constitutional:  Patient is pleasant, alert, cooperative, and in NAD.  HEENT:  NCAT. PERRLA. EOM's intact.   Neck:  CVP appears normal. No carotid bruits.   Pulmonary: " Normal respiratory effort. CTAB.   Cardiac: RRR, normal S1/S2, no S3/S4, no murmur or rub.   Abdomen:  Non-tender abdomen, no hepatosplenomegaly appreciated.   Vascular: Pulses in the upper and lower extremities are 2+ and equal bilaterally.  Extremities: Trace lower extremity edema, erythema, cyanosis or tenderness appreciated.  Skin:  No rashes or lesions appreciated.   Neurological:  No gross motor or sensory deficits.   Psych: Appropriate affect.        Data:     Labs reviewed:  Recent Labs   Lab Test 11/12/21  1204 07/14/21  0827 03/02/21  0927 07/23/20  1008 07/07/20  0000 10/31/19  0849 05/02/19  0000 03/20/19  0823 02/27/18  0817   LDL  --   --   --   --  49 60  --  61 40   HDL  --   --   --   --  42 52  --  40 49   NHDL  --   --   --   --   --  74  --  76 52   CHOL  --   --   --   --  105 126  --  116 101   TRIG  --   --   --   --  71 70  --  75 59   TSH  --   --  2.27 2.87  --  1.94   < > 2.20 2.53   NTBNP 715* 1,009* 804*  --   --   --   --   --   --     < > = values in this interval not displayed.       Lab Results   Component Value Date    WBC 6.6 11/12/2021    WBC 6.2 07/23/2020    RBC 4.18 (L) 11/12/2021    RBC 4.58 07/23/2020    HGB 12.3 (L) 11/12/2021    HGB 13.2 (L) 03/02/2021    HCT 37.8 (L) 11/12/2021    HCT 41.4 07/23/2020    MCV 90 11/12/2021    MCV 90 07/23/2020    MCH 29.4 11/12/2021    MCH 29.9 07/23/2020    MCHC 32.5 11/12/2021    MCHC 33.1 07/23/2020    RDW 12.0 11/12/2021    RDW 12.5 07/23/2020     11/12/2021     07/23/2020       Lab Results   Component Value Date     02/04/2022     04/07/2021    POTASSIUM 4.3 02/04/2022    POTASSIUM 4.1 04/07/2021    CHLORIDE 108 02/04/2022    CHLORIDE 109 04/07/2021    CO2 27 02/04/2022    CO2 28 04/07/2021    ANIONGAP 2 (L) 02/04/2022    ANIONGAP 2 (L) 04/07/2021    GLC 92 02/04/2022    GLC 75 04/07/2021    BUN 22 02/04/2022    BUN 25 04/07/2021    CR 1.12 02/04/2022    CR 1.28 (H) 04/07/2021    GFRESTIMATED 63 02/04/2022     GFRESTIMATED 50 (L) 04/07/2021    GFRESTBLACK 58 (L) 04/07/2021    GABIRELA 9.4 02/04/2022    GABRIELA 8.8 04/07/2021      Lab Results   Component Value Date    AST 32 03/02/2021    ALT 30 03/02/2021       Lab Results   Component Value Date    A1C 5.6 05/02/2019       Lab Results   Component Value Date    INR 1.00 06/20/2019           Problem List:     Patient Active Problem List   Diagnosis     Hypothyroidism, unspecified type     Degeneration of lumbar or lumbosacral intervertebral disc     MGUS (monoclonal gammopathy of unknown significance)     Familial tremor     Hyperlipidemia LDL goal <100     NSTEMI (non-ST elevated myocardial infarction) (H)     [facility]     Coronary artery disease     Bradycardia     Aneurysm of thoracic aorta (H)     Sinus bradycardia     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)     Ascending aortic aneurysm (H)     Sick sinus syndrome (H)     Aortic root dilatation (H)     Medicare annual wellness visit, subsequent     Idiopathic peripheral neuropathy     Chest discomfort     Status post coronary angiogram     Second degree AV block     Cardiac pacemaker in situ     Chronic kidney disease, stage 3 (H)     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)           Medications:     Current Outpatient Medications   Medication Sig Dispense Refill     acetaminophen (TYLENOL 8 HOUR) 650 MG CR tablet Take 650 mg by mouth daily       ASPIRIN EC PO Take 81 mg by mouth daily       atorvastatin (LIPITOR) 40 MG tablet Take 1 tablet (40 mg) by mouth daily 90 tablet 3     gabapentin (NEURONTIN) 300 MG capsule Take 1 capsule (300 mg) by mouth At Bedtime       levothyroxine (SYNTHROID/LEVOTHROID) 100 MCG tablet Take 1 tablet (100 mcg) by mouth daily Upon waking 90 tablet 1     metoprolol succinate ER (TOPROL-XL) 25 MG 24 hr tablet Take 1 tablet (25 mg) by mouth daily At lunch 90 tablet 3     nitroGLYcerin (NITROSTAT) 0.4 MG sublingual tablet One  "tablet under the tongue every 5 minutes if needed for chest pain. May repeat every 5 minutes for a maximum of 3 doses in 15 minutes\" 25 tablet 3     Polyethylene Glycol 3350 (MIRALAX PO) Take by mouth as needed       sacubitril-valsartan (ENTRESTO) 49-51 MG per tablet Take 1 tablet by mouth 2 times daily 60 tablet 5     Glucosamine-Chondroitin (GLUCOSAMINE CHONDROITIN COMPLX) 500-250 MG CAPS Take by mouth daily (Patient not taking: Reported on 1/3/2022)             Past Medical History:     Past Medical History:   Diagnosis Date     Aortic root dilatation (H)     4.4 cm     Ascending aorta dilatation (H)     4.4 cm     ASD (atrial septal defect)      Bradycardia      CAD (coronary artery disease)      YOGESH to RCA(6/20/19); YOGESH to OM1(5/15/14)     Cardiac pacemaker 06/20/2019    [employer] PPM COMFORT MRI XT DR-implant 6/20/19     Chest discomfort      Degeneration of lumbar or lumbosacral intervertebral disc      Familial tremor      Former smoker      Herpes zoster without mention of complication      HTN (hypertension)      Hyperlipidaemia      Idiopathic peripheral neuropathy      MGUS (monoclonal gammopathy of unknown significance)      NSTEMI (non-ST elevated myocardial infarction) (H) 2014     Other and unspecified hyperlipidemia      PFO (patent foramen ovale)      Prostatitis, unspecified      Second degree heart block      Sensorineural hearing loss, unspecified      SSS (sick sinus syndrome) (H)      Status post coronary angiogram 6/20/2019     Unspecified hypothyroidism      Past Surgical History:   Procedure Laterality Date     CHOLECYSTECTOMY       CORONARY ANGIOGRAPHY ADULT ORDER  5/14/14    PTCA w/YOGESH to OM1     CV CORONARY ANGIOGRAM N/A 6/20/2019    Procedure: Coronary Angiogram;  Surgeon: Romie Coronado MD;  Location: [facility] CARDIAC CATH LAB     CV INTRAVASULAR ULTRASOUND N/A 6/20/2019    Procedure: Intravascular Ultrasound;  Surgeon: Romie Coronado MD;  Location:  HEART CARDIAC CATH LAB     " CV PCI ATHERECTOMY ORBITAL N/A 2019    Procedure: PCI Atherectomy Orbital;  Surgeon: Romie Coronado MD;  Location:  HEART CARDIAC CATH LAB     CV PCI STENT DRUG ELUTING N/A 2019    Procedure: PCI Stent Drug Eluting;  Surgeon: Romie Coronado MD;  Location:  HEART CARDIAC CATH LAB     CV TEMPORARY PACEMAKER INSERTION N/A 2019    Procedure: Temporary Pacemaker Insertion;  Surgeon: Romie Coronado MD;  Location:  HEART CARDIAC CATH LAB     EP PACEMAKER N/A 2019    Procedure: EP Pacemaker;  Surgeon: Max Dowell MD;  Location:  HEART CARDIAC CATH LAB     EP PPM UPGRADE TO BIVENT N/A 2021    Procedure: EP PPM UPGRADE TO BIVENT;  Surgeon: Tre Miller MD;  Location:  HEART CARDIAC CATH LAB     HEART CATH, ANGIOPLASTY  14    YOGESH to OM1     ZZC NONSPECIFIC PROCEDURE      Laparoscopic cholecystectomy.      Family History   Problem Relation Age of Onset     Cancer Mother      Genitourinary Problems Father 99        complications from surgery     Heart Disease Brother 72        MI     Social History     Socioeconomic History     Marital status:      Spouse name: Not on file     Number of children: Not on file     Years of education: Not on file     Highest education level: Not on file   Occupational History     Not on file   Tobacco Use     Smoking status: Former Smoker     Packs/day: 0.50     Years: 16.00     Pack years: 8.00     Types: Cigarettes     Start date:      Quit date: 1967     Years since quittin.1     Smokeless tobacco: Never Used   Substance and Sexual Activity     Alcohol use: Yes     Comment: 5-7 drinks week - at most one drink daily     Drug use: No     Sexual activity: Never   Other Topics Concern      Service Not Asked     Blood Transfusions Not Asked     Caffeine Concern Yes     Comment: 2 cups coffee/day     Occupational Exposure Not Asked     Hobby Hazards Not Asked     Sleep Concern No     Stress Concern  Not Asked     Weight Concern Yes     Comment: limiting alcohol to watch calories     Special Diet Not Asked     Back Care Not Asked     Exercise Yes     Comment: Stationary bike  weights and aerobics 4 times week     Bike Helmet Not Asked     Seat Belt Yes     Self-Exams Not Asked     Parent/sibling w/ CABG, MI or angioplasty before 65F 55M? No   Social History Narrative     Not on file     Social Determinants of Health     Financial Resource Strain: Not on file   Food Insecurity: Not on file   Transportation Needs: Not on file   Physical Activity: Not on file   Stress: Not on file   Social Connections: Not on file   Intimate Partner Violence: Not on file   Housing Stability: Not on file           Allergies:   No known drug allergies      Lacey Puckett PA-C  [facility]  Pager: [phone]    Today's clinic visit entailed:  I spent a total of 35 minutes on the day of the visit.   Time spent doing chart review, history and exam, documentation and further activities per the note  Provider  Link to [facility] Help Grid

## 2022-02-04 NOTE — PATIENT INSTRUCTIONS
Today's Plan:   - Keep your medications the same for now.   - If your breathing worsens again, let me know. Likewise, if you develop any lightheadedness, let me know.  - IF your facial itching gets WORSE, let me know right away, in that case I would stop the Entresto and refer you to an allergist. I will review this with Dr. Huggins as well.   - Keep up the good work with the exercise. Rest when you need to.   - Return to see Dr. Huggins as scheduled in May.     If you have questions or concerns please call my nurse team at [phone].   Scheduling phone number: [phone]  For after hours urgent concerns call [phone] option 2.   Reminder: Please bring in all current medications, over the counter supplements and vitamin bottles to your next appointment.    It was a pleasure seeing you today!     Lacey Puckett PA-C

## 2022-02-09 ENCOUNTER — TRANSFERRED RECORDS (OUTPATIENT)
Dept: HEALTH INFORMATION MANAGEMENT | Facility: CLINIC | Age: 87
End: 2022-02-09
Payer: COMMERCIAL

## 2022-02-14 ENCOUNTER — ANCILLARY PROCEDURE (OUTPATIENT)
Dept: CARDIOLOGY | Facility: CLINIC | Age: 87
End: 2022-02-14
Attending: INTERNAL MEDICINE
Payer: COMMERCIAL

## 2022-02-14 DIAGNOSIS — E03.9 HYPOTHYROIDISM, UNSPECIFIED TYPE: ICD-10-CM

## 2022-02-14 DIAGNOSIS — Z95.0 CARDIAC PACEMAKER IN SITU: ICD-10-CM

## 2022-02-14 LAB
MDC_IDC_EPISODE_DTM: NORMAL
MDC_IDC_EPISODE_DURATION: 9 S
MDC_IDC_EPISODE_ID: 1
MDC_IDC_EPISODE_TYPE: NORMAL
MDC_IDC_LEAD_IMPLANT_DT: NORMAL
MDC_IDC_LEAD_LOCATION: NORMAL
MDC_IDC_LEAD_LOCATION_DETAIL_1: NORMAL
MDC_IDC_LEAD_MFG: NORMAL
MDC_IDC_LEAD_MODEL: NORMAL
MDC_IDC_LEAD_POLARITY_TYPE: NORMAL
MDC_IDC_LEAD_SERIAL: NORMAL
MDC_IDC_MSMT_BATTERY_DTM: NORMAL
MDC_IDC_MSMT_BATTERY_REMAINING_LONGEVITY: 125 MO
MDC_IDC_MSMT_BATTERY_RRT_TRIGGER: 2.6
MDC_IDC_MSMT_BATTERY_STATUS: NORMAL
MDC_IDC_MSMT_BATTERY_VOLTAGE: 3.08 V
MDC_IDC_MSMT_LEADCHNL_LV_IMPEDANCE_VALUE: 342 OHM
MDC_IDC_MSMT_LEADCHNL_LV_IMPEDANCE_VALUE: 361 OHM
MDC_IDC_MSMT_LEADCHNL_LV_IMPEDANCE_VALUE: 380 OHM
MDC_IDC_MSMT_LEADCHNL_LV_IMPEDANCE_VALUE: 399 OHM
MDC_IDC_MSMT_LEADCHNL_LV_IMPEDANCE_VALUE: 456 OHM
MDC_IDC_MSMT_LEADCHNL_LV_IMPEDANCE_VALUE: 570 OHM
MDC_IDC_MSMT_LEADCHNL_LV_IMPEDANCE_VALUE: 589 OHM
MDC_IDC_MSMT_LEADCHNL_LV_IMPEDANCE_VALUE: 627 OHM
MDC_IDC_MSMT_LEADCHNL_LV_IMPEDANCE_VALUE: 627 OHM
MDC_IDC_MSMT_LEADCHNL_LV_IMPEDANCE_VALUE: 646 OHM
MDC_IDC_MSMT_LEADCHNL_LV_PACING_THRESHOLD_AMPLITUDE: 0.75 V
MDC_IDC_MSMT_LEADCHNL_LV_PACING_THRESHOLD_PULSEWIDTH: 0.4 MS
MDC_IDC_MSMT_LEADCHNL_RA_IMPEDANCE_VALUE: 361 OHM
MDC_IDC_MSMT_LEADCHNL_RA_IMPEDANCE_VALUE: 494 OHM
MDC_IDC_MSMT_LEADCHNL_RA_SENSING_INTR_AMPL: 1.62 MV
MDC_IDC_MSMT_LEADCHNL_RA_SENSING_INTR_AMPL: 1.62 MV
MDC_IDC_MSMT_LEADCHNL_RV_IMPEDANCE_VALUE: 399 OHM
MDC_IDC_MSMT_LEADCHNL_RV_IMPEDANCE_VALUE: 532 OHM
MDC_IDC_MSMT_LEADCHNL_RV_PACING_THRESHOLD_AMPLITUDE: 0.5 V
MDC_IDC_MSMT_LEADCHNL_RV_PACING_THRESHOLD_PULSEWIDTH: 0.4 MS
MDC_IDC_MSMT_LEADCHNL_RV_SENSING_INTR_AMPL: 10.5 MV
MDC_IDC_MSMT_LEADCHNL_RV_SENSING_INTR_AMPL: 10.5 MV
MDC_IDC_PG_IMPLANT_DTM: NORMAL
MDC_IDC_PG_MFG: NORMAL
MDC_IDC_PG_MODEL: NORMAL
MDC_IDC_PG_SERIAL: NORMAL
MDC_IDC_PG_TYPE: NORMAL
MDC_IDC_SESS_CLINIC_NAME: NORMAL
MDC_IDC_SESS_DTM: NORMAL
MDC_IDC_SESS_TYPE: NORMAL
MDC_IDC_SET_BRADY_AT_MODE_SWITCH_RATE: 171 {BEATS}/MIN
MDC_IDC_SET_BRADY_LOWRATE: 60 {BEATS}/MIN
MDC_IDC_SET_BRADY_MAX_SENSOR_RATE: 120 {BEATS}/MIN
MDC_IDC_SET_BRADY_MAX_TRACKING_RATE: 120 {BEATS}/MIN
MDC_IDC_SET_BRADY_MODE: NORMAL
MDC_IDC_SET_BRADY_PAV_DELAY_LOW: 170 MS
MDC_IDC_SET_BRADY_SAV_DELAY_LOW: 110 MS
MDC_IDC_SET_CRT_LVRV_DELAY: 0 MS
MDC_IDC_SET_CRT_PACED_CHAMBERS: NORMAL
MDC_IDC_SET_LEADCHNL_LV_PACING_AMPLITUDE: 1.25 V
MDC_IDC_SET_LEADCHNL_LV_PACING_ANODE_ELECTRODE_1: NORMAL
MDC_IDC_SET_LEADCHNL_LV_PACING_ANODE_LOCATION_1: NORMAL
MDC_IDC_SET_LEADCHNL_LV_PACING_CAPTURE_MODE: NORMAL
MDC_IDC_SET_LEADCHNL_LV_PACING_CATHODE_ELECTRODE_1: NORMAL
MDC_IDC_SET_LEADCHNL_LV_PACING_CATHODE_LOCATION_1: NORMAL
MDC_IDC_SET_LEADCHNL_LV_PACING_POLARITY: NORMAL
MDC_IDC_SET_LEADCHNL_LV_PACING_PULSEWIDTH: 0.4 MS
MDC_IDC_SET_LEADCHNL_RA_PACING_AMPLITUDE: 1.5 V
MDC_IDC_SET_LEADCHNL_RA_PACING_ANODE_ELECTRODE_1: NORMAL
MDC_IDC_SET_LEADCHNL_RA_PACING_ANODE_LOCATION_1: NORMAL
MDC_IDC_SET_LEADCHNL_RA_PACING_CAPTURE_MODE: NORMAL
MDC_IDC_SET_LEADCHNL_RA_PACING_CATHODE_ELECTRODE_1: NORMAL
MDC_IDC_SET_LEADCHNL_RA_PACING_CATHODE_LOCATION_1: NORMAL
MDC_IDC_SET_LEADCHNL_RA_PACING_POLARITY: NORMAL
MDC_IDC_SET_LEADCHNL_RA_PACING_PULSEWIDTH: 0.4 MS
MDC_IDC_SET_LEADCHNL_RA_SENSING_ANODE_ELECTRODE_1: NORMAL
MDC_IDC_SET_LEADCHNL_RA_SENSING_ANODE_LOCATION_1: NORMAL
MDC_IDC_SET_LEADCHNL_RA_SENSING_CATHODE_ELECTRODE_1: NORMAL
MDC_IDC_SET_LEADCHNL_RA_SENSING_CATHODE_LOCATION_1: NORMAL
MDC_IDC_SET_LEADCHNL_RA_SENSING_POLARITY: NORMAL
MDC_IDC_SET_LEADCHNL_RA_SENSING_SENSITIVITY: 0.3 MV
MDC_IDC_SET_LEADCHNL_RV_PACING_AMPLITUDE: 2 V
MDC_IDC_SET_LEADCHNL_RV_PACING_ANODE_ELECTRODE_1: NORMAL
MDC_IDC_SET_LEADCHNL_RV_PACING_ANODE_LOCATION_1: NORMAL
MDC_IDC_SET_LEADCHNL_RV_PACING_CAPTURE_MODE: NORMAL
MDC_IDC_SET_LEADCHNL_RV_PACING_CATHODE_ELECTRODE_1: NORMAL
MDC_IDC_SET_LEADCHNL_RV_PACING_CATHODE_LOCATION_1: NORMAL
MDC_IDC_SET_LEADCHNL_RV_PACING_POLARITY: NORMAL
MDC_IDC_SET_LEADCHNL_RV_PACING_PULSEWIDTH: 0.4 MS
MDC_IDC_SET_LEADCHNL_RV_SENSING_ANODE_ELECTRODE_1: NORMAL
MDC_IDC_SET_LEADCHNL_RV_SENSING_ANODE_LOCATION_1: NORMAL
MDC_IDC_SET_LEADCHNL_RV_SENSING_CATHODE_ELECTRODE_1: NORMAL
MDC_IDC_SET_LEADCHNL_RV_SENSING_CATHODE_LOCATION_1: NORMAL
MDC_IDC_SET_LEADCHNL_RV_SENSING_POLARITY: NORMAL
MDC_IDC_SET_LEADCHNL_RV_SENSING_SENSITIVITY: 0.9 MV
MDC_IDC_SET_ZONE_DETECTION_INTERVAL: 350 MS
MDC_IDC_SET_ZONE_DETECTION_INTERVAL: 400 MS
MDC_IDC_SET_ZONE_TYPE: NORMAL
MDC_IDC_STAT_AT_BURDEN_PERCENT: 0 %
MDC_IDC_STAT_AT_DTM_END: NORMAL
MDC_IDC_STAT_AT_DTM_START: NORMAL
MDC_IDC_STAT_BRADY_AP_VP_PERCENT: 99.25 %
MDC_IDC_STAT_BRADY_AP_VS_PERCENT: 0.02 %
MDC_IDC_STAT_BRADY_AS_VP_PERCENT: 0.49 %
MDC_IDC_STAT_BRADY_AS_VS_PERCENT: 0.24 %
MDC_IDC_STAT_BRADY_DTM_END: NORMAL
MDC_IDC_STAT_BRADY_DTM_START: NORMAL
MDC_IDC_STAT_BRADY_RA_PERCENT_PACED: 99.48 %
MDC_IDC_STAT_BRADY_RV_PERCENT_PACED: 99.73 %
MDC_IDC_STAT_CRT_DTM_END: NORMAL
MDC_IDC_STAT_CRT_DTM_START: NORMAL
MDC_IDC_STAT_CRT_LV_PERCENT_PACED: 99.7 %
MDC_IDC_STAT_CRT_PERCENT_PACED: 99.7 %
MDC_IDC_STAT_EPISODE_RECENT_COUNT: 0
MDC_IDC_STAT_EPISODE_RECENT_COUNT_DTM_END: NORMAL
MDC_IDC_STAT_EPISODE_RECENT_COUNT_DTM_START: NORMAL
MDC_IDC_STAT_EPISODE_TOTAL_COUNT: 0
MDC_IDC_STAT_EPISODE_TOTAL_COUNT_DTM_END: NORMAL
MDC_IDC_STAT_EPISODE_TOTAL_COUNT_DTM_START: NORMAL
MDC_IDC_STAT_EPISODE_TYPE: NORMAL

## 2022-02-14 PROCEDURE — 93294 REM INTERROG EVL PM/LDLS PM: CPT | Performed by: INTERNAL MEDICINE

## 2022-02-14 PROCEDURE — 93296 REM INTERROG EVL PM/IDS: CPT | Performed by: INTERNAL MEDICINE

## 2022-02-15 RX ORDER — LEVOTHYROXINE SODIUM 100 UG/1
TABLET ORAL
Qty: 90 TABLET | Refills: 0 | Status: SHIPPED | OUTPATIENT
Start: 2022-02-15 | End: 2022-05-24

## 2022-02-15 NOTE — TELEPHONE ENCOUNTER
Routing refill request to provider for review/approval because:  Medication is reported/historical  Medication last filled by Dr. Puckett.     Jessica Denson RN

## 2022-02-16 DIAGNOSIS — E03.9 HYPOTHYROIDISM, UNSPECIFIED TYPE: ICD-10-CM

## 2022-02-17 RX ORDER — LEVOTHYROXINE SODIUM 100 UG/1
TABLET ORAL
Qty: 90 TABLET | Refills: 0 | OUTPATIENT
Start: 2022-02-17

## 2022-02-17 NOTE — TELEPHONE ENCOUNTER
This refill request is a duplicate previously sent to pharmacy or currently in review.  Refused medication to pharmacy as duplicate.   Isabella Pickard RN

## 2022-03-17 DIAGNOSIS — I21.4 NON-STEMI (NON-ST ELEVATED MYOCARDIAL INFARCTION) (H): ICD-10-CM

## 2022-03-17 DIAGNOSIS — I25.810 CORONARY ARTERY DISEASE INVOLVING CORONARY BYPASS GRAFT OF NATIVE HEART WITHOUT ANGINA PECTORIS: ICD-10-CM

## 2022-03-17 RX ORDER — ATORVASTATIN CALCIUM 40 MG/1
40 TABLET, FILM COATED ORAL DAILY
Qty: 90 TABLET | Refills: 0 | Status: SHIPPED | OUTPATIENT
Start: 2022-03-17 | End: 2022-06-21

## 2022-03-25 ENCOUNTER — TELEPHONE (OUTPATIENT)
Dept: CARDIOLOGY | Facility: CLINIC | Age: 87
End: 2022-03-25
Payer: COMMERCIAL

## 2022-03-25 NOTE — TELEPHONE ENCOUNTER
[facility] Call Center    Phone Message    May a detailed message be left on voicemail: yes     Reason for Call: Other: Zane called stating that he ordered a medication from a magazine that is supposed to help neuropathy. Zane stated its called Neuroflo plus. Zane wants to make sure that it is not going to have any complications with the other medications he is taking. Please advise. Thank you.      Action Taken: Message routed to:  Other: Bhumi    Travel Screening: Not Applicable

## 2022-03-28 NOTE — TELEPHONE ENCOUNTER
I don't believe this should interact with his heart meds, however to be sure I would advise him to check with his local pharmacist.

## 2022-03-28 NOTE — TELEPHONE ENCOUNTER
Spoke with patient, reviewed that the neuroflow was not in the Epic data base to do a micromedex check. He will bring his bottle to the pharmacy and see if they can check for interactions.

## 2022-04-18 ENCOUNTER — IMMUNIZATION (OUTPATIENT)
Dept: NURSING | Facility: CLINIC | Age: 87
End: 2022-04-18
Payer: COMMERCIAL

## 2022-04-18 PROCEDURE — 91305 COVID-19,PF,PFIZER (12+ YRS): CPT

## 2022-04-18 PROCEDURE — 0054A COVID-19,PF,PFIZER (12+ YRS): CPT

## 2022-05-03 ENCOUNTER — LAB (OUTPATIENT)
Dept: LAB | Facility: CLINIC | Age: 87
End: 2022-05-03
Payer: COMMERCIAL

## 2022-05-03 ENCOUNTER — HOSPITAL ENCOUNTER (OUTPATIENT)
Dept: CARDIOLOGY | Facility: CLINIC | Age: 87
Discharge: HOME | End: 2022-05-03
Attending: PHYSICIAN ASSISTANT | Admitting: PHYSICIAN ASSISTANT
Payer: COMMERCIAL

## 2022-05-03 DIAGNOSIS — I50.42 CHRONIC COMBINED SYSTOLIC AND DIASTOLIC HRT FAIL (H): ICD-10-CM

## 2022-05-03 LAB
ANION GAP SERPL CALCULATED.3IONS-SCNC: 2 MMOL/L (ref 3–14)
BUN SERPL-MCNC: 22 MG/DL (ref 7–30)
CALCIUM SERPL-MCNC: 9.4 MG/DL (ref 8.5–10.1)
CHLORIDE BLD-SCNC: 109 MMOL/L (ref 94–109)
CO2 SERPL-SCNC: 28 MMOL/L (ref 20–32)
CREAT SERPL-MCNC: 1.3 MG/DL (ref 0.66–1.25)
GFR SERPL CREATININE-BSD FRML MDRD: 53 ML/MIN/1.73M2
GLUCOSE BLD-MCNC: 95 MG/DL (ref 70–99)
LVEF ECHO: NORMAL
POTASSIUM BLD-SCNC: 4.5 MMOL/L (ref 3.4–5.3)
SODIUM SERPL-SCNC: 139 MMOL/L (ref 133–144)

## 2022-05-03 PROCEDURE — 36415 COLL VENOUS BLD VENIPUNCTURE: CPT | Performed by: PHYSICIAN ASSISTANT

## 2022-05-03 PROCEDURE — 80048 BASIC METABOLIC PNL TOTAL CA: CPT | Performed by: PHYSICIAN ASSISTANT

## 2022-05-03 PROCEDURE — 93306 TTE W/DOPPLER COMPLETE: CPT | Mod: 26 | Performed by: INTERNAL MEDICINE

## 2022-05-03 PROCEDURE — 93306 TTE W/DOPPLER COMPLETE: CPT

## 2022-05-10 ENCOUNTER — OFFICE VISIT (OUTPATIENT)
Dept: CARDIOLOGY | Facility: CLINIC | Age: 87
End: 2022-05-10
Payer: COMMERCIAL

## 2022-05-10 VITALS
SYSTOLIC BLOOD PRESSURE: 100 MMHG | DIASTOLIC BLOOD PRESSURE: 64 MMHG | BODY MASS INDEX: 27.5 KG/M2 | HEART RATE: 79 BPM | HEIGHT: 71 IN | WEIGHT: 196.4 LBS

## 2022-05-10 DIAGNOSIS — Z95.0 CARDIAC PACEMAKER IN SITU: ICD-10-CM

## 2022-05-10 DIAGNOSIS — I50.42 CHRONIC COMBINED SYSTOLIC AND DIASTOLIC HRT FAIL (H): Primary | ICD-10-CM

## 2022-05-10 DIAGNOSIS — N18.31 STAGE 3A CHRONIC KIDNEY DISEASE (H): ICD-10-CM

## 2022-05-10 DIAGNOSIS — G60.9 IDIOPATHIC PERIPHERAL NEUROPATHY: ICD-10-CM

## 2022-05-10 DIAGNOSIS — I25.810 CORONARY ARTERY DISEASE INVOLVING CORONARY BYPASS GRAFT OF NATIVE HEART WITHOUT ANGINA PECTORIS: ICD-10-CM

## 2022-05-10 DIAGNOSIS — I71.21 ASCENDING AORTIC ANEURYSM (H): ICD-10-CM

## 2022-05-10 DIAGNOSIS — E78.5 HYPERLIPIDEMIA LDL GOAL <100: ICD-10-CM

## 2022-05-10 PROCEDURE — 99215 OFFICE O/P EST HI 40 MIN: CPT | Performed by: INTERNAL MEDICINE

## 2022-05-10 NOTE — LETTER
5/10/2022    Steven Wagoner MD  [address] [facility] [address]    RE: Moses Elizondo       Dear Colleague,     I had the pleasure of seeing Moses Elizondo in the [facility].  HPI and Plan:   Mr. 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    I saw the patient in July of last year for increasing leg edema and shortness of breath.  Echo revealed worsening LV ejection fraction of 35 to 40%.  Subsequently he was started on Entresto and metoprolol was optimized.  He was sent to EP who upgrade his pacemaker to BiV pacemaker.  He also had venous competency testing which showed venous insufficiency and was sent to Dr. Myles who recommended conservative management at this time.  Patient unfortunately has not been using leg stockings.    He was accompanied by his daughter and they had several questions regarding his medications and I reviewed each of the medications with them.  He takes gabapentin for peripheral neuropathy.  He is on Entresto and metoprolol.  His blood pressure was low today but was taken in the left arm which is nondominant.  When I took it in the right upper arm, it was 100 systolic.  He is asymptomatic without any dizziness or lightheadedness.  He has no orthopnea PND.  He has 1+ leg edema.  He has no chest pain.  He has stable exertional shortness of breath.    Recent echocardiogram results were reviewed with him.  EF is improved to 45 to 50%.  He is pleased with the results.  In addition, his creatinine is 1.31 with slight decrease in GFR and it has been in this range over the last year.    He used to be on Lasix that was discontinued.  He does have leg edema but I suspect part  of it is related to venous insufficiency.          Physical Exam  Please see Below      Assessment and Plan  1.  Chronic systolic and diastolic dysfunction   At this time, EF is improved to 45 to 50% with optimization of cardiomyopathy medications.  Unfortunately, his blood pressure is kind of soft and therefore cannot increase the medications further.  With respect to his leg edema, have asked him to wear compression stockings that should help.  If it persists, I would refer him back to the venous clinic to discuss venous ablation.    A trial of Lasix may be also reasonable if blood pressure allows and his creatinineallows.  We discussed the importance of salt reduction     2. Coronary artery disease status post YOGESH to the OM and most recently to the mid [facility].  He denies recurrent angina.  On low-dose metoprolol and aspirin.    At this time, he has no angina.  He is contemplating knee surgery at some point.  If he does that, we might have to consider a stress MRI to rule out ischemia if his device is MRI compatible/conditional     3.  Sick sinus syndrome and significant bradycardia status post permanent pacemaker.    Device clinic notes reviewed.  He has 99% biventricular pacing     4.  Ascending aorta enlargement, ascending aorta is now increased from 4.4 to 4.5 cm.  Reviewed importance of not lifting heavy weights of greater than 40 pounds.     5. Hyperlipidemia.  His last LDL was 49, HDL was 42.  Continue atorvastatin 40 mg daily.       6.  Venous insufficiency, see discussion above    7.  Peripheral neuropathy  Per primary care physician        Thank you for allowing me to care for Moses Elizondo today with multiple complex issues.  He will return to see my midlevel provider in a month to see how he is doing with his creatinine and potassium as well as edema after compression stockings are placed.  If edema persist, I would have a low threshold to consider venous ablation.  He will also meet with the  orthopedic surgeon to decide if he wants to proceed with knee surgery.  He should be able to proceed with it but we might consider doing a stress MRI or a stress nuclear study to rule out ischemia prior to that       Sincerely,     Unruly Huggins MD       Today's clinic visit entailed:    44 minutes spent on the date of the encounter doing chart review, review of test results, patient visit, documentation and discussion with family   Provider  Link to [facility] Help Grid     The level of medical decision making during this visit was of high complexity.      Orders Placed This Encounter   Procedures     Basic metabolic panel     Follow-Up with Cardiology Core- [facility]       Orders Placed This Encounter   Medications     UNABLE TO FIND     Si capsule 3 times daily MEDICATION NAME: NeuroFlo       Medications Discontinued During This Encounter   Medication Reason     Glucosamine-Chondroitin (GLUCOSAMINE CHONDROITIN COMPLX) 500-250 MG CAPS Medication Reconciliation Clean Up         Encounter Diagnoses   Name Primary?     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) Yes     Cardiac pacemaker in situ      Coronary artery disease      Hyperlipidemia LDL goal <100      Ascending aortic aneurysm (H)      Stage 3a chronic kidney disease (H)      Idiopathic peripheral neuropathy        CURRENT MEDICATIONS:  Current Outpatient Medications   Medication Sig Dispense Refill     acetaminophen (TYLENOL) 650 MG CR tablet Take 650 mg by mouth daily       ASPIRIN EC PO Take 81 mg by mouth daily       atorvastatin (LIPITOR) 40 MG tablet Take 1 tablet (40 mg) by mouth daily 90 tablet 0     gabapentin (NEURONTIN) 300 MG capsule Take 1 capsule (300 mg) by mouth At Bedtime       levothyroxine (SYNTHROID/LEVOTHROID) 100 MCG tablet TAKE 1 TABLET (100 MCG) BY MOUTH DAILY 90 tablet 0     metoprolol succinate ER (TOPROL-XL) 25 MG 24 hr tablet Take 1 tablet (25 mg) by mouth daily At lunch 90 tablet 3     nitroGLYcerin (NITROSTAT) 0.4 MG  "sublingual tablet One tablet under the tongue every 5 minutes if needed for chest pain. May repeat every 5 minutes for a maximum of 3 doses in 15 minutes\" 25 tablet 3     Polyethylene Glycol 3350 (MIRALAX PO) Take by mouth as needed       sacubitril-valsartan (ENTRESTO) 49-51 MG per tablet Take 1 tablet by mouth 2 times daily 60 tablet 5     UNABLE TO FIND 1 capsule 3 times daily MEDICATION NAME: NeuroFlo         ALLERGIES     Allergies   Allergen Reactions     No Known Drug Allergies        PAST MEDICAL HISTORY:  Past Medical History:   Diagnosis Date     Aortic root dilatation (H)     4.4 cm     Ascending aorta dilatation (H)     4.4 cm     ASD (atrial septal defect)      Bradycardia      CAD (coronary artery disease)      YOGESH to RCA(6/20/19); YOGESH to OM1(5/15/14)     Cardiac pacemaker 06/20/2019    [employer] PPM COMFORT MRI XT DR-implant 6/20/19     Chest discomfort      Degeneration of lumbar or lumbosacral intervertebral disc      Familial tremor      Former smoker      Herpes zoster without mention of complication      HTN (hypertension)      Hyperlipidaemia      Idiopathic peripheral neuropathy      MGUS (monoclonal gammopathy of unknown significance)      NSTEMI (non-ST elevated myocardial infarction) (H) 2014     Other and unspecified hyperlipidemia      PFO (patent foramen ovale)      Prostatitis, unspecified      Second degree heart block      Sensorineural hearing loss, unspecified      SSS (sick sinus syndrome) (H)      Status post coronary angiogram 6/20/2019     Unspecified hypothyroidism        PAST SURGICAL HISTORY:  Past Surgical History:   Procedure Laterality Date     CHOLECYSTECTOMY       CORONARY ANGIOGRAPHY ADULT ORDER  5/14/14    PTCA w/YOGESH to OM1     CV CORONARY ANGIOGRAM N/A 6/20/2019    Procedure: Coronary Angiogram;  Surgeon: Romie Coronado MD;  Location: [facility] CARDIAC CATH LAB     CV INTRAVASULAR ULTRASOUND N/A 6/20/2019    Procedure: Intravascular Ultrasound;  Surgeon: Ivonne, " Romie SIEGEL MD;  Location:  HEART CARDIAC CATH LAB     CV PCI ATHERECTOMY ORBITAL N/A 2019    Procedure: PCI Atherectomy Orbital;  Surgeon: Romie Coronado MD;  Location:  HEART CARDIAC CATH LAB     CV PCI STENT DRUG ELUTING N/A 2019    Procedure: PCI Stent Drug Eluting;  Surgeon: Romie Coronado MD;  Location:  HEART CARDIAC CATH LAB     CV TEMPORARY PACEMAKER INSERTION N/A 2019    Procedure: Temporary Pacemaker Insertion;  Surgeon: Romie Coronado MD;  Location:  HEART CARDIAC CATH LAB     EP PACEMAKER N/A 2019    Procedure: EP Pacemaker;  Surgeon: Max Dowell MD;  Location:  HEART CARDIAC CATH LAB     EP PPM UPGRADE TO BIVENT N/A 2021    Procedure: EP PPM UPGRADE TO BIVENT;  Surgeon: Tre Miller MD;  Location:  HEART CARDIAC CATH LAB     HEART CATH, ANGIOPLASTY  14    YOGESH to OM1     ZZC NONSPECIFIC PROCEDURE      Laparoscopic cholecystectomy.        FAMILY HISTORY:  Family History   Problem Relation Age of Onset     Cancer Mother      Genitourinary Problems Father 99        complications from surgery     Heart Disease Brother 72        MI       SOCIAL HISTORY:  Social History     Socioeconomic History     Marital status:      Spouse name: None     Number of children: None     Years of education: None     Highest education level: None   Tobacco Use     Smoking status: Former Smoker     Packs/day: 0.50     Years: 16.00     Pack years: 8.00     Types: Cigarettes     Start date:      Quit date: 1967     Years since quittin.3     Smokeless tobacco: Never Used   Substance and Sexual Activity     Alcohol use: Yes     Comment: 5-7 drinks week - at most one drink daily     Drug use: No     Sexual activity: Never   Other Topics Concern     Caffeine Concern Yes     Comment: 2 cups coffee/day     Sleep Concern No     Weight Concern Yes     Comment: limiting alcohol to watch calories     Exercise Yes     Comment: Stationary bike   "weights and aerobics 4 times week     Seat Belt Yes     Parent/sibling w/ CABG, MI or angioplasty before 65F 55M? No       Review of Systems:  Skin:  Positive for itching LE   Eyes:  Positive for glasses    ENT:  Negative      Respiratory:  Positive for dyspnea on exertion tires easily, increased BOWMAN walking around   Cardiovascular:  Negative;palpitations;chest pain;edema;lightheadedness;dizziness Positive for;exercise intolerance;edema bad knees  Gastroenterology: Negative      Genitourinary:  Positive for nocturia    Musculoskeletal:  Negative back pain both knees issue.  Foot neuopathy.  Neurologic:  Positive for numbness or tingling of feet neuropathy  Psychiatric:  Positive for sleep disturbances up to urinate  Heme/Lymph/Imm:  Negative allergies    Endocrine:  Positive for thyroid disorder      Physical Exam:  Vitals: /64   Pulse 79   Ht 1.803 m (5' 11\")   Wt 89.1 kg (196 lb 6.4 oz)   BMI 27.39 kg/m      Constitutional:  in no acute distress        Skin:  warm and dry to the touch   pacemaker incision in the left infraclavicular area was well-healed      Head:  normocephalic        Eyes:  sclera white        Lymph:      ENT:  no pallor or cyanosis        Neck:  JVP normal        Respiratory:            Cardiac: regular rhythm     no presence of murmur                                                   GI:           Extremities and Muscular Skeletal:    1+ ankle edema    Neurological:  no gross motor deficits;affect appropriate        Psych:  Alert and Oriented x 3        CC  Lacey Puckett PA-C  [address]  [place] [address]    Thank you for allowing me to participate in the care of your patient.      Sincerely,     Unruly Huggins MD     [facility]  "

## 2022-05-10 NOTE — PROGRESS NOTES
HPI and Plan:   Mr. 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    I saw the patient in July of last year for increasing leg edema and shortness of breath.  Echo revealed worsening LV ejection fraction of 35 to 40%.  Subsequently he was started on Entresto and metoprolol was optimized.  He was sent to EP who upgrade his pacemaker to BiV pacemaker.  He also had venous competency testing which showed venous insufficiency and was sent to Dr. Myles who recommended conservative management at this time.  Patient unfortunately has not been using leg stockings.    He was accompanied by his daughter and they had several questions regarding his medications and I reviewed each of the medications with them.  He takes gabapentin for peripheral neuropathy.  He is on Entresto and metoprolol.  His blood pressure was low today but was taken in the left arm which is nondominant.  When I took it in the right upper arm, it was 100 systolic.  He is asymptomatic without any dizziness or lightheadedness.  He has no orthopnea PND.  He has 1+ leg edema.  He has no chest pain.  He has stable exertional shortness of breath.    Recent echocardiogram results were reviewed with him.  EF is improved to 45 to 50%.  He is pleased with the results.  In addition, his creatinine is 1.31 with slight decrease in GFR and it has been in this range over the last year.    He used to be on Lasix that was discontinued.  He does have leg edema but I suspect part of it is related to venous insufficiency.          Physical Exam  Please see Below      Assessment and Plan  1.  Chronic systolic and diastolic dysfunction   At this time, EF is improved to 45 to 50% with  optimization of cardiomyopathy medications.  Unfortunately, his blood pressure is kind of soft and therefore cannot increase the medications further.  With respect to his leg edema, have asked him to wear compression stockings that should help.  If it persists, I would refer him back to the venous clinic to discuss venous ablation.    A trial of Lasix may be also reasonable if blood pressure allows and his creatinineallows.  We discussed the importance of salt reduction     2. Coronary artery disease status post YOGESH to the OM and most recently to the mid RCA.  He denies recurrent angina.  On low-dose metoprolol and aspirin.    At this time, he has no angina.  He is contemplating knee surgery at some point.  If he does that, we might have to consider a stress MRI to rule out ischemia if his device is MRI compatible/conditional     3.  Sick sinus syndrome and significant bradycardia status post permanent pacemaker.    Device clinic notes reviewed.  He has 99% biventricular pacing     4.  Ascending aorta enlargement, ascending aorta is now increased from 4.4 to 4.5 cm.  Reviewed importance of not lifting heavy weights of greater than 40 pounds.     5. Hyperlipidemia.  His last LDL was 49, HDL was 42.  Continue atorvastatin 40 mg daily.       6.  Venous insufficiency, see discussion above    7.  Peripheral neuropathy  Per primary care physician        Thank you for allowing me to care for Moses Elizondo today with multiple complex issues.  He will return to see my midlevel provider in a month to see how he is doing with his creatinine and potassium as well as edema after compression stockings are placed.  If edema persist, I would have a low threshold to consider venous ablation.  He will also meet with the orthopedic surgeon to decide if he wants to proceed with knee surgery.  He should be able to proceed with it but we might consider doing a stress MRI or a stress nuclear study to rule out ischemia prior to  "that       Sincerely,     Unruly Huggins MD       Today's clinic visit entailed:    44 minutes spent on the date of the encounter doing chart review, review of test results, patient visit, documentation and discussion with family   Provider  Link to [facility] Help Grid     The level of medical decision making during this visit was of high complexity.      Orders Placed This Encounter   Procedures     Basic metabolic panel     Follow-Up with Cardiology Core- [facility]       Orders Placed This Encounter   Medications     UNABLE TO FIND     Si capsule 3 times daily MEDICATION NAME: NeuroFlo       Medications Discontinued During This Encounter   Medication Reason     Glucosamine-Chondroitin (GLUCOSAMINE CHONDROITIN COMPLX) 500-250 MG CAPS Medication Reconciliation Clean Up         Encounter Diagnoses   Name Primary?     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) Yes     Cardiac pacemaker in situ      Coronary artery disease      Hyperlipidemia LDL goal <100      Ascending aortic aneurysm (H)      Stage 3a chronic kidney disease (H)      Idiopathic peripheral neuropathy        CURRENT MEDICATIONS:  Current Outpatient Medications   Medication Sig Dispense Refill     acetaminophen (TYLENOL) 650 MG CR tablet Take 650 mg by mouth daily       ASPIRIN EC PO Take 81 mg by mouth daily       atorvastatin (LIPITOR) 40 MG tablet Take 1 tablet (40 mg) by mouth daily 90 tablet 0     gabapentin (NEURONTIN) 300 MG capsule Take 1 capsule (300 mg) by mouth At Bedtime       levothyroxine (SYNTHROID/LEVOTHROID) 100 MCG tablet TAKE 1 TABLET (100 MCG) BY MOUTH DAILY 90 tablet 0     metoprolol succinate ER (TOPROL-XL) 25 MG 24 hr tablet Take 1 tablet (25 mg) by mouth daily At lunch 90 tablet 3     nitroGLYcerin (NITROSTAT) 0.4 MG sublingual tablet One tablet under the tongue every 5 minutes if needed for chest pain. May repeat every 5 minutes for a maximum of 3 doses in 15 minutes\" 25 tablet 3     Polyethylene Glycol 3350 (MIRALAX PO) Take " by mouth as needed       sacubitril-valsartan (ENTRESTO) 49-51 MG per tablet Take 1 tablet by mouth 2 times daily 60 tablet 5     UNABLE TO FIND 1 capsule 3 times daily MEDICATION NAME: NeuroFlo         ALLERGIES     Allergies   Allergen Reactions     No Known Drug Allergies        PAST MEDICAL HISTORY:  Past Medical History:   Diagnosis Date     Aortic root dilatation (H)     4.4 cm     Ascending aorta dilatation (H)     4.4 cm     ASD (atrial septal defect)      Bradycardia      CAD (coronary artery disease)      YOGESH to RCA(6/20/19); YOGESH to OM1(5/15/14)     Cardiac pacemaker 06/20/2019    [employer] PPM COMFORT MRI XT DR-implant 6/20/19     Chest discomfort      Degeneration of lumbar or lumbosacral intervertebral disc      Familial tremor      Former smoker      Herpes zoster without mention of complication      HTN (hypertension)      Hyperlipidaemia      Idiopathic peripheral neuropathy      MGUS (monoclonal gammopathy of unknown significance)      NSTEMI (non-ST elevated myocardial infarction) (H) 2014     Other and unspecified hyperlipidemia      PFO (patent foramen ovale)      Prostatitis, unspecified      Second degree heart block      Sensorineural hearing loss, unspecified      SSS (sick sinus syndrome) (H)      Status post coronary angiogram 6/20/2019     Unspecified hypothyroidism        PAST SURGICAL HISTORY:  Past Surgical History:   Procedure Laterality Date     CHOLECYSTECTOMY       CORONARY ANGIOGRAPHY ADULT ORDER  5/14/14    PTCA w/YOGESH to OM1     CV CORONARY ANGIOGRAM N/A 6/20/2019    Procedure: Coronary Angiogram;  Surgeon: Romie Coronado MD;  Location: [facility] CARDIAC CATH LAB     CV INTRAVASULAR ULTRASOUND N/A 6/20/2019    Procedure: Intravascular Ultrasound;  Surgeon: Romie Coronado MD;  Location: [facility] CARDIAC CATH LAB     CV PCI ATHERECTOMY ORBITAL N/A 6/20/2019    Procedure: PCI Atherectomy Orbital;  Surgeon: Romie Coronado MD;  Location: [facility] CARDIAC CATH LAB     CV PCI  STENT DRUG ELUTING N/A 2019    Procedure: PCI Stent Drug Eluting;  Surgeon: Romie Coronado MD;  Location:  HEART CARDIAC CATH LAB     CV TEMPORARY PACEMAKER INSERTION N/A 2019    Procedure: Temporary Pacemaker Insertion;  Surgeon: Romie Coronado MD;  Location:  HEART CARDIAC CATH LAB     EP PACEMAKER N/A 2019    Procedure: EP Pacemaker;  Surgeon: Max Dowell MD;  Location:  HEART CARDIAC CATH LAB     EP PPM UPGRADE TO BIVENT N/A 2021    Procedure: EP PPM UPGRADE TO BIVENT;  Surgeon: Tre Miller MD;  Location:  HEART CARDIAC CATH LAB     HEART CATH, ANGIOPLASTY  14    YOGESH to OM1     ZZC NONSPECIFIC PROCEDURE      Laparoscopic cholecystectomy.        FAMILY HISTORY:  Family History   Problem Relation Age of Onset     Cancer Mother      Genitourinary Problems Father 99        complications from surgery     Heart Disease Brother 72        MI       SOCIAL HISTORY:  Social History     Socioeconomic History     Marital status:      Spouse name: None     Number of children: None     Years of education: None     Highest education level: None   Tobacco Use     Smoking status: Former Smoker     Packs/day: 0.50     Years: 16.00     Pack years: 8.00     Types: Cigarettes     Start date:      Quit date: 1967     Years since quittin.3     Smokeless tobacco: Never Used   Substance and Sexual Activity     Alcohol use: Yes     Comment: 5-7 drinks week - at most one drink daily     Drug use: No     Sexual activity: Never   Other Topics Concern     Caffeine Concern Yes     Comment: 2 cups coffee/day     Sleep Concern No     Weight Concern Yes     Comment: limiting alcohol to watch calories     Exercise Yes     Comment: Stationary bike  weights and aerobics 4 times week     Seat Belt Yes     Parent/sibling w/ CABG, MI or angioplasty before 65F 55M? No       Review of Systems:  Skin:  Positive for itching LE   Eyes:  Positive for glasses    ENT:   "Negative      Respiratory:  Positive for dyspnea on exertion tires easily, increased BOWMAN walking around   Cardiovascular:  Negative;palpitations;chest pain;edema;lightheadedness;dizziness Positive for;exercise intolerance;edema bad knees  Gastroenterology: Negative      Genitourinary:  Positive for nocturia    Musculoskeletal:  Negative back pain both knees issue.  Foot neuopathy.  Neurologic:  Positive for numbness or tingling of feet neuropathy  Psychiatric:  Positive for sleep disturbances up to urinate  Heme/Lymph/Imm:  Negative allergies    Endocrine:  Positive for thyroid disorder      Physical Exam:  Vitals: /64   Pulse 79   Ht 1.803 m (5' 11\")   Wt 89.1 kg (196 lb 6.4 oz)   BMI 27.39 kg/m      Constitutional:  in no acute distress        Skin:  warm and dry to the touch   pacemaker incision in the left infraclavicular area was well-healed      Head:  normocephalic        Eyes:  sclera white        Lymph:      ENT:  no pallor or cyanosis        Neck:  JVP normal        Respiratory:            Cardiac: regular rhythm     no presence of murmur                                                   GI:           Extremities and Muscular Skeletal:    1+ ankle edema    Neurological:  no gross motor deficits;affect appropriate        Psych:  Alert and Oriented x 3        CC  Lacey Puckett PA-C  [address]  [place] [address]              "

## 2022-05-16 ENCOUNTER — ANCILLARY PROCEDURE (OUTPATIENT)
Dept: CARDIOLOGY | Facility: CLINIC | Age: 87
End: 2022-05-16
Attending: INTERNAL MEDICINE
Payer: COMMERCIAL

## 2022-05-16 DIAGNOSIS — Z95.0 CARDIAC PACEMAKER IN SITU: ICD-10-CM

## 2022-05-16 PROCEDURE — 93294 REM INTERROG EVL PM/LDLS PM: CPT | Performed by: INTERNAL MEDICINE

## 2022-05-16 PROCEDURE — 93296 REM INTERROG EVL PM/IDS: CPT | Performed by: INTERNAL MEDICINE

## 2022-05-18 LAB
MDC_IDC_EPISODE_DTM: NORMAL
MDC_IDC_EPISODE_DURATION: 4 S
MDC_IDC_EPISODE_ID: 2
MDC_IDC_EPISODE_TYPE: NORMAL
MDC_IDC_LEAD_IMPLANT_DT: NORMAL
MDC_IDC_LEAD_LOCATION: NORMAL
MDC_IDC_LEAD_LOCATION_DETAIL_1: NORMAL
MDC_IDC_LEAD_MFG: NORMAL
MDC_IDC_LEAD_MODEL: NORMAL
MDC_IDC_LEAD_POLARITY_TYPE: NORMAL
MDC_IDC_LEAD_SERIAL: NORMAL
MDC_IDC_MSMT_BATTERY_DTM: NORMAL
MDC_IDC_MSMT_BATTERY_REMAINING_LONGEVITY: 118 MO
MDC_IDC_MSMT_BATTERY_RRT_TRIGGER: 2.6
MDC_IDC_MSMT_BATTERY_STATUS: NORMAL
MDC_IDC_MSMT_BATTERY_VOLTAGE: 3.04 V
MDC_IDC_MSMT_LEADCHNL_LV_IMPEDANCE_VALUE: 304 OHM
MDC_IDC_MSMT_LEADCHNL_LV_IMPEDANCE_VALUE: 304 OHM
MDC_IDC_MSMT_LEADCHNL_LV_IMPEDANCE_VALUE: 361 OHM
MDC_IDC_MSMT_LEADCHNL_LV_IMPEDANCE_VALUE: 361 OHM
MDC_IDC_MSMT_LEADCHNL_LV_IMPEDANCE_VALUE: 399 OHM
MDC_IDC_MSMT_LEADCHNL_LV_IMPEDANCE_VALUE: 513 OHM
MDC_IDC_MSMT_LEADCHNL_LV_IMPEDANCE_VALUE: 570 OHM
MDC_IDC_MSMT_LEADCHNL_LV_IMPEDANCE_VALUE: 570 OHM
MDC_IDC_MSMT_LEADCHNL_LV_IMPEDANCE_VALUE: 589 OHM
MDC_IDC_MSMT_LEADCHNL_LV_IMPEDANCE_VALUE: 608 OHM
MDC_IDC_MSMT_LEADCHNL_LV_PACING_THRESHOLD_AMPLITUDE: 0.88 V
MDC_IDC_MSMT_LEADCHNL_LV_PACING_THRESHOLD_PULSEWIDTH: 0.4 MS
MDC_IDC_MSMT_LEADCHNL_RA_IMPEDANCE_VALUE: 399 OHM
MDC_IDC_MSMT_LEADCHNL_RA_IMPEDANCE_VALUE: 570 OHM
MDC_IDC_MSMT_LEADCHNL_RA_SENSING_INTR_AMPL: 3.5 MV
MDC_IDC_MSMT_LEADCHNL_RA_SENSING_INTR_AMPL: 3.5 MV
MDC_IDC_MSMT_LEADCHNL_RV_IMPEDANCE_VALUE: 323 OHM
MDC_IDC_MSMT_LEADCHNL_RV_IMPEDANCE_VALUE: 475 OHM
MDC_IDC_MSMT_LEADCHNL_RV_PACING_THRESHOLD_AMPLITUDE: 0.62 V
MDC_IDC_MSMT_LEADCHNL_RV_PACING_THRESHOLD_PULSEWIDTH: 0.4 MS
MDC_IDC_MSMT_LEADCHNL_RV_SENSING_INTR_AMPL: 10.5 MV
MDC_IDC_MSMT_LEADCHNL_RV_SENSING_INTR_AMPL: 10.5 MV
MDC_IDC_PG_IMPLANT_DTM: NORMAL
MDC_IDC_PG_MFG: NORMAL
MDC_IDC_PG_MODEL: NORMAL
MDC_IDC_PG_SERIAL: NORMAL
MDC_IDC_PG_TYPE: NORMAL
MDC_IDC_SESS_CLINIC_NAME: NORMAL
MDC_IDC_SESS_DTM: NORMAL
MDC_IDC_SESS_TYPE: NORMAL
MDC_IDC_SET_BRADY_AT_MODE_SWITCH_RATE: 171 {BEATS}/MIN
MDC_IDC_SET_BRADY_LOWRATE: 60 {BEATS}/MIN
MDC_IDC_SET_BRADY_MAX_SENSOR_RATE: 120 {BEATS}/MIN
MDC_IDC_SET_BRADY_MAX_TRACKING_RATE: 120 {BEATS}/MIN
MDC_IDC_SET_BRADY_MODE: NORMAL
MDC_IDC_SET_BRADY_PAV_DELAY_LOW: 170 MS
MDC_IDC_SET_BRADY_SAV_DELAY_LOW: 110 MS
MDC_IDC_SET_CRT_LVRV_DELAY: 0 MS
MDC_IDC_SET_CRT_PACED_CHAMBERS: NORMAL
MDC_IDC_SET_LEADCHNL_LV_PACING_AMPLITUDE: 1.5 V
MDC_IDC_SET_LEADCHNL_LV_PACING_ANODE_ELECTRODE_1: NORMAL
MDC_IDC_SET_LEADCHNL_LV_PACING_ANODE_LOCATION_1: NORMAL
MDC_IDC_SET_LEADCHNL_LV_PACING_CAPTURE_MODE: NORMAL
MDC_IDC_SET_LEADCHNL_LV_PACING_CATHODE_ELECTRODE_1: NORMAL
MDC_IDC_SET_LEADCHNL_LV_PACING_CATHODE_LOCATION_1: NORMAL
MDC_IDC_SET_LEADCHNL_LV_PACING_POLARITY: NORMAL
MDC_IDC_SET_LEADCHNL_LV_PACING_PULSEWIDTH: 0.4 MS
MDC_IDC_SET_LEADCHNL_RA_PACING_AMPLITUDE: 1.5 V
MDC_IDC_SET_LEADCHNL_RA_PACING_ANODE_ELECTRODE_1: NORMAL
MDC_IDC_SET_LEADCHNL_RA_PACING_ANODE_LOCATION_1: NORMAL
MDC_IDC_SET_LEADCHNL_RA_PACING_CAPTURE_MODE: NORMAL
MDC_IDC_SET_LEADCHNL_RA_PACING_CATHODE_ELECTRODE_1: NORMAL
MDC_IDC_SET_LEADCHNL_RA_PACING_CATHODE_LOCATION_1: NORMAL
MDC_IDC_SET_LEADCHNL_RA_PACING_POLARITY: NORMAL
MDC_IDC_SET_LEADCHNL_RA_PACING_PULSEWIDTH: 0.4 MS
MDC_IDC_SET_LEADCHNL_RA_SENSING_ANODE_ELECTRODE_1: NORMAL
MDC_IDC_SET_LEADCHNL_RA_SENSING_ANODE_LOCATION_1: NORMAL
MDC_IDC_SET_LEADCHNL_RA_SENSING_CATHODE_ELECTRODE_1: NORMAL
MDC_IDC_SET_LEADCHNL_RA_SENSING_CATHODE_LOCATION_1: NORMAL
MDC_IDC_SET_LEADCHNL_RA_SENSING_POLARITY: NORMAL
MDC_IDC_SET_LEADCHNL_RA_SENSING_SENSITIVITY: 0.3 MV
MDC_IDC_SET_LEADCHNL_RV_PACING_AMPLITUDE: 2 V
MDC_IDC_SET_LEADCHNL_RV_PACING_ANODE_ELECTRODE_1: NORMAL
MDC_IDC_SET_LEADCHNL_RV_PACING_ANODE_LOCATION_1: NORMAL
MDC_IDC_SET_LEADCHNL_RV_PACING_CAPTURE_MODE: NORMAL
MDC_IDC_SET_LEADCHNL_RV_PACING_CATHODE_ELECTRODE_1: NORMAL
MDC_IDC_SET_LEADCHNL_RV_PACING_CATHODE_LOCATION_1: NORMAL
MDC_IDC_SET_LEADCHNL_RV_PACING_POLARITY: NORMAL
MDC_IDC_SET_LEADCHNL_RV_PACING_PULSEWIDTH: 0.4 MS
MDC_IDC_SET_LEADCHNL_RV_SENSING_ANODE_ELECTRODE_1: NORMAL
MDC_IDC_SET_LEADCHNL_RV_SENSING_ANODE_LOCATION_1: NORMAL
MDC_IDC_SET_LEADCHNL_RV_SENSING_CATHODE_ELECTRODE_1: NORMAL
MDC_IDC_SET_LEADCHNL_RV_SENSING_CATHODE_LOCATION_1: NORMAL
MDC_IDC_SET_LEADCHNL_RV_SENSING_POLARITY: NORMAL
MDC_IDC_SET_LEADCHNL_RV_SENSING_SENSITIVITY: 0.9 MV
MDC_IDC_SET_ZONE_DETECTION_INTERVAL: 350 MS
MDC_IDC_SET_ZONE_DETECTION_INTERVAL: 400 MS
MDC_IDC_SET_ZONE_TYPE: NORMAL
MDC_IDC_STAT_AT_BURDEN_PERCENT: 0 %
MDC_IDC_STAT_AT_DTM_END: NORMAL
MDC_IDC_STAT_AT_DTM_START: NORMAL
MDC_IDC_STAT_BRADY_AP_VP_PERCENT: 99.55 %
MDC_IDC_STAT_BRADY_AP_VS_PERCENT: 0.02 %
MDC_IDC_STAT_BRADY_AS_VP_PERCENT: 0.28 %
MDC_IDC_STAT_BRADY_AS_VS_PERCENT: 0.15 %
MDC_IDC_STAT_BRADY_DTM_END: NORMAL
MDC_IDC_STAT_BRADY_DTM_START: NORMAL
MDC_IDC_STAT_BRADY_RA_PERCENT_PACED: 99.7 %
MDC_IDC_STAT_BRADY_RV_PERCENT_PACED: 99.83 %
MDC_IDC_STAT_CRT_DTM_END: NORMAL
MDC_IDC_STAT_CRT_DTM_START: NORMAL
MDC_IDC_STAT_CRT_LV_PERCENT_PACED: 99.8 %
MDC_IDC_STAT_CRT_PERCENT_PACED: 99.8 %
MDC_IDC_STAT_EPISODE_RECENT_COUNT: 0
MDC_IDC_STAT_EPISODE_RECENT_COUNT_DTM_END: NORMAL
MDC_IDC_STAT_EPISODE_RECENT_COUNT_DTM_START: NORMAL
MDC_IDC_STAT_EPISODE_TOTAL_COUNT: 0
MDC_IDC_STAT_EPISODE_TOTAL_COUNT_DTM_END: NORMAL
MDC_IDC_STAT_EPISODE_TOTAL_COUNT_DTM_START: NORMAL
MDC_IDC_STAT_EPISODE_TYPE: NORMAL

## 2022-06-21 DIAGNOSIS — I25.810 CORONARY ARTERY DISEASE INVOLVING CORONARY BYPASS GRAFT OF NATIVE HEART WITHOUT ANGINA PECTORIS: ICD-10-CM

## 2022-06-21 DIAGNOSIS — I21.4 NON-STEMI (NON-ST ELEVATED MYOCARDIAL INFARCTION) (H): ICD-10-CM

## 2022-06-21 RX ORDER — ATORVASTATIN CALCIUM 40 MG/1
40 TABLET, FILM COATED ORAL DAILY
Qty: 90 TABLET | Refills: 3 | Status: SHIPPED | OUTPATIENT
Start: 2022-06-21 | End: 2023-05-22

## 2022-06-27 ENCOUNTER — LAB (OUTPATIENT)
Dept: LAB | Facility: CLINIC | Age: 87
End: 2022-06-27
Payer: COMMERCIAL

## 2022-06-27 ENCOUNTER — OFFICE VISIT (OUTPATIENT)
Dept: CARDIOLOGY | Facility: CLINIC | Age: 87
End: 2022-06-27
Attending: INTERNAL MEDICINE

## 2022-06-27 VITALS
SYSTOLIC BLOOD PRESSURE: 107 MMHG | BODY MASS INDEX: 27.47 KG/M2 | DIASTOLIC BLOOD PRESSURE: 70 MMHG | HEIGHT: 71 IN | OXYGEN SATURATION: 97 % | HEART RATE: 65 BPM | WEIGHT: 196.2 LBS

## 2022-06-27 DIAGNOSIS — I50.42 CHRONIC COMBINED SYSTOLIC AND DIASTOLIC HRT FAIL (H): ICD-10-CM

## 2022-06-27 LAB
ANION GAP SERPL CALCULATED.3IONS-SCNC: 4 MMOL/L (ref 3–14)
BUN SERPL-MCNC: 19 MG/DL (ref 7–30)
CALCIUM SERPL-MCNC: 9.1 MG/DL (ref 8.5–10.1)
CHLORIDE BLD-SCNC: 108 MMOL/L (ref 94–109)
CO2 SERPL-SCNC: 28 MMOL/L (ref 20–32)
CREAT SERPL-MCNC: 1.12 MG/DL (ref 0.66–1.25)
GFR SERPL CREATININE-BSD FRML MDRD: 63 ML/MIN/1.73M2
GLUCOSE BLD-MCNC: 79 MG/DL (ref 70–99)
POTASSIUM BLD-SCNC: 4 MMOL/L (ref 3.4–5.3)
SODIUM SERPL-SCNC: 140 MMOL/L (ref 133–144)

## 2022-06-27 PROCEDURE — 80048 BASIC METABOLIC PNL TOTAL CA: CPT | Performed by: INTERNAL MEDICINE

## 2022-06-27 PROCEDURE — 99215 OFFICE O/P EST HI 40 MIN: CPT | Performed by: PHYSICIAN ASSISTANT

## 2022-06-27 PROCEDURE — 36415 COLL VENOUS BLD VENIPUNCTURE: CPT | Performed by: INTERNAL MEDICINE

## 2022-06-27 NOTE — LETTER
6/27/2022    Steven Wagoner MD  [address] [facility] [address]    RE: Moses Elizondo       Dear Colleague,     I had the pleasure of seeing Moses Elizondo in the [facility].    Cardiology Clinic Progress Note    Moses Elizondo MRN# [id number]   YOB: 1933 Age: 88 year old   Primary cardiologist: Dr. Huggins         Assessment and Plan:     In summary, Moses Elizondo presents today for a 1 month follow up visit.     1. 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 GDMT includes Toprol 25, Entresto 49/51 mg BID. Appears well-compensated with FC II-III sxs.  2. Upgrade to CRT-P on 9/16/21 with adequate BiVp. MRI compatible.  3. Severe bilateral venous insufficiency with chronic superficial venous thrombosis.    -- Conservative management recommended by Dr. Myles (vein clinic) and hematology unless symptoms worsen. Currently stable discomfort/neuropathy symptoms. He isn't interested in ablation procedure at this time.   4. Limiting knee pain. Anticipates he may need surgery in the future. Dr. Huggins has recommended a stress cardiac MRI in this case.    5. Chronically Marginal BP. Asymptomatic.   6. Poor memory.     Plan:  - Encouraged him to get back to routine exercise such as biking or swimming which doesn't irritate his knees.   - Follow up with me in 3 months with a BMP and FLP prior.         History of Presenting Illness:      Moses Elizondo is a pleasant 88 year old patient who presents today for a 1 month follow up visit.     His pertinent cardiac history includes:  # CAD, s/p PCI to OM1 (2014)  # Newly diagnosed cardiomyopathy, LVEF 38% per routine TTE on 1/6/21. 44% per MRI. Suspect pacemaker-induced.   # No evidence of ischemia or infiltrative disease on 1/2021 cMRI.   # SSS, which has now progressed to CHB. S/p PPM.  # Chronic knee pain, arthritis, and Baker's cysts  # Peripheral edema, venous insufficiency  # Mild  ascending aortic enlargement  # Hypothyroidism  # Hyperlipidemia  # Idiopathic peripheral neuropathy  # Chronic facial itching    In brief, Zane saw Dr. Huggins on July 14 for a routine follow up visit.  He complained of worsening leg edema for that time. proBNP was elevated ~1,000 and therefore furosemide 40 mg daily was initiated, an echocardiogram and venous competency study ordered.  He asked him to wear VERNA stockings as well.    His testing took place on August 9.  Echocardiogram was stable from that in January, showing EF estimated at 37%, with moderate global hypokinesis, mild AI, and moderately dilated ascending aorta at 4.4 cm.  His competency study showed a nonocclusive thrombus of the right GSV from the mid thigh to the calf, with severe venous reflux down to the mid calf, where it appeared occluded.  There was also a Baker's cyst incidentally noted.  On the left, there is moderate to severe reflux throughout the entire length of the GSV, also with a Baker's cyst noted. He was referred to see Dr. Myles in the vein clinic for this, and ultimatelty met with him on January 3rd. Dr. Myles recommended ongoing conservative management for his venous insufficiency, but with worsening symptoms could consider bilateral saphenous vein ablation. Regarding his superficial thrombus, Dr. Huggins spoke with hematology who recommended conservative management unless he developed symptoms from it. He had a follow-up RLE venous ultrasound on 11/10 for reassessment and this appeared stable.     When he continued to note no improvement in his functional capacity, he was referred to EP for consideration of CRT. On September 16, he went underwent upgrade to biventricular pacemaker.     Unfortunately, after that, he continued to complain of functional class III symptoms.  In January 2022, I stopped his lisinopril and furosemide, and started Entresto 49/51 mg BID.  Fortunately, after that he noted symptomatic improvement, was able to  exercise on a stationary bike for about 15 minutes/day, complete his household chores, and could always finish what he needed to do if he took it slowly. He did mention some diffuse facial itching x years that he'd noticed a little more since changing to Entresto, but completely denied any facial swelling or new difficulty breathing, and noticed no improvement with Benadryl use.  After discussion with Dr. Krishna, we referred him to an allergist, however the patient declined this and preferred to just continue his medication as it is.     Echocardiogram was repeated in May, showing some improvement in his ejection fraction to 45-50%.  His functional capacity was stable when he saw Dr. Huggins shortly after that.  He did continue to have bothersome peripheral edema for which compression stockings were recommended, but for which reinstitution of low-dose furosemide and/or venous ablation could be considered if symptoms were inadequately controlled with this.  He returns today to follow-up on that.    He continues to feel generally well. His BOWMAN is stable. However his exercise has dwindled, which he attributes more to his knees than his heart. He hasn't yet met with the orthopedic surgeon. He wants to have cataract surgery first. He has no orthopnea. As always, he asks me to walk him through his medications to ensure he's taking them correctly, which he is. He isn't wearing compression socks but states his leg swelling doesn't bother him, but he is bothered by neuropathy. He isn't interested in ablation procedure at this time. Denies chest pain, palpitations, near-syncope.   BMP today is WNL. Creat 1.1. K+ 4.0.  Device interrogation on May 16 showed complete biventricular paced with about 10 years remaining on battery life, and 111 beat run of nonsustained VT, otherwise no arrhythmias.  Of note, the OptiVol fluid index was above his baseline.         Review of Systems:     12-pt ROS is negative except for as noted in the  "HPI.          Physical Exam:     Vitals: /70 (BP Location: Left arm, Patient Position: Sitting)   Pulse 65   Ht 1.803 m (5' 11\")   Wt 89 kg (196 lb 3.2 oz)   SpO2 97%   BMI 27.36 kg/m    Wt Readings from Last 10 Encounters:   06/27/22 89 kg (196 lb 3.2 oz)   05/10/22 89.1 kg (196 lb 6.4 oz)   02/04/22 87.4 kg (192 lb 9.6 oz)   01/18/22 85.7 kg (189 lb)   01/03/22 85 kg (187 lb 6.4 oz)   11/12/21 85.7 kg (189 lb)   09/16/21 86.5 kg (190 lb 9.6 oz)   08/30/21 85.3 kg (188 lb)   08/16/21 86 kg (189 lb 11.2 oz)   07/14/21 88.5 kg (195 lb 1.6 oz)       Constitutional:  Patient is pleasant, alert, cooperative, and in NAD.  HEENT:  NCAT. PERRLA. EOM's intact.   Neck:  CVP appears normal. No carotid bruits.   Pulmonary: Normal respiratory effort. CTAB.   Cardiac: RRR, normal S1/S2, no S3/S4, no murmur or rub.   Abdomen:  Non-tender abdomen, no hepatosplenomegaly appreciated.   Vascular: Pulses in the upper and lower extremities are 2+ and equal bilaterally.  Extremities: Trace lower extremity edema, erythema, cyanosis or tenderness appreciated.  Skin:  No rashes or lesions appreciated.   Neurological:  No gross motor or sensory deficits.   Psych: Appropriate affect.        Data:     Labs reviewed:  Recent Labs   Lab Test 11/12/21  1204 07/14/21  0827 03/02/21  0927 07/23/20  1008 07/07/20  0000 10/31/19  0849 05/02/19  0000 03/20/19  0823 02/27/18  0817   LDL  --   --   --   --  49 60  --  61 40   HDL  --   --   --   --  42 52  --  40 49   NHDL  --   --   --   --   --  74  --  76 52   CHOL  --   --   --   --  105 126  --  116 101   TRIG  --   --   --   --  71 70  --  75 59   TSH  --   --  2.27 2.87  --  1.94   < > 2.20 2.53   NTBNP 715* 1,009* 804*  --   --   --   --   --   --     < > = values in this interval not displayed.       Lab Results   Component Value Date    WBC 6.6 11/12/2021    WBC 6.2 07/23/2020    RBC 4.18 (L) 11/12/2021    RBC 4.58 07/23/2020    HGB 12.3 (L) 11/12/2021    HGB 13.2 (L) 03/02/2021    " HCT 37.8 (L) 11/12/2021    HCT 41.4 07/23/2020    MCV 90 11/12/2021    MCV 90 07/23/2020    MCH 29.4 11/12/2021    MCH 29.9 07/23/2020    MCHC 32.5 11/12/2021    MCHC 33.1 07/23/2020    RDW 12.0 11/12/2021    RDW 12.5 07/23/2020     11/12/2021     07/23/2020       Lab Results   Component Value Date     06/27/2022     04/07/2021    POTASSIUM 4.0 06/27/2022    POTASSIUM 4.1 04/07/2021    CHLORIDE 108 06/27/2022    CHLORIDE 109 04/07/2021    CO2 28 06/27/2022    CO2 28 04/07/2021    ANIONGAP 4 06/27/2022    ANIONGAP 2 (L) 04/07/2021    GLC 79 06/27/2022    GLC 75 04/07/2021    BUN 19 06/27/2022    BUN 25 04/07/2021    CR 1.12 06/27/2022    CR 1.28 (H) 04/07/2021    GFRESTIMATED 63 06/27/2022    GFRESTIMATED 50 (L) 04/07/2021    GFRESTBLACK 58 (L) 04/07/2021    GABRIELA 9.1 06/27/2022    GABRIELA 8.8 04/07/2021      Lab Results   Component Value Date    AST 32 03/02/2021    ALT 30 03/02/2021       Lab Results   Component Value Date    A1C 5.6 05/02/2019       Lab Results   Component Value Date    INR 1.00 06/20/2019           Problem List:     Patient Active Problem List   Diagnosis     Hypothyroidism, unspecified type     Degeneration of lumbar or lumbosacral intervertebral disc     MGUS (monoclonal gammopathy of unknown significance)     Familial tremor     Hyperlipidemia LDL goal <100     NSTEMI (non-ST elevated myocardial infarction) (H)     Health [facility]     Coronary artery disease     Bradycardia     Aneurysm of thoracic aorta (H)     Sinus bradycardia     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)     Ascending aortic aneurysm (H)     Sick sinus syndrome (H)     Aortic root dilatation (H)     Medicare annual wellness visit, subsequent     Idiopathic peripheral neuropathy     Chest discomfort     Status post coronary angiogram     Second degree AV block     Cardiac pacemaker in situ     Chronic kidney disease, stage 3 (H)     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)           Medications:     Current Outpatient Medications   Medication Sig Dispense Refill     acetaminophen (TYLENOL) 650 MG CR tablet Take 650 mg by mouth daily       ASPIRIN EC PO Take 81 mg by mouth daily       atorvastatin (LIPITOR) 40 MG tablet Take 1 tablet (40 mg) by mouth daily 90 tablet 3     gabapentin (NEURONTIN) 300 MG capsule Take 1 capsule (300 mg) by mouth At Bedtime       levothyroxine (SYNTHROID/LEVOTHROID) 100 MCG tablet Take 1 tablet (100 mcg) by mouth daily 60 tablet 0     metoprolol succinate ER (TOPROL-XL) 25 MG 24 hr tablet Take 1 tablet (25 mg) by mouth daily At lunch 90 tablet 3     nitroGLYcerin (NITROSTAT) 0.4 MG sublingual tablet One tablet under the tongue every 5 minutes if needed for chest pain. May repeat every 5 minutes for a maximum of 3 doses in 15 minutes\" 25 tablet 3     Polyethylene Glycol 3350 (MIRALAX PO) Take by mouth as needed       sacubitril-valsartan (ENTRESTO) 49-51 MG per tablet Take 1 tablet by mouth 2 times daily 60 tablet 5     UNABLE TO FIND 1 capsule 3 times daily MEDICATION NAME: Sheritalo             Past Medical History:     Past Medical History:   Diagnosis Date     Aortic root dilatation (H)     4.4 cm     Ascending aorta dilatation (H)     4.4 cm     ASD (atrial septal defect)      Bradycardia      CAD (coronary artery disease)      YOGESH to RCA(6/20/19); YOGESH to OM1(5/15/14)     Cardiac pacemaker 06/20/2019    [employer] PPM COMFORT MRI XT DR-implant 6/20/19     Chest discomfort      Degeneration of lumbar or lumbosacral intervertebral disc      Familial tremor      Former smoker      Herpes zoster without mention of complication      HTN (hypertension)      Hyperlipidaemia      Idiopathic peripheral neuropathy      MGUS (monoclonal gammopathy of unknown significance)      NSTEMI (non-ST elevated myocardial infarction) (H) 2014     Other and unspecified hyperlipidemia      PFO (patent foramen ovale)      Prostatitis, unspecified "      Second degree heart block      Sensorineural hearing loss, unspecified      SSS (sick sinus syndrome) (H)      Status post coronary angiogram 6/20/2019     Unspecified hypothyroidism      Past Surgical History:   Procedure Laterality Date     CHOLECYSTECTOMY       CORONARY ANGIOGRAPHY ADULT ORDER  5/14/14    PTCA w/YOGESH to OM1     CV CORONARY ANGIOGRAM N/A 6/20/2019    Procedure: Coronary Angiogram;  Surgeon: Romie Coronado MD;  Location:  HE[facility]ARD[facility] CATH [facility]     CV INTRAVASULAR ULTRASOUND N/A 6/20/2019    Procedure: Intravascular Ultrasound;  Surgeon: Romie Coronado MD;  Location:  [facility]     CV PCI ATHERECTOMY ORBITAL N/A 6/20/2019    Procedure: PCI Atherectomy Orbital;  Surgeon: Romie Coronado MD;  Location:  [facility] LAB     CV PCI STENT DRUG ELUTING N/A 6/20/2019    Procedure: PCI Stent Drug Eluting;  Surgeon: Romie Coronado MD;  Location:  [facility] LAB     CV TEMPORARY PACEMAKER INSERTION N/A 6/20/2019    Procedure: Temporary Pacemaker Insertion;  Surgeon: Romie Coronado MD;  Location:  [facility]ARD[facility]ATH LAB     EP PACEMAKER N/A 6/20/2019    Procedure: EP Pacemaker;  Surgeon: Max Dowell MD;  Location:  HE[facility]ARD[facility] LAB     EP PPM UPGRADE TO BIVENT N/A 9/16/2021    Procedure: EP PPM UPGRADE TO BIVENT;  Surgeon: Tre Miller MD;  Location:  HE[facility] CARDIAC CATH LAB     HEART CATH, ANGIOPLASTY  5/14/14    YOGESH to OM1     ZZC NONSPECIFIC PROCEDURE  2010    Laparoscopic cholecystectomy.      Family History   Problem Relation Age of Onset     Cancer Mother      Genitourinary Problems Father 99        complications from surgery     Heart Disease Brother 72        MI     Social History     Socioeconomic History     Marital status:      Spouse name: Not on file     Number of children: Not on file     Years of education: Not on file     Highest education level: Not on file   Occupational History     Not on file    Tobacco Use     Smoking status: Former Smoker     Packs/day: 0.50     Years: 16.00     Pack years: 8.00     Types: Cigarettes     Start date:      Quit date: 1967     Years since quittin.5     Smokeless tobacco: Never Used   Substance and Sexual Activity     Alcohol use: Yes     Comment: 5-7 drinks week - at most one drink daily     Drug use: No     Sexual activity: Never   Other Topics Concern      Service Not Asked     Blood Transfusions Not Asked     Caffeine Concern Yes     Comment: 2 cups coffee/day     Occupational Exposure Not Asked     Hobby Hazards Not Asked     Sleep Concern No     Stress Concern Not Asked     Weight Concern Yes     Comment: limiting alcohol to watch calories     Special Diet Not Asked     Back Care Not Asked     Exercise Yes     Comment: Stationary bike  weights and aerobics 4 times week     Bike Helmet Not Asked     Seat Belt Yes     Self-Exams Not Asked     Parent/sibling w/ CABG, MI or angioplasty before 65F 55M? No   Social History Narrative     Not on file     Social Determinants of Health     Financial Resource Strain: Not on file   Food Insecurity: Not on file   Transportation Needs: Not on file   Physical Activity: Not on file   Stress: Not on file   Social Connections: Not on file   Intimate Partner Violence: Not on file   Housing Stability: Not on file           Allergies:   No known drug allergies      EVANGELISTA Wren [facility]  Pager: [phone]    Today's clinic visit entailed:  Review of the result(s) of each unique test - BMP  I spent a total of 40 minutes on the day of the visit.   Time spent doing chart review, history and exam, documentation and further activities per the note  Provider  Link to [facility] Help Grid     The level of medical decision making during this visit was of moderate complexity.    Thank you for allowing me to participate in the care of your patient.      Sincerely,     EVANGELISTA Wren  [facility]  cc:   Unruly Huggins MD  [address]  [address]  [place] [id number]

## 2022-06-27 NOTE — PATIENT INSTRUCTIONS
Today's Plan:   - Try to get back to routine exercises including biking and/or swimming that doesn't irritate the knees.   - If you decide you want to pursue the leg ablation, let me know.   - if you decide to pursue knee surgery, we'll plan to get a stress test beforehand.   - Come back to see me in 3 months with fasting labs prior.      If you have questions or concerns please call my nurse team at [phone].   Scheduling phone number: [phone]  For after hours urgent concerns call [phone] option 2.   Reminder: Please bring in all current medications, over the counter supplements and vitamin bottles to your next appointment.    It was a pleasure seeing you today!     Lacey Puckett PA-C    Component      Latest Ref Rng & Units 6/27/2022   Sodium      133 - 144 mmol/L 140   Potassium      3.4 - 5.3 mmol/L 4.0   Chloride      94 - 109 mmol/L 108   Carbon Dioxide      20 - 32 mmol/L 28   Anion Gap      3 - 14 mmol/L 4   Urea Nitrogen      7 - 30 mg/dL 19   Creatinine      0.66 - 1.25 mg/dL 1.12   Calcium      8.5 - 10.1 mg/dL 9.1   Glucose      70 - 99 mg/dL 79   GFR Estimate      >60 mL/min/1.73m2 63

## 2022-06-27 NOTE — PROGRESS NOTES
Cardiology Clinic Progress Note    Moses Elizondo MRN# [id number]   YOB: 1933 Age: 88 year old   Primary cardiologist: Dr. Huggins         Assessment and Plan:     In summary, Moses Elizondo presents today for a 1 month follow up visit.     1. 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 GDMT includes Toprol 25, Entresto 49/51 mg BID. Appears well-compensated with FC II-III sxs.  2. Upgrade to CRT-P on 9/16/21 with adequate BiVp. MRI compatible.  3. Severe bilateral venous insufficiency with chronic superficial venous thrombosis.    -- Conservative management recommended by Dr. Myles (vein clinic) and hematology unless symptoms worsen. Currently stable discomfort/neuropathy symptoms. He isn't interested in ablation procedure at this time.   4. Limiting knee pain. Anticipates he may need surgery in the future. Dr. Huggins has recommended a stress cardiac MRI in this case.    5. Chronically Marginal BP. Asymptomatic.   6. Poor memory.     Plan:  - Encouraged him to get back to routine exercise such as biking or swimming which doesn't irritate his knees.   - Follow up with me in 3 months with a BMP and FLP prior.         History of Presenting Illness:      Moses Elizondo is a pleasant 88 year old patient who presents today for a 1 month follow up visit.     His pertinent cardiac history includes:  # CAD, s/p PCI to OM1 (2014)  # Newly diagnosed cardiomyopathy, LVEF 38% per routine TTE on 1/6/21. 44% per MRI. Suspect pacemaker-induced.   # No evidence of ischemia or infiltrative disease on 1/2021 cMRI.   # SSS, which has now progressed to CHB. S/p PPM.  # Chronic knee pain, arthritis, and Baker's cysts  # Peripheral edema, venous insufficiency  # Mild ascending aortic enlargement  # Hypothyroidism  # Hyperlipidemia  # Idiopathic peripheral neuropathy  # Chronic facial itching    In brief, Zane saw Dr. Huggins on July 14 for a routine follow up visit.  He complained  of worsening leg edema for that time. proBNP was elevated ~1,000 and therefore furosemide 40 mg daily was initiated, an echocardiogram and venous competency study ordered.  He asked him to wear VERNA stockings as well.    His testing took place on August 9.  Echocardiogram was stable from that in January, showing EF estimated at 37%, with moderate global hypokinesis, mild AI, and moderately dilated ascending aorta at 4.4 cm.  His competency study showed a nonocclusive thrombus of the right GSV from the mid thigh to the calf, with severe venous reflux down to the mid calf, where it appeared occluded.  There was also a Baker's cyst incidentally noted.  On the left, there is moderate to severe reflux throughout the entire length of the GSV, also with a Baker's cyst noted. He was referred to see Dr. Myles in the vein clinic for this, and ultimatelty met with him on January 3rd. Dr. Myles recommended ongoing conservative management for his venous insufficiency, but with worsening symptoms could consider bilateral saphenous vein ablation. Regarding his superficial thrombus, Dr. Huggins spoke with hematology who recommended conservative management unless he developed symptoms from it. He had a follow-up RLE venous ultrasound on 11/10 for reassessment and this appeared stable.     When he continued to note no improvement in his functional capacity, he was referred to EP for consideration of CRT. On September 16, he went underwent upgrade to biventricular pacemaker.     Unfortunately, after that, he continued to complain of functional class III symptoms.  In January 2022, I stopped his lisinopril and furosemide, and started Entresto 49/51 mg BID.  Fortunately, after that he noted symptomatic improvement, was able to exercise on a stationary bike for about 15 minutes/day, complete his household chores, and could always finish what he needed to do if he took it slowly. He did mention some diffuse facial itching x years that he'd  "noticed a little more since changing to Entresto, but completely denied any facial swelling or new difficulty breathing, and noticed no improvement with Benadryl use.  After discussion with Dr. Krishna, we referred him to an allergist, however the patient declined this and preferred to just continue his medication as it is.     Echocardiogram was repeated in May, showing some improvement in his ejection fraction to 45-50%.  His functional capacity was stable when he saw Dr. Huggins shortly after that.  He did continue to have bothersome peripheral edema for which compression stockings were recommended, but for which reinstitution of low-dose furosemide and/or venous ablation could be considered if symptoms were inadequately controlled with this.  He returns today to follow-up on that.    He continues to feel generally well. His BOWMAN is stable. However his exercise has dwindled, which he attributes more to his knees than his heart. He hasn't yet met with the orthopedic surgeon. He wants to have cataract surgery first. He has no orthopnea. As always, he asks me to walk him through his medications to ensure he's taking them correctly, which he is. He isn't wearing compression socks but states his leg swelling doesn't bother him, but he is bothered by neuropathy. He isn't interested in ablation procedure at this time. Denies chest pain, palpitations, near-syncope.   BMP today is WNL. Creat 1.1. K+ 4.0.  Device interrogation on May 16 showed complete biventricular paced with about 10 years remaining on battery life, and 111 beat run of nonsustained VT, otherwise no arrhythmias.  Of note, the OptiVol fluid index was above his baseline.         Review of Systems:     12-pt ROS is negative except for as noted in the HPI.          Physical Exam:     Vitals: /70 (BP Location: Left arm, Patient Position: Sitting)   Pulse 65   Ht 1.803 m (5' 11\")   Wt 89 kg (196 lb 3.2 oz)   SpO2 97%   BMI 27.36 kg/m    Wt Readings from " Last 10 Encounters:   06/27/22 89 kg (196 lb 3.2 oz)   05/10/22 89.1 kg (196 lb 6.4 oz)   02/04/22 87.4 kg (192 lb 9.6 oz)   01/18/22 85.7 kg (189 lb)   01/03/22 85 kg (187 lb 6.4 oz)   11/12/21 85.7 kg (189 lb)   09/16/21 86.5 kg (190 lb 9.6 oz)   08/30/21 85.3 kg (188 lb)   08/16/21 86 kg (189 lb 11.2 oz)   07/14/21 88.5 kg (195 lb 1.6 oz)       Constitutional:  Patient is pleasant, alert, cooperative, and in NAD.  HEENT:  NCAT. PERRLA. EOM's intact.   Neck:  CVP appears normal. No carotid bruits.   Pulmonary: Normal respiratory effort. CTAB.   Cardiac: RRR, normal S1/S2, no S3/S4, no murmur or rub.   Abdomen:  Non-tender abdomen, no hepatosplenomegaly appreciated.   Vascular: Pulses in the upper and lower extremities are 2+ and equal bilaterally.  Extremities: Trace lower extremity edema, erythema, cyanosis or tenderness appreciated.  Skin:  No rashes or lesions appreciated.   Neurological:  No gross motor or sensory deficits.   Psych: Appropriate affect.        Data:     Labs reviewed:  Recent Labs   Lab Test 11/12/21  1204 07/14/21  0827 03/02/21  0927 07/23/20  1008 07/07/20  0000 10/31/19  0849 05/02/19  0000 03/20/19  0823 02/27/18  0817   LDL  --   --   --   --  49 60  --  61 40   HDL  --   --   --   --  42 52  --  40 49   NHDL  --   --   --   --   --  74  --  76 52   CHOL  --   --   --   --  105 126  --  116 101   TRIG  --   --   --   --  71 70  --  75 59   TSH  --   --  2.27 2.87  --  1.94   < > 2.20 2.53   NTBNP 715* 1,009* 804*  --   --   --   --   --   --     < > = values in this interval not displayed.       Lab Results   Component Value Date    WBC 6.6 11/12/2021    WBC 6.2 07/23/2020    RBC 4.18 (L) 11/12/2021    RBC 4.58 07/23/2020    HGB 12.3 (L) 11/12/2021    HGB 13.2 (L) 03/02/2021    HCT 37.8 (L) 11/12/2021    HCT 41.4 07/23/2020    MCV 90 11/12/2021    MCV 90 07/23/2020    MCH 29.4 11/12/2021    MCH 29.9 07/23/2020    MCHC 32.5 11/12/2021    MCHC 33.1 07/23/2020    RDW 12.0 11/12/2021    RDW  12.5 07/23/2020     11/12/2021     07/23/2020       Lab Results   Component Value Date     06/27/2022     04/07/2021    POTASSIUM 4.0 06/27/2022    POTASSIUM 4.1 04/07/2021    CHLORIDE 108 06/27/2022    CHLORIDE 109 04/07/2021    CO2 28 06/27/2022    CO2 28 04/07/2021    ANIONGAP 4 06/27/2022    ANIONGAP 2 (L) 04/07/2021    GLC 79 06/27/2022    GLC 75 04/07/2021    BUN 19 06/27/2022    BUN 25 04/07/2021    CR 1.12 06/27/2022    CR 1.28 (H) 04/07/2021    GFRESTIMATED 63 06/27/2022    GFRESTIMATED 50 (L) 04/07/2021    GFRESTBLACK 58 (L) 04/07/2021    GABRIELA 9.1 06/27/2022    GABRIELA 8.8 04/07/2021      Lab Results   Component Value Date    AST 32 03/02/2021    ALT 30 03/02/2021       Lab Results   Component Value Date    A1C 5.6 05/02/2019       Lab Results   Component Value Date    INR 1.00 06/20/2019           Problem List:     Patient Active Problem List   Diagnosis     Hypothyroidism, unspecified type     Degeneration of lumbar or lumbosacral intervertebral disc     MGUS (monoclonal gammopathy of unknown significance)     Familial tremor     Hyperlipidemia LDL goal <100     NSTEMI (non-ST elevated myocardial infarction) (H)     Health long-term     Coronary artery disease     Bradycardia     Aneurysm of thoracic aorta (H)     Sinus bradycardia     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)     Ascending aortic aneurysm (H)     Sick sinus syndrome (H)     Aortic root dilatation (H)     Medicare annual wellness visit, subsequent     Idiopathic peripheral neuropathy     Chest discomfort     Status post coronary angiogram     Second degree AV block     Cardiac pacemaker in situ     Chronic kidney disease, stage 3 (H)     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)           Medications:     Current Outpatient Medications   Medication Sig Dispense Refill     acetaminophen (TYLENOL) 650 MG CR tablet Take 650 mg by mouth daily       ASPIRIN  "EC PO Take 81 mg by mouth daily       atorvastatin (LIPITOR) 40 MG tablet Take 1 tablet (40 mg) by mouth daily 90 tablet 3     gabapentin (NEURONTIN) 300 MG capsule Take 1 capsule (300 mg) by mouth At Bedtime       levothyroxine (SYNTHROID/LEVOTHROID) 100 MCG tablet Take 1 tablet (100 mcg) by mouth daily 60 tablet 0     metoprolol succinate ER (TOPROL-XL) 25 MG 24 hr tablet Take 1 tablet (25 mg) by mouth daily At lunch 90 tablet 3     nitroGLYcerin (NITROSTAT) 0.4 MG sublingual tablet One tablet under the tongue every 5 minutes if needed for chest pain. May repeat every 5 minutes for a maximum of 3 doses in 15 minutes\" 25 tablet 3     Polyethylene Glycol 3350 (MIRALAX PO) Take by mouth as needed       sacubitril-valsartan (ENTRESTO) 49-51 MG per tablet Take 1 tablet by mouth 2 times daily 60 tablet 5     UNABLE TO FIND 1 capsule 3 times daily MEDICATION NAME: NeuroFlo             Past Medical History:     Past Medical History:   Diagnosis Date     Aortic root dilatation (H)     4.4 cm     Ascending aorta dilatation (H)     4.4 cm     ASD (atrial septal defect)      Bradycardia      CAD (coronary artery disease)      YOGESH to RCA(6/20/19); YOGESH to OM1(5/15/14)     Cardiac pacemaker 06/20/2019    [employer] PPM COMFORT MRI XT DR-implant 6/20/19     Chest discomfort      Degeneration of lumbar or lumbosacral intervertebral disc      Familial tremor      Former smoker      Herpes zoster without mention of complication      HTN (hypertension)      Hyperlipidaemia      Idiopathic peripheral neuropathy      MGUS (monoclonal gammopathy of unknown significance)      NSTEMI (non-ST elevated myocardial infarction) (H) 2014     Other and unspecified hyperlipidemia      PFO (patent foramen ovale)      Prostatitis, unspecified      Second degree heart block      Sensorineural hearing loss, unspecified      SSS (sick sinus syndrome) (H)      Status post coronary angiogram 6/20/2019     Unspecified hypothyroidism      Past Surgical " History:   Procedure Laterality Date     CHOLECYSTECTOMY       CORONARY ANGIOGRAPHY ADULT ORDER  14    PTCA w/YOGESH to OM1     CV CORONARY ANGIOGRAM N/A 2019    Procedure: Coronary Angiogram;  Surgeon: Romie Coronado MD;  Location:  [facility]     CV INTRAVASULAR ULTRASOUND N/A 2019    Procedure: Intravascular Ultrasound;  Surgeon: Romie Coronado MD;  Location:  [facility]     CV PCI ATHERECTOMY ORBITAL N/A 2019    Procedure: PCI Atherectomy Orbital;  Surgeon: Romie Coronado MD;  Location:  [facility]     CV PCI STENT DRUG ELUTING N/A 2019    Procedure: PCI Stent Drug Eluting;  Surgeon: Romie Coronado MD;  Location:  [facility]     CV TEMPORARY PACEMAKER INSERTION N/A 2019    Procedure: Temporary Pacemaker Insertion;  Surgeon: Romie Coronado MD;  Location:  [facility]ARD[facility]ATH [facility]     EP PACEMAKER N/A 2019    Procedure: EP Pacemaker;  Surgeon: Max Dowell MD;  Location:  HE[facility] CARD[facility] CATH LAB     EP PPM UPGRADE TO BIVENT N/A 2021    Procedure: EP PPM UPGRADE TO BIVENT;  Surgeon: Tre Miller MD;  Location:  HEART CARDIAC CATH LAB     HEART CATH, ANGIOPLASTY  14    YOGESH to OM1     ZZC NONSPECIFIC PROCEDURE      Laparoscopic cholecystectomy.      Family History   Problem Relation Age of Onset     Cancer Mother      Genitourinary Problems Father 99        complications from surgery     Heart Disease Brother 72        MI     Social History     Socioeconomic History     Marital status:      Spouse name: Not on file     Number of children: Not on file     Years of education: Not on file     Highest education level: Not on file   Occupational History     Not on file   Tobacco Use     Smoking status: Former Smoker     Packs/day: 0.50     Years: 16.00     Pack years: 8.00     Types: Cigarettes     Start date:      Quit date: 1967     Years since quittin.5      Smokeless tobacco: Never Used   Substance and Sexual Activity     Alcohol use: Yes     Comment: 5-7 drinks week - at most one drink daily     Drug use: No     Sexual activity: Never   Other Topics Concern      Service Not Asked     Blood Transfusions Not Asked     Caffeine Concern Yes     Comment: 2 cups coffee/day     Occupational Exposure Not Asked     Hobby Hazards Not Asked     Sleep Concern No     Stress Concern Not Asked     Weight Concern Yes     Comment: limiting alcohol to watch calories     Special Diet Not Asked     Back Care Not Asked     Exercise Yes     Comment: Stationary bike  weights and aerobics 4 times week     Bike Helmet Not Asked     Seat Belt Yes     Self-Exams Not Asked     Parent/sibling w/ CABG, MI or angioplasty before 65F 55M? No   Social History Narrative     Not on file     Social Determinants of Health     Financial Resource Strain: Not on file   Food Insecurity: Not on file   Transportation Needs: Not on file   Physical Activity: Not on file   Stress: Not on file   Social Connections: Not on file   Intimate Partner Violence: Not on file   Housing Stability: Not on file           Allergies:   No known drug allergies      Lacey Puckett PA-C  [facility]  Pager: [phone]    Today's clinic visit entailed:  Review of the result(s) of each unique test - BMP  I spent a total of 40 minutes on the day of the visit.   Time spent doing chart review, history and exam, documentation and further activities per the note  Provider  Link to MDM Help Grid     The level of medical decision making during this visit was of moderate complexity.

## 2022-07-07 ENCOUNTER — OFFICE VISIT (OUTPATIENT)
Dept: INTERNAL MEDICINE | Facility: CLINIC | Age: 87
End: 2022-07-07
Payer: COMMERCIAL

## 2022-07-07 VITALS
HEART RATE: 103 BPM | TEMPERATURE: 97.7 F | WEIGHT: 194 LBS | BODY MASS INDEX: 27.16 KG/M2 | OXYGEN SATURATION: 98 % | SYSTOLIC BLOOD PRESSURE: 115 MMHG | HEIGHT: 71 IN | DIASTOLIC BLOOD PRESSURE: 73 MMHG

## 2022-07-07 DIAGNOSIS — I50.42 CHRONIC COMBINED SYSTOLIC AND DIASTOLIC HRT FAIL (H): ICD-10-CM

## 2022-07-07 DIAGNOSIS — N18.31 STAGE 3A CHRONIC KIDNEY DISEASE (H): ICD-10-CM

## 2022-07-07 DIAGNOSIS — I25.810 CORONARY ARTERY DISEASE INVOLVING CORONARY BYPASS GRAFT OF NATIVE HEART WITHOUT ANGINA PECTORIS: ICD-10-CM

## 2022-07-07 DIAGNOSIS — Z01.818 PREOP GENERAL PHYSICAL EXAM: Primary | ICD-10-CM

## 2022-07-07 DIAGNOSIS — I25.118 ATHEROSCLEROTIC HEART DISEASE OF NATIVE CORONARY ARTERY WITH OTHER FORMS OF ANGINA PECTORIS (H): ICD-10-CM

## 2022-07-07 DIAGNOSIS — I21.4 NON-STEMI (NON-ST ELEVATED MYOCARDIAL INFARCTION) (H): ICD-10-CM

## 2022-07-07 DIAGNOSIS — G60.9 IDIOPATHIC PERIPHERAL NEUROPATHY: ICD-10-CM

## 2022-07-07 DIAGNOSIS — H25.9 AGE-RELATED CATARACT OF BOTH EYES, UNSPECIFIED AGE-RELATED CATARACT TYPE: ICD-10-CM

## 2022-07-07 DIAGNOSIS — Z00.00 ENCOUNTER FOR MEDICARE ANNUAL WELLNESS EXAM: ICD-10-CM

## 2022-07-07 DIAGNOSIS — E03.9 HYPOTHYROIDISM, UNSPECIFIED TYPE: ICD-10-CM

## 2022-07-07 LAB
ALT SERPL W P-5'-P-CCNC: 27 U/L (ref 0–70)
ERYTHROCYTE [DISTWIDTH] IN BLOOD BY AUTOMATED COUNT: 12 % (ref 10–15)
HCT VFR BLD AUTO: 40.2 % (ref 40–53)
HGB BLD-MCNC: 13.1 G/DL (ref 13.3–17.7)
MCH RBC QN AUTO: 29.7 PG (ref 26.5–33)
MCHC RBC AUTO-ENTMCNC: 32.6 G/DL (ref 31.5–36.5)
MCV RBC AUTO: 91 FL (ref 78–100)
PLATELET # BLD AUTO: 136 10E3/UL (ref 150–450)
RBC # BLD AUTO: 4.41 10E6/UL (ref 4.4–5.9)
TSH SERPL DL<=0.005 MIU/L-ACNC: 1.44 MU/L (ref 0.4–4)
WBC # BLD AUTO: 6.1 10E3/UL (ref 4–11)

## 2022-07-07 PROCEDURE — 36415 COLL VENOUS BLD VENIPUNCTURE: CPT | Performed by: INTERNAL MEDICINE

## 2022-07-07 PROCEDURE — 84443 ASSAY THYROID STIM HORMONE: CPT | Performed by: INTERNAL MEDICINE

## 2022-07-07 PROCEDURE — 99397 PER PM REEVAL EST PAT 65+ YR: CPT | Performed by: INTERNAL MEDICINE

## 2022-07-07 PROCEDURE — 84460 ALANINE AMINO (ALT) (SGPT): CPT | Performed by: INTERNAL MEDICINE

## 2022-07-07 PROCEDURE — 99214 OFFICE O/P EST MOD 30 MIN: CPT | Mod: 25 | Performed by: INTERNAL MEDICINE

## 2022-07-07 PROCEDURE — 85027 COMPLETE CBC AUTOMATED: CPT | Performed by: INTERNAL MEDICINE

## 2022-07-07 NOTE — PATIENT INSTRUCTIONS
Continue all medications at the same doses.  Contact your usual pharmacy if you need refills.      Continue to follow up regularly with the Cardiology Clinic as ordered.      Get Covid booster vaccine this fall when it becomes available     Consider a Shingrix shingles vaccine to prevent future shingles (see information below).  You should get this from the pharmacist in a pharmacy because it will be cheaper than getting it here.  The pharmacist can also tell you how much this vaccine will cost before you receive it.      Return to see me in 1 year, sooner if needed.  Use joblocal or Call [phone] to schedule the appointment with me.           Shingles Vaccine (SHINGRIX):      I would recommend that you consider getting the Shingrix shingles vaccine.  The shingles vaccine is recommended for anyone over age 50.     The Shingrix vaccine is a series of 2 vaccines given 2-6 months apart.     The shingles vaccine does not stop you from getting shingles, but it decreases the intensity of the event, the duration of the event, and decreases the painful nerve condition that results     Based on the available data, the Shingrix vaccine has superior benefit and should be considered even if you have had the old Zostavax shinglesvaccine before.      Contact your insurance provider and ask them if either shingles vaccine is covered and is so, how much it will cost you.  Usually this will be cheaper to get in a pharmacy given by the pharmacist.    Regardless of the coverage, I would recommend that you consider the vaccine since shingles is very painful, (just ask anyone who has ever had it)    For Medicare insurance, the vaccine is cheaper to receive from a pharmacist in a pharmacy than for us to give you in the clinic.          PRE-OPERATIVE INSTRUCTIONS:     *  Contact your surgeon if there is any change in your health. This includes signs of new infection, such as cold or flu (such as a sore throat, runny nose, cough, rash  or fever).       *  Confirm any Covid testing requirements before your procedure.   Be aware that each surgery facility has their specific Covid testing requirements.  Many surgery facilities require Covid testing within 2-4 days of the surgery.  Typically the surgeon's office is responsible for arranging and completing this testing before surgery.    Follow any instructions you have been given.  Contact the surgery office for questions about this.       --[facility] Covid Scheduling number: [phone]   (to arrange Covid testing and/or Covid vaccine appointments within the [facility] system)      *  Stop aspirin of any kind for 7 days before procedure.   (even low dose daily aspirin).    *  No NSAIDs (Motrin, Advil, ibuprofen, Aleve, etc) within 5-7 days of surgery.    *  Stop any Fish Oil, multi vitamin (and specifically anything containing vitamin E) supplements for 7 days prior to surgery because these can affect platelet function.      *  Tylenol (acetaminophen) OK to take if needed for pain or headache.  Follow instructions on the bottle    *  Prepare your body as instructed by the surgery clinic.  If instructed, Take a shower or bath the night before surgery. Use any special soaps or cleaning instructions according to instructions from your surgeon.  If you do not have soap from your surgeon, use your regular soap. Do not shave or scrub the surgery site.  Wear clean pajamas and have clean sheets on your bed     *  ON THE MORNING OF SURGERY:     --Take all usual medications.      *  Resume all medications at the same doses after surgery, unless instructed otherwise by the medical staff.     *  Attend all follow up appointments with the surgeons (and/or therapists if applicable) as instructed.     *  Contact the surgeon's office for any specific questions about after-surgery cares and follow up instructions.      You do not need to necessarily return to see us after your surgery unless  "instructed to do so.          Preventive Health Recommendations:   Male Ages 65 and over    Yearly exam:             See your health care provider every year in order to  o   Review health changes.   o   Discuss preventive care.    o   Review your medicines if your doctor has prescribed any.  Talk with your health care provider about whether you should have a test to screen for prostate cancer (PSA).  Every 3 years, have a diabetes test (fasting glucose). If you are at risk for diabetes, you should have this test more often.  Every 5 years, have a cholesterol test. Have this test more often if you are at risk for high cholesterol or heart disease.   Every 10 years, have a colonoscopy. Or, have a yearly FIT test (stool test). These exams will check for colon cancer.  Talk to with your health care provider about screening for Abdominal Aortic Aneurysm if you have a family history of AAA or have a history of smoking.    Shots:   Get a flu shot each year.   Get a tetanus shot every 10 years.   Talk to your doctor about your pneumonia vaccines. There are now two you should receive - Pneumovax (PPSV 23) and Prevnar (PCV 13).   Talk to your doctor about a shingles vaccine.   Talk to your doctor about the hepatitis B vaccine.  Nutrition:   Eat at least 5 servings of fruits and vegetables each day.   Eat whole-grain bread, whole-wheat pasta and brown rice instead of white grains and rice.   Talk to your provider about Calcium and Vitamin D.      --Good Grains:  Oats, brown rice, Quinoa (these do not raise the blood sugar as much)     --Bad grains:  Anything made from wheat or white rice     (because these raise the blood sugars significantly, and the possible gluten issue from wheat for some people).      --Proteins:  Aim for \"lean proteins\" including chicken, fish, seafood, pork, turkey, and eggs (in moderation); Eat red meat only occasionally    Lifestyle  Exercise for at least 150 minutes a week (30 minutes a day, 5 days " a week). This will help you control your weight and prevent disease.   Limit alcohol to one drink per day.   No smoking.   Wear sunscreen to prevent skin cancer.   See your dentist every six months for an exam and cleaning.   See your eye doctor every 1 to 2 years to screen for conditions such as glaucoma, macular degeneration, cataracts, etc         Patient Education   Personalized Prevention Plan  You are due for the preventive services outlined below.  Your care team is available to assist you in scheduling these services.  If you have already completed any of these items, please share that information with your care team to update in your medical record.  Health Maintenance Due   Topic Date Due    Heart Failure Action Plan  Never done    ANNUAL REVIEW OF HM ORDERS  Never done    Zoster (Shingles) Vaccine (1 of 2) Never done    Kidney Microalbumin Urine Test  02/27/2019    Cholesterol Lab  07/07/2021    FALL RISK ASSESSMENT  07/23/2021    PHQ-2 (once per calendar year)  01/01/2022    Liver Monitoring Lab  03/02/2022

## 2022-07-07 NOTE — LETTER
July 11, 2022      Zane Elizondo  [address] [place] [id number]        Dear ,    We are writing to inform you of your test results.    Your labs look good, including thyroid.  Continue all medications at the same doses.  Contact your usual pharmacy if you need refills.     Resulted Orders   CBC with platelets   Result Value Ref Range    WBC Count 6.1 4.0 - 11.0 10e3/uL    RBC Count 4.41 4.40 - 5.90 10e6/uL    Hemoglobin 13.1  13.1 - 17.7 g/dL    Hematocrit 40.2 40.0 - 53.0 %    MCV 91 78 - 100 fL    MCH 29.7 26.5 - 33.0 pg    MCHC 32.6 31.5 - 36.5 g/dL    RDW 12.0 10.0 - 15.0 %    Platelet Count 136 (L) 150 - 450 10e3/uL   TSH with free T4 reflex   Result Value Ref Range    TSH 1.44 0.40 - 4.00 mU/L   ALT   Result Value Ref Range    ALT 27 0 - 70 U/L       If you have any questions or concerns, please call the clinic at the number listed above.       Sincerely,      Steven Wagoner MD

## 2022-07-07 NOTE — PROGRESS NOTES
[address] [address]  Phone: [phone]  Primary Provider: Steven Wagoner  Pre-op Performing Provider: STEVEN WAGONER      PREOPERATIVE EVALUATION:  Today's date: 7/7/2022    Moses Elizondo is a 88 year old male who presents for a preoperative evaluation.    Surgical Information:  Surgery/Procedure: cataract bilateral eyes  Surgery Location: [facility] [address]  Surgeon: Dr. Shannon Puckett  Surgery Date: 7/28/22 & 8/11/22  Time of Surgery: AM  Where patient plans to recover: At home with family  Fax number for surgical facility: [phone]    Type of Anesthesia Anticipated: Local with MAC    Assessment & Plan     The proposed surgical procedure is considered LOW risk.    1. Preop general physical exam    2. Age-related cataract of both eyes, unspecified age-related cataract type    3. Stage 3a chronic kidney disease (H)    4. Encounter for Medicare annual wellness exam    5. 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 Non-STEMI (non-ST elevated myocardial infarction) (H)    7. Coronary artery disease    8. Idiopathic peripheral neuropathy    9. Hypothyroidism, unspecified type    10. 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   --history of chronic CHF, most recent echocardiogram from May 2022 showed stable EF 45-50%   --currently stable on current regimen without recent excerbations.     Medication Instructions:  Patient is to take all scheduled medications on the day of surgery    RECOMMENDATION:  APPROVAL GIVEN to proceed with proposed procedure, without  further diagnostic evaluation.                      Subjective     HPI related to upcoming procedure: bilateral cataracts      Preop Questions 7/7/2022   1. Have you ever had a heart attack or stroke? YES - NSTEMI 2015   2. Have you ever had surgery on your heart or blood vessels, such as a stent placement, a coronary artery bypass, or surgery on an artery in your head, neck, heart, or legs? YES - PTCA/stent 2015 and 2019; YOGESH to RCA(6/20/19); YOGESH to OM1(5/15/14)   3. Do you have chest pain with activity? No   4. Do you have a history of  heart failure? YES - ongoing chronic    5. Do you currently have a cold, bronchitis or symptoms of other infection? No   6. Do you have a cough, shortness of breath, or wheezing? YES - chronic mild dyspnea on exertion   7. Do you or anyone in your family have previous history of blood clots? No   8. Do you or does anyone in your family have a serious bleeding problem such as prolonged bleeding following surgeries or cuts? No   9. Have you ever had problems with anemia or been told to take iron pills? No   10. Have you had any abnormal blood loss such as black, tarry or bloody stools? No   11. Have you ever had a blood transfusion? No   12. Are you willing to have a blood transfusion if it is medically needed before, during, or after your surgery? Yes   13. Have you or any of your relatives ever had problems with anesthesia? No   14. Do you have sleep apnea, excessive snoring or daytime drowsiness? No   14a. Do you have a CPAP machine? No   15. Do you have any artifical heart valves or other implanted medical devices like a pacemaker, defibrillator, or continuous glucose monitor? No   16. Do you have artificial joints? No   17. Are you allergic to latex? No         Health Care Directive:  Patient does not have a Health Care Directive or Living Will: Discussed advance care planning with patient; however, patient declined at this time.    Preoperative Review of :   reviewed - no  record of controlled substances prescribed.      *  No recent infectious illnesses.    *  Exercise limited due to osteoarthritis of the knees more than the heart failure.     *  No personal or family history of anesthesia complications.    *  No personal or family history of bleeding or clotting disorders.       Prior of coronary artery disease as listed in medical history.  No current or recent cardiovascaulr symptoms.   No shortness of breath, no episodes of chest pain/pressure, no dyspnea on exertion, no changes in his abiliy to perform physical exertion or tasks.  Takes the same amount of time to perform similar physical tasks.      Has known history of CHF.  No new symptoms to report.  Most recent echocardiogram showed EF 45-50% May 2022  Denies new or worsened shortness of breath, dyspnea on exertion, orthopnea, and excessive lower extremity edema.   Taking medications as ordered.     Reviewed weights in chart:  Wt Readings from Last 10 Encounters:   07/07/22 88 kg (194 lb)   06/27/22 89 kg (196 lb 3.2 oz)   05/10/22 89.1 kg (196 lb 6.4 oz)   02/04/22 87.4 kg (192 lb 9.6 oz)   01/18/22 85.7 kg (189 lb)   01/03/22 85 kg (187 lb 6.4 oz)   11/12/21 85.7 kg (189 lb)   09/16/21 86.5 kg (190 lb 9.6 oz)   08/30/21 85.3 kg (188 lb)   08/16/21 86 kg (189 lb 11.2 oz)            Review of Systems  Constitutional, neuro, ENT, endocrine, pulmonary, cardiac, gastrointestinal, genitourinary, musculoskeletal, integument and psychiatric systems are negative, except as otherwise noted.    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) 07/14/2021     Priority: Medium     Cardiac pacemaker in situ 09/23/2019     Priority: Medium     Status post coronary angiogram 06/20/2019     Priority: Medium     Second degree AV block 06/20/2019     Priority: Medium     Chest discomfort 06/05/2019     Priority:  Medium     Added automatically from request for surgery [id number]       Medicare annual wellness visit, subsequent 03/21/2019     Priority: Medium     Idiopathic peripheral neuropathy 03/21/2019     Priority: Medium     Aortic root dilatation (H) 06/19/2017     Priority: Medium     Sick sinus syndrome (H) 05/16/2017     Priority: Medium     Sinus bradycardia 05/10/2016     Priority: Medium     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) 05/10/2016     Priority: Medium     Ascending aortic aneurysm (H) 05/10/2016     Priority: Medium     Aneurysm of thoracic aorta (H) 04/06/2015     Priority: Medium     Bradycardia 05/29/2014     Priority: Medium     Health Care Home 05/16/2014     Priority: Medium     Status:  Accepted  Care Coordinator:  Rita Gonzalez RN     See Letters for [facility] Care Plan         NSTEMI (non-ST elevated myocardial infarction) (H) 05/13/2014     Priority: Medium     Coronary artery disease 05/13/2014     Priority: Medium     NSTEMI; PTCA/stent to OM1       Hyperlipidemia LDL goal <100 10/31/2010     Priority: Medium     Familial tremor      Priority: Medium     MGUS (monoclonal gammopathy of unknown significance)      Priority: Medium     Degeneration of lumbar or lumbosacral intervertebral disc      Priority: Medium     Hypothyroidism, unspecified type      Priority: Medium     Problem list name updated by automated process. Provider to review        Past Medical History:   Diagnosis Date     Aortic root dilatation (H)     4.4 cm     Ascending aorta dilatation (H)     4.4 cm     ASD (atrial septal defect)      Bradycardia      CAD (coronary artery disease) 05/15/2014     YOGESH to RCA(6/20/19); YOGESH to OM1(5/15/14)     Cardiac pacemaker 06/20/2019    [employer] PPM COMFORT MRI XT DR-implant 6/20/19     Chest discomfort      Degeneration of lumbar or lumbosacral intervertebral disc      Familial tremor      Former smoker      Herpes zoster without mention of  complication      HTN (hypertension)      Hyperlipidaemia      Idiopathic peripheral neuropathy      MGUS (monoclonal gammopathy of unknown significance)      NSTEMI (non-ST elevated myocardial infarction) (H) 2014     Other and unspecified hyperlipidemia      PFO (patent foramen ovale)      Prostatitis, unspecified      Second degree heart block      Sensorineural hearing loss, unspecified      SSS (sick sinus syndrome) (H)      Status post coronary angiogram 06/20/2019     Unspecified hypothyroidism      Past Surgical History:   Procedure Laterality Date     CHOLECYSTECTOMY       CORONARY ANGIOGRAPHY ADULT ORDER  5/14/14    PTCA w/YOGESH to OM1     CV CORONARY ANGIOGRAM N/A 6/20/2019    Procedure: Coronary Angiogram;  Surgeon: Romie Coronado MD;  Location:  [facility]ARD[facility]ATH [facility]     CV INTRAVASULAR ULTRASOUND N/A 6/20/2019    Procedure: Intravascular Ultrasound;  Surgeon: Romie Coronado MD;  Location:  HE[facility]ARD[facility] CATH LAB     CV PCI ATHERECTOMY ORBITAL N/A 6/20/2019    Procedure: PCI Atherectomy Orbital;  Surgeon: Romie Coronado MD;  Location:  HEART [facility]ARD[facility] CATH LAB     CV PCI STENT DRUG ELUTING N/A 6/20/2019    Procedure: PCI Stent Drug Eluting;  Surgeon: Romie Coronado MD;  Location:  HEART [facility]ARDI[facility] CATH LAB     CV TEMPORARY PACEMAKER INSERTION N/A 6/20/2019    Procedure: Temporary Pacemaker Insertion;  Surgeon: Romie Coronado MD;  Location:  HEART CARDIAC CATH LAB     EP PACEMAKER N/A 6/20/2019    Procedure: EP Pacemaker;  Surgeon: Max Dowell MD;  Location:  HEART CARDIAC CATH LAB     EP PPM UPGRADE TO BIVENT N/A 9/16/2021    Procedure: EP PPM UPGRADE TO BIVENT;  Surgeon: Tre Miller MD;  Location:  HEART CARDIAC CATH LAB     HEART CATH, ANGIOPLASTY  5/14/14    YOGESH to OM1     ZZC NONSPECIFIC PROCEDURE  2010    Laparoscopic cholecystectomy.      Current Outpatient Medications   Medication Sig Dispense Refill     acetaminophen (TYLENOL) 650 MG CR tablet  "Take 650 mg by mouth daily       ASPIRIN EC PO Take 81 mg by mouth daily       atorvastatin (LIPITOR) 40 MG tablet Take 1 tablet (40 mg) by mouth daily 90 tablet 3     gabapentin (NEURONTIN) 300 MG capsule Take 1 capsule (300 mg) by mouth At Bedtime       levothyroxine (SYNTHROID/LEVOTHROID) 100 MCG tablet Take 1 tablet (100 mcg) by mouth daily 60 tablet 0     metoprolol succinate ER (TOPROL-XL) 25 MG 24 hr tablet Take 1 tablet (25 mg) by mouth daily At lunch 90 tablet 3     nitroGLYcerin (NITROSTAT) 0.4 MG sublingual tablet One tablet under the tongue every 5 minutes if needed for chest pain. May repeat every 5 minutes for a maximum of 3 doses in 15 minutes\" 25 tablet 3     Polyethylene Glycol 3350 (MIRALAX PO) Take by mouth as needed       sacubitril-valsartan (ENTRESTO) 49-51 MG per tablet Take 1 tablet by mouth 2 times daily 60 tablet 5     UNABLE TO FIND 1 capsule 3 times daily MEDICATION NAME: NeuroFlo         Allergies   Allergen Reactions     No Known Drug Allergies         Social History     Tobacco Use     Smoking status: Former Smoker     Packs/day: 0.50     Years: 16.00     Pack years: 8.00     Types: Cigarettes     Start date:      Quit date: 1967     Years since quittin.5     Smokeless tobacco: Never Used   Substance Use Topics     Alcohol use: Yes     Comment: 5-7 drinks week - at most one drink daily     Family History   Problem Relation Age of Onset     Cancer Mother      Genitourinary Problems Father 99        complications from surgery     Heart Disease Brother 72        MI     History   Drug Use No         Objective     /73   Pulse 103   Temp 97.7  F (36.5  C) (Oral)   Ht 1.803 m (5' 11\")   Wt 88 kg (194 lb)   SpO2 98%   BMI 27.06 kg/m      Physical Exam  GENERAL alert and no distress  EYES:  Normal sclera,conjunctiva, EOMI  HENT: oral and posterior pharynx without lesions or erythema, facies symmetric  NECK: Neck supple. No LAD, without thyroidmegaly.  RESP: Clear to " ausculation bilaterally without wheezes or crackles. Normal BS in all fields.  CV: RRR normal S1S2 without murmurs, rubs or gallops.  LYMPH: no cervical lymph adenopathy appreciated  MS: extremities- no gross deformities of the visible extremities noted,   EXT:  no lower extremity edema  PSYCH: Alert and oriented times 3; speech- coherent  SKIN:  No obvious significant skin lesions on visible portions of face     Recent Labs   Lab Test 06/27/22  0839 05/03/22  1206 01/18/22  1411 11/12/21  1204 09/16/21  0713   HGB  --   --   --  12.3* 12.9*   PLT  --   --   --  171 153    139   < > 140 138   POTASSIUM 4.0 4.5   < > 4.5 4.0   CR 1.12 1.30*   < > 1.26* 1.26*    < > = values in this interval not displayed.        Diagnostics:  No labs were ordered during this visit.     EKG (9/16/21): AV dual paced rhythm, unchanged from previous tracings     Echocardiogram 5/3/22:   The left ventricle is normal in size.  The visual ejection fraction is 45-50%.  Grade I or early diastolic dysfunction.  There is mild global hypokinesia of the left ventricle.  The right ventricle is normal in structure, function and size.  No significant valve issues  The ascending aorta is Mildly dilated, measuring 4.5 cm compared with 4.4 cm  in May 2016  Directly compared to the last echo done 8/2021, the LV function has improved  slightly.        Revised Cardiac Risk Index (RCRI):  The patient has the following serious cardiovascular risks for perioperative complications:   - Coronary Artery Disease (MI, positive stress test, angina, Qs on EKG) = 1 point    RCRI Interpretation: 1 point: Class II (low risk - 0.9% complication rate)           Signed Electronically by: Steven Wagoner MD  Copy of this evaluation report is provided to requesting physician.

## 2022-07-07 NOTE — LETTER
7/8/2022      Moses Elizondo  [address] [place] [id number]        Dear  Moses Elizondo:    I am writing to inform you of the results of the laboratory tests you had done recently.    Hemoglobin: NORMAL  Thyroid function: NORMAL  Liver: NORMAL    Your labs looked good.  Continue all medications at the same doses.  Contact your usual pharmacy if you need refills.     Thank you for allowing me to participate in your care. If you have any further questions or problems, please contact me via our nurse line at [phone]    Sincerely,        Steven Wagoner M.D.  Department of Internal Medicine  [facility]

## 2022-07-07 NOTE — PROGRESS NOTES
SUBJECTIVE:   Moses Elizondo is a 88 year old male who presents for Preventive Visit.    **also eneds a pre-op exam performed for upcoming cataract surgery in late July, early august for senile cataracts    Patient has been advised of split billing requirements and indicates understanding: Yes  Are you in the first 12 months of your Medicare coverage?  No    HPI  Do you feel safe in your environment? Yes    Have you ever done Advance Care Planning? (For example, a Health Directive, POLST, or a discussion with a medical provider or your loved ones about your wishes):        Fall risk     click delete button to remove this line now  Cognitive Screening   1) Repeat 3 items (Leader, Season, Table)    2) Clock draw: NORMAL  3) 3 item recall: Recalls 1 object   Results: NORMAL clock, 1-2 items recalled: COGNITIVE IMPAIRMENT LESS LIKELY    Mini-CogTM Copyright UMER Rivas. Licensed by the author for use in [facility]; reprinted with permission (audelia@[facility]). All rights reserved.      Do you have sleep apnea, excessive snoring or daytime drowsiness?: no    Reviewed and updated as needed this visit by clinical staff                    Reviewed and updated as needed this visit by Provider                   Social History     Tobacco Use     Smoking status: Former Smoker     Packs/day: 0.50     Years: 16.00     Pack years: 8.00     Types: Cigarettes     Start date:      Quit date: 1967     Years since quittin.5     Smokeless tobacco: Never Used   Substance Use Topics     Alcohol use: Yes     Comment: 5-7 drinks week - at most one drink daily           1.    Has known history of CHF.  No new symptoms to report.  Denies new or worsened shortness of breath, dyspnea on exertion, orthopnea, and excessive lower extremity edema.   Taking medications as ordered.     Reviewed weights in chart:  Wt Readings from Last 10 Encounters:   22 88 kg (194 lb)   22 89 kg (196 lb 3.2 oz)   05/10/22 89.1 kg  (196 lb 6.4 oz)   02/04/22 87.4 kg (192 lb 9.6 oz)   01/18/22 85.7 kg (189 lb)   01/03/22 85 kg (187 lb 6.4 oz)   11/12/21 85.7 kg (189 lb)   09/16/21 86.5 kg (190 lb 9.6 oz)   08/30/21 85.3 kg (188 lb)   08/16/21 86 kg (189 lb 11.2 oz)       2.  Prior of coronary artery disease as listed in medical history.  No current or recent cardiovascaulr symptoms.   No shortness of breath, no episodes of chest pain/pressure, no dyspnea on exertion, no changes in his abiliy to perform physical exertion or tasks.  Takes the same amount of time to perform similar physical tasks.    Most recent stress test showed:        3.  history of sick sinus syndrome, p[acemaker updated to biventricular model Sept 2021  Reviewed cardiology notes.       4. History of chronic kidney disease presumed due to HTN and vascular disease.  Reviewed most recent labs:    Lab Results   Component Value Date    CR 1.12 06/27/2022    CR 1.30 05/03/2022    CR 1.12 02/04/2022    CR 1.15 01/18/2022    CR 1.26 11/12/2021    CR 1.26 09/16/2021    CR 1.24 08/09/2021    CR 1.23 07/14/2021    CR 1.28 04/07/2021    CR 1.24 03/02/2021    CR 1.14 07/23/2020    CR 1.12 06/20/2019    CR 1.21 03/20/2019    CR 1.22 02/27/2018    CR 1.14 02/21/2017    CR 1.10 02/09/2016    CR 1.19 04/02/2015    CR 1.06 05/15/2014    CR 0.97 05/14/2014    CR 1.16 05/13/2014    CR 1.03 06/28/2013    CR 1.03 06/25/2013    CR 1.07 06/12/2012    CR 1.12 @ 03/08/2010    CR 1.15 @ 09/21/2009    CR 1.22 @ 03/02/2009    CR 1.16 @ 08/06/2008    CR 0.96 07/10/2008             Current providers sharing in care for this patient include:   Patient Care Team:  Steven Wagoner MD as PCP - General (Internal Medicine)  Steven Wagoner MD as Assigned PCP  Lacey Puckett PA-C as Assigned Heart and Vascular Provider    The following health maintenance items are reviewed in Epic and correct as of today:  Health Maintenance Due   Topic Date Due     HF ACTION PLAN  Never done     ANNUAL  "REVIEW OF HM ORDERS  Never done     ZOSTER IMMUNIZATION (1 of 2) Never done     MICROALBUMIN  02/27/2019     LIPID  07/07/2021     MEDICARE ANNUAL WELLNESS VISIT  07/23/2021     FALL RISK ASSESSMENT  07/23/2021     PHQ-2 (once per calendar year)  01/01/2022     ALT  03/02/2022       **I reviewed the information recorded in the patient's [facility] chart (including but not limited to medical history, surgical history, family history, problem list, medication list, and allergy list) and updated the information as indicated based on the patients reported information.             Review of Systems  Constitutional, HEENT, cardiovascular, pulmonary, gi and gu systems are negative, except as otherwise noted.    OBJECTIVE:   There were no vitals taken for this visit. Estimated body mass index is 27.36 kg/m  as calculated from the following:    Height as of 6/27/22: 1.803 m (5' 11\").    Weight as of 6/27/22: 89 kg (196 lb 3.2 oz).  Physical Exam  GENERAL alert and no distress  EYES:  Normal sclera,conjunctiva, EOMI  HENT: oral and posterior pharynx without lesions or erythema, facies symmetric  NECK: Neck supple. No LAD, without thyroidmegaly.  RESP: Clear to ausculation bilaterally without wheezes or crackles. Normal BS in all fields.  CV: RRR normal S1S2 without murmurs, rubs or gallops.  LYMPH: no cervical lymph adenopathy appreciated  MS: extremities- no gross deformities of the visible extremities noted,   EXT:  no lower extremity edema  PSYCH: Alert and oriented times 3; speech- coherent  SKIN:  No obvious significant skin lesions on visible portions of face     Diagnostic Test Results:  Labs reviewed in Epic    ASSESSMENT / PLAN:            Patient has been advised of split billing requirements and indicates understanding: Yes    COUNSELING:  Reviewed preventive health counseling, as reflected in patient instructions       Regular exercise       Healthy diet/nutrition       Vision screening       Hearing screening       " "Dental care       Bladder control       Fall risk prevention    Estimated body mass index is 27.36 kg/m  as calculated from the following:    Height as of 6/27/22: 1.803 m (5' 11\").    Weight as of 6/27/22: 89 kg (196 lb 3.2 oz).        He reports that he quit smoking about 55 years ago. His smoking use included cigarettes. He started smoking about 72 years ago. He has a 8.00 pack-year smoking history. He has never used smokeless tobacco.      Appropriate preventive services were discussed with this patient, including applicable screening as appropriate for cardiovascular disease, diabetes, osteopenia/osteoporosis, and glaucoma.  As appropriate for age/gender, discussed screening for colorectal cancer, prostate cancer, breast cancer, and cervical cancer. Checklist reviewing preventive services available has been given to the patient.    Reviewed patients plan of care and provided an AVS. The  sheri Lopes meets the Care Plan requirement. This Care Plan has been established and reviewed with the .    Counseling Resources:  ATP IV Guidelines  Pooled Cohorts Equation Calculator  Breast Cancer Risk Calculator  Breast Cancer: Medication to Reduce Risk  FRAX Risk Assessment  ICSI Preventive Guidelines  Dietary Guidelines for Americans, 2010  [employer]'s MyPlate  ASA Prophylaxis  Lung CA Screening    Steven Wagoner MD  [facility]    Identified Health Risks:  "

## 2022-07-18 DIAGNOSIS — E03.9 HYPOTHYROIDISM, UNSPECIFIED TYPE: ICD-10-CM

## 2022-07-19 DIAGNOSIS — I50.42 CHRONIC COMBINED SYSTOLIC AND DIASTOLIC HRT FAIL (H): ICD-10-CM

## 2022-07-19 RX ORDER — SACUBITRIL AND VALSARTAN 49; 51 MG/1; MG/1
1 TABLET, FILM COATED ORAL 2 TIMES DAILY
Qty: 60 TABLET | Refills: 5 | Status: SHIPPED | OUTPATIENT
Start: 2022-07-19 | End: 2023-01-19

## 2022-07-19 RX ORDER — LEVOTHYROXINE SODIUM 100 UG/1
100 TABLET ORAL DAILY
Qty: 60 TABLET | Refills: 0 | Status: SHIPPED | OUTPATIENT
Start: 2022-07-19 | End: 2022-09-21

## 2022-07-19 NOTE — TELEPHONE ENCOUNTER
Received refill request for:  Entresto  Last OV was: 22 SWEETIE Vázquez  Labs/EK22 BMP  F/U scheduled:   Date Time Provider Department Center   2022 12:00 AM DEE TECH1 [facility]   10/17/2022  9:00 AM DEE LAB [facility]   10/17/2022 10:00 AM Lacey Puckett PA-C [facility]     New script sent to: [facility]

## 2022-07-28 ENCOUNTER — LAB (OUTPATIENT)
Dept: URGENT CARE | Facility: URGENT CARE | Age: 87
End: 2022-07-28
Payer: COMMERCIAL

## 2022-07-28 DIAGNOSIS — Z20.822 SUSPECTED COVID-19 VIRUS INFECTION: ICD-10-CM

## 2022-07-28 PROCEDURE — 99207 PR NO CHARGE LOS: CPT

## 2022-07-28 PROCEDURE — U0003 INFECTIOUS AGENT DETECTION BY NUCLEIC ACID (DNA OR RNA); SEVERE ACUTE RESPIRATORY SYNDROME CORONAVIRUS 2 (SARS-COV-2) (CORONAVIRUS DISEASE [COVID-19]), AMPLIFIED PROBE TECHNIQUE, MAKING USE OF HIGH THROUGHPUT TECHNOLOGIES AS DESCRIBED BY CMS-2020-01-R: HCPCS

## 2022-07-28 PROCEDURE — U0005 INFEC AGEN DETEC AMPLI PROBE: HCPCS

## 2022-07-29 LAB — SARS-COV-2 RNA RESP QL NAA+PROBE: NEGATIVE

## 2022-08-23 DIAGNOSIS — Z95.0 CARDIAC PACEMAKER IN SITU: Primary | ICD-10-CM

## 2022-08-23 DIAGNOSIS — I44.1 SECOND DEGREE ATRIOVENTRICULAR BLOCK: ICD-10-CM

## 2022-09-19 DIAGNOSIS — E03.9 HYPOTHYROIDISM, UNSPECIFIED TYPE: ICD-10-CM

## 2022-09-21 RX ORDER — LEVOTHYROXINE SODIUM 100 UG/1
100 TABLET ORAL DAILY
Qty: 90 TABLET | Refills: 2 | Status: SHIPPED | OUTPATIENT
Start: 2022-09-21 | End: 2023-06-13

## 2022-09-21 NOTE — TELEPHONE ENCOUNTER
Prescription approved per [facility] Refill Protocol.  Walker Esteban RN  [facility] Triage Nurse

## 2022-10-17 ENCOUNTER — LAB (OUTPATIENT)
Dept: LAB | Facility: CLINIC | Age: 87
End: 2022-10-17
Payer: COMMERCIAL

## 2022-10-17 ENCOUNTER — OFFICE VISIT (OUTPATIENT)
Dept: CARDIOLOGY | Facility: CLINIC | Age: 87
End: 2022-10-17
Attending: PHYSICIAN ASSISTANT
Payer: COMMERCIAL

## 2022-10-17 VITALS
HEIGHT: 71 IN | DIASTOLIC BLOOD PRESSURE: 64 MMHG | WEIGHT: 190.5 LBS | BODY MASS INDEX: 26.67 KG/M2 | HEART RATE: 70 BPM | SYSTOLIC BLOOD PRESSURE: 103 MMHG

## 2022-10-17 DIAGNOSIS — I25.110 CORONARY ARTERY DISEASE INVOLVING NATIVE CORONARY ARTERY OF NATIVE HEART WITH UNSTABLE ANGINA PECTORIS (H): ICD-10-CM

## 2022-10-17 DIAGNOSIS — I50.42 CHRONIC COMBINED SYSTOLIC AND DIASTOLIC HRT FAIL (H): ICD-10-CM

## 2022-10-17 LAB
ANION GAP SERPL CALCULATED.3IONS-SCNC: 1 MMOL/L (ref 3–14)
BUN SERPL-MCNC: 23 MG/DL (ref 7–30)
CALCIUM SERPL-MCNC: 9.4 MG/DL (ref 8.5–10.1)
CHLORIDE BLD-SCNC: 109 MMOL/L (ref 94–109)
CHOLEST SERPL-MCNC: 103 MG/DL
CO2 SERPL-SCNC: 29 MMOL/L (ref 20–32)
CREAT SERPL-MCNC: 1.13 MG/DL (ref 0.66–1.25)
FASTING STATUS PATIENT QL REPORTED: YES
GFR SERPL CREATININE-BSD FRML MDRD: 63 ML/MIN/1.73M2
GLUCOSE BLD-MCNC: 87 MG/DL (ref 70–99)
HDLC SERPL-MCNC: 47 MG/DL
LDLC SERPL CALC-MCNC: 41 MG/DL
NONHDLC SERPL-MCNC: 56 MG/DL
NT-PROBNP SERPL-MCNC: 407 PG/ML (ref 0–1800)
POTASSIUM BLD-SCNC: 4.3 MMOL/L (ref 3.4–5.3)
SODIUM SERPL-SCNC: 139 MMOL/L (ref 133–144)
TRIGL SERPL-MCNC: 74 MG/DL

## 2022-10-17 PROCEDURE — 36415 COLL VENOUS BLD VENIPUNCTURE: CPT | Performed by: PHYSICIAN ASSISTANT

## 2022-10-17 PROCEDURE — 80048 BASIC METABOLIC PNL TOTAL CA: CPT | Performed by: PHYSICIAN ASSISTANT

## 2022-10-17 PROCEDURE — 83880 ASSAY OF NATRIURETIC PEPTIDE: CPT | Performed by: PHYSICIAN ASSISTANT

## 2022-10-17 PROCEDURE — 80061 LIPID PANEL: CPT | Performed by: PHYSICIAN ASSISTANT

## 2022-10-17 PROCEDURE — 99214 OFFICE O/P EST MOD 30 MIN: CPT | Performed by: PHYSICIAN ASSISTANT

## 2022-10-17 RX ORDER — NITROGLYCERIN 0.4 MG/1
TABLET SUBLINGUAL
Qty: 25 TABLET | Refills: 3 | Status: SHIPPED | OUTPATIENT
Start: 2022-10-17

## 2022-10-17 NOTE — LETTER
10/17/2022    Steven Wagoner MD  [address] [facility] [address]    RE: Moses Elizondo       Dear Colleague,     I had the pleasure of seeing Moses Elizondo in the [facility].    Cardiology Clinic Progress Note    Moses Elizondo MRN# [id number]   YOB: 1933 Age: 88 year old   Primary cardiologist: Dr. Huggins         Assessment and Plan:     In summary, Moses Elizondo presents today for a 4 month follow up visit.     1. 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 GDMT includes Toprol 25, Entresto 49/51 mg BID. Appears well-compensated with stable FC II-III sxs, also limited by knee pain.  2. Upgrade to CRT-P on 9/16/21 with adequate BiVp. MRI compatible.  3. Severe bilateral venous insufficiency with chronic superficial venous thrombosis. Conservative management recommended by Dr. Myles (vein clinic) and hematology unless symptoms worsen. Currently stable discomfort/neuropathy symptoms. He isn't interested in ablation procedure at this time.   4. CAD s/p PCI to OM in 2014. Negative stress MRI 1/2021. Denies angina.   5. Limiting knee pain. Anticipates he may need surgery in the future. Dr. Huggins has recommended a repeat stress cardiac MRI in this case.    6. Chronically Marginal BP. Asymptomatic.   7. Poor memory.   8. Hyperlipidemia, well-controlled.     Plan:  - No changes for now.   - Follow up with me in six months.         History of Presenting Illness:      Moses Elizondo is a pleasant 88 year old patient who presents today for a 4 month follow up visit.     His pertinent cardiac history includes:  # CAD, s/p PCI to OM1 (2014)  # Newly diagnosed cardiomyopathy, LVEF 38% per routine TTE on 1/6/21. 44% per MRI. Suspect pacemaker-induced.   # No evidence of ischemia or infiltrative disease on 1/2021 cMRI.   # SSS, which has now progressed to CHB. S/p PPM.  # Chronic knee pain, arthritis, and Baker's cysts  # Peripheral  edema, venous insufficiency  # Mild ascending aortic enlargement  # Hypothyroidism  # Hyperlipidemia  # Idiopathic peripheral neuropathy  # Chronic facial itching    In brief, Zane saw Dr. Huggins on July 14 for a routine follow up visit.  He complained of worsening leg edema for that time. proBNP was elevated ~1,000 and therefore furosemide 40 mg daily was initiated, an echocardiogram and venous competency study ordered.  He asked him to wear VERNA stockings as well.    His testing took place on August 9.  Echocardiogram was stable from that in January, showing EF estimated at 37%, with moderate global hypokinesis, mild AI, and moderately dilated ascending aorta at 4.4 cm.  His competency study showed a nonocclusive thrombus of the right GSV from the mid thigh to the calf, with severe venous reflux down to the mid calf, where it appeared occluded.  There was also a Baker's cyst incidentally noted.  On the left, there is moderate to severe reflux throughout the entire length of the GSV, also with a Baker's cyst noted. He was referred to see Dr. Myles in the vein clinic for this, and ultimatelty met with him on January 3rd. Dr. Myles recommended ongoing conservative management for his venous insufficiency, but with worsening symptoms could consider bilateral saphenous vein ablation. Regarding his superficial thrombus, Dr. Huggins spoke with hematology who recommended conservative management unless he developed symptoms from it. He had a follow-up RLE venous ultrasound on 11/10 for reassessment and this appeared stable.     When he continued to note no improvement in his functional capacity, he was referred to EP for consideration of CRT. On September 16, he went underwent upgrade to biventricular pacemaker.     Unfortunately, after that, he continued to complain of functional class III symptoms.  In January 2022, I stopped his lisinopril and furosemide, and started Entresto 49/51 mg BID.  Fortunately, after that he noted  "symptomatic improvement, was able to exercise on a stationary bike for about 15 minutes/day, complete his household chores, and could always finish what he needed to do if he took it slowly. He did mention some diffuse facial itching x years that he'd noticed a little more since changing to Entresto, but completely denied any facial swelling or new difficulty breathing, and noticed no improvement with Benadryl use.  After discussion with Dr. Huggins, we referred him to an allergist, however the patient declined this and preferred to just continue his medication as it is.     Echocardiogram was repeated in May, showing some improvement in his ejection fraction to 45-50%.  His functional capacity was stable when he saw Dr. Huggins shortly after that.  He did continue to have bothersome peripheral edema for which compression stockings were recommended, but for which reinstitution of low-dose furosemide and/or venous ablation could be considered if symptoms were inadequately controlled with this.      He continues to feel generally well. His BOWMAN is stable, and he feels OK with that - he's able to stay active at baseline and cuts the grass routinely. He is also limited by his chronic knee pain and neuropathy. Feels his as-needed compression stockings keep his swelling under good control. He has no orthopnea. As always, he asks me to walk him through his medications to ensure he's taking them correctly, which he is. Denies chest pain, palpitations, near-syncope. BP is marginal, he's asymptomatic with this. He turns 89 in two days.   Device interrogation in August shows complete atrial and biventricular paced, with nearly 10 years remaining on battery life, no arrhythmias.  BMP and lipid panel look excellent.         Review of Systems:     12-pt ROS is negative except for as noted in the HPI.          Physical Exam:     Vitals: /64   Pulse 70   Ht 1.803 m (5' 11\")   Wt 86.4 kg (190 lb 8 oz)   BMI 26.57 kg/m    Wt " Readings from Last 10 Encounters:   10/17/22 86.4 kg (190 lb 8 oz)   07/07/22 88 kg (194 lb)   06/27/22 89 kg (196 lb 3.2 oz)   05/10/22 89.1 kg (196 lb 6.4 oz)   02/04/22 87.4 kg (192 lb 9.6 oz)   01/18/22 85.7 kg (189 lb)   01/03/22 85 kg (187 lb 6.4 oz)   11/12/21 85.7 kg (189 lb)   09/16/21 86.5 kg (190 lb 9.6 oz)   08/30/21 85.3 kg (188 lb)       Constitutional:  Patient is pleasant, alert, cooperative, and in NAD.  HEENT:  NCAT. PERRLA. EOM's intact.   Neck:  CVP appears normal. No carotid bruits.   Pulmonary: Normal respiratory effort. CTAB.   Cardiac: RRR, normal S1/S2, no S3/S4, no murmur or rub.   Abdomen:  Non-tender abdomen, no hepatosplenomegaly appreciated.   Vascular: Pulses in the upper and lower extremities are 2+ and equal bilaterally.  Extremities: Trace lower extremity edema, erythema, cyanosis or tenderness appreciated.  Skin:  No rashes or lesions appreciated.   Neurological:  No gross motor or sensory deficits.   Psych: Appropriate affect.        Data:     Labs reviewed:  Recent Labs   Lab Test 10/17/22  0845 07/07/22  0806 11/12/21  1204 07/14/21  0827 03/02/21  0927 07/23/20  1008 07/07/20  0000 10/31/19  0849 05/02/19  0000 03/20/19  0823   LDL 41  --   --   --   --   --  49 60  --  61   HDL 47  --   --   --   --   --  42 52  --  40   NHDL 56  --   --   --   --   --   --  74  --  76   CHOL 103  --   --   --   --   --  105 126  --  116   TRIG 74  --   --   --   --   --  71 70  --  75   TSH  --  1.44  --   --  2.27 2.87  --  1.94   < > 2.20   NTBNP 407  --  715* 1,009* 804*  --   --   --   --   --     < > = values in this interval not displayed.       Lab Results   Component Value Date    WBC 6.1 07/07/2022    WBC 6.2 07/23/2020    RBC 4.41 07/07/2022    RBC 4.58 07/23/2020    HGB 13.1 (L) 07/07/2022    HGB 13.2 (L) 03/02/2021    HCT 40.2 07/07/2022    HCT 41.4 07/23/2020    MCV 91 07/07/2022    MCV 90 07/23/2020    MCH 29.7 07/07/2022    MCH 29.9 07/23/2020    MCHC 32.6 07/07/2022    MCHC  33.1 07/23/2020    RDW 12.0 07/07/2022    RDW 12.5 07/23/2020     (L) 07/07/2022     07/23/2020       Lab Results   Component Value Date     10/17/2022     04/07/2021    POTASSIUM 4.3 10/17/2022    POTASSIUM 4.1 04/07/2021    CHLORIDE 109 10/17/2022    CHLORIDE 109 04/07/2021    CO2 29 10/17/2022    CO2 28 04/07/2021    ANIONGAP 1 (L) 10/17/2022    ANIONGAP 2 (L) 04/07/2021    GLC 87 10/17/2022    GLC 75 04/07/2021    BUN 23 10/17/2022    BUN 25 04/07/2021    CR 1.13 10/17/2022    CR 1.28 (H) 04/07/2021    GFRESTIMATED 63 10/17/2022    GFRESTIMATED 50 (L) 04/07/2021    GFRESTBLACK 58 (L) 04/07/2021    GABRIELA 9.4 10/17/2022    GABRIELA 8.8 04/07/2021      Lab Results   Component Value Date    AST 32 03/02/2021    ALT 27 07/07/2022    ALT 30 03/02/2021       Lab Results   Component Value Date    A1C 5.6 05/02/2019       Lab Results   Component Value Date    INR 1.00 06/20/2019           Problem List:     Patient Active Problem List   Diagnosis     Hypothyroidism, unspecified type     Degeneration of lumbar or lumbosacral intervertebral disc     MGUS (monoclonal gammopathy of unknown significance)     Familial tremor     Hyperlipidemia LDL goal <100     NSTEMI (non-ST elevated myocardial infarction) (H)     [facility]     Coronary artery disease     Bradycardia     Aneurysm of thoracic aorta     Sinus bradycardia     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)     Ascending aortic aneurysm     Sick sinus syndrome (H)     Aortic root dilatation (H)     Medicare annual wellness visit, subsequent     Idiopathic peripheral neuropathy     Chest discomfort     Status post coronary angiogram     Second degree AV block     Cardiac pacemaker in situ     Chronic kidney disease, stage 3 (H)     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)           Medications:     Current Outpatient Medications   Medication Sig Dispense Refill     acetaminophen  "(TYLENOL) 650 MG CR tablet Take 650 mg by mouth daily       ASPIRIN EC PO Take 81 mg by mouth daily       atorvastatin (LIPITOR) 40 MG tablet Take 1 tablet (40 mg) by mouth daily 90 tablet 3     gabapentin (NEURONTIN) 300 MG capsule Take 1 capsule (300 mg) by mouth At Bedtime       levothyroxine (SYNTHROID/LEVOTHROID) 100 MCG tablet TAKE 1 TABLET (100 MCG) BY MOUTH DAILY 90 tablet 2     metoprolol succinate ER (TOPROL-XL) 25 MG 24 hr tablet Take 1 tablet (25 mg) by mouth daily At lunch 90 tablet 3     nitroGLYcerin (NITROSTAT) 0.4 MG sublingual tablet One tablet under the tongue every 5 minutes if needed for chest pain. May repeat every 5 minutes for a maximum of 3 doses in 15 minutes\" 25 tablet 3     Polyethylene Glycol 3350 (MIRALAX PO) Take by mouth as needed       sacubitril-valsartan (ENTRESTO) 49-51 MG per tablet Take 1 tablet by mouth 2 times daily 60 tablet 5     UNABLE TO FIND 1 capsule 3 times daily MEDICATION NAME: Jayden             Past Medical History:     Past Medical History:   Diagnosis Date     Aortic root dilatation (H)     4.4 cm     Ascending aorta dilatation (H)     4.4 cm     ASD (atrial septal defect)      Bradycardia      CAD (coronary artery disease) 05/15/2014     YOGESH to RCA(6/20/19); YOGESH to OM1(5/15/14)     Cardiac pacemaker 06/20/2019    [employer] PPM COMFORT MRI XT DR-implant 6/20/19     Chest discomfort      Degeneration of lumbar or lumbosacral intervertebral disc      Familial tremor      Former smoker      Herpes zoster without mention of complication      HTN (hypertension)      Hyperlipidaemia      Idiopathic peripheral neuropathy      MGUS (monoclonal gammopathy of unknown significance)      NSTEMI (non-ST elevated myocardial infarction) (H) 2014     Other and unspecified hyperlipidemia      PFO (patent foramen ovale)      Prostatitis, unspecified      Second degree heart block      Sensorineural hearing loss, unspecified      SSS (sick sinus syndrome) (H)      Status post " coronary angiogram 06/20/2019     Unspecified hypothyroidism      Past Surgical History:   Procedure Laterality Date     CHOLECYSTECTOMY       CORONARY ANGIOGRAPHY ADULT ORDER  5/14/14    PTCA w/YOGESH to OM1     CV CORONARY ANGIOGRAM N/A 6/20/2019    Procedure: Coronary Angiogram;  Surgeon: Romie Coronado MD;  Location:  [facility]     CV INTRAVASULAR ULTRASOUND N/A 6/20/2019    Procedure: Intravascular Ultrasound;  Surgeon: Romie Coronado MD;  Location:  [facility]     CV PCI ATHERECTOMY ORBITAL N/A 6/20/2019    Procedure: PCI Atherectomy Orbital;  Surgeon: Romie Coronado MD;  Location:  [facility]     CV PCI STENT DRUG ELUTING N/A 6/20/2019    Procedure: PCI Stent Drug Eluting;  Surgeon: Romie Coronado MD;  Location:  [facility]     CV TEMPORARY PACEMAKER INSERTION N/A 6/20/2019    Procedure: Temporary Pacemaker Insertion;  Surgeon: Romie Coronado MD;  Location:  [facility]     EP PACEMAKER N/A 6/20/2019    Procedure: EP Pacemaker;  Surgeon: Max Dowell MD;  Location:  [facility]     EP PPM UPGRADE TO BIVENT N/A 9/16/2021    Procedure: EP PPM UPGRADE TO BIVENT;  Surgeon: Tre Miller MD;  Location:  [facility] LAB     HEART CATH, ANGIOPLASTY  5/14/14    YOGESH to OM1     ZZC NONSPECIFIC PROCEDURE  2010    Laparoscopic cholecystectomy.      Family History   Problem Relation Age of Onset     Cancer Mother      Genitourinary Problems Father 99        complications from surgery     Heart Disease Brother 72        MI     Social History     Socioeconomic History     Marital status:      Spouse name: Not on file     Number of children: Not on file     Years of education: Not on file     Highest education level: Not on file   Occupational History     Not on file   Tobacco Use     Smoking status: Former     Packs/day: 0.50     Years: 16.00     Pack years: 8.00     Types: Cigarettes      Start date:      Quit date: 1967     Years since quittin.8     Smokeless tobacco: Never   Substance and Sexual Activity     Alcohol use: Yes     Comment: 5-7 drinks week - at most one drink daily     Drug use: No     Sexual activity: Never   Other Topics Concern      Service Not Asked     Blood Transfusions Not Asked     Caffeine Concern Yes     Comment: 2 cups coffee/day     Occupational Exposure Not Asked     Hobby Hazards Not Asked     Sleep Concern No     Stress Concern Not Asked     Weight Concern Yes     Comment: limiting alcohol to watch calories     Special Diet Not Asked     Back Care Not Asked     Exercise Yes     Comment: Stationary bike  weights and aerobics 4 times week     Bike Helmet Not Asked     Seat Belt Yes     Self-Exams Not Asked     Parent/sibling w/ CABG, MI or angioplasty before 65F 55M? No   Social History Narrative     Not on file     Social Determinants of Health     Financial Resource Strain: Not on file   Food Insecurity: Not on file   Transportation Needs: Not on file   Physical Activity: Not on file   Stress: Not on file   Social Connections: Not on file   Intimate Partner Violence: Not on file   Housing Stability: Not on file           Allergies:   No known drug allergies      EVANGELISTA rWen [facility]  Pager: [phone]    Today's clinic visit entailed:  Review of the result(s) of each unique test - BMP, lipid panel, device interrogation  Ordering of each unique test  I spent a total of 30 minutes on the day of the visit.   Time spent doing chart review, history and exam, documentation and further activities per the note  Provider  Link to [facility] Help Grid     The level of medical decision making during this visit was of moderate complexity.    Thank you for allowing me to participate in the care of your patient.      Sincerely,     EVANGELISTA Wren [facility]  cc:    Lacey Puckett, PAMichaelC  [address]  [place] [address]

## 2022-10-17 NOTE — PROGRESS NOTES
Cardiology Clinic Progress Note    Moses Elizondo MRN# [id number]   YOB: 1933 Age: 88 year old   Primary cardiologist: Dr. Huggins         Assessment and Plan:     In summary, Moses Elizondo presents today for a 4 month follow up visit.     1. 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 GDMT includes Toprol 25, Entresto 49/51 mg BID. Appears well-compensated with stable FC II-III sxs, also limited by knee pain.  2. Upgrade to CRT-P on 9/16/21 with adequate BiVp. MRI compatible.  3. Severe bilateral venous insufficiency with chronic superficial venous thrombosis. Conservative management recommended by Dr. Myles (vein clinic) and hematology unless symptoms worsen. Currently stable discomfort/neuropathy symptoms. He isn't interested in ablation procedure at this time.   4. CAD s/p PCI to OM in 2014. Negative stress MRI 1/2021. Denies angina.   5. Limiting knee pain. Anticipates he may need surgery in the future. Dr. Huggins has recommended a repeat stress cardiac MRI in this case.    6. Chronically Marginal BP. Asymptomatic.   7. Poor memory.   8. Hyperlipidemia, well-controlled.     Plan:  - No changes for now.   - Follow up with me in six months.         History of Presenting Illness:      Moses Elizondo is a pleasant 88 year old patient who presents today for a 4 month follow up visit.     His pertinent cardiac history includes:  # CAD, s/p PCI to OM1 (2014)  # Newly diagnosed cardiomyopathy, LVEF 38% per routine TTE on 1/6/21. 44% per MRI. Suspect pacemaker-induced.   # No evidence of ischemia or infiltrative disease on 1/2021 cMRI.   # SSS, which has now progressed to CHB. S/p PPM.  # Chronic knee pain, arthritis, and Baker's cysts  # Peripheral edema, venous insufficiency  # Mild ascending aortic enlargement  # Hypothyroidism  # Hyperlipidemia  # Idiopathic peripheral neuropathy  # Chronic facial itching    In brief, Zane saw Dr. Huggins on July 14 for a  routine follow up visit.  He complained of worsening leg edema for that time. proBNP was elevated ~1,000 and therefore furosemide 40 mg daily was initiated, an echocardiogram and venous competency study ordered.  He asked him to wear VERNA stockings as well.    His testing took place on August 9.  Echocardiogram was stable from that in January, showing EF estimated at 37%, with moderate global hypokinesis, mild AI, and moderately dilated ascending aorta at 4.4 cm.  His competency study showed a nonocclusive thrombus of the right GSV from the mid thigh to the calf, with severe venous reflux down to the mid calf, where it appeared occluded.  There was also a Baker's cyst incidentally noted.  On the left, there is moderate to severe reflux throughout the entire length of the GSV, also with a Baker's cyst noted. He was referred to see Dr. Myles in the vein clinic for this, and ultimatelty met with him on January 3rd. Dr. Myles recommended ongoing conservative management for his venous insufficiency, but with worsening symptoms could consider bilateral saphenous vein ablation. Regarding his superficial thrombus, Dr. Huggins spoke with hematology who recommended conservative management unless he developed symptoms from it. He had a follow-up RLE venous ultrasound on 11/10 for reassessment and this appeared stable.     When he continued to note no improvement in his functional capacity, he was referred to EP for consideration of CRT. On September 16, he went underwent upgrade to biventricular pacemaker.     Unfortunately, after that, he continued to complain of functional class III symptoms.  In January 2022, I stopped his lisinopril and furosemide, and started Entresto 49/51 mg BID.  Fortunately, after that he noted symptomatic improvement, was able to exercise on a stationary bike for about 15 minutes/day, complete his household chores, and could always finish what he needed to do if he took it slowly. He did mention some diffuse  "facial itching x years that he'd noticed a little more since changing to Entresto, but completely denied any facial swelling or new difficulty breathing, and noticed no improvement with Benadryl use.  After discussion with Dr. Huggins, we referred him to an allergist, however the patient declined this and preferred to just continue his medication as it is.     Echocardiogram was repeated in May, showing some improvement in his ejection fraction to 45-50%.  His functional capacity was stable when he saw Dr. Huggins shortly after that.  He did continue to have bothersome peripheral edema for which compression stockings were recommended, but for which reinstitution of low-dose furosemide and/or venous ablation could be considered if symptoms were inadequately controlled with this.      He continues to feel generally well. His BOWMAN is stable, and he feels OK with that - he's able to stay active at baseline and cuts the grass routinely. He is also limited by his chronic knee pain and neuropathy. Feels his as-needed compression stockings keep his swelling under good control. He has no orthopnea. As always, he asks me to walk him through his medications to ensure he's taking them correctly, which he is. Denies chest pain, palpitations, near-syncope. BP is marginal, he's asymptomatic with this. He turns 89 in two days.   Device interrogation in August shows complete atrial and biventricular paced, with nearly 10 years remaining on battery life, no arrhythmias.  BMP and lipid panel look excellent.         Review of Systems:     12-pt ROS is negative except for as noted in the HPI.          Physical Exam:     Vitals: /64   Pulse 70   Ht 1.803 m (5' 11\")   Wt 86.4 kg (190 lb 8 oz)   BMI 26.57 kg/m    Wt Readings from Last 10 Encounters:   10/17/22 86.4 kg (190 lb 8 oz)   07/07/22 88 kg (194 lb)   06/27/22 89 kg (196 lb 3.2 oz)   05/10/22 89.1 kg (196 lb 6.4 oz)   02/04/22 87.4 kg (192 lb 9.6 oz)   01/18/22 85.7 kg (189 lb) "   01/03/22 85 kg (187 lb 6.4 oz)   11/12/21 85.7 kg (189 lb)   09/16/21 86.5 kg (190 lb 9.6 oz)   08/30/21 85.3 kg (188 lb)       Constitutional:  Patient is pleasant, alert, cooperative, and in NAD.  HEENT:  NCAT. PERRLA. EOM's intact.   Neck:  CVP appears normal. No carotid bruits.   Pulmonary: Normal respiratory effort. CTAB.   Cardiac: RRR, normal S1/S2, no S3/S4, no murmur or rub.   Abdomen:  Non-tender abdomen, no hepatosplenomegaly appreciated.   Vascular: Pulses in the upper and lower extremities are 2+ and equal bilaterally.  Extremities: Trace lower extremity edema, erythema, cyanosis or tenderness appreciated.  Skin:  No rashes or lesions appreciated.   Neurological:  No gross motor or sensory deficits.   Psych: Appropriate affect.        Data:     Labs reviewed:  Recent Labs   Lab Test 10/17/22  0845 07/07/22  0806 11/12/21  1204 07/14/21  0827 03/02/21  0927 07/23/20  1008 07/07/20  0000 10/31/19  0849 05/02/19  0000 03/20/19  0823   LDL 41  --   --   --   --   --  49 60  --  61   HDL 47  --   --   --   --   --  42 52  --  40   NHDL 56  --   --   --   --   --   --  74  --  76   CHOL 103  --   --   --   --   --  105 126  --  116   TRIG 74  --   --   --   --   --  71 70  --  75   TSH  --  1.44  --   --  2.27 2.87  --  1.94   < > 2.20   NTBNP 407  --  715* 1,009* 804*  --   --   --   --   --     < > = values in this interval not displayed.       Lab Results   Component Value Date    WBC 6.1 07/07/2022    WBC 6.2 07/23/2020    RBC 4.41 07/07/2022    RBC 4.58 07/23/2020    HGB 13.1 (L) 07/07/2022    HGB 13.2 (L) 03/02/2021    HCT 40.2 07/07/2022    HCT 41.4 07/23/2020    MCV 91 07/07/2022    MCV 90 07/23/2020    MCH 29.7 07/07/2022    MCH 29.9 07/23/2020    MCHC 32.6 07/07/2022    MCHC 33.1 07/23/2020    RDW 12.0 07/07/2022    RDW 12.5 07/23/2020     (L) 07/07/2022     07/23/2020       Lab Results   Component Value Date     10/17/2022     04/07/2021    POTASSIUM 4.3 10/17/2022     POTASSIUM 4.1 04/07/2021    CHLORIDE 109 10/17/2022    CHLORIDE 109 04/07/2021    CO2 29 10/17/2022    CO2 28 04/07/2021    ANIONGAP 1 (L) 10/17/2022    ANIONGAP 2 (L) 04/07/2021    GLC 87 10/17/2022    GLC 75 04/07/2021    BUN 23 10/17/2022    BUN 25 04/07/2021    CR 1.13 10/17/2022    CR 1.28 (H) 04/07/2021    GFRESTIMATED 63 10/17/2022    GFRESTIMATED 50 (L) 04/07/2021    GFRESTBLACK 58 (L) 04/07/2021    GABRIELA 9.4 10/17/2022    GABRIELA 8.8 04/07/2021      Lab Results   Component Value Date    AST 32 03/02/2021    ALT 27 07/07/2022    ALT 30 03/02/2021       Lab Results   Component Value Date    A1C 5.6 05/02/2019       Lab Results   Component Value Date    INR 1.00 06/20/2019           Problem List:     Patient Active Problem List   Diagnosis     Hypothyroidism, unspecified type     Degeneration of lumbar or lumbosacral intervertebral disc     MGUS (monoclonal gammopathy of unknown significance)     Familial tremor     Hyperlipidemia LDL goal <100     NSTEMI (non-ST elevated myocardial infarction) (H)     [facility] care     Coronary artery disease     Bradycardia     Aneurysm of thoracic aorta     Sinus bradycardia     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)     Ascending aortic aneurysm     Sick sinus syndrome (H)     Aortic root dilatation (H)     Medicare annual wellness visit, subsequent     Idiopathic peripheral neuropathy     Chest discomfort     Status post coronary angiogram     Second degree AV block     Cardiac pacemaker in situ     Chronic kidney disease, stage 3 (H)     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)           Medications:     Current Outpatient Medications   Medication Sig Dispense Refill     acetaminophen (TYLENOL) 650 MG CR tablet Take 650 mg by mouth daily       ASPIRIN EC PO Take 81 mg by mouth daily       atorvastatin (LIPITOR) 40 MG tablet Take 1 tablet (40 mg) by mouth daily 90 tablet 3     gabapentin (NEURONTIN) 300 MG  "capsule Take 1 capsule (300 mg) by mouth At Bedtime       levothyroxine (SYNTHROID/LEVOTHROID) 100 MCG tablet TAKE 1 TABLET (100 MCG) BY MOUTH DAILY 90 tablet 2     metoprolol succinate ER (TOPROL-XL) 25 MG 24 hr tablet Take 1 tablet (25 mg) by mouth daily At lunch 90 tablet 3     nitroGLYcerin (NITROSTAT) 0.4 MG sublingual tablet One tablet under the tongue every 5 minutes if needed for chest pain. May repeat every 5 minutes for a maximum of 3 doses in 15 minutes\" 25 tablet 3     Polyethylene Glycol 3350 (MIRALAX PO) Take by mouth as needed       sacubitril-valsartan (ENTRESTO) 49-51 MG per tablet Take 1 tablet by mouth 2 times daily 60 tablet 5     UNABLE TO FIND 1 capsule 3 times daily MEDICATION NAME: Jayden             Past Medical History:     Past Medical History:   Diagnosis Date     Aortic root dilatation (H)     4.4 cm     Ascending aorta dilatation (H)     4.4 cm     ASD (atrial septal defect)      Bradycardia      CAD (coronary artery disease) 05/15/2014     YOGESH to RCA(6/20/19); YOGESH to OM1(5/15/14)     Cardiac pacemaker 06/20/2019    [employer] PPM COMFORT MRI XT DR-implant 6/20/19     Chest discomfort      Degeneration of lumbar or lumbosacral intervertebral disc      Familial tremor      Former smoker      Herpes zoster without mention of complication      HTN (hypertension)      Hyperlipidaemia      Idiopathic peripheral neuropathy      MGUS (monoclonal gammopathy of unknown significance)      NSTEMI (non-ST elevated myocardial infarction) (H) 2014     Other and unspecified hyperlipidemia      PFO (patent foramen ovale)      Prostatitis, unspecified      Second degree heart block      Sensorineural hearing loss, unspecified      SSS (sick sinus syndrome) (H)      Status post coronary angiogram 06/20/2019     Unspecified hypothyroidism      Past Surgical History:   Procedure Laterality Date     CHOLECYSTECTOMY       CORONARY ANGIOGRAPHY ADULT ORDER  5/14/14    PTCA w/YOGESH to OM1     CV CORONARY ANGIOGRAM " N/A 2019    Procedure: Coronary Angiogram;  Surgeon: Romie Coronado MD;  Location:  [facility]     CV INTRAVASULAR ULTRASOUND N/A 2019    Procedure: Intravascular Ultrasound;  Surgeon: Romie Coronado MD;  Location:  [facility]     CV PCI ATHERECTOMY ORBITAL N/A 2019    Procedure: PCI Atherectomy Orbital;  Surgeon: Romie Coronado MD;  Location:  [facility]     CV PCI STENT DRUG ELUTING N/A 2019    Procedure: PCI Stent Drug Eluting;  Surgeon: Romie Coronado MD;  Location:  [facility]     CV TEMPORARY PACEMAKER INSERTION N/A 2019    Procedure: Temporary Pacemaker Insertion;  Surgeon: Romie Coronado MD;  Location:  [facility]     EP PACEMAKER N/A 2019    Procedure: EP Pacemaker;  Surgeon: Max Dowell MD;  Location:  [facility]     EP PPM UPGRADE TO BIVENT N/A 2021    Procedure: EP PPM UPGRADE TO BIVENT;  Surgeon: Tre Miller MD;  Location:  [facility]     HEART CATH, ANGIOPLASTY  14    YOGESH to OM1     ZZC NONSPECIFIC PROCEDURE      Laparoscopic cholecystectomy.      Family History   Problem Relation Age of Onset     Cancer Mother      Genitourinary Problems Father 99        complications from surgery     Heart Disease Brother 72        MI     Social History     Socioeconomic History     Marital status:      Spouse name: Not on file     Number of children: Not on file     Years of education: Not on file     Highest education level: Not on file   Occupational History     Not on file   Tobacco Use     Smoking status: Former     Packs/day: 0.50     Years: 16.00     Pack years: 8.00     Types: Cigarettes     Start date:      Quit date: 1967     Years since quittin.8     Smokeless tobacco: Never   Substance and Sexual Activity     Alcohol use: Yes     Comment: 5-7 drinks week - at most one drink daily     Drug use: No      Sexual activity: Never   Other Topics Concern      Service Not Asked     Blood Transfusions Not Asked     Caffeine Concern Yes     Comment: 2 cups coffee/day     Occupational Exposure Not Asked     Hobby Hazards Not Asked     Sleep Concern No     Stress Concern Not Asked     Weight Concern Yes     Comment: limiting alcohol to watch calories     Special Diet Not Asked     Back Care Not Asked     Exercise Yes     Comment: Stationary bike  weights and aerobics 4 times week     Bike Helmet Not Asked     Seat Belt Yes     Self-Exams Not Asked     Parent/sibling w/ CABG, MI or angioplasty before 65F 55M? No   Social History Narrative     Not on file     Social Determinants of Health     Financial Resource Strain: Not on file   Food Insecurity: Not on file   Transportation Needs: Not on file   Physical Activity: Not on file   Stress: Not on file   Social Connections: Not on file   Intimate Partner Violence: Not on file   Housing Stability: Not on file           Allergies:   No known drug allergies      Lacey Puckett PA-C  [facility]  Pager: [phone]    Today's clinic visit entailed:  Review of the result(s) of each unique test - BMP, lipid panel, device interrogation  Ordering of each unique test  I spent a total of 30 minutes on the day of the visit.   Time spent doing chart review, history and exam, documentation and further activities per the note  Provider  Link to MDM Help Grid     The level of medical decision making during this visit was of moderate complexity.

## 2022-10-17 NOTE — PATIENT INSTRUCTIONS
Today's Plan:   - Keep up the good work with your routine activities. No changes for now. Come back to see me in another six months.     If you have questions or concerns please call my nurse team at [phone].   Scheduling phone number: [phone]  For after hours urgent concerns call [phone] option 2.   Reminder: Please bring in all current medications, over the counter supplements and vitamin bottles to your next appointment.    It was a pleasure seeing you today!     Lacey Puckett PA-C    Component      Latest Ref Rng & Units 10/17/2022   Sodium      133 - 144 mmol/L 139   Potassium      3.4 - 5.3 mmol/L 4.3   Chloride      94 - 109 mmol/L 109   Carbon Dioxide      20 - 32 mmol/L 29   Anion Gap      3 - 14 mmol/L 1 (L)   Urea Nitrogen      7 - 30 mg/dL 23   Creatinine      0.66 - 1.25 mg/dL 1.13   Calcium      8.5 - 10.1 mg/dL 9.4   Glucose      70 - 99 mg/dL 87   GFR Estimate      >60 mL/min/1.73m2 63   Cholesterol      <200 mg/dL 103   Triglycerides      <150 mg/dL 74   HDL Cholesterol      >=40 mg/dL 47   LDL Cholesterol Calculated      <=100 mg/dL 41   Non HDL Cholesterol      <130 mg/dL 56   Patient Fasting > 8hrs?       Yes   N-Terminal Pro Bnp      0 - 1,800 pg/mL 407

## 2022-10-18 DIAGNOSIS — G60.9 IDIOPATHIC PERIPHERAL NEUROPATHY: ICD-10-CM

## 2022-10-18 RX ORDER — GABAPENTIN 300 MG/1
300 CAPSULE ORAL AT BEDTIME
Qty: 90 CAPSULE | Refills: 0 | Status: SHIPPED | OUTPATIENT
Start: 2022-10-18 | End: 2023-01-10

## 2022-11-01 ENCOUNTER — TRANSFERRED RECORDS (OUTPATIENT)
Dept: HEALTH INFORMATION MANAGEMENT | Facility: CLINIC | Age: 87
End: 2022-11-01

## 2022-11-29 DIAGNOSIS — I25.810 CORONARY ARTERY DISEASE INVOLVING CORONARY BYPASS GRAFT OF NATIVE HEART WITHOUT ANGINA PECTORIS: ICD-10-CM

## 2022-11-29 RX ORDER — METOPROLOL SUCCINATE 25 MG/1
25 TABLET, EXTENDED RELEASE ORAL DAILY
Qty: 90 TABLET | Refills: 3 | Status: ON HOLD | OUTPATIENT
Start: 2022-11-29 | End: 2023-12-01

## 2022-12-28 NOTE — TELEPHONE ENCOUNTER
New Prescription(s) sent electronically to specified pharmacy.    Statement of Medical Necessity received from [facility] for cpap supplies.      Seen 3/7/22 with download, follow up in 11 months, scheduled 2/7/2023  .   SMN routed to provider to review and sign.

## 2022-12-30 ENCOUNTER — ANCILLARY PROCEDURE (OUTPATIENT)
Dept: CARDIOLOGY | Facility: CLINIC | Age: 87
End: 2022-12-30
Attending: INTERNAL MEDICINE
Payer: COMMERCIAL

## 2022-12-30 DIAGNOSIS — I44.1 SECOND DEGREE ATRIOVENTRICULAR BLOCK: ICD-10-CM

## 2022-12-30 DIAGNOSIS — Z95.0 CARDIAC PACEMAKER IN SITU: ICD-10-CM

## 2022-12-30 PROCEDURE — 93281 PM DEVICE PROGR EVAL MULTI: CPT | Performed by: INTERNAL MEDICINE

## 2023-01-09 DIAGNOSIS — G60.9 IDIOPATHIC PERIPHERAL NEUROPATHY: ICD-10-CM

## 2023-01-10 RX ORDER — GABAPENTIN 300 MG/1
CAPSULE ORAL
Qty: 90 CAPSULE | Refills: 0 | Status: SHIPPED | OUTPATIENT
Start: 2023-01-10 | End: 2023-04-12

## 2023-01-10 RX ORDER — GABAPENTIN 300 MG/1
CAPSULE ORAL
Qty: 90 CAPSULE | Refills: 0 | OUTPATIENT
Start: 2023-01-10

## 2023-01-12 LAB
MDC_IDC_LEAD_IMPLANT_DT: NORMAL
MDC_IDC_LEAD_LOCATION: NORMAL
MDC_IDC_LEAD_LOCATION_DETAIL_1: NORMAL
MDC_IDC_LEAD_MFG: NORMAL
MDC_IDC_LEAD_MODEL: NORMAL
MDC_IDC_LEAD_POLARITY_TYPE: NORMAL
MDC_IDC_LEAD_SERIAL: NORMAL
MDC_IDC_MSMT_BATTERY_DTM: NORMAL
MDC_IDC_MSMT_BATTERY_REMAINING_LONGEVITY: 112 MO
MDC_IDC_MSMT_BATTERY_RRT_TRIGGER: 2.6
MDC_IDC_MSMT_BATTERY_STATUS: NORMAL
MDC_IDC_MSMT_BATTERY_VOLTAGE: 3.02 V
MDC_IDC_MSMT_LEADCHNL_LV_IMPEDANCE_VALUE: 342 OHM
MDC_IDC_MSMT_LEADCHNL_LV_IMPEDANCE_VALUE: 342 OHM
MDC_IDC_MSMT_LEADCHNL_LV_IMPEDANCE_VALUE: 399 OHM
MDC_IDC_MSMT_LEADCHNL_LV_IMPEDANCE_VALUE: 437 OHM
MDC_IDC_MSMT_LEADCHNL_LV_IMPEDANCE_VALUE: 437 OHM
MDC_IDC_MSMT_LEADCHNL_LV_IMPEDANCE_VALUE: 532 OHM
MDC_IDC_MSMT_LEADCHNL_LV_IMPEDANCE_VALUE: 646 OHM
MDC_IDC_MSMT_LEADCHNL_LV_IMPEDANCE_VALUE: 665 OHM
MDC_IDC_MSMT_LEADCHNL_LV_IMPEDANCE_VALUE: 684 OHM
MDC_IDC_MSMT_LEADCHNL_LV_IMPEDANCE_VALUE: 684 OHM
MDC_IDC_MSMT_LEADCHNL_LV_PACING_THRESHOLD_AMPLITUDE: 1 V
MDC_IDC_MSMT_LEADCHNL_LV_PACING_THRESHOLD_PULSEWIDTH: 0.4 MS
MDC_IDC_MSMT_LEADCHNL_RA_IMPEDANCE_VALUE: 380 OHM
MDC_IDC_MSMT_LEADCHNL_RA_IMPEDANCE_VALUE: 570 OHM
MDC_IDC_MSMT_LEADCHNL_RA_PACING_THRESHOLD_AMPLITUDE: 0.5 V
MDC_IDC_MSMT_LEADCHNL_RA_PACING_THRESHOLD_PULSEWIDTH: 0.4 MS
MDC_IDC_MSMT_LEADCHNL_RA_SENSING_INTR_AMPL: 2.12 MV
MDC_IDC_MSMT_LEADCHNL_RA_SENSING_INTR_AMPL: 2.62 MV
MDC_IDC_MSMT_LEADCHNL_RV_IMPEDANCE_VALUE: 380 OHM
MDC_IDC_MSMT_LEADCHNL_RV_IMPEDANCE_VALUE: 513 OHM
MDC_IDC_MSMT_LEADCHNL_RV_PACING_THRESHOLD_AMPLITUDE: 0.5 V
MDC_IDC_MSMT_LEADCHNL_RV_PACING_THRESHOLD_PULSEWIDTH: 0.4 MS
MDC_IDC_MSMT_LEADCHNL_RV_SENSING_INTR_AMPL: 10.5 MV
MDC_IDC_MSMT_LEADCHNL_RV_SENSING_INTR_AMPL: 10.5 MV
MDC_IDC_PG_IMPLANT_DTM: NORMAL
MDC_IDC_PG_MFG: NORMAL
MDC_IDC_PG_MODEL: NORMAL
MDC_IDC_PG_SERIAL: NORMAL
MDC_IDC_PG_TYPE: NORMAL
MDC_IDC_SESS_CLINIC_NAME: NORMAL
MDC_IDC_SESS_DTM: NORMAL
MDC_IDC_SESS_TYPE: NORMAL
MDC_IDC_SET_BRADY_AT_MODE_SWITCH_RATE: 171 {BEATS}/MIN
MDC_IDC_SET_BRADY_LOWRATE: 60 {BEATS}/MIN
MDC_IDC_SET_BRADY_MAX_SENSOR_RATE: 120 {BEATS}/MIN
MDC_IDC_SET_BRADY_MAX_TRACKING_RATE: 120 {BEATS}/MIN
MDC_IDC_SET_BRADY_MODE: NORMAL
MDC_IDC_SET_BRADY_PAV_DELAY_LOW: 170 MS
MDC_IDC_SET_BRADY_SAV_DELAY_LOW: 110 MS
MDC_IDC_SET_CRT_LVRV_DELAY: 0 MS
MDC_IDC_SET_CRT_PACED_CHAMBERS: NORMAL
MDC_IDC_SET_LEADCHNL_LV_PACING_AMPLITUDE: 1.5 V
MDC_IDC_SET_LEADCHNL_LV_PACING_ANODE_ELECTRODE_1: NORMAL
MDC_IDC_SET_LEADCHNL_LV_PACING_ANODE_LOCATION_1: NORMAL
MDC_IDC_SET_LEADCHNL_LV_PACING_CAPTURE_MODE: NORMAL
MDC_IDC_SET_LEADCHNL_LV_PACING_CATHODE_ELECTRODE_1: NORMAL
MDC_IDC_SET_LEADCHNL_LV_PACING_CATHODE_LOCATION_1: NORMAL
MDC_IDC_SET_LEADCHNL_LV_PACING_POLARITY: NORMAL
MDC_IDC_SET_LEADCHNL_LV_PACING_PULSEWIDTH: 0.4 MS
MDC_IDC_SET_LEADCHNL_RA_PACING_AMPLITUDE: 1.5 V
MDC_IDC_SET_LEADCHNL_RA_PACING_ANODE_ELECTRODE_1: NORMAL
MDC_IDC_SET_LEADCHNL_RA_PACING_ANODE_LOCATION_1: NORMAL
MDC_IDC_SET_LEADCHNL_RA_PACING_CAPTURE_MODE: NORMAL
MDC_IDC_SET_LEADCHNL_RA_PACING_CATHODE_ELECTRODE_1: NORMAL
MDC_IDC_SET_LEADCHNL_RA_PACING_CATHODE_LOCATION_1: NORMAL
MDC_IDC_SET_LEADCHNL_RA_PACING_POLARITY: NORMAL
MDC_IDC_SET_LEADCHNL_RA_PACING_PULSEWIDTH: 0.4 MS
MDC_IDC_SET_LEADCHNL_RA_SENSING_ANODE_ELECTRODE_1: NORMAL
MDC_IDC_SET_LEADCHNL_RA_SENSING_ANODE_LOCATION_1: NORMAL
MDC_IDC_SET_LEADCHNL_RA_SENSING_CATHODE_ELECTRODE_1: NORMAL
MDC_IDC_SET_LEADCHNL_RA_SENSING_CATHODE_LOCATION_1: NORMAL
MDC_IDC_SET_LEADCHNL_RA_SENSING_POLARITY: NORMAL
MDC_IDC_SET_LEADCHNL_RA_SENSING_SENSITIVITY: 0.3 MV
MDC_IDC_SET_LEADCHNL_RV_PACING_AMPLITUDE: 2 V
MDC_IDC_SET_LEADCHNL_RV_PACING_ANODE_ELECTRODE_1: NORMAL
MDC_IDC_SET_LEADCHNL_RV_PACING_ANODE_LOCATION_1: NORMAL
MDC_IDC_SET_LEADCHNL_RV_PACING_CAPTURE_MODE: NORMAL
MDC_IDC_SET_LEADCHNL_RV_PACING_CATHODE_ELECTRODE_1: NORMAL
MDC_IDC_SET_LEADCHNL_RV_PACING_CATHODE_LOCATION_1: NORMAL
MDC_IDC_SET_LEADCHNL_RV_PACING_POLARITY: NORMAL
MDC_IDC_SET_LEADCHNL_RV_PACING_PULSEWIDTH: 0.4 MS
MDC_IDC_SET_LEADCHNL_RV_SENSING_ANODE_ELECTRODE_1: NORMAL
MDC_IDC_SET_LEADCHNL_RV_SENSING_ANODE_LOCATION_1: NORMAL
MDC_IDC_SET_LEADCHNL_RV_SENSING_CATHODE_ELECTRODE_1: NORMAL
MDC_IDC_SET_LEADCHNL_RV_SENSING_CATHODE_LOCATION_1: NORMAL
MDC_IDC_SET_LEADCHNL_RV_SENSING_POLARITY: NORMAL
MDC_IDC_SET_LEADCHNL_RV_SENSING_SENSITIVITY: 0.9 MV
MDC_IDC_SET_ZONE_DETECTION_INTERVAL: 350 MS
MDC_IDC_SET_ZONE_DETECTION_INTERVAL: 400 MS
MDC_IDC_SET_ZONE_TYPE: NORMAL
MDC_IDC_STAT_AT_BURDEN_PERCENT: 0 %
MDC_IDC_STAT_AT_DTM_END: NORMAL
MDC_IDC_STAT_AT_DTM_START: NORMAL
MDC_IDC_STAT_BRADY_AP_VP_PERCENT: 99.46 %
MDC_IDC_STAT_BRADY_AP_VS_PERCENT: 0.02 %
MDC_IDC_STAT_BRADY_AS_VP_PERCENT: 0.37 %
MDC_IDC_STAT_BRADY_AS_VS_PERCENT: 0.15 %
MDC_IDC_STAT_BRADY_DTM_END: NORMAL
MDC_IDC_STAT_BRADY_DTM_START: NORMAL
MDC_IDC_STAT_BRADY_RA_PERCENT_PACED: 99.6 %
MDC_IDC_STAT_BRADY_RV_PERCENT_PACED: 99.82 %
MDC_IDC_STAT_CRT_DTM_END: NORMAL
MDC_IDC_STAT_CRT_DTM_START: NORMAL
MDC_IDC_STAT_CRT_LV_PERCENT_PACED: 99.79 %
MDC_IDC_STAT_CRT_PERCENT_PACED: 99.79 %
MDC_IDC_STAT_EPISODE_RECENT_COUNT: 0
MDC_IDC_STAT_EPISODE_RECENT_COUNT_DTM_END: NORMAL
MDC_IDC_STAT_EPISODE_RECENT_COUNT_DTM_START: NORMAL
MDC_IDC_STAT_EPISODE_TOTAL_COUNT: 0
MDC_IDC_STAT_EPISODE_TOTAL_COUNT_DTM_END: NORMAL
MDC_IDC_STAT_EPISODE_TOTAL_COUNT_DTM_START: NORMAL
MDC_IDC_STAT_EPISODE_TYPE: NORMAL

## 2023-01-19 DIAGNOSIS — I50.42 CHRONIC COMBINED SYSTOLIC AND DIASTOLIC HRT FAIL (H): ICD-10-CM

## 2023-01-19 RX ORDER — SACUBITRIL AND VALSARTAN 49; 51 MG/1; MG/1
1 TABLET, FILM COATED ORAL 2 TIMES DAILY
Qty: 180 TABLET | Refills: 1 | Status: SHIPPED | OUTPATIENT
Start: 2023-01-19 | End: 2023-07-17

## 2023-01-19 NOTE — TELEPHONE ENCOUNTER
[facility] Refill Guideline reviewed.  Medication meets criteria for refill. Artur LU

## 2023-03-22 ENCOUNTER — TRANSFERRED RECORDS (OUTPATIENT)
Dept: HEALTH INFORMATION MANAGEMENT | Facility: CLINIC | Age: 88
End: 2023-03-22

## 2023-03-30 ENCOUNTER — ANCILLARY PROCEDURE (OUTPATIENT)
Dept: CARDIOLOGY | Facility: CLINIC | Age: 88
End: 2023-03-30
Attending: INTERNAL MEDICINE
Payer: COMMERCIAL

## 2023-03-30 DIAGNOSIS — Z95.0 CARDIAC PACEMAKER IN SITU: ICD-10-CM

## 2023-03-30 DIAGNOSIS — I44.1 SECOND DEGREE ATRIOVENTRICULAR BLOCK: ICD-10-CM

## 2023-03-30 LAB
MDC_IDC_LEAD_IMPLANT_DT: NORMAL
MDC_IDC_LEAD_LOCATION: NORMAL
MDC_IDC_LEAD_LOCATION_DETAIL_1: NORMAL
MDC_IDC_LEAD_MFG: NORMAL
MDC_IDC_LEAD_MODEL: NORMAL
MDC_IDC_LEAD_POLARITY_TYPE: NORMAL
MDC_IDC_LEAD_SERIAL: NORMAL
MDC_IDC_MSMT_BATTERY_DTM: NORMAL
MDC_IDC_MSMT_BATTERY_REMAINING_LONGEVITY: 108 MO
MDC_IDC_MSMT_BATTERY_RRT_TRIGGER: 2.6
MDC_IDC_MSMT_BATTERY_STATUS: NORMAL
MDC_IDC_MSMT_BATTERY_VOLTAGE: 3.01 V
MDC_IDC_MSMT_LEADCHNL_LV_IMPEDANCE_VALUE: 304 OHM
MDC_IDC_MSMT_LEADCHNL_LV_IMPEDANCE_VALUE: 342 OHM
MDC_IDC_MSMT_LEADCHNL_LV_IMPEDANCE_VALUE: 380 OHM
MDC_IDC_MSMT_LEADCHNL_LV_IMPEDANCE_VALUE: 399 OHM
MDC_IDC_MSMT_LEADCHNL_LV_IMPEDANCE_VALUE: 418 OHM
MDC_IDC_MSMT_LEADCHNL_LV_IMPEDANCE_VALUE: 532 OHM
MDC_IDC_MSMT_LEADCHNL_LV_IMPEDANCE_VALUE: 570 OHM
MDC_IDC_MSMT_LEADCHNL_LV_IMPEDANCE_VALUE: 589 OHM
MDC_IDC_MSMT_LEADCHNL_LV_IMPEDANCE_VALUE: 589 OHM
MDC_IDC_MSMT_LEADCHNL_LV_IMPEDANCE_VALUE: 646 OHM
MDC_IDC_MSMT_LEADCHNL_LV_PACING_THRESHOLD_AMPLITUDE: 0.88 V
MDC_IDC_MSMT_LEADCHNL_LV_PACING_THRESHOLD_PULSEWIDTH: 0.4 MS
MDC_IDC_MSMT_LEADCHNL_RA_IMPEDANCE_VALUE: 380 OHM
MDC_IDC_MSMT_LEADCHNL_RA_IMPEDANCE_VALUE: 513 OHM
MDC_IDC_MSMT_LEADCHNL_RA_PACING_THRESHOLD_AMPLITUDE: 0.5 V
MDC_IDC_MSMT_LEADCHNL_RA_PACING_THRESHOLD_PULSEWIDTH: 0.4 MS
MDC_IDC_MSMT_LEADCHNL_RA_SENSING_INTR_AMPL: 2.75 MV
MDC_IDC_MSMT_LEADCHNL_RA_SENSING_INTR_AMPL: 2.75 MV
MDC_IDC_MSMT_LEADCHNL_RV_IMPEDANCE_VALUE: 399 OHM
MDC_IDC_MSMT_LEADCHNL_RV_IMPEDANCE_VALUE: 513 OHM
MDC_IDC_MSMT_LEADCHNL_RV_PACING_THRESHOLD_AMPLITUDE: 0.5 V
MDC_IDC_MSMT_LEADCHNL_RV_PACING_THRESHOLD_PULSEWIDTH: 0.4 MS
MDC_IDC_MSMT_LEADCHNL_RV_SENSING_INTR_AMPL: 10.5 MV
MDC_IDC_MSMT_LEADCHNL_RV_SENSING_INTR_AMPL: 10.5 MV
MDC_IDC_PG_IMPLANT_DTM: NORMAL
MDC_IDC_PG_MFG: NORMAL
MDC_IDC_PG_MODEL: NORMAL
MDC_IDC_PG_SERIAL: NORMAL
MDC_IDC_PG_TYPE: NORMAL
MDC_IDC_SESS_CLINIC_NAME: NORMAL
MDC_IDC_SESS_DTM: NORMAL
MDC_IDC_SESS_TYPE: NORMAL
MDC_IDC_SET_BRADY_AT_MODE_SWITCH_RATE: 171 {BEATS}/MIN
MDC_IDC_SET_BRADY_LOWRATE: 60 {BEATS}/MIN
MDC_IDC_SET_BRADY_MAX_SENSOR_RATE: 120 {BEATS}/MIN
MDC_IDC_SET_BRADY_MAX_TRACKING_RATE: 120 {BEATS}/MIN
MDC_IDC_SET_BRADY_MODE: NORMAL
MDC_IDC_SET_BRADY_PAV_DELAY_LOW: 170 MS
MDC_IDC_SET_BRADY_SAV_DELAY_LOW: 110 MS
MDC_IDC_SET_CRT_LVRV_DELAY: 0 MS
MDC_IDC_SET_CRT_PACED_CHAMBERS: NORMAL
MDC_IDC_SET_LEADCHNL_LV_PACING_AMPLITUDE: 1.5 V
MDC_IDC_SET_LEADCHNL_LV_PACING_ANODE_ELECTRODE_1: NORMAL
MDC_IDC_SET_LEADCHNL_LV_PACING_ANODE_LOCATION_1: NORMAL
MDC_IDC_SET_LEADCHNL_LV_PACING_CAPTURE_MODE: NORMAL
MDC_IDC_SET_LEADCHNL_LV_PACING_CATHODE_ELECTRODE_1: NORMAL
MDC_IDC_SET_LEADCHNL_LV_PACING_CATHODE_LOCATION_1: NORMAL
MDC_IDC_SET_LEADCHNL_LV_PACING_POLARITY: NORMAL
MDC_IDC_SET_LEADCHNL_LV_PACING_PULSEWIDTH: 0.4 MS
MDC_IDC_SET_LEADCHNL_RA_PACING_AMPLITUDE: 1.5 V
MDC_IDC_SET_LEADCHNL_RA_PACING_ANODE_ELECTRODE_1: NORMAL
MDC_IDC_SET_LEADCHNL_RA_PACING_ANODE_LOCATION_1: NORMAL
MDC_IDC_SET_LEADCHNL_RA_PACING_CAPTURE_MODE: NORMAL
MDC_IDC_SET_LEADCHNL_RA_PACING_CATHODE_ELECTRODE_1: NORMAL
MDC_IDC_SET_LEADCHNL_RA_PACING_CATHODE_LOCATION_1: NORMAL
MDC_IDC_SET_LEADCHNL_RA_PACING_POLARITY: NORMAL
MDC_IDC_SET_LEADCHNL_RA_PACING_PULSEWIDTH: 0.4 MS
MDC_IDC_SET_LEADCHNL_RA_SENSING_ANODE_ELECTRODE_1: NORMAL
MDC_IDC_SET_LEADCHNL_RA_SENSING_ANODE_LOCATION_1: NORMAL
MDC_IDC_SET_LEADCHNL_RA_SENSING_CATHODE_ELECTRODE_1: NORMAL
MDC_IDC_SET_LEADCHNL_RA_SENSING_CATHODE_LOCATION_1: NORMAL
MDC_IDC_SET_LEADCHNL_RA_SENSING_POLARITY: NORMAL
MDC_IDC_SET_LEADCHNL_RA_SENSING_SENSITIVITY: 0.3 MV
MDC_IDC_SET_LEADCHNL_RV_PACING_AMPLITUDE: 2 V
MDC_IDC_SET_LEADCHNL_RV_PACING_ANODE_ELECTRODE_1: NORMAL
MDC_IDC_SET_LEADCHNL_RV_PACING_ANODE_LOCATION_1: NORMAL
MDC_IDC_SET_LEADCHNL_RV_PACING_CAPTURE_MODE: NORMAL
MDC_IDC_SET_LEADCHNL_RV_PACING_CATHODE_ELECTRODE_1: NORMAL
MDC_IDC_SET_LEADCHNL_RV_PACING_CATHODE_LOCATION_1: NORMAL
MDC_IDC_SET_LEADCHNL_RV_PACING_POLARITY: NORMAL
MDC_IDC_SET_LEADCHNL_RV_PACING_PULSEWIDTH: 0.4 MS
MDC_IDC_SET_LEADCHNL_RV_SENSING_ANODE_ELECTRODE_1: NORMAL
MDC_IDC_SET_LEADCHNL_RV_SENSING_ANODE_LOCATION_1: NORMAL
MDC_IDC_SET_LEADCHNL_RV_SENSING_CATHODE_ELECTRODE_1: NORMAL
MDC_IDC_SET_LEADCHNL_RV_SENSING_CATHODE_LOCATION_1: NORMAL
MDC_IDC_SET_LEADCHNL_RV_SENSING_POLARITY: NORMAL
MDC_IDC_SET_LEADCHNL_RV_SENSING_SENSITIVITY: 0.9 MV
MDC_IDC_SET_ZONE_DETECTION_INTERVAL: 350 MS
MDC_IDC_SET_ZONE_DETECTION_INTERVAL: 400 MS
MDC_IDC_SET_ZONE_TYPE: NORMAL
MDC_IDC_STAT_AT_BURDEN_PERCENT: 0 %
MDC_IDC_STAT_AT_DTM_END: NORMAL
MDC_IDC_STAT_AT_DTM_START: NORMAL
MDC_IDC_STAT_BRADY_AP_VP_PERCENT: 99.52 %
MDC_IDC_STAT_BRADY_AP_VS_PERCENT: 0.03 %
MDC_IDC_STAT_BRADY_AS_VP_PERCENT: 0.39 %
MDC_IDC_STAT_BRADY_AS_VS_PERCENT: 0.06 %
MDC_IDC_STAT_BRADY_DTM_END: NORMAL
MDC_IDC_STAT_BRADY_DTM_START: NORMAL
MDC_IDC_STAT_BRADY_RA_PERCENT_PACED: 99.58 %
MDC_IDC_STAT_BRADY_RV_PERCENT_PACED: 99.9 %
MDC_IDC_STAT_CRT_DTM_END: NORMAL
MDC_IDC_STAT_CRT_DTM_START: NORMAL
MDC_IDC_STAT_CRT_LV_PERCENT_PACED: 99.87 %
MDC_IDC_STAT_CRT_PERCENT_PACED: 99.87 %
MDC_IDC_STAT_EPISODE_RECENT_COUNT: 0
MDC_IDC_STAT_EPISODE_RECENT_COUNT_DTM_END: NORMAL
MDC_IDC_STAT_EPISODE_RECENT_COUNT_DTM_START: NORMAL
MDC_IDC_STAT_EPISODE_TOTAL_COUNT: 0
MDC_IDC_STAT_EPISODE_TOTAL_COUNT_DTM_END: NORMAL
MDC_IDC_STAT_EPISODE_TOTAL_COUNT_DTM_START: NORMAL
MDC_IDC_STAT_EPISODE_TYPE: NORMAL

## 2023-03-30 PROCEDURE — 93296 REM INTERROG EVL PM/IDS: CPT | Performed by: INTERNAL MEDICINE

## 2023-03-30 PROCEDURE — 93294 REM INTERROG EVL PM/LDLS PM: CPT | Performed by: INTERNAL MEDICINE

## 2023-04-11 DIAGNOSIS — G60.9 IDIOPATHIC PERIPHERAL NEUROPATHY: ICD-10-CM

## 2023-04-12 RX ORDER — GABAPENTIN 300 MG/1
CAPSULE ORAL
Qty: 90 CAPSULE | Refills: 0 | Status: SHIPPED | OUTPATIENT
Start: 2023-04-12 | End: 2023-07-14

## 2023-05-01 ENCOUNTER — ANCILLARY PROCEDURE (OUTPATIENT)
Dept: CARDIOLOGY | Facility: CLINIC | Age: 88
End: 2023-05-01
Attending: INTERNAL MEDICINE
Payer: COMMERCIAL

## 2023-05-01 ENCOUNTER — OFFICE VISIT (OUTPATIENT)
Dept: CARDIOLOGY | Facility: CLINIC | Age: 88
End: 2023-05-01
Attending: PHYSICIAN ASSISTANT
Payer: COMMERCIAL

## 2023-05-01 ENCOUNTER — LAB (OUTPATIENT)
Dept: LAB | Facility: CLINIC | Age: 88
End: 2023-05-01
Payer: COMMERCIAL

## 2023-05-01 VITALS
DIASTOLIC BLOOD PRESSURE: 78 MMHG | SYSTOLIC BLOOD PRESSURE: 118 MMHG | HEIGHT: 71 IN | WEIGHT: 200.5 LBS | HEART RATE: 80 BPM | OXYGEN SATURATION: 97 % | BODY MASS INDEX: 28.07 KG/M2

## 2023-05-01 DIAGNOSIS — Z95.0 CARDIAC PACEMAKER IN SITU: ICD-10-CM

## 2023-05-01 DIAGNOSIS — I50.42 CHRONIC COMBINED SYSTOLIC AND DIASTOLIC HRT FAIL (H): ICD-10-CM

## 2023-05-01 DIAGNOSIS — G60.9 IDIOPATHIC PERIPHERAL NEUROPATHY: ICD-10-CM

## 2023-05-01 DIAGNOSIS — I50.42 CHRONIC COMBINED SYSTOLIC AND DIASTOLIC HRT FAIL (H): Primary | ICD-10-CM

## 2023-05-01 DIAGNOSIS — N18.31 STAGE 3A CHRONIC KIDNEY DISEASE (H): ICD-10-CM

## 2023-05-01 DIAGNOSIS — R06.02 SOB (SHORTNESS OF BREATH): ICD-10-CM

## 2023-05-01 DIAGNOSIS — I44.1 SECOND DEGREE ATRIOVENTRICULAR BLOCK: ICD-10-CM

## 2023-05-01 DIAGNOSIS — I77.810 AORTIC ROOT DILATATION (H): ICD-10-CM

## 2023-05-01 DIAGNOSIS — I25.810 CORONARY ARTERY DISEASE INVOLVING CORONARY BYPASS GRAFT OF NATIVE HEART WITHOUT ANGINA PECTORIS: ICD-10-CM

## 2023-05-01 LAB
ANION GAP SERPL CALCULATED.3IONS-SCNC: 11 MMOL/L (ref 7–15)
BUN SERPL-MCNC: 20.6 MG/DL (ref 8–23)
CALCIUM SERPL-MCNC: 9.3 MG/DL (ref 8.8–10.2)
CHLORIDE SERPL-SCNC: 106 MMOL/L (ref 98–107)
CREAT SERPL-MCNC: 1.31 MG/DL (ref 0.67–1.17)
DEPRECATED HCO3 PLAS-SCNC: 24 MMOL/L (ref 22–29)
GFR SERPL CREATININE-BSD FRML MDRD: 52 ML/MIN/1.73M2
GLUCOSE SERPL-MCNC: 100 MG/DL (ref 70–99)
NT-PROBNP SERPL-MCNC: 459 PG/ML (ref 0–1800)
POTASSIUM SERPL-SCNC: 4.5 MMOL/L (ref 3.4–5.3)
SODIUM SERPL-SCNC: 141 MMOL/L (ref 136–145)

## 2023-05-01 PROCEDURE — 36415 COLL VENOUS BLD VENIPUNCTURE: CPT | Performed by: PHYSICIAN ASSISTANT

## 2023-05-01 PROCEDURE — 93281 PM DEVICE PROGR EVAL MULTI: CPT | Performed by: INTERNAL MEDICINE

## 2023-05-01 PROCEDURE — 83880 ASSAY OF NATRIURETIC PEPTIDE: CPT | Performed by: PHYSICIAN ASSISTANT

## 2023-05-01 PROCEDURE — 99215 OFFICE O/P EST HI 40 MIN: CPT | Performed by: INTERNAL MEDICINE

## 2023-05-01 PROCEDURE — 80048 BASIC METABOLIC PNL TOTAL CA: CPT | Performed by: PHYSICIAN ASSISTANT

## 2023-05-01 RX ORDER — FUROSEMIDE 20 MG
20 TABLET ORAL DAILY
Qty: 30 TABLET | Refills: 11 | Status: SHIPPED | OUTPATIENT
Start: 2023-05-01 | End: 2023-05-22

## 2023-05-01 NOTE — PROGRESS NOTES
HPI and Plan:   Mr. 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     He has been having exertional shortness of breath for last 2 years that is been bothering him.  It is at least moderate in degree.  It does not occur at rest.  Walking 1-2 blocks, makes him short of breath and he has to rest.  There is no orthopnea or PND.  He has no chest pain.  He does have chronic leg edema partly related to venous insufficiency and has been diagnosed with that on ultrasound.  He was sent to venous clinic but was recommended conservative management.  He was supposed to wear stockings but he stopped doing so.    He is here accompanied by his daughter in law.  He is on Entresto and metoprolol.  He was on Lasix at some point but has discontinued.     He has been a remote smoker and quit in at age 32.  He has not had any formal PFTs.         Physical Exam  Regular rate and rhythm.  No murmurs.  Chest was clear auscultation.  JVP was not raised but hepatojugular reflux is marginally positive.  2+ leg edema, pitting type, venous insufficiency stasis noted.     Assessment and Plan  1.  Chronic systolic and diastolic dysfunction   Last echo revealed EF of 45 to 50%.  He appears to have some fluid overload.  Part of it related to venous insufficiency.  I will trial him again on Lasix 20 mg/day.  We also talked about his salt intake.  He likes to sprinkle salt on his diet and we talked about avoiding that and reducing it.  He will return to see my midlevel provider in few weeks to see how he is doing and repeat a BMP and N-terminal proBNP.  I also recommend an echocardiogram to assess  his wall motion ejection fraction.  Edema may be also worse because of gabapentin that he uses for peripheral neuropathy.    Patient's main complaint is shortness of breath and have recommended formal PFTs to make sure there is no pulmonary cause.     2. Coronary artery disease status post YOGESH to the OM and most recently to the mid RCA.  He denies recurrent angina.  On low-dose metoprolol and aspirin.    We have done a ischemic work-up with a stress MRI in 2021 which was negative.  At this time he has no chest pain.  We talked about the possibility of shortness of breath being angina equivalent.  For definitive diagnosis and angiogram may be recommended but at his age he is a bit reluctant.  If symptoms get worse, we may have to consider that    3.  Sick sinus syndrome and significant bradycardia status post permanent pacemaker.    Device clinic notes reviewed.  He has 99% biventricular pacing     4.  Ascending aorta enlargement, ascending aorta is 4.5 cm.      5. Hyperlipidemia.  His last LDL was 41..  Continue atorvastatin 40 mg daily.       6.  Venous insufficiency,  venous compression stockings     7.  Peripheral neuropathy  On gabapentin     Plan  Echocardiogram  Formal PFT  Start Lasix 20 mg daily  Repeat BMP and N-terminal proBNP in 1 month  Compression stocking  Lower salt intake  Follow-up with my LISS in 4 to 6 weeks.          Sincerely,     Unruly Huggins MD    Today's clinic visit entailed:    41 minutes spent by me on the date of the encounter doing chart review, review of test results, patient visit, documentation and discussion with family   Provider  Link to MDM Help Grid     The level of medical decision making during this visit was of high complexity.      No orders of the defined types were placed in this encounter.      No orders of the defined types were placed in this encounter.      There are no discontinued medications.      Encounter Diagnosis   Name Primary?     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 (H)        CURRENT MEDICATIONS:  Current Outpatient Medications   Medication Sig Dispense Refill     acetaminophen (TYLENOL) 650 MG CR tablet Take 650 mg by mouth daily       ASPIRIN EC PO Take 81 mg by mouth daily       atorvastatin (LIPITOR) 40 MG tablet Take 1 tablet (40 mg) by mouth daily 90 tablet 3     gabapentin (NEURONTIN) 300 MG capsule TAKE 1 CAPSULE(300 MG) BY MOUTH AT BEDTIME 90 capsule 0     levothyroxine (SYNTHROID/LEVOTHROID) 100 MCG tablet TAKE 1 TABLET (100 MCG) BY MOUTH DAILY 90 tablet 2     metoprolol succinate ER (TOPROL XL) 25 MG 24 hr tablet Take 1 tablet (25 mg) by mouth daily At lunch 90 tablet 3     nitroGLYcerin (NITROSTAT) 0.4 MG sublingual tablet One tablet under the tongue every 5 minutes if needed for chest pain. May repeat every 5 minutes for a maximum of 3 doses in 15 minutes\" 25 tablet 3     Polyethylene Glycol 3350 (MIRALAX PO) Take by mouth as needed       sacubitril-valsartan (ENTRESTO) 49-51 MG per tablet Take 1 tablet by mouth 2 times daily 180 tablet 1     UNABLE TO FIND 1 capsule 3 times daily MEDICATION NAME: NeuroFlo         ALLERGIES     Allergies   Allergen Reactions     No Known Drug Allergy        PAST MEDICAL HISTORY:  Past Medical History:   Diagnosis Date     Aortic root dilatation (H)     4.4 cm     Ascending aorta dilatation (H)     4.4 cm     ASD (atrial septal defect)      Bradycardia      CAD (coronary artery disease) 05/15/2014     YOGESH to RCA(6/20/19); YOGESH to OM1(5/15/14)     Cardiac pacemaker 06/20/2019    [employer] PPM COMFORT MRI XT DR-implant 6/20/19     Chest discomfort      Degeneration of lumbar or lumbosacral intervertebral disc      Familial tremor      Former smoker      Herpes zoster without mention of complication      HTN (hypertension)      Hyperlipidaemia      Idiopathic peripheral neuropathy      MGUS (monoclonal gammopathy of unknown significance)      NSTEMI (non-ST elevated myocardial infarction) (H) 2014     Other and " unspecified hyperlipidemia      PFO (patent foramen ovale)      Prostatitis, unspecified      Second degree heart block      Sensorineural hearing loss, unspecified      SSS (sick sinus syndrome) (H)      Status post coronary angiogram 06/20/2019     Unspecified hypothyroidism        PAST SURGICAL HISTORY:  Past Surgical History:   Procedure Laterality Date     CHOLECYSTECTOMY       CORONARY ANGIOGRAPHY ADULT ORDER  5/14/14    PTCA w/YOGESH to OM1     CV CORONARY ANGIOGRAM N/A 6/20/2019    Procedure: Coronary Angiogram;  Surgeon: Romie Coronado MD;  Location:  [facility]     CV INTRAVASULAR ULTRASOUND N/A 6/20/2019    Procedure: Intravascular Ultrasound;  Surgeon: Romie Coronado MD;  Location:  [facility]     CV PCI ATHERECTOMY ORBITAL N/A 6/20/2019    Procedure: PCI Atherectomy Orbital;  Surgeon: Romie Coronado MD;  Location:  [facility]     CV PCI STENT DRUG ELUTING N/A 6/20/2019    Procedure: PCI Stent Drug Eluting;  Surgeon: Romie Coronado MD;  Location:  [facility]     CV TEMPORARY PACEMAKER INSERTION N/A 6/20/2019    Procedure: Temporary Pacemaker Insertion;  Surgeon: Romie Coronado MD;  Location:  [facility]     EP PACEMAKER N/A 6/20/2019    Procedure: EP Pacemaker;  Surgeon: Max Dowell MD;  Location:  [facility]ARD[facility]ATH LAB     EP PPM UPGRADE TO BIVENT N/A 9/16/2021    Procedure: EP PPM UPGRADE TO BIVENT;  Surgeon: Tre Miller MD;  Location:  [facility]ARD[facility] CATH LAB     HEART CATH, ANGIOPLASTY  5/14/14    YOGESH to OM1     Z NONSPECIFIC PROCEDURE  2010    Laparoscopic cholecystectomy.        FAMILY HISTORY:  Family History   Problem Relation Age of Onset     Cancer Mother      Genitourinary Problems Father 99        complications from surgery     Heart Disease Brother 72        MI       SOCIAL HISTORY:  Social History     Socioeconomic History     Marital status:      Spouse name: None      "Number of children: None     Years of education: None     Highest education level: None   Tobacco Use     Smoking status: Former     Packs/day: 0.50     Years: 16.00     Pack years: 8.00     Types: Cigarettes     Start date:      Quit date: 1967     Years since quittin.3     Smokeless tobacco: Never   Substance and Sexual Activity     Alcohol use: Yes     Comment: 5-7 drinks week - at most one drink daily     Drug use: No     Sexual activity: Never   Other Topics Concern     Caffeine Concern Yes     Comment: 2 cups coffee/day     Sleep Concern No     Weight Concern Yes     Comment: limiting alcohol to watch calories     Exercise Yes     Comment: Stationary bike  weights and aerobics 4 times week     Seat Belt Yes     Parent/sibling w/ CABG, MI or angioplasty before 65F 55M? No       Review of Systems:  Skin:          Eyes:         ENT:         Respiratory:  Positive for dyspnea on exertion     Cardiovascular:  chest pain;Negative;palpitations;lightheadedness;dizziness;edema Positive for;fatigue    Gastroenterology:        Genitourinary:         Musculoskeletal:  Positive for   knee pain  Neurologic:  Positive for numbness or tingling of feet    Psychiatric:         Heme/Lymph/Imm:         Endocrine:  Negative        Physical Exam:  Vitals: /78 (BP Location: Left arm, Patient Position: Sitting)   Pulse 80   Ht 1.803 m (5' 11\")   Wt 90.9 kg (200 lb 8 oz)   SpO2 97%   BMI 27.96 kg/m          LILIA Puckett PA-C  [address]  [place] [id number]              "

## 2023-05-01 NOTE — LETTER
5/1/2023    Steven Wagoner MD  [address] [facility] [address]    RE: Moses Elizondo       Dear Colleague,     I had the pleasure of seeing Moses Elizondo in the [facility].  HPI and Plan:   Mr. 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     He has been having exertional shortness of breath for last 2 years that is been bothering him.  It is at least moderate in degree.  It does not occur at rest.  Walking 1-2 blocks, makes him short of breath and he has to rest.  There is no orthopnea or PND.  He has no chest pain.  He does have chronic leg edema partly related to venous insufficiency and has been diagnosed with that on ultrasound.  He was sent to venous clinic but was recommended conservative management.  He was supposed to wear stockings but he stopped doing so.    He is here accompanied by his daughter in law.  He is on Entresto and metoprolol.  He was on Lasix at some point but has discontinued.     He has been a remote smoker and quit in at age 32.  He has not had any formal PFTs.         Physical Exam  Regular rate and rhythm.  No murmurs.  Chest was clear auscultation.  JVP was not raised but hepatojugular reflux is marginally positive.  2+ leg edema, pitting type, venous insufficiency stasis noted.     Assessment and Plan  1.  Chronic systolic and diastolic dysfunction   Last echo revealed EF of 45 to 50%.  He appears to have some fluid overload.  Part of it related to venous insufficiency.  I will trial him again on Lasix 20 mg/day.  We also talked about his salt intake.  He likes to sprinkle salt on his diet and  we talked about avoiding that and reducing it.  He will return to see my midlevel provider in few weeks to see how he is doing and repeat a BMP and N-terminal proBNP.  I also recommend an echocardiogram to assess his wall motion ejection fraction.  Edema may be also worse because of gabapentin that he uses for peripheral neuropathy.    Patient's main complaint is shortness of breath and have recommended formal PFTs to make sure there is no pulmonary cause.     2. Coronary artery disease status post YOGESH to the OM and most recently to the mid [facility].  He denies recurrent angina.  On low-dose metoprolol and aspirin.    We have done a ischemic work-up with a stress MRI in 2021 which was negative.  At this time he has no chest pain.  We talked about the possibility of shortness of breath being angina equivalent.  For definitive diagnosis and angiogram may be recommended but at his age he is a bit reluctant.  If symptoms get worse, we may have to consider that    3.  Sick sinus syndrome and significant bradycardia status post permanent pacemaker.    Device clinic notes reviewed.  He has 99% biventricular pacing     4.  Ascending aorta enlargement, ascending aorta is 4.5 cm.      5. Hyperlipidemia.  His last LDL was 41..  Continue atorvastatin 40 mg daily.       6.  Venous insufficiency,  venous compression stockings     7.  Peripheral neuropathy  On gabapentin     Plan  Echocardiogram  Formal PFT  Start Lasix 20 mg daily  Repeat BMP and N-terminal proBNP in 1 month  Compression stocking  Lower salt intake  Follow-up with my LISS in 4 to 6 weeks.          Sincerely,     Unruly Huggins MD    Today's clinic visit entailed:    41 minutes spent by me on the date of the encounter doing chart review, review of test results, patient visit, documentation and discussion with family   Provider  Link to [facility] Help Grid     The level of medical decision making during this visit was of high complexity.      No orders of the defined types were  "placed in this encounter.      No orders of the defined types were placed in this encounter.      There are no discontinued medications.      Encounter Diagnosis   Name Primary?    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)        CURRENT MEDICATIONS:  Current Outpatient Medications   Medication Sig Dispense Refill    acetaminophen (TYLENOL) 650 MG CR tablet Take 650 mg by mouth daily      ASPIRIN EC PO Take 81 mg by mouth daily      atorvastatin (LIPITOR) 40 MG tablet Take 1 tablet (40 mg) by mouth daily 90 tablet 3    gabapentin (NEURONTIN) 300 MG capsule TAKE 1 CAPSULE(300 MG) BY MOUTH AT BEDTIME 90 capsule 0    levothyroxine (SYNTHROID/LEVOTHROID) 100 MCG tablet TAKE 1 TABLET (100 MCG) BY MOUTH DAILY 90 tablet 2    metoprolol succinate ER (TOPROL XL) 25 MG 24 hr tablet Take 1 tablet (25 mg) by mouth daily At lunch 90 tablet 3    nitroGLYcerin (NITROSTAT) 0.4 MG sublingual tablet One tablet under the tongue every 5 minutes if needed for chest pain. May repeat every 5 minutes for a maximum of 3 doses in 15 minutes\" 25 tablet 3    Polyethylene Glycol 3350 (MIRALAX PO) Take by mouth as needed      sacubitril-valsartan (ENTRESTO) 49-51 MG per tablet Take 1 tablet by mouth 2 times daily 180 tablet 1    UNABLE TO FIND 1 capsule 3 times daily MEDICATION NAME: NeuroFlo         ALLERGIES     Allergies   Allergen Reactions    No Known Drug Allergy        PAST MEDICAL HISTORY:  Past Medical History:   Diagnosis Date    Aortic root dilatation (H)     4.4 cm    Ascending aorta dilatation (H)     4.4 cm    ASD (atrial septal defect)     Bradycardia     CAD (coronary artery disease) 05/15/2014     YOGESH to RCA(6/20/19); YOGESH to OM1(5/15/14)    Cardiac pacemaker 06/20/2019    [employer] PPM COMFORT MRI XT DR-implant 6/20/19    Chest discomfort     Degeneration of lumbar or lumbosacral intervertebral disc     Familial tremor     Former smoker     Herpes zoster without mention of complication     HTN (hypertension)     " Hyperlipidaemia     Idiopathic peripheral neuropathy     MGUS (monoclonal gammopathy of unknown significance)     NSTEMI (non-ST elevated myocardial infarction) (H) 2014    Other and unspecified hyperlipidemia     PFO (patent foramen ovale)     Prostatitis, unspecified     Second degree heart block     Sensorineural hearing loss, unspecified     SSS (sick sinus syndrome) (H)     Status post coronary angiogram 06/20/2019    Unspecified hypothyroidism        PAST SURGICAL HISTORY:  Past Surgical History:   Procedure Laterality Date    CHOLECYSTECTOMY      CORONARY ANGIOGRAPHY ADULT ORDER  5/14/14    PTCA w/YOGESH to OM1    CV CORONARY ANGIOGRAM N/A 6/20/2019    Procedure: Coronary Angiogram;  Surgeon: Romei Coronado MD;  Location:  HEART CARDI[facility] CATH LAB    CV INTRAVASULAR ULTRASOUND N/A 6/20/2019    Procedure: Intravascular Ultrasound;  Surgeon: Romie Coronado MD;  Location:  HEART CARDIAC CATH LAB    CV PCI ATHERECTOMY ORBITAL N/A 6/20/2019    Procedure: PCI Atherectomy Orbital;  Surgeon: Romie Coronado MD;  Location:  HEART CARDIAC CATH LAB    CV PCI STENT DRUG ELUTING N/A 6/20/2019    Procedure: PCI Stent Drug Eluting;  Surgeon: Romie Coronado MD;  Location:  HEART CARDIAC CATH LAB    CV TEMPORARY PACEMAKER INSERTION N/A 6/20/2019    Procedure: Temporary Pacemaker Insertion;  Surgeon: Romie Coronado MD;  Location:  HEART CARDIAC CATH LAB    EP PACEMAKER N/A 6/20/2019    Procedure: EP Pacemaker;  Surgeon: Max Dowell MD;  Location:  HEART CARDIAC CATH LAB    EP PPM UPGRADE TO BIVENT N/A 9/16/2021    Procedure: EP PPM UPGRADE TO BIVENT;  Surgeon: Tre Miller MD;  Location:  HEART CARDIAC CATH LAB    HEART CATH, ANGIOPLASTY  5/14/14    YOGESH to OM1    ZZC NONSPECIFIC PROCEDURE  2010    Laparoscopic cholecystectomy.        FAMILY HISTORY:  Family History   Problem Relation Age of Onset    Cancer Mother     Genitourinary Problems Father 99        complications from  "surgery    Heart Disease Brother 72        MI       SOCIAL HISTORY:  Social History     Socioeconomic History    Marital status:      Spouse name: None    Number of children: None    Years of education: None    Highest education level: None   Tobacco Use    Smoking status: Former     Packs/day: 0.50     Years: 16.00     Pack years: 8.00     Types: Cigarettes     Start date:      Quit date: 1967     Years since quittin.3    Smokeless tobacco: Never   Substance and Sexual Activity    Alcohol use: Yes     Comment: 5-7 drinks week - at most one drink daily    Drug use: No    Sexual activity: Never   Other Topics Concern    Caffeine Concern Yes     Comment: 2 cups coffee/day    Sleep Concern No    Weight Concern Yes     Comment: limiting alcohol to watch calories    Exercise Yes     Comment: Stationary bike  weights and aerobics 4 times week    Seat Belt Yes    Parent/sibling w/ CABG, MI or angioplasty before 65F 55M? No       Review of Systems:  Skin:          Eyes:         ENT:         Respiratory:  Positive for dyspnea on exertion     Cardiovascular:  chest pain;Negative;palpitations;lightheadedness;dizziness;edema Positive for;fatigue    Gastroenterology:        Genitourinary:         Musculoskeletal:  Positive for   knee pain  Neurologic:  Positive for numbness or tingling of feet    Psychiatric:         Heme/Lymph/Imm:         Endocrine:  Negative        Physical Exam:  Vitals: /78 (BP Location: Left arm, Patient Position: Sitting)   Pulse 80   Ht 1.803 m (5' 11\")   Wt 90.9 kg (200 lb 8 oz)   SpO2 97%   BMI 27.96 kg/m          CC  Lacey Puckett PA-C  [address]  [place] [address]        Thank you for allowing me to participate in the care of your patient.      Sincerely,     Unruly Huggins MD     [facility]  "

## 2023-05-11 LAB
MDC_IDC_EPISODE_DTM: NORMAL
MDC_IDC_EPISODE_DURATION: 11 S
MDC_IDC_EPISODE_ID: 3
MDC_IDC_EPISODE_TYPE: NORMAL
MDC_IDC_LEAD_IMPLANT_DT: NORMAL
MDC_IDC_LEAD_LOCATION: NORMAL
MDC_IDC_LEAD_LOCATION_DETAIL_1: NORMAL
MDC_IDC_LEAD_MFG: NORMAL
MDC_IDC_LEAD_MODEL: NORMAL
MDC_IDC_LEAD_POLARITY_TYPE: NORMAL
MDC_IDC_LEAD_SERIAL: NORMAL
MDC_IDC_MSMT_BATTERY_DTM: NORMAL
MDC_IDC_MSMT_BATTERY_REMAINING_LONGEVITY: 107 MO
MDC_IDC_MSMT_BATTERY_RRT_TRIGGER: 2.6
MDC_IDC_MSMT_BATTERY_STATUS: NORMAL
MDC_IDC_MSMT_BATTERY_VOLTAGE: 3.01 V
MDC_IDC_MSMT_LEADCHNL_LV_IMPEDANCE_VALUE: 285 OHM
MDC_IDC_MSMT_LEADCHNL_LV_IMPEDANCE_VALUE: 342 OHM
MDC_IDC_MSMT_LEADCHNL_LV_IMPEDANCE_VALUE: 399 OHM
MDC_IDC_MSMT_LEADCHNL_LV_IMPEDANCE_VALUE: 399 OHM
MDC_IDC_MSMT_LEADCHNL_LV_IMPEDANCE_VALUE: 437 OHM
MDC_IDC_MSMT_LEADCHNL_LV_IMPEDANCE_VALUE: 513 OHM
MDC_IDC_MSMT_LEADCHNL_LV_IMPEDANCE_VALUE: 589 OHM
MDC_IDC_MSMT_LEADCHNL_LV_IMPEDANCE_VALUE: 627 OHM
MDC_IDC_MSMT_LEADCHNL_LV_IMPEDANCE_VALUE: 646 OHM
MDC_IDC_MSMT_LEADCHNL_LV_IMPEDANCE_VALUE: 665 OHM
MDC_IDC_MSMT_LEADCHNL_LV_PACING_THRESHOLD_AMPLITUDE: 1 V
MDC_IDC_MSMT_LEADCHNL_LV_PACING_THRESHOLD_PULSEWIDTH: 0.4 MS
MDC_IDC_MSMT_LEADCHNL_RA_IMPEDANCE_VALUE: 361 OHM
MDC_IDC_MSMT_LEADCHNL_RA_IMPEDANCE_VALUE: 532 OHM
MDC_IDC_MSMT_LEADCHNL_RA_PACING_THRESHOLD_AMPLITUDE: 0.5 V
MDC_IDC_MSMT_LEADCHNL_RA_PACING_THRESHOLD_PULSEWIDTH: 0.4 MS
MDC_IDC_MSMT_LEADCHNL_RA_SENSING_INTR_AMPL: 2.25 MV
MDC_IDC_MSMT_LEADCHNL_RA_SENSING_INTR_AMPL: 2.5 MV
MDC_IDC_MSMT_LEADCHNL_RV_IMPEDANCE_VALUE: 399 OHM
MDC_IDC_MSMT_LEADCHNL_RV_IMPEDANCE_VALUE: 513 OHM
MDC_IDC_MSMT_LEADCHNL_RV_PACING_THRESHOLD_AMPLITUDE: 0.5 V
MDC_IDC_MSMT_LEADCHNL_RV_PACING_THRESHOLD_PULSEWIDTH: 0.4 MS
MDC_IDC_MSMT_LEADCHNL_RV_SENSING_INTR_AMPL: 10.5 MV
MDC_IDC_MSMT_LEADCHNL_RV_SENSING_INTR_AMPL: 10.5 MV
MDC_IDC_PG_IMPLANT_DTM: NORMAL
MDC_IDC_PG_MFG: NORMAL
MDC_IDC_PG_MODEL: NORMAL
MDC_IDC_PG_SERIAL: NORMAL
MDC_IDC_PG_TYPE: NORMAL
MDC_IDC_SESS_CLINIC_NAME: NORMAL
MDC_IDC_SESS_DTM: NORMAL
MDC_IDC_SESS_TYPE: NORMAL
MDC_IDC_SET_BRADY_AT_MODE_SWITCH_RATE: 171 {BEATS}/MIN
MDC_IDC_SET_BRADY_LOWRATE: 60 {BEATS}/MIN
MDC_IDC_SET_BRADY_MAX_SENSOR_RATE: 120 {BEATS}/MIN
MDC_IDC_SET_BRADY_MAX_TRACKING_RATE: 120 {BEATS}/MIN
MDC_IDC_SET_BRADY_MODE: NORMAL
MDC_IDC_SET_BRADY_PAV_DELAY_LOW: 170 MS
MDC_IDC_SET_BRADY_SAV_DELAY_LOW: 110 MS
MDC_IDC_SET_CRT_LVRV_DELAY: 0 MS
MDC_IDC_SET_CRT_PACED_CHAMBERS: NORMAL
MDC_IDC_SET_LEADCHNL_LV_PACING_AMPLITUDE: 1.5 V
MDC_IDC_SET_LEADCHNL_LV_PACING_ANODE_ELECTRODE_1: NORMAL
MDC_IDC_SET_LEADCHNL_LV_PACING_ANODE_LOCATION_1: NORMAL
MDC_IDC_SET_LEADCHNL_LV_PACING_CAPTURE_MODE: NORMAL
MDC_IDC_SET_LEADCHNL_LV_PACING_CATHODE_ELECTRODE_1: NORMAL
MDC_IDC_SET_LEADCHNL_LV_PACING_CATHODE_LOCATION_1: NORMAL
MDC_IDC_SET_LEADCHNL_LV_PACING_POLARITY: NORMAL
MDC_IDC_SET_LEADCHNL_LV_PACING_PULSEWIDTH: 0.4 MS
MDC_IDC_SET_LEADCHNL_RA_PACING_AMPLITUDE: 1.5 V
MDC_IDC_SET_LEADCHNL_RA_PACING_ANODE_ELECTRODE_1: NORMAL
MDC_IDC_SET_LEADCHNL_RA_PACING_ANODE_LOCATION_1: NORMAL
MDC_IDC_SET_LEADCHNL_RA_PACING_CAPTURE_MODE: NORMAL
MDC_IDC_SET_LEADCHNL_RA_PACING_CATHODE_ELECTRODE_1: NORMAL
MDC_IDC_SET_LEADCHNL_RA_PACING_CATHODE_LOCATION_1: NORMAL
MDC_IDC_SET_LEADCHNL_RA_PACING_POLARITY: NORMAL
MDC_IDC_SET_LEADCHNL_RA_PACING_PULSEWIDTH: 0.4 MS
MDC_IDC_SET_LEADCHNL_RA_SENSING_ANODE_ELECTRODE_1: NORMAL
MDC_IDC_SET_LEADCHNL_RA_SENSING_ANODE_LOCATION_1: NORMAL
MDC_IDC_SET_LEADCHNL_RA_SENSING_CATHODE_ELECTRODE_1: NORMAL
MDC_IDC_SET_LEADCHNL_RA_SENSING_CATHODE_LOCATION_1: NORMAL
MDC_IDC_SET_LEADCHNL_RA_SENSING_POLARITY: NORMAL
MDC_IDC_SET_LEADCHNL_RA_SENSING_SENSITIVITY: 0.3 MV
MDC_IDC_SET_LEADCHNL_RV_PACING_AMPLITUDE: 2 V
MDC_IDC_SET_LEADCHNL_RV_PACING_ANODE_ELECTRODE_1: NORMAL
MDC_IDC_SET_LEADCHNL_RV_PACING_ANODE_LOCATION_1: NORMAL
MDC_IDC_SET_LEADCHNL_RV_PACING_CAPTURE_MODE: NORMAL
MDC_IDC_SET_LEADCHNL_RV_PACING_CATHODE_ELECTRODE_1: NORMAL
MDC_IDC_SET_LEADCHNL_RV_PACING_CATHODE_LOCATION_1: NORMAL
MDC_IDC_SET_LEADCHNL_RV_PACING_POLARITY: NORMAL
MDC_IDC_SET_LEADCHNL_RV_PACING_PULSEWIDTH: 0.4 MS
MDC_IDC_SET_LEADCHNL_RV_SENSING_ANODE_ELECTRODE_1: NORMAL
MDC_IDC_SET_LEADCHNL_RV_SENSING_ANODE_LOCATION_1: NORMAL
MDC_IDC_SET_LEADCHNL_RV_SENSING_CATHODE_ELECTRODE_1: NORMAL
MDC_IDC_SET_LEADCHNL_RV_SENSING_CATHODE_LOCATION_1: NORMAL
MDC_IDC_SET_LEADCHNL_RV_SENSING_POLARITY: NORMAL
MDC_IDC_SET_LEADCHNL_RV_SENSING_SENSITIVITY: 0.9 MV
MDC_IDC_SET_ZONE_DETECTION_INTERVAL: 350 MS
MDC_IDC_SET_ZONE_DETECTION_INTERVAL: 400 MS
MDC_IDC_SET_ZONE_TYPE: NORMAL
MDC_IDC_STAT_AT_BURDEN_PERCENT: 0 %
MDC_IDC_STAT_AT_DTM_END: NORMAL
MDC_IDC_STAT_AT_DTM_START: NORMAL
MDC_IDC_STAT_BRADY_AP_VP_PERCENT: 99.36 %
MDC_IDC_STAT_BRADY_AP_VS_PERCENT: 0.03 %
MDC_IDC_STAT_BRADY_AS_VP_PERCENT: 0.53 %
MDC_IDC_STAT_BRADY_AS_VS_PERCENT: 0.07 %
MDC_IDC_STAT_BRADY_DTM_END: NORMAL
MDC_IDC_STAT_BRADY_DTM_START: NORMAL
MDC_IDC_STAT_BRADY_RA_PERCENT_PACED: 99.42 %
MDC_IDC_STAT_BRADY_RV_PERCENT_PACED: 99.89 %
MDC_IDC_STAT_CRT_DTM_END: NORMAL
MDC_IDC_STAT_CRT_DTM_START: NORMAL
MDC_IDC_STAT_CRT_LV_PERCENT_PACED: 99.86 %
MDC_IDC_STAT_CRT_PERCENT_PACED: 99.86 %
MDC_IDC_STAT_EPISODE_RECENT_COUNT: 0
MDC_IDC_STAT_EPISODE_RECENT_COUNT_DTM_END: NORMAL
MDC_IDC_STAT_EPISODE_RECENT_COUNT_DTM_START: NORMAL
MDC_IDC_STAT_EPISODE_TOTAL_COUNT: 0
MDC_IDC_STAT_EPISODE_TOTAL_COUNT_DTM_END: NORMAL
MDC_IDC_STAT_EPISODE_TOTAL_COUNT_DTM_START: NORMAL
MDC_IDC_STAT_EPISODE_TYPE: NORMAL

## 2023-05-16 ENCOUNTER — TRANSFERRED RECORDS (OUTPATIENT)
Dept: HEALTH INFORMATION MANAGEMENT | Facility: CLINIC | Age: 88
End: 2023-05-16

## 2023-05-17 ENCOUNTER — LAB (OUTPATIENT)
Dept: LAB | Facility: CLINIC | Age: 88
End: 2023-05-17
Payer: COMMERCIAL

## 2023-05-17 ENCOUNTER — HOSPITAL ENCOUNTER (OUTPATIENT)
Dept: CARDIAC REHAB | Facility: CLINIC | Age: 88
Discharge: HOME | End: 2023-05-17
Attending: INTERNAL MEDICINE
Payer: COMMERCIAL

## 2023-05-17 ENCOUNTER — HOSPITAL ENCOUNTER (OUTPATIENT)
Dept: CARDIOLOGY | Facility: CLINIC | Age: 88
Discharge: HOME | End: 2023-05-17
Attending: INTERNAL MEDICINE | Admitting: INTERNAL MEDICINE
Payer: COMMERCIAL

## 2023-05-17 DIAGNOSIS — R06.02 SOB (SHORTNESS OF BREATH): ICD-10-CM

## 2023-05-17 DIAGNOSIS — I50.42 CHRONIC COMBINED SYSTOLIC AND DIASTOLIC HRT FAIL (H): ICD-10-CM

## 2023-05-17 LAB
ANION GAP SERPL CALCULATED.3IONS-SCNC: 10 MMOL/L (ref 7–15)
BUN SERPL-MCNC: 26.2 MG/DL (ref 8–23)
CALCIUM SERPL-MCNC: 9.4 MG/DL (ref 8.8–10.2)
CHLORIDE SERPL-SCNC: 106 MMOL/L (ref 98–107)
CREAT SERPL-MCNC: 1.24 MG/DL (ref 0.67–1.17)
DEPRECATED HCO3 PLAS-SCNC: 24 MMOL/L (ref 22–29)
GFR SERPL CREATININE-BSD FRML MDRD: 56 ML/MIN/1.73M2
GLUCOSE SERPL-MCNC: 93 MG/DL (ref 70–99)
LVEF ECHO: NORMAL
NT-PROBNP SERPL-MCNC: 481 PG/ML (ref 0–1800)
POTASSIUM SERPL-SCNC: 4 MMOL/L (ref 3.4–5.3)
SODIUM SERPL-SCNC: 140 MMOL/L (ref 136–145)

## 2023-05-17 PROCEDURE — 94729 DIFFUSING CAPACITY: CPT

## 2023-05-17 PROCEDURE — 36415 COLL VENOUS BLD VENIPUNCTURE: CPT | Performed by: INTERNAL MEDICINE

## 2023-05-17 PROCEDURE — 93306 TTE W/DOPPLER COMPLETE: CPT | Mod: 26 | Performed by: INTERNAL MEDICINE

## 2023-05-17 PROCEDURE — 83880 ASSAY OF NATRIURETIC PEPTIDE: CPT | Performed by: INTERNAL MEDICINE

## 2023-05-17 PROCEDURE — 94726 PLETHYSMOGRAPHY LUNG VOLUMES: CPT

## 2023-05-17 PROCEDURE — 94375 RESPIRATORY FLOW VOLUME LOOP: CPT

## 2023-05-17 PROCEDURE — 80048 BASIC METABOLIC PNL TOTAL CA: CPT | Performed by: INTERNAL MEDICINE

## 2023-05-17 PROCEDURE — 93306 TTE W/DOPPLER COMPLETE: CPT

## 2023-05-18 LAB
DLCOUNC-%PRED-PRE: 74 %
DLCOUNC-PRE: 17.83 ML/MIN/MMHG
DLCOUNC-PRED: 23.86 ML/MIN/MMHG
ERV-%PRED-PRE: 125 %
ERV-PRE: 1.09 L
ERV-PRED: 0.87 L
EXPTIME-PRE: 9.23 SEC
FEF2575-%PRED-PRE: 199 %
FEF2575-PRE: 3.37 L/SEC
FEF2575-PRED: 1.69 L/SEC
FEFMAX-%PRED-PRE: 125 %
FEFMAX-PRE: 7.8 L/SEC
FEFMAX-PRED: 6.23 L/SEC
FEV1-%PRED-PRE: 148 %
FEV1-PRE: 3.76 L
FEV1FEV6-PRE: 78 %
FEV1FEV6-PRED: 75 %
FEV1FVC-PRE: 77 %
FEV1FVC-PRED: 74 %
FEV1SVC-PRE: 74 %
FEV1SVC-PRED: 56 %
FIFMAX-PRE: 6.28 L/SEC
FRCPLETH-%PRED-PRE: 121 %
FRCPLETH-PRE: 4.76 L
FRCPLETH-PRED: 3.93 L
FVC-%PRED-PRE: 142 %
FVC-PRE: 4.9 L
FVC-PRED: 3.45 L
IC-%PRED-PRE: 108 %
IC-PRE: 3.94 L
IC-PRED: 3.63 L
RVPLETH-%PRED-PRE: 116 %
RVPLETH-PRE: 3.59 L
RVPLETH-PRED: 3.09 L
TLCPLETH-%PRED-PRE: 118 %
TLCPLETH-PRE: 8.71 L
TLCPLETH-PRED: 7.33 L
VA-%PRED-PRE: 118 %
VA-PRE: 7.59 L
VC-%PRED-PRE: 113 %
VC-PRE: 5.12 L
VC-PRED: 4.5 L

## 2023-05-21 NOTE — PROGRESS NOTES
Cardiology Clinic Progress Note    Moses Elizondo MRN# [id number]   YOB: 1933 Age: 89 year old   Primary cardiologist: Dr. Huggins         Assessment and Plan:     In summary, Moses Elizondo presents today for follow up after the initiation of low dose furosemide. This has resulted in a 7 lb wt loss and improvement in his peripheral edema. His renal fn is stable.     1. 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    -- GDMT includes Toprol 25, Entresto 49/51 mg BID.    -- Appears well-compensated with stable FC II-III sxs, also limited by knee pain.   2. Upgrade to CRT-P on 9/16/21 with adequate BiVp.   3. Severe bilateral venous insufficiency. Currently under good control.   4. CAD s/p PCI to OM in 2014. Negative stress MRI 1/2021. Denies angina.   5. Stable moderately dilated ascending aorta (4.5 cm).   6. Chronically Marginal BP. Asymptomatic.   7. Hyperlipidemia, well-controlled.   8. Chronic kidney disease, stable.    Plan:  - No changes for now.   - Discussed routine weights and contacting clinic for any significant fluctuations.  - Follow up with me in six months with a FLP/ALT and BMP prior.     Lacey Puckett PA-C  [facility]        History of Presenting Illness:      Moses Elizondo is a pleasant 89 year old patient who presents today for a 4 month follow up visit.     His pertinent cardiac history includes:  # CAD, s/p PCI to OM1 (2014).   # SSS, which has now progressed to CHB. S/p PPM in 2019. Upgrade to CRT-P on 9/16/21. MRI compatible.  # Presumed NICMP, LVEF 38% per routine TTE on 1/6/21. 44% per MRI. Suspect pacemaker-induced. No evidence of ischemia or infiltrative disease on 1/2021 cMRI. Slight EF improvement to 45-50% since BiV upgrade. Unfortunately no significant improvement in functional capacity with this.  # Chronic multifactorial BOWMAN.   # Chronic knee pain, arthritis. Anticipates he may need surgery in the future. Dr. Huggins has  "recommended a repeat stress cardiac MRI in this case.    # Severe bilateral venous insufficiency with chronic superficial venous thrombosis. Conservative management recommended by Dr. Myles (vein clinic) and hematology unless symptoms worsen.   # Hx of superficial RLE venous thrombus.  # L-sided bakers cyst  # Ascending aortic aneurysm  # Hypothyroidism  # Hyperlipidemia  # Idiopathic peripheral neuropathy  # Chronic facial itching    Please see my note dated 10/17/22 for the patient's detailed history.     He last saw Dr. Huggins a few weeks ago for a routine follow up, and appeared mildly volume up. Furosemide 20 mg daily was initiated. Labs on 5/17 showed a normal proBNP at 481, stable BMP with a creat of 1.24. Echocardiogram showed stable EF 45-50% and stable moderately dilated ascending aorta (4.5 cm). PFT's were benign.    Today, Zane returns to clinic with his daughter. His clinic weight is down 7 lbs. Thinks his breathing is overall the same. He works for about ten minutes in the garage and then sits and relaxes. He really enjoys biking and will get back to that now that the weather is nice. His swelling overall feels better, and looks much better to me than it has in the past. We reviewed all his meds in detail, he always likes to ensure that he's taking them correctly, which he is. BP is marginal, but he's completely asymptomatic with this. Last device check 5/1/23 showed 100% BiVP.          Review of Systems:     12-pt ROS is negative except for as noted in the HPI.          Physical Exam:     Vitals: /62   Pulse 62   Ht 1.803 m (5' 11\")   Wt 87.8 kg (193 lb 9.6 oz)   SpO2 95%   BMI 27.00 kg/m    Wt Readings from Last 10 Encounters:   05/22/23 87.8 kg (193 lb 9.6 oz)   05/01/23 90.9 kg (200 lb 8 oz)   10/17/22 86.4 kg (190 lb 8 oz)   07/07/22 88 kg (194 lb)   06/27/22 89 kg (196 lb 3.2 oz)   05/10/22 89.1 kg (196 lb 6.4 oz)   02/04/22 87.4 kg (192 lb 9.6 oz)   01/18/22 85.7 kg (189 lb)   01/03/22 " 85 kg (187 lb 6.4 oz)   11/12/21 85.7 kg (189 lb)       Constitutional:  Patient is pleasant, alert, cooperative, and in NAD.  HEENT:  NCAT. PERRLA. EOM's intact.   Neck:  CVP appears normal. No carotid bruits.   Pulmonary: Normal respiratory effort. CTAB.   Cardiac: RRR, normal S1/S2, no S3/S4, no murmur or rub.   Abdomen:  Non-tender abdomen, no hepatosplenomegaly appreciated.   Vascular: Pulses in the upper and lower extremities are 2+ and equal bilaterally.  Extremities: Trace lower extremity edema, erythema, cyanosis or tenderness appreciated.  Skin:  No rashes or lesions appreciated.   Neurological:  No gross motor or sensory deficits.   Psych: Appropriate affect.        Data:     Labs reviewed:  Recent Labs   Lab Test 05/17/23  0834 05/01/23  1401 10/17/22  0845 07/07/22  0806 07/14/21  0827 03/02/21  0927 07/23/20  1008 07/07/20  0000 10/31/19  0849 05/02/19  0000 03/20/19  0823   LDL  --   --  41  --   --   --   --  49 60  --  61   HDL  --   --  47  --   --   --   --  42 52  --  40   NHDL  --   --  56  --   --   --   --   --  74  --  76   CHOL  --   --  103  --   --   --   --  105 126  --  116   TRIG  --   --  74  --   --   --   --  71 70  --  75   TSH  --   --   --  1.44  --  2.27 2.87  --  1.94   < > 2.20   NTBNP 481 459 407  --    < > 804*  --   --   --   --   --     < > = values in this interval not displayed.       Lab Results   Component Value Date    WBC 6.1 07/07/2022    WBC 6.2 07/23/2020    RBC 4.41 07/07/2022    RBC 4.58 07/23/2020    HGB 13.1 (L) 07/07/2022    HGB 13.2 (L) 03/02/2021    HCT 40.2 07/07/2022    HCT 41.4 07/23/2020    MCV 91 07/07/2022    MCV 90 07/23/2020    MCH 29.7 07/07/2022    MCH 29.9 07/23/2020    MCHC 32.6 07/07/2022    MCHC 33.1 07/23/2020    RDW 12.0 07/07/2022    RDW 12.5 07/23/2020     (L) 07/07/2022     07/23/2020       Lab Results   Component Value Date     05/17/2023     04/07/2021    POTASSIUM 4.0 05/17/2023    POTASSIUM 4.3 10/17/2022     POTASSIUM 4.1 04/07/2021    CHLORIDE 106 05/17/2023    CHLORIDE 109 10/17/2022    CHLORIDE 109 04/07/2021    CO2 24 05/17/2023    CO2 29 10/17/2022    CO2 28 04/07/2021    ANIONGAP 10 05/17/2023    ANIONGAP 1 (L) 10/17/2022    ANIONGAP 2 (L) 04/07/2021    GLC 93 05/17/2023    GLC 87 10/17/2022    GLC 75 04/07/2021    BUN 26.2 (H) 05/17/2023    BUN 23 10/17/2022    BUN 25 04/07/2021    CR 1.24 (H) 05/17/2023    CR 1.28 (H) 04/07/2021    GFRESTIMATED 56 (L) 05/17/2023    GFRESTIMATED 50 (L) 04/07/2021    GFRESTBLACK 58 (L) 04/07/2021    GABRIELA 9.4 05/17/2023    GABRIELA 8.8 04/07/2021      Lab Results   Component Value Date    AST 32 03/02/2021    ALT 27 07/07/2022    ALT 30 03/02/2021       Lab Results   Component Value Date    A1C 5.6 05/02/2019       Lab Results   Component Value Date    INR 1.00 06/20/2019           Problem List:     Patient Active Problem List   Diagnosis     Hypothyroidism, unspecified type     Degeneration of lumbar or lumbosacral intervertebral disc     MGUS (monoclonal gammopathy of unknown significance)     Familial tremor     Hyperlipidemia LDL goal <100     NSTEMI (non-ST elevated myocardial infarction) (H)     Health long-term     Coronary artery disease     Bradycardia     Aneurysm of thoracic aorta (H)     Sinus bradycardia     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)     Ascending aortic aneurysm (H)     Sick sinus syndrome (H)     Aortic root dilatation (H)     Medicare annual wellness visit, subsequent     Idiopathic peripheral neuropathy     Chest discomfort     Status post coronary angiogram     Second degree AV block     Cardiac pacemaker in situ     Chronic kidney disease, stage 3 (H)     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)           Medications:     Current Outpatient Medications   Medication Sig Dispense Refill     acetaminophen (TYLENOL) 650 MG CR tablet Take 650 mg by mouth daily       ASPIRIN EC PO Take 81 mg by  "mouth daily       atorvastatin (LIPITOR) 40 MG tablet Take 1 tablet (40 mg) by mouth daily 90 tablet 3     furosemide (LASIX) 20 MG tablet Take 1 tablet (20 mg) by mouth daily 30 tablet 11     gabapentin (NEURONTIN) 300 MG capsule TAKE 1 CAPSULE(300 MG) BY MOUTH AT BEDTIME 90 capsule 0     levothyroxine (SYNTHROID/LEVOTHROID) 100 MCG tablet TAKE 1 TABLET (100 MCG) BY MOUTH DAILY 90 tablet 2     metoprolol succinate ER (TOPROL XL) 25 MG 24 hr tablet Take 1 tablet (25 mg) by mouth daily At lunch 90 tablet 3     nitroGLYcerin (NITROSTAT) 0.4 MG sublingual tablet One tablet under the tongue every 5 minutes if needed for chest pain. May repeat every 5 minutes for a maximum of 3 doses in 15 minutes\" 25 tablet 3     Polyethylene Glycol 3350 (MIRALAX PO) Take by mouth as needed       sacubitril-valsartan (ENTRESTO) 49-51 MG per tablet Take 1 tablet by mouth 2 times daily 180 tablet 1     UNABLE TO FIND 1 capsule 3 times daily MEDICATION NAME: Jayden             Past Medical History:     Past Medical History:   Diagnosis Date     Aortic root dilatation (H)     4.4 cm     Ascending aorta dilatation (H)     4.4 cm     ASD (atrial septal defect)      Bradycardia      CAD (coronary artery disease) 05/15/2014     YOGESH to RCA(6/20/19); YOGESH to OM1(5/15/14)     Cardiac pacemaker 06/20/2019    [employer] PPM COMFORT MRI XT DR-implant 6/20/19     Chest discomfort      Degeneration of lumbar or lumbosacral intervertebral disc      Familial tremor      Former smoker      Herpes zoster without mention of complication      HTN (hypertension)      Hyperlipidaemia      Idiopathic peripheral neuropathy      MGUS (monoclonal gammopathy of unknown significance)      NSTEMI (non-ST elevated myocardial infarction) (H) 2014     Other and unspecified hyperlipidemia      PFO (patent foramen ovale)      Prostatitis, unspecified      Second degree heart block      Sensorineural hearing loss, unspecified      SSS (sick sinus syndrome) (H)      Status " post coronary angiogram 06/20/2019     Unspecified hypothyroidism      Past Surgical History:   Procedure Laterality Date     CHOLECYSTECTOMY       CORONARY ANGIOGRAPHY ADULT ORDER  5/14/14    PTCA w/YOGESH to OM1     CV CORONARY ANGIOGRAM N/A 6/20/2019    Procedure: Coronary Angiogram;  Surgeon: Romie Coronado MD;  Location:  [facility]     CV INTRAVASULAR ULTRASOUND N/A 6/20/2019    Procedure: Intravascular Ultrasound;  Surgeon: Romie Coronado MD;  Location:  [facility]     CV PCI ATHERECTOMY ORBITAL N/A 6/20/2019    Procedure: PCI Atherectomy Orbital;  Surgeon: Romie Coronado MD;  Location:  [facility]     CV PCI STENT DRUG ELUTING N/A 6/20/2019    Procedure: PCI Stent Drug Eluting;  Surgeon: Romie Coronado MD;  Location:  [facility]     CV TEMPORARY PACEMAKER INSERTION N/A 6/20/2019    Procedure: Temporary Pacemaker Insertion;  Surgeon: Romie Coronado MD;  Location:  [facility]     EP PACEMAKER N/A 6/20/2019    Procedure: EP Pacemaker;  Surgeon: Max Dowell MD;  Location:  [facility]     EP PPM UPGRADE TO BIVENT N/A 9/16/2021    Procedure: EP PPM UPGRADE TO BIVENT;  Surgeon: Tre Miller MD;  Location:  [facility]ARD[facility]ATH LAB     HEART CATH, ANGIOPLASTY  5/14/14    YOGESH to OM1     ZZC NONSPECIFIC PROCEDURE  2010    Laparoscopic cholecystectomy.      Family History   Problem Relation Age of Onset     Cancer Mother      Genitourinary Problems Father 99        complications from surgery     Heart Disease Brother 72        MI     Social History     Socioeconomic History     Marital status:      Spouse name: Not on file     Number of children: Not on file     Years of education: Not on file     Highest education level: Not on file   Occupational History     Not on file   Tobacco Use     Smoking status: Former     Packs/day: 0.50     Years: 16.00     Pack years: 8.00     Types: Cigarettes      Start date:      Quit date: 1967     Years since quittin.4     Smokeless tobacco: Never   Vaping Use     Vaping status: Not on file   Substance and Sexual Activity     Alcohol use: Yes     Comment: 5-7 drinks week - at most one drink daily     Drug use: No     Sexual activity: Never   Other Topics Concern      Service Not Asked     Blood Transfusions Not Asked     Caffeine Concern Yes     Comment: 2 cups coffee/day     Occupational Exposure Not Asked     Hobby Hazards Not Asked     Sleep Concern No     Stress Concern Not Asked     Weight Concern Yes     Comment: limiting alcohol to watch calories     Special Diet Not Asked     Back Care Not Asked     Exercise Yes     Comment: Stationary bike  weights and aerobics 4 times week     Bike Helmet Not Asked     Seat Belt Yes     Self-Exams Not Asked     Parent/sibling w/ CABG, MI or angioplasty before 65F 55M? No   Social History Narrative     Not on file     Social Determinants of Health     Financial Resource Strain: Not on file   Food Insecurity: Not on file   Transportation Needs: Not on file   Physical Activity: Not on file   Stress: Not on file   Social Connections: Not on file   Intimate Partner Violence: Not on file   Housing Stability: Not on file           Allergies:   No known drug allergy    Today's clinic visit entailed:  Review of the result(s) of each unique test - echocardiogram, PFTs, lipid panel, BMP, device interrogation  Ordering of each unique test  Prescription drug management  I spent a total of 30 minutes on the day of the visit.   Time spent by me doing chart review, history and exam, documentation and further activities per the note  Provider  Link to [facility] Help Grid     The level of medical decision making during this visit was of moderate complexity.

## 2023-05-22 ENCOUNTER — OFFICE VISIT (OUTPATIENT)
Dept: CARDIOLOGY | Facility: CLINIC | Age: 88
End: 2023-05-22
Attending: INTERNAL MEDICINE
Payer: COMMERCIAL

## 2023-05-22 VITALS
DIASTOLIC BLOOD PRESSURE: 62 MMHG | BODY MASS INDEX: 27.1 KG/M2 | HEIGHT: 71 IN | OXYGEN SATURATION: 95 % | HEART RATE: 62 BPM | SYSTOLIC BLOOD PRESSURE: 100 MMHG | WEIGHT: 193.6 LBS

## 2023-05-22 DIAGNOSIS — I25.810 CORONARY ARTERY DISEASE INVOLVING CORONARY BYPASS GRAFT OF NATIVE HEART WITHOUT ANGINA PECTORIS: ICD-10-CM

## 2023-05-22 DIAGNOSIS — I21.4 NON-STEMI (NON-ST ELEVATED MYOCARDIAL INFARCTION) (H): ICD-10-CM

## 2023-05-22 DIAGNOSIS — I50.42 CHRONIC COMBINED SYSTOLIC AND DIASTOLIC HRT FAIL (H): ICD-10-CM

## 2023-05-22 DIAGNOSIS — R06.02 SOB (SHORTNESS OF BREATH): ICD-10-CM

## 2023-05-22 PROCEDURE — 99214 OFFICE O/P EST MOD 30 MIN: CPT | Performed by: PHYSICIAN ASSISTANT

## 2023-05-22 RX ORDER — ATORVASTATIN CALCIUM 40 MG/1
40 TABLET, FILM COATED ORAL
Qty: 90 TABLET | Refills: 3 | COMMUNITY
Start: 2023-05-22 | End: 2023-11-27

## 2023-05-22 RX ORDER — FUROSEMIDE 20 MG
20 TABLET ORAL DAILY
Qty: 90 TABLET | Refills: 3 | Status: ON HOLD | OUTPATIENT
Start: 2023-05-22 | End: 2023-12-01

## 2023-05-22 NOTE — LETTER
5/22/2023    Steven Wagoner MD  [address] [facility] [address]    RE: Moses Elizondo       Dear Colleague,     I had the pleasure of seeing Moses Elizondo in the [facility].    Cardiology Clinic Progress Note    Moses Elizondo MRN# [id number]   YOB: 1933 Age: 89 year old   Primary cardiologist: Dr. Huggins         Assessment and Plan:     In summary, Moses Elizondo presents today for follow up after the initiation of low dose furosemide. This has resulted in a 7 lb wt loss and improvement in his peripheral edema. His renal fn is stable.     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    -- GDMT includes Toprol 25, Entresto 49/51 mg BID.    -- Appears well-compensated with stable FC II-III sxs, also limited by knee pain.   Upgrade to CRT-P on 9/16/21 with adequate BiVp.   Severe bilateral venous insufficiency. Currently under good control.   CAD s/p PCI to OM in 2014. Negative stress MRI 1/2021. Denies angina.   Stable moderately dilated ascending aorta (4.5 cm).   Chronically Marginal BP. Asymptomatic.   Hyperlipidemia, well-controlled.   Chronic kidney disease, stable.    Plan:  - No changes for now.   - Discussed routine weights and contacting clinic for any significant fluctuations.  - Follow up with me in six months with a FLP/ALT and BMP prior.     EVANGELISTA Wren [facility]        History of Presenting Illness:      Moses Elizondo is a pleasant 89 year old patient who presents today for a 4 month follow up visit.     His pertinent cardiac history includes:  # CAD, s/p PCI to OM1 (2014).   # SSS, which has now progressed to CHB. S/p PPM in 2019. Upgrade to CRT-P on 9/16/21. MRI compatible.  # Presumed NICMP, LVEF 38% per routine TTE on 1/6/21. 44% per MRI. Suspect pacemaker-induced. No evidence of ischemia or infiltrative disease on 1/2021 cMRI. Slight EF improvement to 45-50% since BiV upgrade. Unfortunately no  "significant improvement in functional capacity with this.  # Chronic multifactorial BOWMAN.   # Chronic knee pain, arthritis. Anticipates he may need surgery in the future. Dr. Huggins has recommended a repeat stress cardiac MRI in this case.    # Severe bilateral venous insufficiency with chronic superficial venous thrombosis. Conservative management recommended by Dr. Myles (vein clinic) and hematology unless symptoms worsen.   # Hx of superficial RLE venous thrombus.  # L-sided bakers cyst  # Ascending aortic aneurysm  # Hypothyroidism  # Hyperlipidemia  # Idiopathic peripheral neuropathy  # Chronic facial itching    Please see my note dated 10/17/22 for the patient's detailed history.     He last saw Dr. Huggins a few weeks ago for a routine follow up, and appeared mildly volume up. Furosemide 20 mg daily was initiated. Labs on 5/17 showed a normal proBNP at 481, stable BMP with a creat of 1.24. Echocardiogram showed stable EF 45-50% and stable moderately dilated ascending aorta (4.5 cm). PFT's were benign.    Today, Zane returns to clinic with his daughter. His clinic weight is down 7 lbs. Thinks his breathing is overall the same. He works for about ten minutes in the garage and then sits and relaxes. He really enjoys biking and will get back to that now that the weather is nice. His swelling overall feels better, and looks much better to me than it has in the past. We reviewed all his meds in detail, he always likes to ensure that he's taking them correctly, which he is. BP is marginal, but he's completely asymptomatic with this. Last device check 5/1/23 showed 100% BiVP.          Review of Systems:     12-pt ROS is negative except for as noted in the HPI.          Physical Exam:     Vitals: /62   Pulse 62   Ht 1.803 m (5' 11\")   Wt 87.8 kg (193 lb 9.6 oz)   SpO2 95%   BMI 27.00 kg/m    Wt Readings from Last 10 Encounters:   05/22/23 87.8 kg (193 lb 9.6 oz)   05/01/23 90.9 kg (200 lb 8 oz)   10/17/22 86.4 kg " (190 lb 8 oz)   07/07/22 88 kg (194 lb)   06/27/22 89 kg (196 lb 3.2 oz)   05/10/22 89.1 kg (196 lb 6.4 oz)   02/04/22 87.4 kg (192 lb 9.6 oz)   01/18/22 85.7 kg (189 lb)   01/03/22 85 kg (187 lb 6.4 oz)   11/12/21 85.7 kg (189 lb)       Constitutional:  Patient is pleasant, alert, cooperative, and in NAD.  HEENT:  NCAT. PERRLA. EOM's intact.   Neck:  CVP appears normal. No carotid bruits.   Pulmonary: Normal respiratory effort. CTAB.   Cardiac: RRR, normal S1/S2, no S3/S4, no murmur or rub.   Abdomen:  Non-tender abdomen, no hepatosplenomegaly appreciated.   Vascular: Pulses in the upper and lower extremities are 2+ and equal bilaterally.  Extremities: Trace lower extremity edema, erythema, cyanosis or tenderness appreciated.  Skin:  No rashes or lesions appreciated.   Neurological:  No gross motor or sensory deficits.   Psych: Appropriate affect.        Data:     Labs reviewed:  Recent Labs   Lab Test 05/17/23  0834 05/01/23  1401 10/17/22  0845 07/07/22  0806 07/14/21  0827 03/02/21  0927 07/23/20  1008 07/07/20  0000 10/31/19  0849 05/02/19  0000 03/20/19  0823   LDL  --   --  41  --   --   --   --  49 60  --  61   HDL  --   --  47  --   --   --   --  42 52  --  40   NHDL  --   --  56  --   --   --   --   --  74  --  76   CHOL  --   --  103  --   --   --   --  105 126  --  116   TRIG  --   --  74  --   --   --   --  71 70  --  75   TSH  --   --   --  1.44  --  2.27 2.87  --  1.94   < > 2.20   NTBNP 481 459 407  --    < > 804*  --   --   --   --   --     < > = values in this interval not displayed.       Lab Results   Component Value Date    WBC 6.1 07/07/2022    WBC 6.2 07/23/2020    RBC 4.41 07/07/2022    RBC 4.58 07/23/2020    HGB 13.1 (L) 07/07/2022    HGB 13.2 (L) 03/02/2021    HCT 40.2 07/07/2022    HCT 41.4 07/23/2020    MCV 91 07/07/2022    MCV 90 07/23/2020    MCH 29.7 07/07/2022    MCH 29.9 07/23/2020    MCHC 32.6 07/07/2022    MCHC 33.1 07/23/2020    RDW 12.0 07/07/2022    RDW 12.5 07/23/2020    PLT  136 (L) 07/07/2022     07/23/2020       Lab Results   Component Value Date     05/17/2023     04/07/2021    POTASSIUM 4.0 05/17/2023    POTASSIUM 4.3 10/17/2022    POTASSIUM 4.1 04/07/2021    CHLORIDE 106 05/17/2023    CHLORIDE 109 10/17/2022    CHLORIDE 109 04/07/2021    CO2 24 05/17/2023    CO2 29 10/17/2022    CO2 28 04/07/2021    ANIONGAP 10 05/17/2023    ANIONGAP 1 (L) 10/17/2022    ANIONGAP 2 (L) 04/07/2021    GLC 93 05/17/2023    GLC 87 10/17/2022    GLC 75 04/07/2021    BUN 26.2 (H) 05/17/2023    BUN 23 10/17/2022    BUN 25 04/07/2021    CR 1.24 (H) 05/17/2023    CR 1.28 (H) 04/07/2021    GFRESTIMATED 56 (L) 05/17/2023    GFRESTIMATED 50 (L) 04/07/2021    GFRESTBLACK 58 (L) 04/07/2021    GABRIELA 9.4 05/17/2023    GABRIELA 8.8 04/07/2021      Lab Results   Component Value Date    AST 32 03/02/2021    ALT 27 07/07/2022    ALT 30 03/02/2021       Lab Results   Component Value Date    A1C 5.6 05/02/2019       Lab Results   Component Value Date    INR 1.00 06/20/2019           Problem List:     Patient Active Problem List   Diagnosis    Hypothyroidism, unspecified type    Degeneration of lumbar or lumbosacral intervertebral disc    MGUS (monoclonal gammopathy of unknown significance)    Familial tremor    Hyperlipidemia LDL goal <100    NSTEMI (non-ST elevated myocardial infarction) (H)    Health Care Home    Coronary artery disease    Bradycardia    Aneurysm of thoracic aorta (H)    Sinus bradycardia    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)    Ascending aortic aneurysm (H)    Sick sinus syndrome (H)    Aortic root dilatation (H)    Medicare annual wellness visit, subsequent    Idiopathic peripheral neuropathy    Chest discomfort    Status post coronary angiogram    Second degree AV block    Cardiac pacemaker in situ    Chronic kidney disease, stage 3 (H)    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)           Medications:     Current  "Outpatient Medications   Medication Sig Dispense Refill    acetaminophen (TYLENOL) 650 MG CR tablet Take 650 mg by mouth daily      ASPIRIN EC PO Take 81 mg by mouth daily      atorvastatin (LIPITOR) 40 MG tablet Take 1 tablet (40 mg) by mouth daily 90 tablet 3    furosemide (LASIX) 20 MG tablet Take 1 tablet (20 mg) by mouth daily 30 tablet 11    gabapentin (NEURONTIN) 300 MG capsule TAKE 1 CAPSULE(300 MG) BY MOUTH AT BEDTIME 90 capsule 0    levothyroxine (SYNTHROID/LEVOTHROID) 100 MCG tablet TAKE 1 TABLET (100 MCG) BY MOUTH DAILY 90 tablet 2    metoprolol succinate ER (TOPROL XL) 25 MG 24 hr tablet Take 1 tablet (25 mg) by mouth daily At lunch 90 tablet 3    nitroGLYcerin (NITROSTAT) 0.4 MG sublingual tablet One tablet under the tongue every 5 minutes if needed for chest pain. May repeat every 5 minutes for a maximum of 3 doses in 15 minutes\" 25 tablet 3    Polyethylene Glycol 3350 (MIRALAX PO) Take by mouth as needed      sacubitril-valsartan (ENTRESTO) 49-51 MG per tablet Take 1 tablet by mouth 2 times daily 180 tablet 1    UNABLE TO FIND 1 capsule 3 times daily MEDICATION NAME: NeuroFlo             Past Medical History:     Past Medical History:   Diagnosis Date    Aortic root dilatation (H)     4.4 cm    Ascending aorta dilatation (H)     4.4 cm    ASD (atrial septal defect)     Bradycardia     CAD (coronary artery disease) 05/15/2014     YOGESH to RCA(6/20/19); YOGESH to OM1(5/15/14)    Cardiac pacemaker 06/20/2019    [employer] PPM COMFORT MRI XT DR-implant 6/20/19    Chest discomfort     Degeneration of lumbar or lumbosacral intervertebral disc     Familial tremor     Former smoker     Herpes zoster without mention of complication     HTN (hypertension)     Hyperlipidaemia     Idiopathic peripheral neuropathy     MGUS (monoclonal gammopathy of unknown significance)     NSTEMI (non-ST elevated myocardial infarction) (H) 2014    Other and unspecified hyperlipidemia     PFO (patent foramen ovale)     Prostatitis, " unspecified     Second degree heart block     Sensorineural hearing loss, unspecified     SSS (sick sinus syndrome) (H)     Status post coronary angiogram 06/20/2019    Unspecified hypothyroidism      Past Surgical History:   Procedure Laterality Date    CHOLECYSTECTOMY      CORONARY ANGIOGRAPHY ADULT ORDER  5/14/14    PTCA w/YOGESH to OM1    CV CORONARY ANGIOGRAM N/A 6/20/2019    Procedure: Coronary Angiogram;  Surgeon: Romie Coronado MD;  Location:  [facility]    CV INTRAVASULAR ULTRASOUND N/A 6/20/2019    Procedure: Intravascular Ultrasound;  Surgeon: Romie Coronado MD;  Location:  [facility]    CV PCI ATHERECTOMY ORBITAL N/A 6/20/2019    Procedure: PCI Atherectomy Orbital;  Surgeon: Romie Coronado MD;  Location:  [facility]    CV PCI STENT DRUG ELUTING N/A 6/20/2019    Procedure: PCI Stent Drug Eluting;  Surgeon: Romie Coronado MD;  Location:  [facility]    CV TEMPORARY PACEMAKER INSERTION N/A 6/20/2019    Procedure: Temporary Pacemaker Insertion;  Surgeon: Romie Coronado MD;  Location:  [facility]    EP PACEMAKER N/A 6/20/2019    Procedure: EP Pacemaker;  Surgeon: Max Dowell MD;  Location:  [facility]    EP PPM UPGRADE TO BIVENT N/A 9/16/2021    Procedure: EP PPM UPGRADE TO BIVENT;  Surgeon: Tre Miller MD;  Location:  HE[facility]ARD[facility] CATH LAB    HEART CATH, ANGIOPLASTY  5/14/14    YOGESH to OM1    ZZC NONSPECIFIC PROCEDURE  2010    Laparoscopic cholecystectomy.      Family History   Problem Relation Age of Onset    Cancer Mother     Genitourinary Problems Father 99        complications from surgery    Heart Disease Brother 72        MI     Social History     Socioeconomic History    Marital status:      Spouse name: Not on file    Number of children: Not on file    Years of education: Not on file    Highest education level: Not on file   Occupational History    Not on file   Tobacco  Use    Smoking status: Former     Packs/day: 0.50     Years: 16.00     Pack years: 8.00     Types: Cigarettes     Start date:      Quit date: 1967     Years since quittin.4    Smokeless tobacco: Never   Vaping Use    Vaping status: Not on file   Substance and Sexual Activity    Alcohol use: Yes     Comment: 5-7 drinks week - at most one drink daily    Drug use: No    Sexual activity: Never   Other Topics Concern     Service Not Asked    Blood Transfusions Not Asked    Caffeine Concern Yes     Comment: 2 cups coffee/day    Occupational Exposure Not Asked    Hobby Hazards Not Asked    Sleep Concern No    Stress Concern Not Asked    Weight Concern Yes     Comment: limiting alcohol to watch calories    Special Diet Not Asked    Back Care Not Asked    Exercise Yes     Comment: Stationary bike  weights and aerobics 4 times week    Bike Helmet Not Asked    Seat Belt Yes    Self-Exams Not Asked    Parent/sibling w/ CABG, MI or angioplasty before 65F 55M? No   Social History Narrative    Not on file     Social Determinants of Health     Financial Resource Strain: Not on file   Food Insecurity: Not on file   Transportation Needs: Not on file   Physical Activity: Not on file   Stress: Not on file   Social Connections: Not on file   Intimate Partner Violence: Not on file   Housing Stability: Not on file           Allergies:   No known drug allergy    Today's clinic visit entailed:  Review of the result(s) of each unique test - echocardiogram, PFTs, lipid panel, BMP, device interrogation  Ordering of each unique test  Prescription drug management  I spent a total of 30 minutes on the day of the visit.   Time spent by me doing chart review, history and exam, documentation and further activities per the note  Provider  Link to [facility] Help Grid     The level of medical decision making during this visit was of moderate complexity.        Thank you for allowing me to participate in the care of your  patient.      Sincerely,     Lacey Puckett PA-C     [facility]  cc:   Unruly Huggins MD  [address]  [address]  [place] [id number]

## 2023-06-19 ENCOUNTER — TELEPHONE (OUTPATIENT)
Dept: CARDIOLOGY | Facility: CLINIC | Age: 88
End: 2023-06-19
Payer: COMMERCIAL

## 2023-06-19 NOTE — TELEPHONE ENCOUNTER
Health Call Center    Phone Message    May a detailed message be left on voicemail: yes     Reason for Call: Other: patient is calling as he has a question regarding a air compression foot and leg massager that his daughter got him, the massager says if the patient has a device to contact dr  before use, patient just wants to make sure he can use this for his legs and feet with device ( pacemaker), please call patient to further discuss, thank you.     Action Taken: Other: cardiology    Travel Screening: Not Applicable   Thank you!  [facility]

## 2023-06-23 ENCOUNTER — TELEPHONE (OUTPATIENT)
Dept: CARDIOLOGY | Facility: CLINIC | Age: 88
End: 2023-06-23
Payer: COMMERCIAL

## 2023-06-23 NOTE — TELEPHONE ENCOUNTER
Spoke with Patient who notes that for the past 3-4 weeks he has noticed with positional changes he may have brief dizziness for less than 20 sec. And it does not happen maybe about 2-3 X's/week.  Patient states weight is stable , breathing about the same. Denies chest discomfort .  Patient states he is well hydrated.     Patient will continue to monitor symptoms and call bacvk in a week or so upwith update.   Patient agrees with plan.     Last Lacey BRIAN LISS OV 5-22-23          Assessment and Plan:      In summary, Moses Elizondo presents today for follow up after the initiation of low dose furosemide. This has resulted in a 7 lb wt loss and improvement in his peripheral edema. His renal fn is stable.      1. 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           -- GDMT includes Toprol 25, Entresto 49/51 mg BID.           -- Appears well-compensated with stable FC II-III sxs, also limited by knee pain.   2. Upgrade to CRT-P on 9/16/21 with adequate BiVp.   3. Severe bilateral venous insufficiency. Currently under good control.   4. CAD s/p PCI to OM in 2014. Negative stress MRI 1/2021. Denies angina.   5. Stable moderately dilated ascending aorta (4.5 cm).   6. Chronically Marginal BP. Asymptomatic.   7. Hyperlipidemia, well-controlled.   8. Chronic kidney disease, stable.     Plan:  - No changes for now.   - Discussed routine weights and contacting clinic for any significant fluctuations.  - Follow up with me in six months with a FLP/ALT and BMP prior.    Last Dr. Huggins OV 5-1-23

## 2023-06-23 NOTE — TELEPHONE ENCOUNTER
[facility]    Phone Message    May a detailed message be left on voicemail: yes     Reason for Call: Other: patient is calling and would like to speak to care team as when he stands up he gets some dizziness for about 20-30 seconds than it goes away, patient stated its not all the time but  3-4 time or most of the time that's what happens, please call patient to further discuss, thank you.      Action Taken: Other: cardiology    Travel Screening: Not Applicable   Thank you!  [facility]

## 2023-07-13 DIAGNOSIS — G60.9 IDIOPATHIC PERIPHERAL NEUROPATHY: ICD-10-CM

## 2023-07-14 RX ORDER — GABAPENTIN 300 MG/1
CAPSULE ORAL
Qty: 90 CAPSULE | Refills: 0 | Status: SHIPPED | OUTPATIENT
Start: 2023-07-14 | End: 2023-10-19

## 2023-07-17 DIAGNOSIS — I50.42 CHRONIC COMBINED SYSTOLIC AND DIASTOLIC HRT FAIL (H): ICD-10-CM

## 2023-07-17 RX ORDER — SACUBITRIL AND VALSARTAN 49; 51 MG/1; MG/1
1 TABLET, FILM COATED ORAL 2 TIMES DAILY
Qty: 180 TABLET | Refills: 3 | Status: SHIPPED | OUTPATIENT
Start: 2023-07-17 | End: 2023-07-25

## 2023-07-25 ENCOUNTER — TELEPHONE (OUTPATIENT)
Dept: CARDIOLOGY | Facility: CLINIC | Age: 88
End: 2023-07-25
Payer: COMMERCIAL

## 2023-07-25 DIAGNOSIS — I50.42 CHRONIC COMBINED SYSTOLIC AND DIASTOLIC HRT FAIL (H): ICD-10-CM

## 2023-07-25 RX ORDER — SACUBITRIL AND VALSARTAN 49; 51 MG/1; MG/1
TABLET, FILM COATED ORAL
Qty: 90 TABLET | Refills: 3 | COMMUNITY
Start: 2023-07-25 | End: 2023-08-02 | Stop reason: DRUGHIGH

## 2023-07-25 NOTE — TELEPHONE ENCOUNTER
Cut entresto in half.   Slow positional changes.   Call for symptom update next week.   Thanks,  Lacey

## 2023-07-25 NOTE — TELEPHONE ENCOUNTER
Spoke to patient and reviewed recommendations from Lacey. He was agreeable to plan. Patient to call in 1 week with an update.

## 2023-07-25 NOTE — TELEPHONE ENCOUNTER
Patient left a voicemail saying he was supposed to call back at the end of June regarding his breathing, but he did not do this. He is calling today to [place] back because he feels his breathing is a bit worse and would like to discuss.     Per notes, patient had reported dizziness with position changes on 6/23 and was advised to monitor. Breathing was stable at that time per note.     Spoke to patient, clarified that he misspoke when he said he was short of breath, meant to say he was still having dizziness. His breathing is stable. He said that if he sits for 30min or more, when he gets up he has to steady himself for 20 seconds or so before walking due to feeling dizzy. He has not fallen. Does not occur when laying down or any other time. No vision changes or neurologic changes. He thinks he is staying hydrated. He does not check his BP at home, was 100/62 at cardiology visit in May. Routed to Lacey PITT to review.

## 2023-08-01 NOTE — TELEPHONE ENCOUNTER
Called patient for an update on dizziness after decreasing entresto to 1/2 tablet BID last week. He says it is a little better, does not occur as often now since decreasing the dosing but it is still present. Occurs only with position changes. He thinks he is staying hydrated. He does not check his BP at home. Routed to Lacey Puckett to review.

## 2023-08-01 NOTE — TELEPHONE ENCOUNTER
Order placed for bmp and BP check.    Called patient to discuss plan for in-clinic check. He is willing to set that up. He notes he has been outside gardening all morning without any issues.    Message sent to scheduling to set up orthostatic BP check and bmp.

## 2023-08-01 NOTE — TELEPHONE ENCOUNTER
Let's have him come in for an orthostatic BP check and BMP and based on that we can decide whether to stop his lasix or cut back further on his Entresto.   Thanks!

## 2023-08-02 ENCOUNTER — ALLIED HEALTH/NURSE VISIT (OUTPATIENT)
Dept: CARDIOLOGY | Facility: CLINIC | Age: 88
End: 2023-08-02
Payer: COMMERCIAL

## 2023-08-02 ENCOUNTER — TELEPHONE (OUTPATIENT)
Dept: CARDIOLOGY | Facility: CLINIC | Age: 88
End: 2023-08-02

## 2023-08-02 ENCOUNTER — LAB (OUTPATIENT)
Dept: LAB | Facility: CLINIC | Age: 88
End: 2023-08-02
Payer: COMMERCIAL

## 2023-08-02 DIAGNOSIS — I50.42 CHRONIC COMBINED SYSTOLIC AND DIASTOLIC HRT FAIL (H): ICD-10-CM

## 2023-08-02 DIAGNOSIS — I50.42 CHRONIC COMBINED SYSTOLIC AND DIASTOLIC HRT FAIL (H): Primary | ICD-10-CM

## 2023-08-02 LAB
ANION GAP SERPL CALCULATED.3IONS-SCNC: 8 MMOL/L (ref 7–15)
BUN SERPL-MCNC: 23.2 MG/DL (ref 8–23)
CALCIUM SERPL-MCNC: 9.2 MG/DL (ref 8.8–10.2)
CHLORIDE SERPL-SCNC: 106 MMOL/L (ref 98–107)
CREAT SERPL-MCNC: 1.25 MG/DL (ref 0.67–1.17)
DEPRECATED HCO3 PLAS-SCNC: 26 MMOL/L (ref 22–29)
GFR SERPL CREATININE-BSD FRML MDRD: 55 ML/MIN/1.73M2
GLUCOSE SERPL-MCNC: 91 MG/DL (ref 70–99)
POTASSIUM SERPL-SCNC: 4.3 MMOL/L (ref 3.4–5.3)
SODIUM SERPL-SCNC: 140 MMOL/L (ref 136–145)

## 2023-08-02 PROCEDURE — 80048 BASIC METABOLIC PNL TOTAL CA: CPT | Performed by: PHYSICIAN ASSISTANT

## 2023-08-02 PROCEDURE — 36415 COLL VENOUS BLD VENIPUNCTURE: CPT | Performed by: PHYSICIAN ASSISTANT

## 2023-08-02 PROCEDURE — 99207 PR NO CHARGE NURSE ONLY: CPT

## 2023-08-02 RX ORDER — SACUBITRIL AND VALSARTAN 24; 26 MG/1; MG/1
TABLET, FILM COATED ORAL
Qty: 90 TABLET | Refills: 1 | Status: ON HOLD | OUTPATIENT
Start: 2023-08-02 | End: 2023-12-01

## 2023-08-02 NOTE — PROGRESS NOTES
Last office visit: 23    Previous blood pressure:   1001/62   Mm Hg     Previous heart rate:  62    bpm    Time of readin:20pm    Morning medications were taken at:     Today's blood pressure:         Orthostatic BP:    Supine:  BP: 101/66  Pulse: 85    Sitting:  BP: 93/62  Pulse: 79    Standing:  BP: 88/56  Pulse: 68          Ordering Provider: SWEETIE Velásquez    Results sent to team # : 2    Michelle Cochran LPN

## 2023-08-02 NOTE — TELEPHONE ENCOUNTER
BMP done today 12:20 PM pending.     [facility] Nurse visit - 23.   Last office visit: 23     Previous blood pressure:   1001/62   Mm Hg      Previous heart rate:  62    bpm     Time of readin:20pm     Morning medications were taken at:      Today's blood pressure:          Orthostatic BP:     Supine:  BP: 101/66  Pulse: 85     Sitting:  BP: 93/62  Pulse: 79     Standing:  BP: 88/56  Pulse: 68    Message from Lacey BRIAN 23 - Let's have him come in for an orthostatic BP check and BMP and based on that we can decide whether to stop his lasix or cut back further on his Entresto.   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           -- GDMT includes Toprol 25, Entresto 49/51 mg BID.           -- Appears well-compensated with stable FC II-III sxs, also limited by knee pain.   Upgrade to CRT-P on 21 with adequate BiVp.   Severe bilateral venous insufficiency. Currently under good control.   CAD s/p PCI to OM in . Negative stress MRI 2021. Denies angina.   Stable moderately dilated ascending aorta (4.5 cm).   Chronically Marginal BP. Asymptomatic.   Hyperlipidemia, well-controlled.   Chronic kidney disease, stable.     Plan:  - No changes for now.   - Discussed routine weights and contacting clinic for any significant fluctuations.  - Follow up with me in six months with a FLP/ALT and BMP prior.    Patient of Dr. Taylor

## 2023-08-02 NOTE — TELEPHONE ENCOUNTER
Attempted to contact patient, left a message with patient's spouse to call back to discuss entresto dose.    1540 spoke with patient to review recommendation from LISS Lacey Puckett to decrease the entresto dose again to 12/13mg BID. Rx escripted. Patient is going out of town til Tuesday. He will try to  the new script in the AM before they leave.    Reviewed with patient to touch base on Tuesday for symptom update.

## 2023-08-02 NOTE — TELEPHONE ENCOUNTER
Please cut back further on Entresto. Med list states he is taking 49/51 mg BID but I believe this is incorrect and he is taking 24/26 mg BID after the recent change we made -- correct? If so, cut that back further to 12/13 mg BID. Prescribe the 24/26 mg tablet and have him cut it in half. Call for another sx update on Monday.   Thank you!    EVANGELISTA Wren [facility]

## 2023-08-07 ENCOUNTER — ANCILLARY PROCEDURE (OUTPATIENT)
Dept: CARDIOLOGY | Facility: CLINIC | Age: 88
End: 2023-08-07
Attending: INTERNAL MEDICINE
Payer: COMMERCIAL

## 2023-08-07 DIAGNOSIS — Z95.0 BIVENTRICULAR CARDIAC PACEMAKER IN SITU: Primary | ICD-10-CM

## 2023-08-07 DIAGNOSIS — Z95.0 CARDIAC PACEMAKER IN SITU: ICD-10-CM

## 2023-08-07 DIAGNOSIS — I44.1 SECOND DEGREE ATRIOVENTRICULAR BLOCK: ICD-10-CM

## 2023-08-07 PROCEDURE — 93294 REM INTERROG EVL PM/LDLS PM: CPT | Performed by: INTERNAL MEDICINE

## 2023-08-07 PROCEDURE — 93296 REM INTERROG EVL PM/IDS: CPT | Performed by: INTERNAL MEDICINE

## 2023-08-08 LAB
MDC_IDC_EPISODE_DTM: NORMAL
MDC_IDC_EPISODE_DURATION: 7 S
MDC_IDC_EPISODE_ID: 4
MDC_IDC_EPISODE_TYPE: NORMAL
MDC_IDC_LEAD_IMPLANT_DT: NORMAL
MDC_IDC_LEAD_LOCATION: NORMAL
MDC_IDC_LEAD_LOCATION_DETAIL_1: NORMAL
MDC_IDC_LEAD_MFG: NORMAL
MDC_IDC_LEAD_MODEL: NORMAL
MDC_IDC_LEAD_POLARITY_TYPE: NORMAL
MDC_IDC_LEAD_SERIAL: NORMAL
MDC_IDC_MSMT_BATTERY_DTM: NORMAL
MDC_IDC_MSMT_BATTERY_REMAINING_LONGEVITY: 100 MO
MDC_IDC_MSMT_BATTERY_RRT_TRIGGER: 2.6
MDC_IDC_MSMT_BATTERY_STATUS: NORMAL
MDC_IDC_MSMT_BATTERY_VOLTAGE: 3.01 V
MDC_IDC_MSMT_LEADCHNL_LV_IMPEDANCE_VALUE: 285 OHM
MDC_IDC_MSMT_LEADCHNL_LV_IMPEDANCE_VALUE: 323 OHM
MDC_IDC_MSMT_LEADCHNL_LV_IMPEDANCE_VALUE: 380 OHM
MDC_IDC_MSMT_LEADCHNL_LV_IMPEDANCE_VALUE: 380 OHM
MDC_IDC_MSMT_LEADCHNL_LV_IMPEDANCE_VALUE: 399 OHM
MDC_IDC_MSMT_LEADCHNL_LV_IMPEDANCE_VALUE: 494 OHM
MDC_IDC_MSMT_LEADCHNL_LV_IMPEDANCE_VALUE: 551 OHM
MDC_IDC_MSMT_LEADCHNL_LV_IMPEDANCE_VALUE: 551 OHM
MDC_IDC_MSMT_LEADCHNL_LV_IMPEDANCE_VALUE: 589 OHM
MDC_IDC_MSMT_LEADCHNL_LV_IMPEDANCE_VALUE: 646 OHM
MDC_IDC_MSMT_LEADCHNL_LV_PACING_THRESHOLD_AMPLITUDE: 1 V
MDC_IDC_MSMT_LEADCHNL_LV_PACING_THRESHOLD_PULSEWIDTH: 0.4 MS
MDC_IDC_MSMT_LEADCHNL_RA_IMPEDANCE_VALUE: 361 OHM
MDC_IDC_MSMT_LEADCHNL_RA_IMPEDANCE_VALUE: 494 OHM
MDC_IDC_MSMT_LEADCHNL_RA_PACING_THRESHOLD_AMPLITUDE: 0.5 V
MDC_IDC_MSMT_LEADCHNL_RA_PACING_THRESHOLD_PULSEWIDTH: 0.4 MS
MDC_IDC_MSMT_LEADCHNL_RA_SENSING_INTR_AMPL: 2.88 MV
MDC_IDC_MSMT_LEADCHNL_RA_SENSING_INTR_AMPL: 2.88 MV
MDC_IDC_MSMT_LEADCHNL_RV_IMPEDANCE_VALUE: 342 OHM
MDC_IDC_MSMT_LEADCHNL_RV_IMPEDANCE_VALUE: 418 OHM
MDC_IDC_MSMT_LEADCHNL_RV_PACING_THRESHOLD_AMPLITUDE: 0.62 V
MDC_IDC_MSMT_LEADCHNL_RV_PACING_THRESHOLD_PULSEWIDTH: 0.4 MS
MDC_IDC_MSMT_LEADCHNL_RV_SENSING_INTR_AMPL: 10.25 MV
MDC_IDC_MSMT_LEADCHNL_RV_SENSING_INTR_AMPL: 10.25 MV
MDC_IDC_PG_IMPLANT_DTM: NORMAL
MDC_IDC_PG_MFG: NORMAL
MDC_IDC_PG_MODEL: NORMAL
MDC_IDC_PG_SERIAL: NORMAL
MDC_IDC_PG_TYPE: NORMAL
MDC_IDC_SESS_CLINIC_NAME: NORMAL
MDC_IDC_SESS_DTM: NORMAL
MDC_IDC_SESS_TYPE: NORMAL
MDC_IDC_SET_BRADY_AT_MODE_SWITCH_RATE: 171 {BEATS}/MIN
MDC_IDC_SET_BRADY_LOWRATE: 60 {BEATS}/MIN
MDC_IDC_SET_BRADY_MAX_SENSOR_RATE: 120 {BEATS}/MIN
MDC_IDC_SET_BRADY_MAX_TRACKING_RATE: 120 {BEATS}/MIN
MDC_IDC_SET_BRADY_MODE: NORMAL
MDC_IDC_SET_BRADY_PAV_DELAY_LOW: 170 MS
MDC_IDC_SET_BRADY_SAV_DELAY_LOW: 110 MS
MDC_IDC_SET_CRT_LVRV_DELAY: 0 MS
MDC_IDC_SET_CRT_PACED_CHAMBERS: NORMAL
MDC_IDC_SET_LEADCHNL_LV_PACING_AMPLITUDE: 1.5 V
MDC_IDC_SET_LEADCHNL_LV_PACING_ANODE_ELECTRODE_1: NORMAL
MDC_IDC_SET_LEADCHNL_LV_PACING_ANODE_LOCATION_1: NORMAL
MDC_IDC_SET_LEADCHNL_LV_PACING_CAPTURE_MODE: NORMAL
MDC_IDC_SET_LEADCHNL_LV_PACING_CATHODE_ELECTRODE_1: NORMAL
MDC_IDC_SET_LEADCHNL_LV_PACING_CATHODE_LOCATION_1: NORMAL
MDC_IDC_SET_LEADCHNL_LV_PACING_POLARITY: NORMAL
MDC_IDC_SET_LEADCHNL_LV_PACING_PULSEWIDTH: 0.4 MS
MDC_IDC_SET_LEADCHNL_RA_PACING_AMPLITUDE: 1.5 V
MDC_IDC_SET_LEADCHNL_RA_PACING_ANODE_ELECTRODE_1: NORMAL
MDC_IDC_SET_LEADCHNL_RA_PACING_ANODE_LOCATION_1: NORMAL
MDC_IDC_SET_LEADCHNL_RA_PACING_CAPTURE_MODE: NORMAL
MDC_IDC_SET_LEADCHNL_RA_PACING_CATHODE_ELECTRODE_1: NORMAL
MDC_IDC_SET_LEADCHNL_RA_PACING_CATHODE_LOCATION_1: NORMAL
MDC_IDC_SET_LEADCHNL_RA_PACING_POLARITY: NORMAL
MDC_IDC_SET_LEADCHNL_RA_PACING_PULSEWIDTH: 0.4 MS
MDC_IDC_SET_LEADCHNL_RA_SENSING_ANODE_ELECTRODE_1: NORMAL
MDC_IDC_SET_LEADCHNL_RA_SENSING_ANODE_LOCATION_1: NORMAL
MDC_IDC_SET_LEADCHNL_RA_SENSING_CATHODE_ELECTRODE_1: NORMAL
MDC_IDC_SET_LEADCHNL_RA_SENSING_CATHODE_LOCATION_1: NORMAL
MDC_IDC_SET_LEADCHNL_RA_SENSING_POLARITY: NORMAL
MDC_IDC_SET_LEADCHNL_RA_SENSING_SENSITIVITY: 0.3 MV
MDC_IDC_SET_LEADCHNL_RV_PACING_AMPLITUDE: 2 V
MDC_IDC_SET_LEADCHNL_RV_PACING_ANODE_ELECTRODE_1: NORMAL
MDC_IDC_SET_LEADCHNL_RV_PACING_ANODE_LOCATION_1: NORMAL
MDC_IDC_SET_LEADCHNL_RV_PACING_CAPTURE_MODE: NORMAL
MDC_IDC_SET_LEADCHNL_RV_PACING_CATHODE_ELECTRODE_1: NORMAL
MDC_IDC_SET_LEADCHNL_RV_PACING_CATHODE_LOCATION_1: NORMAL
MDC_IDC_SET_LEADCHNL_RV_PACING_POLARITY: NORMAL
MDC_IDC_SET_LEADCHNL_RV_PACING_PULSEWIDTH: 0.4 MS
MDC_IDC_SET_LEADCHNL_RV_SENSING_ANODE_ELECTRODE_1: NORMAL
MDC_IDC_SET_LEADCHNL_RV_SENSING_ANODE_LOCATION_1: NORMAL
MDC_IDC_SET_LEADCHNL_RV_SENSING_CATHODE_ELECTRODE_1: NORMAL
MDC_IDC_SET_LEADCHNL_RV_SENSING_CATHODE_LOCATION_1: NORMAL
MDC_IDC_SET_LEADCHNL_RV_SENSING_POLARITY: NORMAL
MDC_IDC_SET_LEADCHNL_RV_SENSING_SENSITIVITY: 0.9 MV
MDC_IDC_SET_ZONE_DETECTION_INTERVAL: 350 MS
MDC_IDC_SET_ZONE_DETECTION_INTERVAL: 400 MS
MDC_IDC_SET_ZONE_TYPE: NORMAL
MDC_IDC_STAT_AT_BURDEN_PERCENT: 0 %
MDC_IDC_STAT_AT_DTM_END: NORMAL
MDC_IDC_STAT_AT_DTM_START: NORMAL
MDC_IDC_STAT_BRADY_AP_VP_PERCENT: 98.72 %
MDC_IDC_STAT_BRADY_AP_VS_PERCENT: 0.04 %
MDC_IDC_STAT_BRADY_AS_VP_PERCENT: 1.02 %
MDC_IDC_STAT_BRADY_AS_VS_PERCENT: 0.22 %
MDC_IDC_STAT_BRADY_DTM_END: NORMAL
MDC_IDC_STAT_BRADY_DTM_START: NORMAL
MDC_IDC_STAT_BRADY_RA_PERCENT_PACED: 98.89 %
MDC_IDC_STAT_BRADY_RV_PERCENT_PACED: 99.74 %
MDC_IDC_STAT_CRT_DTM_END: NORMAL
MDC_IDC_STAT_CRT_DTM_START: NORMAL
MDC_IDC_STAT_CRT_LV_PERCENT_PACED: 99.71 %
MDC_IDC_STAT_CRT_PERCENT_PACED: 99.71 %
MDC_IDC_STAT_EPISODE_RECENT_COUNT: 0
MDC_IDC_STAT_EPISODE_RECENT_COUNT_DTM_END: NORMAL
MDC_IDC_STAT_EPISODE_RECENT_COUNT_DTM_START: NORMAL
MDC_IDC_STAT_EPISODE_TOTAL_COUNT: 0
MDC_IDC_STAT_EPISODE_TOTAL_COUNT_DTM_END: NORMAL
MDC_IDC_STAT_EPISODE_TOTAL_COUNT_DTM_START: NORMAL
MDC_IDC_STAT_EPISODE_TYPE: NORMAL

## 2023-08-09 ENCOUNTER — TELEPHONE (OUTPATIENT)
Dept: CARDIOLOGY | Facility: CLINIC | Age: 88
End: 2023-08-09
Payer: COMMERCIAL

## 2023-08-09 NOTE — TELEPHONE ENCOUNTER
Called patient to see if he was able to start the new dose of entresto: 12/13mg BID.  Patient states the script was not available before he left town. He started the new dose on Tuesday, 8/8/2023. Reviewed with patient to touch base on Monday to see if symptoms have changed. Patient verbalized understanding and agreed with plan.

## 2023-08-14 NOTE — TELEPHONE ENCOUNTER
Patient is aware of plan to continue current regimen. Scheduling number provided to set up follow up for Nov.

## 2023-08-14 NOTE — TELEPHONE ENCOUNTER
"Called patient for an update on dizziness after decreasing entresto further to 12-13mg BID, states he is \"feeling good.\" Patient notes he has had only one episode of dizziness since starting the lower dose on 8/8, previously it was occurring every other day with position changes. He does not check his BP at home. Pt has orders for follow up in November with labs prior. Lacey Puckett updated.   "

## 2023-09-05 DIAGNOSIS — E03.9 HYPOTHYROIDISM, UNSPECIFIED TYPE: ICD-10-CM

## 2023-09-05 RX ORDER — LEVOTHYROXINE SODIUM 100 UG/1
100 TABLET ORAL DAILY
Qty: 90 TABLET | Refills: 0 | Status: SHIPPED | OUTPATIENT
Start: 2023-09-05 | End: 2023-12-11

## 2023-10-18 DIAGNOSIS — G60.9 IDIOPATHIC PERIPHERAL NEUROPATHY: ICD-10-CM

## 2023-10-19 RX ORDER — GABAPENTIN 300 MG/1
CAPSULE ORAL
Qty: 90 CAPSULE | Refills: 0 | Status: SHIPPED | OUTPATIENT
Start: 2023-10-19 | End: 2024-01-23

## 2023-11-16 ENCOUNTER — ANCILLARY PROCEDURE (OUTPATIENT)
Dept: CARDIOLOGY | Facility: CLINIC | Age: 88
End: 2023-11-16
Attending: INTERNAL MEDICINE
Payer: COMMERCIAL

## 2023-11-16 DIAGNOSIS — I44.1 SECOND DEGREE ATRIOVENTRICULAR BLOCK: ICD-10-CM

## 2023-11-16 DIAGNOSIS — Z95.0 BIVENTRICULAR CARDIAC PACEMAKER IN SITU: ICD-10-CM

## 2023-11-16 LAB
MDC_IDC_LEAD_CONNECTION_STATUS: NORMAL
MDC_IDC_LEAD_IMPLANT_DT: NORMAL
MDC_IDC_LEAD_LOCATION: NORMAL
MDC_IDC_LEAD_LOCATION_DETAIL_1: NORMAL
MDC_IDC_LEAD_MFG: NORMAL
MDC_IDC_LEAD_MODEL: NORMAL
MDC_IDC_LEAD_POLARITY_TYPE: NORMAL
MDC_IDC_LEAD_SERIAL: NORMAL
MDC_IDC_MSMT_BATTERY_DTM: NORMAL
MDC_IDC_MSMT_BATTERY_REMAINING_LONGEVITY: 98 MO
MDC_IDC_MSMT_BATTERY_RRT_TRIGGER: 2.6
MDC_IDC_MSMT_BATTERY_STATUS: NORMAL
MDC_IDC_MSMT_BATTERY_VOLTAGE: 3 V
MDC_IDC_MSMT_LEADCHNL_LV_IMPEDANCE_VALUE: 285 OHM
MDC_IDC_MSMT_LEADCHNL_LV_IMPEDANCE_VALUE: 323 OHM
MDC_IDC_MSMT_LEADCHNL_LV_IMPEDANCE_VALUE: 380 OHM
MDC_IDC_MSMT_LEADCHNL_LV_IMPEDANCE_VALUE: 380 OHM
MDC_IDC_MSMT_LEADCHNL_LV_IMPEDANCE_VALUE: 437 OHM
MDC_IDC_MSMT_LEADCHNL_LV_IMPEDANCE_VALUE: 513 OHM
MDC_IDC_MSMT_LEADCHNL_LV_IMPEDANCE_VALUE: 551 OHM
MDC_IDC_MSMT_LEADCHNL_LV_IMPEDANCE_VALUE: 570 OHM
MDC_IDC_MSMT_LEADCHNL_LV_IMPEDANCE_VALUE: 589 OHM
MDC_IDC_MSMT_LEADCHNL_LV_IMPEDANCE_VALUE: 646 OHM
MDC_IDC_MSMT_LEADCHNL_LV_PACING_THRESHOLD_AMPLITUDE: 1 V
MDC_IDC_MSMT_LEADCHNL_LV_PACING_THRESHOLD_PULSEWIDTH: 0.4 MS
MDC_IDC_MSMT_LEADCHNL_RA_IMPEDANCE_VALUE: 380 OHM
MDC_IDC_MSMT_LEADCHNL_RA_IMPEDANCE_VALUE: 494 OHM
MDC_IDC_MSMT_LEADCHNL_RA_PACING_THRESHOLD_AMPLITUDE: 0.38 V
MDC_IDC_MSMT_LEADCHNL_RA_PACING_THRESHOLD_PULSEWIDTH: 0.4 MS
MDC_IDC_MSMT_LEADCHNL_RA_SENSING_INTR_AMPL: 3.5 MV
MDC_IDC_MSMT_LEADCHNL_RA_SENSING_INTR_AMPL: 3.5 MV
MDC_IDC_MSMT_LEADCHNL_RV_IMPEDANCE_VALUE: 361 OHM
MDC_IDC_MSMT_LEADCHNL_RV_IMPEDANCE_VALUE: 437 OHM
MDC_IDC_MSMT_LEADCHNL_RV_PACING_THRESHOLD_AMPLITUDE: 0.5 V
MDC_IDC_MSMT_LEADCHNL_RV_PACING_THRESHOLD_PULSEWIDTH: 0.4 MS
MDC_IDC_MSMT_LEADCHNL_RV_SENSING_INTR_AMPL: 10.25 MV
MDC_IDC_MSMT_LEADCHNL_RV_SENSING_INTR_AMPL: 10.25 MV
MDC_IDC_PG_IMPLANT_DTM: NORMAL
MDC_IDC_PG_MFG: NORMAL
MDC_IDC_PG_MODEL: NORMAL
MDC_IDC_PG_SERIAL: NORMAL
MDC_IDC_PG_TYPE: NORMAL
MDC_IDC_SESS_CLINIC_NAME: NORMAL
MDC_IDC_SESS_DTM: NORMAL
MDC_IDC_SESS_TYPE: NORMAL
MDC_IDC_SET_BRADY_AT_MODE_SWITCH_RATE: 171 {BEATS}/MIN
MDC_IDC_SET_BRADY_LOWRATE: 60 {BEATS}/MIN
MDC_IDC_SET_BRADY_MAX_SENSOR_RATE: 120 {BEATS}/MIN
MDC_IDC_SET_BRADY_MAX_TRACKING_RATE: 120 {BEATS}/MIN
MDC_IDC_SET_BRADY_MODE: NORMAL
MDC_IDC_SET_BRADY_PAV_DELAY_LOW: 170 MS
MDC_IDC_SET_BRADY_SAV_DELAY_LOW: 110 MS
MDC_IDC_SET_CRT_LVRV_DELAY: 0 MS
MDC_IDC_SET_CRT_PACED_CHAMBERS: NORMAL
MDC_IDC_SET_LEADCHNL_LV_PACING_AMPLITUDE: 1.5 V
MDC_IDC_SET_LEADCHNL_LV_PACING_ANODE_ELECTRODE_1: NORMAL
MDC_IDC_SET_LEADCHNL_LV_PACING_ANODE_LOCATION_1: NORMAL
MDC_IDC_SET_LEADCHNL_LV_PACING_CAPTURE_MODE: NORMAL
MDC_IDC_SET_LEADCHNL_LV_PACING_CATHODE_ELECTRODE_1: NORMAL
MDC_IDC_SET_LEADCHNL_LV_PACING_CATHODE_LOCATION_1: NORMAL
MDC_IDC_SET_LEADCHNL_LV_PACING_POLARITY: NORMAL
MDC_IDC_SET_LEADCHNL_LV_PACING_PULSEWIDTH: 0.4 MS
MDC_IDC_SET_LEADCHNL_RA_PACING_AMPLITUDE: 1.5 V
MDC_IDC_SET_LEADCHNL_RA_PACING_ANODE_ELECTRODE_1: NORMAL
MDC_IDC_SET_LEADCHNL_RA_PACING_ANODE_LOCATION_1: NORMAL
MDC_IDC_SET_LEADCHNL_RA_PACING_CAPTURE_MODE: NORMAL
MDC_IDC_SET_LEADCHNL_RA_PACING_CATHODE_ELECTRODE_1: NORMAL
MDC_IDC_SET_LEADCHNL_RA_PACING_CATHODE_LOCATION_1: NORMAL
MDC_IDC_SET_LEADCHNL_RA_PACING_POLARITY: NORMAL
MDC_IDC_SET_LEADCHNL_RA_PACING_PULSEWIDTH: 0.4 MS
MDC_IDC_SET_LEADCHNL_RA_SENSING_ANODE_ELECTRODE_1: NORMAL
MDC_IDC_SET_LEADCHNL_RA_SENSING_ANODE_LOCATION_1: NORMAL
MDC_IDC_SET_LEADCHNL_RA_SENSING_CATHODE_ELECTRODE_1: NORMAL
MDC_IDC_SET_LEADCHNL_RA_SENSING_CATHODE_LOCATION_1: NORMAL
MDC_IDC_SET_LEADCHNL_RA_SENSING_POLARITY: NORMAL
MDC_IDC_SET_LEADCHNL_RA_SENSING_SENSITIVITY: 0.3 MV
MDC_IDC_SET_LEADCHNL_RV_PACING_AMPLITUDE: 2 V
MDC_IDC_SET_LEADCHNL_RV_PACING_ANODE_ELECTRODE_1: NORMAL
MDC_IDC_SET_LEADCHNL_RV_PACING_ANODE_LOCATION_1: NORMAL
MDC_IDC_SET_LEADCHNL_RV_PACING_CAPTURE_MODE: NORMAL
MDC_IDC_SET_LEADCHNL_RV_PACING_CATHODE_ELECTRODE_1: NORMAL
MDC_IDC_SET_LEADCHNL_RV_PACING_CATHODE_LOCATION_1: NORMAL
MDC_IDC_SET_LEADCHNL_RV_PACING_POLARITY: NORMAL
MDC_IDC_SET_LEADCHNL_RV_PACING_PULSEWIDTH: 0.4 MS
MDC_IDC_SET_LEADCHNL_RV_SENSING_ANODE_ELECTRODE_1: NORMAL
MDC_IDC_SET_LEADCHNL_RV_SENSING_ANODE_LOCATION_1: NORMAL
MDC_IDC_SET_LEADCHNL_RV_SENSING_CATHODE_ELECTRODE_1: NORMAL
MDC_IDC_SET_LEADCHNL_RV_SENSING_CATHODE_LOCATION_1: NORMAL
MDC_IDC_SET_LEADCHNL_RV_SENSING_POLARITY: NORMAL
MDC_IDC_SET_LEADCHNL_RV_SENSING_SENSITIVITY: 0.9 MV
MDC_IDC_SET_ZONE_DETECTION_INTERVAL: 350 MS
MDC_IDC_SET_ZONE_DETECTION_INTERVAL: 400 MS
MDC_IDC_SET_ZONE_STATUS: NORMAL
MDC_IDC_SET_ZONE_STATUS: NORMAL
MDC_IDC_SET_ZONE_TYPE: NORMAL
MDC_IDC_SET_ZONE_VENDOR_TYPE: NORMAL
MDC_IDC_STAT_AT_BURDEN_PERCENT: 0 %
MDC_IDC_STAT_AT_DTM_END: NORMAL
MDC_IDC_STAT_AT_DTM_START: NORMAL
MDC_IDC_STAT_BRADY_AP_VP_PERCENT: 98.77 %
MDC_IDC_STAT_BRADY_AP_VS_PERCENT: 0.02 %
MDC_IDC_STAT_BRADY_AS_VP_PERCENT: 0.95 %
MDC_IDC_STAT_BRADY_AS_VS_PERCENT: 0.26 %
MDC_IDC_STAT_BRADY_DTM_END: NORMAL
MDC_IDC_STAT_BRADY_DTM_START: NORMAL
MDC_IDC_STAT_BRADY_RA_PERCENT_PACED: 98.96 %
MDC_IDC_STAT_BRADY_RV_PERCENT_PACED: 99.71 %
MDC_IDC_STAT_CRT_DTM_END: NORMAL
MDC_IDC_STAT_CRT_DTM_START: NORMAL
MDC_IDC_STAT_CRT_LV_PERCENT_PACED: 99.68 %
MDC_IDC_STAT_CRT_PERCENT_PACED: 99.68 %
MDC_IDC_STAT_EPISODE_RECENT_COUNT: 0
MDC_IDC_STAT_EPISODE_RECENT_COUNT_DTM_END: NORMAL
MDC_IDC_STAT_EPISODE_RECENT_COUNT_DTM_START: NORMAL
MDC_IDC_STAT_EPISODE_TOTAL_COUNT: 0
MDC_IDC_STAT_EPISODE_TOTAL_COUNT_DTM_END: NORMAL
MDC_IDC_STAT_EPISODE_TOTAL_COUNT_DTM_START: NORMAL
MDC_IDC_STAT_EPISODE_TYPE: NORMAL
MDC_IDC_STAT_TACHYTHERAPY_RECENT_DTM_END: NORMAL
MDC_IDC_STAT_TACHYTHERAPY_RECENT_DTM_START: NORMAL
MDC_IDC_STAT_TACHYTHERAPY_TOTAL_DTM_END: NORMAL
MDC_IDC_STAT_TACHYTHERAPY_TOTAL_DTM_START: NORMAL

## 2023-11-16 PROCEDURE — 93294 REM INTERROG EVL PM/LDLS PM: CPT | Performed by: INTERNAL MEDICINE

## 2023-11-16 PROCEDURE — 93296 REM INTERROG EVL PM/IDS: CPT | Performed by: INTERNAL MEDICINE

## 2023-11-20 ENCOUNTER — LAB (OUTPATIENT)
Dept: LAB | Facility: CLINIC | Age: 88
End: 2023-11-20
Payer: COMMERCIAL

## 2023-11-20 DIAGNOSIS — I50.42 CHRONIC COMBINED SYSTOLIC AND DIASTOLIC HRT FAIL (H): ICD-10-CM

## 2023-11-20 LAB
ALT SERPL W P-5'-P-CCNC: 17 U/L (ref 0–70)
ANION GAP SERPL CALCULATED.3IONS-SCNC: 8 MMOL/L (ref 7–15)
BUN SERPL-MCNC: 19 MG/DL (ref 8–23)
CALCIUM SERPL-MCNC: 9.3 MG/DL (ref 8.2–9.6)
CHLORIDE SERPL-SCNC: 106 MMOL/L (ref 98–107)
CHOLEST SERPL-MCNC: 179 MG/DL
CREAT SERPL-MCNC: 1.25 MG/DL (ref 0.67–1.17)
DEPRECATED HCO3 PLAS-SCNC: 26 MMOL/L (ref 22–29)
EGFRCR SERPLBLD CKD-EPI 2021: 55 ML/MIN/1.73M2
GLUCOSE SERPL-MCNC: 89 MG/DL (ref 70–99)
HDLC SERPL-MCNC: 47 MG/DL
LDLC SERPL CALC-MCNC: 113 MG/DL
NONHDLC SERPL-MCNC: 132 MG/DL
POTASSIUM SERPL-SCNC: 4 MMOL/L (ref 3.4–5.3)
SODIUM SERPL-SCNC: 140 MMOL/L (ref 135–145)
TRIGL SERPL-MCNC: 93 MG/DL

## 2023-11-20 PROCEDURE — 84460 ALANINE AMINO (ALT) (SGPT): CPT

## 2023-11-20 PROCEDURE — 80048 BASIC METABOLIC PNL TOTAL CA: CPT

## 2023-11-20 PROCEDURE — 80061 LIPID PANEL: CPT

## 2023-11-20 PROCEDURE — 36415 COLL VENOUS BLD VENIPUNCTURE: CPT

## 2023-11-27 ENCOUNTER — OFFICE VISIT (OUTPATIENT)
Dept: CARDIOLOGY | Facility: CLINIC | Age: 88
End: 2023-11-27
Attending: PHYSICIAN ASSISTANT
Payer: COMMERCIAL

## 2023-11-27 VITALS
BODY MASS INDEX: 26.88 KG/M2 | HEART RATE: 94 BPM | OXYGEN SATURATION: 97 % | WEIGHT: 192 LBS | DIASTOLIC BLOOD PRESSURE: 72 MMHG | SYSTOLIC BLOOD PRESSURE: 114 MMHG | HEIGHT: 71 IN

## 2023-11-27 DIAGNOSIS — I50.42 CHRONIC COMBINED SYSTOLIC AND DIASTOLIC HRT FAIL (H): ICD-10-CM

## 2023-11-27 DIAGNOSIS — I21.4 NON-STEMI (NON-ST ELEVATED MYOCARDIAL INFARCTION) (H): ICD-10-CM

## 2023-11-27 DIAGNOSIS — I25.810 CORONARY ARTERY DISEASE INVOLVING CORONARY BYPASS GRAFT OF NATIVE HEART WITHOUT ANGINA PECTORIS: ICD-10-CM

## 2023-11-27 PROCEDURE — 99214 OFFICE O/P EST MOD 30 MIN: CPT | Performed by: PHYSICIAN ASSISTANT

## 2023-11-27 RX ORDER — ATORVASTATIN CALCIUM 40 MG/1
40 TABLET, FILM COATED ORAL
Qty: 90 TABLET | Refills: 3 | Status: SHIPPED | OUTPATIENT
Start: 2023-11-27

## 2023-11-27 NOTE — PATIENT INSTRUCTIONS
Today, we discussed the following:   - Results:   The cholesterol is higher than we'd like.   - Medication changes:    Re-start atorvastatin.  - Follow up:   With Dr. Huggins in 6 months.     Please, remember to continue the followin.  Weigh yourself daily. Call if your weight is up > than 2 pounds in one day, or 5 pounds in one week; if you feel more short of breath or have worsening swelling in your legs or abdomen.  2.  Eat a low sodium diet (less than 2,000mg or 2g daily). If you eat less salt, you will retain less fluid.   3.  Avoid alcohol, as this can worsen heart failure.   4.  Avoid NSAIDs as able (For example, Ibuprofen / aleve / advil / naprosen / diclofenac).    Please call [facility] nurse for any questions or concerns Mon-Fri 8am-4pm:   [phone]  For concerns after hours:   [phone] option 2   Scheduling phone number:   [phone]    Thank you for visiting with us today.   Lacey Puckett PA-C  ______________________________________________________________

## 2023-11-27 NOTE — LETTER
11/27/2023    Steven Wagoner MD  [address] [facility] [address]    RE: Moses Elizondo       Dear Colleague,     I had the pleasure of seeing Moses Elizondo in the [facility].    Cardiology Clinic Progress Note    Moses Elizondo MRN# [id number]   YOB: 1933 Age: 89 year old   Primary cardiologist: Dr. Huggins         Assessment and Plan:     In summary, Moses Elizondo presents today for a routine 6 month follow up visit.    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    -- GDMT includes Toprol 25, Entresto 12-13 mg BID.    -- Appears well-compensated at goal wt ~190 lbs on lasix 20 mg daily, with stable FC II-III sxs, also limited by knee pain.   Upgrade to CRT-P on 9/16/21 with adequate BiVp.   Severe bilateral venous insufficiency. Currently under good control.   Prior symptomatic orthostasis, resolved with Entresto reduction.   CAD s/p PCI to OM in 2014. Negative stress MRI 1/2021. Denies angina.   Stable moderately dilated ascending aorta (4.5 cm).   Hyperlipidemia, uncontrolled (pt mistakenly off statin).   Chronic kidney disease, stable.    Plan:  - Re-start atorvastatin.   - Continue other medications.   - Continue routine device interrogations. I note that he has had some mode switch episodes (<1% of the time) although with no EGM's for review. If we do capture AF on his device, will need to consider anticoagulation.   - Follow up with Dr. Huggins in six months' time with a BMP prior. He is aware of signs/sxs of HF to monitor for and alert us with in the interim.     It's always a pleasure seeing Zane.    EVANGELISTA [facility] - [facility]        History of Presenting Illness:      Moses Elizondo is a pleasant 89 year old patient who presents today for a routine follow up visit.     His pertinent cardiac history includes:  # CAD, s/p PCI to OM1 (2014).   # SSS, which has now progressed to CHB. S/p PPM in 2019. Upgrade to  CRT-P on 9/16/21. MRI compatible.  # Presumed NICMP, LVEF 38% per routine TTE on 1/6/21. 44% per MRI. Suspect pacemaker-induced. No evidence of ischemia or infiltrative disease on 1/2021 cMRI. Slight EF improvement to 45-50% since BiV upgrade. Unfortunately no significant improvement in functional capacity with this.  # Chronic multifactorial BOWMAN.   # Chronic knee pain, arthritis. Anticipates he may need surgery in the future. Dr. Huggins has recommended a repeat stress cardiac MRI in this case.    # Severe bilateral venous insufficiency with chronic superficial venous thrombosis. Conservative management recommended by Dr. Myles (vein clinic) and hematology unless symptoms worsen.   # Hx of superficial RLE venous thrombus.  # L-sided bakers cyst  # Ascending aortic aneurysm  # Hypothyroidism  # Hyperlipidemia  # Idiopathic peripheral neuropathy  # Chronic facial itching    Please see my note dated 10/17/22 for the patient's detailed history.     Zane last saw Dr. Huggins in May for a routine follow up, and appeared mildly volume up. Furosemide 20 mg daily was initiated and he subsequently lost 7 lbs with symptomatic improvement. Labs on 5/17 showed a normal proBNP at 481, stable BMP with a creat of 1.24. Echocardiogram showed stable EF 45-50% and stable moderately dilated ascending aorta (4.5 cm). PFT's were benign.    Today, Zane returns to clinic with his daughter for a six month follow up. He feels well, overall! He was previously getting a little lightheaded but that has resolved since we reduced his Entresto to the 12-13 dose. Thinks his breathing is overall the same. He works for about ten minutes in the garage and then sits and relaxes. Still mows the lawn without problem. He really enjoys biking but is only able to do this a limited amount due to dyspnea. His swelling remains under good control. Weight is stable over the past six months ~190 lbs. We reviewed all his meds in detail, he always likes to ensure that  "he's taking them correctly, but he is mistakenly not taking his statin (thought Dr. Huggins told him to stop this, but his note states otherwise). As a result, his lipid panel shows his LDL has gone up from 41 to 113.   Renal fn is stable.   Last device check 11/16/23 showed 99% Ap/>99% , <1% mode switch, 8 years remaining on battery life.         Review of Systems:     12-pt ROS is negative except for as noted in the HPI.          Physical Exam:     Vitals: /72   Pulse 94   Ht 1.803 m (5' 11\")   Wt 87.1 kg (192 lb)   SpO2 97%   BMI 26.78 kg/m    Wt Readings from Last 10 Encounters:   11/27/23 87.1 kg (192 lb)   05/22/23 87.8 kg (193 lb 9.6 oz)   05/01/23 90.9 kg (200 lb 8 oz)   10/17/22 86.4 kg (190 lb 8 oz)   07/07/22 88 kg (194 lb)   06/27/22 89 kg (196 lb 3.2 oz)   05/10/22 89.1 kg (196 lb 6.4 oz)   02/04/22 87.4 kg (192 lb 9.6 oz)   01/18/22 85.7 kg (189 lb)   01/03/22 85 kg (187 lb 6.4 oz)       Constitutional:  Patient is pleasant, alert, cooperative, and in NAD.  HEENT:  NCAT. PERRLA. EOM's intact.   Neck:  CVP appears normal. No carotid bruits.   Pulmonary: Normal respiratory effort. CTAB.   Cardiac: RRR, normal S1/S2, no S3/S4, no murmur or rub.   Abdomen:  Non-tender abdomen, no hepatosplenomegaly appreciated.   Vascular: Pulses in the upper and lower extremities are 2+ and equal bilaterally.  Extremities: 1+ lower extremity edema, erythema, cyanosis or tenderness appreciated.  Skin:  No rashes or lesions appreciated.   Neurological:  No gross motor or sensory deficits.   Psych: Appropriate affect.        Data:     Labs reviewed:  Recent Labs   Lab Test 11/20/23  0842 05/17/23  0834 05/01/23  1401 10/17/22  0845 07/07/22  0806 07/14/21  0827 03/02/21 0927 07/23/20  1008 07/07/20  0000 10/31/19  0849   *  --   --  41  --   --   --   --  49 60   HDL 47  --   --  47  --   --   --   --  42 52   NHDL 132*  --   --  56  --   --   --   --   --  74   CHOL 179  --   --  103  --   --   --   --  " 105 126   TRIG 93  --   --  74  --   --   --   --  71 70   TSH  --   --   --   --  1.44  --  2.27 2.87  --  1.94   NTBNP  --  481 459 407  --    < > 804*  --   --   --     < > = values in this interval not displayed.       Lab Results   Component Value Date    WBC 6.1 07/07/2022    WBC 6.2 07/23/2020    RBC 4.41 07/07/2022    RBC 4.58 07/23/2020    HGB 13.1 (L) 07/07/2022    HGB 13.2 (L) 03/02/2021    HCT 40.2 07/07/2022    HCT 41.4 07/23/2020    MCV 91 07/07/2022    MCV 90 07/23/2020    MCH 29.7 07/07/2022    MCH 29.9 07/23/2020    MCHC 32.6 07/07/2022    MCHC 33.1 07/23/2020    RDW 12.0 07/07/2022    RDW 12.5 07/23/2020     (L) 07/07/2022     07/23/2020       Lab Results   Component Value Date     11/20/2023     04/07/2021    POTASSIUM 4.0 11/20/2023    POTASSIUM 4.3 10/17/2022    POTASSIUM 4.1 04/07/2021    CHLORIDE 106 11/20/2023    CHLORIDE 109 10/17/2022    CHLORIDE 109 04/07/2021    CO2 26 11/20/2023    CO2 29 10/17/2022    CO2 28 04/07/2021    ANIONGAP 8 11/20/2023    ANIONGAP 1 (L) 10/17/2022    ANIONGAP 2 (L) 04/07/2021    GLC 89 11/20/2023    GLC 87 10/17/2022    GLC 75 04/07/2021    BUN 19.0 11/20/2023    BUN 23 10/17/2022    BUN 25 04/07/2021    CR 1.25 (H) 11/20/2023    CR 1.28 (H) 04/07/2021    GFRESTIMATED 55 (L) 11/20/2023    GFRESTIMATED 50 (L) 04/07/2021    GFRESTBLACK 58 (L) 04/07/2021    GABRIELA 9.3 11/20/2023    GABRIELA 8.8 04/07/2021      Lab Results   Component Value Date    AST 32 03/02/2021    ALT 17 11/20/2023    ALT 30 03/02/2021       Lab Results   Component Value Date    A1C 5.6 05/02/2019       Lab Results   Component Value Date    INR 1.00 06/20/2019           Problem List:     Patient Active Problem List   Diagnosis    Hypothyroidism, unspecified type    Degeneration of lumbar or lumbosacral intervertebral disc    MGUS (monoclonal gammopathy of unknown significance)    Familial tremor    Hyperlipidemia LDL goal <100    NSTEMI (non-ST elevated myocardial infarction)  "(H)    Health Care Home    Coronary artery disease    Bradycardia    Aneurysm of thoracic aorta (H24)    Sinus bradycardia    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24)    Ascending aortic aneurysm (H24)    Sick sinus syndrome (H)    Aortic root dilatation (H24)    Medicare annual wellness visit, subsequent    Idiopathic peripheral neuropathy    Chest discomfort    Status post coronary angiogram    Second degree AV block    Cardiac pacemaker in situ    Chronic kidney disease, stage 3 (H)    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)           Medications:     Current Outpatient Medications   Medication Sig Dispense Refill    acetaminophen (TYLENOL) 650 MG CR tablet Take 650 mg by mouth daily      ASPIRIN EC PO Take 81 mg by mouth daily      furosemide (LASIX) 20 MG tablet Take 1 tablet (20 mg) by mouth daily 90 tablet 3    gabapentin (NEURONTIN) 300 MG capsule TAKE 1 CAPSULE(300 MG) BY MOUTH AT BEDTIME 90 capsule 0    levothyroxine (SYNTHROID/LEVOTHROID) 100 MCG tablet TAKE 1 TABLET (100 MCG) BY MOUTH DAILY 90 tablet 0    metoprolol succinate ER (TOPROL XL) 25 MG 24 hr tablet Take 1 tablet (25 mg) by mouth daily At lunch 90 tablet 3    nitroGLYcerin (NITROSTAT) 0.4 MG sublingual tablet One tablet under the tongue every 5 minutes if needed for chest pain. May repeat every 5 minutes for a maximum of 3 doses in 15 minutes\" 25 tablet 3    Polyethylene Glycol 3350 (MIRALAX PO) Take by mouth as needed      sacubitril-valsartan (ENTRESTO) 24-26 MG per tablet Take 1/2 tab (12/13mg) twice daily 90 tablet 1    UNABLE TO FIND 1 capsule 3 times daily MEDICATION NAME: NeuroFlo      atorvastatin (LIPITOR) 40 MG tablet Take 1 tablet (40 mg) by mouth daily (with dinner) (Patient not taking: Reported on 11/27/2023) 90 tablet 3           Past Medical History:     Past Medical History:   Diagnosis Date    Aortic root dilatation (H)     4.4 cm    Ascending aorta " dilatation (H)     4.4 cm    ASD (atrial septal defect)     Bradycardia     CAD (coronary artery disease) 05/15/2014     YOGESH to RCA(6/20/19); YOGESH to OM1(5/15/14)    Cardiac pacemaker 06/20/2019    [employer] PPM COMFORT MRI XT DR-implant 6/20/19    Chest discomfort     Degeneration of lumbar or lumbosacral intervertebral disc     Familial tremor     Former smoker     Herpes zoster without mention of complication     HTN (hypertension)     Hyperlipidaemia     Idiopathic peripheral neuropathy     MGUS (monoclonal gammopathy of unknown significance)     NSTEMI (non-ST elevated myocardial infarction) (H) 2014    Other and unspecified hyperlipidemia     PFO (patent foramen ovale)     Prostatitis, unspecified     Second degree heart block     Sensorineural hearing loss, unspecified     SSS (sick sinus syndrome) (H)     Status post coronary angiogram 06/20/2019    Unspecified hypothyroidism      Past Surgical History:   Procedure Laterality Date    CHOLECYSTECTOMY      CORONARY ANGIOGRAPHY ADULT ORDER  5/14/14    PTCA w/YOGESH to OM1    CV CORONARY ANGIOGRAM N/A 6/20/2019    Procedure: Coronary Angiogram;  Surgeon: Romie Coronado MD;  Location: [facility] CARDIAC CATH LAB    CV INTRAVASULAR ULTRASOUND N/A 6/20/2019    Procedure: Intravascular Ultrasound;  Surgeon: Romie Coronado MD;  Location: [facility] CARDIAC CATH LAB    CV PCI ATHERECTOMY ORBITAL N/A 6/20/2019    Procedure: PCI Atherectomy Orbital;  Surgeon: Romie Coronado MD;  Location: [facility] CARDIAC CATH LAB    CV PCI STENT DRUG ELUTING N/A 6/20/2019    Procedure: PCI Stent Drug Eluting;  Surgeon: Romie Coronado MD;  Location:  HEART CARDIAC CATH LAB    CV TEMPORARY PACEMAKER INSERTION N/A 6/20/2019    Procedure: Temporary Pacemaker Insertion;  Surgeon: Romie Coronado MD;  Location: [facility] CARDIAC CATH LAB    EP PACEMAKER N/A 6/20/2019    Procedure: EP Pacemaker;  Surgeon: Max Dowell MD;  Location:  HEART CARDIAC CATH LAB    EP  PPM UPGRADE TO BIVENT N/A 2021    Procedure: EP PPM UPGRADE TO BIVENT;  Surgeon: Tre Miller MD;  Location:  HEART CARDIAC CATH LAB    HEART CATH, ANGIOPLASTY  14    YOGESH to OM1    ZZC NONSPECIFIC PROCEDURE      Laparoscopic cholecystectomy.      Family History   Problem Relation Age of Onset    Cancer Mother     Genitourinary Problems Father 99        complications from surgery    Heart Disease Brother 72        MI     Social History     Socioeconomic History    Marital status:      Spouse name: Not on file    Number of children: Not on file    Years of education: Not on file    Highest education level: Not on file   Occupational History    Not on file   Tobacco Use    Smoking status: Former     Packs/day: 0.50     Years: 16.00     Additional pack years: 0.00     Total pack years: 8.00     Types: Cigarettes     Start date:      Quit date: 1967     Years since quittin.9    Smokeless tobacco: Never   Substance and Sexual Activity    Alcohol use: Yes     Comment: 5-7 drinks week - at most one drink daily    Drug use: No    Sexual activity: Never   Other Topics Concern     Service Not Asked    Blood Transfusions Not Asked    Caffeine Concern Yes     Comment: 2 cups coffee/day    Occupational Exposure Not Asked    Hobby Hazards Not Asked    Sleep Concern No    Stress Concern Not Asked    Weight Concern Yes     Comment: limiting alcohol to watch calories    Special Diet Not Asked    Back Care Not Asked    Exercise Yes     Comment: Stationary bike  weights and aerobics 4 times week    Bike Helmet Not Asked    Seat Belt Yes    Self-Exams Not Asked    Parent/sibling w/ CABG, MI or angioplasty before 65F 55M? No   Social History Narrative    Not on file     Social Determinants of Health     Financial Resource Strain: Not on file   Food Insecurity: Not on file   Transportation Needs: Not on file   Physical Activity: Not on file   Stress: Not on file   Social Connections: Not on  file   Interpersonal Safety: Not on file   Housing Stability: Not on file           Allergies:   No known drug allergy    Today's clinic visit entailed:  Review of the result(s) of each unique test - BMP, lipid panel, device interrogation, echocardiogram  Ordering of each unique test  Prescription drug management  I spent a total of 30 minutes on the day of the visit.   Time spent by me doing chart review, history and exam, documentation and further activities per the note  Provider  Link to [facility] Help Grid     The level of medical decision making during this visit was of moderate complexity.      Thank you for allowing me to participate in the care of your patient.      Sincerely,     Lacey Puckett PA-C     [facility]  cc:   Lacey Puckett PA-C  [address] [place] [id number]

## 2023-11-27 NOTE — PROGRESS NOTES
Cardiology Clinic Progress Note    Moses Elizondo MRN# [id number]   YOB: 1933 Age: 89 year old   Primary cardiologist: Dr. Huggins         Assessment and Plan:     In summary, Moses Elizondo presents today for a routine 6 month follow up visit.    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    -- GDMT includes Toprol 25, Entresto 12-13 mg BID.    -- Appears well-compensated at goal wt ~190 lbs on lasix 20 mg daily, with stable FC II-III sxs, also limited by knee pain.   Upgrade to CRT-P on 9/16/21 with adequate BiVp.   Severe bilateral venous insufficiency. Currently under good control.   Prior symptomatic orthostasis, resolved with Entresto reduction.   CAD s/p PCI to OM in 2014. Negative stress MRI 1/2021. Denies angina.   Stable moderately dilated ascending aorta (4.5 cm).   Hyperlipidemia, uncontrolled (pt mistakenly off statin).   Chronic kidney disease, stable.    Plan:  - Re-start atorvastatin.   - Continue other medications.   - Continue routine device interrogations. I note that he has had some mode switch episodes (<1% of the time) although with no EGM's for review. If we do capture AF on his device, will need to consider anticoagulation.   - Follow up with Dr. Huggins in six months' time with a BMP prior. He is aware of signs/sxs of HF to monitor for and alert us with in the interim.     It's always a pleasure seeing Zane.    Lacey Puckett PA-C  [facility]        History of Presenting Illness:      Moses Elizondo is a pleasant 89 year old patient who presents today for a routine follow up visit.     His pertinent cardiac history includes:  # CAD, s/p PCI to OM1 (2014).   # SSS, which has now progressed to CHB. S/p PPM in 2019. Upgrade to CRT-P on 9/16/21. MRI compatible.  # Presumed NICMP, LVEF 38% per routine TTE on 1/6/21. 44% per MRI. Suspect pacemaker-induced. No evidence of ischemia or infiltrative disease on 1/2021 cMRI. Slight EF improvement to  45-50% since BiV upgrade. Unfortunately no significant improvement in functional capacity with this.  # Chronic multifactorial BOWAMN.   # Chronic knee pain, arthritis. Anticipates he may need surgery in the future. Dr. Huggins has recommended a repeat stress cardiac MRI in this case.    # Severe bilateral venous insufficiency with chronic superficial venous thrombosis. Conservative management recommended by Dr. Myles (vein clinic) and hematology unless symptoms worsen.   # Hx of superficial RLE venous thrombus.  # L-sided bakers cyst  # Ascending aortic aneurysm  # Hypothyroidism  # Hyperlipidemia  # Idiopathic peripheral neuropathy  # Chronic facial itching    Please see my note dated 10/17/22 for the patient's detailed history.     Zane last saw Dr. Huggins in May for a routine follow up, and appeared mildly volume up. Furosemide 20 mg daily was initiated and he subsequently lost 7 lbs with symptomatic improvement. Labs on 5/17 showed a normal proBNP at 481, stable BMP with a creat of 1.24. Echocardiogram showed stable EF 45-50% and stable moderately dilated ascending aorta (4.5 cm). PFT's were benign.    Today, Zane returns to clinic with his daughter for a six month follow up. He feels well, overall! He was previously getting a little lightheaded but that has resolved since we reduced his Entresto to the 12-13 dose. Thinks his breathing is overall the same. He works for about ten minutes in the garage and then sits and relaxes. Still mows the lawn without problem. He really enjoys biking but is only able to do this a limited amount due to dyspnea. His swelling remains under good control. Weight is stable over the past six months ~190 lbs. We reviewed all his meds in detail, he always likes to ensure that he's taking them correctly, but he is mistakenly not taking his statin (thought Dr. Huggins told him to stop this, but his note states otherwise). As a result, his lipid panel shows his LDL has gone up from 41 to 113.  "  Renal fn is stable.   Last device check 11/16/23 showed 99% Ap/>99% , <1% mode switch, 8 years remaining on battery life.         Review of Systems:     12-pt ROS is negative except for as noted in the HPI.          Physical Exam:     Vitals: /72   Pulse 94   Ht 1.803 m (5' 11\")   Wt 87.1 kg (192 lb)   SpO2 97%   BMI 26.78 kg/m    Wt Readings from Last 10 Encounters:   11/27/23 87.1 kg (192 lb)   05/22/23 87.8 kg (193 lb 9.6 oz)   05/01/23 90.9 kg (200 lb 8 oz)   10/17/22 86.4 kg (190 lb 8 oz)   07/07/22 88 kg (194 lb)   06/27/22 89 kg (196 lb 3.2 oz)   05/10/22 89.1 kg (196 lb 6.4 oz)   02/04/22 87.4 kg (192 lb 9.6 oz)   01/18/22 85.7 kg (189 lb)   01/03/22 85 kg (187 lb 6.4 oz)       Constitutional:  Patient is pleasant, alert, cooperative, and in NAD.  HEENT:  NCAT. PERRLA. EOM's intact.   Neck:  CVP appears normal. No carotid bruits.   Pulmonary: Normal respiratory effort. CTAB.   Cardiac: RRR, normal S1/S2, no S3/S4, no murmur or rub.   Abdomen:  Non-tender abdomen, no hepatosplenomegaly appreciated.   Vascular: Pulses in the upper and lower extremities are 2+ and equal bilaterally.  Extremities: 1+ lower extremity edema, erythema, cyanosis or tenderness appreciated.  Skin:  No rashes or lesions appreciated.   Neurological:  No gross motor or sensory deficits.   Psych: Appropriate affect.        Data:     Labs reviewed:  Recent Labs   Lab Test 11/20/23  0842 05/17/23  0834 05/01/23  1401 10/17/22  0845 07/07/22  0806 07/14/21  0827 03/02/21  0927 07/23/20  1008 07/07/20  0000 10/31/19  0849   *  --   --  41  --   --   --   --  49 60   HDL 47  --   --  47  --   --   --   --  42 52   NHDL 132*  --   --  56  --   --   --   --   --  74   CHOL 179  --   --  103  --   --   --   --  105 126   TRIG 93  --   --  74  --   --   --   --  71 70   TSH  --   --   --   --  1.44  --  2.27 2.87  --  1.94   NTBNP  --  481 059 407  --    < > 804*  --   --   --     < > = values in this interval not displayed. "       Lab Results   Component Value Date    WBC 6.1 07/07/2022    WBC 6.2 07/23/2020    RBC 4.41 07/07/2022    RBC 4.58 07/23/2020    HGB 13.1 (L) 07/07/2022    HGB 13.2 (L) 03/02/2021    HCT 40.2 07/07/2022    HCT 41.4 07/23/2020    MCV 91 07/07/2022    MCV 90 07/23/2020    MCH 29.7 07/07/2022    MCH 29.9 07/23/2020    MCHC 32.6 07/07/2022    MCHC 33.1 07/23/2020    RDW 12.0 07/07/2022    RDW 12.5 07/23/2020     (L) 07/07/2022     07/23/2020       Lab Results   Component Value Date     11/20/2023     04/07/2021    POTASSIUM 4.0 11/20/2023    POTASSIUM 4.3 10/17/2022    POTASSIUM 4.1 04/07/2021    CHLORIDE 106 11/20/2023    CHLORIDE 109 10/17/2022    CHLORIDE 109 04/07/2021    CO2 26 11/20/2023    CO2 29 10/17/2022    CO2 28 04/07/2021    ANIONGAP 8 11/20/2023    ANIONGAP 1 (L) 10/17/2022    ANIONGAP 2 (L) 04/07/2021    GLC 89 11/20/2023    GLC 87 10/17/2022    GLC 75 04/07/2021    BUN 19.0 11/20/2023    BUN 23 10/17/2022    BUN 25 04/07/2021    CR 1.25 (H) 11/20/2023    CR 1.28 (H) 04/07/2021    GFRESTIMATED 55 (L) 11/20/2023    GFRESTIMATED 50 (L) 04/07/2021    GFRESTBLACK 58 (L) 04/07/2021    GABRIELA 9.3 11/20/2023    GABRIELA 8.8 04/07/2021      Lab Results   Component Value Date    AST 32 03/02/2021    ALT 17 11/20/2023    ALT 30 03/02/2021       Lab Results   Component Value Date    A1C 5.6 05/02/2019       Lab Results   Component Value Date    INR 1.00 06/20/2019           Problem List:     Patient Active Problem List   Diagnosis    Hypothyroidism, unspecified type    Degeneration of lumbar or lumbosacral intervertebral disc    MGUS (monoclonal gammopathy of unknown significance)    Familial tremor    Hyperlipidemia LDL goal <100    NSTEMI (non-ST elevated myocardial infarction) (H)    Health Care Home    Coronary artery disease    Bradycardia    Aneurysm of thoracic aorta (H24)    Sinus bradycardia    Atherosclerotic heart disease of native coronary artery with other forms of angina pectoris  "(H24)    Ascending aortic aneurysm (H24)    Sick sinus syndrome (H)    Aortic root dilatation (H24)    Medicare annual wellness visit, subsequent    Idiopathic peripheral neuropathy    Chest discomfort    Status post coronary angiogram    Second degree AV block    Cardiac pacemaker in situ    Chronic kidney disease, stage 3 (H)    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)           Medications:     Current Outpatient Medications   Medication Sig Dispense Refill    acetaminophen (TYLENOL) 650 MG CR tablet Take 650 mg by mouth daily      ASPIRIN EC PO Take 81 mg by mouth daily      furosemide (LASIX) 20 MG tablet Take 1 tablet (20 mg) by mouth daily 90 tablet 3    gabapentin (NEURONTIN) 300 MG capsule TAKE 1 CAPSULE(300 MG) BY MOUTH AT BEDTIME 90 capsule 0    levothyroxine (SYNTHROID/LEVOTHROID) 100 MCG tablet TAKE 1 TABLET (100 MCG) BY MOUTH DAILY 90 tablet 0    metoprolol succinate ER (TOPROL XL) 25 MG 24 hr tablet Take 1 tablet (25 mg) by mouth daily At lunch 90 tablet 3    nitroGLYcerin (NITROSTAT) 0.4 MG sublingual tablet One tablet under the tongue every 5 minutes if needed for chest pain. May repeat every 5 minutes for a maximum of 3 doses in 15 minutes\" 25 tablet 3    Polyethylene Glycol 3350 (MIRALAX PO) Take by mouth as needed      sacubitril-valsartan (ENTRESTO) 24-26 MG per tablet Take 1/2 tab (12/13mg) twice daily 90 tablet 1    UNABLE TO FIND 1 capsule 3 times daily MEDICATION NAME: NeuroFlo      atorvastatin (LIPITOR) 40 MG tablet Take 1 tablet (40 mg) by mouth daily (with dinner) (Patient not taking: Reported on 11/27/2023) 90 tablet 3           Past Medical History:     Past Medical History:   Diagnosis Date    Aortic root dilatation (H)     4.4 cm    Ascending aorta dilatation (H)     4.4 cm    ASD (atrial septal defect)     Bradycardia     CAD (coronary artery disease) 05/15/2014     YOGESH to RCA(6/20/19); YOGESH to OM1(5/15/14)    Cardiac pacemaker 06/20/2019    [employer] PPM COMFORT MRI XT " DR-implant 6/20/19    Chest discomfort     Degeneration of lumbar or lumbosacral intervertebral disc     Familial tremor     Former smoker     Herpes zoster without mention of complication     HTN (hypertension)     Hyperlipidaemia     Idiopathic peripheral neuropathy     MGUS (monoclonal gammopathy of unknown significance)     NSTEMI (non-ST elevated myocardial infarction) (H) 2014    Other and unspecified hyperlipidemia     PFO (patent foramen ovale)     Prostatitis, unspecified     Second degree heart block     Sensorineural hearing loss, unspecified     SSS (sick sinus syndrome) (H)     Status post coronary angiogram 06/20/2019    Unspecified hypothyroidism      Past Surgical History:   Procedure Laterality Date    CHOLECYSTECTOMY      CORONARY ANGIOGRAPHY ADULT ORDER  5/14/14    PTCA w/YOGESH to OM1    CV CORONARY ANGIOGRAM N/A 6/20/2019    Procedure: Coronary Angiogram;  Surgeon: Romie Coronado MD;  Location:  [facility]    CV INTRAVASULAR ULTRASOUND N/A 6/20/2019    Procedure: Intravascular Ultrasound;  Surgeon: Romie Coronado MD;  Location:  [facility]    CV PCI ATHERECTOMY ORBITAL N/A 6/20/2019    Procedure: PCI Atherectomy Orbital;  Surgeon: Romie Coronado MD;  Location:  [facility]    CV PCI STENT DRUG ELUTING N/A 6/20/2019    Procedure: PCI Stent Drug Eluting;  Surgeon: Romie Coronado MD;  Location:  [facility]    CV TEMPORARY PACEMAKER INSERTION N/A 6/20/2019    Procedure: Temporary Pacemaker Insertion;  Surgeon: Romie Coronado MD;  Location:  [facility]    EP PACEMAKER N/A 6/20/2019    Procedure: EP Pacemaker;  Surgeon: Max Dowell MD;  Location:  [facility]    EP PPM UPGRADE TO BIVENT N/A 9/16/2021    Procedure: EP PPM UPGRADE TO BIVENT;  Surgeon: Tre Miller MD;  Location:  [facility]    HEART CATH, ANGIOPLASTY  5/14/14    YOGESH to OM1    ZZC NONSPECIFIC PROCEDURE       Laparoscopic cholecystectomy.      Family History   Problem Relation Age of Onset    Cancer Mother     Genitourinary Problems Father 99        complications from surgery    Heart Disease Brother 72        MI     Social History     Socioeconomic History    Marital status:      Spouse name: Not on file    Number of children: Not on file    Years of education: Not on file    Highest education level: Not on file   Occupational History    Not on file   Tobacco Use    Smoking status: Former     Packs/day: 0.50     Years: 16.00     Additional pack years: 0.00     Total pack years: 8.00     Types: Cigarettes     Start date:      Quit date: 1967     Years since quittin.9    Smokeless tobacco: Never   Substance and Sexual Activity    Alcohol use: Yes     Comment: 5-7 drinks week - at most one drink daily    Drug use: No    Sexual activity: Never   Other Topics Concern     Service Not Asked    Blood Transfusions Not Asked    Caffeine Concern Yes     Comment: 2 cups coffee/day    Occupational Exposure Not Asked    Hobby Hazards Not Asked    Sleep Concern No    Stress Concern Not Asked    Weight Concern Yes     Comment: limiting alcohol to watch calories    Special Diet Not Asked    Back Care Not Asked    Exercise Yes     Comment: Stationary bike  weights and aerobics 4 times week    Bike Helmet Not Asked    Seat Belt Yes    Self-Exams Not Asked    Parent/sibling w/ CABG, MI or angioplasty before 65F 55M? No   Social History Narrative    Not on file     Social Determinants of Health     Financial Resource Strain: Not on file   Food Insecurity: Not on file   Transportation Needs: Not on file   Physical Activity: Not on file   Stress: Not on file   Social Connections: Not on file   Interpersonal Safety: Not on file   Housing Stability: Not on file           Allergies:   No known drug allergy    Today's clinic visit entailed:  Review of the result(s) of each unique test - BMP, lipid panel, device  interrogation, echocardiogram  Ordering of each unique test  Prescription drug management  I spent a total of 30 minutes on the day of the visit.   Time spent by me doing chart review, history and exam, documentation and further activities per the note  Provider  Link to MDM Help Grid     The level of medical decision making during this visit was of moderate complexity.

## 2023-11-30 ENCOUNTER — APPOINTMENT (OUTPATIENT)
Dept: CT IMAGING | Facility: CLINIC | Age: 88
End: 2023-11-30
Attending: STUDENT IN AN ORGANIZED HEALTH CARE EDUCATION/TRAINING PROGRAM
Payer: COMMERCIAL

## 2023-11-30 ENCOUNTER — APPOINTMENT (OUTPATIENT)
Dept: GENERAL RADIOLOGY | Facility: CLINIC | Age: 88
End: 2023-11-30
Attending: EMERGENCY MEDICINE
Payer: COMMERCIAL

## 2023-11-30 ENCOUNTER — HOSPITAL ENCOUNTER (OUTPATIENT)
Facility: CLINIC | Age: 88
Setting detail: OBSERVATION
Discharge: HOME | End: 2023-12-01
Attending: EMERGENCY MEDICINE | Admitting: STUDENT IN AN ORGANIZED HEALTH CARE EDUCATION/TRAINING PROGRAM
Payer: COMMERCIAL

## 2023-11-30 DIAGNOSIS — N30.00 ACUTE CYSTITIS WITHOUT HEMATURIA: ICD-10-CM

## 2023-11-30 DIAGNOSIS — I50.42 CHRONIC COMBINED SYSTOLIC AND DIASTOLIC HRT FAIL (H): ICD-10-CM

## 2023-11-30 DIAGNOSIS — A41.9 SEPSIS WITHOUT ACUTE ORGAN DYSFUNCTION, DUE TO UNSPECIFIED ORGANISM (H): ICD-10-CM

## 2023-11-30 DIAGNOSIS — I25.810 CORONARY ARTERY DISEASE INVOLVING CORONARY BYPASS GRAFT OF NATIVE HEART WITHOUT ANGINA PECTORIS: ICD-10-CM

## 2023-11-30 LAB
ALBUMIN SERPL BCG-MCNC: 3.7 G/DL (ref 3.5–5.2)
ALBUMIN UR-MCNC: 20 MG/DL
ALP SERPL-CCNC: 62 U/L (ref 40–150)
ALT SERPL W P-5'-P-CCNC: 14 U/L (ref 0–70)
ANION GAP SERPL CALCULATED.3IONS-SCNC: 9 MMOL/L (ref 7–15)
APPEARANCE UR: ABNORMAL
AST SERPL W P-5'-P-CCNC: 29 U/L (ref 0–45)
BACTERIA #/AREA URNS HPF: ABNORMAL /HPF
BASOPHILS # BLD AUTO: 0 10E3/UL (ref 0–0.2)
BASOPHILS NFR BLD AUTO: 0 %
BILIRUB SERPL-MCNC: 0.6 MG/DL
BILIRUB UR QL STRIP: NEGATIVE
BUN SERPL-MCNC: 18.2 MG/DL (ref 8–23)
CALCIUM SERPL-MCNC: 8.6 MG/DL (ref 8.2–9.6)
CHLORIDE SERPL-SCNC: 104 MMOL/L (ref 98–107)
COLOR UR AUTO: ABNORMAL
CREAT SERPL-MCNC: 1.35 MG/DL (ref 0.67–1.17)
DEPRECATED HCO3 PLAS-SCNC: 23 MMOL/L (ref 22–29)
EGFRCR SERPLBLD CKD-EPI 2021: 50 ML/MIN/1.73M2
EOSINOPHIL # BLD AUTO: 0 10E3/UL (ref 0–0.7)
EOSINOPHIL NFR BLD AUTO: 0 %
ERYTHROCYTE [DISTWIDTH] IN BLOOD BY AUTOMATED COUNT: 11.9 % (ref 10–15)
FLUAV RNA SPEC QL NAA+PROBE: NEGATIVE
FLUBV RNA RESP QL NAA+PROBE: NEGATIVE
GLUCOSE SERPL-MCNC: 122 MG/DL (ref 70–99)
GLUCOSE UR STRIP-MCNC: NEGATIVE MG/DL
HCO3 BLDV-SCNC: 20 MMOL/L (ref 21–28)
HCO3 BLDV-SCNC: 25 MMOL/L (ref 21–28)
HCT VFR BLD AUTO: 37.6 % (ref 40–53)
HGB BLD-MCNC: 12.3 G/DL (ref 13.3–17.7)
HGB UR QL STRIP: ABNORMAL
IMM GRANULOCYTES # BLD: 0.1 10E3/UL
IMM GRANULOCYTES NFR BLD: 1 %
KETONES UR STRIP-MCNC: NEGATIVE MG/DL
LACTATE BLD-SCNC: 0.5 MMOL/L
LACTATE BLD-SCNC: 0.8 MMOL/L
LEUKOCYTE ESTERASE UR QL STRIP: ABNORMAL
LYMPHOCYTES # BLD AUTO: 1.1 10E3/UL (ref 0.8–5.3)
LYMPHOCYTES NFR BLD AUTO: 9 %
MAGNESIUM SERPL-MCNC: 1.8 MG/DL (ref 1.7–2.3)
MCH RBC QN AUTO: 29.2 PG (ref 26.5–33)
MCHC RBC AUTO-ENTMCNC: 32.7 G/DL (ref 31.5–36.5)
MCV RBC AUTO: 89 FL (ref 78–100)
MONOCYTES # BLD AUTO: 1.1 10E3/UL (ref 0–1.3)
MONOCYTES NFR BLD AUTO: 9 %
MUCOUS THREADS #/AREA URNS LPF: PRESENT /LPF
NEUTROPHILS # BLD AUTO: 10 10E3/UL (ref 1.6–8.3)
NEUTROPHILS NFR BLD AUTO: 81 %
NITRATE UR QL: POSITIVE
NRBC # BLD AUTO: 0 10E3/UL
NRBC BLD AUTO-RTO: 0 /100
NT-PROBNP SERPL-MCNC: 1992 PG/ML (ref 0–1800)
PCO2 BLDV: 30 MM HG (ref 40–50)
PCO2 BLDV: 41 MM HG (ref 40–50)
PH BLDV: 7.4 [PH] (ref 7.32–7.43)
PH BLDV: 7.43 [PH] (ref 7.32–7.43)
PH UR STRIP: 5.5 [PH] (ref 5–7)
PLATELET # BLD AUTO: 128 10E3/UL (ref 150–450)
PO2 BLDV: 16 MM HG (ref 25–47)
PO2 BLDV: 55 MM HG (ref 25–47)
POTASSIUM SERPL-SCNC: 4 MMOL/L (ref 3.4–5.3)
PROT SERPL-MCNC: 6.3 G/DL (ref 6.4–8.3)
RBC # BLD AUTO: 4.21 10E6/UL (ref 4.4–5.9)
RBC URINE: >182 /HPF
RSV RNA SPEC NAA+PROBE: NEGATIVE
SAO2 % BLDV: 22 % (ref 94–100)
SAO2 % BLDV: 90 % (ref 94–100)
SARS-COV-2 RNA RESP QL NAA+PROBE: NEGATIVE
SODIUM SERPL-SCNC: 136 MMOL/L (ref 135–145)
SP GR UR STRIP: 1.02 (ref 1–1.03)
TROPONIN T SERPL HS-MCNC: 35 NG/L
UROBILINOGEN UR STRIP-MCNC: NORMAL MG/DL
WBC # BLD AUTO: 12.4 10E3/UL (ref 4–11)
WBC URINE: 74 /HPF

## 2023-11-30 PROCEDURE — 258N000003 HC RX IP 258 OP 636: Performed by: EMERGENCY MEDICINE

## 2023-11-30 PROCEDURE — 36415 COLL VENOUS BLD VENIPUNCTURE: CPT | Performed by: STUDENT IN AN ORGANIZED HEALTH CARE EDUCATION/TRAINING PROGRAM

## 2023-11-30 PROCEDURE — 82803 BLOOD GASES ANY COMBINATION: CPT

## 2023-11-30 PROCEDURE — 120N000001 HC R&B MED SURG/OB

## 2023-11-30 PROCEDURE — 71046 X-RAY EXAM CHEST 2 VIEWS: CPT

## 2023-11-30 PROCEDURE — 83605 ASSAY OF LACTIC ACID: CPT

## 2023-11-30 PROCEDURE — 87637 SARSCOV2&INF A&B&RSV AMP PRB: CPT | Performed by: STUDENT IN AN ORGANIZED HEALTH CARE EDUCATION/TRAINING PROGRAM

## 2023-11-30 PROCEDURE — 87086 URINE CULTURE/COLONY COUNT: CPT | Performed by: STUDENT IN AN ORGANIZED HEALTH CARE EDUCATION/TRAINING PROGRAM

## 2023-11-30 PROCEDURE — 70450 CT HEAD/BRAIN W/O DYE: CPT

## 2023-11-30 PROCEDURE — 84484 ASSAY OF TROPONIN QUANT: CPT | Performed by: STUDENT IN AN ORGANIZED HEALTH CARE EDUCATION/TRAINING PROGRAM

## 2023-11-30 PROCEDURE — 250N000011 HC RX IP 250 OP 636: Mod: JZ | Performed by: EMERGENCY MEDICINE

## 2023-11-30 PROCEDURE — 258N000003 HC RX IP 258 OP 636: Performed by: STUDENT IN AN ORGANIZED HEALTH CARE EDUCATION/TRAINING PROGRAM

## 2023-11-30 PROCEDURE — 82374 ASSAY BLOOD CARBON DIOXIDE: CPT | Performed by: STUDENT IN AN ORGANIZED HEALTH CARE EDUCATION/TRAINING PROGRAM

## 2023-11-30 PROCEDURE — 83735 ASSAY OF MAGNESIUM: CPT | Performed by: STUDENT IN AN ORGANIZED HEALTH CARE EDUCATION/TRAINING PROGRAM

## 2023-11-30 PROCEDURE — 250N000013 HC RX MED GY IP 250 OP 250 PS 637: Performed by: STUDENT IN AN ORGANIZED HEALTH CARE EDUCATION/TRAINING PROGRAM

## 2023-11-30 PROCEDURE — 93005 ELECTROCARDIOGRAM TRACING: CPT

## 2023-11-30 PROCEDURE — 96361 HYDRATE IV INFUSION ADD-ON: CPT

## 2023-11-30 PROCEDURE — 99223 1ST HOSP IP/OBS HIGH 75: CPT | Performed by: STUDENT IN AN ORGANIZED HEALTH CARE EDUCATION/TRAINING PROGRAM

## 2023-11-30 PROCEDURE — 81001 URINALYSIS AUTO W/SCOPE: CPT | Performed by: STUDENT IN AN ORGANIZED HEALTH CARE EDUCATION/TRAINING PROGRAM

## 2023-11-30 PROCEDURE — 99285 EMERGENCY DEPT VISIT HI MDM: CPT | Mod: 25

## 2023-11-30 PROCEDURE — 87040 BLOOD CULTURE FOR BACTERIA: CPT | Performed by: STUDENT IN AN ORGANIZED HEALTH CARE EDUCATION/TRAINING PROGRAM

## 2023-11-30 PROCEDURE — 83880 ASSAY OF NATRIURETIC PEPTIDE: CPT | Performed by: STUDENT IN AN ORGANIZED HEALTH CARE EDUCATION/TRAINING PROGRAM

## 2023-11-30 PROCEDURE — 96365 THER/PROPH/DIAG IV INF INIT: CPT

## 2023-11-30 PROCEDURE — 85025 COMPLETE CBC W/AUTO DIFF WBC: CPT | Performed by: STUDENT IN AN ORGANIZED HEALTH CARE EDUCATION/TRAINING PROGRAM

## 2023-11-30 RX ORDER — PIPERACILLIN SODIUM, TAZOBACTAM SODIUM 4; .5 G/20ML; G/20ML
4.5 INJECTION, POWDER, LYOPHILIZED, FOR SOLUTION INTRAVENOUS ONCE
Status: COMPLETED | OUTPATIENT
Start: 2023-11-30 | End: 2023-11-30

## 2023-11-30 RX ORDER — AMOXICILLIN 250 MG
2 CAPSULE ORAL 2 TIMES DAILY PRN
Status: DISCONTINUED | OUTPATIENT
Start: 2023-11-30 | End: 2023-12-01 | Stop reason: HOSPADM

## 2023-11-30 RX ORDER — SODIUM CHLORIDE 9 MG/ML
INJECTION, SOLUTION INTRAVENOUS CONTINUOUS
Status: DISCONTINUED | OUTPATIENT
Start: 2023-11-30 | End: 2023-12-01 | Stop reason: HOSPADM

## 2023-11-30 RX ORDER — ACETAMINOPHEN 325 MG/1
650 TABLET ORAL AT BEDTIME
Status: DISCONTINUED | OUTPATIENT
Start: 2023-11-30 | End: 2023-12-01 | Stop reason: HOSPADM

## 2023-11-30 RX ORDER — CEFTRIAXONE 1 G/1
1 INJECTION, POWDER, FOR SOLUTION INTRAMUSCULAR; INTRAVENOUS EVERY 24 HOURS
Status: DISCONTINUED | OUTPATIENT
Start: 2023-11-30 | End: 2023-12-01

## 2023-11-30 RX ORDER — GABAPENTIN 300 MG/1
300 CAPSULE ORAL AT BEDTIME
Status: DISCONTINUED | OUTPATIENT
Start: 2023-11-30 | End: 2023-12-01 | Stop reason: HOSPADM

## 2023-11-30 RX ORDER — ONDANSETRON 2 MG/ML
4 INJECTION INTRAMUSCULAR; INTRAVENOUS EVERY 6 HOURS PRN
Status: DISCONTINUED | OUTPATIENT
Start: 2023-11-30 | End: 2023-12-01 | Stop reason: HOSPADM

## 2023-11-30 RX ORDER — LIDOCAINE 40 MG/G
CREAM TOPICAL
Status: DISCONTINUED | OUTPATIENT
Start: 2023-11-30 | End: 2023-12-01 | Stop reason: HOSPADM

## 2023-11-30 RX ORDER — ASPIRIN 81 MG/1
81 TABLET ORAL DAILY
Status: DISCONTINUED | OUTPATIENT
Start: 2023-12-01 | End: 2023-12-01 | Stop reason: HOSPADM

## 2023-11-30 RX ORDER — ONDANSETRON 4 MG/1
4 TABLET, ORALLY DISINTEGRATING ORAL EVERY 6 HOURS PRN
Status: DISCONTINUED | OUTPATIENT
Start: 2023-11-30 | End: 2023-12-01 | Stop reason: HOSPADM

## 2023-11-30 RX ORDER — CALCIUM CARBONATE 500 MG/1
1000 TABLET, CHEWABLE ORAL 4 TIMES DAILY PRN
Status: DISCONTINUED | OUTPATIENT
Start: 2023-11-30 | End: 2023-12-01 | Stop reason: HOSPADM

## 2023-11-30 RX ORDER — ACETAMINOPHEN 500 MG
1000 TABLET ORAL ONCE
Status: COMPLETED | OUTPATIENT
Start: 2023-11-30 | End: 2023-11-30

## 2023-11-30 RX ORDER — LEVOTHYROXINE SODIUM 100 UG/1
100 TABLET ORAL DAILY
Status: DISCONTINUED | OUTPATIENT
Start: 2023-12-01 | End: 2023-12-01 | Stop reason: HOSPADM

## 2023-11-30 RX ORDER — AMOXICILLIN 250 MG
1 CAPSULE ORAL 2 TIMES DAILY PRN
Status: DISCONTINUED | OUTPATIENT
Start: 2023-11-30 | End: 2023-12-01 | Stop reason: HOSPADM

## 2023-11-30 RX ADMIN — ACETAMINOPHEN 650 MG: 325 TABLET, FILM COATED ORAL at 21:14

## 2023-11-30 RX ADMIN — SODIUM CHLORIDE 1000 ML: 9 INJECTION, SOLUTION INTRAVENOUS at 14:38

## 2023-11-30 RX ADMIN — SODIUM CHLORIDE, POTASSIUM CHLORIDE, SODIUM LACTATE AND CALCIUM CHLORIDE 500 ML: 600; 310; 30; 20 INJECTION, SOLUTION INTRAVENOUS at 16:55

## 2023-11-30 RX ADMIN — ACETAMINOPHEN 1000 MG: 500 TABLET, FILM COATED ORAL at 14:34

## 2023-11-30 RX ADMIN — GABAPENTIN 300 MG: 300 CAPSULE ORAL at 21:14

## 2023-11-30 RX ADMIN — PIPERACILLIN AND TAZOBACTAM 4.5 G: 4; .5 INJECTION, POWDER, FOR SOLUTION INTRAVENOUS at 17:28

## 2023-11-30 RX ADMIN — SODIUM CHLORIDE, POTASSIUM CHLORIDE, SODIUM LACTATE AND CALCIUM CHLORIDE 500 ML: 600; 310; 30; 20 INJECTION, SOLUTION INTRAVENOUS at 18:11

## 2023-11-30 ASSESSMENT — ACTIVITIES OF DAILY LIVING (ADL)
ADLS_ACUITY_SCORE: 35

## 2023-11-30 NOTE — ED PROVIDER NOTES
History     Chief Complaint:  Fall and Altered Mental Status       HPI   Moses Elizondo is a 89 year old male with a past medical history significant for sick sinus syndrome, coronary artery disease, hyperlipidemia who presents to the ED via/accompanied by daughters with a chief complaint of generalized weakness and confusion onset this morning.  Patient reports that he lost his balance and had a fall at home without head trauma or loss of consciousness.  He reports that he has been urinating somewhat more than normal but denies dysuria.  He also has had nonbloody diarrhea today as well.  He denies abdominal pain, vomiting, chest pain, shortness of breath, fever, chills.  Patient reports that he lives in a house with his wife.      Independent Historian: history provided by the patient    Review of External Notes: See MDM    ROS:  Review of Systems  Full ROS completed and negative other than pertinent positives and negatives noted in HPI    Allergies:  No Known Drug Allergy     Medications:    acetaminophen (TYLENOL) 650 MG CR tablet  ASPIRIN EC PO  atorvastatin (LIPITOR) 40 MG tablet  furosemide (LASIX) 20 MG tablet  gabapentin (NEURONTIN) 300 MG capsule  levothyroxine (SYNTHROID/LEVOTHROID) 100 MCG tablet  metoprolol succinate ER (TOPROL XL) 25 MG 24 hr tablet  nitroGLYcerin (NITROSTAT) 0.4 MG sublingual tablet  Polyethylene Glycol 3350 (MIRALAX PO)  sacubitril-valsartan (ENTRESTO) 24-26 MG per tablet  UNABLE TO FIND        Past Medical History:    Past Medical History:   Diagnosis Date    Aortic root dilatation (H)     Ascending aorta dilatation (H)     ASD (atrial septal defect)     Bradycardia     CAD (coronary artery disease) 05/15/2014    Cardiac pacemaker 06/20/2019    Chest discomfort     Degeneration of lumbar or lumbosacral intervertebral disc     Familial tremor     Former smoker     Herpes zoster without mention of complication     HTN (hypertension)     Hyperlipidaemia     Idiopathic peripheral  neuropathy     MGUS (monoclonal gammopathy of unknown significance)     NSTEMI (non-ST elevated myocardial infarction) (H) 2014    Other and unspecified hyperlipidemia     PFO (patent foramen ovale)     Prostatitis, unspecified     Second degree heart block     Sensorineural hearing loss, unspecified     SSS (sick sinus syndrome) (H)     Status post coronary angiogram 06/20/2019    Unspecified hypothyroidism        Past Surgical History:    Past Surgical History:   Procedure Laterality Date    CHOLECYSTECTOMY      CORONARY ANGIOGRAPHY ADULT ORDER  5/14/14    PTCA w/YOGESH to OM1    CV CORONARY ANGIOGRAM N/A 6/20/2019    Procedure: Coronary Angiogram;  Surgeon: Romie Coronado MD;  Location:  [facility]    CV INTRAVASULAR ULTRASOUND N/A 6/20/2019    Procedure: Intravascular Ultrasound;  Surgeon: Romie Coronado MD;  Location:  [facility]    CV PCI ATHERECTOMY ORBITAL N/A 6/20/2019    Procedure: PCI Atherectomy Orbital;  Surgeon: Romie Coronado MD;  Location:  [facility]    CV PCI STENT DRUG ELUTING N/A 6/20/2019    Procedure: PCI Stent Drug Eluting;  Surgeon: Romie Coronado MD;  Location:  [facility]    CV TEMPORARY PACEMAKER INSERTION N/A 6/20/2019    Procedure: Temporary Pacemaker Insertion;  Surgeon: Romie Coronado MD;  Location:  [facility]    EP PACEMAKER N/A 6/20/2019    Procedure: EP Pacemaker;  Surgeon: Max Dowell MD;  Location:  [facility]    EP PPM UPGRADE TO BIVENT N/A 9/16/2021    Procedure: EP PPM UPGRADE TO BIVENT;  Surgeon: Tre Miller MD;  Location:  [facility]    HEART CATH, ANGIOPLASTY  5/14/14    YOGESH to OM1    ZZC NONSPECIFIC PROCEDURE  2010    Laparoscopic cholecystectomy.         Family History:    family history includes Cancer in his mother; Genitourinary Problems (age of onset: 99) in his father; Heart Disease (age of onset: 72) in his brother.    Social  History:   reports that he quit smoking about 56 years ago. His smoking use included cigarettes. He started smoking about 73 years ago. He has a 8.00 pack-year smoking history. He has never used smokeless tobacco. He reports current alcohol use. He reports that he does not use drugs.  PCP: Steven Wagoner     Physical Exam   Patient Vitals for the past 24 hrs:   BP Temp Temp src Pulse Resp SpO2   11/30/23 1745 94/57 -- -- 60 -- --   11/30/23 1730 91/55 -- -- 61 -- --   11/30/23 1600 92/55 -- -- 60 -- 92 %   11/30/23 1530 92/53 -- -- 61 -- 91 %   11/30/23 1416 (!) 88/53 100.3  F (37.9  C) Oral 66 18 94 %        Physical Exam  Constitutional: Well developed, nontox appearance  Head: Atraumatic.   Mouth/Throat: Oropharynx is clear and moist.   Neck:  no stridor, no C-spine tenderness  Eyes: no scleral icterus  Cardiovascular: RRR, 2+ bilat radial pulses  Pulmonary/Chest: nml resp effort, Clear BS bilat  Abdominal: ND, soft, NT, no rebound or guarding   Back: No T or L spinous process tenderness  : no CVA tenderness bilat  Ext: Warm, well perfused, trace to mild bilateral pretibial edema  Neurological: A&O, symmetric facies, moves ext x4  Skin: Skin is warm and dry.   Psychiatric: Behavior is normal. Thought content normal.   Nursing note and vitals reviewed.    Emergency Department Course   ECG:  ECG results from 11/30/23   EKG 12-lead, tracing only     Value    Systolic Blood Pressure     Diastolic Blood Pressure     Ventricular Rate 65    Atrial Rate 65    KS Interval 138    QRS Duration 118        QTc 459    P Axis 44    R AXIS 265    T Axis 82    Interpretation ECG      Sinus rhythm  Right bundle branch block  Possible Anterolateral infarct , age undetermined  Abnormal ECG  When compared with ECG of 16-SEP-2021 07:42,  Sinus rhythm has replaced Electronic ventricular pacemaker         Imaging:  Chest XR,  PA & LAT   Final Result   IMPRESSION: Triple lead cardiac device left chest wall, with lead  tips   projected over the RA, RV, and coronary sinus. Tortuous calcified   thoracic aorta. Heart size is stable. Pulmonary vascularity is within   normal limits. Lungs clear. No pleural effusions.      MATT SAPP MD            SYSTEM ID:  [id number]      Head CT w/o contrast   Final Result   IMPRESSION:      1. No evidence of acute intracranial hemorrhage, mass, or herniation.   2. Diffuse parenchymal volume loss and white matter changes likely due   to chronic microvascular ischemic change.         RODRIGO ESCUDERO MD            SYSTEM ID:  [id number]           Report per radiology unless otherwise specified in report or noted in MDM    Laboratory:  Labs Ordered and Resulted from Time of ED Arrival to Time of ED Departure   ROUTINE UA WITH MICROSCOPIC REFLEX TO CULTURE - Abnormal       Result Value    Color Urine Orange (*)     Appearance Urine Slightly Cloudy (*)     Glucose Urine Negative      Bilirubin Urine Negative      Ketones Urine Negative      Specific Gravity Urine 1.019      Blood Urine Large (*)     pH Urine 5.5      Protein Albumin Urine 20 (*)     Urobilinogen Urine Normal      Nitrite Urine Positive (*)     Leukocyte Esterase Urine Moderate (*)     Bacteria Urine Few (*)     Mucus Urine Present (*)     RBC Urine >182 (*)     WBC Urine 74 (*)    COMPREHENSIVE METABOLIC PANEL - Abnormal    Sodium 136      Potassium 4.0      Carbon Dioxide (CO2) 23      Anion Gap 9      Urea Nitrogen 18.2      Creatinine 1.35 (*)     GFR Estimate 50 (*)     Calcium 8.6      Chloride 104      Glucose 122 (*)     Alkaline Phosphatase 62      AST 29      ALT 14      Protein Total 6.3 (*)     Albumin 3.7      Bilirubin Total 0.6     NT PROBNP INPATIENT - Abnormal    N terminal Pro BNP Inpatient 1,992 (*)    TROPONIN T, HIGH SENSITIVITY - Abnormal    Troponin T, High Sensitivity 35 (*)    CBC WITH PLATELETS AND DIFFERENTIAL - Abnormal    WBC Count 12.4 (*)     RBC Count 4.21 (*)     Hemoglobin 12.3 (*)     Hematocrit 37.6  (*)     MCV 89      MCH 29.2      MCHC 32.7      RDW 11.9      Platelet Count 128 (*)     % Neutrophils 81      % Lymphocytes 9      % Monocytes 9      % Eosinophils 0      % Basophils 0      % Immature Granulocytes 1      NRBCs per 100 WBC 0      Absolute Neutrophils 10.0 (*)     Absolute Lymphocytes 1.1      Absolute Monocytes 1.1      Absolute Eosinophils 0.0      Absolute Basophils 0.0      Absolute Immature Granulocytes 0.1      Absolute NRBCs 0.0     ISTAT GASES LACTATE VENOUS POCT - Abnormal    Lactic Acid POCT 0.8      Bicarbonate Venous POCT 25      O2 Sat, Venous POCT 22 (*)     pCO2 Venous POCT 41      pH Venous POCT 7.40      pO2 Venous POCT 16 (*)    ISTAT GASES LACTATE VENOUS POCT - Abnormal    Lactic Acid POCT 0.5      Bicarbonate Venous POCT 20 (*)     O2 Sat, Venous POCT 90 (*)     pCO2 Venous POCT 30 (*)     pH Venous POCT 7.43      pO2 Venous POCT 55 (*)    MAGNESIUM - Normal    Magnesium 1.8     INFLUENZA A/B, RSV, & SARS-COV2 PCR - Normal    Influenza A PCR Negative      Influenza B PCR Negative      RSV PCR Negative      SARS CoV2 PCR Negative     BLOOD CULTURE   BLOOD CULTURE   URINE CULTURE        Procedures       Emergency Department Course & Assessments:             Interventions:  Medications   lactated ringers BOLUS 500 mL (has no administration in time range)   sodium chloride 0.9% BOLUS 1,000 mL (0 mLs Intravenous Stopped 11/30/23 1559)   acetaminophen (TYLENOL) tablet 1,000 mg (1,000 mg Oral $Given 11/30/23 1434)   lactated ringers BOLUS 500 mL (0 mLs Intravenous Stopped 11/30/23 1725)   piperacillin-tazobactam (ZOSYN) 4.5 g vial to attach to  mL bag (4.5 g Intravenous $New Bag 11/30/23 1728)        Independent Interpretation (X-rays, CTs, rhythm strip):  See MDM    Consultations/Discussion of Management or Tests:  Discussed patient case with admitting hospitalist service    Social Determinants of Health affecting care:  See MDM    Disposition:  The patient was admitted to the  [facility] under the care of Dr. Harry.     Impression & Plan    CMS Diagnoses: None    Medical Decision Makin year old male presenting w/ generalized weakness, fall    Social determinants affecting patient's health include: Age increasing risk for presentation to the emergency department     I reviewed medical records from cardiology office visit on 2023    DDx includes viral syndrome NOS, influenza-like illness, influenza, COVID-19, UTI, sepsis, severe sepsis, bacterial pneumonia, intracranial hemorrhage, dehydration.  Doubt meningitis, encephalitis given history and physical exam.  Labs significant for leukocytosis, urinalysis consistent with infection, COVID-19 negative.  Imaging sig for no acute intracranial abnormality.  Interventions as noted above.  Blood pressure from most recent cardiology visit was 114/72 on 2023.  Patient's presentation is consistent with urinary tract infection which is likely the cause of his low normal blood pressures.  Patient is given IV fluids as noted above.  There may be an element of dehydration as well.  Given his generalized weakness, relative hypotension and age, I think would be appropriate to admit the patient to the hospital service for further evaluation and management.  Patient and family counseled on all results, disposition and diagnosis.  They are understanding and agreeable to plan. Patient admitted in guarded condition.        Diagnosis:    ICD-10-CM    1. Acute cystitis without hematuria  N30.00       2. Sepsis without acute organ dysfunction, due to unspecified organism (H)  A41.9            Discharge Medications:  New Prescriptions    No medications on file        2023   Darwin Crespo MD Vaughn, Christopher E, MD  23 1808

## 2023-11-30 NOTE — ED TRIAGE NOTES
Patient comes in with generalized weakness and increased confusion over the last day. States he was going to the bathroom more that usual yesterday. Today could barely walk. Went outside and had an unwitnessed fall. Denies pain. Unsure if hit head. Asking repetitive questions.     Triage Assessment (Adult)       Row Name 11/30/23 1418          Triage Assessment    Airway WDL WDL        Respiratory WDL    Respiratory WDL WDL        Skin Circulation/Temperature WDL    Skin Circulation/Temperature WDL --  warm to touch        Cardiac WDL    Cardiac WDL WDL        Peripheral/Neurovascular WDL    Peripheral Neurovascular WDL WDL        Cognitive/Neuro/Behavioral WDL    Cognitive/Neuro/Behavioral WDL --  more confused today.

## 2023-11-30 NOTE — ED NOTES
PIT/Triage Evaluation    Patient presented for evaluation of a fall and confusion. He does not recall falling, but his family reports he fell a couple of hours ago while outside. He was on his way back up the steps to go inside when his legs gave out, causing him to fall. The fall was not witnessed. Additionally, family notes that he has been more confused since falling, as he does not remember falling and he was not answering questions well. Since yesterday, he has felt weaker and had multiple episodes of diarrhea. He has a pacemaker, leg swelling, and shortness of breath at baseline. He was recently put back on a statin on Tuesday. He denies chest pain or blood thinner use.    Exam is notable for:    No data found.  General: Awake, alert, in no acute distress   HEENT: Atraumatic   EOM normal   External ears normal   Trachea midline  Neck: Supple, normal ROM. No midline cervical tenderness  CV: Regular rate, regular rhythm   No murmur  1+ pitting LE edema  2+ radial and DP pulses  PULM: Breath sounds normal bilaterally  No wheezes or rales  ABD: Soft, non-tender, non-distended  Normal bowel sounds   No rebound or guarding   MSK: No gross deformities  NEURO: Alert, no focal deficits. 5/5 strength in bilateral upper and lower extremities.  No gross sensory deficits.  Cranial nerves 2-12 intact.   Skin: Warm, dry and intact      Appropriate interventions for symptom management were initiated if applicable.  Appropriate diagnostic tests were initiated if indicated.    Important information for subsequent clinician:  Hypotensive and borderline febrile.  Fell over the threshold coming into the house.  No one saw it.  Unclear if head injury but has been altered since this morning.          I briefly evaluated the patient and developed an initial plan of care. I discussed this plan and explained that this brief interaction does not constitute a full evaluation. Patient/family understands that they should wait to be fully  evaluated and discuss any test results with another clinician prior to leaving the hospital.     Tori Jaeger,   11/30/23 142

## 2023-12-01 ENCOUNTER — APPOINTMENT (OUTPATIENT)
Dept: PHYSICAL THERAPY | Facility: CLINIC | Age: 88
End: 2023-12-01
Attending: STUDENT IN AN ORGANIZED HEALTH CARE EDUCATION/TRAINING PROGRAM
Payer: COMMERCIAL

## 2023-12-01 VITALS
OXYGEN SATURATION: 94 % | SYSTOLIC BLOOD PRESSURE: 99 MMHG | BODY MASS INDEX: 25.2 KG/M2 | TEMPERATURE: 99.7 F | HEIGHT: 71 IN | DIASTOLIC BLOOD PRESSURE: 56 MMHG | WEIGHT: 180 LBS | RESPIRATION RATE: 18 BRPM | HEART RATE: 62 BPM

## 2023-12-01 LAB
ANION GAP SERPL CALCULATED.3IONS-SCNC: 9 MMOL/L (ref 7–15)
ATRIAL RATE - MUSE: 65 BPM
BACTERIA UR CULT: ABNORMAL
BUN SERPL-MCNC: 17.7 MG/DL (ref 8–23)
CALCIUM SERPL-MCNC: 8.5 MG/DL (ref 8.2–9.6)
CHLORIDE SERPL-SCNC: 108 MMOL/L (ref 98–107)
CREAT SERPL-MCNC: 1.25 MG/DL (ref 0.67–1.17)
DEPRECATED HCO3 PLAS-SCNC: 23 MMOL/L (ref 22–29)
DIASTOLIC BLOOD PRESSURE - MUSE: NORMAL MMHG
EGFRCR SERPLBLD CKD-EPI 2021: 55 ML/MIN/1.73M2
ERYTHROCYTE [DISTWIDTH] IN BLOOD BY AUTOMATED COUNT: 11.9 % (ref 10–15)
GLUCOSE SERPL-MCNC: 95 MG/DL (ref 70–99)
HCT VFR BLD AUTO: 33.4 % (ref 40–53)
HGB BLD-MCNC: 10.9 G/DL (ref 13.3–17.7)
INTERPRETATION ECG - MUSE: NORMAL
MCH RBC QN AUTO: 29.1 PG (ref 26.5–33)
MCHC RBC AUTO-ENTMCNC: 32.6 G/DL (ref 31.5–36.5)
MCV RBC AUTO: 89 FL (ref 78–100)
P AXIS - MUSE: 44 DEGREES
PLATELET # BLD AUTO: 92 10E3/UL (ref 150–450)
POTASSIUM SERPL-SCNC: 3.8 MMOL/L (ref 3.4–5.3)
PR INTERVAL - MUSE: 138 MS
QRS DURATION - MUSE: 118 MS
QT - MUSE: 442 MS
QTC - MUSE: 459 MS
R AXIS - MUSE: 265 DEGREES
RBC # BLD AUTO: 3.75 10E6/UL (ref 4.4–5.9)
SODIUM SERPL-SCNC: 140 MMOL/L (ref 135–145)
SYSTOLIC BLOOD PRESSURE - MUSE: NORMAL MMHG
T AXIS - MUSE: 82 DEGREES
VENTRICULAR RATE- MUSE: 65 BPM
WBC # BLD AUTO: 9.6 10E3/UL (ref 4–11)

## 2023-12-01 PROCEDURE — G0378 HOSPITAL OBSERVATION PER HR: HCPCS

## 2023-12-01 PROCEDURE — 250N000011 HC RX IP 250 OP 636: Mod: JZ | Performed by: STUDENT IN AN ORGANIZED HEALTH CARE EDUCATION/TRAINING PROGRAM

## 2023-12-01 PROCEDURE — 99239 HOSP IP/OBS DSCHRG MGMT >30: CPT | Performed by: STUDENT IN AN ORGANIZED HEALTH CARE EDUCATION/TRAINING PROGRAM

## 2023-12-01 PROCEDURE — 250N000013 HC RX MED GY IP 250 OP 250 PS 637: Performed by: STUDENT IN AN ORGANIZED HEALTH CARE EDUCATION/TRAINING PROGRAM

## 2023-12-01 PROCEDURE — 85027 COMPLETE CBC AUTOMATED: CPT | Performed by: STUDENT IN AN ORGANIZED HEALTH CARE EDUCATION/TRAINING PROGRAM

## 2023-12-01 PROCEDURE — 258N000003 HC RX IP 258 OP 636: Performed by: STUDENT IN AN ORGANIZED HEALTH CARE EDUCATION/TRAINING PROGRAM

## 2023-12-01 PROCEDURE — 96375 TX/PRO/DX INJ NEW DRUG ADDON: CPT

## 2023-12-01 PROCEDURE — 82310 ASSAY OF CALCIUM: CPT | Performed by: STUDENT IN AN ORGANIZED HEALTH CARE EDUCATION/TRAINING PROGRAM

## 2023-12-01 PROCEDURE — 97116 GAIT TRAINING THERAPY: CPT | Mod: GP

## 2023-12-01 PROCEDURE — 36415 COLL VENOUS BLD VENIPUNCTURE: CPT | Performed by: STUDENT IN AN ORGANIZED HEALTH CARE EDUCATION/TRAINING PROGRAM

## 2023-12-01 PROCEDURE — 97161 PT EVAL LOW COMPLEX 20 MIN: CPT | Mod: GP

## 2023-12-01 PROCEDURE — 97530 THERAPEUTIC ACTIVITIES: CPT | Mod: GP

## 2023-12-01 PROCEDURE — 96376 TX/PRO/DX INJ SAME DRUG ADON: CPT

## 2023-12-01 RX ORDER — SACUBITRIL AND VALSARTAN 24; 26 MG/1; MG/1
TABLET, FILM COATED ORAL
COMMUNITY
Start: 2023-12-08 | End: 2023-12-01

## 2023-12-01 RX ORDER — FUROSEMIDE 20 MG
20 TABLET ORAL DAILY
COMMUNITY
Start: 2023-12-08 | End: 2023-12-01

## 2023-12-01 RX ORDER — CEFUROXIME AXETIL 500 MG/1
500 TABLET ORAL 2 TIMES DAILY
Qty: 14 TABLET | Refills: 0 | Status: SHIPPED | OUTPATIENT
Start: 2023-12-01 | End: 2023-12-11

## 2023-12-01 RX ORDER — CEFTRIAXONE 1 G/1
1 INJECTION, POWDER, FOR SOLUTION INTRAMUSCULAR; INTRAVENOUS EVERY 24 HOURS
Status: COMPLETED | OUTPATIENT
Start: 2023-12-01 | End: 2023-12-01

## 2023-12-01 RX ORDER — METOPROLOL SUCCINATE 25 MG/1
25 TABLET, EXTENDED RELEASE ORAL DAILY
COMMUNITY
Start: 2023-12-11 | End: 2023-12-11

## 2023-12-01 RX ADMIN — LEVOTHYROXINE SODIUM 100 MCG: 100 TABLET ORAL at 08:42

## 2023-12-01 RX ADMIN — CEFTRIAXONE SODIUM 1 G: 1 INJECTION, POWDER, FOR SOLUTION INTRAMUSCULAR; INTRAVENOUS at 16:37

## 2023-12-01 RX ADMIN — SODIUM CHLORIDE: 9 INJECTION, SOLUTION INTRAVENOUS at 09:27

## 2023-12-01 RX ADMIN — CEFTRIAXONE SODIUM 1 G: 1 INJECTION, POWDER, FOR SOLUTION INTRAMUSCULAR; INTRAVENOUS at 00:20

## 2023-12-01 RX ADMIN — ASPIRIN 81 MG: 81 TABLET, COATED ORAL at 08:42

## 2023-12-01 ASSESSMENT — ACTIVITIES OF DAILY LIVING (ADL)
ADLS_ACUITY_SCORE: 24
ADLS_ACUITY_SCORE: 35
ADLS_ACUITY_SCORE: 22
ADLS_ACUITY_SCORE: 35
ADLS_ACUITY_SCORE: 35
ADLS_ACUITY_SCORE: 24
ADLS_ACUITY_SCORE: 35

## 2023-12-01 NOTE — H&P
[facility]    History and Physical - Hospitalist Service       Date of Admission:  11/30/2023    Assessment & Plan      Moses Elizondo is a 89 year old male admitted on 11/30/2023. He presents for evaluation of generalized weakness        Sepsis without acute organ dysfunction, due to unspecified organism (H)      Acute cystitis without hematuria    Acute kidney injury    Generalized weakness    Assessment: presents with generalized weakness and confusion onset this morning.  Patient reports that he lost his balance and had a fall at home without head trauma or loss of consciousness.  He reports that he has been urinating somewhat more than normal but denies dysuria.  On admission, labs pertinent for a WBC of 12.4, fever of 100.3  F, creatinine 1.35.  UA is highly consistent with a urinary tract infection.    Plan:   -Admit to inpatient  -IV ceftriaxone  -IV fluids overnight for LYSAS  -Follow urine cultures/blood cultures  -Fall precautions  -Follow vitals/temp  -PT eval in AM      Hypothyroidism, unspecified type    Assessment/Plan: Continue prior to mission levothyroxine      MGUS (monoclonal gammopathy of unknown significance)    Assessment/Plan: Stable, follow as an outpatient      Hyperlipidemia LDL goal <100    Coronary artery disease    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24)    Assessment: PTA on Lasix 10 mg daily, metoprolol 25 mg daily, Entresto.    Plan:   -Holding prior to mission Lasix/Entresto/metoprolol in the setting of hypotension due to sepsis.          Diet:   Regular Diet  DVT Prophylaxis: Pneumatic Compression Devices  [place] Catheter: Not present  Lines: None     Cardiac Monitoring: None  Code Status:  FULL CODE    Clinically Significant Risk Factors Present on Admission                # Drug Induced Platelet Defect: home medication list includes an antiplatelet medication    # Heart failure, NOS: heart  "failure noted on the problem list and last echo with EF 40-50%     # Overweight: Estimated body mass index is 26.78 kg/m  as calculated from the following:    Height as of 11/27/23: 1.803 m (5' 11\").    Weight as of 11/27/23: 87.1 kg (192 lb).        # Pacemaker present       Disposition Plan      Expected Discharge Date: 12/02/2023                  Everton Harry MD  Hospitalist Service  [facility]  Securely message with [employer] (more info)  Text page via [facility] Paging/Directory     ______________________________________________________________________    Chief Complaint     Generalized weakness    History is obtained from the patient    History of Present Illness     Moses WINSTON Elizondo is a 89 year old male with past medical history of coronary disease, hypertension, hyperlipidemia, hypothyroidism who presents for evaluation of generalized weakness.    Patient reports that he has become more weak over the past several days.  Most notably over the past 24 hours he has become more confused and weak per his family.  On the day of admission, he lost his balance due to weakness and had a fall but did not sustain any head trauma and there was no loss of consciousness.  He does endorse increasing urinary frequency but denies dysuria overall or hematuria.  He does endorse some loose stools.  But no nausea/vomiting or abdominal pain.  He otherwise has no chest pain or shortness of breath.  He lives in a house with his wife, overall independent at baseline.  At this time patient has no other complaints.    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     Familial tremor     Former smoker     Herpes zoster without mention of complication     HTN (hypertension)     Hyperlipidaemia     Idiopathic peripheral neuropathy     MGUS (monoclonal gammopathy of unknown significance)     NSTEMI (non-ST elevated myocardial infarction) (H) 2014    Other and unspecified hyperlipidemia     PFO (patent foramen ovale)     Prostatitis, unspecified     Second degree heart block     Sensorineural hearing loss, unspecified     SSS (sick sinus syndrome) (H)     Status post coronary angiogram 06/20/2019    Unspecified hypothyroidism        Past Surgical History   Past Surgical History:   Procedure Laterality Date    CHOLECYSTECTOMY      CORONARY ANGIOGRAPHY ADULT ORDER  5/14/14    PTCA w/YOGESH to OM1    CV CORONARY ANGIOGRAM N/A 6/20/2019    Procedure: Coronary Angiogram;  Surgeon: Romie Coronado MD;  Location:  HEART CARDIAC CATH LAB    CV INTRAVASULAR ULTRASOUND N/A 6/20/2019    Procedure: Intravascular Ultrasound;  Surgeon: Romie Coronado MD;  Location:  HEART CARDIAC CATH LAB    CV PCI ATHERECTOMY ORBITAL N/A 6/20/2019    Procedure: PCI Atherectomy Orbital;  Surgeon: Romie Coronado MD;  Location:  HEART CARDIAC CATH LAB    CV PCI STENT DRUG ELUTING N/A 6/20/2019    Procedure: PCI Stent Drug Eluting;  Surgeon: Romie Coronado MD;  Location:  HEART CARDIAC CATH LAB    CV TEMPORARY PACEMAKER INSERTION N/A 6/20/2019    Procedure: Temporary Pacemaker Insertion;  Surgeon: Romie Coronado MD;  Location:  HEART CARDIAC CATH LAB    EP PACEMAKER N/A 6/20/2019    Procedure: EP Pacemaker;  Surgeon: Max Dowell MD;  Location:  HEART CARDIAC CATH LAB    EP PPM UPGRADE TO BIVENT N/A 9/16/2021    Procedure: EP PPM UPGRADE TO BIVENT;  Surgeon: Tre Miller MD;  Location:  HEART CARDIAC CATH LAB    HEART CATH, ANGIOPLASTY  5/14/14    YOGESH to OM1    ZZC NONSPECIFIC PROCEDURE  2010    Laparoscopic  "cholecystectomy.        Prior to Admission Medications   Prior to Admission Medications   Prescriptions Last Dose Informant Patient Reported? Taking?   ASPIRIN EC PO 2023 Self Yes Yes   Sig: Take 81 mg by mouth at bedtime   acetaminophen (TYLENOL) 650 MG CR tablet 2023 Self Yes Yes   Sig: Take 650 mg by mouth at bedtime   atorvastatin (LIPITOR) 40 MG tablet 2023 Self No Yes   Sig: Take 1 tablet (40 mg) by mouth daily (with dinner)   furosemide (LASIX) 20 MG tablet 2023 Self No Yes   Sig: Take 1 tablet (20 mg) by mouth daily   gabapentin (NEURONTIN) 300 MG capsule 2023 Self No Yes   Sig: TAKE 1 CAPSULE(300 MG) BY MOUTH AT BEDTIME   levothyroxine (SYNTHROID/LEVOTHROID) 100 MCG tablet 2023 Self No Yes   Sig: TAKE 1 TABLET (100 MCG) BY MOUTH DAILY   metoprolol succinate ER (TOPROL XL) 25 MG 24 hr tablet 2023 at lunch Self No Yes   Sig: Take 1 tablet (25 mg) by mouth daily At lunch   nitroGLYcerin (NITROSTAT) 0.4 MG sublingual tablet prn Self No No   Sig: One tablet under the tongue every 5 minutes if needed for chest pain. May repeat every 5 minutes for a maximum of 3 doses in 15 minutes\"   polyethylene glycol (MIRALAX) 17 g packet 2023 Self Yes Yes   Sig: Take 17 g by mouth every morning   sacubitril-valsartan (ENTRESTO) 24-26 MG per tablet 2023 at x1 Self No Yes   Sig: Take 1/2 tab (12/13mg) twice daily      Facility-Administered Medications: None        Review of Systems    The 10 point Review of Systems is negative other than noted in the HPI or here.     Social History   I have reviewed this patient's social history and updated it with pertinent information if needed.  Social History     Tobacco Use    Smoking status: Former     Packs/day: 0.50     Years: 16.00     Additional pack years: 0.00     Total pack years: 8.00     Types: Cigarettes     Start date:      Quit date: 1967     Years since quittin.9    Smokeless tobacco: Never   Substance Use " Topics    Alcohol use: Yes     Comment: 5-7 drinks week - at most one drink daily    Drug use: No         Family History   I have reviewed this patient's family history and updated it with pertinent information if needed.  Family History   Problem Relation Age of Onset    Cancer Mother     Genitourinary Problems Father 99        complications from surgery    Heart Disease Brother 72        MI         Allergies   Allergies   Allergen Reactions    No Known Drug Allergy      ------------------------------------------------------------------------     Physical Exam   Vital Signs: Temp: 100.3  F (37.9  C) Temp src: Oral BP: 94/57 Pulse: 60   Resp: 18 SpO2: 92 % O2 Device: None (Room air)    Weight: 0 lbs 0 oz    Constitutional: awake, alert, cooperative, no apparent distress.   Eyes: Lids and lashes normal, pupils equal, round and reactive to light   ENT: Normocephalic, without obvious abnormality, atraumatic, sinuses nontender on palpation   Hematologic / Lymphatic: no cervical lymphadenopathy   Respiratory: CTABL   Cardiovascular: RRR with no m/r/g   GI: Normal bowel sounds, soft, non-distended, non-tender.   Skin: normal skin color, texture, turgor   Musculoskeletal: There is no redness, warmth, or swelling of the joints. Full range of motion noted.   Neurologic: Awake, alert, oriented to name, place and time. Cranial nerves II-XII are grossly intact. Motor is 5 out of 5 bilaterally. Sensory is intact.   Neuropsychiatric: normal mood and affect  ----------------------------------------------------------------------------------------------------------------------------------    Medical Decision Making       ------------------ MEDICAL DECISION MAKING ------------------------------------------------------------------------------------------------------  High Complexity Decision Making       Data   ------------------------- PAST 24 HR DATA REVIEWED -----------------------------------------------    I have personally  reviewed the following data over the past 24 hrs:    12.4 (H)  \   12.3 (L)   / 128 (L)     136 104 18.2 /  122 (H)   4.0 23 1.35 (H) \     ALT: 14 AST: 29 AP: 62 TBILI: 0.6   ALB: 3.7 TOT PROTEIN: 6.3 (L) LIPASE: N/A     Trop: 35 (H) BNP: 1,992 (H)     Procal: N/A CRP: N/A Lactic Acid: 0.5         Imaging results reviewed over the past 24 hrs:   Recent Results (from the past 24 hour(s))   Head CT w/o contrast    Narrative    CT SCAN OF THE HEAD WITHOUT CONTRAST   11/30/2023 2:50 PM     HISTORY: fall, AMS    TECHNIQUE:  Axial images of the head and coronal reformations without  IV contrast material. Radiation dose for this scan was reduced using  automated exposure control, adjustment of the mA and/or kV according  to patient size, or iterative reconstruction technique.    COMPARISON: None.    FINDINGS: There is no evidence of intracranial hemorrhage, mass, acute  infarct or anomaly. There is generalized brain volume loss with  associated ex vacuo dilatation of the ventricles. Mild patchy  periventricular white matter hypodensities which are nonspecific, but  likely related to chronic microvascular ischemic disease.     The visualized portions of the sinuses and mastoids appear normal. The  bony calvarium and bones of the skull base appear intact.       Impression    IMPRESSION:     1. No evidence of acute intracranial hemorrhage, mass, or herniation.  2. Diffuse parenchymal volume loss and white matter changes likely due  to chronic microvascular ischemic change.      RODRIGO ESCUDERO MD         SYSTEM ID:  [id number]   Chest XR,  PA & LAT    Narrative    CHEST TWO VIEWS  11/30/2023 3:54 PM     HISTORY:  Sepsis.    COMPARISON: 9/16/2021.      Impression    IMPRESSION: Triple lead cardiac device left chest wall, with lead tips  projected over the RA, RV, and coronary sinus. Tortuous calcified  thoracic aorta. Heart size is stable. Pulmonary vascularity is within  normal limits. Lungs clear. No pleural  effusions.    MATT SAPP MD         SYSTEM ID:  [id number]     ------------------------- ENCOUNTER LABS ----------------------------------------------------------------  Recent Labs   Lab 11/30/23  1429   WBC 12.4*   HGB 12.3*   MCV 89   *      POTASSIUM 4.0   CHLORIDE 104   CO2 23   BUN 18.2   CR 1.35*   ANIONGAP 9   GABRIELA 8.6   *   ALBUMIN 3.7   PROTTOTAL 6.3*   BILITOTAL 0.6   ALKPHOS 62   ALT 14   AST 29       Most Recent 3 CBC's:  Recent Labs   Lab Test 11/30/23  1429 07/07/22  0806 11/12/21  1204   WBC 12.4* 6.1 6.6   HGB 12.3* 13.1* 12.3*   MCV 89 91 90   * 136* 171     Most Recent 3 BMP's:  Recent Labs   Lab Test 11/30/23  1429 11/20/23  0842 08/02/23  1212    140 140   POTASSIUM 4.0 4.0 4.3   CHLORIDE 104 106 106   CO2 23 26 26   BUN 18.2 19.0 23.2*   CR 1.35* 1.25* 1.25*   ANIONGAP 9 8 8   GABRIELA 8.6 9.3 9.2   * 89 91     Most Recent 2 LFT's:  Recent Labs   Lab Test 11/30/23  1429 11/20/23  0842 07/07/22  0806 03/02/21  0927   AST 29  --   --  32   ALT 14 17   < > 30   ALKPHOS 62  --   --  90   BILITOTAL 0.6  --   --  0.8    < > = values in this interval not displayed.     Most Recent 3 INR's:  Recent Labs   Lab Test 06/20/19  0706   INR 1.00     Most Recent 3 Troponin's:No lab results found.  Most Recent 3 BNP's:  Recent Labs   Lab Test 11/30/23  1429 05/17/23  0834 05/01/23  1401 10/17/22  0845   NTBNPI 1,992*  --   --   --    NTBNP  --  481 459 407     Most Recent D-dimer:No lab results found.  Most Recent Cholesterol Panel:  Recent Labs   Lab Test 11/20/23  0842   CHOL 179   *   HDL 47   TRIG 93     Most Recent 6 Bacteria Isolates From Any Culture (See EPIC Reports for Culture Details):  Recent Labs   Lab Test 05/20/16  0953   CULT 50,000 to 100,000 colonies/mL Coagulase negative Staphylococcus*     Most Recent Urinalysis:  Recent Labs   Lab Test 11/30/23  1724 05/20/16  0929   COLOR Orange* Yellow   APPEARANCE Slightly Cloudy* Clear   URINEGLC Negative  Negative   URINEBILI Negative Small  This is an unconfirmed screening test result. A positive result may be false.  *   URINEKETONE Negative Negative   SG 1.019 1.020   UBLD Large* Negative   URINEPH 5.5 6.5   PROTEIN 20* Trace*   UROBILINOGEN  --  1.0   NITRITE Positive* Negative   LEUKEST Moderate* Negative   RBCU >182* 5-10*   WBCU 74* 2-5*

## 2023-12-01 NOTE — ED NOTES
[facility]  ED Nurse Handoff Report    ED Chief complaint: Fall and Altered Mental Status      ED Diagnosis:   Final diagnoses:   Acute cystitis without hematuria   Sepsis without acute organ dysfunction, due to unspecified organism (H)       Code Status: Full Code    Allergies:   Allergies   Allergen Reactions    No Known Drug Allergy        Patient Story: see ED triage note  Focused Assessment:    Chest XR,  PA & LAT   Final Result   IMPRESSION: Triple lead cardiac device left chest wall, with lead tips   projected over the RA, RV, and coronary sinus. Tortuous calcified   thoracic aorta. Heart size is stable. Pulmonary vascularity is within   normal limits. Lungs clear. No pleural effusions.      MATT SAPP MD            SYSTEM ID:  [id number]      Head CT w/o contrast   Final Result   IMPRESSION:      1. No evidence of acute intracranial hemorrhage, mass, or herniation.   2. Diffuse parenchymal volume loss and white matter changes likely due   to chronic microvascular ischemic change.         RODRIGO ESCUDERO MD            SYSTEM ID:  [id number]        Abnormal Labs Resulted from Time of ED Arrival to Time of ED Departure   ROUTINE UA WITH MICROSCOPIC REFLEX TO CULTURE - Abnormal       Result Value    Color Urine Orange (*)     Appearance Urine Slightly Cloudy (*)     Glucose Urine Negative      Bilirubin Urine Negative      Ketones Urine Negative      Specific Gravity Urine 1.019      Blood Urine Large (*)     pH Urine 5.5      Protein Albumin Urine 20 (*)     Urobilinogen Urine Normal      Nitrite Urine Positive (*)     Leukocyte Esterase Urine Moderate (*)     Bacteria Urine Few (*)     Mucus Urine Present (*)     RBC Urine >182 (*)     WBC Urine 74 (*)    COMPREHENSIVE METABOLIC PANEL - Abnormal    Sodium 136      Potassium 4.0      Carbon Dioxide (CO2) 23      Anion Gap 9      Urea Nitrogen 18.2      Creatinine 1.35 (*)     GFR Estimate 50 (*)     Calcium 8.6      Chloride 104      Glucose  122 (*)     Alkaline Phosphatase 62      AST 29      ALT 14      Protein Total 6.3 (*)     Albumin 3.7      Bilirubin Total 0.6     NT PROBNP INPATIENT - Abnormal    N terminal Pro BNP Inpatient 1,992 (*)    TROPONIN T, HIGH SENSITIVITY - Abnormal    Troponin T, High Sensitivity 35 (*)    CBC WITH PLATELETS AND DIFFERENTIAL - Abnormal    WBC Count 12.4 (*)     RBC Count 4.21 (*)     Hemoglobin 12.3 (*)     Hematocrit 37.6 (*)     MCV 89      MCH 29.2      MCHC 32.7      RDW 11.9      Platelet Count 128 (*)     % Neutrophils 81      % Lymphocytes 9      % Monocytes 9      % Eosinophils 0      % Basophils 0      % Immature Granulocytes 1      NRBCs per 100 WBC 0      Absolute Neutrophils 10.0 (*)     Absolute Lymphocytes 1.1      Absolute Monocytes 1.1      Absolute Eosinophils 0.0      Absolute Basophils 0.0      Absolute Immature Granulocytes 0.1      Absolute NRBCs 0.0     ISTAT GASES LACTATE VENOUS POCT - Abnormal    Lactic Acid POCT 0.8      Bicarbonate Venous POCT 25      O2 Sat, Venous POCT 22 (*)     pCO2 Venous POCT 41      pH Venous POCT 7.40      pO2 Venous POCT 16 (*)    ISTAT GASES LACTATE VENOUS POCT - Abnormal    Lactic Acid POCT 0.5      Bicarbonate Venous POCT 20 (*)     O2 Sat, Venous POCT 90 (*)     pCO2 Venous POCT 30 (*)     pH Venous POCT 7.43      pO2 Venous POCT 55 (*)          Treatments and/or interventions provided: labs, imaging, meds   Patient's response to treatments and/or interventions: tolerated well    To be done/followed up on inpatient unit:  per MD orders    Does this patient have any cognitive concerns?: Forgetful    Activity level - Baseline/Home:  Independent  Activity Level - Current:   Stand with Assist    Patient's Preferred language: English   Needed?: No    Isolation: None  Infection: Not Applicable  Patient tested for COVID 19 prior to admission: YES  Bariatric?: No    Vital Signs:   Vitals:    11/30/23 1745 11/30/23 1800 11/30/23 1830 11/30/23 1900   BP: 94/57  92/56 100/56 95/60   Pulse: 60 60 60 60   Resp:       Temp:       TempSrc:       SpO2:           Cardiac Rhythm:     Was the PSS-3 completed:   Yes  What interventions are required if any?               Family Comments: @ bedside  OBS brochure/video discussed/provided to patient/family: N/A              Name of person given brochure if not patient:               Relationship to patient:     For the majority of the shift this patient's behavior was Green.   Behavioral interventions performed were none.    ED NURSE PHONE NUMBER: *[phone]

## 2023-12-01 NOTE — ED NOTES
Writer straight cath pt after bladder scan to obtain UA and drain bladder.  Bladder scan showed 100ml, bladder drained of 200ml total.  Orange colored urine, small amt of blood at tip of catheter when removed

## 2023-12-01 NOTE — UTILIZATION REVIEW
"    Admission Status; Secondary Review Determination         Under the authority of the Utilization Management Committee, the utilization review process indicated a secondary review on the above patient.  The review outcome is based on review of the medical records, discussions with staff, and applying clinical experience noted on the date of the review.          (x) Observation Status Appropriate - This patient does not meet hospital inpatient criteria and is placed in observation status. If this patient's primary payer is Medicare and was admitted as an inpatient, Condition Code 44 should be used and patient status changed to \"observation\".     RATIONALE FOR DETERMINATION   89-year-old male was admitted with generalized weakness and confusion, which began the morning of admission following a fall at home without head trauma or loss of consciousness. Initial labs showed a WBC of 12.4, fever of 100.3 F, and a creatinine level of 1.35, baseline is 1.25. Urinalysis indicated a urinary tract infection (UTI). The patient was admitted for inpatient management, including IV ceftriaxone for the UTI, IV fluids , and fall precautions. Hypothyroidism was noted but stable on prior levothyroxine, and the patient also had MGUS (monoclonal gammopathy of unknown significance) to be followed as an outpatient.  The severity of illness, intensity of service provided, expected LOS and risk for adverse outcome make the care appropriate for further observation; however, doesn't meet criteria for hospital inpatient admission. Dr Harry notified of this determination.    This document was produced using voice recognition software.      The information on this document is developed by the utilization review team in order for the business office to ensure compliance.  This only denotes the appropriateness of proper admission status and does not reflect the quality of care rendered.         The definitions of Inpatient Status and Observation " Status used in making the determination above are those provided in the CMS Coverage Manual, Chapter 1 and Chapter 6, section 70.4.      Sincerely,     LEANN MAGANA MD    System Medical Director  Utilization Management  [facility].

## 2023-12-01 NOTE — PROGRESS NOTES
[facility]    Medicine Progress Note - Hospitalist Service    Date of Admission:  11/30/2023  Date of Service: 12/01/2023    Assessment & Plan      Moses Elizondo is a 89 year old male admitted on 11/30/2023. He presents for evaluation of generalized weakness        Sepsis without acute organ dysfunction, due to unspecified organism (H)      Acute cystitis without hematuria    Acute kidney injury    Generalized weakness    Assessment: presents with generalized weakness and confusion onset this morning.  Patient reports that he lost his balance and had a fall at home without head trauma or loss of consciousness.  He reports that he has been urinating somewhat more than normal but denies dysuria.  On admission, labs pertinent for a WBC of 12.4, fever of 100.3  F, creatinine 1.35.  UA is highly consistent with a urinary tract infection.    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    Plan:   -Holding prior to mission Lasix/Entresto/metoprolol in the setting of hypotension due to sepsis.          Diet: Combination Diet Regular Diet Adult    DVT Prophylaxis: Pneumatic Compression Devices  [place] Catheter: Not present  Lines: None     Cardiac Monitoring: None  Code Status: Full Code      Clinically Significant Risk Factors Present on Admission                # Drug Induced Platelet Defect: home medication list  "includes an antiplatelet medication    # Heart failure, NOS: heart failure noted on the problem list and last echo with EF 40-50%     # Overweight: Estimated body mass index is 25.2 kg/m  as calculated from the following:    Height as of this encounter: 1.8 m (5' 10.87\").    Weight as of this encounter: 81.6 kg (180 lb).        # Pacemaker present       Disposition Plan      Expected Discharge Date: 12/03/2023      Destination: home with family              Everton Harry MD  Hospitalist Service  [facility]  Securely message with [employer] (more info)  Text page via [facility] Paging/Directory   ______________________________________________________________________    Interval History     Doing well   BPs soft but normalizing   No CP/SOB  No fevers  No new nausea / vomiting or abdominal pain  No new complaints    Physical Exam   Vital Signs: Temp: 99.5  F (37.5  C) Temp src: Oral BP: 102/56 Pulse: 73   Resp: 18 SpO2: 93 % O2 Device: None (Room air)    Weight: 180 lbs 0 oz    Constitutional: awake, alert, cooperative, no apparent distress.   Respiratory: CTABL   Cardiovascular: RRR with no m/r/g   GI: Normal bowel sounds, soft, non-distended, non-tender.   Skin: normal skin color, texture, turgor   Musculoskeletal: There is no redness, warmth, or swelling of the joints. Full range of motion noted.   Neurologic: Awake, alert, oriented to name, place and time.  Motor is 5 out of 5 bilaterally. Sensory is intact.   Neuropsychiatric: normal mood and affect    ----------------------------------------------------------------------------------------    Medical Decision Making       ------------------ MEDICAL DECISION MAKING ------------------------------------------------------------------------------------------------------  Moderate Complexity Decision Making       Data   ------------------------- PAST 24 HR DATA REVIEWED -----------------------------------------------    I have personally reviewed the " following data over the past 24 hrs:    9.6  \   10.9 (L)   / 92 (L)     140 108 (H) 17.7 /  95   3.8 23 1.25 (H) \     ALT: N/A AST: N/A AP: N/A TBILI: N/A   ALB: N/A TOT PROTEIN: N/A LIPASE: N/A     Trop: N/A BNP: N/A     Procal: N/A CRP: N/A Lactic Acid: 0.5         Imaging results reviewed over the past 24 hrs:   Recent Results (from the past 24 hour(s))   Head CT w/o contrast    Narrative    CT SCAN OF THE HEAD WITHOUT CONTRAST   11/30/2023 2:50 PM     HISTORY: fall, AMS    TECHNIQUE:  Axial images of the head and coronal reformations without  IV contrast material. Radiation dose for this scan was reduced using  automated exposure control, adjustment of the mA and/or kV according  to patient size, or iterative reconstruction technique.    COMPARISON: None.    FINDINGS: There is no evidence of intracranial hemorrhage, mass, acute  infarct or anomaly. There is generalized brain volume loss with  associated ex vacuo dilatation of the ventricles. Mild patchy  periventricular white matter hypodensities which are nonspecific, but  likely related to chronic microvascular ischemic disease.     The visualized portions of the sinuses and mastoids appear normal. The  bony calvarium and bones of the skull base appear intact.       Impression    IMPRESSION:     1. No evidence of acute intracranial hemorrhage, mass, or herniation.  2. Diffuse parenchymal volume loss and white matter changes likely due  to chronic microvascular ischemic change.      RODRIGO ESCUDERO MD         SYSTEM ID:  [id number]   Chest XR,  PA & LAT    Narrative    CHEST TWO VIEWS  11/30/2023 3:54 PM     HISTORY:  Sepsis.    COMPARISON: 9/16/2021.      Impression    IMPRESSION: Triple lead cardiac device left chest wall, with lead tips  projected over the RA, RV, and coronary sinus. Tortuous calcified  thoracic aorta. Heart size is stable. Pulmonary vascularity is within  normal limits. Lungs clear. No pleural effusions.    MATT SAPP MD          SYSTEM ID:  [id number]     ------------------------- ENCOUNTER LABS ----------------------------------------------------------------  Recent Labs   Lab 12/01/23 0840 11/30/23  1429   WBC 9.6 12.4*   HGB 10.9* 12.3*   MCV 89 89   PLT 92* 128*    136   POTASSIUM 3.8 4.0   CHLORIDE 108* 104   CO2 23 23   BUN 17.7 18.2   CR 1.25* 1.35*   ANIONGAP 9 9   GABRIELA 8.5 8.6   GLC 95 122*   ALBUMIN  --  3.7   PROTTOTAL  --  6.3*   BILITOTAL  --  0.6   ALKPHOS  --  62   ALT  --  14   AST  --  29       Most Recent 3 CBC's:  Recent Labs   Lab Test 12/01/23  0840 11/30/23 1429 07/07/22  0806   WBC 9.6 12.4* 6.1   HGB 10.9* 12.3* 13.1*   MCV 89 89 91   PLT 92* 128* 136*     Most Recent 3 BMP's:  Recent Labs   Lab Test 12/01/23  0840 11/30/23 1429 11/20/23  0842    136 140   POTASSIUM 3.8 4.0 4.0   CHLORIDE 108* 104 106   CO2 23 23 26   BUN 17.7 18.2 19.0   CR 1.25* 1.35* 1.25*   ANIONGAP 9 9 8   GABRIELA 8.5 8.6 9.3   GLC 95 122* 89     Most Recent 2 LFT's:  Recent Labs   Lab Test 11/30/23  1429 11/20/23  0842 07/07/22  0806 03/02/21  0927   AST 29  --   --  32   ALT 14 17   < > 30   ALKPHOS 62  --   --  90   BILITOTAL 0.6  --   --  0.8    < > = values in this interval not displayed.     Most Recent 3 INR's:  Recent Labs   Lab Test 06/20/19  0706   INR 1.00     Most Recent 3 Troponin's:No lab results found.  Most Recent 3 BNP's:  Recent Labs   Lab Test 11/30/23  1429 05/17/23  0834 05/01/23  1401 10/17/22  0845   NTBNPI 1,992*  --   --   --    NTBNP  --  481 459 407     Most Recent D-dimer:No lab results found.  Most Recent Cholesterol Panel:  Recent Labs   Lab Test 11/20/23  0842   CHOL 179   *   HDL 47   TRIG 93     Most Recent 6 Bacteria Isolates From Any Culture (See EPIC Reports for Culture Details):  Recent Labs   Lab Test 05/20/16  0953   CULT 50,000 to 100,000 colonies/mL Coagulase negative Staphylococcus*     Most Recent Urinalysis:  Recent Labs   Lab Test 11/30/23  1724 05/20/16  0929   COLOR Orange* Yellow    APPEARANCE Slightly Cloudy* Clear   URINEGLC Negative Negative   URINEBILI Negative Small  This is an unconfirmed screening test result. A positive result may be false.  *   URINEKETONE Negative Negative   SG 1.019 1.020   UBLD Large* Negative   URINEPH 5.5 6.5   PROTEIN 20* Trace*   UROBILINOGEN  --  1.0   NITRITE Positive* Negative   LEUKEST Moderate* Negative   RBCU >182* 5-10*   WBCU 74* 2-5*

## 2023-12-01 NOTE — DISCHARGE SUMMARY
"[facility]  Hospitalist Discharge Summary      Date of Admission:  11/30/2023  Date of Discharge:  12/1/2023  Discharging Provider: Everton Harry MD  Discharge Service: Hospitalist Service    Discharge Diagnoses       Sepsis without acute organ dysfunction, due to unspecified organism (H)      Acute cystitis without hematuria    Acute kidney injury    Generalized weakness    Clinically Significant Risk Factors     # Overweight: Estimated body mass index is 25.2 kg/m  as calculated from the following:    Height as of this encounter: 1.8 m (5' 10.87\").    Weight as of this encounter: 81.6 kg (180 lb).       Follow-ups Needed After Discharge   Follow-up Appointments     Follow-up and recommended labs and tests       Follow up with primary care provider, Steven Wagoner, within 7   days for hospital follow- up.  No follow up labs or test are needed.            Unresulted Labs Ordered in the Past 30 Days of this Admission       Date and Time Order Name Status Description    11/30/2023  5:51 PM Urine Culture Preliminary     11/30/2023  2:23 PM Blood Culture Peripheral Blood Preliminary     11/30/2023  2:23 PM Blood Culture Line, venous Preliminary           Discharge Disposition   Discharged to home  Condition at discharge: Stable    Hospital Course   Moses Elizondo is a 89 year old male admitted on 11/30/2023. He presents for evaluation of generalized weakness        Sepsis without acute organ dysfunction, due to unspecified organism (H)      Acute cystitis without hematuria    Acute kidney injury    Generalized weakness    Assessment: presents with generalized weakness and confusion onset this morning.  Patient reports that he lost his balance and had a fall at home without head trauma or loss of consciousness.  He reports that he has been urinating somewhat more than normal but denies dysuria.  On admission, labs pertinent for a WBC of 12.4, fever of 100.3  F, creatinine 1.35.  UA is " highly consistent with a urinary tract infection.    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    Plan:   -Holding prior to mission lasix and Entresto  -Resume metoprolol on Monday    Consultations This Hospital Stay   PHYSICAL THERAPY ADULT IP CONSULT    Code Status   Full Code    Time Spent on this Encounter   I, Everton Harry MD, personally saw the patient today and spent greater than 30 minutes discharging this patient.       Everton Harry MD  [place] [id number] [facility]  [address] [place] [address], [address] [address]  Phone: [phone]  ______________________________________________________________________    Physical Exam   Vital Signs: Temp: 99.7  F (37.6  C) Temp src: Oral BP: 99/56 Pulse: 62   Resp: 18 SpO2: 94 % O2 Device: None (Room air)    Weight: 180 lbs 0 oz  ----------------------------------------------------------------------------------------       Primary Care Physician   Steven Wagoner    Discharge Orders      Reason for your hospital stay    You had a fall and weakness from a urinary tract infection needing antibiotics.     Follow-up and recommended labs and tests     Follow up with primary care provider, Steven Wagoner, within 7 days for hospital follow- up.  No follow up labs or test are needed.     Activity    Your activity upon discharge: activity as tolerated     Diet    Follow this diet upon discharge: Orders Placed This Encounter       Combination Diet Regular Diet Adult       Significant Results and Procedures   Most Recent 3 CBC's:  Recent Labs   Lab Test 12/01/23  0840 11/30/23  1429 07/07/22  0806   WBC 9.6 12.4* 6.1   HGB 10.9* 12.3* 13.1*   MCV 89 89 91   PLT 92* 128* 136*     Most Recent 3 BMP's:  Recent Labs   Lab Test 12/01/23  0840 11/30/23  1429 11/20/23  0842    136 140   POTASSIUM 3.8 4.0 4.0   CHLORIDE 108* 104 106   CO2 23 23 26   BUN 17.7 18.2 19.0   CR 1.25* 1.35* 1.25*   ANIONGAP 9 9 8   GABRIELA 8.5 8.6 9.3   GLC 95 122* 89     Most Recent 2 LFT's:  Recent Labs   Lab Test 11/30/23  1429 11/20/23  0842 07/07/22  0806 03/02/21  0927   AST 29  --   --  32   ALT 14 17   < > 30   ALKPHOS 62  --   --  90   BILITOTAL 0.6  --   --  0.8    < > = values in this interval not displayed.     Most Recent 3 INR's:  Recent Labs   Lab Test 06/20/19  0706   INR 1.00     Most Recent 3 Troponin's:No lab results found.  Most Recent 3 BNP's:  Recent Labs   Lab Test 11/30/23  1429 05/17/23  0834 05/01/23  1401 10/17/22  0845   NTBNPI 1,992*  --   --   --    NTBNP  --  481 459 407     Most Recent D-dimer:No lab results found.  Most Recent Cholesterol Panel:  Recent Labs   Lab Test 11/20/23  0842   CHOL 179   *   HDL 47   TRIG 93     Most Recent TSH and T4:  Recent Labs   Lab Test 07/07/22  0806   TSH 1.44     Most Recent Hemoglobin A1c:  Recent Labs   Lab Test 05/02/19  0000   A1C 5.6     Most Recent Urinalysis:  Recent Labs   Lab Test 11/30/23  1724 05/20/16  0929   COLOR Orange* Yellow   APPEARANCE Slightly Cloudy* Clear   URINEGLC Negative Negative   URINEBILI Negative Small  This is an unconfirmed screening test result. A positive result may be false.  *   URINEKETONE Negative Negative   SG 1.019 1.020   UBLD Large* Negative   URINEPH 5.5 6.5   PROTEIN 20* Trace*   UROBILINOGEN  --  1.0   NITRITE Positive* Negative   LEUKEST Moderate* Negative   RBCU >182* 5-10*   WBCU 74* 2-5*   ,   Results for orders placed or performed during the  hospital encounter of 11/30/23   Head CT w/o contrast    Narrative    CT SCAN OF THE HEAD WITHOUT CONTRAST   11/30/2023 2:50 PM     HISTORY: fall, AMS    TECHNIQUE:  Axial images of the head and coronal reformations without  IV contrast material. Radiation dose for this scan was reduced using  automated exposure control, adjustment of the mA and/or kV according  to patient size, or iterative reconstruction technique.    COMPARISON: None.    FINDINGS: There is no evidence of intracranial hemorrhage, mass, acute  infarct or anomaly. There is generalized brain volume loss with  associated ex vacuo dilatation of the ventricles. Mild patchy  periventricular white matter hypodensities which are nonspecific, but  likely related to chronic microvascular ischemic disease.     The visualized portions of the sinuses and mastoids appear normal. The  bony calvarium and bones of the skull base appear intact.       Impression    IMPRESSION:     1. No evidence of acute intracranial hemorrhage, mass, or herniation.  2. Diffuse parenchymal volume loss and white matter changes likely due  to chronic microvascular ischemic change.      RODRIGO ESCUDERO MD         SYSTEM ID:  [id number]   Chest XR,  PA & LAT    Narrative    CHEST TWO VIEWS  11/30/2023 3:54 PM     HISTORY:  Sepsis.    COMPARISON: 9/16/2021.      Impression    IMPRESSION: Triple lead cardiac device left chest wall, with lead tips  projected over the RA, RV, and coronary sinus. Tortuous calcified  thoracic aorta. Heart size is stable. Pulmonary vascularity is within  normal limits. Lungs clear. No pleural effusions.    MATT SAPP MD         SYSTEM ID:  [id number]       Discharge Medications   Current Discharge Medication List        START taking these medications    Details   cefuroxime (CEFTIN) 500 MG tablet Take 1 tablet (500 mg) by mouth 2 times daily for 7 days  Qty: 14 tablet, Refills: 0    Associated Diagnoses: Acute cystitis without hematuria; Sepsis without acute  "organ dysfunction, due to unspecified organism (H)           CONTINUE these medications which have CHANGED    Details   metoprolol succinate ER (TOPROL XL) 25 MG 24 hr tablet Take 1 tablet (25 mg) by mouth daily At lunch    Associated Diagnoses: Coronary artery disease 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           CONTINUE these medications which have NOT CHANGED    Details   acetaminophen (TYLENOL) 650 MG CR tablet Take 650 mg by mouth at bedtime      ASPIRIN EC PO Take 81 mg by mouth at bedtime      atorvastatin (LIPITOR) 40 MG tablet Take 1 tablet (40 mg) by mouth daily (with dinner)  Qty: 90 tablet, Refills: 3    Associated Diagnoses: Non-STEMI (non-ST elevated myocardial infarction) (H); Coronary artery disease 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 May repeat every 5 minutes for a maximum of 3 doses in 15 minutes\"  Qty: 25 tablet, Refills: 3    Associated Diagnoses: Coronary artery disease involving native coronary artery of native heart with unstable angina pectoris (H)           STOP taking these medications       furosemide (LASIX) 20 MG tablet Comments:   Reason for Stopping:         sacubitril-valsartan (ENTRESTO) 24-26 MG per tablet Comments:   Reason for Stopping:             Allergies   Allergies   Allergen Reactions    No Known Drug Allergy      "

## 2023-12-01 NOTE — PROGRESS NOTES
"[facility]  OUTPATIENT PHYSICAL THERAPY EVALUATION  PLAN OF TREATMENT FOR OUTPATIENT REHABILITATION  (COMPLETE FOR INITIAL CLAIMS ONLY)  Patient's Last Name, First Name, M.I.  YOB: 1933  Moses Elizondo                        Provider's Name  [facility] Medical Record No.  [id number]                             Onset Date:  11/30/23   Start of Care Date:  12/01/23   Type:     _X_PT   ___OT   ___SLP Medical Diagnosis:  sepsis              PT Diagnosis:  impaired IND with functional mobility Visits from SOC:  1     See note for plan of treatment, functional goals and certification details    I CERTIFY THE NEED FOR THESE SERVICES FURNISHED UNDER        THIS PLAN OF TREATMENT AND WHILE UNDER MY CARE     (Physician co-signature of this document indicates review and certification of the therapy plan).                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\" and goes downstairs daily, has a who lives dtr lives 2 doors down and can assist at times if needed   Self-Care   Usual Activity Tolerance good   Current Activity Tolerance moderate   Equipment Currently Used at Home none;shower chair   Fall history within last six months yes   Number of times patient has fallen within last six months 1   Activity/Exercise/Self-Care Comment Pt IND and ADLs for mobility without AD, does own SEC and 4WW   General Information   Onset of " "Illness/Injury or Date of Surgery 11/30/23   Referring Physician Everton Harry MD   Pertinent History of Current Problem (include personal factors and/or comorbidities that impact the POC) \"presents with generalized weakness and confusion onset this morning.  Patient reports that he lost his balance and had a fall at home without head trauma or loss of consciousness.  He reports that he has been urinating somewhat more than normal but denies dysuria.  On admission, labs pertinent for a WBC of 12.4, fever of 100.3  F, creatinine 1.35.  UA is highly consistent with a urinary tract infection.\"   Cognition   Affect/Mental Status (Cognition) WFL   Orientation Status (Cognition) oriented x 3   Follows Commands (Cognition) WFL   Pain Assessment   Patient Currently in Pain No   Integumentary/Edema   Integumentary/Edema Comments age-related integumentary changes   Posture    Posture Forward head position   Range of Motion (ROM)   Range of Motion ROM is WFL   Strength (Manual Muscle Testing)   Strength (Manual Muscle Testing) Deficits observed during functional mobility   Strength Comments mildly decreased functional strength and endurance   Bed Mobility   Comment, (Bed Mobility) SBA sup>sit   Transfers   Comment, (Transfers) CGA sit>stand with no AD   Gait/Stairs (Locomotion)   Comment, (Gait/Stairs) Pt amb 5 ft with no AD and CGA, increased toe-out angle B, increased lateral sway   Balance   Balance Comments good sitting balance, fair standing balance   Sensory Examination   Sensory Perception Comments pt reports baseline neuropathy in BLEs up to feet and ankles   Clinical Impression   Criteria for Skilled Therapeutic Intervention Yes, treatment indicated   PT Diagnosis (PT) impaired IND with functional mobility   Influenced by the following impairments impaired functional strength, balance, activity tolerance   Functional limitations due to impairments impaired transfers, ambulation, stair performance   Clinical " Presentation (PT Evaluation Complexity) stable   Clinical Presentation Rationale Based on current presentation, PMH, social support   Clinical Decision Making (Complexity) low complexity   Planned Therapy Interventions (PT) balance training;bed mobility training;gait training;home exercise program;patient/family education;stair training;strengthening;transfer training;progressive activity/exercise;home program guidelines   Risk & Benefits of therapy have been explained evaluation/treatment results reviewed;care plan/treatment goals reviewed;risks/benefits reviewed;current/potential barriers reviewed;participants voiced agreement with care plan;participants included;patient;daughter   PT Total Evaluation Time   PT Eval, Low Complexity Minutes (42861) 10   Physical Therapy Goals   PT Frequency Daily   PT Predicted Duration/Target Date for Goal Attainment 12/08/23   PT Goals Bed Mobility;Transfers;Gait;Stairs   PT: Bed Mobility Independent;Supine to/from sit   PT: Transfers Modified independent;Sit to/from stand;Bed to/from chair;Assistive device   PT: Gait Modified independent;Assistive device;Rolling walker;Greater than 200 feet   PT: Stairs Supervision/stand-by assist;Greater than 10 stairs;Rail on left   Interventions   Interventions Quick Adds Gait Training;Therapeutic Activity   Therapeutic Activity   Therapeutic Activities: dynamic activities to improve functional performance Minutes (76316) 10   Symptoms Noted During/After Treatment Fatigue   Treatment Detail/Skilled Intervention Following eval, pt completed further sit<>Stands with FWW or with no AD. Dons shoes at EOB with SBA. Pt voids in standing with SBA, able to manage clothing IND. Sit>sup with SBA. Pt remained supine with alarm armed and all needs in reach.   Gait Training   Gait Training Minutes (63375) 24   Treatment Detail/Skilled Intervention Following eval, pt ambulated further distance, first two bouts with no AD, third bout with FWW, last bout  with SEC. Pt donned shoes following first bout as he notes he may feel more steady wearing supportive shoes. Without AD, pt is mildy unsteady, but no overt LOB observed. Able to complete horiz and vert head turns with CGA but slow pace of motion noted. Pt appears to be more steady and with quicker pace when using FWW. Slightly less steady with SEC vs FWW. Discussed benefits of AD use for stability as well as energy conservation. Pt would like walker for DC, edu on appropriate height and how to fold up walker to put in car. Completed ascent and descent of 5 stairs with CGA-SBA, step-to pattern, single rail. Pt edu on option to use BUE on single rail if feeling less steady.   PT Discharge Planning   PT Plan progress gait - trial SEC, stair trial, dynamic balance   PT Discharge Recommendation (DC Rec) home with assist   PT Rationale for DC Rec Pt appears to be near to reported baseline for mobility. CGA with no AD or SBA with FWW for ambulation today. Pt lives with spouse and has family nearby that can assist as needed. Anticipate pt will be able to return home with assist of family for heavier household tasks as needed, currently benefits from FWW use   PT Brief overview of current status Ax1 FWW   Total Session Time   Timed Code Treatment Minutes 34   Total Session Time (sum of timed and untimed services) 44

## 2023-12-01 NOTE — PROGRESS NOTES
RECEIVING UNIT ED HANDOFF REVIEW    ED Nurse Handoff Report was reviewed by: Stephenie Paige RN on December 1, 2023 at 7:45 AM

## 2023-12-02 NOTE — PLAN OF CARE
Physical Therapy Discharge Summary    Reason for therapy discharge:    Discharged to home.    Progress towards therapy goal(s). See goals on Care Plan in [facility] electronic health record for goal details.  Goals partially met.  Barriers to achieving goals:   discharge from facility.    Therapy recommendation(s):    Pt appears to be near to reported baseline for mobility. CGA with no AD or SBA with FWW for ambulation today. Pt lives with spouse and has family nearby that can assist as needed. Anticipate pt will be able to return home with assist of family for heavier household tasks as needed, currently benefits from FWW use

## 2023-12-03 ENCOUNTER — PATIENT OUTREACH (OUTPATIENT)
Dept: CARE COORDINATION | Facility: CLINIC | Age: 88
End: 2023-12-03
Payer: COMMERCIAL

## 2023-12-03 NOTE — PROGRESS NOTES
Clinic Care Coordination Contact  Community Health Worker Initial Outreach    Patient accepts CC: No, Pt declined needs for extra support.     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)    Acute cystitis without hematuria    Sepsis without acute organ dysfunction, due to unspecified organism (H)  Discharge:   Discharge Date: 12/01/23  Discharge Diagnosis: Hypothyroidism, unspecified type    MGUS (monoclonal gammopathy of unknown significance)    Hyperlipidemia LDL goal <100    Coronary artery disease    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 He presents for evaluation of generalized weakness     ASSESSMENT           Discharge Assessment  How are you doing now that you are home?: doing well  How are your symptoms? (Red Flag symptoms escalate to triage hotline per guidelines): Improved  Do you feel your condition is stable enough to be safe at home until your provider visit?: Yes  Does the patient have their discharge instructions? : Yes  Does the patient have questions regarding their discharge instructions? : No  Were you started on any new medications or were there changes to any of your previous medications? : Yes  Does the patient have all of their  medications?: Yes  Do you have questions regarding any of your medications? : No  Do you have all of your needed medical supplies or equipment (DME)?  (i.e. oxygen tank, CPAP, cane, etc.): Yes  Discharge follow-up appointment scheduled within 14 calendar days? : No (He planning to call clinic, Pt has diffeculty hearing)    Post-op ([facility] Only)  If the patient had a surgery or procedure, do they have any questions for a nurse?: No         PLAN                                                      Outpatient Plan:      Follow-up Appointments     Follow-up and recommended labs and tests       Follow up with primary care provider, Steven Wagoner, within 7   days for hospital follow- up.  No follow up labs or test are needed.      Future Appointments   Date Time Provider Department Center   3/21/2024 12:00 AM [address] [facility]     For any urgent concerns, please contact our 24 hour nurse triage line: [phone]     RINKU AlexanderW  [phone]  [facility]

## 2023-12-04 ENCOUNTER — TELEPHONE (OUTPATIENT)
Dept: CARDIOLOGY | Facility: CLINIC | Age: 88
End: 2023-12-04
Payer: COMMERCIAL

## 2023-12-04 DIAGNOSIS — I50.42 CHRONIC COMBINED SYSTOLIC AND DIASTOLIC HRT FAIL (H): ICD-10-CM

## 2023-12-04 DIAGNOSIS — I25.810 CORONARY ARTERY DISEASE INVOLVING CORONARY BYPASS GRAFT OF NATIVE HEART WITHOUT ANGINA PECTORIS: Primary | ICD-10-CM

## 2023-12-04 NOTE — TELEPHONE ENCOUNTER
Voicemail received from patient noting he was admitted at [facility] last week for a UTI and was advised to stop his furosemide and entresto. He said was given a 7 day course of antibiotics at discharge, and he is wondering if he is supposed to resume these once he finishes the antibiotics.     Discharge summary 12/1-   Acute cystitis without hematuria  Acute kidney injury  Generalized weakness  Assessment: presents with generalized weakness and confusion onset this morning.  Patient reports that he lost his balance and had a fall at home without head trauma or loss of consciousness.  He reports that he has been urinating somewhat more than normal but denies dysuria.  On admission, labs pertinent for a WBC of 12.4, fever of 100.3  F, creatinine 1.35.  UA is highly consistent with a urinary tract infection.    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    Plan:   -Holding prior to mission lasix and Entresto  -Resume metoprolol on Monday 12/11       PCP follow up is 12/11/23 for discharge follow up. Spoke to patient, says he does not check his BP at home. Chronic dyspnea, stable. Slight edema in ankles. His weight was 180lbs before admission and he said it was 185lbs today. OV note from 11/27 says goal weight 190lbs. He would like to be 175lbs. No orthopnea or PND. Lacey Puckett messaged to review.

## 2023-12-05 LAB
BACTERIA BLD CULT: NO GROWTH
BACTERIA BLD CULT: NO GROWTH

## 2023-12-05 RX ORDER — FUROSEMIDE 20 MG
20 TABLET ORAL DAILY
Status: ON HOLD | COMMUNITY
Start: 2023-12-05 | End: 2023-12-17

## 2023-12-05 NOTE — TELEPHONE ENCOUNTER
Please have him re-start the Lasix. He sees his PCP 12/11 - if BP stable he could re-start the Entresto then.   Let us know of course if his weight increases in the interim.   Thanks!

## 2023-12-05 NOTE — TELEPHONE ENCOUNTER
Spoke with Patient and Lacey BRIAN LISS Reply reviewed.  Toady Patient will restart the Lasix at previous dose 20 mg daily.  Patient states today his weight is 187.lb     Patient will see PCP on 12-11-23 and call back with BP. Regarding when sto start Entresto.    Patient watching diet , sodium foods..     Patient to call back if weight goes up or for further questions or concerns.

## 2023-12-07 NOTE — PROGRESS NOTES
"  Assessment & Plan     Hospital discharge follow-up  Appears overall stable and improved.    Sepsis without acute organ dysfunction, due to unspecified organism (H)  Resolved with completion of oral antibiotics.    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  Restart Entresto.  - sacubitril-valsartan (ENTRESTO) 24-26 MG per tablet; Take 0.5 tablets by mouth 2 times daily    Stage 3a chronic kidney disease (H)  Noted as baseline and continue to follow-up as directed    MGUS (monoclonal gammopathy of unknown significance)  Stable per history.  Hemoglobin   Date Value Ref Range Status   12/01/2023 10.9 (L) 13.3 - 17.7 g/dL Final   03/02/2021 13.2 (L) 13.3 - 17.7 g/dL Final   ] Recheck hemoglobin    Hypothyroidism, unspecified type  Refilled per patient request and suggest recheck TSH as greater than 12 months noted  - levothyroxine (SYNTHROID/LEVOTHROID) 100 MCG tablet; Take 1 tablet (100 mcg) by mouth daily  - TSH with free T4 reflex; Future    Coronary artery disease  Stable without active complaint.  Continue with risk reduction therapy  - metoprolol succinate ER (TOPROL XL) 25 MG 24 hr tablet; Take 1 tablet (25 mg) by mouth daily At lunch       MED REC REQUIRED  Post Medication Reconciliation Status: discharge medications reconciled and changed, per note/orders  BMI:   Estimated body mass index is 25.76 kg/m  as calculated from the following:    Height as of 12/1/23: 1.8 m (5' 10.87\").    Weight as of this encounter: 83.5 kg (184 lb).       See Patient Instructions    Ayden Koehler MD  [facility]    Austin Degroot is a 89 year old, presenting for the following health issues:  Hospital F/U    Patient has not seen me prior and normally sees Dr. Wagoner.    He states he feels well.  His appetite is good.  His " weight is down.  He does not report shortness of breath.  He does need a refill of a few of his medications.  He otherwise denies any complaints    Has had no urinary symptoms.  He has completed his oral antibiotics.    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 doing well   How are your symptoms? (Red Flag symptoms escalate to triage hotline per guidelines) Improved   Do you feel your condition is stable enough to be safe at home until your provider visit? Yes   Does the patient have their discharge instructions?  Yes   Does the patient have questions regarding their discharge instructions?  No   Were you started on any new medications or were there changes to any of your previous medications?  Yes   Does the patient have all of their medications? Yes   Do you have questions regarding any of your medications?  No   Do you have all of your needed medical supplies or equipment (DME)?  (i.e. oxygen tank, CPAP, cane, etc.) Yes   Discharge follow-up appointment scheduled within 14 calendar days?  No     Hospital Follow-up Visit:    Hospital/Nursing Home/IP  Rehab Facility: [facility]  Date of Admission: 11/30/23  Date of Discharge: 12/1/23  Reason(s) for Admission: sepsis    Was your hospitalization related to COVID-19? No   Problems taking medications regularly:  None  Medication changes since discharge: noted  Problems adhering to non-medication therapy:  None    Summary of hospitalization:  [facility] discharge summary reviewed  Diagnostic Tests/Treatments reviewed.  Follow up needed: as ordered  Other Healthcare Providers Involved in Patient s Care:           Update since discharge: improved.     Plan of care communicated with patient       Sepsis without acute organ dysfunction, due to unspecified organism (H)      Acute cystitis without hematuria    Acute kidney injury    Generalized weakness    Assessment: presents with generalized weakness and confusion onset this morning.  Patient reports that he lost his balance and had a fall at home without head trauma or loss of consciousness.  He reports that he has been urinating somewhat more than normal but denies dysuria.  On admission, labs pertinent for a WBC of 12.4, fever of 100.3  F, creatinine 1.35.  UA is highly consistent with a urinary tract infection.    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    Plan:   -Holding prior to mission lasix and Entresto  -Resume metoprolol on Monday       Current Outpatient Medications   Medication    acetaminophen  (TYLENOL) 650 MG CR tablet    ASPIRIN EC PO    atorvastatin (LIPITOR) 40 MG tablet    furosemide (LASIX) 20 MG tablet    gabapentin (NEURONTIN) 300 MG capsule    levothyroxine (SYNTHROID/LEVOTHROID) 100 MCG tablet    metoprolol succinate ER (TOPROL XL) 25 MG 24 hr tablet    nitroGLYcerin (NITROSTAT) 0.4 MG sublingual tablet    polyethylene glycol (MIRALAX) 17 g packet     No current facility-administered medications for this visit.     Other concerns:  Discuss it patient should restart Entresto -  discussed.      Review of Systems   CONSTITUTIONAL: NEGATIVE for fever, chills, change in weight  EYES: NEGATIVE for vision changes or irritation  ENT/MOUTH: NEGATIVE for ear, mouth and throat problems  RESP: NEGATIVE for significant cough or SOB  CV: NEGATIVE for chest pain, palpitations  GI: NEGATIVE for nausea, abdominal pain, heartburn, or change in bowel habits  MUSCULOSKELETAL: NEGATIVE for significant arthralgias or myalgia  NEURO: NEGATIVE for weakness, dizziness or paresthesias  HEME: NEGATIVE for bleeding problems  PSYCHIATRIC: NEGATIVE for changes in mood or affect      Objective    /77   Pulse 81   Temp 97.1  F (36.2  C) (Temporal)   Resp 17   Wt 83.5 kg (184 lb)   SpO2 98%   BMI 25.76 kg/m    Body mass index is 25.76 kg/m .    Physical Exam   GENERAL: alert and no distress  EYES: Eyes grossly normal to inspection, PERRL and conjunctivae and sclerae normal  HENT: ear canals and TM's normal, nose and mouth without ulcers or lesions  NECK: no adenopathy, no asymmetry, masses, or scars and thyroid normal to palpation  RESP: lungs clear to auscultation - no rales, rhonchi or wheezes  CV: regular rate and rhythm, normal S1 S2, no S3 or S4, no murmur, click or rub  MS: no gross musculoskeletal defects noted  NEURO: No focal changes  PSYCH: mentation appears normal, affect normal/bright    TSH   Date Value Ref Range Status   07/07/2022 1.44 0.40 - 4.00 mU/L Final   03/02/2021 2.27 0.40 - 4.00 mU/L Final

## 2023-12-11 ENCOUNTER — OFFICE VISIT (OUTPATIENT)
Dept: INTERNAL MEDICINE | Facility: CLINIC | Age: 88
End: 2023-12-11
Payer: COMMERCIAL

## 2023-12-11 VITALS
DIASTOLIC BLOOD PRESSURE: 77 MMHG | HEART RATE: 81 BPM | SYSTOLIC BLOOD PRESSURE: 124 MMHG | WEIGHT: 184 LBS | OXYGEN SATURATION: 98 % | TEMPERATURE: 97.1 F | BODY MASS INDEX: 25.76 KG/M2 | RESPIRATION RATE: 17 BRPM

## 2023-12-11 DIAGNOSIS — N39.0 URINARY TRACT INFECTION WITHOUT HEMATURIA, SITE UNSPECIFIED: ICD-10-CM

## 2023-12-11 DIAGNOSIS — A41.9 SEPSIS WITHOUT ACUTE ORGAN DYSFUNCTION, DUE TO UNSPECIFIED ORGANISM (H): ICD-10-CM

## 2023-12-11 DIAGNOSIS — I50.42 CHRONIC COMBINED SYSTOLIC AND DIASTOLIC HRT FAIL (H): ICD-10-CM

## 2023-12-11 DIAGNOSIS — N18.31 STAGE 3A CHRONIC KIDNEY DISEASE (H): ICD-10-CM

## 2023-12-11 DIAGNOSIS — E03.9 HYPOTHYROIDISM, UNSPECIFIED TYPE: ICD-10-CM

## 2023-12-11 DIAGNOSIS — D47.2 MGUS (MONOCLONAL GAMMOPATHY OF UNKNOWN SIGNIFICANCE): ICD-10-CM

## 2023-12-11 DIAGNOSIS — I25.810 CORONARY ARTERY DISEASE INVOLVING CORONARY BYPASS GRAFT OF NATIVE HEART WITHOUT ANGINA PECTORIS: ICD-10-CM

## 2023-12-11 DIAGNOSIS — Z09 HOSPITAL DISCHARGE FOLLOW-UP: Primary | ICD-10-CM

## 2023-12-11 PROBLEM — Z00.00 MEDICARE ANNUAL WELLNESS VISIT, SUBSEQUENT: Status: RESOLVED | Noted: 2019-03-21 | Resolved: 2023-12-11

## 2023-12-11 PROBLEM — R07.89 CHEST DISCOMFORT: Status: RESOLVED | Noted: 2019-06-05 | Resolved: 2023-12-11

## 2023-12-11 LAB
ERYTHROCYTE [DISTWIDTH] IN BLOOD BY AUTOMATED COUNT: 11.8 % (ref 10–15)
HCT VFR BLD AUTO: 39.8 % (ref 40–53)
HGB BLD-MCNC: 13 G/DL (ref 13.3–17.7)
MCH RBC QN AUTO: 28.9 PG (ref 26.5–33)
MCHC RBC AUTO-ENTMCNC: 32.7 G/DL (ref 31.5–36.5)
MCV RBC AUTO: 88 FL (ref 78–100)
PLATELET # BLD AUTO: 200 10E3/UL (ref 150–450)
RBC # BLD AUTO: 4.5 10E6/UL (ref 4.4–5.9)
TSH SERPL DL<=0.005 MIU/L-ACNC: 1.85 UIU/ML (ref 0.3–4.2)
WBC # BLD AUTO: 5.7 10E3/UL (ref 4–11)

## 2023-12-11 PROCEDURE — 36415 COLL VENOUS BLD VENIPUNCTURE: CPT | Performed by: INTERNAL MEDICINE

## 2023-12-11 PROCEDURE — 85027 COMPLETE CBC AUTOMATED: CPT | Performed by: INTERNAL MEDICINE

## 2023-12-11 PROCEDURE — 84443 ASSAY THYROID STIM HORMONE: CPT | Performed by: INTERNAL MEDICINE

## 2023-12-11 PROCEDURE — 99495 TRANSJ CARE MGMT MOD F2F 14D: CPT | Performed by: INTERNAL MEDICINE

## 2023-12-11 RX ORDER — SACUBITRIL AND VALSARTAN 24; 26 MG/1; MG/1
0.5 TABLET, FILM COATED ORAL 2 TIMES DAILY
Status: ON HOLD
Start: 2023-12-11 | End: 2023-12-16

## 2023-12-11 RX ORDER — LEVOTHYROXINE SODIUM 100 UG/1
100 TABLET ORAL DAILY
Qty: 90 TABLET | Refills: 0 | Status: SHIPPED | OUTPATIENT
Start: 2023-12-11 | End: 2024-03-06

## 2023-12-11 RX ORDER — METOPROLOL SUCCINATE 25 MG/1
25 TABLET, EXTENDED RELEASE ORAL DAILY
Qty: 90 TABLET | Refills: 0 | Status: SHIPPED | OUTPATIENT
Start: 2023-12-11 | End: 2024-03-06

## 2023-12-11 NOTE — LETTER
[facility]  [address] [address]  [phone]      12/12/2023       M[facility] Elizondo  [address] [place] [phone]        Dear Moses,    Your thyroid function tests look good and thus I would not change anything at this point.    Your white blood cell count and platelet counts have not returned normal.    Your hemoglobin function test is slightly abnormal but stable and should be rechecked here in the clinic in 3 months with a follow-up visit with me.  I will look forward to seeing you at that time and please call to make an appointment.      Sincerely,      Ayden Koehler MD  Internal Medicine

## 2023-12-14 LAB
ALBUMIN SERPL BCG-MCNC: 3.5 G/DL (ref 3.5–5.2)
ALP SERPL-CCNC: 63 U/L (ref 40–150)
ALT SERPL W P-5'-P-CCNC: 20 U/L (ref 0–70)
ANION GAP SERPL CALCULATED.3IONS-SCNC: 11 MMOL/L (ref 7–15)
AST SERPL W P-5'-P-CCNC: 30 U/L (ref 0–45)
BASOPHILS # BLD AUTO: 0 10E3/UL (ref 0–0.2)
BASOPHILS NFR BLD AUTO: 0 %
BILIRUB SERPL-MCNC: 0.7 MG/DL
BUN SERPL-MCNC: 22.6 MG/DL (ref 8–23)
CALCIUM SERPL-MCNC: 8.7 MG/DL (ref 8.2–9.6)
CHLORIDE SERPL-SCNC: 102 MMOL/L (ref 98–107)
CREAT SERPL-MCNC: 1.42 MG/DL (ref 0.67–1.17)
DEPRECATED HCO3 PLAS-SCNC: 21 MMOL/L (ref 22–29)
EGFRCR SERPLBLD CKD-EPI 2021: 47 ML/MIN/1.73M2
EOSINOPHIL # BLD AUTO: 0 10E3/UL (ref 0–0.7)
EOSINOPHIL NFR BLD AUTO: 0 %
ERYTHROCYTE [DISTWIDTH] IN BLOOD BY AUTOMATED COUNT: 12.1 % (ref 10–15)
FLUAV RNA SPEC QL NAA+PROBE: NEGATIVE
FLUBV RNA RESP QL NAA+PROBE: NEGATIVE
GLUCOSE SERPL-MCNC: 186 MG/DL (ref 70–99)
HCT VFR BLD AUTO: 36.3 % (ref 40–53)
HGB BLD-MCNC: 11.8 G/DL (ref 13.3–17.7)
IMM GRANULOCYTES # BLD: 0.1 10E3/UL
IMM GRANULOCYTES NFR BLD: 1 %
LYMPHOCYTES # BLD AUTO: 1 10E3/UL (ref 0.8–5.3)
LYMPHOCYTES NFR BLD AUTO: 6 %
MCH RBC QN AUTO: 28.5 PG (ref 26.5–33)
MCHC RBC AUTO-ENTMCNC: 32.5 G/DL (ref 31.5–36.5)
MCV RBC AUTO: 88 FL (ref 78–100)
MONOCYTES # BLD AUTO: 1.2 10E3/UL (ref 0–1.3)
MONOCYTES NFR BLD AUTO: 7 %
NEUTROPHILS # BLD AUTO: 14.8 10E3/UL (ref 1.6–8.3)
NEUTROPHILS NFR BLD AUTO: 86 %
NRBC # BLD AUTO: 0 10E3/UL
NRBC BLD AUTO-RTO: 0 /100
PLATELET # BLD AUTO: 186 10E3/UL (ref 150–450)
POTASSIUM SERPL-SCNC: 3.8 MMOL/L (ref 3.4–5.3)
PROT SERPL-MCNC: 6.2 G/DL (ref 6.4–8.3)
RBC # BLD AUTO: 4.14 10E6/UL (ref 4.4–5.9)
RSV RNA SPEC NAA+PROBE: NEGATIVE
SARS-COV-2 RNA RESP QL NAA+PROBE: NEGATIVE
SODIUM SERPL-SCNC: 134 MMOL/L (ref 135–145)
WBC # BLD AUTO: 17.2 10E3/UL (ref 4–11)

## 2023-12-14 PROCEDURE — 87637 SARSCOV2&INF A&B&RSV AMP PRB: CPT | Performed by: EMERGENCY MEDICINE

## 2023-12-14 PROCEDURE — 85025 COMPLETE CBC W/AUTO DIFF WBC: CPT | Performed by: EMERGENCY MEDICINE

## 2023-12-14 PROCEDURE — 99285 EMERGENCY DEPT VISIT HI MDM: CPT | Mod: 25

## 2023-12-14 PROCEDURE — 80053 COMPREHEN METABOLIC PANEL: CPT | Performed by: EMERGENCY MEDICINE

## 2023-12-14 PROCEDURE — 36415 COLL VENOUS BLD VENIPUNCTURE: CPT | Performed by: EMERGENCY MEDICINE

## 2023-12-14 PROCEDURE — 83036 HEMOGLOBIN GLYCOSYLATED A1C: CPT | Performed by: STUDENT IN AN ORGANIZED HEALTH CARE EDUCATION/TRAINING PROGRAM

## 2023-12-15 ENCOUNTER — APPOINTMENT (OUTPATIENT)
Dept: PHYSICAL THERAPY | Facility: CLINIC | Age: 88
End: 2023-12-15
Attending: STUDENT IN AN ORGANIZED HEALTH CARE EDUCATION/TRAINING PROGRAM
Payer: COMMERCIAL

## 2023-12-15 ENCOUNTER — HOSPITAL ENCOUNTER (OUTPATIENT)
Facility: CLINIC | Age: 88
Setting detail: OBSERVATION
Discharge: HOME | End: 2023-12-17
Attending: EMERGENCY MEDICINE | Admitting: STUDENT IN AN ORGANIZED HEALTH CARE EDUCATION/TRAINING PROGRAM
Payer: COMMERCIAL

## 2023-12-15 ENCOUNTER — APPOINTMENT (OUTPATIENT)
Dept: CT IMAGING | Facility: CLINIC | Age: 88
End: 2023-12-15
Attending: EMERGENCY MEDICINE
Payer: COMMERCIAL

## 2023-12-15 DIAGNOSIS — R26.2 UNABLE TO AMBULATE: ICD-10-CM

## 2023-12-15 DIAGNOSIS — I25.810 CORONARY ARTERY DISEASE INVOLVING CORONARY BYPASS GRAFT OF NATIVE HEART WITHOUT ANGINA PECTORIS: ICD-10-CM

## 2023-12-15 DIAGNOSIS — R53.1 GENERALIZED WEAKNESS: ICD-10-CM

## 2023-12-15 DIAGNOSIS — R19.7 DIARRHEA, UNSPECIFIED TYPE: ICD-10-CM

## 2023-12-15 DIAGNOSIS — A04.72 C. DIFFICILE COLITIS: Primary | ICD-10-CM

## 2023-12-15 DIAGNOSIS — I50.42 CHRONIC COMBINED SYSTOLIC AND DIASTOLIC HRT FAIL (H): ICD-10-CM

## 2023-12-15 DIAGNOSIS — D72.829 LEUKOCYTOSIS, UNSPECIFIED TYPE: ICD-10-CM

## 2023-12-15 LAB
ADV 40+41 DNA STL QL NAA+NON-PROBE: NEGATIVE
ALBUMIN UR-MCNC: 20 MG/DL
APPEARANCE UR: CLEAR
ASTRO TYP 1-8 RNA STL QL NAA+NON-PROBE: NEGATIVE
BILIRUB UR QL STRIP: NEGATIVE
C CAYETANENSIS DNA STL QL NAA+NON-PROBE: NEGATIVE
C DIFF GDH STL QL IA: POSITIVE
C DIFF TOX A+B STL QL IA: POSITIVE
C DIFF TOX B STL QL: POSITIVE
CAMPYLOBACTER DNA SPEC NAA+PROBE: NEGATIVE
COLOR UR AUTO: YELLOW
CRYPTOSP DNA STL QL NAA+NON-PROBE: NEGATIVE
E COLI O157 DNA STL QL NAA+NON-PROBE: NORMAL
E HISTOLYT DNA STL QL NAA+NON-PROBE: NEGATIVE
EAEC ASTA GENE ISLT QL NAA+PROBE: NEGATIVE
EC STX1+STX2 GENES STL QL NAA+NON-PROBE: NEGATIVE
EPEC EAE GENE STL QL NAA+NON-PROBE: NEGATIVE
ETEC LTA+ST1A+ST1B TOX ST NAA+NON-PROBE: NEGATIVE
G LAMBLIA DNA STL QL NAA+NON-PROBE: NEGATIVE
GLUCOSE UR STRIP-MCNC: NEGATIVE MG/DL
HBA1C MFR BLD: 5.6 %
HCO3 BLDV-SCNC: 23 MMOL/L (ref 21–28)
HGB UR QL STRIP: NEGATIVE
KETONES UR STRIP-MCNC: NEGATIVE MG/DL
LACTATE BLD-SCNC: 1.5 MMOL/L
LEUKOCYTE ESTERASE UR QL STRIP: NEGATIVE
NITRATE UR QL: NEGATIVE
NOROVIRUS GI+II RNA STL QL NAA+NON-PROBE: NEGATIVE
P SHIGELLOIDES DNA STL QL NAA+NON-PROBE: NEGATIVE
PCO2 BLDV: 40 MM HG (ref 40–50)
PH BLDV: 7.38 [PH] (ref 7.32–7.43)
PH UR STRIP: 5.5 [PH] (ref 5–7)
PO2 BLDV: 27 MM HG (ref 25–47)
RBC URINE: 1 /HPF
RVA RNA STL QL NAA+NON-PROBE: NEGATIVE
SALMONELLA SP RPOD STL QL NAA+PROBE: NEGATIVE
SAO2 % BLDV: 49 % (ref 94–100)
SAPO I+II+IV+V RNA STL QL NAA+NON-PROBE: NEGATIVE
SHIGELLA SP+EIEC IPAH ST NAA+NON-PROBE: NEGATIVE
SP GR UR STRIP: 1.02 (ref 1–1.03)
SQUAMOUS EPITHELIAL: 1 /HPF
UROBILINOGEN UR STRIP-MCNC: NORMAL MG/DL
V CHOLERAE DNA SPEC QL NAA+PROBE: NEGATIVE
VIBRIO DNA SPEC NAA+PROBE: NEGATIVE
WBC URINE: 1 /HPF
Y ENTEROCOL DNA STL QL NAA+PROBE: NEGATIVE

## 2023-12-15 PROCEDURE — 82803 BLOOD GASES ANY COMBINATION: CPT

## 2023-12-15 PROCEDURE — 83605 ASSAY OF LACTIC ACID: CPT

## 2023-12-15 PROCEDURE — 258N000003 HC RX IP 258 OP 636: Performed by: EMERGENCY MEDICINE

## 2023-12-15 PROCEDURE — 250N000013 HC RX MED GY IP 250 OP 250 PS 637: Performed by: EMERGENCY MEDICINE

## 2023-12-15 PROCEDURE — 258N000003 HC RX IP 258 OP 636: Performed by: INTERNAL MEDICINE

## 2023-12-15 PROCEDURE — 87493 C DIFF AMPLIFIED PROBE: CPT | Mod: XU | Performed by: EMERGENCY MEDICINE

## 2023-12-15 PROCEDURE — 250N000013 HC RX MED GY IP 250 OP 250 PS 637: Performed by: INTERNAL MEDICINE

## 2023-12-15 PROCEDURE — 96360 HYDRATION IV INFUSION INIT: CPT

## 2023-12-15 PROCEDURE — 87324 CLOSTRIDIUM AG IA: CPT | Performed by: EMERGENCY MEDICINE

## 2023-12-15 PROCEDURE — G0378 HOSPITAL OBSERVATION PER HR: HCPCS

## 2023-12-15 PROCEDURE — 96361 HYDRATE IV INFUSION ADD-ON: CPT

## 2023-12-15 PROCEDURE — 36415 COLL VENOUS BLD VENIPUNCTURE: CPT | Performed by: EMERGENCY MEDICINE

## 2023-12-15 PROCEDURE — 99207 PR APP CREDIT; MD BILLING SHARED VISIT: CPT | Performed by: INTERNAL MEDICINE

## 2023-12-15 PROCEDURE — 87507 IADNA-DNA/RNA PROBE TQ 12-25: CPT | Performed by: EMERGENCY MEDICINE

## 2023-12-15 PROCEDURE — 71250 CT THORAX DX C-: CPT

## 2023-12-15 PROCEDURE — 87040 BLOOD CULTURE FOR BACTERIA: CPT | Performed by: EMERGENCY MEDICINE

## 2023-12-15 PROCEDURE — 81001 URINALYSIS AUTO W/SCOPE: CPT | Performed by: EMERGENCY MEDICINE

## 2023-12-15 PROCEDURE — 97530 THERAPEUTIC ACTIVITIES: CPT | Mod: GP

## 2023-12-15 PROCEDURE — 97161 PT EVAL LOW COMPLEX 20 MIN: CPT | Mod: GP

## 2023-12-15 PROCEDURE — 97116 GAIT TRAINING THERAPY: CPT | Mod: GP

## 2023-12-15 PROCEDURE — 99223 1ST HOSP IP/OBS HIGH 75: CPT | Mod: AI | Performed by: STUDENT IN AN ORGANIZED HEALTH CARE EDUCATION/TRAINING PROGRAM

## 2023-12-15 RX ORDER — VANCOMYCIN HYDROCHLORIDE 125 MG/1
125 CAPSULE ORAL 4 TIMES DAILY
Status: DISCONTINUED | OUTPATIENT
Start: 2023-12-15 | End: 2023-12-15

## 2023-12-15 RX ORDER — ACETAMINOPHEN 650 MG/1
650 SUPPOSITORY RECTAL EVERY 4 HOURS PRN
Status: DISCONTINUED | OUTPATIENT
Start: 2023-12-15 | End: 2023-12-17 | Stop reason: HOSPADM

## 2023-12-15 RX ORDER — METOPROLOL SUCCINATE 25 MG/1
25 TABLET, EXTENDED RELEASE ORAL DAILY
Status: DISCONTINUED | OUTPATIENT
Start: 2023-12-15 | End: 2023-12-17 | Stop reason: HOSPADM

## 2023-12-15 RX ORDER — PROCHLORPERAZINE 25 MG
12.5 SUPPOSITORY, RECTAL RECTAL EVERY 12 HOURS PRN
Status: DISCONTINUED | OUTPATIENT
Start: 2023-12-15 | End: 2023-12-17 | Stop reason: HOSPADM

## 2023-12-15 RX ORDER — ACETAMINOPHEN 325 MG/1
650 TABLET ORAL EVERY 4 HOURS PRN
Status: DISCONTINUED | OUTPATIENT
Start: 2023-12-15 | End: 2023-12-17 | Stop reason: HOSPADM

## 2023-12-15 RX ORDER — SODIUM CHLORIDE 9 MG/ML
INJECTION, SOLUTION INTRAVENOUS CONTINUOUS
Status: DISCONTINUED | OUTPATIENT
Start: 2023-12-15 | End: 2023-12-17 | Stop reason: HOSPADM

## 2023-12-15 RX ORDER — ACETAMINOPHEN 325 MG/1
650 TABLET ORAL AT BEDTIME
Status: DISCONTINUED | OUTPATIENT
Start: 2023-12-15 | End: 2023-12-17 | Stop reason: HOSPADM

## 2023-12-15 RX ORDER — ATORVASTATIN CALCIUM 40 MG/1
40 TABLET, FILM COATED ORAL
Status: DISCONTINUED | OUTPATIENT
Start: 2023-12-15 | End: 2023-12-17 | Stop reason: HOSPADM

## 2023-12-15 RX ORDER — ONDANSETRON 4 MG/1
4 TABLET, ORALLY DISINTEGRATING ORAL EVERY 6 HOURS PRN
Status: DISCONTINUED | OUTPATIENT
Start: 2023-12-15 | End: 2023-12-17 | Stop reason: HOSPADM

## 2023-12-15 RX ORDER — ASPIRIN 81 MG/1
81 TABLET ORAL AT BEDTIME
Status: DISCONTINUED | OUTPATIENT
Start: 2023-12-15 | End: 2023-12-17 | Stop reason: HOSPADM

## 2023-12-15 RX ORDER — GABAPENTIN 300 MG/1
300 CAPSULE ORAL AT BEDTIME
Status: DISCONTINUED | OUTPATIENT
Start: 2023-12-15 | End: 2023-12-17 | Stop reason: HOSPADM

## 2023-12-15 RX ORDER — ONDANSETRON 2 MG/ML
4 INJECTION INTRAMUSCULAR; INTRAVENOUS EVERY 6 HOURS PRN
Status: DISCONTINUED | OUTPATIENT
Start: 2023-12-15 | End: 2023-12-17 | Stop reason: HOSPADM

## 2023-12-15 RX ORDER — PROCHLORPERAZINE MALEATE 5 MG
5 TABLET ORAL EVERY 6 HOURS PRN
Status: DISCONTINUED | OUTPATIENT
Start: 2023-12-15 | End: 2023-12-17 | Stop reason: HOSPADM

## 2023-12-15 RX ORDER — VANCOMYCIN HYDROCHLORIDE 125 MG/1
125 CAPSULE ORAL 4 TIMES DAILY
Status: DISCONTINUED | OUTPATIENT
Start: 2023-12-15 | End: 2023-12-17 | Stop reason: HOSPADM

## 2023-12-15 RX ORDER — LEVOTHYROXINE SODIUM 100 UG/1
100 TABLET ORAL DAILY
Status: DISCONTINUED | OUTPATIENT
Start: 2023-12-15 | End: 2023-12-17 | Stop reason: HOSPADM

## 2023-12-15 RX ADMIN — VANCOMYCIN HYDROCHLORIDE 125 MG: 125 CAPSULE ORAL at 12:54

## 2023-12-15 RX ADMIN — SODIUM CHLORIDE: 9 INJECTION, SOLUTION INTRAVENOUS at 14:56

## 2023-12-15 RX ADMIN — VANCOMYCIN HYDROCHLORIDE 125 MG: 125 CAPSULE ORAL at 17:00

## 2023-12-15 RX ADMIN — ASPIRIN 81 MG: 81 TABLET, COATED ORAL at 22:38

## 2023-12-15 RX ADMIN — VANCOMYCIN HYDROCHLORIDE 125 MG: 125 CAPSULE ORAL at 06:05

## 2023-12-15 RX ADMIN — GABAPENTIN 300 MG: 300 CAPSULE ORAL at 22:38

## 2023-12-15 RX ADMIN — ATORVASTATIN CALCIUM 40 MG: 40 TABLET, FILM COATED ORAL at 18:05

## 2023-12-15 RX ADMIN — LEVOTHYROXINE SODIUM 100 MCG: 100 TABLET ORAL at 18:05

## 2023-12-15 RX ADMIN — SODIUM CHLORIDE: 9 INJECTION, SOLUTION INTRAVENOUS at 13:45

## 2023-12-15 RX ADMIN — METOPROLOL SUCCINATE 25 MG: 25 TABLET, EXTENDED RELEASE ORAL at 18:03

## 2023-12-15 RX ADMIN — ACETAMINOPHEN 650 MG: 325 TABLET, FILM COATED ORAL at 22:38

## 2023-12-15 RX ADMIN — SODIUM CHLORIDE 1000 ML: 9 INJECTION, SOLUTION INTRAVENOUS at 01:59

## 2023-12-15 RX ADMIN — VANCOMYCIN HYDROCHLORIDE 125 MG: 125 CAPSULE ORAL at 20:50

## 2023-12-15 ASSESSMENT — ACTIVITIES OF DAILY LIVING (ADL)
ADLS_ACUITY_SCORE: 37
ADLS_ACUITY_SCORE: 37
ADLS_ACUITY_SCORE: 33
ADLS_ACUITY_SCORE: 37

## 2023-12-15 NOTE — H&P
Assessment/Plan    89M hx of CHF, Anemia, hypothyhroidism, MGUS, CAD, CKD presenting with weakness and diarrhea.     Acute Hypoxic Respiratory Failure  SIRS, 2/2 possible infectious diarrheal disease  ``Hx: Patient states that since his discharge 2 weeks ago, he's been having constant loose stools. For the past 2 days patient has had progressively severe weakness limiting ambulation. Denies fevers/chills(family states he had temp of 100), no abdominla pain, endorses chronic dyspnea. No nausea/vomiting/dysurea  ``Vitals/Exam: 90-91 on RA, 96 on 1L, Exam shows LE edema, mild RLQ tenderness. AOx4  ``Labs: WBC 17, UA negative, LA negative  ``Imaging: CT CAP negative  ``ER Course: 1L, PO Vanc  --PT OT  --Follow-up Stool workup  --Wean off O2, if cannot then 6min walk test  --if no improvement consider palliative  --Hold IVF given LE edema and hx of CKD/CHF  --Continue emperic PO vancomycin for now    CAD, non-ischemia  Bistolic Heart Failure  Chronic Dyspnea  ``LVEF 45-50 on 5/2023. CRT-P on 9/2021  --resume metoprol/aspirin/statin once confirmed  --resume entresto after 24hrs to prevent LYSSA in setting of severe diarrhea and limited ability to give IVF given LE edema and CHF  --intake/output, daily weights    Chronic Bilateral Venous Insufficiency  --SCDs  --Hold home lasix for 24hrs    MGUS  --has outpt follow-up     Hypothyroidism  --resume synthroid once confirmed    Chronic Moderately dilated Ascending Aorta  ``4.5cm  --pt follows with cardio as outpatient    CKD  ``Baseline Cr 1.25, currently 1.42  ``LE +2 edema bilaterally  --Avoid nsaids/contrast  --Renal diet  --Hold IVF given LE edema and no evidence of sepsis/dehydration/LYSSA  --Hold home lasix/entresto for 24hrs to prevent dyhydration/LYSSA given diarrhea, then could be restarted    Chronic Anemia, likely 2/2 Kidney disease  `` Hb baseline 11-13, currently 11.8  --Outpt follow-up for his anemia, no acute intervension    Hyperglycemia  --No indication for mod  CHO/ISS at this time  --A1c    Supportive Care  DVT ppx: SCDs  Diet: colleen;l  Pain Control: Tylenol  Upper GI ppx: Zofran  Lower GI ppx: none  Lines/Catheters: IV  TTE/Dopplers: none  Contact Precautions: Cdiff precautions  PT/OT/Social/Wound/Palliative Consults: PT OT  Code Status: DNR DNI    History of Present Illness  Subjective: Patient states that since his discharge 2 weeks ago, he's been having constant loose stools. For the past 2 days patient has had progressively severe weakness limiting ambulation. Denies fevers/chills(family states he had temp of 100), no abdominla pain, endorses chronic dyspnea. No nausea/vomiting/dysurea    ROS: 10 point ROS neg other than the symptoms noted above in the HPI.     Physical Exam  /65   Pulse 65   Temp 99.2  F (37.3  C) (Temporal)   Resp 23   SpO2 96%     Constitutional: Alert and oriented to person, place and time; no apparent distress.   HEENT: Normocephalic, no scleral icterus  Abdomen: soft, no distention/guarding, RLQ mild tenderness  Lungs: lungs clear to auscultation bilaterally  Heart: Regular s1s2, no evidence of murmurs  Extremities/Neuro:No focal strength/sensation deficits, LE edema + bilatearlly  Skin: No obvious signs of skin breakdown  Psychiatric: appropriate affect, insight and judgment    I have personally spent 80 minutes total time today in preparing to see the patient (eg, review of tests), obtaining and/or reviewing separately obtained history, performing a medically appropriate examination and/or evaluation, counseling and educating the patient/family/caregiver, ordering medications, tests, or procedures, referring and communicating with other health care professionals, documenting clinical information in the electronic or other health record, independently interpreting results and communicating results to the patient/ family/caregiver and care coordination.    EMR History    Past Medical Hx:   Past Medical History:   Diagnosis Date     Aortic root dilatation (H24)     4.4 cm    Ascending aorta dilatation (H24)     4.4 cm    ASD (atrial septal defect)     Bradycardia     CAD (coronary artery disease) 05/15/2014     YOGESH to RCA(6/20/19); YOGESH to OM1(5/15/14)    Cardiac pacemaker 06/20/2019    [employer] PPM COMFORT MRI XT DR-implant 6/20/19    Chest discomfort     Degeneration of lumbar or lumbosacral intervertebral disc     Familial tremor     Former smoker     Herpes zoster without mention of complication     HTN (hypertension)     Hyperlipidaemia     Idiopathic peripheral neuropathy     MGUS (monoclonal gammopathy of unknown significance)     NSTEMI (non-ST elevated myocardial infarction) (H) 2014    Other and unspecified hyperlipidemia     PFO (patent foramen ovale)     Prostatitis, unspecified     Second degree heart block     Sensorineural hearing loss, unspecified     SSS (sick sinus syndrome) (H)     Status post coronary angiogram 06/20/2019    Unspecified hypothyroidism         Home Medications: Present in Med Rec    Allergies:   Allergies   Allergen Reactions    No Known Drug Allergy         Pertinent Family Hx:   Family History   Problem Relation Age of Onset    Cancer Mother     Genitourinary Problems Father 99        complications from surgery    Heart Disease Brother 72        MI        Surgical Hx:   Past Surgical History:   Procedure Laterality Date    CHOLECYSTECTOMY      CORONARY ANGIOGRAPHY ADULT ORDER  5/14/14    PTCA w/YOGESH to OM1    CV CORONARY ANGIOGRAM N/A 6/20/2019    Procedure: Coronary Angiogram;  Surgeon: Romie Coronado MD;  Location:  [facility]ART CARDIAC CATH LAB    CV INTRAVASULAR ULTRASOUND N/A 6/20/2019    Procedure: Intravascular Ultrasound;  Surgeon: Romie Coronado MD;  Location: [facility] CARDIAC CATH LAB    CV PCI ATHERECTOMY ORBITAL N/A 6/20/2019    Procedure: PCI Atherectomy Orbital;  Surgeon: Romie Coronado MD;  Location:  HEART CARDIAC CATH LAB    CV PCI STENT DRUG ELUTING N/A 6/20/2019    Procedure: PCI  Stent Drug Eluting;  Surgeon: Romie Coronado MD;  Location:  HEART CARDIAC CATH LAB    CV TEMPORARY PACEMAKER INSERTION N/A 6/20/2019    Procedure: Temporary Pacemaker Insertion;  Surgeon: Romie Coronado MD;  Location:  HEART CARDIAC CATH LAB    EP PACEMAKER N/A 6/20/2019    Procedure: EP Pacemaker;  Surgeon: Max Dowell MD;  Location:  HEART CARDIAC CATH LAB    EP PPM UPGRADE TO BIVENT N/A 9/16/2021    Procedure: EP PPM UPGRADE TO BIVENT;  Surgeon: Tre Miller MD;  Location:  HEART CARDIAC CATH LAB    HEART CATH, ANGIOPLASTY  5/14/14    YOGESH to OM1    ZZC NONSPECIFIC PROCEDURE  2010    Laparoscopic cholecystectomy.         Substance Hx:   History   Drug Use No   ,  Social History    Substance and Sexual Activity      Alcohol use: Yes        Comment: 5-7 drinks week - at most one drink daily  ,   History   Smoking Status    Former    Packs/day: 0.50    Years: 16.00    Types: Cigarettes    Start date: 1950    Quit date: 1/1/1967   Smokeless Tobacco    Never   ,   History   Sexual Activity    Sexual activity: Never              Imaging/Labs    Imaging: CT Chest Abdomen Pelvis w/o Contrast    Result Date: 12/15/2023  EXAM: CT CHEST ABDOMEN PELVIS W/O CONTRAST LOCATION: [facility] DATE: 12/15/2023 INDICATION: fever, diarrhea, renal insufficiency, generalized weakness COMPARISON: CT chest 05/13/2014 TECHNIQUE: CT scan of the chest, abdomen, and pelvis was performed without IV contrast. Multiplanar reformats were obtained. Dose reduction techniques were used. CONTRAST: None. FINDINGS: LUNGS AND PLEURA: Mild upper lung paraseptal emphysema. Mild bibasilar atelectasis. Few small benign fissural lymph nodes. MEDIASTINUM/AXILLAE: No pericardial effusion. Left chest implanted cardiac device, lead tips in the right ventricle, right atrium, and branch coronary sinus. No lymphadenopathy. No thoracic aortic aneurysm. CORONARY ARTERY CALCIFICATION: Previous intervention  (stents). HEPATOBILIARY: Few small low-attenuation liver lesions, unchanged from 2014, benign. PANCREAS: Normal. SPLEEN: Normal. ADRENAL GLANDS: Normal. KIDNEYS/BLADDER: No significant mass, stones, or hydronephrosis. There are simple or benign cysts. No follow up is needed. BOWEL: No obstruction or inflammatory change. LYMPH NODES: Normal. VASCULATURE: No abdominal aortic aneurysm. Moderate aortic atherosclerotic calcification. PELVIC ORGANS: Mildly enlarged prostate. MUSCULOSKELETAL: Degenerative changes of the spine.     IMPRESSION: 1.  No acute process in the chest, abdomen, or pelvis.         Recent Labs   Lab Test 12/14/23 2113 12/01/23  0840   * 140   POTASSIUM 3.8 3.8   CHLORIDE 102 108*   CO2 21* 23   ANIONGAP 11 9   * 95   BUN 22.6 17.7   CR 1.42* 1.25*   GABRIELA 8.7 8.5     CBC RESULTS:   Recent Labs   Lab Test 12/14/23 2113   WBC 17.2*   RBC 4.14*   HGB 11.8*   HCT 36.3*   MCV 88   MCH 28.5   MCHC 32.5   RDW 12.1         Liver Function Studies -   Recent Labs   Lab Test 12/14/23 2113   PROTTOTAL 6.2*   ALBUMIN 3.5   BILITOTAL 0.7   ALKPHOS 63   AST 30   ALT 20      Troponin I ES   Date Value Ref Range Status   05/13/2014 28.500 () 0.000 - 0.034 ug/L Final     Comment:     Previous critical documented

## 2023-12-15 NOTE — ED NOTES
[facility]  ED Nurse Handoff Report    ED Chief complaint: Generalized Weakness      ED Diagnosis:   Final diagnoses:   None       Code Status: to be addressed     Allergies:   Allergies   Allergen Reactions    No Known Drug Allergy        Patient Story: pt recently finished antibiotics for UTI, increased weakness body aches and diarrhea  Focused Assessment:  Pt awakens to verbal stimuli, alert to self and situation. Pt has had loose brown stool with sample sent to lab.     Treatments and/or interventions provided: IV fluids, labs, ct   Patient's response to treatments and/or interventions: Tolerated well    To be done/followed up on inpatient unit:  doctor orders    Does this patient have any cognitive concerns?: A&Ox4    Activity level - Baseline/Home:  Independent  Activity Level - Current:   Stand with Assist    Patient's Preferred language: English   Needed?: No    Isolation: None and Enteric  Infection: Not Applicable  C-Diff (Clostridium Difficile) diagnosis  Patient tested for COVID 19 prior to admission: Yes  Bariatric?: No    Vital Signs:   Vitals:    12/15/23 0230 12/15/23 0233 12/15/23 0356 12/15/23 0426   BP: 103/54  109/57    Pulse: 64 68 63 70   Resp:  15 25 27   Temp:       TempSrc:       SpO2: 93%  95% 96%       Cardiac Rhythm:     Was the PSS-3 completed:   Yes  What interventions are required if any?               Family Comments: bedside  OBS brochure/video discussed/provided to patient/family: Yes              Name of person given brochure if not patient: pt              Relationship to patient: pt    For the majority of the shift this patient's behavior was Green.   Behavioral interventions performed were none.    ED NURSE PHONE NUMBER: *[phone]

## 2023-12-15 NOTE — PROGRESS NOTES
Observation goals  PRIOR TO DISCHARGE       Comments:   -diagnostic tests and consults completed and resulted- Not MET  -vital signs normal or at patient baseline- Not  MET  -safe disposition plan has been identified- Not MET  Nurse to notify provider when observation goals have been met and patient is ready for discharge.

## 2023-12-15 NOTE — PHARMACY-ADMISSION MEDICATION HISTORY
"Pharmacist Admission Medication History    Admission medication history is complete. The information provided in this note is only as accurate as the sources available at the time of the update.    Information Source(s): Patient, Hospital records, and CareEverywhere/SureScripts via in-person    Pertinent Information:   --  Pt was recently discharged from [facility] on 12/1/2023.  Pt had a follow-up visit with his provider since discharge and were instructed to restart furosemide and Entresto.  --  Pt last took his medications yesterday but is unsure if he took his evening pills.    Changes made to PTA medication list:  Added: None  Deleted: None  Changed: None    Medication Affordability:  Not including over the counter (OTC) medications, was there a time in the past 3 months when you did not take your medications as prescribed because of cost?: No    Allergies reviewed with patient and updates made in EHR: yes    Medication History Completed By: Jennifer Alcala PharmD 12/15/2023 8:28 AM    PTA Med List   Medication Sig Last Dose    acetaminophen (TYLENOL) 650 MG CR tablet Take 650 mg by mouth at bedtime     ASPIRIN EC PO Take 81 mg by mouth at bedtime     atorvastatin (LIPITOR) 40 MG tablet Take 1 tablet (40 mg) by mouth daily (with dinner)     furosemide (LASIX) 20 MG tablet Take 1 tablet (20 mg) by mouth daily 12/14/2023 at am    gabapentin (NEURONTIN) 300 MG capsule TAKE 1 CAPSULE(300 MG) BY MOUTH AT BEDTIME     levothyroxine (SYNTHROID/LEVOTHROID) 100 MCG tablet Take 1 tablet (100 mcg) by mouth daily 12/14/2023 at am    metoprolol succinate ER (TOPROL XL) 25 MG 24 hr tablet Take 1 tablet (25 mg) by mouth daily At lunch     nitroGLYcerin (NITROSTAT) 0.4 MG sublingual tablet One tablet under the tongue every 5 minutes if needed for chest pain. May repeat every 5 minutes for a maximum of 3 doses in 15 minutes\" prn    sacubitril-valsartan (ENTRESTO) 24-26 MG per tablet Take 0.5 tablets by mouth 2 times daily 12/14/2023 at am "

## 2023-12-15 NOTE — PROGRESS NOTES
[facility]    Medicine Progress Note - Hospitalist Service    Date of Admission:  12/15/2023    Assessment & Plan      89 y/o male with PMH of nonischemic cardiomyopathy, Anemia, hypothyhroidism, MGUS, CAD, CKD presenting with weakness and diarrhea.      Diarrhea  C diff colitis  - Recent admission from 11/30-12/1 /2023 for weakness and UTI; urine culture positive for more than 100,000 colony of E. Coli; treated with ceftriaxone in the hospital, discharged on Ceftin  - Patient states that since his discharge 2 weeks ago, he's been having constant loose stools. For the past 2 days patient has had progressively severe weakness limiting ambulation. Denies fevers/chills(family states he had temp of 100), no abdominal pain, endorses chronic dyspnea. No nausea/vomiting/dysurea  ``Vitals/Exam: 90-91 on RA, 96 on 1L, Exam shows LE edema, mild RLQ tenderness. AOx4  ``Labs: WBC 17, UA negative, LA negative  ``Imaging: CT CAP negative  ``ER Course: 1L, PO Vanc  - Stool culture positive for C. Difficile  - Vanco 125 mg p.o. 4 times daily  - Mild IV hydration normal saline at 75 cc/h while still having diarrhea  - Monitor for further improvement of diarrhea    Generalized weakness  - Likely because of ongoing diarrhea  - Treat C. difficile as above  - Fall precaution  - PT/OT    Hypoxia, resolved  - Noted to have oxygen saturation 9091% on room air in ER, improved to 96% on 1 L  - CT chest with no infiltrate  - Currently saturating well on room air    Nonischemic cardiomyopathy  Bistolic Heart Failure  Chronic Dyspnea  ``LVEF 45-50 on 5/2023. CRT-P on 9/2021  - Follows with cardiology as outpatient  - resume PTA aspirin 81 mg p.o. daily, Toprol-XL 25 mg p.o. daily and statin  - Hold PTA Entresto for now, possible resume tomorrow if blood pressure is stable  - Hold PTA Lasix; getting mild IV hydration normal saline 75 cc/h  - Control fluid status  - ntake/output, daily weights     Chronic Bilateral  "Venous Insufficiency  --SCDs  --Hold home lasix for now     MGUS  --has outpt follow-up      Hypothyroidism  --resume synthroid once confirmed     Chronic Moderately dilated Ascending Aorta  ``4.5cm  --pt follows with cardio as outpatient     CKD  ``Baseline Cr 1.25, currently 1.42  ``Getting mild IV hydration  - Holding PTA Lasix for now  - Avoid nephrotoxic drug  - Avoid nsaids/contrast  - BMP in a.m.    Chronic Anemia, likely 2/2 Kidney disease  `` Hb baseline 11-13, currently 11.8  --Outpt follow-up for his anemia, no acute intervension     Hyperglycemia  --No indication for mod CHO/ISS at this time  --A1c 5.6                Diet: Renal Diet (non-dialysis)    DVT Prophylaxis: Pneumatic Compression Devices  [place] Catheter: Not present  Lines: None     Cardiac Monitoring: None  Code Status: No CPR- Do NOT Intubate      Clinically Significant Risk Factors Present on Admission                # Drug Induced Platelet Defect: home medication list includes an antiplatelet medication    # Heart failure, NOS: heart failure noted on the problem list and last echo with EF 40-50%     # Overweight: Estimated body mass index is 25.76 kg/m  as calculated from the following:    Height as of 12/1/23: 1.8 m (5' 10.87\").    Weight as of 12/11/23: 83.5 kg (184 lb).        # Pacemaker present       Disposition Plan      Expected Discharge Date: 12/16/2023                    Anayeli Patrick MD  Hospitalist Service  [facility]  Securely message with [employer] (more info)  Text page via [facility] Paging/Directory   ______________________________________________________________________    Interval History   Saw the patient when he was still in ER; daughter present  He had 3 loose bowel movements in the morning  Otherwise reported feeling okay, denies chest pain  Reports some chronic shortness of breath  No nausea, no vomiting  Daughter reports poor appetite  Discussed with RN    Physical Exam   Vital Signs: Temp: " 99.2  F (37.3  C) Temp src: Temporal BP: 116/65 Pulse: 63   Resp: 18 SpO2: 93 % O2 Device: None (Room air)    Weight: 0 lbs 0 oz    General Appearance: Awake/alert/no acute distress/looks younger than his stated age  Respiratory: Bilateral air entry, no wheezing, no rales, no crackles  Cardiovascular: S1-S2, no murmurs, no rub  GI: Abdomen soft, nontender, nondistended, bowel sounds are present  Skin: No rashes, no cyanosis  Other: 1+ edema  Neuro: AAOX3, no FNDs  Psych- very pleasant     Medical Decision Making       40 MINUTES SPENT BY ME on the date of service doing chart review, history, exam, documentation & further activities per the note.      Data     I have personally reviewed the following data over the past 24 hrs:    17.2 (H)  \   11.8 (L)   / 186     134 (L) 102 22.6 /  186 (H)   3.8 21 (L) 1.42 (H) \     ALT: 20 AST: 30 AP: 63 TBILI: 0.7   ALB: 3.5 TOT PROTEIN: 6.2 (L) LIPASE: N/A     TSH: N/A T4: N/A A1C: 5.6     Procal: N/A CRP: N/A Lactic Acid: 1.5         Imaging results reviewed over the past 24 hrs:   Recent Results (from the past 24 hour(s))   CT Chest Abdomen Pelvis w/o Contrast    Narrative    EXAM: CT CHEST ABDOMEN PELVIS W/O CONTRAST  LOCATION: [facility]  DATE: 12/15/2023    INDICATION: fever, diarrhea, renal insufficiency, generalized weakness  COMPARISON: CT chest 05/13/2014  TECHNIQUE: CT scan of the chest, abdomen, and pelvis was performed without IV contrast. Multiplanar reformats were obtained. Dose reduction techniques were used.   CONTRAST: None.    FINDINGS:   LUNGS AND PLEURA: Mild upper lung paraseptal emphysema. Mild bibasilar atelectasis. Few small benign fissural lymph nodes.    MEDIASTINUM/AXILLAE: No pericardial effusion. Left chest implanted cardiac device, lead tips in the right ventricle, right atrium, and branch coronary sinus. No lymphadenopathy. No thoracic aortic aneurysm.    CORONARY ARTERY CALCIFICATION: Previous intervention  (stents).    HEPATOBILIARY: Few small low-attenuation liver lesions, unchanged from 2014, benign.    PANCREAS: Normal.    SPLEEN: Normal.    ADRENAL GLANDS: Normal.    KIDNEYS/BLADDER: No significant mass, stones, or hydronephrosis. There are simple or benign cysts. No follow up is needed.    BOWEL: No obstruction or inflammatory change.    LYMPH NODES: Normal.    VASCULATURE: No abdominal aortic aneurysm. Moderate aortic atherosclerotic calcification.    PELVIC ORGANS: Mildly enlarged prostate.    MUSCULOSKELETAL: Degenerative changes of the spine.      Impression    IMPRESSION:  1.  No acute process in the chest, abdomen, or pelvis.

## 2023-12-15 NOTE — ED NOTES
RECEIVING UNIT ED HANDOFF REVIEW    ED Nurse Handoff Report was reviewed by: Christina Hernandez RN on December 15, 2023 at 2:14 PM

## 2023-12-15 NOTE — ED PROVIDER NOTES
History     Chief Complaint:  Generalized Weakness       The history is provided by the patient and a relative.      Moses Elizondo is a 89 year old male with history of coronary artery disease, hyperlipidemia and hypertension who presents to the ED with his daughters for evaluation of generalized weakness. His daughters report he was in ED two weeks ago for a UTI. They reports that he has been having confusion, weakness in legs when walking, diarrhea, shortness of breath and a low grade fever. He reports having 4-5 episodes of diarrhea but denies blood in stool. His daughters mention that he was able to walk earlier today but couldn't now. He was given Tylenol at 1600 yesterday. No one has been sick around him. He lives with his wife in a private home. No recent travel. Denies abdominal pain, cough, rashes or vomiting.     Independent Historian:    Daughters provides supplemental history as noted above.     Review of External Notes:  Reviewed discharge summary from hospitalization from 11/30/2023 to 12/1/2020.  Patient was treated with ceftriaxone and discharged on Ceftin for cultures that showed E. coli.    Medications:    acetaminophen (TYLENOL) 650 MG CR tablet  ASPIRIN EC PO  atorvastatin (LIPITOR) 40 MG tablet  furosemide (LASIX) 20 MG tablet  gabapentin (NEURONTIN) 300 MG capsule  levothyroxine (SYNTHROID/LEVOTHROID) 100 MCG tablet  metoprolol succinate ER (TOPROL XL) 25 MG 24 hr tablet  nitroGLYcerin (NITROSTAT) 0.4 MG sublingual tablet  sacubitril-valsartan (ENTRESTO) 24-26 MG per tablet    Past Medical History:    ASD (atrial septal defect)  Bradycardia  CAD (coronary artery disease)  Cardiac pacemaker  Degeneration of lumbar or lumbosacral intervertebral disc  Former smoker  Hypertension   Hyperlipidaemia  Idiopathic peripheral neuropathy  MGUS (monoclonal gammopathy of unknown significance)  NSTEMI (non-ST elevated myocardial infarction)   PFO (patent foramen ovale)  Prostatitis,  unspecified  Second degree heart block  Sensorineural hearing loss, unspecified  SSS (sick sinus syndrome)  Status post coronary angiogram  Unspecified hypothyroidism  Sepsis       Past Surgical History:    Past Surgical History:   Procedure Laterality Date    CHOLECYSTECTOMY      CORONARY ANGIOGRAPHY ADULT ORDER  5/14/14    PTCA w/YOGESH to OM1    CV CORONARY ANGIOGRAM N/A 6/20/2019    Procedure: Coronary Angiogram;  Surgeon: Romie Coronado MD;  Location:  [facility]    CV INTRAVASULAR ULTRASOUND N/A 6/20/2019    Procedure: Intravascular Ultrasound;  Surgeon: Romie Coronado MD;  Location:  [facility]    CV PCI ATHERECTOMY ORBITAL N/A 6/20/2019    Procedure: PCI Atherectomy Orbital;  Surgeon: Romie Coronado MD;  Location:  [facility]    CV PCI STENT DRUG ELUTING N/A 6/20/2019    Procedure: PCI Stent Drug Eluting;  Surgeon: Romie Coronado MD;  Location:  [facility]    CV TEMPORARY PACEMAKER INSERTION N/A 6/20/2019    Procedure: Temporary Pacemaker Insertion;  Surgeon: Romie Coronado MD;  Location:  [facility]    EP PACEMAKER N/A 6/20/2019    Procedure: EP Pacemaker;  Surgeon: Max Dowell MD;  Location:  [facility]    EP PPM UPGRADE TO BIVENT N/A 9/16/2021    Procedure: EP PPM UPGRADE TO BIVENT;  Surgeon: Tre Miller MD;  Location:  [facility]ARD[facility]ATH LAB    HEART CATH, ANGIOPLASTY  5/14/14    YOGESH to OM1    ZZC NONSPECIFIC PROCEDURE  2010    Laparoscopic cholecystectomy.       Physical Exam   Patient Vitals for the past 24 hrs:   BP Temp Temp src Pulse Resp SpO2   12/15/23 0535 123/65 -- -- 65 23 --   12/15/23 0525 126/70 -- -- 72 26 --   12/15/23 0455 -- -- -- 69 26 96 %   12/15/23 0435 116/62 -- -- 69 15 95 %   12/15/23 0426 -- -- -- 70 27 96 %   12/15/23 0356 109/57 -- -- 63 25 95 %   12/15/23 0233 -- -- -- 68 15 --   12/15/23 0230 103/54 -- -- 64 -- 93 %   12/15/23 0217 -- -- -- 64 13 94 %    12/15/23 0216 106/55 -- -- 64 -- --   12/15/23 0118 -- -- -- 61 19 90 %   12/14/23 2111 100/52 99.2  F (37.3  C) Temporal 65 18 98 %      Physical Exam  General: Well-nourished, warm to touch  Eyes: PERRL, conjunctivae pink no scleral icterus or conjunctival injection  ENT:  Moist mucus membranes, posterior oropharynx clear without erythema or exudates  Respiratory:  Lungs clear to auscultation bilaterally, no crackles/rubs/wheezes.  Good air movement  CV: Normal rate and rhythm, no murmurs/rubs/gallops  GI:  Abdomen soft and non-distended.  Normoactive BS.  No tenderness, guarding or rebound  Skin: Warm, dry.  No rashes or petechiae  Musculoskeletal: No peripheral edema or calf tenderness  Neuro: Alert and oriented to person/place/time  Psychiatric: Normal affect    Emergency Department Course     Laboratory:  Labs Ordered and Resulted from Time of ED Arrival to Time of ED Departure   COMPREHENSIVE METABOLIC PANEL - Abnormal       Result Value    Sodium 134 (*)     Potassium 3.8      Carbon Dioxide (CO2) 21 (*)     Anion Gap 11      Urea Nitrogen 22.6      Creatinine 1.42 (*)     GFR Estimate 47 (*)     Calcium 8.7      Chloride 102      Glucose 186 (*)     Alkaline Phosphatase 63      AST 30      ALT 20      Protein Total 6.2 (*)     Albumin 3.5      Bilirubin Total 0.7     UA MACROSCOPIC WITH REFLEX TO MICRO AND CULTURE - Abnormal    Color Urine Yellow      Appearance Urine Clear      Glucose Urine Negative      Bilirubin Urine Negative      Ketones Urine Negative      Specific Gravity Urine 1.016      Blood Urine Negative      pH Urine 5.5      Protein Albumin Urine 20 (*)     Urobilinogen Urine Normal      Nitrite Urine Negative      Leukocyte Esterase Urine Negative      RBC Urine 1      WBC Urine 1      Squamous Epithelials Urine 1     CBC WITH PLATELETS AND DIFFERENTIAL - Abnormal    WBC Count 17.2 (*)     RBC Count 4.14 (*)     Hemoglobin 11.8 (*)     Hematocrit 36.3 (*)     MCV 88      MCH 28.5      MCHC  32.5      RDW 12.1      Platelet Count 186      % Neutrophils 86      % Lymphocytes 6      % Monocytes 7      % Eosinophils 0      % Basophils 0      % Immature Granulocytes 1      NRBCs per 100 WBC 0      Absolute Neutrophils 14.8 (*)     Absolute Lymphocytes 1.0      Absolute Monocytes 1.2      Absolute Eosinophils 0.0      Absolute Basophils 0.0      Absolute Immature Granulocytes 0.1      Absolute NRBCs 0.0     ISTAT GASES LACTATE VENOUS POCT - Abnormal    Lactic Acid POCT 1.5      Bicarbonate Venous POCT 23      O2 Sat, Venous POCT 49 (*)     pCO2 Venous POCT 40      pH Venous POCT 7.38      pO2 Venous POCT 27     INFLUENZA A/B, RSV, & SARS-COV2 PCR - Normal    Influenza A PCR Negative      Influenza B PCR Negative      RSV PCR Negative      SARS CoV2 PCR Negative     HEMOGLOBIN A1C - Normal    Hemoglobin A1C 5.6     BLOOD CULTURE   BLOOD CULTURE   ENTERIC BACTERIA AND VIRUS PANEL BY PCR   C. DIFFICILE TOXIN B PCR WITH REFLEX TO C. DIFFICILE ANTIGEN AND TOXINS A/B EIA      Emergency Department Course & Assessments:     Interventions:  Medications   sodium chloride (PF) 0.9% PF flush 3 mL (has no administration in time range)   sodium chloride (PF) 0.9% PF flush 3 mL (3 mLs Intracatheter $Given 12/15/23 0722)   vancomycin (VANCOCIN) capsule 125 mg (125 mg Oral $Given 12/15/23 0605)   sodium chloride 0.9% BOLUS 1,000 mL (0 mLs Intravenous Stopped 12/15/23 0330)      Independent Interpretation (X-rays, CTs, rhythm strip):  None    Assessments/Consultations/Discussion of Management or Tests:  ED Course as of 12/15/23 0731   Fri Dec 15, 2023   0013 I obtained history and examined the patient as noted above.    0531 I consulted with Dr. Jimenez with the hospitalist service regarding admission.     Social Determinants of Health affecting care:  None     Disposition:  The patient was admitted to the hospital under the care of Dr. Jimenez.     Impression & Plan      Medical Decision Making:  Moses Elizondo is a  89 year old male who lives in his house independently with his wife with recent admission and treatment for urinary tract infection with antibiotics who comes today with diarrhea, low-grade fever and generalized weakness with inability to walk.  Viral testing for COVID, influenza and RSV are negative.  He does have a significant leukocytosis on laboratory studies with a white blood cell count of 17,000.  He has a mild elevation of his creatinine from his previous baseline.  Sepsis evaluation was undertaken.  Fortunately, lactic acid is normal.  His blood pressure has been normal.  He does not have severe sepsis or septic shock.  A target 1 L bolus was provided for concerns of dehydration.  His blood pressure remained normal.  Blood cultures are pending.  Urinalysis is bland without signs of infection.  Stool studies are pending at this time.  A CT scan of the chest abdomen and pelvis was obtained and shows no obvious infectious etiology.  Remainder of physical examination does not reveal an infectious etiology.  I am concerned that this could represent C. difficile colitis.  We will treat him empirically with oral vancomycin at this time.  Given his age and risk factors as well as his generalized weakness and leukocytosis, will admit to the observation unit for ongoing monitoring and treatment.  Patient and family were in agreement with the plan.    Diagnosis:    ICD-10-CM    1. Leukocytosis, unspecified type  D72.829       2. Diarrhea, unspecified type  R19.7       3. Generalized weakness  R53.1       4. Unable to ambulate  R26.2            Discharge Medications:  New Prescriptions    No medications on file      Scribe Disclosure:  Jia LUTZ, am serving as a scribe at 12:16 AM on 12/15/2023 to document services personally performed by Julisa Salazar MD based on my observations and the provider's statements to me.  12/15/2023   Julisa Salazar MD Cho, Amy C, MD  12/15/23 2754

## 2023-12-16 LAB
ALBUMIN SERPL BCG-MCNC: 3 G/DL (ref 3.5–5.2)
ALP SERPL-CCNC: 51 U/L (ref 40–150)
ALT SERPL W P-5'-P-CCNC: 15 U/L (ref 0–70)
ANION GAP SERPL CALCULATED.3IONS-SCNC: 6 MMOL/L (ref 7–15)
AST SERPL W P-5'-P-CCNC: 25 U/L (ref 0–45)
BILIRUB SERPL-MCNC: 0.5 MG/DL
BUN SERPL-MCNC: 14.8 MG/DL (ref 8–23)
CALCIUM SERPL-MCNC: 8.3 MG/DL (ref 8.2–9.6)
CHLORIDE SERPL-SCNC: 112 MMOL/L (ref 98–107)
CREAT SERPL-MCNC: 1.03 MG/DL (ref 0.67–1.17)
DEPRECATED HCO3 PLAS-SCNC: 23 MMOL/L (ref 22–29)
EGFRCR SERPLBLD CKD-EPI 2021: 69 ML/MIN/1.73M2
ERYTHROCYTE [DISTWIDTH] IN BLOOD BY AUTOMATED COUNT: 12 % (ref 10–15)
GLUCOSE SERPL-MCNC: 88 MG/DL (ref 70–99)
HCT VFR BLD AUTO: 32.1 % (ref 40–53)
HGB BLD-MCNC: 10.7 G/DL (ref 13.3–17.7)
MCH RBC QN AUTO: 29.3 PG (ref 26.5–33)
MCHC RBC AUTO-ENTMCNC: 33.3 G/DL (ref 31.5–36.5)
MCV RBC AUTO: 88 FL (ref 78–100)
PLATELET # BLD AUTO: 136 10E3/UL (ref 150–450)
POTASSIUM SERPL-SCNC: 3.7 MMOL/L (ref 3.4–5.3)
PROT SERPL-MCNC: 5.2 G/DL (ref 6.4–8.3)
RBC # BLD AUTO: 3.65 10E6/UL (ref 4.4–5.9)
SODIUM SERPL-SCNC: 141 MMOL/L (ref 135–145)
WBC # BLD AUTO: 7.8 10E3/UL (ref 4–11)

## 2023-12-16 PROCEDURE — 85027 COMPLETE CBC AUTOMATED: CPT | Performed by: STUDENT IN AN ORGANIZED HEALTH CARE EDUCATION/TRAINING PROGRAM

## 2023-12-16 PROCEDURE — 258N000003 HC RX IP 258 OP 636: Performed by: INTERNAL MEDICINE

## 2023-12-16 PROCEDURE — 80053 COMPREHEN METABOLIC PANEL: CPT | Performed by: STUDENT IN AN ORGANIZED HEALTH CARE EDUCATION/TRAINING PROGRAM

## 2023-12-16 PROCEDURE — 96361 HYDRATE IV INFUSION ADD-ON: CPT

## 2023-12-16 PROCEDURE — 250N000013 HC RX MED GY IP 250 OP 250 PS 637: Performed by: INTERNAL MEDICINE

## 2023-12-16 PROCEDURE — 258N000003 HC RX IP 258 OP 636: Performed by: PHYSICIAN ASSISTANT

## 2023-12-16 PROCEDURE — 36415 COLL VENOUS BLD VENIPUNCTURE: CPT | Performed by: STUDENT IN AN ORGANIZED HEALTH CARE EDUCATION/TRAINING PROGRAM

## 2023-12-16 PROCEDURE — 250N000013 HC RX MED GY IP 250 OP 250 PS 637: Performed by: EMERGENCY MEDICINE

## 2023-12-16 PROCEDURE — G0378 HOSPITAL OBSERVATION PER HR: HCPCS

## 2023-12-16 PROCEDURE — 99232 SBSQ HOSP IP/OBS MODERATE 35: CPT | Performed by: PHYSICIAN ASSISTANT

## 2023-12-16 RX ORDER — VANCOMYCIN HYDROCHLORIDE 125 MG/1
125 CAPSULE ORAL 4 TIMES DAILY
Qty: 56 CAPSULE | Refills: 0 | Status: SHIPPED | OUTPATIENT
Start: 2023-12-15 | End: 2023-12-17

## 2023-12-16 RX ADMIN — VANCOMYCIN HYDROCHLORIDE 125 MG: 125 CAPSULE ORAL at 07:51

## 2023-12-16 RX ADMIN — METOPROLOL SUCCINATE 25 MG: 25 TABLET, EXTENDED RELEASE ORAL at 11:46

## 2023-12-16 RX ADMIN — ASPIRIN 81 MG: 81 TABLET, COATED ORAL at 22:33

## 2023-12-16 RX ADMIN — VANCOMYCIN HYDROCHLORIDE 125 MG: 125 CAPSULE ORAL at 20:32

## 2023-12-16 RX ADMIN — ATORVASTATIN CALCIUM 40 MG: 40 TABLET, FILM COATED ORAL at 16:32

## 2023-12-16 RX ADMIN — VANCOMYCIN HYDROCHLORIDE 125 MG: 125 CAPSULE ORAL at 16:32

## 2023-12-16 RX ADMIN — ACETAMINOPHEN 650 MG: 325 TABLET, FILM COATED ORAL at 22:33

## 2023-12-16 RX ADMIN — SODIUM CHLORIDE: 9 INJECTION, SOLUTION INTRAVENOUS at 16:34

## 2023-12-16 RX ADMIN — GABAPENTIN 300 MG: 300 CAPSULE ORAL at 22:33

## 2023-12-16 RX ADMIN — SODIUM CHLORIDE: 9 INJECTION, SOLUTION INTRAVENOUS at 03:43

## 2023-12-16 RX ADMIN — LEVOTHYROXINE SODIUM 100 MCG: 100 TABLET ORAL at 07:51

## 2023-12-16 RX ADMIN — VANCOMYCIN HYDROCHLORIDE 125 MG: 125 CAPSULE ORAL at 11:46

## 2023-12-16 ASSESSMENT — ACTIVITIES OF DAILY LIVING (ADL)
ADLS_ACUITY_SCORE: 39
DEPENDENT_IADLS:: INDEPENDENT
ADLS_ACUITY_SCORE: 39
ADLS_ACUITY_SCORE: 37
ADLS_ACUITY_SCORE: 39
ADLS_ACUITY_SCORE: 39
ADLS_ACUITY_SCORE: 37
ADLS_ACUITY_SCORE: 39
ADLS_ACUITY_SCORE: 39

## 2023-12-16 NOTE — PROGRESS NOTES
-diagnostic tests and consults completed and resulted: not met  -vital signs normal or at patient baseline: partially met  -tolerating oral intake to maintain hydration: partially met   -returns to baseline functional status: not met  -safe disposition plan has been identified: not met      Nurse to notify provider when observation goals have been met and patient is ready for discharge.

## 2023-12-16 NOTE — PROGRESS NOTES
Summary: 12/16/2023 [id number]    PRIMARY Concern: Generalized weakness and loose stools  SAFETY RISK Concerns (fall risk, behaviors, etc.): fall      Isolation/Type: Enteric   Tests/Procedures for NEXT shift: AM Labs  Consults? (Pending/following, signed-off?) OT/SW pending  Where is patient from? (Home, TCU, etc.): home  Other Important info for NEXT shift: none  Anticipated DC date & active delays: TBD    Orientation: A&OX4  Observation Goals (met & not met): Not Met  Activity Level: SBA/W  Fall Risk: Fall precaution  Pain Management: Denied pain  ABNL VS/O2: VSS, on RA  ABNL Lab/BG: See Results  Diet: regular  Bowel/Bladder: Continent  Drains/Devices: Infusing NS at 75 mL/hr  Tests/Procedures for next shift: None  Anticipated DC date: TBD  Other Important Info: C-diff and on abx PO        -diagnostic tests and consults completed and resulted: not met  -vital signs normal or at patient baseline: partially met  -tolerating oral intake to maintain hydration: partially met   -returns to baseline functional status: not met  -safe disposition plan has been identified: not met        Nurse to notify provider when observation goals have been met and patient is ready for discharge.

## 2023-12-16 NOTE — PROGRESS NOTES
[facility]    Medicine Progress Note - Hospitalist Service    Date of Admission:  12/15/2023    Assessment & Plan      89 y/o male with PMH of nonischemic cardiomyopathy, Anemia, hypothyhroidism, MGUS, CAD, CKD presenting with weakness and diarrhea.      Diarrhea  C diff colitis  - Recent admission from 11/30-12/1 /2023 for weakness and UTI; urine culture positive for more than 100,000 colony of E. Coli; treated with ceftriaxone in the hospital, discharged on Ceftin  - Patient states that since his discharge 2 weeks ago, he's been having constant loose stools. For the past 2 days patient has had progressively severe weakness limiting ambulation. Denies fevers/chills(family states he had temp of 100), no abdominal pain, endorses chronic dyspnea. No nausea/vomiting/dysurea  ``Vitals/Exam: 90-91 on RA, 96 on 1L, Exam shows LE edema, mild RLQ tenderness. AOx4  ``Labs: WBC 17, UA negative, LA negative  ``Imaging: CT CAP negative  ``ER Course: 1L, PO Vanc  - Stool culture positive for C. Difficile  - Vanco 125 mg p.o. 4 times daily  - Mild IV hydration normal saline at 75 cc/h while still having diarrhea  - Monitor for further improvement of diarrhea - had 3 loose stools in 8 hours    Generalized weakness  - Likely because of ongoing diarrhea  - Treat C. difficile as above  - Fall precaution  - PT recommending home PT services    Hypoxia, resolved  - Noted to have oxygen saturation 90-91% on room air in ER, improved to 96% on 1 L  - CT chest with no infiltrate  - Currently saturating well on room air    Nonischemic cardiomyopathy  Bistolic Heart Failure  Chronic Dyspnea  ``LVEF 45-50 on 5/2023. CRT-P on 9/2021  - Follows with cardiology as outpatient  - resume PTA aspirin 81 mg p.o. daily, Toprol-XL 25 mg p.o. daily and statin  - Hold PTA Entresto for now, can resume after discharge at PCP follow up visit  - Hold PTA Lasix; getting mild IV hydration normal saline 50 cc/h  - Control fluid  "status  - intake/output, daily weights     Chronic Bilateral Venous Insufficiency  --SCDs  --Hold home lasix for now     MGUS  --has outpt follow-up      Hypothyroidism  --resume synthroid once confirmed     Chronic Moderately dilated Ascending Aorta  ``4.5cm  --pt follows with cardio as outpatient     CKD  ``Baseline Cr 1.25, was 1.42 on admission, improved to 1.03 today  ``Getting mild IV hydration  - Holding PTA Lasix for now  - Avoid nephrotoxic drug  - Avoid nsaids/contrast    Chronic Anemia, likely 2/2 Kidney disease  `` Hb baseline 11-13, currently 11.8  --Outpt follow-up for his anemia, no acute intervension     Hyperglycemia  --No indication for mod CHO/ISS at this time  --A1c 5.6             Observation Goals: -diagnostic tests and consults completed and resulted, -vital signs normal or at patient baseline, -tolerating oral intake to maintain hydration, -returns to baseline functional status, -safe disposition plan has been identified, Nurse to notify provider when observation goals have been met and patient is ready for discharge.  Diet: Renal Diet (non-dialysis)    DVT Prophylaxis: Pneumatic Compression Devices  [place] Catheter: Not present  Lines: None     Cardiac Monitoring: None  Code Status: No CPR- Do NOT Intubate      Clinically Significant Risk Factors Present on Admission              # Hypoalbuminemia: Lowest albumin = 3 g/dL at 12/16/2023  6:59 AM, will monitor as appropriate   # Drug Induced Platelet Defect: home medication list includes an antiplatelet medication    # Heart failure, NOS: heart failure noted on the problem list and last echo with EF 40-50%     # Overweight: Estimated body mass index is 25.76 kg/m  as calculated from the following:    Height as of 12/1/23: 1.8 m (5' 10.87\").    Weight as of 12/11/23: 83.5 kg (184 lb).        # Pacemaker present       Disposition Plan      Expected Discharge Date: 12/17/2023        Discharge Comments: oral ABX  c-diff positive  PT- home with home " care PT  OT  CM/SW            Klarissa Mora PA-C  Hospitalist Service  [facility]  Securely message with [employer] (more info)  Text page via [employer] Paging/Directory   ______________________________________________________________________    Interval History   He had 3 loose bowel movements overnight and one again after eating breakfast. Ordered breakfast but had not eaten. Appetite remains poor. No CP, SOB, nausea, or vomiting. Son-in-law at bedside.  Feels he is less weak than when he came in but still below baseline.    Physical Exam   Vital Signs: Temp: 98.5  F (36.9  C) Temp src: Oral BP: 114/71 Pulse: 68   Resp: 18 SpO2: 97 % O2 Device: None (Room air)    Weight: 0 lbs 0 oz    General Appearance: Awake/alert/no acute distress/restin gin bed  Respiratory: Bilateral air entry, no wheezing, no rales, no crackles  Cardiovascular: S1-S2, no murmurs, no rub,   GI: Abdomen soft, nontender, nondistended, bowel sounds are present  Skin: No rashes, no cyanosis  Other: 1+ edema  Neuro: AAOX3, no focal neurologic deficits, gait not evaluated  Psych- very pleasant, answers questions appropriately    Medical Decision Making       40 MINUTES SPENT BY ME on the date of service doing chart review, history, exam, documentation & further activities per the note.      Data     I have personally reviewed the following data over the past 24 hrs:    7.8  \   10.7 (L)   / 136 (L)     141 112 (H) 14.8 /  88   3.7 23 1.03 \     ALT: 15 AST: 25 AP: 51 TBILI: 0.5   ALB: 3.0 (L) TOT PROTEIN: 5.2 (L) LIPASE: N/A       Imaging results reviewed over the past 24 hrs:   No results found for this or any previous visit (from the past 24 hour(s)).

## 2023-12-16 NOTE — CONSULTS
Care Management Initial Consult    General Information  Assessment completed with: Diogo constantino  Type of CM/SW Visit: Initial Assessment  Primary Care Provider verified and updated as needed: Yes (Dr. Steven Wagoner)   Readmission within the last 30 days: no previous admission in last 30 days      Reason for Consult: discharge planning  Advance Care Planning:    no ACP documents on file in EPIC    Communication Assessment  Patient's communication style: spoken language (English or Bilingual)      Cognitive  Cognitive/Neuro/Behavioral: WDL                      Living Environment:   People in home: spouse  Shannon  Current living Arrangements: house ([address] [place] [address])      Able to return to prior arrangements: yes    Family/Social Support:  Care provided by: self  Provides care for: no one  Marital Status:   Support system: Children, Wife  Shannon       Description of Support System: Supportive, Involved      Current Resources:   Patient receiving home care services: No  Community Resources: None  Equipment currently used at home: walker, rolling  Supplies currently used at home:      Employment/Financial:  Employment Status: retired     Employment/ Comments: retired  / office  Financial Concerns: none   Finance Comments: active BCBS/BCBS MEDICARE ADVANTAGE insurance  Does the patient's insurance plan have a 3 day qualifying hospital stay waiver?  Yes   Which insurance plan 3 day waiver is available? Alternative insurance waiver  Will the waiver be used for post-acute placement? No    Lifestyle & Psychosocial Needs:  Social Determinants of Health     Food Insecurity: Not on file   Depression: Not at risk (11/27/2023)    PHQ-2     PHQ-2 Score: 1   Housing Stability: Not on file   Tobacco Use: Medium Risk (12/11/2023)    Patient History     Smoking Tobacco Use: Former     Smokeless Tobacco Use: Never     Passive Exposure: Not on file     Functional  Status:  Prior to admission patient needed assistance:   Dependent ADLs:: Independent  Dependent IADLs:: Independent    Mental Health Status:  Mental Health Status: No Current Concerns       Chemical Dependency Status:  Chemical Dependency Status: No Current Concerns       Values/Beliefs:  Spiritual, Cultural Beliefs, Evangelical Practices, Values that affect care: no         Additional Information:  Met with patient in room, introduced self and role in discharge planning. Confirmed the information in the above assessment, please see each section for helpful details. Provided MOON/outpatient observation brochure and explained.  Pt and his wife live in a house in [place] independently, 2 steps to enter, 1 flight of stairs within the home.  Supportive family live nearby (daughter 2 doors down) and can help as needed.  Explained PT recommendation.  Pt/family was provided with the Medicare Compare list for Home Care.  Discussed associated Medicare star ratings to assist with choice for referrals/discharge planning Yes; Education was given to pt/family that star ratings are updated/maintained by Medicare and can be reviewed by visiting www.medicare.gov Yes.  Pt agreeable to [facility] referral, sent to [facility] per protocol; they outsourced to [facility] who accepted, orders to be routed there at discharge.  Pt requested assistance finding appointment for hospital followup with Dr. Wagoner, as this provider did not have availability recently (pt saw Dr. Koehler instead).  Appointment scheduled, pt very appreciative; it appears on AVS:   Dec 22, 2023 11:30 AM (Arrive by 11:15 AM)  ED/Hospital Follow Up with Steven Wagoner MD  [facility]          Elin Ghosh RN, BSN, PHN  [facility]  Inpatient Care Management - FLOAT  Short Stay CM RN Mobile: [phone] daily 7:30-4:00

## 2023-12-16 NOTE — PROGRESS NOTES
Summary: [id number]  Care Plan Summary Note:  Orientation: A&OX4  Observation Goals (met & not met): Not Met  Activity Level: SBA  Fall Risk: Fall precaution  Pain Management:  Denied pain  ABNL VS/O2: VSS, on RA  ABNL Lab/BG: See Results  Diet: regular  Bowel/Bladder: Continent, x2 loose/watery stool  Drains/Devices: Infusing NS at 75 mL/hr  Tests/Procedures for next shift: None  Anticipated DC date: TBD  Other Important Info: pt on Enteric( C-difficile) and on abx PO    Observation goals  PRIOR TO DISCHARGE       Comments:   -diagnostic tests and consults completed and resulted- Not MET but only PT consulted, recommended home with home care physical therapy   -vital signs normal or at patient baseline- Not  MET  -safe disposition plan has been identified- Not MET  Nurse to notify provider when observation goals have been met and patient is ready for discharge.

## 2023-12-16 NOTE — PROGRESS NOTES
Observation goals  PRIOR TO DISCHARGE       Comments:   -diagnostic tests and consults completed and resulted- Partially met  -vital signs normal or at patient baseline- Met  -safe disposition plan has been identified- Met  Nurse to notify provider when observation goals have been met and patient is ready for discharge.

## 2023-12-16 NOTE — PROGRESS NOTES
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 Was most recently DCed w/ FWW a few weeks ago but reports has not used. No new falls.   General Information   Onset of Illness/Injury or Date of Surgery 12/15/23   Referring Physician Anisha Padilla MD   Patient/Family Therapy Goals Statement (PT) Return to home   Pertinent History of Current Problem (include personal factors and/or comorbidities that impact the POC) 89M hx of CHF, Anemia, hypothyhroidism, MGUS, CAD, CKD presenting with weakness and diarrhea.   Cognition   Affect/Mental Status (Cognition) WFL   Orientation Status (Cognition) oriented x 4   Follows Commands (Cognition) WFL   Pain Assessment   Patient Currently in Pain No   Posture    Posture Forward head position   Range of Motion (ROM)   ROM Comment WFLs for mobility and transfers   Strength (Manual Muscle Testing)   Strength Comments Grossly 4+ to 4/5 for BLEs on strength testing   Bed Mobility   Comment, (Bed Mobility) Supine to sit w/ SBA   Transfers "   Comment, (Transfers) Sit to stand w/ FWW and SBA   Gait/Stairs (Locomotion)   Comment, (Gait/Stairs) 10 ft w/ FWW and CGA   Balance   Balance Comments Good sitting balance, improved balance w/ FWW compared to no AD   Clinical Impression   Criteria for Skilled Therapeutic Intervention Yes, treatment indicated   PT Diagnosis (PT) Impaired ambulation   Influenced by the following impairments Impaired strength, balance and activity tolerance   Functional limitations due to impairments Impaired ADLs, IADLs and functional mobility   Clinical Presentation (PT Evaluation Complexity) stable   Clinical Presentation Rationale Clinical judgment   Clinical Decision Making (Complexity) low complexity   Planned Therapy Interventions (PT) balance training;bed mobility training;gait training;home exercise program;patient/family education;stair training;strengthening;transfer training;progressive activity/exercise;home program guidelines   Risk & Benefits of therapy have been explained evaluation/treatment results reviewed;care plan/treatment goals reviewed;risks/benefits reviewed;current/potential barriers reviewed;participants voiced agreement with care plan;participants included;patient;daughter   PT Total Evaluation Time   PT Eval, Low Complexity Minutes (09958) 10   Therapy Certification   Start of care date 12/15/23   Certification date from 12/15/23   Certification date to 12/25/23   Medical Diagnosis Weakness and diarrhea   Physical Therapy Goals   PT Frequency 5x/week   PT Predicted Duration/Target Date for Goal Attainment 12/25/23   PT Goals Bed Mobility;Transfers;Gait;Stairs   PT: Bed Mobility Independent;Supine to/from sit   PT: Transfers Modified independent;Sit to/from stand;Bed to/from chair;Assistive device   PT: Gait Modified independent;Assistive device;Rolling walker;Greater than 200 feet   PT: Stairs Greater than 10 stairs;Rail on left;Modified independent   Therapeutic Activity   Therapeutic Activities: dynamic  activities to improve functional performance Minutes (79425) 10   Symptoms Noted During/After Treatment Fatigue   Treatment Detail/Skilled Intervention Pt supine in bed at start of session. Agreeable to PT. Good sitting balance once EOB. Completing sit to stand x1 w/ FWW and SBA and x3 w/ no AD and SBA. Requesting to use toilet during session. Stand <> sit on toilet w/ SBA. Able to complete own pericares. Placed brief on Pt d/t diarrhea. Able to wash hands w/ SBA at sink w/ good balance. Sit to supine w/ SBA. All needs met at end of session w/ call light in place and bed alarm on. Educated on using his FWW for mobility and HHPT. Son and Pt agreeable.   Gait Training   Gait Training Minutes (16329) 18   Treatment Detail/Skilled Intervention Pt ambulating ~225 ft w/ FWW and CGA progressing to SBA. Cues for tall posture. Good sumit noted. No overt LOB. Pt ambulating ~250 ft w/ no AD and CGA. Some more decreased balance noted. Slower sumit. Pt completing x6 stairs w/ L sided railing ascending and a mixture of step to/reciprocal pattern w/ CGA progressing to SBA. No LOB noted. Completing an additional x3 stairs w/ sequencing similar to getting in and out of garage and SBA. No overt LOB noted. Completing dynamic gait w/o AD for ~100 ft and needing CGA. Multiple episodes of decreased balance and path deviations. Pt then ambulating an additional ~100 ft w/ dynamic balance and FWW. Improved balance noted and able to complete w/ SBA. Ambulating ~150 ft back to room w/ FWW and SBA.   Symptoms Noted During/After Treatment (Gait Training) fatigue   PT Discharge Planning   PT Plan Gait w/o AD, activity tolerance, review stairs, strengthening   PT Discharge Recommendation (DC Rec) home with home care physical therapy   PT Rationale for DC Rec Pt slightly below baseline mobility. Lives in a house w/ spouse and stairs to complete. Recently hospitalized and recommend FWW at [place]. Pt did not use walker at home. Educated on using  walker aftter returning home this time and Pt agreeable. Anticipate when medically ready Pt can DC home w/ HHPT and use of walker. Pt and son agreeable.   PT Brief overview of current status SBA w/ FWW   [facility]  OUTPATIENT PHYSICAL THERAPY EVALUATION  PLAN OF TREATMENT FOR OUTPATIENT REHABILITATION  (COMPLETE FOR INITIAL CLAIMS ONLY)  Patient's Last Name, First Name, M.I.  YOB: 1933  [place]                        Provider's Name  [facility] Medical Record No.  [id number]                             Onset Date:  12/15/23   Start of Care Date:  (P) 12/15/23   Type:     _X_PT   ___OT   ___SLP Medical Diagnosis:  (P) Weakness and diarrhea              PT Diagnosis:  (P) Impaired ambulation Visits from SOC:  1     See note for plan of treatment, functional goals and certification details    I CERTIFY THE NEED FOR THESE SERVICES FURNISHED UNDER        THIS PLAN OF TREATMENT AND WHILE UNDER MY CARE     (Physician co-signature of this document indicates review and certification of the therapy plan).

## 2023-12-17 VITALS
DIASTOLIC BLOOD PRESSURE: 74 MMHG | SYSTOLIC BLOOD PRESSURE: 120 MMHG | OXYGEN SATURATION: 94 % | HEART RATE: 60 BPM | RESPIRATION RATE: 16 BRPM | TEMPERATURE: 97.7 F

## 2023-12-17 PROCEDURE — 99239 HOSP IP/OBS DSCHRG MGMT >30: CPT | Performed by: PHYSICIAN ASSISTANT

## 2023-12-17 PROCEDURE — 250N000013 HC RX MED GY IP 250 OP 250 PS 637: Performed by: INTERNAL MEDICINE

## 2023-12-17 PROCEDURE — 250N000013 HC RX MED GY IP 250 OP 250 PS 637: Performed by: EMERGENCY MEDICINE

## 2023-12-17 PROCEDURE — 96361 HYDRATE IV INFUSION ADD-ON: CPT

## 2023-12-17 PROCEDURE — G0378 HOSPITAL OBSERVATION PER HR: HCPCS

## 2023-12-17 RX ORDER — VANCOMYCIN HYDROCHLORIDE 125 MG/1
125 CAPSULE ORAL 4 TIMES DAILY
Qty: 48 CAPSULE | Refills: 0 | Status: SHIPPED | OUTPATIENT
Start: 2023-12-15 | End: 2023-12-27

## 2023-12-17 RX ORDER — FUROSEMIDE 20 MG
20 TABLET ORAL DAILY
Start: 2023-12-19 | End: 2023-12-22

## 2023-12-17 RX ADMIN — METOPROLOL SUCCINATE 25 MG: 25 TABLET, EXTENDED RELEASE ORAL at 12:12

## 2023-12-17 RX ADMIN — VANCOMYCIN HYDROCHLORIDE 125 MG: 125 CAPSULE ORAL at 12:12

## 2023-12-17 RX ADMIN — VANCOMYCIN HYDROCHLORIDE 125 MG: 125 CAPSULE ORAL at 08:10

## 2023-12-17 RX ADMIN — LEVOTHYROXINE SODIUM 100 MCG: 100 TABLET ORAL at 08:10

## 2023-12-17 ASSESSMENT — ACTIVITIES OF DAILY LIVING (ADL)
ADLS_ACUITY_SCORE: 39

## 2023-12-17 NOTE — PROGRESS NOTES
PRIMARY Concern: Generalized weakness and loose stools  SAFETY RISK Concerns (fall risk, behaviors, etc.): fall      Isolation/Type: Enteric   Tests/Procedures for NEXT shift:None  Consults? (Pending/following, signed-off?) OT/PT/SW following   Where is patient from? (Home, TCU, etc.): home w/ spouse  Other Important info for NEXT shift:   Anticipated DC date & active delays: Likely home tomorrow     Orientation: A&OX4  Observation Goals (met & not met): Not Met  Activity Level: SBA/W  Fall Risk: Fall precaution  Pain Management: Denied pain  ABNL VS/O2: VSS, on RA  ABNL Lab/BG: see results  Diet: regular  Bowel/Bladder: Continent  Drains/Devices: Infusing NS at 50 mL/hr  Other Important Info: C-diff and on PO Vanco.      Observation goals        Comments:   -diagnostic tests and consults completed and resulted- Partially met  -vital signs normal or at patient baseline- Met  -safe disposition plan has been identified- Met  Nurse to notify provider when observation goals have been met and patient is ready for discharge.

## 2023-12-17 NOTE — PLAN OF CARE
Physical Therapy Discharge Summary    Reason for therapy discharge:    Discharged to home with home therapy.    Progress towards therapy goal(s). See goals on Care Plan in [facility] electronic health record for goal details.  Goals partially met.  Barriers to achieving goals:   discharge from facility.    Therapy recommendation(s):    Continued therapy is recommended.  Rationale/Recommendations:  To further improve independence with mobility.

## 2023-12-17 NOTE — PROGRESS NOTES
Observation goals  PRIOR TO DISCHARGE       Comments:   -diagnostic tests and consults completed and resulted- Met  -vital signs normal or at patient baseline- Met  -safe disposition plan has been identified- Met  Nurse to notify provider when observation goals have been met and patient is ready for discharge.

## 2023-12-17 NOTE — PROGRESS NOTES
PRIMARY Concern: Generalized weakness and loose stools  SAFETY RISK Concerns (fall risk, behaviors, etc.): fall      Isolation/Type: Enteric   Tests/Procedures for NEXT shift:None  Consults? (Pending/following, signed-off?) OT/PT/SW following   Where is patient from? (Home, TCU, etc.): home w/ spouse  Other Important info for NEXT shift: 3 loose stools today.  Anticipated DC date & active delays: Likely home tomorrow     Orientation: A&OX4  Observation Goals (met & not met): Not Met  Activity Level: SBA/W  Fall Risk: Fall precaution  Pain Management: Denied pain  ABNL VS/O2: VSS, on RA  ABNL Lab/BG: Hgb 10.7  Diet: regular  Bowel/Bladder: Continent  Drains/Devices: Infusing NS at 50 mL/hr  Other Important Info: C-diff and on PO Vanco.

## 2023-12-17 NOTE — PLAN OF CARE
Pt is discharging home at this time w/ all pt's belongings. AVS, meds and education given. Pt is aware NOT to pick medications from [facility]. Questions answered. Pt's daughter will transport.       Plan of Care Reviewed With: patient, child

## 2023-12-18 NOTE — PLAN OF CARE
OT: Order received, chart reviewed and discussed with care team. Pt discharged prior to OT evaluation. Defer discharge recommendations to PT. Will complete orders.

## 2023-12-19 ENCOUNTER — PATIENT OUTREACH (OUTPATIENT)
Dept: CARE COORDINATION | Facility: CLINIC | Age: 88
End: 2023-12-19
Payer: COMMERCIAL

## 2023-12-19 NOTE — PROGRESS NOTES
[facility]:   [facility] Contact  [facility]/Voicemail     Clinical Data: Post-Discharge Outreach     Outreach attempted x 2.  Left message on patient's voicemail, providing [facility]'s central phone number of [phone] ([phone]) for questions/concerns and/or to schedule an appt with an [facility] provider, if they do not have a PCP.      Plan:  [facility] will do no further outreaches at this time.       Eufemia Malik  Community Health Worker  [facility], [facility]  [phone]      *Connected Care Resource Team does NOT follow patient ongoing. Referrals are identified based on internal discharge reports and the outreach is to ensure patient has an understanding of their discharge instructions.

## 2023-12-20 LAB
BACTERIA BLD CULT: NO GROWTH
BACTERIA BLD CULT: NO GROWTH

## 2023-12-22 ENCOUNTER — OFFICE VISIT (OUTPATIENT)
Dept: INTERNAL MEDICINE | Facility: CLINIC | Age: 88
End: 2023-12-22
Payer: COMMERCIAL

## 2023-12-22 VITALS
OXYGEN SATURATION: 97 % | WEIGHT: 198.8 LBS | BODY MASS INDEX: 28.46 KG/M2 | TEMPERATURE: 97.2 F | SYSTOLIC BLOOD PRESSURE: 108 MMHG | HEIGHT: 70 IN | HEART RATE: 72 BPM | DIASTOLIC BLOOD PRESSURE: 68 MMHG

## 2023-12-22 DIAGNOSIS — G60.9 IDIOPATHIC PERIPHERAL NEUROPATHY: ICD-10-CM

## 2023-12-22 DIAGNOSIS — I25.118 ATHEROSCLEROTIC HEART DISEASE OF NATIVE CORONARY ARTERY WITH OTHER FORMS OF ANGINA PECTORIS (H): ICD-10-CM

## 2023-12-22 DIAGNOSIS — I49.5 SICK SINUS SYNDROME (H): ICD-10-CM

## 2023-12-22 DIAGNOSIS — D64.9 ANEMIA, UNSPECIFIED TYPE: ICD-10-CM

## 2023-12-22 DIAGNOSIS — N18.31 STAGE 3A CHRONIC KIDNEY DISEASE (H): ICD-10-CM

## 2023-12-22 DIAGNOSIS — A04.72 C. DIFFICILE COLITIS: Primary | ICD-10-CM

## 2023-12-22 DIAGNOSIS — I50.42 CHRONIC COMBINED SYSTOLIC AND DIASTOLIC HRT FAIL (H): ICD-10-CM

## 2023-12-22 DIAGNOSIS — D47.2 MGUS (MONOCLONAL GAMMOPATHY OF UNKNOWN SIGNIFICANCE): ICD-10-CM

## 2023-12-22 PROBLEM — R19.7 DIARRHEA, UNSPECIFIED TYPE: Status: RESOLVED | Noted: 2023-12-15 | Resolved: 2023-12-22

## 2023-12-22 PROBLEM — N30.00 ACUTE CYSTITIS WITHOUT HEMATURIA: Status: RESOLVED | Noted: 2023-11-30 | Resolved: 2023-12-22

## 2023-12-22 PROBLEM — A41.9 SEPSIS WITHOUT ACUTE ORGAN DYSFUNCTION, DUE TO UNSPECIFIED ORGANISM (H): Status: RESOLVED | Noted: 2023-11-30 | Resolved: 2023-12-22

## 2023-12-22 PROBLEM — Z98.890 STATUS POST CORONARY ANGIOGRAM: Status: RESOLVED | Noted: 2019-06-20 | Resolved: 2023-12-22

## 2023-12-22 LAB
ERYTHROCYTE [DISTWIDTH] IN BLOOD BY AUTOMATED COUNT: 12 % (ref 10–15)
HCT VFR BLD AUTO: 38.9 % (ref 40–53)
HGB BLD-MCNC: 12.7 G/DL (ref 13.3–17.7)
IRON BINDING CAPACITY (ROCHE): 191 UG/DL (ref 240–430)
IRON SATN MFR SERPL: 25 % (ref 15–46)
IRON SERPL-MCNC: 48 UG/DL (ref 61–157)
MCH RBC QN AUTO: 28.7 PG (ref 26.5–33)
MCHC RBC AUTO-ENTMCNC: 32.6 G/DL (ref 31.5–36.5)
MCV RBC AUTO: 88 FL (ref 78–100)
PLATELET # BLD AUTO: 183 10E3/UL (ref 150–450)
RBC # BLD AUTO: 4.42 10E6/UL (ref 4.4–5.9)
WBC # BLD AUTO: 5.8 10E3/UL (ref 4–11)

## 2023-12-22 PROCEDURE — 83550 IRON BINDING TEST: CPT | Performed by: INTERNAL MEDICINE

## 2023-12-22 PROCEDURE — 83540 ASSAY OF IRON: CPT | Performed by: INTERNAL MEDICINE

## 2023-12-22 PROCEDURE — 36415 COLL VENOUS BLD VENIPUNCTURE: CPT | Performed by: INTERNAL MEDICINE

## 2023-12-22 PROCEDURE — 85027 COMPLETE CBC AUTOMATED: CPT | Performed by: INTERNAL MEDICINE

## 2023-12-22 PROCEDURE — 99213 OFFICE O/P EST LOW 20 MIN: CPT | Performed by: INTERNAL MEDICINE

## 2023-12-22 RX ORDER — SACUBITRIL AND VALSARTAN 24; 26 MG/1; MG/1
TABLET, FILM COATED ORAL
Qty: 90 TABLET
Start: 2023-12-22 | End: 2024-02-23

## 2023-12-22 RX ORDER — FUROSEMIDE 20 MG
20 TABLET ORAL EVERY MORNING
Qty: 90 TABLET | Refills: 1 | Status: SHIPPED | OUTPATIENT
Start: 2023-12-22 | End: 2024-07-24

## 2023-12-22 NOTE — PATIENT INSTRUCTIONS
" Finish all the antibiotic tablet ( vancomycin) until all tablets are gone.      Usually the 14 days is an adequate treatment.      Contact us if you develop any return of diarrhea (more than 3 loose stools in a day for more than 2 days) in the next several weeks, because this can indicate a re-emergence of the intestinal infection.       OK to resume Entresto top help heart pumping performance.      Resume a normal diet, try to eat \"real food\" as best possible.       Continue all other medications at the same doses.  Contact your usual pharmacy if you need refills.      RSV vaccines are now available.  The will help provide protection against respiratory syncytial virus (RSV), a common upper respiratory virus that is known to cause severe inflammation in the airways.  This vaccine is recommended for adults over age 60, especially those with high risk conditions and/or chronic respiratory illnesses such as asthma or COPD.  This vaccine is available at most pharmacies and clinics.    If you are on Medicare insurance, get this vaccine from a pharmacist in a pharmacy for the best cost and to confirm coverage, it will be less expensive to get this there rather than from our clinic.          Investigate a Shingrix shingles vaccine with your pharmacist .  They can tell you the coverage and cost and then give it to you if the price is acceptable.    Medicare sometimes does not cover these Shingrix shingles vaccines, and with Medicare insurance it is usually cheaper to receive this shingles vaccine from a pharmacist in a pharmacy rather than in our clinic.    At this time, you only need the 2 Shingrix vaccines and then you are done.        Get a tetanus vaccine booster from a pharmacist in your usual pharmacy (tetanus vaccines are recommended every 10 years).  With Medicare insurance, the tetanus vaccine is cheaper to get in the pharmacy than in the clinic.     See Cardiology Clinic in March 2024.      Return to see me or " one of my partners in 6 months, sooner if needed.  Use [employer] or Call [phone] to schedule the appointment with me.

## 2023-12-22 NOTE — PROGRESS NOTES
Assessment & Plan     (A04.72) C. difficile colitis  (primary encounter diagnosis)  Comment: Clostridium difficile associated with the potent antibitoics he needed for his acute cystitis.   Reviewed Clostridium difficile at length with patient.   Finish vancomycin as prescribed until comeptlely gone.   Contact us for any return of noticeable diarrhea(I.e. more than 3 looes stools per day for more than 2 days) as this can represent a recurrance for which he may require a more prlonged course of oral vancomycin.   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  Continue current therapy.   OK to resume Entresto  Follow with cardiology clinic as planned.   Plan: sacubitril-valsartan (ENTRESTO) 24-26 MG per         tablet, furosemide (LASIX) 20 MG tablet            (D64.9) Anemia, unspecified type  Comment: Hgb low int he hospital when acutely ill with colitis.   Recheck to confirm improved.   Workup further is not better.   Plan: CBC with platelets, Iron & Iron Binding         Capacity            (N18.31) Stage 3a chronic kidney disease (H)  Comment: renal function OK.   Continue all medications at the same doses.    Plan: CBC with platelets, Iron & Iron Binding         Capacity            (I49.5) Sick sinus syndrome (H)  Comment: biventricular pacemaker in place.   Followed closely by Cardiology   Plan:     (I25.118) 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24)  Comment: This condition is currently controlled on the current medical regimen.  Continue current therapy.   The patient does not report any signs or symptoms of angina or active cardiac ischemia.   They do not report any relative changes in their ability to perform physical activities over the past year.    We discussed aggressive secondary risk factor modification, including aggressive BP control  (under 130/ideally), aggressive LDL lowering with statin (goal under 100 for sure/under 70 ideally), no smoking, diabetes prevention/management, no smoking, and use of either ASA or similar anti platelet agent if tolerated.     Plan:     (G60.9) Idiopathic peripheral neuropathy  Comment: This condition is currently controlled on the current medical regimen.  Continue current therapy.   Plan:     (D47.2) MGUS (monoclonal gammopathy of unknown significance)  Comment: This condition is currently controlled on the current medical regimen.  Continue current therapy.   Plan:              MED REC REQUIRED  Post Medication Reconciliation Status: discharge medications reconciled, continue medications without change      Steven Wagoner MD  [facility] IZZY Degroot is a 89 year old, presenting for the following health issues:  c.diff and Hospital F/U  Questions on medication from the hospital     HPI       Hospital Follow-up Visit:    Hospital/Nursing Home/IP Rehab Facility: [facility]  Date of Admission: 12/15/2023  Date of Discharge: 12/17/2023  Reason(s) for Admission: Unable to ambulate (posative C.Diff)     Was your hospitalization related to COVID-19? No   Problems taking medications regularly:  None  Medication changes since discharge: None  Problems adhering to non-medication therapy:  None    Summary of hospitalization:  [facility] discharge summary reviewed  Diagnostic Tests/Treatments reviewed.  Follow up needed: cardiology  Other Healthcare Providers Involved in Patient s Care:         cardiology  Update since discharge: improved.         Plan of care communicated with the patient.     Since home from hospital, they report that they are feeling better.   Energy level and stamina are slowly improving.    Appetite and intake are improving.   No fevers, no chills  No shortness of breath.   No abdominal pain.   No loose stools  "since leaving the hospital, eating almost normal diet.   They have mostly returned to prior routine and activities.                **I reviewed the information recorded in the patient's EPIC chart (including but not limited to medical history, surgical history, family history, problem list, medication list, and allergy list) and updated the information as indicated based on the patients reported information.         Review of Systems   Constitutional, HEENT, cardiovascular, pulmonary, gi and gu systems are negative, except as otherwise noted.      Objective    /68   Pulse 72   Temp 97.2  F (36.2  C) (Temporal)   Ht 1.778 m (5' 10\")   Wt 90.2 kg (198 lb 12.8 oz)   SpO2 97%   BMI 28.52 kg/m    Body mass index is 28.52 kg/m .  Physical Exam   GENERAL alert and no distress  EYES:  Normal sclera,conjunctiva, EOMI  HENT: oral and posterior pharynx without lesions or erythema, facies symmetric  NECK: Neck supple. No LAD, without thyroidmegaly.  RESP: Clear to ausculation bilaterally without wheezes or crackles. Normal BS in all fields.  CV: RRR normal S1S2 without murmurs, rubs or gallops.  LYMPH: no cervical lymph adenopathy appreciated  MS: extremities- no gross deformities of the visible extremities noted,   EXT:  no lower extremity edema  PSYCH: Alert and oriented times 3; speech- coherent  SKIN:  No obvious significant skin lesions on visible portions of face   ABD;  soft, nontedner, nondistended. Normal bowel sounds.       Results for orders placed or performed in visit on 12/22/23   CBC with platelets     Status: Abnormal   Result Value Ref Range    WBC Count 5.8 4.0 - 11.0 10e3/uL    RBC Count 4.42 4.40 - 5.90 10e6/uL    Hemoglobin 12.7 (L) 13.3 - 17.7 g/dL    Hematocrit 38.9 (L) 40.0 - 53.0 %    MCV 88 78 - 100 fL    MCH 28.7 26.5 - 33.0 pg    MCHC 32.6 31.5 - 36.5 g/dL    RDW 12.0 10.0 - 15.0 %    Platelet Count 183 150 - 450 10e3/uL                      "

## 2023-12-28 NOTE — Clinical Note
"Left detailed voicemail informing patient that I can move up her ablation procedure per  with another provider if she can't wait and to call me back.     Thanks,  Bobbi \"Yessica\" Yair     " Subclavian vein accessed.

## 2024-01-01 ENCOUNTER — APPOINTMENT (OUTPATIENT)
Dept: CT IMAGING | Facility: CLINIC | Age: 89
DRG: 683 | End: 2024-01-01
Attending: EMERGENCY MEDICINE
Payer: COMMERCIAL

## 2024-01-01 ENCOUNTER — APPOINTMENT (OUTPATIENT)
Dept: GENERAL RADIOLOGY | Facility: CLINIC | Age: 89
DRG: 683 | End: 2024-01-01
Attending: EMERGENCY MEDICINE
Payer: COMMERCIAL

## 2024-01-01 ENCOUNTER — HOSPITAL ENCOUNTER (INPATIENT)
Facility: CLINIC | Age: 89
LOS: 2 days | Discharge: HOME HEALTH | DRG: 683 | End: 2024-01-04
Attending: EMERGENCY MEDICINE | Admitting: INTERNAL MEDICINE
Payer: COMMERCIAL

## 2024-01-01 ENCOUNTER — OFFICE VISIT (OUTPATIENT)
Dept: URGENT CARE | Facility: URGENT CARE | Age: 89
End: 2024-01-01
Payer: COMMERCIAL

## 2024-01-01 VITALS
OXYGEN SATURATION: 95 % | SYSTOLIC BLOOD PRESSURE: 95 MMHG | DIASTOLIC BLOOD PRESSURE: 43 MMHG | WEIGHT: 180 LBS | BODY MASS INDEX: 25.83 KG/M2 | HEART RATE: 62 BPM | TEMPERATURE: 97.4 F
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
## 2024-01-01 DIAGNOSIS — I49.5 SICK SINUS SYNDROME (H): ICD-10-CM

## 2024-01-01 DIAGNOSIS — N17.9 AKI (ACUTE KIDNEY INJURY) (H): ICD-10-CM

## 2024-01-01 DIAGNOSIS — R79.89 ELEVATED SERUM CREATININE: ICD-10-CM

## 2024-01-01 DIAGNOSIS — S00.03XA CONTUSION OF SCALP, INITIAL ENCOUNTER: ICD-10-CM

## 2024-01-01 DIAGNOSIS — S09.90XA CLOSED HEAD INJURY, INITIAL ENCOUNTER: ICD-10-CM

## 2024-01-01 DIAGNOSIS — G25.0 FAMILIAL TREMOR: ICD-10-CM

## 2024-01-01 DIAGNOSIS — R50.9 FEVER, UNSPECIFIED FEVER CAUSE: ICD-10-CM

## 2024-01-01 DIAGNOSIS — R94.4 DECREASED GLOMERULAR FILTRATION RATE (GFR): ICD-10-CM

## 2024-01-01 DIAGNOSIS — K59.01 SLOW TRANSIT CONSTIPATION: ICD-10-CM

## 2024-01-01 DIAGNOSIS — D69.6 THROMBOCYTOPENIA (H): ICD-10-CM

## 2024-01-01 DIAGNOSIS — I50.42 CHRONIC COMBINED SYSTOLIC AND DIASTOLIC HRT FAIL (H): ICD-10-CM

## 2024-01-01 DIAGNOSIS — E87.1 HYPONATREMIA: ICD-10-CM

## 2024-01-01 DIAGNOSIS — N18.31 STAGE 3A CHRONIC KIDNEY DISEASE (H): ICD-10-CM

## 2024-01-01 DIAGNOSIS — Z95.0 CARDIAC PACEMAKER IN SITU: ICD-10-CM

## 2024-01-01 DIAGNOSIS — R40.0 SOMNOLENCE: ICD-10-CM

## 2024-01-01 DIAGNOSIS — E86.0 DEHYDRATION: ICD-10-CM

## 2024-01-01 DIAGNOSIS — G60.9 IDIOPATHIC PERIPHERAL NEUROPATHY: ICD-10-CM

## 2024-01-01 DIAGNOSIS — R53.1 GENERALIZED WEAKNESS: ICD-10-CM

## 2024-01-01 DIAGNOSIS — I25.810 CORONARY ARTERY DISEASE INVOLVING CORONARY BYPASS GRAFT OF NATIVE HEART WITHOUT ANGINA PECTORIS: Primary | ICD-10-CM

## 2024-01-01 LAB
ALBUMIN SERPL BCG-MCNC: 4.1 G/DL (ref 3.5–5.2)
ALBUMIN UR-MCNC: ABNORMAL MG/DL
ALP SERPL-CCNC: 84 U/L (ref 40–150)
ALT SERPL W P-5'-P-CCNC: 43 U/L (ref 0–70)
ANION GAP SERPL CALCULATED.3IONS-SCNC: 14 MMOL/L (ref 7–15)
ANION GAP SERPL CALCULATED.3IONS-SCNC: 2 MMOL/L (ref 3–14)
APPEARANCE UR: CLEAR
AST SERPL W P-5'-P-CCNC: 62 U/L (ref 0–45)
BACTERIA #/AREA URNS HPF: ABNORMAL /HPF
BASOPHILS # BLD AUTO: 0 10E3/UL (ref 0–0.2)
BASOPHILS # BLD AUTO: ABNORMAL 10*3/UL
BASOPHILS # BLD MANUAL: 0 10E3/UL (ref 0–0.2)
BASOPHILS NFR BLD AUTO: 0 %
BASOPHILS NFR BLD AUTO: ABNORMAL %
BASOPHILS NFR BLD MANUAL: 0 %
BILIRUB DIRECT SERPL-MCNC: <0.2 MG/DL (ref 0–0.3)
BILIRUB SERPL-MCNC: 0.8 MG/DL
BILIRUB UR QL STRIP: NEGATIVE
BUN SERPL-MCNC: 23 MG/DL (ref 7–30)
BUN SERPL-MCNC: 25.1 MG/DL (ref 8–23)
CALCIUM SERPL-MCNC: 9 MG/DL (ref 8.2–9.6)
CALCIUM SERPL-MCNC: 9.1 MG/DL (ref 8.5–10.1)
CHLORIDE BLD-SCNC: 105 MMOL/L (ref 94–109)
CHLORIDE SERPL-SCNC: 95 MMOL/L (ref 98–107)
CO2 SERPL-SCNC: 26 MMOL/L (ref 20–32)
COLOR UR AUTO: YELLOW
CREAT SERPL-MCNC: 1.41 MG/DL (ref 0.67–1.17)
CREAT SERPL-MCNC: 1.5 MG/DL (ref 0.66–1.25)
DEPRECATED HCO3 PLAS-SCNC: 24 MMOL/L (ref 22–29)
EGFRCR SERPLBLD CKD-EPI 2021: 44 ML/MIN/1.73M2
EGFRCR SERPLBLD CKD-EPI 2021: 47 ML/MIN/1.73M2
EOSINOPHIL # BLD AUTO: 0 10E3/UL (ref 0–0.7)
EOSINOPHIL # BLD AUTO: ABNORMAL 10*3/UL
EOSINOPHIL # BLD MANUAL: 0.2 10E3/UL (ref 0–0.7)
EOSINOPHIL NFR BLD AUTO: 0 %
EOSINOPHIL NFR BLD AUTO: ABNORMAL %
EOSINOPHIL NFR BLD MANUAL: 2 %
ERYTHROCYTE [DISTWIDTH] IN BLOOD BY AUTOMATED COUNT: 12.2 % (ref 10–15)
ERYTHROCYTE [DISTWIDTH] IN BLOOD BY AUTOMATED COUNT: 12.5 % (ref 10–15)
FLUAV RNA SPEC QL NAA+PROBE: NEGATIVE
FLUBV RNA RESP QL NAA+PROBE: NEGATIVE
GLUCOSE BLD-MCNC: 126 MG/DL (ref 70–99)
GLUCOSE SERPL-MCNC: 129 MG/DL (ref 70–99)
GLUCOSE UR STRIP-MCNC: NEGATIVE MG/DL
HCO3 BLDV-SCNC: 21 MMOL/L (ref 21–28)
HCT VFR BLD AUTO: 37.3 % (ref 40–53)
HCT VFR BLD AUTO: 39.9 % (ref 40–53)
HGB BLD-MCNC: 12.2 G/DL (ref 13.3–17.7)
HGB BLD-MCNC: 13 G/DL (ref 13.3–17.7)
HGB UR QL STRIP: NEGATIVE
HOLD SPECIMEN: NORMAL
IMM GRANULOCYTES # BLD: 0 10E3/UL
IMM GRANULOCYTES # BLD: ABNORMAL 10*3/UL
IMM GRANULOCYTES NFR BLD: 1 %
IMM GRANULOCYTES NFR BLD: ABNORMAL %
KETONES UR STRIP-MCNC: NEGATIVE MG/DL
LACTATE BLD-SCNC: 1.6 MMOL/L
LEUKOCYTE ESTERASE UR QL STRIP: NEGATIVE
LYMPHOCYTES # BLD AUTO: 0.7 10E3/UL (ref 0.8–5.3)
LYMPHOCYTES # BLD AUTO: ABNORMAL 10*3/UL
LYMPHOCYTES # BLD MANUAL: 1.6 10E3/UL (ref 0.8–5.3)
LYMPHOCYTES NFR BLD AUTO: 16 %
LYMPHOCYTES NFR BLD AUTO: ABNORMAL %
LYMPHOCYTES NFR BLD MANUAL: 19 %
MCH RBC QN AUTO: 28.6 PG (ref 26.5–33)
MCH RBC QN AUTO: 28.7 PG (ref 26.5–33)
MCHC RBC AUTO-ENTMCNC: 32.6 G/DL (ref 31.5–36.5)
MCHC RBC AUTO-ENTMCNC: 32.7 G/DL (ref 31.5–36.5)
MCV RBC AUTO: 88 FL (ref 78–100)
MCV RBC AUTO: 88 FL (ref 78–100)
MONOCYTES # BLD AUTO: 0.5 10E3/UL (ref 0–1.3)
MONOCYTES # BLD AUTO: ABNORMAL 10*3/UL
MONOCYTES # BLD MANUAL: 0.9 10E3/UL (ref 0–1.3)
MONOCYTES NFR BLD AUTO: 11 %
MONOCYTES NFR BLD AUTO: ABNORMAL %
MONOCYTES NFR BLD MANUAL: 11 %
NEUTROPHILS # BLD AUTO: 3.2 10E3/UL (ref 1.6–8.3)
NEUTROPHILS # BLD AUTO: ABNORMAL 10*3/UL
NEUTROPHILS # BLD MANUAL: 5.6 10E3/UL (ref 1.6–8.3)
NEUTROPHILS NFR BLD AUTO: 71 %
NEUTROPHILS NFR BLD AUTO: ABNORMAL %
NEUTROPHILS NFR BLD MANUAL: 68 %
NITRATE UR QL: NEGATIVE
NRBC # BLD AUTO: 0 10E3/UL
NRBC BLD AUTO-RTO: 0 /100
PCO2 BLDV: 30 MM HG (ref 40–50)
PH BLDV: 7.45 [PH] (ref 7.32–7.43)
PH UR STRIP: 6 [PH] (ref 5–7)
PLAT MORPH BLD: NORMAL
PLATELET # BLD AUTO: 107 10E3/UL (ref 150–450)
PLATELET # BLD AUTO: 125 10E3/UL (ref 150–450)
PO2 BLDV: 38 MM HG (ref 25–47)
POTASSIUM BLD-SCNC: 4.3 MMOL/L (ref 3.4–5.3)
POTASSIUM SERPL-SCNC: 4 MMOL/L (ref 3.4–5.3)
PROCALCITONIN SERPL IA-MCNC: 0.4 NG/ML
PROT SERPL-MCNC: 7 G/DL (ref 6.4–8.3)
RBC # BLD AUTO: 4.25 10E6/UL (ref 4.4–5.9)
RBC # BLD AUTO: 4.55 10E6/UL (ref 4.4–5.9)
RBC #/AREA URNS AUTO: ABNORMAL /HPF
RBC MORPH BLD: NORMAL
RSV RNA SPEC NAA+PROBE: NEGATIVE
SAO2 % BLDV: 76 % (ref 94–100)
SARS-COV-2 RNA RESP QL NAA+PROBE: NEGATIVE
SODIUM SERPL-SCNC: 133 MMOL/L (ref 135–145)
SODIUM SERPL-SCNC: 133 MMOL/L (ref 135–145)
SP GR UR STRIP: 1.01 (ref 1–1.03)
SQUAMOUS #/AREA URNS AUTO: ABNORMAL /LPF
UROBILINOGEN UR STRIP-ACNC: 0.2 E.U./DL
WBC # BLD AUTO: 4.5 10E3/UL (ref 4–11)
WBC # BLD AUTO: 8.2 10E3/UL (ref 4–11)
WBC #/AREA URNS AUTO: ABNORMAL /HPF

## 2024-01-01 PROCEDURE — 81001 URINALYSIS AUTO W/SCOPE: CPT | Performed by: NURSE PRACTITIONER

## 2024-01-01 PROCEDURE — 84145 PROCALCITONIN (PCT): CPT | Performed by: EMERGENCY MEDICINE

## 2024-01-01 PROCEDURE — 96365 THER/PROPH/DIAG IV INF INIT: CPT | Mod: 59

## 2024-01-01 PROCEDURE — 80053 COMPREHEN METABOLIC PANEL: CPT | Performed by: NURSE PRACTITIONER

## 2024-01-01 PROCEDURE — 82248 BILIRUBIN DIRECT: CPT | Performed by: EMERGENCY MEDICINE

## 2024-01-01 PROCEDURE — 99207 PR INPT ADMISSION FROM CLINIC: CPT | Performed by: NURSE PRACTITIONER

## 2024-01-01 PROCEDURE — 70450 CT HEAD/BRAIN W/O DYE: CPT

## 2024-01-01 PROCEDURE — 80048 BASIC METABOLIC PNL TOTAL CA: CPT | Performed by: EMERGENCY MEDICINE

## 2024-01-01 PROCEDURE — 96366 THER/PROPH/DIAG IV INF ADDON: CPT

## 2024-01-01 PROCEDURE — 96361 HYDRATE IV INFUSION ADD-ON: CPT

## 2024-01-01 PROCEDURE — 82803 BLOOD GASES ANY COMBINATION: CPT

## 2024-01-01 PROCEDURE — 85027 COMPLETE CBC AUTOMATED: CPT | Performed by: EMERGENCY MEDICINE

## 2024-01-01 PROCEDURE — 85007 BL SMEAR W/DIFF WBC COUNT: CPT | Performed by: EMERGENCY MEDICINE

## 2024-01-01 PROCEDURE — 250N000009 HC RX 250: Performed by: EMERGENCY MEDICINE

## 2024-01-01 PROCEDURE — G0378 HOSPITAL OBSERVATION PER HR: HCPCS

## 2024-01-01 PROCEDURE — 96375 TX/PRO/DX INJ NEW DRUG ADDON: CPT

## 2024-01-01 PROCEDURE — 258N000003 HC RX IP 258 OP 636: Performed by: EMERGENCY MEDICINE

## 2024-01-01 PROCEDURE — 250N000013 HC RX MED GY IP 250 OP 250 PS 637: Performed by: EMERGENCY MEDICINE

## 2024-01-01 PROCEDURE — 71046 X-RAY EXAM CHEST 2 VIEWS: CPT

## 2024-01-01 PROCEDURE — 74177 CT ABD & PELVIS W/CONTRAST: CPT

## 2024-01-01 PROCEDURE — 36415 COLL VENOUS BLD VENIPUNCTURE: CPT | Performed by: NURSE PRACTITIONER

## 2024-01-01 PROCEDURE — 258N000003 HC RX IP 258 OP 636: Performed by: INTERNAL MEDICINE

## 2024-01-01 PROCEDURE — 250N000011 HC RX IP 250 OP 636: Performed by: EMERGENCY MEDICINE

## 2024-01-01 PROCEDURE — 36415 COLL VENOUS BLD VENIPUNCTURE: CPT | Performed by: EMERGENCY MEDICINE

## 2024-01-01 PROCEDURE — 99222 1ST HOSP IP/OBS MODERATE 55: CPT | Performed by: INTERNAL MEDICINE

## 2024-01-01 PROCEDURE — 87637 SARSCOV2&INF A&B&RSV AMP PRB: CPT | Performed by: EMERGENCY MEDICINE

## 2024-01-01 PROCEDURE — 87040 BLOOD CULTURE FOR BACTERIA: CPT | Performed by: EMERGENCY MEDICINE

## 2024-01-01 PROCEDURE — 85025 COMPLETE CBC W/AUTO DIFF WBC: CPT | Performed by: NURSE PRACTITIONER

## 2024-01-01 PROCEDURE — 99285 EMERGENCY DEPT VISIT HI MDM: CPT | Mod: 25

## 2024-01-01 RX ORDER — AMOXICILLIN 250 MG
1 CAPSULE ORAL 2 TIMES DAILY
Status: CANCELLED | OUTPATIENT
Start: 2024-01-01

## 2024-01-01 RX ORDER — ONDANSETRON 2 MG/ML
4 INJECTION INTRAMUSCULAR; INTRAVENOUS ONCE
Status: COMPLETED | OUTPATIENT
Start: 2024-01-01 | End: 2024-01-01

## 2024-01-01 RX ORDER — PIPERACILLIN SODIUM, TAZOBACTAM SODIUM 4; .5 G/20ML; G/20ML
4.5 INJECTION, POWDER, LYOPHILIZED, FOR SOLUTION INTRAVENOUS ONCE
Status: DISCONTINUED | OUTPATIENT
Start: 2024-01-01 | End: 2024-01-01

## 2024-01-01 RX ORDER — IOPAMIDOL 755 MG/ML
90 INJECTION, SOLUTION INTRAVASCULAR ONCE
Status: COMPLETED | OUTPATIENT
Start: 2024-01-01 | End: 2024-01-01

## 2024-01-01 RX ORDER — SODIUM CHLORIDE 9 MG/ML
INJECTION, SOLUTION INTRAVENOUS CONTINUOUS
Status: DISCONTINUED | OUTPATIENT
Start: 2024-01-01 | End: 2024-01-01

## 2024-01-01 RX ORDER — AMOXICILLIN 250 MG
2 CAPSULE ORAL 2 TIMES DAILY
Status: CANCELLED | OUTPATIENT
Start: 2024-01-01

## 2024-01-01 RX ORDER — PIPERACILLIN SODIUM, TAZOBACTAM SODIUM 3; .375 G/15ML; G/15ML
3.38 INJECTION, POWDER, LYOPHILIZED, FOR SOLUTION INTRAVENOUS ONCE
Status: COMPLETED | OUTPATIENT
Start: 2024-01-01 | End: 2024-01-01

## 2024-01-01 RX ORDER — ACETAMINOPHEN 500 MG
1000 TABLET ORAL ONCE
Status: COMPLETED | OUTPATIENT
Start: 2024-01-01 | End: 2024-01-01

## 2024-01-01 RX ADMIN — SODIUM CHLORIDE: 9 INJECTION, SOLUTION INTRAVENOUS at 17:11

## 2024-01-01 RX ADMIN — SODIUM CHLORIDE 1000 ML: 9 INJECTION, SOLUTION INTRAVENOUS at 23:00

## 2024-01-01 RX ADMIN — PIPERACILLIN AND TAZOBACTAM 3.38 G: 3; .375 INJECTION, POWDER, FOR SOLUTION INTRAVENOUS at 20:36

## 2024-01-01 RX ADMIN — ACETAMINOPHEN 1000 MG: 500 TABLET, FILM COATED ORAL at 18:50

## 2024-01-01 RX ADMIN — ONDANSETRON 4 MG: 2 INJECTION INTRAMUSCULAR; INTRAVENOUS at 16:45

## 2024-01-01 RX ADMIN — IOPAMIDOL 90 ML: 755 INJECTION, SOLUTION INTRAVENOUS at 17:40

## 2024-01-01 RX ADMIN — SODIUM CHLORIDE 1000 ML: 9 INJECTION, SOLUTION INTRAVENOUS at 20:27

## 2024-01-01 RX ADMIN — SODIUM CHLORIDE 66 ML: 9 INJECTION, SOLUTION INTRAVENOUS at 17:39

## 2024-01-01 ASSESSMENT — ACTIVITIES OF DAILY LIVING (ADL)
ADLS_ACUITY_SCORE: 35

## 2024-01-01 NOTE — ED PROVIDER NOTES
History     Chief Complaint:  Fall      HPI   Moses Elizondo is a 89 year old male with history of hypertension, stage 3 CKD, CHF, aortic root dilation, NSTEMI, and non essential tremor who presents with his family for evaluation after a fall. The patient's daughter reports that the patient had an unwitnessed mechanical fall yesterday when coming up a few steps from the garage into his house.  His wife was right there and saw him within seconds after he fell because she heard him fall. She states that the patient was able to get up and go into the house unassisted and was with his spouse right after. Notes the patient has been complaining of bilateral hip pain today. Reports that the patient was very tremulous today, more than his normal non essential tremor.  Adds that two days ago, before the fall, the patient developed a fever. Reports that the patient was treated for a UTI one month ago and finished a course of vancomycin for a C. Difficile infection four days ago. The patient endorses left lower quadrant abdominal pain. Denies use of anticoagulants or blood thinners. Denies known sick exposures. Denies loss of consciousness, neck pain, and cough.  Patient does mention that when he woke up this morning he had some numbness in his fingertips on the left hand.  It went away and then came back briefly today and then is gone again.  He could use his hand fine.  He does not know if maybe he hit his elbow or his arm yesterday when he fell.  No other weakness or numbness in his body.    Independent Historian:   Daughter - They report as noted above.    Review of External Notes:   I reviewed urgent care discharge summary and laboratory work. Unremarkable UA.     Medications:    Atorvastatin  Furosemide  Gabapentin  Levothyroxine  Metoprolol  Nitroglycerin   Entresto  Aspirin 81 MG    Past Medical History:    Aortic root dilation  Atrial septal defect  CAD  DDD  Non essential tremor  Tobacco  abuse  Hypertension  Hyperlipidemia  MUGS  Idiopathic peripheral neuropathy  NSTEMI  Prostatitis  Second degree heart block  Sick sinus syndrome  Hypothyroidism   Stage 3 CKD  CHF    Past Surgical History:    Cholecystectomy  Coronary angiogram  Intravascular ultrasound  PCI atherectomy orbital  PCI stent drug eluting  Pacemaker placement     Physical Exam   Patient Vitals for the past 24 hrs:   BP Temp Temp src Pulse Resp SpO2 Weight   01/01/24 2045 92/49 -- -- 64 -- 95 % --   01/01/24 2030 (!) 83/54 -- -- 63 18 95 % --   01/01/24 2008 (!) 88/47 98.4  F (36.9  C) Oral -- 18 -- --   01/01/24 1900 95/69 -- -- 66 -- 95 % --   01/01/24 1845 101/55 -- -- 67 -- 93 % --   01/01/24 1830 104/53 (!) 102.1  F (38.9  C) Axillary 68 -- 95 % --   01/01/24 1815 105/56 -- -- 69 -- 92 % --   01/01/24 1810 -- -- -- -- -- 96 % --   01/01/24 1805 -- -- -- -- -- 91 % --   01/01/24 1800 110/52 -- -- 70 -- (!) 80 % --   01/01/24 1628 (!) 153/77 99.6  F (37.6  C) -- 82 20 98 % 81.6 kg (180 lb)      Physical Exam  Nursing note and vitals reviewed.    Constitutional:  Appears comfortable.  Sleepy but arousable and oriented.   HENT:                Nose normal.  No discharge.      Oral mucosa is dry.  Eyes:    Conjunctivae are normal without injection.  Pupils are equal.  Cardiovascular:  Normal rate, regular rhythm with normal S1 and S2.      Normal heart sounds and peripheral pulses 2+ and equal.    Pulmonary:  Effort normal and breath sounds clear to auscultation bilaterally.    No respiratory distress.  No stridor.     No wheezes. No rales.     GI:    Soft.  There is some diffuse tenderness, worse in the left lower quadrant without rebound or guarding.  Musculoskeletal:  Normal range of motion. No extremity deformity.     Patient had only trace ankle edema, I can flex and extend his hips without any tenderness.  Upper extremities are normal as well.     No cervical spine tenderness.  Neurological:   Alert and oriented. No focal weakness.   Some general weakness noted.  Full normal sensation in all 4 extremities.  Skin:    Skin is warm and dry. No rash noted. Hematoma above the right eyebrow in the fronto-temporal region with some tenderness and swelling.   Psychiatric:   Behavior is normal. Appropriate mood and affect.     Judgment and thought content normal.     Emergency Department Course       Imaging:  CT Abdomen Pelvis w Contrast   Final Result   IMPRESSION:   1.  Large descending and rectosigmoid stool burden with 7 cm rectal stool ball. No findings of stercoral colitis.   2.  Prostatomegaly.       CT Head w/o Contrast   Final Result   IMPRESSION:   1.  Negative for acute intracranial process.      Chest XR,  PA & LAT    (Results Pending)      Laboratory:  Labs Ordered and Resulted from Time of ED Arrival to Time of ED Departure   BASIC METABOLIC PANEL - Abnormal       Result Value    Sodium 133 (*)     Potassium 4.0      Chloride 95 (*)     Carbon Dioxide (CO2) 24      Anion Gap 14      Urea Nitrogen 25.1 (*)     Creatinine 1.41 (*)     GFR Estimate 47 (*)     Calcium 9.0      Glucose 129 (*)    CBC WITH PLATELETS AND DIFFERENTIAL - Abnormal    WBC Count 8.2      RBC Count 4.55      Hemoglobin 13.0 (*)     Hematocrit 39.9 (*)     MCV 88      MCH 28.6      MCHC 32.6      RDW 12.5      Platelet Count 125 (*)     % Neutrophils        % Lymphocytes        % Monocytes        % Eosinophils        % Basophils        % Immature Granulocytes        NRBCs per 100 WBC 0      Absolute Neutrophils        Absolute Lymphocytes        Absolute Monocytes        Absolute Eosinophils        Absolute Basophils        Absolute Immature Granulocytes        Absolute NRBCs 0.0     ISTAT GASES LACTATE VENOUS POCT - Abnormal    Lactic Acid POCT 1.6      Bicarbonate Venous POCT 21      O2 Sat, Venous POCT 76 (*)     pCO2 Venous POCT 30 (*)     pH Venous POCT 7.45 (*)     pO2 Venous POCT 38     HEPATIC FUNCTION PANEL - Abnormal    Protein Total 7.0      Albumin 4.1       Bilirubin Total 0.8      Alkaline Phosphatase 84      AST 62 (*)     ALT 43      Bilirubin Direct <0.20     INFLUENZA A/B, RSV, & SARS-COV2 PCR - Normal    Influenza A PCR Negative      Influenza B PCR Negative      RSV PCR Negative      SARS CoV2 PCR Negative     PROCALCITONIN - Normal    Procalcitonin 0.40     DIFFERENTIAL    % Neutrophils 68      % Lymphocytes 19      % Monocytes 11      % Eosinophils 2      % Basophils 0      Absolute Neutrophils 5.6      Absolute Lymphocytes 1.6      Absolute Monocytes 0.9      Absolute Eosinophils 0.2      Absolute Basophils 0.0      RBC Morphology Confirmed RBC Indices      Platelet Assessment        Value: Automated Count Confirmed. Platelet morphology is normal.   BLOOD CULTURE   BLOOD CULTURE      Emergency Department Course & Assessments:     Interventions:  Medications   sodium chloride 0.9 % infusion ( Intravenous $New Bag 1/1/24 1711)   ondansetron (ZOFRAN) injection 4 mg (4 mg Intravenous $Given 1/1/24 1645)   iopamidol (ISOVUE-370) solution 90 mL (90 mLs Intravenous $Given 1/1/24 1740)   Saline flush (66 mLs Intravenous $Given 1/1/24 1739)   acetaminophen (TYLENOL) tablet 1,000 mg (1,000 mg Oral $Given 1/1/24 1850)   sodium chloride 0.9% BOLUS 1,000 mL (1,000 mLs Intravenous $New Bag 1/1/24 2027)   piperacillin-tazobactam (ZOSYN) 3.375 g vial to attach to  mL bag (3.375 g Intravenous $New Bag 1/1/24 2036)      Assessments:  1650 I obtained history and examined the patient as noted above.     Independent Interpretation (X-rays, CTs, rhythm strip):  None    Consultations/Discussion of Management or Tests:  2110 I discussed the case with Dr. Johnson from the hospitalist service       Social Determinants of Health affecting care:   None    Disposition:  The patient was admitted to the hospital under the care of Dr. Johnson.     Impression & Plan    Medical Decision Making:  Patient comes in because of increased weakness, fever, and for evaluation after a fall where he  hit his head yesterday.  He is not on blood thinners but he has a hematoma on the right frontal temporal area of his forehead.  CT was negative for acute injury.  The patient seems sleepy but he is appropriate when you talk to him.  I do not see any evidence of a stroke, no focal weakness.  He did spike a fever here of 102.1.  Blood cultures were obtained, septic workup started and is white count is normal and his lactate is normal.  He does have acute kidney failure with a creatinine up to 1.41.  The last kidney function testing in the last couple of months was normal.  Hemoglobin is 13.0, urine does not suggest infection.  Viral screen was negative.  He seemed to have some abdominal pain and CT was obtained and just showed some constipation but no diverticulitis or bowel obstruction.  There is no evidence of a pelvic or hip fracture.  I am not having evidence by blood work or testing or physical exam that suggest where the infection is coming from.  He is not complaining of a headache other than from where he hit his head.  I have added a procalcitonin and ordered a chest x-ray.  Procalcitonin came back normal chest x-ray is normal and his LFTs are normal.  The patient likely has a viral infection but with the height of the fever at 102 degrees, I did give him 1 dose of empiric Zosyn.  It was stopped before it was completely and because of his history of C. difficile after I talked with the hospitalist.  I am not comfortable sending the patient home, has had some soft blood pressures and I am giving him fluids overnight.  He certainly seems much better after IV fluids and I suspect the dehydration is a significant part of his acute kidney injury and may be his mental status as well.  Will await the cultures to come back.  Continue IV fluids overnight and after in the care of her to Dr. Johnson from the hospital service were talked with.      Diagnosis:    ICD-10-CM    1. Fever, unspecified fever cause  R50.9       2.  Generalized weakness  R53.1       3. Contusion of scalp, initial encounter  S00.03XA       4. LYSSA (acute kidney injury) (H24)  N17.9       5. Somnolence  R40.0       6. Dehydration  E86.0       7. Slow transit constipation  K59.01            Discharge Medications:  New Prescriptions    No medications on file      Scribe Disclosure:  I, Luli Guerrero, am serving as a scribe at 4:57 PM on 1/1/2024 to document services personally performed by Krystin Patrick MD based on my observations and the provider's statements to me.     1/1/2024   Krystin Patrick MD Powell, Tracy Alan, MD  01/01/24 1959

## 2024-01-01 NOTE — ED TRIAGE NOTES
Pt fell yesterday at home, states he thinks its because he was weak, had a fever yesterday, hit head from fall, not on blood thinners. Went to  today, had blood work and UA done, sodium low. Pt had shaking this afternoon at home.      Triage Assessment (Adult)       Row Name 01/01/24 1624          Triage Assessment    Airway WDL WDL        Respiratory WDL    Respiratory WDL WDL        Cardiac WDL    Cardiac WDL WDL        Cognitive/Neuro/Behavioral WDL    Cognitive/Neuro/Behavioral WDL X

## 2024-01-01 NOTE — PROGRESS NOTES
Chief Complaint   Patient presents with    Fever     For a couple ago, highest 101.6    UTI     For a week ago and C- Diff for about two weeks and completed the antibiotics.      Fall     Yesterday          ICD-10-CM    1. Fall, initial encounter  W19.XXXA UA Macroscopic with reflex to Microscopic and Culture - Clinic Collect     Basic metabolic panel  (Ca, Cl, CO2, Creat, Gluc, K, Na, BUN)     CBC with platelets and differential     Basic metabolic panel  (Ca, Cl, CO2, Creat, Gluc, K, Na, BUN)     CBC with platelets and differential     UA Microscopic with Reflex to Culture      2. Thrombocytopenia (H24)  D69.6       3. Hyponatremia  E87.1       4. Elevated serum creatinine  R79.89       5. Decreased glomerular filtration rate (GFR)  R94.4       6. Closed head injury, initial encounter  S09.90XA       We discussed warning signs of subdural hematoma and stroke and instructed family to bring him to the emergency room if any of these develop.  They understand there is some risk associated with not completing a scan of the brain today including but not limited to subdural bleeding, intracranial hemorrhage, and death, but family and patient choose to monitor him closely at home.    Recheck labs with primary care provider in 2 weeks.  If new symptoms develop or he consistently has fever please go to the emergency room for further workup.    Sodium level slightly diminished to advised patient to go ahead and add  table salt to meals, eat a can of soup or any processed meal or 2, for the next 1 to 2 days    On 12/16/2023 creatinine was 1.03 and GFR was 69.  Today the creatinine is increased to 1.5 and the GFR is 44 which is basically the same as it was on 12/14/2023.  Advised him to increase water intake.    We discussed preventing falls and extra cautions to take in the future.  No signs of urinary infection today that would cause fall.      Results for orders placed or performed in visit on 01/01/24 (from the past 24  hour(s))   Basic metabolic panel  (Ca, Cl, CO2, Creat, Gluc, K, Na, BUN)   Result Value Ref Range    Sodium 133 (L) 135 - 145 mmol/L    Potassium 4.3 3.4 - 5.3 mmol/L    Chloride 105 94 - 109 mmol/L    Carbon Dioxide (CO2) 26 20 - 32 mmol/L    Anion Gap 2 (L) 3 - 14 mmol/L    Urea Nitrogen 23 7 - 30 mg/dL    Creatinine 1.50 (H) 0.66 - 1.25 mg/dL    GFR Estimate 44 (L) >60 mL/min/1.73m2    Calcium 9.1 8.5 - 10.1 mg/dL    Glucose 126 (H) 70 - 99 mg/dL   CBC with platelets and differential    Narrative    The following orders were created for panel order CBC with platelets and differential.  Procedure                               Abnormality         Status                     ---------                               -----------         ------                     CBC with platelets and d...[[id number]]  Abnormal            Final result                 Please view results for these tests on the individual orders.   CBC with platelets and differential   Result Value Ref Range    WBC Count 4.5 4.0 - 11.0 10e3/uL    RBC Count 4.25 (L) 4.40 - 5.90 10e6/uL    Hemoglobin 12.2 (L) 13.3 - 17.7 g/dL    Hematocrit 37.3 (L) 40.0 - 53.0 %    MCV 88 78 - 100 fL    MCH 28.7 26.5 - 33.0 pg    MCHC 32.7 31.5 - 36.5 g/dL    RDW 12.2 10.0 - 15.0 %    Platelet Count 107 (L) 150 - 450 10e3/uL    % Neutrophils 71 %    % Lymphocytes 16 %    % Monocytes 11 %    % Eosinophils 0 %    % Basophils 0 %    % Immature Granulocytes 1 %    Absolute Neutrophils 3.2 1.6 - 8.3 10e3/uL    Absolute Lymphocytes 0.7 (L) 0.8 - 5.3 10e3/uL    Absolute Monocytes 0.5 0.0 - 1.3 10e3/uL    Absolute Eosinophils 0.0 0.0 - 0.7 10e3/uL    Absolute Basophils 0.0 0.0 - 0.2 10e3/uL    Absolute Immature Granulocytes 0.0 <=0.4 10e3/uL   UA Macroscopic with reflex to Microscopic and Culture - Clinic Collect    Specimen: Urine, Midstream   Result Value Ref Range    Color Urine Yellow Colorless, Straw, Light Yellow, Yellow    Appearance Urine Clear Clear    Glucose Urine  Negative Negative mg/dL    Bilirubin Urine Negative Negative    Ketones Urine Negative Negative mg/dL    Specific Gravity Urine 1.015 1.003 - 1.035    Blood Urine Negative Negative    pH Urine 6.0 5.0 - 7.0    Protein Albumin Urine Trace (A) Negative mg/dL    Urobilinogen Urine 0.2 0.2, 1.0 E.U./dL    Nitrite Urine Negative Negative    Leukocyte Esterase Urine Negative Negative   UA Microscopic with Reflex to Culture   Result Value Ref Range    Bacteria Urine Few (A) None Seen /HPF    RBC Urine 0-2 0-2 /HPF /HPF    WBC Urine 0-5 0-5 /HPF /HPF    Squamous Epithelials Urine None Seen None Seen /LPF    Narrative    Urine Culture not indicated       Subjective     Moses Elizondo is an 89 year old male who presents to clinic today for fall that occurred yesterday when he was stepping out to his garage.  He did feel little lightheaded before hand, felt hit his head but never got knocked out.  He does take an aspirin every day but no other blood thinners.  He has not had any signs of concussion since that time.  He was treated for a urinary tract infection a few weeks ago and had a clear urine after the treatment.  He also underwent treatment for C. difficile about a month ago.      ROS: 10 point ROS neg other than the symptoms noted above in the HPI.       Objective    BP 95/43   Pulse 62   Temp 97.4  F (36.3  C) (Tympanic)   Wt 81.6 kg (180 lb)   SpO2 95%   BMI 25.83 kg/m    Nurses notes and VS have been reviewed.    Physical Exam       GENERAL APPEARANCE: healthy appearing, alert     EYES: PERRL, EOMI, sclera non-icteric     HENT: oral exam benign, mucus membranes intact, without ulcers or lesions     NECK: no adenopathy or asymmetry, thyroid normal to palpation     RESP: lungs clear to auscultation - no rales, rhonchi or wheezes     CV: regular rates and rhythm, no murmurs, rubs, or gallop     ABDOMEN:  soft, nontender, no HSM or masses and bowel sounds normal     MS: extremities normal- no gross deformities  noted; normal muscle tone.     SKIN: Ecchymosis and a lump just to the side and above the right eyebrow     NEURO: Normal strength and tone, mentation intact and speech normal, normal neuroexam for a 89-year-old male     PSYCH: normal thought process; no significant mood disturbance      ALPESH Whitaker, CNP  [facility] Provider    The use of Dragon/[employer] dictation services may have been used to construct the content in this note; any grammatical or spelling errors are non-intentional. Please contact the author of this note directly if you are in need of any clarification.

## 2024-01-01 NOTE — PATIENT INSTRUCTIONS
Increase water intake, add table salt or eat some processed foods for a couple of days.    Monitor for any signs of stroke and go to the Emergency Room if these occur.    Recheck Basic metabolic panel in 2 weeks

## 2024-01-01 NOTE — ED NOTES
Bed: ED20  Expected date:   Expected time:   Means of arrival:   Comments:  [age]M fall, no thinners, weakness

## 2024-01-02 ENCOUNTER — APPOINTMENT (OUTPATIENT)
Dept: GENERAL RADIOLOGY | Facility: CLINIC | Age: 89
DRG: 683 | End: 2024-01-02
Attending: PHYSICIAN ASSISTANT
Payer: COMMERCIAL

## 2024-01-02 ENCOUNTER — APPOINTMENT (OUTPATIENT)
Dept: PHYSICAL THERAPY | Facility: CLINIC | Age: 89
DRG: 683 | End: 2024-01-02
Attending: INTERNAL MEDICINE
Payer: COMMERCIAL

## 2024-01-02 PROBLEM — K59.01 SLOW TRANSIT CONSTIPATION: Status: ACTIVE | Noted: 2024-01-02

## 2024-01-02 LAB
ANION GAP SERPL CALCULATED.3IONS-SCNC: 10 MMOL/L (ref 7–15)
BUN SERPL-MCNC: 20.7 MG/DL (ref 8–23)
CALCIUM SERPL-MCNC: 8.6 MG/DL (ref 8.2–9.6)
CHLORIDE SERPL-SCNC: 103 MMOL/L (ref 98–107)
CK SERPL-CCNC: 688 U/L (ref 39–308)
CREAT SERPL-MCNC: 1.07 MG/DL (ref 0.67–1.17)
DEPRECATED HCO3 PLAS-SCNC: 23 MMOL/L (ref 22–29)
EGFRCR SERPLBLD CKD-EPI 2021: 66 ML/MIN/1.73M2
ERYTHROCYTE [DISTWIDTH] IN BLOOD BY AUTOMATED COUNT: 12.4 % (ref 10–15)
GLUCOSE SERPL-MCNC: 101 MG/DL (ref 70–99)
HCT VFR BLD AUTO: 34.7 % (ref 40–53)
HGB BLD-MCNC: 11.5 G/DL (ref 13.3–17.7)
LACTATE SERPL-SCNC: 0.8 MMOL/L (ref 0.7–2)
MCH RBC QN AUTO: 28.5 PG (ref 26.5–33)
MCHC RBC AUTO-ENTMCNC: 33.1 G/DL (ref 31.5–36.5)
MCV RBC AUTO: 86 FL (ref 78–100)
PLATELET # BLD AUTO: 87 10E3/UL (ref 150–450)
POTASSIUM SERPL-SCNC: 3.9 MMOL/L (ref 3.4–5.3)
PROCALCITONIN SERPL IA-MCNC: 5.23 NG/ML
RBC # BLD AUTO: 4.03 10E6/UL (ref 4.4–5.9)
SODIUM SERPL-SCNC: 136 MMOL/L (ref 135–145)
WBC # BLD AUTO: 5 10E3/UL (ref 4–11)

## 2024-01-02 PROCEDURE — 71046 X-RAY EXAM CHEST 2 VIEWS: CPT

## 2024-01-02 PROCEDURE — 36415 COLL VENOUS BLD VENIPUNCTURE: CPT | Performed by: INTERNAL MEDICINE

## 2024-01-02 PROCEDURE — 250N000011 HC RX IP 250 OP 636: Performed by: INTERNAL MEDICINE

## 2024-01-02 PROCEDURE — 82550 ASSAY OF CK (CPK): CPT | Performed by: PHYSICIAN ASSISTANT

## 2024-01-02 PROCEDURE — G0378 HOSPITAL OBSERVATION PER HR: HCPCS

## 2024-01-02 PROCEDURE — 97530 THERAPEUTIC ACTIVITIES: CPT | Mod: GP

## 2024-01-02 PROCEDURE — 250N000013 HC RX MED GY IP 250 OP 250 PS 637: Performed by: PHYSICIAN ASSISTANT

## 2024-01-02 PROCEDURE — 99232 SBSQ HOSP IP/OBS MODERATE 35: CPT | Performed by: PHYSICIAN ASSISTANT

## 2024-01-02 PROCEDURE — 80048 BASIC METABOLIC PNL TOTAL CA: CPT | Performed by: INTERNAL MEDICINE

## 2024-01-02 PROCEDURE — 84145 PROCALCITONIN (PCT): CPT | Performed by: PHYSICIAN ASSISTANT

## 2024-01-02 PROCEDURE — 258N000003 HC RX IP 258 OP 636: Performed by: INTERNAL MEDICINE

## 2024-01-02 PROCEDURE — 87040 BLOOD CULTURE FOR BACTERIA: CPT | Performed by: PHYSICIAN ASSISTANT

## 2024-01-02 PROCEDURE — 97161 PT EVAL LOW COMPLEX 20 MIN: CPT | Mod: GP

## 2024-01-02 PROCEDURE — 120N000001 HC R&B MED SURG/OB

## 2024-01-02 PROCEDURE — 83605 ASSAY OF LACTIC ACID: CPT | Performed by: PHYSICIAN ASSISTANT

## 2024-01-02 PROCEDURE — 250N000011 HC RX IP 250 OP 636: Performed by: PHYSICIAN ASSISTANT

## 2024-01-02 PROCEDURE — 85027 COMPLETE CBC AUTOMATED: CPT | Performed by: INTERNAL MEDICINE

## 2024-01-02 PROCEDURE — 258N000003 HC RX IP 258 OP 636: Performed by: PHYSICIAN ASSISTANT

## 2024-01-02 PROCEDURE — 250N000013 HC RX MED GY IP 250 OP 250 PS 637: Performed by: INTERNAL MEDICINE

## 2024-01-02 PROCEDURE — 36415 COLL VENOUS BLD VENIPUNCTURE: CPT | Performed by: PHYSICIAN ASSISTANT

## 2024-01-02 RX ORDER — LIDOCAINE 40 MG/G
CREAM TOPICAL
Status: DISCONTINUED | OUTPATIENT
Start: 2024-01-02 | End: 2024-01-04 | Stop reason: HOSPADM

## 2024-01-02 RX ORDER — ASPIRIN 81 MG/1
81 TABLET ORAL AT BEDTIME
Status: DISCONTINUED | OUTPATIENT
Start: 2024-01-02 | End: 2024-01-04 | Stop reason: HOSPADM

## 2024-01-02 RX ORDER — ACETAMINOPHEN 650 MG/1
650 SUPPOSITORY RECTAL EVERY 4 HOURS PRN
Status: DISCONTINUED | OUTPATIENT
Start: 2024-01-02 | End: 2024-01-04 | Stop reason: HOSPADM

## 2024-01-02 RX ORDER — PIPERACILLIN SODIUM, TAZOBACTAM SODIUM 4; .5 G/20ML; G/20ML
4.5 INJECTION, POWDER, LYOPHILIZED, FOR SOLUTION INTRAVENOUS EVERY 6 HOURS
Status: DISCONTINUED | OUTPATIENT
Start: 2024-01-02 | End: 2024-01-04

## 2024-01-02 RX ORDER — OXYCODONE HYDROCHLORIDE 5 MG/1
5 TABLET ORAL EVERY 4 HOURS PRN
Status: DISCONTINUED | OUTPATIENT
Start: 2024-01-02 | End: 2024-01-04 | Stop reason: HOSPADM

## 2024-01-02 RX ORDER — NALOXONE HYDROCHLORIDE 0.4 MG/ML
0.2 INJECTION, SOLUTION INTRAMUSCULAR; INTRAVENOUS; SUBCUTANEOUS
Status: DISCONTINUED | OUTPATIENT
Start: 2024-01-02 | End: 2024-01-04 | Stop reason: HOSPADM

## 2024-01-02 RX ORDER — ATORVASTATIN CALCIUM 40 MG/1
40 TABLET, FILM COATED ORAL
Status: DISCONTINUED | OUTPATIENT
Start: 2024-01-02 | End: 2024-01-04 | Stop reason: HOSPADM

## 2024-01-02 RX ORDER — LEVOTHYROXINE SODIUM 100 UG/1
100 TABLET ORAL DAILY
Status: DISCONTINUED | OUTPATIENT
Start: 2024-01-02 | End: 2024-01-04 | Stop reason: HOSPADM

## 2024-01-02 RX ORDER — METOPROLOL SUCCINATE 25 MG/1
25 TABLET, EXTENDED RELEASE ORAL DAILY
Status: DISCONTINUED | OUTPATIENT
Start: 2024-01-02 | End: 2024-01-04 | Stop reason: HOSPADM

## 2024-01-02 RX ORDER — POLYETHYLENE GLYCOL 3350 17 G/17G
17 POWDER, FOR SOLUTION ORAL 2 TIMES DAILY PRN
Status: DISCONTINUED | OUTPATIENT
Start: 2024-01-02 | End: 2024-01-04 | Stop reason: HOSPADM

## 2024-01-02 RX ORDER — PROCHLORPERAZINE MALEATE 5 MG
5 TABLET ORAL EVERY 6 HOURS PRN
Status: DISCONTINUED | OUTPATIENT
Start: 2024-01-02 | End: 2024-01-04 | Stop reason: HOSPADM

## 2024-01-02 RX ORDER — ONDANSETRON 2 MG/ML
4 INJECTION INTRAMUSCULAR; INTRAVENOUS EVERY 6 HOURS PRN
Status: DISCONTINUED | OUTPATIENT
Start: 2024-01-02 | End: 2024-01-04 | Stop reason: HOSPADM

## 2024-01-02 RX ORDER — BISACODYL 10 MG
10 SUPPOSITORY, RECTAL RECTAL DAILY PRN
Status: DISCONTINUED | OUTPATIENT
Start: 2024-01-02 | End: 2024-01-04 | Stop reason: HOSPADM

## 2024-01-02 RX ORDER — GABAPENTIN 300 MG/1
300 CAPSULE ORAL AT BEDTIME
Status: DISCONTINUED | OUTPATIENT
Start: 2024-01-02 | End: 2024-01-04 | Stop reason: HOSPADM

## 2024-01-02 RX ORDER — NALOXONE HYDROCHLORIDE 0.4 MG/ML
0.4 INJECTION, SOLUTION INTRAMUSCULAR; INTRAVENOUS; SUBCUTANEOUS
Status: DISCONTINUED | OUTPATIENT
Start: 2024-01-02 | End: 2024-01-04 | Stop reason: HOSPADM

## 2024-01-02 RX ORDER — ONDANSETRON 4 MG/1
4 TABLET, ORALLY DISINTEGRATING ORAL EVERY 6 HOURS PRN
Status: DISCONTINUED | OUTPATIENT
Start: 2024-01-02 | End: 2024-01-04 | Stop reason: HOSPADM

## 2024-01-02 RX ORDER — ACETAMINOPHEN 325 MG/1
650 TABLET ORAL EVERY 4 HOURS PRN
Status: DISCONTINUED | OUTPATIENT
Start: 2024-01-02 | End: 2024-01-04 | Stop reason: HOSPADM

## 2024-01-02 RX ORDER — AMOXICILLIN 250 MG
2 CAPSULE ORAL 2 TIMES DAILY PRN
Status: DISCONTINUED | OUTPATIENT
Start: 2024-01-02 | End: 2024-01-04 | Stop reason: HOSPADM

## 2024-01-02 RX ORDER — AMOXICILLIN 250 MG
1 CAPSULE ORAL 2 TIMES DAILY PRN
Status: DISCONTINUED | OUTPATIENT
Start: 2024-01-02 | End: 2024-01-04 | Stop reason: HOSPADM

## 2024-01-02 RX ORDER — SODIUM CHLORIDE, SODIUM LACTATE, POTASSIUM CHLORIDE, CALCIUM CHLORIDE 600; 310; 30; 20 MG/100ML; MG/100ML; MG/100ML; MG/100ML
INJECTION, SOLUTION INTRAVENOUS CONTINUOUS
Status: DISCONTINUED | OUTPATIENT
Start: 2024-01-02 | End: 2024-01-03

## 2024-01-02 RX ORDER — PROCHLORPERAZINE 25 MG
12.5 SUPPOSITORY, RECTAL RECTAL EVERY 12 HOURS PRN
Status: DISCONTINUED | OUTPATIENT
Start: 2024-01-02 | End: 2024-01-04 | Stop reason: HOSPADM

## 2024-01-02 RX ADMIN — LEVOTHYROXINE SODIUM 100 MCG: 100 TABLET ORAL at 08:24

## 2024-01-02 RX ADMIN — ASPIRIN 81 MG: 81 TABLET, COATED ORAL at 21:27

## 2024-01-02 RX ADMIN — VANCOMYCIN HYDROCHLORIDE 1750 MG: 10 INJECTION, POWDER, LYOPHILIZED, FOR SOLUTION INTRAVENOUS at 13:59

## 2024-01-02 RX ADMIN — OXYCODONE HYDROCHLORIDE 2.5 MG: 5 TABLET ORAL at 05:07

## 2024-01-02 RX ADMIN — ACETAMINOPHEN 650 MG: 325 TABLET, FILM COATED ORAL at 14:54

## 2024-01-02 RX ADMIN — PIPERACILLIN AND TAZOBACTAM 4.5 G: 4; .5 INJECTION, POWDER, FOR SOLUTION INTRAVENOUS at 18:14

## 2024-01-02 RX ADMIN — OXYCODONE HYDROCHLORIDE 2.5 MG: 5 TABLET ORAL at 14:54

## 2024-01-02 RX ADMIN — METOPROLOL SUCCINATE 25 MG: 25 TABLET, EXTENDED RELEASE ORAL at 08:24

## 2024-01-02 RX ADMIN — PIPERACILLIN AND TAZOBACTAM 4.5 G: 4; .5 INJECTION, POWDER, FOR SOLUTION INTRAVENOUS at 12:09

## 2024-01-02 RX ADMIN — SODIUM CHLORIDE, POTASSIUM CHLORIDE, SODIUM LACTATE AND CALCIUM CHLORIDE: 600; 310; 30; 20 INJECTION, SOLUTION INTRAVENOUS at 12:05

## 2024-01-02 RX ADMIN — GABAPENTIN 300 MG: 300 CAPSULE ORAL at 21:27

## 2024-01-02 RX ADMIN — ACETAMINOPHEN 650 MG: 325 TABLET, FILM COATED ORAL at 05:07

## 2024-01-02 RX ADMIN — SODIUM CHLORIDE, POTASSIUM CHLORIDE, SODIUM LACTATE AND CALCIUM CHLORIDE 500 ML: 600; 310; 30; 20 INJECTION, SOLUTION INTRAVENOUS at 08:13

## 2024-01-02 ASSESSMENT — ACTIVITIES OF DAILY LIVING (ADL)
ADLS_ACUITY_SCORE: 37
ADLS_ACUITY_SCORE: 35
ADLS_ACUITY_SCORE: 37
ADLS_ACUITY_SCORE: 35
ADLS_ACUITY_SCORE: 37
ADLS_ACUITY_SCORE: 37
ADLS_ACUITY_SCORE: 35
ADLS_ACUITY_SCORE: 37

## 2024-01-02 NOTE — ED NOTES
Patient's daughters taking patient's wallet on their way home. Patient's glasses remain at bedside. Their contact numbers are as follows:    Russ (daughter): [phone]  Eri (daughter): [phone]  Savanna (daughter): [phone]

## 2024-01-02 NOTE — PHARMACY-ADMISSION MEDICATION HISTORY
"Pharmacist Admission Medication History    Admission medication history is complete. The information provided in this note is only as accurate as the sources available at the time of the update.    Information Source(s): Family member and CareEverywhere/SureScripts via in-person    Pertinent Information: Patient sometimes does not take his Lasix, especially if he has been urinating a lot.    Changes made to PTA medication list:  Added: None  Deleted: None  Changed: None    Medication Affordability:  Not including over the counter (OTC) medications, was there a time in the past 3 months when you did not take your medications as prescribed because of cost?: No    Allergies reviewed with patient and updates made in EHR: yes    Medication History Completed By: [facility] 1/1/2024 6:19 PM    PTA Med List   Medication Sig Last Dose    acetaminophen (TYLENOL) 650 MG CR tablet Take 650 mg by mouth at bedtime 12/31/2023    ASPIRIN EC PO Take 81 mg by mouth at bedtime 12/31/2023    atorvastatin (LIPITOR) 40 MG tablet Take 1 tablet (40 mg) by mouth daily (with dinner) 12/31/2023    furosemide (LASIX) 20 MG tablet Take 1 tablet (20 mg) by mouth every morning 12/31/2023    gabapentin (NEURONTIN) 300 MG capsule TAKE 1 CAPSULE(300 MG) BY MOUTH AT BEDTIME 12/31/2023    levothyroxine (SYNTHROID/LEVOTHROID) 100 MCG tablet Take 1 tablet (100 mcg) by mouth daily 1/1/2024    metoprolol succinate ER (TOPROL XL) 25 MG 24 hr tablet Take 1 tablet (25 mg) by mouth daily At lunch 1/1/2024    nitroGLYcerin (NITROSTAT) 0.4 MG sublingual tablet One tablet under the tongue every 5 minutes if needed for chest pain. May repeat every 5 minutes for a maximum of 3 doses in 15 minutes\" Unknown at prn    sacubitril-valsartan (ENTRESTO) 24-26 MG per tablet Take 1/2 tab (12/13mg) twice daily 1/1/2024 at am       "

## 2024-01-02 NOTE — PLAN OF CARE
Observation goals  PRIOR TO DISCHARGE        Comments: -diagnostic tests and consults completed and resulted: not met  -vital signs normal or at patient baseline: not met, low BP  -tolerating oral intake to maintain hydration: met  -infection is improving: partially met  -returns to baseline functional status: not met  -safe disposition plan has been identified: not met   Nurse to notify provider when observation goals have been met and patient is ready for discharge.

## 2024-01-02 NOTE — ED NOTES
[facility]  ED Nurse Handoff Report    ED Chief complaint: Shaking      ED Diagnosis:   Final diagnoses:   Fever, unspecified fever cause   Generalized weakness   Contusion of scalp, initial encounter   LYSSA (acute kidney injury) (H24)   Somnolence   Dehydration   Slow transit constipation       Code Status: Hospitalist to address    Allergies:   Allergies   Allergen Reactions    No Known Drug Allergy      Moses Elizondo is a 89 year old male with history of hypertension, stage 3 CKD, CHF, aortic root dilation, NSTEMI, and non essential tremor who presents with his family for evaluation after a fall. The patient's daughter reports that the patient had an unwitnessed mechanical fall yesterday when coming up a few steps from the garage into his house. States that the patient was able to get up and go into the house unassisted and was with his spouse right after. Notes the patient has been complaining of bilateral hip pain today. Reports that the patient was very tremulous today, more than his normal non essential tremor.  Adds that two days ago, before the fall, the patient developed a fever. Reports that the patient was treated for a UTI one month ago and finished a course of vancomycin for a C. Difficile infection four days ago. The patient endorses left lower quadrant abdominal pain. Denies use of anticoagulants or blood thinners. Denies known sick exposures. Denies loss of consciousness, neck pain, and cough.      Patient Story:   Focused Assessment:  Alert, pleasant, hypoxic while sleeping so placed on oxygen.  No complaints     Treatments and/or interventions provided: labs, imaging   Patient's response to treatments and/or interventions:   Labs Ordered and Resulted from Time of ED Arrival to Time of ED Departure   BASIC METABOLIC PANEL - Abnormal       Result Value    Sodium 133 (*)     Potassium 4.0      Chloride 95 (*)     Carbon Dioxide (CO2) 24      Anion Gap 14      Urea Nitrogen 25.1  (*)     Creatinine 1.41 (*)     GFR Estimate 47 (*)     Calcium 9.0      Glucose 129 (*)    CBC WITH PLATELETS AND DIFFERENTIAL - Abnormal    WBC Count 8.2      RBC Count 4.55      Hemoglobin 13.0 (*)     Hematocrit 39.9 (*)     MCV 88      MCH 28.6      MCHC 32.6      RDW 12.5      Platelet Count 125 (*)     % Neutrophils        % Lymphocytes        % Monocytes        % Eosinophils        % Basophils        % Immature Granulocytes        NRBCs per 100 WBC 0      Absolute Neutrophils        Absolute Lymphocytes        Absolute Monocytes        Absolute Eosinophils        Absolute Basophils        Absolute Immature Granulocytes        Absolute NRBCs 0.0     ISTAT GASES LACTATE VENOUS POCT - Abnormal    Lactic Acid POCT 1.6      Bicarbonate Venous POCT 21      O2 Sat, Venous POCT 76 (*)     pCO2 Venous POCT 30 (*)     pH Venous POCT 7.45 (*)     pO2 Venous POCT 38     INFLUENZA A/B, RSV, & SARS-COV2 PCR - Normal    Influenza A PCR Negative      Influenza B PCR Negative      RSV PCR Negative      SARS CoV2 PCR Negative     DIFFERENTIAL    % Neutrophils 68      % Lymphocytes 19      % Monocytes 11      % Eosinophils 2      % Basophils 0      Absolute Neutrophils 5.6      Absolute Lymphocytes 1.6      Absolute Monocytes 0.9      Absolute Eosinophils 0.2      Absolute Basophils 0.0      RBC Morphology Confirmed RBC Indices      Platelet Assessment        Value: Automated Count Confirmed. Platelet morphology is normal.   PROCALCITONIN   HEPATIC FUNCTION PANEL   BLOOD CULTURE   BLOOD CULTURE         To be done/followed up on inpatient unit:      Does this patient have any cognitive concerns?:  none    Activity level - Baseline/Home:  Independent  Activity Level - Current:   Unknown    Patient's Preferred language: English   Needed?: No    Isolation: None  Infection: Not Applicable  Patient tested for COVID 19 prior to admission: YES  Bariatric?: No    Vital Signs:   Vitals:    01/01/24 1800 01/01/24 1805 01/01/24  1810 01/01/24 1830   BP: 110/52      Pulse: 70      Resp:       Temp:    (!) 102.1  F (38.9  C)   TempSrc:    Axillary   SpO2: (!) 80% 91% 96%    Weight:           Cardiac Rhythm:     Was the PSS-3 completed:   Yes  What interventions are required if any?               Family Comments: 3 family members at bedside   OBS brochure/video discussed/provided to patient/family: N/A              Name of person given brochure if not patient:               Relationship to patient:     For the majority of the shift this patient's behavior was Green.   Behavioral interventions performed were .    ED NURSE PHONE NUMBER: [phone]

## 2024-01-02 NOTE — PROGRESS NOTES
MD Notification    Notified Person: MD    Notified Person Name: Louie    Notification Date/Time: 11:30 1/2/24    Notification Interaction: Vocera    Purpose of Notification: critical lab, procalcitonin 5.23     Orders Received: ABX ordered    Comments:

## 2024-01-02 NOTE — PHARMACY-VANCOMYCIN DOSING SERVICE
"Pharmacy Vancomycin Initial Note  Date of Service 2024  Patient's  10/19/1933  89 year old, male    Indication: Sepsis    Current estimated CrCl = Estimated Creatinine Clearance: 54.6 mL/min (based on SCr of 1.07 mg/dL).    Creatinine for last 3 days  2024: 11:09 AM Creatinine 1.50 mg/dL;  4:36 PM Creatinine 1.41 mg/dL  2024:  6:41 AM Creatinine 1.07 mg/dL    Recent Vancomycin Level(s) for last 3 days  No results found for requested labs within last 3 days.      Vancomycin IV Administrations (past 72 hours)        No vancomycin orders with administrations in past 72 hours.                    Nephrotoxins and other renal medications (From now, onward)      Start     Dose/Rate Route Frequency Ordered Stop    24 1200  vancomycin (VANCOCIN) 1,250 mg in 0.9% NaCl 250 mL intermittent infusion         1,250 mg  over 90 Minutes Intravenous EVERY 24 HOURS 24 1156      24 1200  piperacillin-tazobactam (ZOSYN) 4.5 g vial to attach to  mL bag        Note to Pharmacy: For [facility], [facility] and [facility]: For Zosyn-naive patients, use the \"Zosyn initial dose + extended infusion\" order panel.    4.5 g  over 30 Minutes Intravenous EVERY 6 HOURS 24 1133      24 1200  vancomycin (VANCOCIN) 1,750 mg in 0.9% NaCl 500 mL intermittent infusion         1,750 mg  over 2 Hours Intravenous ONCE 24 1156              Contrast Orders - past 72 hours (72h ago, onward)      Start     Dose/Rate Route Frequency Stop    24 1725  iopamidol (ISOVUE-370) solution 90 mL         90 mL Intravenous ONCE 24 1740            [employer] Prediction of Planned Initial Vancomycin Regimen  Loading dose: 1750 mg at 12:00 2024.  Regimen: 1250 mg IV every 24 hours.  Start time: 12:00 on 2024  Exposure target: AUC24 (range)400-600 mg/L.hr   AUC24,ss: 512 mg/L.hr  Probability of AUC24 > 400: 76 %  Ctrough,ss: 15.8 mg/L  Probability of Ctrough,ss > 20: 29 %  Probability of nephrotoxicity (Lodise HEIDE " 2009): 11 %        Plan:  Start vancomycin  1750 mg load, then 1250 mg IV q24h.   Vancomycin monitoring method: AUC  Vancomycin therapeutic monitoring goal: 400-600 mg*h/L  Pharmacy will check vancomycin levels as appropriate in 1-3 Days.    Serum creatinine levels will be ordered daily for the first week of therapy and at least twice weekly for subsequent weeks.      Sweta Bermeo, [facility]

## 2024-01-02 NOTE — PROGRESS NOTES
RECEIVING UNIT ED HANDOFF REVIEW    ED Nurse Handoff Report was reviewed by: Katey Chiu RN on January 2, 2024 at 8:01 AM

## 2024-01-02 NOTE — PROGRESS NOTES
[facility]    Medicine Progress Note - Hospitalist Service    Date of Admission:  1/1/2024    Assessment & Plan   Moses Elizondo is a 89 year old male admitted on 1/1/2024. He presents to the emergency department for evaluation of fever, shaking chills, recent fall with lightheadedness during febrile illness, decreased oral intake. Found to have grossly elevated procalcitonin of 5, with unclear source of infection.    Acute febrile illness with grossly elevated procalcitonin, unclear etiology  Initially suspected viral etiology with no clear source, no leukocytosis.  Fever in the 103 range associated with rigors over the past 2 to 3 days.  No sore throat, no abdominal pain or flank pain, no urinary symptoms.  1 episode of emesis in the ED which was atypical.  No cough or shortness of breath.  No report of sick family members, but multiple family members in and out of the house over the Christmas holiday. Hypotensive again HD #2, post small dose oxycodone. Overnight was SBP 110s-130. STAT Lactate 0.8 WNL and another 500mL bolus LR. Added on PCT which was normal yesterday and came back grossly elevated at 5.23. Repeated CXR due to c/f possible PNA but unremarkable. Patient with leg pain bilaterally and cramping.  -Observation status --> Inpatient given quite elevated PCT  -Blood cultures x 2 pending, repeat today  -Start Zosyn/Vancomycin  -MIVF, hold PTA diuretics for now  -Monitor symptoms and vitals closely  -No other hardware besides PPM  -Unclear source  -If continues to spike fevers, not much improvement on 1/3/24 will obtain TTE and consult ID    Hypochloremic hyponatremia, resolved  -Holding prior to admission furosemide 20 mg daily; recommend holding additional 5 to 10 days at discharge until acute illness has resolved unless symptoms of shortness of breath or increasing lower extremity edema occur  -Received 1 L normal saline bolus in the emergency department  -Compression  stockings to bilateral lower extremities    Recent C. difficile colitis: Treated with oral vancomycin through this past Thursday or Friday.  No further diarrhea.  -Without source for patient's fever, and recent C. difficile infection, would not continue broad-spectrum anti-infectives.  Discussed with ER team  -Monitor for recurrence of diarrhea or fevers, if present might consider extending treatment course    Rectosigmoid stool ball; measuring 7 cm.  Noted on CT imaging.  Initially with some left lower quadrant abdominal discomfort which has since resolved.  Large bowel movement following CT.  Reports soft stools with no further diarrhea since C. difficile treatment.  -As needed MiraLAX, Dulcolax    Coronary artery disease:  Nonischemic cardiomyopathy: Ejection fraction in the 45% range as of May 2023 echocardiogram.  Some lower extremity edema, but not significant change, no orthopnea.  Sick sinus syndrome and complete heart block status post permanent pacemaker implantation:  -Continue aspirin 81 mg daily  -Continue atorvastatin 40 mg daily  -Continue metoprolol XL 25 mg daily  -Prior to admission Entresto on hold as 12-13 mg dose not available through pharmacy.  Resumption pending patient's oral intake and blood pressure trend, but would not be unreasonable to hold for several days until his acute illness has resolved.  -Daily weights, intake and output.  Weight reported approximately 190 pounds over this past summer/fall, but also reports he has been losing weight with poor intake and appears to be somewhat dehydrated by labs and symptoms.    Hypothyroidism:  -Okay to continue levothyroxine 100 mcg daily    Fall: Suspect secondary to orthostatic hypotension with septic vasodilation as fall occurred when patient was having rigors and feeling lightheaded  -Physical therapy consulted    Idiopathic peripheral neuropathy:  -Gabapentin 300 mg at bedtime per home regimen    Stage III chronic kidney disease with acute  "kidney injury: Creatinine of 1.41.  Stable since recent hospitalization 12/14, but generally increased from 1.25 range this summer/autumn.  -Entresto, furosemide on hold  -2 L IV normal saline administered in ER    Ascending aortic dilation: Known issue.  -Continue with outpatient vascular/cardiology follow-up.  -Continue metoprolol 25 mg daily          Diet: Regular Diet Adult    DVT Prophylaxis: Ambulate every shift  Stroud Catheter: Not present  Lines: None     Cardiac Monitoring: None  Code Status: Full Code    Clinically Significant Risk Factors Present on Admission                # Drug Induced Platelet Defect: home medication list includes an antiplatelet medication    # Heart failure, NOS: heart failure noted on the problem list and last echo with EF 40-50%     # Overweight: Estimated body mass index is 25.86 kg/m  as calculated from the following:    Height as of this encounter: 1.803 m (5' 11\").    Weight as of this encounter: 84.1 kg (185 lb 6.5 oz).         # Financial/Environmental Concerns:     # Pacemaker present       Disposition Plan      Expected Discharge Date: 01/04/2024        Discharge Comments: new admit   Viral work-up.    PT/OT          The patient's care was discussed with the Attending Physician, Dr. Pak, Bedside Nurse, Care Coordinator/, and Patient.    Ana Palma PA-C  Hospitalist Service  [facility]  Securely message with [employer] (more info)  Text page via [facility] Paging/Directory   ______________________________________________________________________    Interval History   Seen and examined. No leukocytosis, afebrile. BP has been intermittently soft, unclear reasons, asymptomatic. Does not meet sepsis criteria. Lactate repeated and neg. Pt reports bilateral leg cramping last night and arm shaking. Feeling a bit improved now. Has soft stool since Cdiff, a couple times per day. Feels frequency has much improved. CXR ? Pna. No hypoxia " currently. Questions welcomed and answered to the best of my knowledge. Called and updated daughters this afternoon.    Physical Exam   Vital Signs: Temp: 97.7  F (36.5  C) Temp src: Oral BP: 94/47 Pulse: 61   Resp: 19 SpO2: 95 % O2 Device: None (Room air) Oxygen Delivery: 2 LPM  Weight: 185 lbs 6.51 oz    Physical Exam    General: Awake, alert, very pleasant gentleman who appears younger than stated age. Looks comfortable sitting up in bed. No acute distress.  HEENT: Normocephalic, atraumatic. Extraocular movements intact.   Respiratory: Clear to auscultation bilaterally, no rales, wheezing, or rhonchi.  Cardiovascular: Regular rate and rhythm, +S1 and S2, no murmur auscultated. Trace to 1+ peripheral edema.   Gastrointestinal: Soft, non-tender, non-distended. Bowel sounds present.  Skin: Warm, dry. No obvious rashes or lesions on exposed skin. Dorsalis pedis pulses palpable bilaterally.  Musculoskeletal: No joint swelling, erythema or tenderness. Moves all extremities equally.  Neurologic: AAO x3.   Psychiatric: Appropriate mood and affect. No obvious anxiety or depression.      Medical Decision Making       45 MINUTES SPENT BY ME on the date of service doing chart review, history, exam, documentation & further activities per the note.      Data     I have personally reviewed the following data over the past 24 hrs:    5.0  \   11.5 (L)   / 87 (L)     136 103 20.7 /  101 (H)   3.9 23 1.07 \     ALT: 43 AST: 62 (H) AP: 84 TBILI: 0.8   ALB: 4.1 TOT PROTEIN: 7.0 LIPASE: N/A     Procal: 5.23 (HH) CRP: N/A Lactic Acid: 0.8         Imaging results reviewed over the past 24 hrs:   Recent Results (from the past 24 hour(s))   CT Head w/o Contrast    Narrative    EXAM: CT HEAD W/O CONTRAST  LOCATION: [facility]  DATE: 1/1/2024    INDICATION: Fall yesterday, hit right frontal temporal area, altered mental status, not on blood thinners  COMPARISON: None.  TECHNIQUE: Routine CT Head without IV  contrast. Multiplanar reformats. Dose reduction techniques were used.    FINDINGS:  INTRACRANIAL CONTENTS: No intracranial hemorrhage, extraaxial collection, or mass effect.  No CT evidence of acute infarct. Mild presumed chronic small vessel ischemic changes. Moderate generalized volume loss. No hydrocephalus.     VISUALIZED ORBITS/SINUSES/MASTOIDS: Prior bilateral cataract surgery. Visualized portions of the orbits are otherwise unremarkable. No paranasal sinus mucosal disease. No middle ear or mastoid effusion.    BONES/SOFT TISSUES: Right pterional scalp swelling without fracture. Evidence of old nasal trauma.      Impression    IMPRESSION:  1.  Negative for acute intracranial process.   CT Abdomen Pelvis w Contrast    Narrative    EXAM: CT ABDOMEN PELVIS W CONTRAST  LOCATION: [facility]  DATE: 1/1/2024    INDICATION: Fever, confusion, urine normal, left lower quadrant abdominal pain  COMPARISON: None.  TECHNIQUE: CT scan of the abdomen and pelvis was performed following injection of IV contrast. Multiplanar reformats were obtained. Dose reduction techniques were used.  CONTRAST: 99mL ISOVUE 370    FINDINGS:     LOWER CHEST: Bibasilar atelectasis. Partially visualized coronary stents and pacemaker wires.     HEPATOBILIARY: Subcentimeter hypoattenuating lesion/lesions, too small to characterize, possibly cysts or hemangiomas. No biliary dilation. Cholecystectomy.     PANCREAS: Atrophic.     SPLEEN: Splenomegaly.     ADRENAL GLANDS: Normal.     KIDNEYS/BLADDER: Benign renal cysts and/or probable cysts for which no further follow-up imaging is recommended. No hydronephrosis. Normal urinary bladder.      BOWEL: Small hiatal hernia. No obstruction. Appendix is not visualized. No secondary findings of acute appendicitis in the right lower quadrant. Large descending and rectosigmoid stool burden, with 7 cm rectal stool ball. No wall thickening or   pericolonic stranding. No pneumoperitoneum or  free fluid.     LYMPH NODES: No pathologically enlarged lymph nodes.    VASCULATURE: Focal ectasia of the infrarenal abdominal aorta measuring up to 2.9 cm. Moderate aortoiliac atherosclerotic disease.    PELVIC ORGANS: Prostatomegaly.     MUSCULOSKELETAL: Multilevel degenerative disc disease of the thoracolumbar spine.       Impression    IMPRESSION:  1.  Large descending and rectosigmoid stool burden with 7 cm rectal stool ball. No findings of stercoral colitis.  2.  Prostatomegaly.    Chest XR,  PA & LAT    Narrative    EXAM: XR CHEST 2 VIEWS  LOCATION: [facility]  DATE: 1/1/2024    INDICATION: fever  COMPARISON: 12/15/2023      Impression    IMPRESSION: Subtle reticulonodular opacities of the lung bases which may be seen with developing infectious infiltrates. Stable heart size and position of the left chest pacemaker. No pulmonary vascular congestion. No pleural effusion or pneumothorax.   Degenerative changes of the thoracic spine.   XR Chest 2 Views    Narrative    CHEST TWO VIEWS January 2, 2024 12:30 PM     HISTORY: Shaking, dehydration.    COMPARISON: January 1, 2024.       Impression    IMPRESSION: Trace pleural fluid bilaterally. Lungs clear. There are no  acute infiltrates. The cardiac silhouette is not enlarged. Pulmonary  vasculature is unremarkable.    ANDREAS BISHOP MD         SYSTEM ID:  [id number]

## 2024-01-02 NOTE — H&P
[facility]    History and Physical - Hospitalist Service       Date of Admission:  1/1/2024    Assessment & Plan      Moses Elizondo is a 89 year old male admitted on 1/1/2024. He presents to the emergency department for evaluation of fever, shaking chills, recent fall with lightheadedness during febrile illness, decreased oral intake.    Acute febrile illness: Suspect viral with no clear source, no leukocytosis.  Fever in the 103 range associated with rigors over the past 2 to 3 days.  No sore throat, no abdominal pain or flank pain, no urinary symptoms.  1 episode of emesis here in the emergency department which was atypical.  No cough or shortness of breath.  No report of sick family members, but multiple family members in and out of the house over the Christmas holiday.  -Blood cultures x 2 pending  -Received an initial dose of Zosyn in the emergency department.  Holding on further doses of anti-infectives given no clear source of infection identified; patient is not septic at this time  -Will repeat CBC in a.m.  -2 L normal saline bolus ordered in the emergency department    Hypochloremic hyponatremia:  -Holding prior to admission furosemide 20 mg daily; recommend holding additional 5 to 10 days at discharge until acute illness has resolved unless symptoms of shortness of breath or increasing lower extremity edema occur  -Received 1 L normal saline bolus in the emergency department  -Repeat BMP in a.m.  -Compression stockings to bilateral lower extremities    Recent C. difficile colitis: Treated with oral vancomycin through this past Thursday or Friday.  No further diarrhea.  -Without source for patient's fever, and recent C. difficile infection, would not continue broad-spectrum anti-infectives.  Discussed with ER team  -Monitor for recurrence of diarrhea or fevers, if present might consider extending treatment course    Rectosigmoid stool ball; measuring 7 cm.  Noted on CT  imaging.  Initially with some left lower quadrant abdominal discomfort which has since resolved.  Large bowel movement following CT.  Reports soft stools with no further diarrhea since C. difficile treatment.  -As needed MiraLAX, Dulcolax    Coronary artery disease:  Nonischemic cardiomyopathy: Ejection fraction in the 45% range as of May 2023 echocardiogram.  Some lower extremity edema, but not significant change, no orthopnea.  Sick sinus syndrome and complete heart block status post permanent pacemaker implantation:  -Continue aspirin 81 mg daily  -Continue atorvastatin 40 mg daily  -Continue metoprolol XL 25 mg daily  -Prior to admission Entresto on hold as 12-13 mg dose not available through pharmacy.  Resumption pending patient's oral intake and blood pressure trend, but would not be unreasonable to hold for several days until his acute illness has resolved.  -Daily weights, intake and output.  Weight reported approximately 190 pounds over this past summer/fall, but also reports he has been losing weight with poor intake and appears to be somewhat dehydrated by labs and symptoms.    Hypothyroidism:  -Okay to continue levothyroxine 100 mcg daily    Fall: Suspect secondary to orthostatic hypotension with septic vasodilation as fall occurred when patient was having rigors and feeling lightheaded  -Physical therapy consulted    Idiopathic peripheral neuropathy:  -Gabapentin 300 mg at bedtime per home regimen    Stage III chronic kidney disease with acute kidney injury: Creatinine of 1.41.  Stable since recent hospitalization 12/14, but generally increased from 1.25 range this summer/autumn.  -Entresto, furosemide on hold  -2 L IV normal saline administered in ER    Ascending aortic dilation: Known issue.  -Continue with outpatient vascular/cardiology follow-up.  -Continue metoprolol 25 mg daily          Diet:  Regular adult diet  DVT Prophylaxis: Ambulate every shift  Stroud Catheter: Not present  Lines: None    "  Cardiac Monitoring: None  Code Status:  Full code.  Confirmed with patient on admission.  3 daughters present at bedside.    Clinically Significant Risk Factors Present on Admission                # Drug Induced Platelet Defect: home medication list includes an antiplatelet medication    # Heart failure, NOS: heart failure noted on the problem list and last echo with EF 40-50%     # Overweight: Estimated body mass index is 25.83 kg/m  as calculated from the following:    Height as of 12/22/23: 1.778 m (5' 10\").    Weight as of this encounter: 81.6 kg (180 lb).         # Pacemaker present       Disposition Plan    anticipate 1/2/2024 pending improvement in fever curve, tolerating oral intake, safe disposition plan in place.       Ralph Johnson MD  Hospitalist Service  [facility]  Securely message with [employer] (more info)  Text page via [facility] Paging/Directory     ______________________________________________________________________    Chief Complaint   Weakness, recent fall, fever and shaking chills.    History is obtained from the patient, patient's 3 daughters at bedside, discussion with Dr. Patrick in the emergency department, chart review.    History of Present Illness   Moses Elizondo is a 89 year old male who presents to the emergency department for evaluation of fever and shaking chills, recent fall, somnolence in the setting of acute febrile illness    On November 30, patient was diagnosed with an E. coli urinary tract infection.  Treated with cephalosporins, and subsequently developed diarrhea.  Hospitalized and positive for C. difficile 12/15/2023.  Recently completed a course of oral vancomycin this past Thursday.  He reports that his diarrhea has resolved, though his stool is still somewhat soft.  No abdominal pain.    For the past 2 to 3 days, patient has had an elevated temperature as well as intermittent rigors.  He describes shaking spells occurring approximately " twice per day, overnight, and an afternoon.  Has had fever of 102, 101.6, and here in the emergency department again with fever of 102.1.  During fevers and rigors on presentation, family around him describes him as being somnolent.  He actually does not recall some of the events around this timeframe (episode of emesis).  Fever broke after Tylenol administration and after 1 L normal saline volume administration, is now feeling at baseline.  3 daughters at bedside all remark on how much improved he appears as compared to arrival.    Patient lives alone with his wife, but daughters are present and involved in his care.  He has had no associated symptoms with his fevers including no shortness of breath, no cough, no sore throat.  No urinary symptoms.  He has noted that he has been eating and drinking less, and here in the emergency department had an episode of nonbloody emesis which was unusual around the time of his rigors episode.  No other nausea described.    Yesterday, during an episode of rigors, patient reports that he fell down 2 steps into his garage.  Hit his right forehead, did not lose consciousness.  Was seen in urgent care, subsequently recommended to ER for head CT.  No subdural noted on CT imaging.    A CT of the abdomen and pelvis was performed as he apparently had some left lower quadrant discomfort on palpation in the ER.  This demonstrated a 7 cm rectosigmoid stool ball and prostatomegaly, but no other acute pathology to explain patient's fevers.    Following CT scan, patient with a large bowel movement.  Again, he reports soft stools with no issues with constipation since treatment for C. difficile with watery stools over the past 2 weeks.  No belly pain on reassessment following CT and bowel movement.    Patient reports that he has been losing some weight, has not been eating as much.  Is still on furosemide as well as Entresto.  Had a dip in his blood pressures after fevers.  Breath sounds are  clear with no cough.  No cervical chain lymphadenopathy.    Discussed potential etiologies including viremia or bacteremia for his rigors and high fevers.  No clear source has yet been identified, so recommend holding on further anti-infectives.  Family is understanding of this request.  Will need to continue to monitor for improvement.    Monitoring for recurrent symptoms        Past Medical History    Past Medical History:   Diagnosis Date    Aortic root dilatation (H24)     4.4 cm    Ascending aorta dilatation (H24)     4.4 cm    ASD (atrial septal defect)     Bradycardia     CAD (coronary artery disease) 05/15/2014     YOGESH to RCA(6/20/19); YOGESH to OM1(5/15/14)    Cardiac pacemaker 06/20/2019    [employer] PPM COMFORT MRI XT DR-implant 6/20/19    Chest discomfort     Degeneration of lumbar or lumbosacral intervertebral disc     Familial tremor     Former smoker     Herpes zoster without mention of complication     HTN (hypertension)     Hyperlipidaemia     Idiopathic peripheral neuropathy     MGUS (monoclonal gammopathy of unknown significance)     NSTEMI (non-ST elevated myocardial infarction) (H) 2014    Other and unspecified hyperlipidemia     PFO (patent foramen ovale)     Prostatitis, unspecified     Second degree heart block     Sensorineural hearing loss, unspecified     SSS (sick sinus syndrome) (H)     Status post coronary angiogram 06/20/2019    Unspecified hypothyroidism        Past Surgical History   Past Surgical History:   Procedure Laterality Date    CHOLECYSTECTOMY      CORONARY ANGIOGRAPHY ADULT ORDER  5/14/14    PTCA w/YOGESH to OM1    CV CORONARY ANGIOGRAM N/A 6/20/2019    Procedure: Coronary Angiogram;  Surgeon: Romie Coronado MD;  Location: [facility] CARDIAC CATH LAB    CV INTRAVASULAR ULTRASOUND N/A 6/20/2019    Procedure: Intravascular Ultrasound;  Surgeon: Romie Coronado MD;  Location: [facility] CARDIAC CATH LAB    CV PCI ATHERECTOMY ORBITAL N/A 6/20/2019    Procedure: PCI Atherectomy  "Orbital;  Surgeon: Romie Coronado MD;  Location:  HEART CARDIAC CATH LAB    CV PCI STENT DRUG ELUTING N/A 6/20/2019    Procedure: PCI Stent Drug Eluting;  Surgeon: Romei Coronado MD;  Location:  HEART CARDIAC CATH LAB    CV TEMPORARY PACEMAKER INSERTION N/A 6/20/2019    Procedure: Temporary Pacemaker Insertion;  Surgeon: Romie Coronado MD;  Location:  HEART CARDIAC CATH LAB    EP PACEMAKER N/A 6/20/2019    Procedure: EP Pacemaker;  Surgeon: Max Dowell MD;  Location:  HEART CARDIAC CATH LAB    EP PPM UPGRADE TO BIVENT N/A 9/16/2021    Procedure: EP PPM UPGRADE TO BIVENT;  Surgeon: Tre Miller MD;  Location:  HEART CARDIAC CATH LAB    HEART CATH, ANGIOPLASTY  5/14/14    YOGESH to OM1    ZZC NONSPECIFIC PROCEDURE  2010    Laparoscopic cholecystectomy.        Prior to Admission Medications   Prior to Admission Medications   Prescriptions Last Dose Informant Patient Reported? Taking?   ASPIRIN EC PO 12/31/2023 Self Yes Yes   Sig: Take 81 mg by mouth at bedtime   acetaminophen (TYLENOL) 650 MG CR tablet 12/31/2023 Self Yes Yes   Sig: Take 650 mg by mouth at bedtime   atorvastatin (LIPITOR) 40 MG tablet 12/31/2023 Self No Yes   Sig: Take 1 tablet (40 mg) by mouth daily (with dinner)   furosemide (LASIX) 20 MG tablet 12/31/2023  No Yes   Sig: Take 1 tablet (20 mg) by mouth every morning   gabapentin (NEURONTIN) 300 MG capsule 12/31/2023 Self No Yes   Sig: TAKE 1 CAPSULE(300 MG) BY MOUTH AT BEDTIME   levothyroxine (SYNTHROID/LEVOTHROID) 100 MCG tablet 1/1/2024  No Yes   Sig: Take 1 tablet (100 mcg) by mouth daily   metoprolol succinate ER (TOPROL XL) 25 MG 24 hr tablet 1/1/2024  No Yes   Sig: Take 1 tablet (25 mg) by mouth daily At lunch   nitroGLYcerin (NITROSTAT) 0.4 MG sublingual tablet Unknown at prn Self No Yes   Sig: One tablet under the tongue every 5 minutes if needed for chest pain. May repeat every 5 minutes for a maximum of 3 doses in 15 minutes\"   sacubitril-valsartan " (ENTRESTO) 24-26 MG per tablet 1/1/2024 at am  No Yes   Sig: Take 1/2 tab (12/13mg) twice daily      Facility-Administered Medications: None           Physical Exam   Vital Signs: Temp: 98.4  F (36.9  C) Temp src: Oral BP: 92/49 Pulse: 64   Resp: 18 SpO2: 95 % O2 Device: Nasal cannula Oxygen Delivery: 2 LPM  Weight: 180 lbs 0 oz    General Appearance: Well-appearing 89-year-old male.  Appears robust for age.  Does not appear acutely ill or toxic at this time, but fever has broken.  Eyes: No scleral icterus or injection  HEENT: Small ecchymosis and hematoma overlying right lateral eyebrow from recent fall.  No tonsillar hypertrophy or exudate, no posterior oropharyngeal exudate or erythema, no anterior cervical chain lymphadenopathy.  Dentition intact  Respiratory: Breath sounds are clear bilaterally to auscultation without wheezes or crackles.  Cardiovascular: Regular rate and rhythm  GI: Abdomen soft, nontender palpation.  No palpable mass  Lymph/Hematologic: No anterior cervical chain lymphadenopathy.  Patient does have some 1+ pitting edema bilateral lower extremities  Genitourinary: No CVA tenderness to percussion  Skin: No appreciable rash  Musculoskeletal: Muscular tone and bulk intact in all extremities.  Robust for age of 89.  Neurologic: Alert, conversant, appropriate in conversation.  Mental status is grossly intact.  Psychiatric: Very pleasant, normal affect    Medical Decision Making       65 MINUTES SPENT BY ME on the date of service doing chart review, history, exam, documentation & further activities per the note.      Data     I have personally reviewed the following data over the past 24 hrs:    8.2  \   13.0 (L)   / 125 (L)     133 (L) 95 (L) 25.1 (H) /  129 (H)   4.0 24 1.41 (H) \     ALT: 43 AST: 62 (H) AP: 84 TBILI: 0.8   ALB: 4.1 TOT PROTEIN: 7.0 LIPASE: N/A     Procal: 0.40 CRP: N/A Lactic Acid: 1.6         Imaging results reviewed over the past 24 hrs:   Recent Results (from the past 24  hour(s))   CT Head w/o Contrast    Narrative    EXAM: CT HEAD W/O CONTRAST  LOCATION: [facility]  DATE: 1/1/2024    INDICATION: Fall yesterday, hit right frontal temporal area, altered mental status, not on blood thinners  COMPARISON: None.  TECHNIQUE: Routine CT Head without IV contrast. Multiplanar reformats. Dose reduction techniques were used.    FINDINGS:  INTRACRANIAL CONTENTS: No intracranial hemorrhage, extraaxial collection, or mass effect.  No CT evidence of acute infarct. Mild presumed chronic small vessel ischemic changes. Moderate generalized volume loss. No hydrocephalus.     VISUALIZED ORBITS/SINUSES/MASTOIDS: Prior bilateral cataract surgery. Visualized portions of the orbits are otherwise unremarkable. No paranasal sinus mucosal disease. No middle ear or mastoid effusion.    BONES/SOFT TISSUES: Right pterional scalp swelling without fracture. Evidence of old nasal trauma.      Impression    IMPRESSION:  1.  Negative for acute intracranial process.   CT Abdomen Pelvis w Contrast    Narrative    EXAM: CT ABDOMEN PELVIS W CONTRAST  LOCATION: [facility]  DATE: 1/1/2024    INDICATION: Fever, confusion, urine normal, left lower quadrant abdominal pain  COMPARISON: None.  TECHNIQUE: CT scan of the abdomen and pelvis was performed following injection of IV contrast. Multiplanar reformats were obtained. Dose reduction techniques were used.  CONTRAST: 99mL ISOVUE 370    FINDINGS:     LOWER CHEST: Bibasilar atelectasis. Partially visualized coronary stents and pacemaker wires.     HEPATOBILIARY: Subcentimeter hypoattenuating lesion/lesions, too small to characterize, possibly cysts or hemangiomas. No biliary dilation. Cholecystectomy.     PANCREAS: Atrophic.     SPLEEN: Splenomegaly.     ADRENAL GLANDS: Normal.     KIDNEYS/BLADDER: Benign renal cysts and/or probable cysts for which no further follow-up imaging is recommended. No hydronephrosis. Normal urinary  bladder.      BOWEL: Small hiatal hernia. No obstruction. Appendix is not visualized. No secondary findings of acute appendicitis in the right lower quadrant. Large descending and rectosigmoid stool burden, with 7 cm rectal stool ball. No wall thickening or   pericolonic stranding. No pneumoperitoneum or free fluid.     LYMPH NODES: No pathologically enlarged lymph nodes.    VASCULATURE: Focal ectasia of the infrarenal abdominal aorta measuring up to 2.9 cm. Moderate aortoiliac atherosclerotic disease.    PELVIC ORGANS: Prostatomegaly.     MUSCULOSKELETAL: Multilevel degenerative disc disease of the thoracolumbar spine.       Impression    IMPRESSION:  1.  Large descending and rectosigmoid stool burden with 7 cm rectal stool ball. No findings of stercoral colitis.  2.  Prostatomegaly.

## 2024-01-02 NOTE — ED NOTES
Bed: ED12  Expected date:   Expected time:   Means of arrival:   Comments:  ROOM 20 - Hospital bed

## 2024-01-03 ENCOUNTER — APPOINTMENT (OUTPATIENT)
Dept: PHYSICAL THERAPY | Facility: CLINIC | Age: 89
DRG: 683 | End: 2024-01-03
Payer: COMMERCIAL

## 2024-01-03 ENCOUNTER — APPOINTMENT (OUTPATIENT)
Dept: CARDIOLOGY | Facility: CLINIC | Age: 89
DRG: 683 | End: 2024-01-03
Attending: INTERNAL MEDICINE
Payer: COMMERCIAL

## 2024-01-03 LAB
ALBUMIN SERPL BCG-MCNC: 2.9 G/DL (ref 3.5–5.2)
ALP SERPL-CCNC: 54 U/L (ref 40–150)
ALT SERPL W P-5'-P-CCNC: 29 U/L (ref 0–70)
ANION GAP SERPL CALCULATED.3IONS-SCNC: 6 MMOL/L (ref 7–15)
AST SERPL W P-5'-P-CCNC: 59 U/L (ref 0–45)
BILIRUB SERPL-MCNC: 0.6 MG/DL
BUN SERPL-MCNC: 15.6 MG/DL (ref 8–23)
CALCIUM SERPL-MCNC: 8.3 MG/DL (ref 8.2–9.6)
CHLORIDE SERPL-SCNC: 106 MMOL/L (ref 98–107)
CREAT SERPL-MCNC: 1.23 MG/DL (ref 0.67–1.17)
DEPRECATED HCO3 PLAS-SCNC: 25 MMOL/L (ref 22–29)
EGFRCR SERPLBLD CKD-EPI 2021: 56 ML/MIN/1.73M2
GLUCOSE SERPL-MCNC: 83 MG/DL (ref 70–99)
LVEF ECHO: NORMAL
NT-PROBNP SERPL-MCNC: 4042 PG/ML (ref 0–1800)
POTASSIUM SERPL-SCNC: 3.8 MMOL/L (ref 3.4–5.3)
PROCALCITONIN SERPL IA-MCNC: 3.88 NG/ML
PROT SERPL-MCNC: 5.2 G/DL (ref 6.4–8.3)
SODIUM SERPL-SCNC: 137 MMOL/L (ref 135–145)

## 2024-01-03 PROCEDURE — 258N000003 HC RX IP 258 OP 636: Performed by: INTERNAL MEDICINE

## 2024-01-03 PROCEDURE — 258N000003 HC RX IP 258 OP 636: Performed by: PHYSICIAN ASSISTANT

## 2024-01-03 PROCEDURE — 80053 COMPREHEN METABOLIC PANEL: CPT | Performed by: PHYSICIAN ASSISTANT

## 2024-01-03 PROCEDURE — 250N000011 HC RX IP 250 OP 636: Performed by: INTERNAL MEDICINE

## 2024-01-03 PROCEDURE — 83880 ASSAY OF NATRIURETIC PEPTIDE: CPT | Performed by: INTERNAL MEDICINE

## 2024-01-03 PROCEDURE — 250N000013 HC RX MED GY IP 250 OP 250 PS 637: Performed by: INTERNAL MEDICINE

## 2024-01-03 PROCEDURE — 250N000011 HC RX IP 250 OP 636: Performed by: PHYSICIAN ASSISTANT

## 2024-01-03 PROCEDURE — 250N000013 HC RX MED GY IP 250 OP 250 PS 637: Performed by: PHYSICIAN ASSISTANT

## 2024-01-03 PROCEDURE — 120N000001 HC R&B MED SURG/OB

## 2024-01-03 PROCEDURE — 93306 TTE W/DOPPLER COMPLETE: CPT | Mod: 26 | Performed by: INTERNAL MEDICINE

## 2024-01-03 PROCEDURE — 99232 SBSQ HOSP IP/OBS MODERATE 35: CPT | Performed by: INTERNAL MEDICINE

## 2024-01-03 PROCEDURE — 97750 PHYSICAL PERFORMANCE TEST: CPT | Mod: GP

## 2024-01-03 PROCEDURE — 36415 COLL VENOUS BLD VENIPUNCTURE: CPT | Performed by: PHYSICIAN ASSISTANT

## 2024-01-03 PROCEDURE — 93306 TTE W/DOPPLER COMPLETE: CPT

## 2024-01-03 PROCEDURE — 84145 PROCALCITONIN (PCT): CPT | Performed by: INTERNAL MEDICINE

## 2024-01-03 PROCEDURE — 97116 GAIT TRAINING THERAPY: CPT | Mod: GP

## 2024-01-03 RX ORDER — FUROSEMIDE 10 MG/ML
20 INJECTION INTRAMUSCULAR; INTRAVENOUS ONCE
Status: COMPLETED | OUTPATIENT
Start: 2024-01-03 | End: 2024-01-03

## 2024-01-03 RX ADMIN — SODIUM CHLORIDE, POTASSIUM CHLORIDE, SODIUM LACTATE AND CALCIUM CHLORIDE: 600; 310; 30; 20 INJECTION, SOLUTION INTRAVENOUS at 00:49

## 2024-01-03 RX ADMIN — ACETAMINOPHEN 650 MG: 325 TABLET, FILM COATED ORAL at 16:51

## 2024-01-03 RX ADMIN — VANCOMYCIN HYDROCHLORIDE 1250 MG: 10 INJECTION, POWDER, LYOPHILIZED, FOR SOLUTION INTRAVENOUS at 11:26

## 2024-01-03 RX ADMIN — ACETAMINOPHEN 650 MG: 325 TABLET, FILM COATED ORAL at 02:16

## 2024-01-03 RX ADMIN — PIPERACILLIN AND TAZOBACTAM 4.5 G: 4; .5 INJECTION, POWDER, FOR SOLUTION INTRAVENOUS at 18:04

## 2024-01-03 RX ADMIN — GABAPENTIN 300 MG: 300 CAPSULE ORAL at 21:29

## 2024-01-03 RX ADMIN — ASPIRIN 81 MG: 81 TABLET, COATED ORAL at 21:28

## 2024-01-03 RX ADMIN — FUROSEMIDE 20 MG: 10 INJECTION, SOLUTION INTRAMUSCULAR; INTRAVENOUS at 14:28

## 2024-01-03 RX ADMIN — PIPERACILLIN AND TAZOBACTAM 4.5 G: 4; .5 INJECTION, POWDER, FOR SOLUTION INTRAVENOUS at 00:47

## 2024-01-03 RX ADMIN — OXYCODONE HYDROCHLORIDE 5 MG: 5 TABLET ORAL at 02:16

## 2024-01-03 RX ADMIN — PIPERACILLIN AND TAZOBACTAM 4.5 G: 4; .5 INJECTION, POWDER, FOR SOLUTION INTRAVENOUS at 12:47

## 2024-01-03 RX ADMIN — PIPERACILLIN AND TAZOBACTAM 4.5 G: 4; .5 INJECTION, POWDER, FOR SOLUTION INTRAVENOUS at 07:05

## 2024-01-03 RX ADMIN — METOPROLOL SUCCINATE 25 MG: 25 TABLET, EXTENDED RELEASE ORAL at 11:47

## 2024-01-03 RX ADMIN — LEVOTHYROXINE SODIUM 100 MCG: 100 TABLET ORAL at 07:56

## 2024-01-03 RX ADMIN — PIPERACILLIN AND TAZOBACTAM 4.5 G: 4; .5 INJECTION, POWDER, FOR SOLUTION INTRAVENOUS at 23:58

## 2024-01-03 ASSESSMENT — ACTIVITIES OF DAILY LIVING (ADL)
ADLS_ACUITY_SCORE: 40
ADLS_ACUITY_SCORE: 38
ADLS_ACUITY_SCORE: 38
ADLS_ACUITY_SCORE: 40
ADLS_ACUITY_SCORE: 38
ADLS_ACUITY_SCORE: 40
ADLS_ACUITY_SCORE: 38
DEPENDENT_IADLS:: INDEPENDENT
ADLS_ACUITY_SCORE: 38

## 2024-01-03 NOTE — PLAN OF CARE
Goal Outcome Evaluation:    PRIMARY Concern: fall, fever  SAFETY RISK Concerns (fall risk, behaviors, etc.): fall risk      Isolation/Type: enteric (C-diff)  Tests/Procedures for NEXT shift: chest XR, AM labs  Consults? (Pending/following, signed-off?) PT, SW/CM  Where is patient from? (Home, TCU, etc.): from home  Other Important info for NEXT shift: infiltration on L PIV on AM shift, no vesicant infusing  Anticipated DC date & active delays: TBD  _____________________________________________________________________________  SUMMARY NOTE:  Orientation/Cognitive: A&Ox4  Observation Goals (Met/ Not Met): not met  Mobility Level/Assist Equipment: ASBA GB/W  Antibiotics & Plan (IV/po, length of tx left): IV vanco aand IV zosyn  Pain Management: denies  Tele/VS/O2: VSS on RA ex soft BP  ABNL Lab/BG: procalcitonin 5.23,   Diet: Regular   Bowel/Bladder: continent, up to the bathroom   Skin Concerns: scattered bruises, LPIV infiltration site   Drains/Devices: PIV SL

## 2024-01-03 NOTE — PROGRESS NOTES
MD Notification    Notified Person: MD    Notified Person Name: Pernell    Notification Date/Time: 1/2/24 1808    Notification Interaction: ramy    Purpose of Notification: Do you still want Q2 VS.    Orders Received: Q4    Comments:

## 2024-01-03 NOTE — PROGRESS NOTES
[facility]    Medicine Progress Note - Hospitalist Service  Date of Admission:  1/1/2024    Assessment & Plan   Moses Elizondo is a very pleasant 89 year old male with MGUS, recent bout of c diff colitis in December 2023, dyslipidemia, NSTEMI, pacemaker, stage 3 CKD, hypothyroidism who was admitted on 1/1/2024 for fever, shaking chills, recent fall with lightheadedness during febrile illness, decreased oral intake, elevated procalcitonin.      Acute febrile illness with grossly elevated procalcitonin, unclear etiology  Initially suspected viral etiology with no clear source, no leukocytosis.  Fever in the 103 range associated with rigors over the past 2-3 days prior to admission.  No sore throat, no abdominal pain or flank pain, no urinary symptoms.  One episode of emesis in the ED which was atypical.  No cough or shortness of breath.  No report of sick family members, but multiple family members in and out of the house over the Christmas holiday. Repeated CXR due to c/f possible PNA but unremarkable. Patient with leg pain bilaterally and cramping.  -Blood cultures NGTD  -continue Zosyn/Vancomycin  -discontinue IV fluids   -consider resuming PTA diuretic tomorrow 1/4  -TTE improved compared to previous     Hypochloremic hyponatremia, resolved  -Holding prior to admission furosemide 20 mg daily; recommend holding additional 5 to 10 days at discharge until acute illness has resolved unless symptoms of shortness of breath or increasing lower extremity edema occur  -Compression stockings to bilateral lower extremities     Recent C. difficile colitis  Treated with oral vancomycin through this past Thursday or Friday.  No further diarrhea.  -Monitor for recurrence of diarrhea or fevers, if present might consider extending treatment course     Rectosigmoid stool ball  measuring 7 cm.  Noted on CT imaging.  Initially with some left lower quadrant abdominal discomfort which has since resolved.   Large bowel movement following CT.  Reports soft stools with no further diarrhea since C. difficile treatment.  -As needed MiraLAX, Dulcolax     Coronary artery disease  Nonischemic cardiomyopathy  Sick sinus syndrome and complete heart block status post permanent pacemaker implantation  Ejection fraction in the 45% range as of May 2023 echocardiogram.  Some lower extremity edema, but not significant change, no orthopnea.  -Continue aspirin 81 mg daily  -Continue atorvastatin 40 mg daily  -Continue metoprolol XL 25 mg daily  -Prior to admission Entresto on hold as 12-13 mg dose not available through pharmacy.      Hypothyroidism  -levothyroxine 100 mcg daily     Fall  Suspect secondary to orthostatic hypotension with septic vasodilation as fall occurred when patient was having rigors and feeling lightheaded  -Physical therapy consulted     Idiopathic peripheral neuropathy  -Gabapentin 300 mg at bedtime per home regimen     Stage III chronic kidney disease with acute kidney injury  Creatinine of 1.41.  Stable since recent hospitalization 12/14, but generally increased from 1.25 range this summer/autumn.  -Entresto, furosemide on hold     Ascending aortic dilation  Known from prior imaging.  -Continue with outpatient vascular/cardiology follow-up.  -Continue metoprolol 25 mg daily        Diet: Regular Diet Adult    DVT Prophylaxis: Pneumatic Compression Devices and Ambulate every shift  Stroud Catheter: Not present  Lines: None     Cardiac Monitoring: None  Code Status: Full Code      Clinically Significant Risk Factors              # Hypoalbuminemia: Lowest albumin = 2.9 g/dL at 1/3/2024  6:28 AM, will monitor as appropriate   # Thrombocytopenia: Lowest platelets = 87 in last 2 days, will monitor for bleeding    # Heart failure, NOS: heart failure noted on the problem list and last echo with EF 40-50%       # Overweight: Estimated body mass index is 25.86 kg/m  as calculated from the following:    Height as of this  "encounter: 1.803 m (5' 11\").    Weight as of this encounter: 84.1 kg (185 lb 6.5 oz)., PRESENT ON ADMISSION     # Financial/Environmental Concerns: none   # Pacemaker present       Disposition Plan      Expected Discharge Date: 01/04/2024      Destination: home with family;home with help/services  Discharge Comments: Home with HC when ready.   Viral work-up.            Ajit Scott MD  Hospitalist Service  [facility]  Securely message with [employer] (more info)  Text page via [employer] Paging/Directory   ______________________________________________________________________    Interval History   Denies constipation or     Physical Exam   Vital Signs: Temp: 98.2  F (36.8  C) Temp src: Oral BP: 111/67 Pulse: 61   Resp: 19 SpO2: 97 % O2 Device: None (Room air) Oxygen Delivery: 2 LPM  Weight: 185 lbs 6.51 oz    General Appearance: Talkative,   Respiratory: clear, no wheezing or crackles  Cardiovascular: regular, no LE edema  Pacer pocket non-tender, no erythema  GI: soft, non-tender  Skin: no acute rashes    Medical Decision Making       MANAGEMENT DISCUSSED with the following over the past 24 hours: patient, daughter, bedside nurse   NOTE(S)/MEDICAL RECORDS REVIEWED over the past 24 hours: H&P, med admin record  Tests ORDERED & REVIEWED in the past 24 hours:  - See lab/imaging results included in the data section of the note      Data     I have personally reviewed the following data over the past 24 hrs:    N/A  \   N/A   / N/A     137 106 15.6 /  83   3.8 25 1.23 (H) \     ALT: 29 AST: 59 (H) AP: 54 TBILI: 0.6   ALB: 2.9 (L) TOT PROTEIN: 5.2 (L) LIPASE: N/A     Trop: N/A BNP: 4,042 (H)     Procal: 3.88 (H) CRP: N/A Lactic Acid: N/A         Imaging results reviewed over the past 24 hrs:   Recent Results (from the past 24 hour(s))   Echocardiogram Complete   Result Value    LVEF  55-60%    Narrative    [id number]  [id number]  [id number]  [id number]^HEAVEN^AJIT^JOEL     [facility] " [facility]  Echocardiography Laboratory  [address] [place] [address]     Name: DIANNA CUMMINGS  MRN: [id number]  : 10/19/1933  Study Date: 2024 10:44 AM  Age: 89 yrs  Gender: Male  [facility]  Reason For Study: Fever  Ordering Physician: AJIT COLLADO  Referring Physician: AJIT COLLADO  Performed By: NOBLE Jasso     BSA: 2.0 m2  Height: 71 in  Weight: 185 lb  HR: 79  BP: 103/43 mmHg  ______________________________________________________________________________  Procedure  Complete Portable Echo Adult.  ______________________________________________________________________________  Interpretation Summary     The left ventricle is normal in size.  The visual ejection fraction is 55-60%.  Diastolic Doppler findings (E/E' ratio and/or other parameters) suggest left  ventricular filling pressures are normal.  No regional wall motion abnormalities noted.  There is a catheter/pacemaker lead seen in the right ventricle.  No hemodynamically significant valvular heart disease within limitation of  test modality.  ______________________________________________________________________________  Left Ventricle  The left ventricle is normal in size. There is normal left ventricular wall  thickness. Diastolic Doppler findings (E/E' ratio and/or other parameters)  suggest left ventricular filling pressures are normal. The visual ejection  fraction is 55-60%. No regional wall motion abnormalities noted.     Right Ventricle  There is a catheter/pacemaker lead seen in the right ventricle. The right  ventricle is normal in size and function.     Atria  Normal left atrial size. Right atrial size is normal. There is no color  Doppler evidence of an atrial shunt.     Mitral Valve  The mitral valve leaflets are moderately thickened. There is mild to moderate  mitral annular calcification. There is trace mitral regurgitation.     Tricuspid Valve  There is trace tricuspid  regurgitation. IVC diameter and respiratory changes  fall into an intermediate range suggesting an RA pressure of 8 mmHg. The right  ventricular systolic pressure is approximated at 23.8 mmHg plus the right  atrial pressure.     Aortic Valve  There is trivial trileaflet aortic sclerosis. There is trace aortic  regurgitation.     Pulmonic Valve  There is mild (1+) pulmonic valvular regurgitation.     Vessels  Aortic root dilatation is present. Ascending aorta dilatation is present.     Pericardium  There is no pericardial effusion.     Rhythm  Sinus rhythm was noted. The rhythm was paced.  ______________________________________________________________________________  MMode/2D Measurements & Calculations  IVSd: 1.2 cm     LVIDd: 5.3 cm  LVIDs: 4.1 cm  LVPWd: 1.1 cm  FS: 21.4 %  LV mass(C)d: 236.9 grams  LV mass(C)dI: 116.1 grams/m2  Ao root diam: 4.0 cm  asc Aorta Diam: 4.2 cm  LVOT diam: 2.2 cm  LVOT area: 3.8 cm2  Ao root diam index Ht(cm/m): 2.2  Ao root diam index BSA (cm/m2): 2.0  Asc Ao diam index BSA (cm/m2): 2.1  Asc Ao diam index Ht(cm/m): 2.3  LA Volume (BP): 52.0 ml     LA Volume Index (BP): 25.5 ml/m2  RV Base: 2.8 cm  RWT: 0.42  TAPSE: 2.3 cm     Doppler Measurements & Calculations  MV E max rick: 77.6 cm/sec  MV A max rick: 70.6 cm/sec  MV E/A: 1.1  MV dec time: 0.20 sec  PA acc time: 0.15 sec  TR max rick: 244.0 cm/sec  TR max P.8 mmHg  Pulm Sys Rick: 58.4 cm/sec  Pulm Wilde Rick: 38.8 cm/sec  Pulm A Revs Rick: 35.1 cm/sec  Pulm S/D: 1.5  E/E' av.2  Lateral E/e': 9.0  Medial E/e': 7.3  RV S Rick: 13.8 cm/sec     ______________________________________________________________________________  Report approved by: Jessica Corrales 2024 02:17 PM

## 2024-01-03 NOTE — CONSULTS
Care Management Initial Consult    General Information  Assessment completed with: Patient, Children, Zane, Daughter-ILA Solano-Oliver  Type of CM/SW Visit: Initial Assessment    Primary Care Provider verified and updated as needed: Yes   Readmission within the last 30 days:        Reason for Consult: discharge planning  Advance Care Planning:            Communication Assessment  Patient's communication style: spoken language (English or Bilingual)             Cognitive  Cognitive/Neuro/Behavioral: .WDL except  Level of Consciousness: alert  Arousal Level: opens eyes spontaneously  Orientation: disoriented to, time  Mood/Behavior: calm, cooperative  Best Language: 0 - No aphasia  Speech: logical, clear    Living Environment:   People in home: spouse  Shannon  Current living Arrangements: house      Able to return to prior arrangements: yes       Family/Social Support:  Care provided by: self  Provides care for: no one  Marital Status:   Wife, Children  Shannon       Description of Support System: Supportive, Involved    Support Assessment: Adequate family and caregiver support    Current Resources:   Patient receiving home care services: No     Community Resources: None  Equipment currently used at home: none  Supplies currently used at home: None    Employment/Financial:  Employment Status: retired        Financial Concerns: none   Referral to Financial Worker: No       Does the patient's insurance plan have a 3 day qualifying hospital stay waiver?  Yes     Which insurance plan 3 day waiver is available? Alternative insurance waiver    Will the waiver be used for post-acute placement? No    Lifestyle & Psychosocial Needs:  Social Determinants of Health     Food Insecurity: Not on file   Depression: Not at risk (11/27/2023)    PHQ-2     PHQ-2 Score: 1   Housing Stability: Not on file   Tobacco Use: Medium Risk (1/1/2024)    Patient History     Smoking Tobacco Use: Former     Smokeless Tobacco Use: Never      Passive Exposure: Not on file   Financial Resource Strain: Not on file   Alcohol Use: Not on file   Transportation Needs: Not on file   Physical Activity: Not on file   Interpersonal Safety: Not on file   Stress: Not on file   Social Connections: Not on file       Functional Status:  Prior to admission patient needed assistance:   Dependent ADLs:: Independent  Dependent IADLs:: Independent       Mental Health Status:          Chemical Dependency Status:                Values/Beliefs:  Spiritual, Cultural Beliefs, Religion Practices, Values that affect care: no               Additional Information:  Per consult for elevated risk of readmission and discharge planning, writer met with Pt and daughter-Jaime to discuss discharge plan. Per Pt prior to admit, Pt was independent with all ADL's and was still driving. Pt is living in a house with his wife. Pt wasn't receiving any [facility] or [facility] services. Discussed that PT is recommending Home  with HHC. Writer discussed with and family the services and they would like RN/PT/OT. Writer sent [facility] referral for HHC.  Family will transport Pt at discharge.      Inpatient Care Coordinator will continue to follow for discharge needs.           Patrice Armendariz RN, BSN, Care Coordinator

## 2024-01-03 NOTE — PROGRESS NOTES
01/03/24 7323   Appointment Info   Signing Clinician's Name / Credentials (PT) Brittany Dressler, PT   Physical Therapy Goals   PT Frequency 5x/week   Interventions   Interventions Quick Adds Physical Perf Test/Measures;Gait Training   Therapeutic Activity   Treatment Detail/Skilled Intervention Izabela supine<>sit, sit-stand with RW. Finished in bed alarm armed.   Gait Training   Gait Training Minutes (20324) 8   Symptoms Noted During/After Treatment (Gait Training) fatigue;shortness of breath   Treatment Detail/Skilled Intervention 3 stairs with wall and HHA per home set-up Heriberto to steady and support, pt and dtr/caregiver training on handling assist.   Distance in Feet 400' RW Izabela then no AD Heriberto for intermittent LOBs with head > body turns.   Physical Performance Test or Measurement (Report Req)    Physical Performance Test/Measurement Minutes, w/report (99443) 8   Symptoms Noted During/After Treatment fatigue   Treatment Detail/Skilled Intervention DGI 7/24 falls risk indoors < 20, safe ambulator 23.   PT Discharge Planning   PT Plan PT: balance, activity tolerance progressions.   PT Discharge Recommendation (DC Rec) home with assist;home with home care physical therapy   PT Rationale for DC Rec Pt below independent no AD baseline function, home PT, RW use, shower chair and nightlights indicated to progress towards it.   PT Brief overview of current status Izabela RW, Heriberto stairs, Heriberto no AD.   PT Equipment Needed at Discharge   (Pt has equipment)   Total Session Time   Timed Code Treatment Minutes 16   Total Session Time (sum of timed and untimed services) 16

## 2024-01-03 NOTE — PLAN OF CARE
Goal Outcome Evaluation:      Plan of Care Reviewed With: patient    Overall Patient Progress: improving     PRIMARY Concern: fall, fever, chills  SAFETY RISK Concerns (fall risk, behaviors, etc.): fall risk      Isolation/Type: enteric (C-diff +ve)  Tests/Procedures for NEXT shift: CMP, Creatinine  Consults? (Pending/following, signed-off?) PT following, SW/CM pending  Where is patient from? (Home, TCU)Home  Other Important info for NEXT shift:NONE  Anticipated DC date & active delays: TBD    SUMMARY NOTE:  Orientation/Cognitive: A&Ox4, forgetful at times  Observation Goals (Met/ Not Met):n/a-Inpatient  Mobility Level/Assist Equipment: SBA GB/W  Antibiotics & Plan (IV/po, length of tx left): IV vanco and IV zosyn  Pain Management: PRN Oxy and Tylenol given for back pain per pt's request  Tele/VS/O2: VSS on RA ex soft BP at times  ABNL Lab/BG: procalcitonin 5.23  Diet: Regular   Bowel/Bladder: Continent, up to the bathroom   Skin Concerns: scattered bruises, PIV WITH l/r CONT@75  Drains/Devices: PIV SL

## 2024-01-03 NOTE — PLAN OF CARE
PRIMARY Concern: fall, fever  SAFETY RISK Concerns (fall risk, behaviors, etc.): fall risk      Isolation/Type: enteric (C-diff)  Tests/Procedures for NEXT shift: chest XR complete  Consults? (Pending/following, signed-off?) PT/SW  Where is patient from? (Home, TCU, etc.): from home  Other Important info for NEXT shift: extravasation on L PIV (quarter sized, ice pack applied)  Anticipated DC date & active delays: Pending  _____________________________________________________________________________  SUMMARY NOTE:  Orientation/Cognitive: A&Ox4  Observation Goals (Met/ Not Met): not met  Mobility Level/Assist Equipment: Assist 1 GB/W  Antibiotics & Plan (IV/po, length of tx left): on zosyn and vanco  Pain Management: decreased with oxy x1, and tylenol x1  Tele/VS/O2: VSS on RA ex BP soft  ABNL Lab/BG: Pending BC, procalcitonin 5.23  Diet: Reg   Bowel/Bladder: continent, up to the bathroom   Skin Concerns: scattered bruises   Drains/Devices: PIV infusing with LR @ 75ml/hr

## 2024-01-04 ENCOUNTER — APPOINTMENT (OUTPATIENT)
Dept: CT IMAGING | Facility: CLINIC | Age: 89
DRG: 683 | End: 2024-01-04
Attending: INTERNAL MEDICINE
Payer: COMMERCIAL

## 2024-01-04 VITALS
HEIGHT: 71 IN | WEIGHT: 187 LBS | HEART RATE: 61 BPM | OXYGEN SATURATION: 96 % | DIASTOLIC BLOOD PRESSURE: 54 MMHG | TEMPERATURE: 97.7 F | RESPIRATION RATE: 18 BRPM | BODY MASS INDEX: 26.18 KG/M2 | SYSTOLIC BLOOD PRESSURE: 96 MMHG

## 2024-01-04 LAB
ALBUMIN SERPL BCG-MCNC: 3.1 G/DL (ref 3.5–5.2)
ALP SERPL-CCNC: 63 U/L (ref 40–150)
ALT SERPL W P-5'-P-CCNC: 32 U/L (ref 0–70)
ANION GAP SERPL CALCULATED.3IONS-SCNC: 10 MMOL/L (ref 7–15)
AST SERPL W P-5'-P-CCNC: 66 U/L (ref 0–45)
BASOPHILS # BLD AUTO: 0 10E3/UL (ref 0–0.2)
BASOPHILS NFR BLD AUTO: 1 %
BILIRUB SERPL-MCNC: 0.7 MG/DL
BUN SERPL-MCNC: 12.4 MG/DL (ref 8–23)
CALCIUM SERPL-MCNC: 8.5 MG/DL (ref 8.2–9.6)
CHLORIDE SERPL-SCNC: 106 MMOL/L (ref 98–107)
CREAT SERPL-MCNC: 1.23 MG/DL (ref 0.67–1.17)
CREAT SERPL-MCNC: 1.25 MG/DL (ref 0.67–1.17)
DEPRECATED HCO3 PLAS-SCNC: 22 MMOL/L (ref 22–29)
EGFRCR SERPLBLD CKD-EPI 2021: 55 ML/MIN/1.73M2
EGFRCR SERPLBLD CKD-EPI 2021: 56 ML/MIN/1.73M2
EOSINOPHIL # BLD AUTO: 0.1 10E3/UL (ref 0–0.7)
EOSINOPHIL NFR BLD AUTO: 2 %
ERYTHROCYTE [DISTWIDTH] IN BLOOD BY AUTOMATED COUNT: 12.2 % (ref 10–15)
GLUCOSE BLDC GLUCOMTR-MCNC: 84 MG/DL (ref 70–99)
GLUCOSE SERPL-MCNC: 83 MG/DL (ref 70–99)
HCT VFR BLD AUTO: 34.1 % (ref 40–53)
HGB BLD-MCNC: 11.4 G/DL (ref 13.3–17.7)
IMM GRANULOCYTES # BLD: 0 10E3/UL
IMM GRANULOCYTES NFR BLD: 1 %
LACTATE SERPL-SCNC: 1 MMOL/L (ref 0.7–2)
LYMPHOCYTES # BLD AUTO: 1.1 10E3/UL (ref 0.8–5.3)
LYMPHOCYTES NFR BLD AUTO: 34 %
MCH RBC QN AUTO: 28.5 PG (ref 26.5–33)
MCHC RBC AUTO-ENTMCNC: 33.4 G/DL (ref 31.5–36.5)
MCV RBC AUTO: 85 FL (ref 78–100)
MONOCYTES # BLD AUTO: 0.4 10E3/UL (ref 0–1.3)
MONOCYTES NFR BLD AUTO: 13 %
NEUTROPHILS # BLD AUTO: 1.6 10E3/UL (ref 1.6–8.3)
NEUTROPHILS NFR BLD AUTO: 49 %
NRBC # BLD AUTO: 0 10E3/UL
NRBC BLD AUTO-RTO: 0 /100
PLATELET # BLD AUTO: 89 10E3/UL (ref 150–450)
POTASSIUM SERPL-SCNC: 3.4 MMOL/L (ref 3.4–5.3)
PROCALCITONIN SERPL IA-MCNC: 2.1 NG/ML
PROT SERPL-MCNC: 5.8 G/DL (ref 6.4–8.3)
RBC # BLD AUTO: 4 10E6/UL (ref 4.4–5.9)
SODIUM SERPL-SCNC: 138 MMOL/L (ref 135–145)
WBC # BLD AUTO: 3.4 10E3/UL (ref 4–11)

## 2024-01-04 PROCEDURE — 36415 COLL VENOUS BLD VENIPUNCTURE: CPT | Performed by: INTERNAL MEDICINE

## 2024-01-04 PROCEDURE — 82040 ASSAY OF SERUM ALBUMIN: CPT | Performed by: INTERNAL MEDICINE

## 2024-01-04 PROCEDURE — 85025 COMPLETE CBC W/AUTO DIFF WBC: CPT | Performed by: INTERNAL MEDICINE

## 2024-01-04 PROCEDURE — 70450 CT HEAD/BRAIN W/O DYE: CPT

## 2024-01-04 PROCEDURE — 250N000013 HC RX MED GY IP 250 OP 250 PS 637: Performed by: PHYSICIAN ASSISTANT

## 2024-01-04 PROCEDURE — 99239 HOSP IP/OBS DSCHRG MGMT >30: CPT | Performed by: INTERNAL MEDICINE

## 2024-01-04 PROCEDURE — 84145 PROCALCITONIN (PCT): CPT | Performed by: INTERNAL MEDICINE

## 2024-01-04 PROCEDURE — 250N000013 HC RX MED GY IP 250 OP 250 PS 637: Performed by: INTERNAL MEDICINE

## 2024-01-04 PROCEDURE — 250N000011 HC RX IP 250 OP 636: Performed by: PHYSICIAN ASSISTANT

## 2024-01-04 PROCEDURE — 82565 ASSAY OF CREATININE: CPT | Performed by: INTERNAL MEDICINE

## 2024-01-04 PROCEDURE — 83605 ASSAY OF LACTIC ACID: CPT | Performed by: INTERNAL MEDICINE

## 2024-01-04 RX ORDER — FUROSEMIDE 20 MG
20 TABLET ORAL EVERY MORNING
Status: DISCONTINUED | OUTPATIENT
Start: 2024-01-04 | End: 2024-01-04 | Stop reason: HOSPADM

## 2024-01-04 RX ADMIN — LEVOTHYROXINE SODIUM 100 MCG: 100 TABLET ORAL at 10:42

## 2024-01-04 RX ADMIN — PIPERACILLIN AND TAZOBACTAM 4.5 G: 4; .5 INJECTION, POWDER, FOR SOLUTION INTRAVENOUS at 05:56

## 2024-01-04 RX ADMIN — FUROSEMIDE 20 MG: 20 TABLET ORAL at 10:42

## 2024-01-04 RX ADMIN — METOPROLOL SUCCINATE 25 MG: 25 TABLET, EXTENDED RELEASE ORAL at 10:42

## 2024-01-04 ASSESSMENT — ACTIVITIES OF DAILY LIVING (ADL)
ADLS_ACUITY_SCORE: 37
ADLS_ACUITY_SCORE: 40
ADLS_ACUITY_SCORE: 37

## 2024-01-04 NOTE — PLAN OF CARE
Physical Therapy Discharge Summary    Reason for therapy discharge:    Discharged to home with home therapy.    Progress towards therapy goal(s). See goals on Care Plan in [facility] electronic health record for goal details.  Goals partially met.  Barriers to achieving goals:   discharge from facility.    Therapy recommendation(s):    Continued therapy is recommended.  Rationale/Recommendations:Pt below independent no AD baseline function, home PT, RW use, shower chair and nightlights indicated to progress towards it.  PT Brief overview of current status: Izabela RW, Heriberto stairs, Heriberto no AD.  PT Equipment Needed at Discharge:  (Pt has equipment)    Recommendation above provided by last treating therapist.

## 2024-01-04 NOTE — DISCHARGE SUMMARY
PRIMARY Concern: fall, fever, chills  Orientation/Cognitive: A&Ox4, forgetful  Tele/VS/O2: VSS on RA ex BP soft at times      Pt will be discharging home with family, and has all of his belongings. All question have been answered, and pt/family understands all discharge information.     Pt refused at home oxygen equipment. He still has the referral, and he will see his primary on the 9th. I told pt/family member use his pulse ox often, especially if he is feeling SOB. I also informed him the benefits oxygen, and the issue it can cause without it. After these intervention pt still refused at home oxygen equipment.

## 2024-01-04 NOTE — PROGRESS NOTES
Patient has been assessed for Home Oxygen needs. Oxygen readings:    *Pulse oximetry (SpO2) = 96% on room air at rest while awake.    *SpO2 improved to 96% on 0liters/minute at rest.    *SpO2 = 86% on room air during activity/with exercise.    *SpO2 improved to 92% on 1liters/minute during activity/with exercise.

## 2024-01-04 NOTE — DISCHARGE SUMMARY
"[facility]  Hospitalist Discharge Summary      Date of Admission:  1/1/2024  Date of Discharge:  1/4/2024 to home with [facility]  Discharging Provider: Marlene Scott MD  Discharge Service: Hospitalist Service    Discharge Diagnoses   Acute febrile illness with grossly elevated procalcitonin, suspect viral illness  Transient speech changes and left mouth droop, resolved  Nocturnal apneic spells and hypoxia  Hypochloremic hyponatremia, resolved  Recent C. difficile colitis  Rectosigmoid stool ball  Coronary artery disease  Nonischemic cardiomyopathy  Sick sinus syndrome and complete heart block status post permanent pacemaker implantation  Hypothyroidism  Fall  Idiopathic peripheral neuropathy  Stage III chronic kidney disease with acute kidney injury  Ascending aortic dilation    Clinically Significant Risk Factors     # Overweight: Estimated body mass index is 26.08 kg/m  as calculated from the following:    Height as of this encounter: 1.803 m (5' 11\").    Weight as of this encounter: 84.8 kg (187 lb).       Follow-ups Needed After Discharge   Follow-up Appointments     Follow-up and recommended labs and tests       Follow up with primary care provider, Steven Wagoner, within 7   days for hospital follow- up.  No follow up labs or test are needed.            Unresulted Labs Ordered in the Past 30 Days of this Admission       Date and Time Order Name Status Description    1/2/2024 11:34 AM Blood Culture Arm, Right Preliminary     1/2/2024 11:34 AM Blood Culture Hand, Left Preliminary     1/1/2024  4:59 PM Blood Culture Peripheral Blood Preliminary     1/1/2024  4:59 PM Blood Culture Peripheral Blood Preliminary         These results will be followed up by myself or my group    Discharge Disposition   Admited to home care:     Discharged to home  Condition at discharge: Stable    Hospital Course   Moses Elizondo is a very pleasant 89 year old male with MGUS, recent bout of c diff " colitis in December 2023, dyslipidemia, NSTEMI, pacemaker, stage 3 CKD, hypothyroidism who was admitted on 1/1/2024 for fever, shaking chills, recent fall with lightheadedness during febrile illness, decreased oral intake, elevated procalcitonin.      Acute febrile illness with grossly elevated procalcitonin, suspect viral illness  Initially suspected viral etiology with no clear source, no leukocytosis.  Fever in the 103 range associated with rigors over the past 2-3 days prior to admission.  No sore throat, no abdominal pain or flank pain, no urinary symptoms.  One episode of emesis in the ED which was atypical.  No cough or shortness of breath.  No report of sick family members, but multiple family members in and out of the house over the Christmas holiday. Repeated CXR due to c/f possible PNA but unremarkable. Patient with leg pain bilaterally and cramping.  -TTE improved compared to previous  -procalcitonin trended down  -Blood cultures NGTD  -discontinued Zosyn/Vancomycin  -resume PTA diuretic 1/4 and continue at discharge    Transient speech changes and left mouth droop, resolved  Concern for new neuro findings this morning by bedside RN. Labs drawn and stat head CT obtained. Symptoms resolved by the time of my arrival for RRT.   -head CT negative for acute changes  -reassurance given to family    Nocturnal apneic spells and hypoxia  Suspected CARIDAD but hasn't had a sleep study. Overnight oximetry confirms periodic nocturnal hypoxia.   -obtained resting and ambulatory O2 sats. Qualifies for for home oxygen with activity.   -outpatient sleep study referral    Oxygen Documentation  I certify that this patient, Moses Elizondo has been under my care (or a nurse practitioner or physican's assistant working with me). This is the face-to-face encounter for oxygen medical necessity.      At the time of this encounter, I have reviewed the qualifying testing and have determined that supplemental oxygen is reasonable  and necessary and is expected to improve the patient's condition in a home setting.       Patient has continued oxygen desaturation due to Chronic Heart Failure I50.    If portability is ordered, is the patient mobile within the home? Yes    Hypochloremic hyponatremia, resolved  Noted    Recent C. difficile colitis  Treated with oral vancomycin through this past Thursday or Friday.  No further diarrhea.  -Monitor for recurrence of diarrhea or fevers, if present might consider extending treatment course   -no stools so c .diff not suspected  -will need to be on c diff precautions until Jan 14th (30 days from last positive result) if in a care facility or hospital. Discussed with infection prevention nurse.    Rectosigmoid stool ball  measuring 7 cm.  Noted on CT imaging.  Initially with some left lower quadrant abdominal discomfort which has since resolved.  Large bowel movement following CT.  Reports soft stools with no further diarrhea since C. difficile treatment.  -As needed MiraLAX, Dulcolax     Coronary artery disease  Nonischemic cardiomyopathy  Sick sinus syndrome and complete heart block status post permanent pacemaker implantation  Ejection fraction in the 45% range as of May 2023 echocardiogram.  Some lower extremity edema, but not significant change, no orthopnea.  -Continue aspirin 81 mg daily  -Continue atorvastatin 40 mg daily  -Continue metoprolol XL 25 mg daily  -Prior to admission Entresto on hold as 12-13 mg dose not available through pharmacy. Can resume at discharge.     Hypothyroidism  -levothyroxine 100 mcg daily     Fall  Suspect secondary to orthostatic hypotension with septic vasodilation as fall occurred when patient was having rigors and feeling lightheaded  -Physical therapy consulted     Idiopathic peripheral neuropathy  -Gabapentin 300 mg at bedtime per home regimen     Stage III chronic kidney disease with acute kidney injury  Creatinine of 1.41.  Stable since recent hospitalization  12/14, but generally increased from 1.25 range this summer/rosie.  -Entresto, furosemide on hold. Resume at discharge.     Ascending aortic dilation  Known from prior imaging.  -Continue with outpatient vascular/cardiology follow-up.  -Continue metoprolol 25 mg daily    Consultations This Hospital Stay   PHYSICAL THERAPY ADULT IP CONSULT  CARE MANAGEMENT / SOCIAL WORK IP CONSULT  PHARMACY TO DOSE VANCO  INFECTION PREVENTION IP CONSULT    Code Status   Full Code    Time Spent on this Encounter   I, Marlene Scott MD, personally saw the patient today and spent greater than 30 minutes discharging this patient.       Marlene Scott MD  [facility] EXTENDED RECOVERY AND SHORT STAY  [address] [address]  Phone: [phone]  ______________________________________________________________________    Physical Exam   Vital Signs: Temp: 98.8  F (37.1  C) Temp src: Axillary BP: 110/60 Pulse: 80   Resp: 18 SpO2: 96 % O2 Device: None (Room air)    Weight: 187 lbs 0 oz         Primary Care Physician   Steven Wagoner    Discharge Orders      Home Care Referral      Sleep Study Referral      Reason for your hospital stay    Febrile illness, suspected viral infection     Follow-up and recommended labs and tests     Follow up with primary care provider, Steven Wagoner, within 7 days for hospital follow- up.  No follow up labs or test are needed.     Activity    Your activity upon discharge: activity as tolerated     Oxygen Adult/Peds    Oxygen Documentation  I certify that this patient, Moses Elizondo has been under my care (or a nurse practitioner or physican's assistant working with me). This is the face-to-face encounter for oxygen medical necessity.      At the time of this encounter, I have reviewed the qualifying testing and have determined that supplemental oxygen is reasonable and necessary and is expected to improve the patient's condition in a home setting.        Patient has continued oxygen desaturation due to Chronic Heart Failure I50.    If portability is ordered, is the patient mobile within the home? yes     Oxygen Adult/Peds    Oxygen Documentation  I certify that this patient, Moses Elizondo has been under my care (or a nurse practitioner or physican's assistant working with me). This is the face-to-face encounter for oxygen medical necessity.      At the time of this encounter, I have reviewed the qualifying testing and have determined that supplemental oxygen is reasonable and necessary and is expected to improve the patient's condition in a home setting.       Patient has continued oxygen desaturation due to CAD I25.10.    If portability is ordered, is the patient mobile within the home? yes     Diet    Follow this diet upon discharge:       Regular Diet Adult       Significant Results and Procedures   See Epic    Discharge Medications   Current Discharge Medication List        CONTINUE these medications which have NOT CHANGED    Details   acetaminophen (TYLENOL) 650 MG CR tablet Take 650 mg by mouth at bedtime      ASPIRIN EC PO Take 81 mg by mouth at bedtime      atorvastatin (LIPITOR) 40 MG tablet Take 1 tablet (40 mg) by mouth daily (with dinner)  Qty: 90 tablet, Refills: 3    Associated Diagnoses: Non-STEMI (non-ST elevated myocardial infarction) (H); Coronary artery disease 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      furosemide (LASIX) 20 MG tablet Take 1 tablet (20 mg) by mouth every morning  Qty: 90 tablet, Refills: 1    Associated Diagnoses: 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)      gabapentin (NEURONTIN) 300 MG capsule TAKE 1 CAPSULE(300 MG) BY MOUTH AT BEDTIME  Qty: 90 capsule, Refills: 0    Associated Diagnoses: Idiopathic peripheral neuropathy      levothyroxine (SYNTHROID/LEVOTHROID) 100 MCG tablet Take 1 tablet (100 mcg) by mouth daily  Qty: 90 tablet, Refills: 0    Associated Diagnoses:  "Hypothyroidism, unspecified type      metoprolol succinate ER (TOPROL XL) 25 MG 24 hr tablet Take 1 tablet (25 mg) by mouth daily At lunch  Qty: 90 tablet, Refills: 0    Associated Diagnoses: Coronary artery disease 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      nitroGLYcerin (NITROSTAT) 0.4 MG sublingual tablet One tablet under the tongue every 5 minutes if needed for chest pain. 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

## 2024-01-04 NOTE — PROGRESS NOTES
[facility]    Medicine Progress Note - Hospitalist Service    Date of Admission:  1/1/2024    Assessment & Plan   Moses Elizondo is a very pleasant 89 year old male with MGUS, recent bout of c diff colitis in December 2023, dyslipidemia, NSTEMI, pacemaker, stage 3 CKD, hypothyroidism who was admitted on 1/1/2024 for fever, shaking chills, recent fall with lightheadedness during febrile illness, decreased oral intake, elevated procalcitonin.      Acute febrile illness with grossly elevated procalcitonin, suspect viral illness  Initially suspected viral etiology with no clear source, no leukocytosis.  Fever in the 103 range associated with rigors over the past 2-3 days prior to admission.  No sore throat, no abdominal pain or flank pain, no urinary symptoms.  One episode of emesis in the ED which was atypical.  No cough or shortness of breath.  No report of sick family members, but multiple family members in and out of the house over the Christmas holiday. Repeated CXR due to c/f possible PNA but unremarkable. Patient with leg pain bilaterally and cramping.  -Blood cultures NGTD  -discontinue Zosyn/Vancomycin  -consider resuming PTA diuretic tomorrow 1/4  -TTE improved compared to previous    Transient speech changes and left mouth droop, resolved  Concern for new neuro findings this morning by bedside RN. Labs drawn and stat head CT obtained. Symptoms resolved by the time of my arrival for RRT.   -head CT negative for acute changes  -reassurance given to family    Nocturnal apneic spells and hypoxia  Suspected CARIDAD but hasn't had a sleep study. Overnight oximetry confirms periodic nocturnal hypoxia.   -obtain resting and ambulatory O2 sats to see if patient may qualify for home oxygen  -outpatient sleep study referral    Hypochloremic hyponatremia, resolved  Noted    Recent C. difficile colitis  Treated with oral vancomycin through this past Thursday or Friday.  No further  diarrhea.  -Monitor for recurrence of diarrhea or fevers, if present might consider extending treatment course   -no stools so c .diff not suspected  -will need to be on c diff precautions until Jan 14th (30 days from last positive result) if in a care facility or hospital. Discussed with infection prevention nurse.    Rectosigmoid stool ball  measuring 7 cm.  Noted on CT imaging.  Initially with some left lower quadrant abdominal discomfort which has since resolved.  Large bowel movement following CT.  Reports soft stools with no further diarrhea since C. difficile treatment.  -As needed MiraLAX, Dulcolax     Coronary artery disease  Nonischemic cardiomyopathy  Sick sinus syndrome and complete heart block status post permanent pacemaker implantation  Ejection fraction in the 45% range as of May 2023 echocardiogram.  Some lower extremity edema, but not significant change, no orthopnea.  -Continue aspirin 81 mg daily  -Continue atorvastatin 40 mg daily  -Continue metoprolol XL 25 mg daily  -Prior to admission Entresto on hold as 12-13 mg dose not available through pharmacy. Can resume at discharge.     Hypothyroidism  -levothyroxine 100 mcg daily     Fall  Suspect secondary to orthostatic hypotension with septic vasodilation as fall occurred when patient was having rigors and feeling lightheaded  -Physical therapy consulted     Idiopathic peripheral neuropathy  -Gabapentin 300 mg at bedtime per home regimen     Stage III chronic kidney disease with acute kidney injury  Creatinine of 1.41.  Stable since recent hospitalization 12/14, but generally increased from 1.25 range this summer/autumn.  -Entresto, furosemide on hold     Ascending aortic dilation  Known from prior imaging.  -Continue with outpatient vascular/cardiology follow-up.  -Continue metoprolol 25 mg daily          Diet: Regular Diet Adult    DVT Prophylaxis: Pneumatic Compression Devices and Ambulate every shift  [place] Catheter: Not present  Lines: None    "  Cardiac Monitoring: None  Code Status: Full Code      Clinically Significant Risk Factors              # Hypoalbuminemia: Lowest albumin = 2.9 g/dL at 1/3/2024  6:28 AM, will monitor as appropriate      # Acute heart failure with preserved ejection fraction: heart failure noted on problem list, last echo with EF >50%, and receiving IV diuretics       # Overweight: Estimated body mass index is 26.08 kg/m  as calculated from the following:    Height as of this encounter: 1.803 m (5' 11\").    Weight as of this encounter: 84.8 kg (187 lb)., PRESENT ON ADMISSION     # Financial/Environmental Concerns: none   # Pacemaker present       Disposition Plan      Expected Discharge Date: 01/05/2024      Destination: home with family;home with help/services  Discharge Comments: Home with HC when ready.   Viral work-up.            Marlene Scott MD  Hospitalist Service  [facility]  Securely message with [email or web address] (more info)  Text page via [employer] Paging/Directory   ______________________________________________________________________    Interval History   Stable overnight. This morning had RRT called for transient neuro changes. Afebrile. No cough. Feeling well and wants to go home. Numerous family members in the room updated.     Physical Exam   Vital Signs: Temp: 97.9  F (36.6  C) Temp src: Oral BP: 118/73 Pulse: 74   Resp: 18 SpO2: 96 % O2 Device: None (Room air) Oxygen Delivery: 2 LPM  Weight: 187 lbs 0 oz    General Appearance: Alert, NAD  Respiratory: clear, no wheezes  Neuro: Face symmetric, speech normal, no focal deficits     Medical Decision Making       discharge MINUTES SPENT BY ME on the date of service doing chart review, history, exam, documentation & further activities per the note.      Data     I have personally reviewed the following data over the past 24 hrs:    3.4 (L)  \   11.4 (L)   / 89 (L)     N/A N/A N/A /  84   N/A N/A 1.25 (H) \     Trop: N/A BNP: N/A     Procal: 2.10 (H) " CRP: N/A Lactic Acid: 1.0

## 2024-01-04 NOTE — PLAN OF CARE
PRIMARY Concern: Fall, fever, chills/tremors  SAFETY RISK Concerns (fall risk, behaviors, etc.): Falls risk   Isolation/Type: Enteric for C-diff  Tests/Procedures for NEXT shift: None  Consults: SW following  Where is patient from: Home  Other Important info for NEXT shift: has pacemaker  Anticipated DC date & active delays: Pending    SUMMARY NOTE:  Orientation/Cognitive: A&Ox4, forgetful at times  Observation Goals (Met/ Not Met): Inpatient  Mobility Level/Assist Equipment: SBA walker  Antibiotics & Plan (IV/po, length of tx left): IV vanco and IV zosyn  Pain Management: Denies pain this shift  Tele/VS/O2: VSS on RA   ABNL Lab/BG: procalcitonin 5.23 (1/2). BNP 4,042  Diet: Reg  Bowel/Bladder: Continent to B/B, up to bathroom, no BM this shift  Skin Concerns: scattered bruises  Drains/Devices: PIV SL

## 2024-01-04 NOTE — PROGRESS NOTES
Care Management Discharge Note    Discharge Date: 01/04/2024       Discharge Disposition: Home, Home Care    Discharge Services: None    Discharge DME: None    Discharge Transportation: car, drives self, family or friend will provide    Private pay costs discussed: Not applicable    Does the patient's insurance plan have a 3 day qualifying hospital stay waiver?  Yes     Which insurance plan 3 day waiver is available? Alternative insurance waiver    Will the waiver be used for post-acute placement? No    PAS Confirmation Code:    Patient/family educated on Medicare website which has current facility and service quality ratings:      Education Provided on the Discharge Plan:    Persons Notified of Discharge Plans:   Patient/Family in Agreement with the Plan: yes    Handoff Referral Completed: Yes    Additional Information:  Patient has discharge orders.  [facility] has been notified of his discharge.  Referrals are on the AVS.  They will pull orders from [facility].  He has a follow up appointment on the AVS for Jan 9th at 11:15.    He is going home on oxygen.    Mindy Jules RN  Inpatient Float Care Coordinator  [facility]

## 2024-01-04 NOTE — PLAN OF CARE
PRIMARY Concern: fall, fever, chills  SAFETY RISK Concerns (fall risk, behaviors, etc.): fall risk      Isolation/Type: enteric (C-diff)  Tests/Procedures for NEXT shift: echo (competed today)  Consults: PT/SW following   Where is patient from: Home  Other Important info for NEXT shift: pt SpO2 was dropping during his nap (will need pulse ox overnight), gave IV lasix   Anticipated DC date & active delays: TBD    SUMMARY NOTE:  Orientation/Cognitive: A&Ox4, forgetful  Observation Goals (Met/ Not Met): Inpatient  Mobility Level/Assist Equipment: SBA GB/W  Antibiotics & Plan (IV/po, length of tx left): IV vanco and IV zosyn  Pain Management: decreased with heat and tylenol   Tele/VS/O2: VSS on RA   ABNL Lab/BG: procalcitonin 5.23 (1/2/24). BNP 4,042  Diet: Regular, poor appetite   Bowel/Bladder: Continent, up to the bathroom   Skin Concerns: scattered bruises, compression stockings on  Drains/Devices: PIV SL

## 2024-01-04 NOTE — CONSULTS
Enteric precautions required if patient has tested positive in the last 30 days.     Discontinue antibiotics if appropriate.    Disinfect environmental surfaces and reusable patient equipment with bleach based product.     Handwashing with soap and water required.    Patients with extended stay (>30 days) require approval from IP department before enteric precautions discontinuation. If approved by IP, the patient must be moved to a new room and the old room will need to be terminally cleaned with bleach    Roldan Singh, Infection Prevention on 1/4/2024 at 11:22 AM

## 2024-01-06 ENCOUNTER — PATIENT OUTREACH (OUTPATIENT)
Dept: CARE COORDINATION | Facility: CLINIC | Age: 89
End: 2024-01-06
Payer: COMMERCIAL

## 2024-01-06 LAB
BACTERIA BLD CULT: NO GROWTH
BACTERIA BLD CULT: NO GROWTH

## 2024-01-06 NOTE — PROGRESS NOTES
[facility]:   [facility] Contact  [facility]/Voicemail     Clinical Data: Post-Discharge Outreach     Outreach attempted x 2.  Busy signal.     Plan:  [facility] will do no further outreaches at this time.       Windy Manzano MA  [facility] [place], [facility]    *Connected Care Resource Team does NOT follow patient ongoing. Referrals are identified based on internal discharge reports and the outreach is to ensure patient has an understanding of their discharge instructions.

## 2024-01-07 LAB
BACTERIA BLD CULT: NO GROWTH
BACTERIA BLD CULT: NO GROWTH

## 2024-01-08 ENCOUNTER — TELEPHONE (OUTPATIENT)
Dept: INTERNAL MEDICINE | Facility: CLINIC | Age: 89
End: 2024-01-08
Payer: COMMERCIAL

## 2024-01-08 NOTE — TELEPHONE ENCOUNTER
Home Care is calling regarding an established patient with [facility].  {     Requesting orders from: Steven Wagoner  Provider is following patient: Yes    Marlene with [facility], called requesting approval for visit delay for the following home care orders: SN, PT and OT.     Pt will be seen today due to agency staffing.       Confirmed ok to leave a detailed message with call back.  Contact information confirmed and updated as needed.    Gualberto Weinstein RN

## 2024-01-09 ENCOUNTER — OFFICE VISIT (OUTPATIENT)
Dept: INTERNAL MEDICINE | Facility: CLINIC | Age: 89
End: 2024-01-09
Payer: COMMERCIAL

## 2024-01-09 VITALS
HEIGHT: 71 IN | SYSTOLIC BLOOD PRESSURE: 104 MMHG | BODY MASS INDEX: 26.22 KG/M2 | HEART RATE: 90 BPM | DIASTOLIC BLOOD PRESSURE: 64 MMHG | OXYGEN SATURATION: 99 % | TEMPERATURE: 97.1 F | WEIGHT: 187.3 LBS

## 2024-01-09 DIAGNOSIS — I21.4 NON-STEMI (NON-ST ELEVATED MYOCARDIAL INFARCTION) (H): ICD-10-CM

## 2024-01-09 DIAGNOSIS — N18.31 STAGE 3A CHRONIC KIDNEY DISEASE (H): ICD-10-CM

## 2024-01-09 DIAGNOSIS — E03.9 HYPOTHYROIDISM, UNSPECIFIED TYPE: ICD-10-CM

## 2024-01-09 DIAGNOSIS — I49.5 SICK SINUS SYNDROME (H): ICD-10-CM

## 2024-01-09 DIAGNOSIS — I71.20 THORACIC AORTIC ANEURYSM WITHOUT RUPTURE, UNSPECIFIED PART (H): ICD-10-CM

## 2024-01-09 DIAGNOSIS — R50.9 ACUTE FEBRILE ILLNESS: Primary | ICD-10-CM

## 2024-01-09 DIAGNOSIS — I50.42 CHRONIC COMBINED SYSTOLIC AND DIASTOLIC HRT FAIL (H): ICD-10-CM

## 2024-01-09 DIAGNOSIS — I25.118 ATHEROSCLEROTIC HEART DISEASE OF NATIVE CORONARY ARTERY WITH OTHER FORMS OF ANGINA PECTORIS (H): ICD-10-CM

## 2024-01-09 DIAGNOSIS — I25.810 CORONARY ARTERY DISEASE INVOLVING CORONARY BYPASS GRAFT OF NATIVE HEART WITHOUT ANGINA PECTORIS: ICD-10-CM

## 2024-01-09 DIAGNOSIS — G60.9 IDIOPATHIC PERIPHERAL NEUROPATHY: ICD-10-CM

## 2024-01-09 PROCEDURE — 99213 OFFICE O/P EST LOW 20 MIN: CPT | Performed by: INTERNAL MEDICINE

## 2024-01-09 ASSESSMENT — PAIN SCALES - GENERAL: PAINLEVEL: NO PAIN (0)

## 2024-01-09 NOTE — PROGRESS NOTES
Assessment & Plan     (R50.9) Acute febrile illness  (primary encounter diagnosis)  Comment: he has done surprisingly well in his recovery.   Still needs to esnure proper nutrition, proper rest, resume activities as tolerated.   Plan:     (N18.31) Stage 3a chronic kidney disease (H)  Comment: This condition is currently controlled on the current medical regimen.  Continue current therapy.   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  Continue current therapy.   Plan: CBC with platelets, Comprehensive metabolic         panel            (I49.5) Sick sinus syndrome (H)  Comment: This condition is currently controlled on the current medical regimen.  Continue current therapy.   Plan:     (I71.20) Thoracic aortic aneurysm without rupture, unspecified part (H24)  Comment: This condition is currently controlled on the current medical regimen.  Continue current therapy.   Plan: CBC with platelets, Comprehensive metabolic         panel            (I25.118) 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24)  Comment: This condition is currently controlled on the current medical regimen.  Continue current therapy.   Discussed secondary risk factor modification and recommended continuing aggressive management of these items.   Plan: CBC with platelets, Comprehensive metabolic         panel            (I21.4) Non-STEMI (non-ST elevated myocardial infarction) (H)  Comment: This condition is currently controlled on the current medical regimen.  Continue current therapy.   Plan:     (I25.810) Coronary artery disease  Comment:   Plan:     (G60.9) Idiopathic peripheral neuropathy  Comment:   Plan:     (E03.9) Hypothyroidism, unspecified type  Comment: This condition is currently controlled on the current medical  regimen.  Continue current therapy.   Plan: TSH with free T4 reflex                      MED REC REQUIRED  Post Medication Reconciliation Status: discharge medications reconciled, continue medications without change      Steven Wagoner MD  [facility] IZZY Degroot is a 89 year old, presenting for the following health issues:  Hospital F/U        1/9/2024    11:05 AM   Additional Questions   Roomed by Winifred STERLING CMA       [facility]     Hospital Follow-up Visit:    Hospital/Nursing Home/ Rehab Facility: [facility]  Date of Admission: 1-1-24  Date of Discharge: 1-4-24  Reason(s) for Admission: dehydration    Was your hospitalization related to COVID-19? No   Problems taking medications regularly:  None  Medication changes since discharge: see discharge summary  Problems adhering to non-medication therapy:  None    Summary of hospitalization:  [facility] discharge summary reviewed  Diagnostic Tests/Treatments reviewed.  Follow up needed: none  Other Healthcare Providers Involved in Patient s Care:         None  Update since discharge: improved.         Since home from hospital, they report that they are feeling better.   Energy level and stamina are slowly improving.    Appetite and intake are improving.   No fevers, no chills  No shortness of breath.   No abdominal pain.   They have almost returned to prior routine and activities.       Plan of care communicated with patient               **I reviewed the information recorded in the patient's EPIC chart (including but not limited to medical history, surgical history, family history, problem list, medication list, and allergy list) and updated the information as indicated based on the patients reported information.         Review of Systems   Constitutional, HEENT, cardiovascular, pulmonary, gi and gu systems are negative, except as otherwise noted.      Objective    /64    "Pulse 90   Temp 97.1  F (36.2  C) (Tympanic)   Ht 1.803 m (5' 11\")   Wt 85 kg (187 lb 4.8 oz)   SpO2 99%   BMI 26.12 kg/m    Body mass index is 26.12 kg/m .  Physical Exam   GENERAL alert and no distress  EYES:  Normal sclera,conjunctiva, EOMI  HENT: oral and posterior pharynx without lesions or erythema, facies symmetric  NECK: Neck supple. No LAD, without thyroidmegaly.  RESP: Clear to ausculation bilaterally without wheezes or crackles. Normal BS in all fields.  CV: RRR normal S1S2 without murmurs, rubs or gallops.  LYMPH: no cervical lymph adenopathy appreciated  MS: extremities- no gross deformities of the visible extremities noted,   EXT:  no lower extremity edema  PSYCH: Alert and oriented times 3; speech- coherent  SKIN:  No obvious significant skin lesions on visible portions of face                       "

## 2024-01-09 NOTE — TELEPHONE ENCOUNTER
OK for these requested Home Care orders..    I will keep an eye out and sign any orders from this encounter either on paper or electronically.      Close encounter when done.

## 2024-01-09 NOTE — PATIENT INSTRUCTIONS
" Continue all medications at the same doses.  Contact your usual pharmacy if you need refills.      Contact us you develop any significant diarrhea in the next few months (more than 3 loose stools per day for more than 2 days) as this can indiacte a return of the Clostridium difficile infeciton which would require more treatment.      Consider a \"pro-biotic\" supplement to help support \"friendly\" bacteria in the intestinal tract that can be adversely affected by antibiotics and poor diet.  There are many different probiotics to choose from, take whichever version is most convenient for you.  You can get these from any pharmacy and most grocery stores.    Examples of pro-biotics are:  Align, Culturelle, GoodBelly juice, Activia yogurt, or any pro-biotic from [employer] or Fresh Thyme (choose the \"middle one).  Most regular yogurts will not give much probiotic effect since they are pasteurized.     Follow up with the Cardiology Clinic as planned in March     Sleep Clinic evaluation as planned in March.       Return to see me in approximately 6 months, sooner if needed.  Please get nonfasting labs [facility] or at any other [facility] lab 1-2 days before this appointment.  If you get the labs done at another clinic, make arrangements with that clinic directly.  The orders will be in place.  Use [employer] or Call [phone] to schedule the appointment with me and lab appointment.             "

## 2024-01-12 ENCOUNTER — TELEPHONE (OUTPATIENT)
Dept: INTERNAL MEDICINE | Facility: CLINIC | Age: 89
End: 2024-01-12
Payer: COMMERCIAL

## 2024-01-12 ENCOUNTER — MEDICAL CORRESPONDENCE (OUTPATIENT)
Dept: HEALTH INFORMATION MANAGEMENT | Facility: CLINIC | Age: 89
End: 2024-01-12
Payer: COMMERCIAL

## 2024-01-12 NOTE — TELEPHONE ENCOUNTER
Home care can check his vitals when they are there.   A couple of times per week would be fine until he is back to normal activity levels and pre-hospital baseline.    Check the weight 2-3 times per week.   Call if he gains more than 10 lbs in one week.      Close encounter when done.

## 2024-01-12 NOTE — TELEPHONE ENCOUNTER
Continue all medications at the same dose for now.   Continue to monitor weights and vital signs.

## 2024-01-12 NOTE — TELEPHONE ENCOUNTER
Angi PT with [facility] called and wanted to report vitals and have the provider look at patient's medications.     Blood pressure resting/sittin/50; pulse: 61  Blood pressure standin/53  Blood pressure after moving around :118/72; pulse: 87    Patient had no symptoms, but had mild SOB with exertion  2+ pitting edema mid calf and below; weight is stable.    Patient is taking furosemide, metoprolol, and sacubitril-valsartan.  Angi is wondering if the patient should continue to take all of the blood pressure medications at the current dosing?    Routing to provider to review and advise.    Palma SIEGEL [facility] Triage Team

## 2024-01-12 NOTE — TELEPHONE ENCOUNTER
Relayed providers response to PTAngi.   Per Angi, patient does not have BP cuff at home, but does have oximeter and weight scale. BP readings will only be monitored when Home Care is in patient's home.  Angi is unsure how often SN home visits will be, because nurse has not requested initial order yet.

## 2024-01-15 ENCOUNTER — MEDICAL CORRESPONDENCE (OUTPATIENT)
Dept: HEALTH INFORMATION MANAGEMENT | Facility: CLINIC | Age: 89
End: 2024-01-15
Payer: COMMERCIAL

## 2024-01-15 NOTE — TELEPHONE ENCOUNTER
Relayed providers message to KUSH Whitley with [facility]. Angi verbalized understanding and will relay this message also to patient's Home Care RN.

## 2024-01-16 ENCOUNTER — MEDICAL CORRESPONDENCE (OUTPATIENT)
Dept: HEALTH INFORMATION MANAGEMENT | Facility: CLINIC | Age: 89
End: 2024-01-16
Payer: COMMERCIAL

## 2024-01-16 NOTE — TELEPHONE ENCOUNTER
ALY Nickerson with [facility] returning call regarding below.    Liya states that she will be seeing patient 1x per week for 3 weeks. States patients vital signs were stable today and patient can check his weight daily. Liya expressed understanding to PCP's note.    Routing back to PCP as update Liya states that nursing will be seeing patient 1x per week for 3 weeks. If anything further is needed - please call Liya at [phone] - thank you!     Yany Galindo RN  [facility]

## 2024-01-23 DIAGNOSIS — G60.9 IDIOPATHIC PERIPHERAL NEUROPATHY: ICD-10-CM

## 2024-01-23 RX ORDER — GABAPENTIN 300 MG/1
CAPSULE ORAL
Qty: 90 CAPSULE | Refills: 0 | Status: SHIPPED | OUTPATIENT
Start: 2024-01-23 | End: 2024-04-23

## 2024-02-09 ENCOUNTER — TRANSFERRED RECORDS (OUTPATIENT)
Dept: HEALTH INFORMATION MANAGEMENT | Facility: CLINIC | Age: 89
End: 2024-02-09
Payer: COMMERCIAL

## 2024-02-14 DIAGNOSIS — Z53.9 DIAGNOSIS NOT YET DEFINED: Primary | ICD-10-CM

## 2024-02-14 PROCEDURE — G0180 MD CERTIFICATION HHA PATIENT: HCPCS | Performed by: INTERNAL MEDICINE

## 2024-02-19 ENCOUNTER — MEDICAL CORRESPONDENCE (OUTPATIENT)
Dept: HEALTH INFORMATION MANAGEMENT | Facility: CLINIC | Age: 89
End: 2024-02-19
Payer: COMMERCIAL

## 2024-02-23 DIAGNOSIS — I50.42 CHRONIC COMBINED SYSTOLIC AND DIASTOLIC HRT FAIL (H): ICD-10-CM

## 2024-02-23 RX ORDER — SACUBITRIL AND VALSARTAN 24; 26 MG/1; MG/1
TABLET, FILM COATED ORAL
Qty: 90 TABLET | Refills: 3 | Status: SHIPPED | OUTPATIENT
Start: 2024-02-23 | End: 2024-05-21

## 2024-03-05 DIAGNOSIS — I25.810 CORONARY ARTERY DISEASE INVOLVING CORONARY BYPASS GRAFT OF NATIVE HEART WITHOUT ANGINA PECTORIS: ICD-10-CM

## 2024-03-05 DIAGNOSIS — E03.9 HYPOTHYROIDISM, UNSPECIFIED TYPE: ICD-10-CM

## 2024-03-06 RX ORDER — LEVOTHYROXINE SODIUM 100 UG/1
100 TABLET ORAL DAILY
Qty: 90 TABLET | Refills: 0 | Status: SHIPPED | OUTPATIENT
Start: 2024-03-06 | End: 2024-06-10

## 2024-03-06 RX ORDER — METOPROLOL SUCCINATE 25 MG/1
25 TABLET, EXTENDED RELEASE ORAL DAILY
Qty: 90 TABLET | Refills: 0 | Status: SHIPPED | OUTPATIENT
Start: 2024-03-06 | End: 2024-06-10

## 2024-03-21 ENCOUNTER — ANCILLARY PROCEDURE (OUTPATIENT)
Dept: CARDIOLOGY | Facility: CLINIC | Age: 89
End: 2024-03-21
Attending: INTERNAL MEDICINE
Payer: COMMERCIAL

## 2024-03-21 DIAGNOSIS — I44.1 SECOND DEGREE ATRIOVENTRICULAR BLOCK: ICD-10-CM

## 2024-03-21 DIAGNOSIS — Z95.0 BIVENTRICULAR CARDIAC PACEMAKER IN SITU: ICD-10-CM

## 2024-03-21 DIAGNOSIS — Z95.0 BIVENTRICULAR CARDIAC PACEMAKER IN SITU: Primary | ICD-10-CM

## 2024-03-21 PROCEDURE — 93296 REM INTERROG EVL PM/IDS: CPT | Performed by: INTERNAL MEDICINE

## 2024-03-21 PROCEDURE — 93294 REM INTERROG EVL PM/LDLS PM: CPT | Performed by: INTERNAL MEDICINE

## 2024-03-22 LAB
MDC_IDC_LEAD_CONNECTION_STATUS: NORMAL
MDC_IDC_LEAD_IMPLANT_DT: NORMAL
MDC_IDC_LEAD_LOCATION: NORMAL
MDC_IDC_LEAD_LOCATION_DETAIL_1: NORMAL
MDC_IDC_LEAD_MFG: NORMAL
MDC_IDC_LEAD_MODEL: NORMAL
MDC_IDC_LEAD_POLARITY_TYPE: NORMAL
MDC_IDC_LEAD_SERIAL: NORMAL
MDC_IDC_MSMT_BATTERY_DTM: NORMAL
MDC_IDC_MSMT_BATTERY_REMAINING_LONGEVITY: 95 MO
MDC_IDC_MSMT_BATTERY_RRT_TRIGGER: 2.6
MDC_IDC_MSMT_BATTERY_STATUS: NORMAL
MDC_IDC_MSMT_BATTERY_VOLTAGE: 3 V
MDC_IDC_MSMT_LEADCHNL_LV_IMPEDANCE_VALUE: 304 OHM
MDC_IDC_MSMT_LEADCHNL_LV_IMPEDANCE_VALUE: 380 OHM
MDC_IDC_MSMT_LEADCHNL_LV_IMPEDANCE_VALUE: 437 OHM
MDC_IDC_MSMT_LEADCHNL_LV_IMPEDANCE_VALUE: 456 OHM
MDC_IDC_MSMT_LEADCHNL_LV_IMPEDANCE_VALUE: 475 OHM
MDC_IDC_MSMT_LEADCHNL_LV_IMPEDANCE_VALUE: 570 OHM
MDC_IDC_MSMT_LEADCHNL_LV_IMPEDANCE_VALUE: 646 OHM
MDC_IDC_MSMT_LEADCHNL_LV_IMPEDANCE_VALUE: 684 OHM
MDC_IDC_MSMT_LEADCHNL_LV_IMPEDANCE_VALUE: 741 OHM
MDC_IDC_MSMT_LEADCHNL_LV_IMPEDANCE_VALUE: 779 OHM
MDC_IDC_MSMT_LEADCHNL_LV_PACING_THRESHOLD_AMPLITUDE: 0.88 V
MDC_IDC_MSMT_LEADCHNL_LV_PACING_THRESHOLD_PULSEWIDTH: 0.4 MS
MDC_IDC_MSMT_LEADCHNL_RA_IMPEDANCE_VALUE: 380 OHM
MDC_IDC_MSMT_LEADCHNL_RA_IMPEDANCE_VALUE: 513 OHM
MDC_IDC_MSMT_LEADCHNL_RA_PACING_THRESHOLD_AMPLITUDE: 0.38 V
MDC_IDC_MSMT_LEADCHNL_RA_PACING_THRESHOLD_PULSEWIDTH: 0.4 MS
MDC_IDC_MSMT_LEADCHNL_RA_SENSING_INTR_AMPL: 1.38 MV
MDC_IDC_MSMT_LEADCHNL_RA_SENSING_INTR_AMPL: 1.38 MV
MDC_IDC_MSMT_LEADCHNL_RV_IMPEDANCE_VALUE: 342 OHM
MDC_IDC_MSMT_LEADCHNL_RV_IMPEDANCE_VALUE: 418 OHM
MDC_IDC_MSMT_LEADCHNL_RV_PACING_THRESHOLD_AMPLITUDE: 0.5 V
MDC_IDC_MSMT_LEADCHNL_RV_PACING_THRESHOLD_PULSEWIDTH: 0.4 MS
MDC_IDC_MSMT_LEADCHNL_RV_SENSING_INTR_AMPL: 8.25 MV
MDC_IDC_MSMT_LEADCHNL_RV_SENSING_INTR_AMPL: 8.25 MV
MDC_IDC_PG_IMPLANT_DTM: NORMAL
MDC_IDC_PG_MFG: NORMAL
MDC_IDC_PG_MODEL: NORMAL
MDC_IDC_PG_SERIAL: NORMAL
MDC_IDC_PG_TYPE: NORMAL
MDC_IDC_SESS_CLINIC_NAME: NORMAL
MDC_IDC_SESS_DTM: NORMAL
MDC_IDC_SESS_TYPE: NORMAL
MDC_IDC_SET_BRADY_AT_MODE_SWITCH_RATE: 171 {BEATS}/MIN
MDC_IDC_SET_BRADY_LOWRATE: 60 {BEATS}/MIN
MDC_IDC_SET_BRADY_MAX_SENSOR_RATE: 120 {BEATS}/MIN
MDC_IDC_SET_BRADY_MAX_TRACKING_RATE: 120 {BEATS}/MIN
MDC_IDC_SET_BRADY_MODE: NORMAL
MDC_IDC_SET_BRADY_PAV_DELAY_LOW: 170 MS
MDC_IDC_SET_BRADY_SAV_DELAY_LOW: 110 MS
MDC_IDC_SET_CRT_LVRV_DELAY: 0 MS
MDC_IDC_SET_CRT_PACED_CHAMBERS: NORMAL
MDC_IDC_SET_LEADCHNL_LV_PACING_AMPLITUDE: 1.5 V
MDC_IDC_SET_LEADCHNL_LV_PACING_ANODE_ELECTRODE_1: NORMAL
MDC_IDC_SET_LEADCHNL_LV_PACING_ANODE_LOCATION_1: NORMAL
MDC_IDC_SET_LEADCHNL_LV_PACING_CAPTURE_MODE: NORMAL
MDC_IDC_SET_LEADCHNL_LV_PACING_CATHODE_ELECTRODE_1: NORMAL
MDC_IDC_SET_LEADCHNL_LV_PACING_CATHODE_LOCATION_1: NORMAL
MDC_IDC_SET_LEADCHNL_LV_PACING_POLARITY: NORMAL
MDC_IDC_SET_LEADCHNL_LV_PACING_PULSEWIDTH: 0.4 MS
MDC_IDC_SET_LEADCHNL_RA_PACING_AMPLITUDE: 1.5 V
MDC_IDC_SET_LEADCHNL_RA_PACING_ANODE_ELECTRODE_1: NORMAL
MDC_IDC_SET_LEADCHNL_RA_PACING_ANODE_LOCATION_1: NORMAL
MDC_IDC_SET_LEADCHNL_RA_PACING_CAPTURE_MODE: NORMAL
MDC_IDC_SET_LEADCHNL_RA_PACING_CATHODE_ELECTRODE_1: NORMAL
MDC_IDC_SET_LEADCHNL_RA_PACING_CATHODE_LOCATION_1: NORMAL
MDC_IDC_SET_LEADCHNL_RA_PACING_POLARITY: NORMAL
MDC_IDC_SET_LEADCHNL_RA_PACING_PULSEWIDTH: 0.4 MS
MDC_IDC_SET_LEADCHNL_RA_SENSING_ANODE_ELECTRODE_1: NORMAL
MDC_IDC_SET_LEADCHNL_RA_SENSING_ANODE_LOCATION_1: NORMAL
MDC_IDC_SET_LEADCHNL_RA_SENSING_CATHODE_ELECTRODE_1: NORMAL
MDC_IDC_SET_LEADCHNL_RA_SENSING_CATHODE_LOCATION_1: NORMAL
MDC_IDC_SET_LEADCHNL_RA_SENSING_POLARITY: NORMAL
MDC_IDC_SET_LEADCHNL_RA_SENSING_SENSITIVITY: 0.3 MV
MDC_IDC_SET_LEADCHNL_RV_PACING_AMPLITUDE: 2 V
MDC_IDC_SET_LEADCHNL_RV_PACING_ANODE_ELECTRODE_1: NORMAL
MDC_IDC_SET_LEADCHNL_RV_PACING_ANODE_LOCATION_1: NORMAL
MDC_IDC_SET_LEADCHNL_RV_PACING_CAPTURE_MODE: NORMAL
MDC_IDC_SET_LEADCHNL_RV_PACING_CATHODE_ELECTRODE_1: NORMAL
MDC_IDC_SET_LEADCHNL_RV_PACING_CATHODE_LOCATION_1: NORMAL
MDC_IDC_SET_LEADCHNL_RV_PACING_POLARITY: NORMAL
MDC_IDC_SET_LEADCHNL_RV_PACING_PULSEWIDTH: 0.4 MS
MDC_IDC_SET_LEADCHNL_RV_SENSING_ANODE_ELECTRODE_1: NORMAL
MDC_IDC_SET_LEADCHNL_RV_SENSING_ANODE_LOCATION_1: NORMAL
MDC_IDC_SET_LEADCHNL_RV_SENSING_CATHODE_ELECTRODE_1: NORMAL
MDC_IDC_SET_LEADCHNL_RV_SENSING_CATHODE_LOCATION_1: NORMAL
MDC_IDC_SET_LEADCHNL_RV_SENSING_POLARITY: NORMAL
MDC_IDC_SET_LEADCHNL_RV_SENSING_SENSITIVITY: 0.9 MV
MDC_IDC_SET_ZONE_DETECTION_INTERVAL: 350 MS
MDC_IDC_SET_ZONE_DETECTION_INTERVAL: 400 MS
MDC_IDC_SET_ZONE_STATUS: NORMAL
MDC_IDC_SET_ZONE_STATUS: NORMAL
MDC_IDC_SET_ZONE_TYPE: NORMAL
MDC_IDC_SET_ZONE_VENDOR_TYPE: NORMAL
MDC_IDC_STAT_AT_BURDEN_PERCENT: 0 %
MDC_IDC_STAT_AT_DTM_END: NORMAL
MDC_IDC_STAT_AT_DTM_START: NORMAL
MDC_IDC_STAT_BRADY_AP_VP_PERCENT: 96.45 %
MDC_IDC_STAT_BRADY_AP_VS_PERCENT: 0.07 %
MDC_IDC_STAT_BRADY_AS_VP_PERCENT: 2.7 %
MDC_IDC_STAT_BRADY_AS_VS_PERCENT: 0.78 %
MDC_IDC_STAT_BRADY_DTM_END: NORMAL
MDC_IDC_STAT_BRADY_DTM_START: NORMAL
MDC_IDC_STAT_BRADY_RA_PERCENT_PACED: 97.16 %
MDC_IDC_STAT_BRADY_RV_PERCENT_PACED: 99.15 %
MDC_IDC_STAT_CRT_DTM_END: NORMAL
MDC_IDC_STAT_CRT_DTM_START: NORMAL
MDC_IDC_STAT_CRT_LV_PERCENT_PACED: 99.12 %
MDC_IDC_STAT_CRT_PERCENT_PACED: 99.12 %
MDC_IDC_STAT_EPISODE_RECENT_COUNT: 0
MDC_IDC_STAT_EPISODE_RECENT_COUNT_DTM_END: NORMAL
MDC_IDC_STAT_EPISODE_RECENT_COUNT_DTM_START: NORMAL
MDC_IDC_STAT_EPISODE_TOTAL_COUNT: 0
MDC_IDC_STAT_EPISODE_TOTAL_COUNT_DTM_END: NORMAL
MDC_IDC_STAT_EPISODE_TOTAL_COUNT_DTM_START: NORMAL
MDC_IDC_STAT_EPISODE_TYPE: NORMAL
MDC_IDC_STAT_TACHYTHERAPY_RECENT_DTM_END: NORMAL
MDC_IDC_STAT_TACHYTHERAPY_RECENT_DTM_START: NORMAL
MDC_IDC_STAT_TACHYTHERAPY_TOTAL_DTM_END: NORMAL
MDC_IDC_STAT_TACHYTHERAPY_TOTAL_DTM_START: NORMAL

## 2024-04-23 DIAGNOSIS — G60.9 IDIOPATHIC PERIPHERAL NEUROPATHY: ICD-10-CM

## 2024-04-23 RX ORDER — GABAPENTIN 300 MG/1
CAPSULE ORAL
Qty: 90 CAPSULE | Refills: 0 | Status: SHIPPED | OUTPATIENT
Start: 2024-04-23 | End: 2024-07-25

## 2024-05-14 ENCOUNTER — LAB (OUTPATIENT)
Dept: LAB | Facility: CLINIC | Age: 89
End: 2024-05-14
Payer: COMMERCIAL

## 2024-05-14 DIAGNOSIS — I50.42 CHRONIC COMBINED SYSTOLIC AND DIASTOLIC HRT FAIL (H): ICD-10-CM

## 2024-05-14 LAB
ALT SERPL W P-5'-P-CCNC: 23 U/L (ref 0–70)
ANION GAP SERPL CALCULATED.3IONS-SCNC: 11 MMOL/L (ref 7–15)
BUN SERPL-MCNC: 19.8 MG/DL (ref 8–23)
CALCIUM SERPL-MCNC: 9.5 MG/DL (ref 8.2–9.6)
CHLORIDE SERPL-SCNC: 108 MMOL/L (ref 98–107)
CHOLEST SERPL-MCNC: 102 MG/DL
CREAT SERPL-MCNC: 1.1 MG/DL (ref 0.67–1.17)
DEPRECATED HCO3 PLAS-SCNC: 24 MMOL/L (ref 22–29)
EGFRCR SERPLBLD CKD-EPI 2021: 64 ML/MIN/1.73M2
FASTING STATUS PATIENT QL REPORTED: NO
GLUCOSE SERPL-MCNC: 101 MG/DL (ref 70–99)
HDLC SERPL-MCNC: 45 MG/DL
LDLC SERPL CALC-MCNC: 45 MG/DL
NONHDLC SERPL-MCNC: 57 MG/DL
POTASSIUM SERPL-SCNC: 4.5 MMOL/L (ref 3.4–5.3)
SODIUM SERPL-SCNC: 143 MMOL/L (ref 135–145)
TRIGL SERPL-MCNC: 60 MG/DL

## 2024-05-14 PROCEDURE — 80048 BASIC METABOLIC PNL TOTAL CA: CPT | Performed by: PHYSICIAN ASSISTANT

## 2024-05-14 PROCEDURE — 84460 ALANINE AMINO (ALT) (SGPT): CPT | Performed by: PHYSICIAN ASSISTANT

## 2024-05-14 PROCEDURE — 36415 COLL VENOUS BLD VENIPUNCTURE: CPT | Performed by: PHYSICIAN ASSISTANT

## 2024-05-14 PROCEDURE — 80061 LIPID PANEL: CPT | Performed by: PHYSICIAN ASSISTANT

## 2024-05-21 ENCOUNTER — OFFICE VISIT (OUTPATIENT)
Dept: CARDIOLOGY | Facility: CLINIC | Age: 89
End: 2024-05-21
Payer: COMMERCIAL

## 2024-05-21 ENCOUNTER — ANCILLARY PROCEDURE (OUTPATIENT)
Dept: CARDIOLOGY | Facility: CLINIC | Age: 89
End: 2024-05-21
Attending: INTERNAL MEDICINE
Payer: COMMERCIAL

## 2024-05-21 VITALS
WEIGHT: 186.7 LBS | DIASTOLIC BLOOD PRESSURE: 57 MMHG | BODY MASS INDEX: 26.14 KG/M2 | SYSTOLIC BLOOD PRESSURE: 90 MMHG | HEIGHT: 71 IN | HEART RATE: 71 BPM

## 2024-05-21 DIAGNOSIS — I50.42 CHRONIC COMBINED SYSTOLIC AND DIASTOLIC HRT FAIL (H): Primary | ICD-10-CM

## 2024-05-21 DIAGNOSIS — I87.2 VENOUS (PERIPHERAL) INSUFFICIENCY: ICD-10-CM

## 2024-05-21 DIAGNOSIS — E78.5 HYPERLIPIDEMIA LDL GOAL <100: ICD-10-CM

## 2024-05-21 DIAGNOSIS — I44.1 SECOND DEGREE ATRIOVENTRICULAR BLOCK: ICD-10-CM

## 2024-05-21 DIAGNOSIS — I25.810 CORONARY ARTERY DISEASE INVOLVING CORONARY BYPASS GRAFT OF NATIVE HEART WITHOUT ANGINA PECTORIS: ICD-10-CM

## 2024-05-21 DIAGNOSIS — Z95.0 BIVENTRICULAR CARDIAC PACEMAKER IN SITU: ICD-10-CM

## 2024-05-21 DIAGNOSIS — I71.21 ANEURYSM OF ASCENDING AORTA WITHOUT RUPTURE (H): ICD-10-CM

## 2024-05-21 PROCEDURE — 93281 PM DEVICE PROGR EVAL MULTI: CPT | Performed by: INTERNAL MEDICINE

## 2024-05-21 PROCEDURE — 99214 OFFICE O/P EST MOD 30 MIN: CPT | Mod: 25 | Performed by: INTERNAL MEDICINE

## 2024-05-21 RX ORDER — SACUBITRIL AND VALSARTAN 24; 26 MG/1; MG/1
TABLET, FILM COATED ORAL
Qty: 90 TABLET | Refills: 3 | Status: SHIPPED | OUTPATIENT
Start: 2024-05-21

## 2024-05-21 NOTE — PROGRESS NOTES
HPI and Plan:   Mr. 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    His last echo had shown an EF of 45 to 50%.  He denies any chest pain or shortness of breath.  Is on a stable dose of Lasix at 20 mg/day.  He is on Entresto 24-26, half tablet twice daily.  Also on metoprolol XL 25 mg daily.    His main complaint is exertional shortness of breath for last 2 years.  It occurs on walking 1-1-1/2 blocks.  There is no associated chest discomfort.  We had done a pulmonary function test last year which was normal.  He also had an echocardiogram which revealed EF of 45% with medical therapy optimization, in January of this year, EF normalized to 55 to 60%.    He does have some leg edema but he also has venous insufficiency.  His leg edema worsens when he does not wear his compression stockings.  Is sometimes hard for him to put on the compression stockings so he skips few days.    He also has peripheral neuropathy is on gabapentin.  He also takes as needed NSAIDs for his neuropathic pain.    On exam, regular rate and rhythm.  No murmurs.  Chest was reauscultation.  1+ leg edema, partially pitting.  Venous insufficiency changes noted including superficial varicosities.    Impression  Exertional shortness of breath, etiology is unclear.  He does not have any abnormal pulmonary function on the PFTs.  His fluid status is stable including weight which is around and 87 pounds which it has been over the last 2 years.  He does have some leg edema with an element of venous insufficiency and on Lasix 20 g/day.  It is possible that the shortness of breath is an anginal equivalent  but at age 89 we discussed that medical therapy is the best option at this time unless symptoms worsen.  He had a stress MRI in 2021 which was negative for ischemia.  After further discussion with him and his daughter, they both agreed to continue conservative management at this time unless symptoms worsen and at that time they will call us.  Heart failure with mildly reduced ejection fraction, EF now improved to normal range.  He is on low-dose Entresto but blood pressures is 90 systolic.  I will suggest decreasing Entresto from half a tablet 24-26 g twice daily to once daily.  Continue metoprolol XL.  On low-dose Lasix which will continue  Venous insufficiency, discussed importance of wearing compression stockings and also keeping legs elevated when able.  Continue low-dose Lasix.  S/p BiV pacemaker, continue to follow in device clinic.  Has 99% ventricular pacing.  Hyperlipidemia, on atorvastatin, last LDL 45 and HDL 45    Plan  His main concern of shortness of breath is stable.  It has not worsened.  At this time we will continue medical therapy.  If his leg edema or weight worsen, have asked him to call us.  There is worsening shortness of breath and further limitation activity, have asked him to call us.  Otherwise he will see my midlevel provider in 6 months and with me in a year.  Recommend follow-up echocardiogram in a year of just before the visit with me.    Today's clinic visit entailed:    34 minutes spent by me on the date of the encounter doing chart review, review of test results, patient visit, and documentation   Provider  Link to [facility] Help Grid           No orders of the defined types were placed in this encounter.      Orders Placed This Encounter   Medications    Aspirin-Caffeine 400-32 MG TABS     Sig: Take 1 tablet by mouth as needed       There are no discontinued medications.      Encounter Diagnosis   Name Primary?    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)        CURRENT  "MEDICATIONS:  Current Outpatient Medications   Medication Sig Dispense Refill    acetaminophen (TYLENOL) 650 MG CR tablet Take 650 mg by mouth at bedtime      ASPIRIN EC PO Take 81 mg by mouth at bedtime      Aspirin-Caffeine 400-32 MG TABS Take 1 tablet by mouth as needed      atorvastatin (LIPITOR) 40 MG tablet Take 1 tablet (40 mg) by mouth daily (with dinner) 90 tablet 3    furosemide (LASIX) 20 MG tablet Take 1 tablet (20 mg) by mouth every morning 90 tablet 1    gabapentin (NEURONTIN) 300 MG capsule TAKE 1 CAPSULE(300 MG) BY MOUTH AT BEDTIME 90 capsule 0    levothyroxine (SYNTHROID/LEVOTHROID) 100 MCG tablet TAKE 1 TABLET (100 MCG) BY MOUTH DAILY 90 tablet 0    metoprolol succinate ER (TOPROL XL) 25 MG 24 hr tablet TAKE 1 TABLET (25 MG) BY MOUTH DAILY AT LUNCH 90 tablet 0    nitroGLYcerin (NITROSTAT) 0.4 MG sublingual tablet One tablet under the tongue every 5 minutes if needed for chest pain. May repeat every 5 minutes for a maximum of 3 doses in 15 minutes\" 25 tablet 3    sacubitril-valsartan (ENTRESTO) 24-26 MG per tablet Take 1/2 tab (12/13mg) twice daily 90 tablet 3       ALLERGIES     Allergies   Allergen Reactions    No Known Drug Allergy        PAST MEDICAL HISTORY:  Past Medical History:   Diagnosis Date    Aortic root dilatation (H24)     4.4 cm    Ascending aorta dilatation (H24)     4.4 cm    ASD (atrial septal defect)     Bradycardia     CAD (coronary artery disease) 05/15/2014     YOGESH to RCA(6/20/19); YOGESH to OM1(5/15/14)    Cardiac pacemaker 06/20/2019    [employer] PPM COMFORT MRI XT DR-implant 6/20/19    Chest discomfort     Degeneration of lumbar or lumbosacral intervertebral disc     Familial tremor     Former smoker     Herpes zoster without mention of complication     HTN (hypertension)     Hyperlipidaemia     Idiopathic peripheral neuropathy     MGUS (monoclonal gammopathy of unknown significance)     NSTEMI (non-ST elevated myocardial infarction) (H) 2014    Other and unspecified " hyperlipidemia     PFO (patent foramen ovale)     Prostatitis, unspecified     Second degree heart block     Sensorineural hearing loss, unspecified     SSS (sick sinus syndrome) (H)     Status post coronary angiogram 06/20/2019    Unspecified hypothyroidism        PAST SURGICAL HISTORY:  Past Surgical History:   Procedure Laterality Date    CHOLECYSTECTOMY      CORONARY ANGIOGRAPHY ADULT ORDER  5/14/14    PTCA w/YOGESH to OM1    CV CORONARY ANGIOGRAM N/A 6/20/2019    Procedure: Coronary Angiogram;  Surgeon: Romie Coronado MD;  Location:  HEART CARDIAC CATH LAB    CV INTRAVASULAR ULTRASOUND N/A 6/20/2019    Procedure: Intravascular Ultrasound;  Surgeon: Romie Coronado MD;  Location:  HEART CARDIAC CATH LAB    CV PCI ATHERECTOMY ORBITAL N/A 6/20/2019    Procedure: PCI Atherectomy Orbital;  Surgeon: Romie Coronado MD;  Location:  HEART CARDIAC CATH LAB    CV PCI STENT DRUG ELUTING N/A 6/20/2019    Procedure: PCI Stent Drug Eluting;  Surgeon: Romie Coronado MD;  Location:  HEART CARDIAC CATH LAB    CV TEMPORARY PACEMAKER INSERTION N/A 6/20/2019    Procedure: Temporary Pacemaker Insertion;  Surgeon: Romie Coronado MD;  Location:  HEART CARDIAC CATH LAB    EP PACEMAKER N/A 6/20/2019    Procedure: EP Pacemaker;  Surgeon: Max Dowell MD;  Location:  HEART CARDIAC CATH LAB    EP PPM UPGRADE TO BIVENT N/A 9/16/2021    Procedure: EP PPM UPGRADE TO BIVENT;  Surgeon: Tre Miller MD;  Location:  HEART CARDIAC CATH LAB    HEART CATH, ANGIOPLASTY  5/14/14    YOGESH to OM1    ZZC NONSPECIFIC PROCEDURE  2010    Laparoscopic cholecystectomy.        FAMILY HISTORY:  Family History   Problem Relation Age of Onset    Cancer Mother     Genitourinary Problems Father 99        complications from surgery    Heart Disease Brother 72        MI       SOCIAL HISTORY:  Social History     Socioeconomic History    Marital status:      Spouse name: None    Number of children: None    Years of  education: None    Highest education level: None   Tobacco Use    Smoking status: Former     Current packs/day: 0.00     Average packs/day: 0.5 packs/day for 17.0 years (8.5 ttl pk-yrs)     Types: Cigarettes     Start date:      Quit date: 1967     Years since quittin.4    Smokeless tobacco: Never   Substance and Sexual Activity    Alcohol use: Yes     Comment: 5-7 drinks week - at most one drink daily    Drug use: No    Sexual activity: Never   Other Topics Concern    Caffeine Concern Yes     Comment: 2 cups coffee/day    Sleep Concern No    Weight Concern Yes     Comment: limiting alcohol to watch calories    Exercise Yes     Comment: Stationary bike  weights and aerobics 4 times week    Seat Belt Yes    Parent/sibling w/ CABG, MI or angioplasty before 65F 55M? No     Social Determinants of Health     Financial Resource Strain: Low Risk  (2024)    Financial Resource Strain     Within the past 12 months, have you or your family members you live with been unable to get utilities (heat, electricity) when it was really needed?: No   Food Insecurity: Low Risk  (2024)    Food Insecurity     Within the past 12 months, did you worry that your food would run out before you got money to buy more?: No     Within the past 12 months, did the food you bought just not last and you didn t have money to get more?: No   Transportation Needs: Low Risk  (2024)    Transportation Needs     Within the past 12 months, has lack of transportation kept you from medical appointments, getting your medicines, non-medical meetings or appointments, work, or from getting things that you need?: No   Housing Stability: Low Risk  (2024)    Housing Stability     Do you have housing? : Yes     Are you worried about losing your housing?: No       Review of Systems:  Skin:          Eyes:         ENT:         Respiratory:  Positive for shortness of breath     Cardiovascular:    Positive for;edema    Gastroenterology:       "  Genitourinary:         Musculoskeletal:         Neurologic:         Psychiatric:         Heme/Lymph/Imm:  Negative      Endocrine:  Positive for thyroid disorder      Physical Exam:  Vitals: BP 90/57   Pulse 71   Ht 1.803 m (5' 11\")   Wt 84.7 kg (186 lb 11.2 oz)   BMI 26.04 kg/m            CC  Lacey Puckett PA-C  [address]  [place] [address]                "

## 2024-05-21 NOTE — LETTER
5/21/2024    Steven Wagoner MD  [address] [facility] [address]    RE: Moses Elizondo       Dear Colleague,     I had the pleasure of seeing Moses Elizondo in the [facility].  HPI and Plan:   Mr. 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    His last echo had shown an EF of 45 to 50%.  He denies any chest pain or shortness of breath.  Is on a stable dose of Lasix at 20 mg/day.  He is on Entresto 24-26, half tablet twice daily.  Also on metoprolol XL 25 mg daily.    His main complaint is exertional shortness of breath for last 2 years.  It occurs on walking 1-1-1/2 blocks.  There is no associated chest discomfort.  We had done a pulmonary function test last year which was normal.  He also had an echocardiogram which revealed EF of 45% with medical therapy optimization, in January of this year, EF normalized to 55 to 60%.    He does have some leg edema but he also has venous insufficiency.  His leg edema worsens when he does not wear his compression stockings.  Is sometimes hard for him to put on the compression stockings so he skips few days.    He also has peripheral neuropathy is on gabapentin.  He also takes as needed NSAIDs for his neuropathic pain.    On exam, regular rate and rhythm.  No murmurs.  Chest was reauscultation.  1+ leg edema, partially pitting.  Venous insufficiency changes noted including superficial varicosities.    Impression  Exertional shortness of breath, etiology is unclear.  He does not have any abnormal pulmonary function on the PFTs.  His fluid status is stable including weight which is around and  87 pounds which it has been over the last 2 years.  He does have some leg edema with an element of venous insufficiency and on Lasix 20 g/day.  It is possible that the shortness of breath is an anginal equivalent but at age 89 we discussed that medical therapy is the best option at this time unless symptoms worsen.  He had a stress MRI in 2021 which was negative for ischemia.  After further discussion with him and his daughter, they both agreed to continue conservative management at this time unless symptoms worsen and at that time they will call us.  Heart failure with mildly reduced ejection fraction, EF now improved to normal range.  He is on low-dose Entresto but blood pressures is 90 systolic.  I will suggest decreasing Entresto from half a tablet 24-26 g twice daily to once daily.  Continue metoprolol XL.  On low-dose Lasix which will continue  Venous insufficiency, discussed importance of wearing compression stockings and also keeping legs elevated when able.  Continue low-dose Lasix.  S/p BiV pacemaker, continue to follow in device clinic.  Has 99% ventricular pacing.  Hyperlipidemia, on atorvastatin, last LDL 45 and HDL 45    Plan  His main concern of shortness of breath is stable.  It has not worsened.  At this time we will continue medical therapy.  If his leg edema or weight worsen, have asked him to call us.  There is worsening shortness of breath and further limitation activity, have asked him to call us.  Otherwise he will see my midlevel provider in 6 months and with me in a year.  Recommend follow-up echocardiogram in a year of just before the visit with me.    Today's clinic visit entailed:    34 minutes spent by me on the date of the encounter doing chart review, review of test results, patient visit, and documentation   Provider  Link to [facility] Help Grid           No orders of the defined types were placed in this encounter.      Orders Placed This Encounter   Medications    Aspirin-Caffeine 400-32  "MG TABS     Sig: Take 1 tablet by mouth as needed       There are no discontinued medications.      Encounter Diagnosis   Name Primary?    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)        CURRENT MEDICATIONS:  Current Outpatient Medications   Medication Sig Dispense Refill    acetaminophen (TYLENOL) 650 MG CR tablet Take 650 mg by mouth at bedtime      ASPIRIN EC PO Take 81 mg by mouth at bedtime      Aspirin-Caffeine 400-32 MG TABS Take 1 tablet by mouth as needed      atorvastatin (LIPITOR) 40 MG tablet Take 1 tablet (40 mg) by mouth daily (with dinner) 90 tablet 3    furosemide (LASIX) 20 MG tablet Take 1 tablet (20 mg) by mouth every morning 90 tablet 1    gabapentin (NEURONTIN) 300 MG capsule TAKE 1 CAPSULE(300 MG) BY MOUTH AT BEDTIME 90 capsule 0    levothyroxine (SYNTHROID/LEVOTHROID) 100 MCG tablet TAKE 1 TABLET (100 MCG) BY MOUTH DAILY 90 tablet 0    metoprolol succinate ER (TOPROL XL) 25 MG 24 hr tablet TAKE 1 TABLET (25 MG) BY MOUTH DAILY AT LUNCH 90 tablet 0    nitroGLYcerin (NITROSTAT) 0.4 MG sublingual tablet One tablet under the tongue every 5 minutes if needed for chest pain. May repeat every 5 minutes for a maximum of 3 doses in 15 minutes\" 25 tablet 3    sacubitril-valsartan (ENTRESTO) 24-26 MG per tablet Take 1/2 tab (12/13mg) twice daily 90 tablet 3       ALLERGIES     Allergies   Allergen Reactions    No Known Drug Allergy        PAST MEDICAL HISTORY:  Past Medical History:   Diagnosis Date    Aortic root dilatation (H24)     4.4 cm    Ascending aorta dilatation (H24)     4.4 cm    ASD (atrial septal defect)     Bradycardia     CAD (coronary artery disease) 05/15/2014     YOGESH to RCA(6/20/19); YOGESH to OM1(5/15/14)    Cardiac pacemaker 06/20/2019    [employer] PPM COMFORT MRI XT DR-implant 6/20/19    Chest discomfort     Degeneration of lumbar or lumbosacral intervertebral disc     Familial tremor     Former smoker     Herpes zoster without mention of complication     HTN (hypertension)     " Hyperlipidaemia     Idiopathic peripheral neuropathy     MGUS (monoclonal gammopathy of unknown significance)     NSTEMI (non-ST elevated myocardial infarction) (H) 2014    Other and unspecified hyperlipidemia     PFO (patent foramen ovale)     Prostatitis, unspecified     Second degree heart block     Sensorineural hearing loss, unspecified     SSS (sick sinus syndrome) (H)     Status post coronary angiogram 06/20/2019    Unspecified hypothyroidism        PAST SURGICAL HISTORY:  Past Surgical History:   Procedure Laterality Date    CHOLECYSTECTOMY      CORONARY ANGIOGRAPHY ADULT ORDER  5/14/14    PTCA w/YOGESH to OM1    CV CORONARY ANGIOGRAM N/A 6/20/2019    Procedure: Coronary Angiogram;  Surgeon: Romie Coronado MD;  Location:  HEART CARDIAC CATH LAB    CV INTRAVASULAR ULTRASOUND N/A 6/20/2019    Procedure: Intravascular Ultrasound;  Surgeon: Romie Coronado MD;  Location:  HEART CARDIAC CATH LAB    CV PCI ATHERECTOMY ORBITAL N/A 6/20/2019    Procedure: PCI Atherectomy Orbital;  Surgeon: Romie Coronado MD;  Location:  HEART CARDIAC CATH LAB    CV PCI STENT DRUG ELUTING N/A 6/20/2019    Procedure: PCI Stent Drug Eluting;  Surgeon: Romie Coronado MD;  Location:  HEART CARDIAC CATH LAB    CV TEMPORARY PACEMAKER INSERTION N/A 6/20/2019    Procedure: Temporary Pacemaker Insertion;  Surgeon: Romie Coronado MD;  Location:  HEART CARDIAC CATH LAB    EP PACEMAKER N/A 6/20/2019    Procedure: EP Pacemaker;  Surgeon: Max Dowell MD;  Location:  HEART CARDIAC CATH LAB    EP PPM UPGRADE TO BIVENT N/A 9/16/2021    Procedure: EP PPM UPGRADE TO BIVENT;  Surgeon: Tre Miller MD;  Location:  HEART CARDIAC CATH LAB    HEART CATH, ANGIOPLASTY  5/14/14    YOGESH to OM1    ZZC NONSPECIFIC PROCEDURE  2010    Laparoscopic cholecystectomy.        FAMILY HISTORY:  Family History   Problem Relation Age of Onset    Cancer Mother     Genitourinary Problems Father 99        complications from  surgery    Heart Disease Brother 72        MI       SOCIAL HISTORY:  Social History     Socioeconomic History    Marital status:      Spouse name: None    Number of children: None    Years of education: None    Highest education level: None   Tobacco Use    Smoking status: Former     Current packs/day: 0.00     Average packs/day: 0.5 packs/day for 17.0 years (8.5 ttl pk-yrs)     Types: Cigarettes     Start date:      Quit date: 1967     Years since quittin.4    Smokeless tobacco: Never   Substance and Sexual Activity    Alcohol use: Yes     Comment: 5-7 drinks week - at most one drink daily    Drug use: No    Sexual activity: Never   Other Topics Concern    Caffeine Concern Yes     Comment: 2 cups coffee/day    Sleep Concern No    Weight Concern Yes     Comment: limiting alcohol to watch calories    Exercise Yes     Comment: Stationary bike  weights and aerobics 4 times week    Seat Belt Yes    Parent/sibling w/ CABG, MI or angioplasty before 65F 55M? No     Social Determinants of Health     Financial Resource Strain: Low Risk  (2024)    Financial Resource Strain     Within the past 12 months, have you or your family members you live with been unable to get utilities (heat, electricity) when it was really needed?: No   Food Insecurity: Low Risk  (2024)    Food Insecurity     Within the past 12 months, did you worry that your food would run out before you got money to buy more?: No     Within the past 12 months, did the food you bought just not last and you didn t have money to get more?: No   Transportation Needs: Low Risk  (2024)    Transportation Needs     Within the past 12 months, has lack of transportation kept you from medical appointments, getting your medicines, non-medical meetings or appointments, work, or from getting things that you need?: No   Housing Stability: Low Risk  (2024)    Housing Stability     Do you have housing? : Yes     Are you worried about losing your  "housing?: No       Review of Systems:  Skin:          Eyes:         ENT:         Respiratory:  Positive for shortness of breath     Cardiovascular:    Positive for;edema    Gastroenterology:        Genitourinary:         Musculoskeletal:         Neurologic:         Psychiatric:         Heme/Lymph/Imm:  Negative      Endocrine:  Positive for thyroid disorder      Physical Exam:  Vitals: BP 90/57   Pulse 71   Ht 1.803 m (5' 11\")   Wt 84.7 kg (186 lb 11.2 oz)   BMI 26.04 kg/m            CC  Lacey Puckett PA-C  [address]  [place] [id number]      Thank you for allowing me to participate in the care of your patient.      Sincerely,     Unruly Huggins MD     [facility] [place] [address]      "

## 2024-05-30 LAB
MDC_IDC_EPISODE_DTM: NORMAL
MDC_IDC_EPISODE_DTM: NORMAL
MDC_IDC_EPISODE_DURATION: 21 S
MDC_IDC_EPISODE_DURATION: 48 S
MDC_IDC_EPISODE_ID: 5
MDC_IDC_EPISODE_ID: 6
MDC_IDC_EPISODE_TYPE: NORMAL
MDC_IDC_EPISODE_TYPE: NORMAL
MDC_IDC_LEAD_CONNECTION_STATUS: NORMAL
MDC_IDC_LEAD_IMPLANT_DT: NORMAL
MDC_IDC_LEAD_LOCATION: NORMAL
MDC_IDC_LEAD_LOCATION_DETAIL_1: NORMAL
MDC_IDC_LEAD_MFG: NORMAL
MDC_IDC_LEAD_MODEL: NORMAL
MDC_IDC_LEAD_POLARITY_TYPE: NORMAL
MDC_IDC_LEAD_SERIAL: NORMAL
MDC_IDC_MSMT_BATTERY_DTM: NORMAL
MDC_IDC_MSMT_BATTERY_REMAINING_LONGEVITY: 93 MO
MDC_IDC_MSMT_BATTERY_RRT_TRIGGER: 2.6
MDC_IDC_MSMT_BATTERY_STATUS: NORMAL
MDC_IDC_MSMT_BATTERY_VOLTAGE: 3 V
MDC_IDC_MSMT_LEADCHNL_LV_IMPEDANCE_VALUE: 285 OHM
MDC_IDC_MSMT_LEADCHNL_LV_IMPEDANCE_VALUE: 323 OHM
MDC_IDC_MSMT_LEADCHNL_LV_IMPEDANCE_VALUE: 399 OHM
MDC_IDC_MSMT_LEADCHNL_LV_IMPEDANCE_VALUE: 418 OHM
MDC_IDC_MSMT_LEADCHNL_LV_IMPEDANCE_VALUE: 418 OHM
MDC_IDC_MSMT_LEADCHNL_LV_IMPEDANCE_VALUE: 494 OHM
MDC_IDC_MSMT_LEADCHNL_LV_IMPEDANCE_VALUE: 551 OHM
MDC_IDC_MSMT_LEADCHNL_LV_IMPEDANCE_VALUE: 589 OHM
MDC_IDC_MSMT_LEADCHNL_LV_IMPEDANCE_VALUE: 608 OHM
MDC_IDC_MSMT_LEADCHNL_LV_IMPEDANCE_VALUE: 684 OHM
MDC_IDC_MSMT_LEADCHNL_LV_PACING_THRESHOLD_AMPLITUDE: 1 V
MDC_IDC_MSMT_LEADCHNL_LV_PACING_THRESHOLD_PULSEWIDTH: 0.4 MS
MDC_IDC_MSMT_LEADCHNL_RA_IMPEDANCE_VALUE: 361 OHM
MDC_IDC_MSMT_LEADCHNL_RA_IMPEDANCE_VALUE: 494 OHM
MDC_IDC_MSMT_LEADCHNL_RA_PACING_THRESHOLD_AMPLITUDE: 0.38 V
MDC_IDC_MSMT_LEADCHNL_RA_PACING_THRESHOLD_PULSEWIDTH: 0.4 MS
MDC_IDC_MSMT_LEADCHNL_RA_SENSING_INTR_AMPL: 2.12 MV
MDC_IDC_MSMT_LEADCHNL_RA_SENSING_INTR_AMPL: 2.88 MV
MDC_IDC_MSMT_LEADCHNL_RV_IMPEDANCE_VALUE: 380 OHM
MDC_IDC_MSMT_LEADCHNL_RV_IMPEDANCE_VALUE: 456 OHM
MDC_IDC_MSMT_LEADCHNL_RV_PACING_THRESHOLD_AMPLITUDE: 0.38 V
MDC_IDC_MSMT_LEADCHNL_RV_PACING_THRESHOLD_PULSEWIDTH: 0.4 MS
MDC_IDC_MSMT_LEADCHNL_RV_SENSING_INTR_AMPL: 8.25 MV
MDC_IDC_MSMT_LEADCHNL_RV_SENSING_INTR_AMPL: 8.25 MV
MDC_IDC_PG_IMPLANT_DTM: NORMAL
MDC_IDC_PG_MFG: NORMAL
MDC_IDC_PG_MODEL: NORMAL
MDC_IDC_PG_SERIAL: NORMAL
MDC_IDC_PG_TYPE: NORMAL
MDC_IDC_SESS_CLINIC_NAME: NORMAL
MDC_IDC_SESS_DTM: NORMAL
MDC_IDC_SESS_TYPE: NORMAL
MDC_IDC_SET_BRADY_AT_MODE_SWITCH_RATE: 171 {BEATS}/MIN
MDC_IDC_SET_BRADY_LOWRATE: 60 {BEATS}/MIN
MDC_IDC_SET_BRADY_MAX_SENSOR_RATE: 120 {BEATS}/MIN
MDC_IDC_SET_BRADY_MAX_TRACKING_RATE: 120 {BEATS}/MIN
MDC_IDC_SET_BRADY_MODE: NORMAL
MDC_IDC_SET_BRADY_PAV_DELAY_LOW: 170 MS
MDC_IDC_SET_BRADY_SAV_DELAY_LOW: 110 MS
MDC_IDC_SET_CRT_LVRV_DELAY: 0 MS
MDC_IDC_SET_CRT_PACED_CHAMBERS: NORMAL
MDC_IDC_SET_LEADCHNL_LV_PACING_AMPLITUDE: 1.5 V
MDC_IDC_SET_LEADCHNL_LV_PACING_ANODE_ELECTRODE_1: NORMAL
MDC_IDC_SET_LEADCHNL_LV_PACING_ANODE_LOCATION_1: NORMAL
MDC_IDC_SET_LEADCHNL_LV_PACING_CAPTURE_MODE: NORMAL
MDC_IDC_SET_LEADCHNL_LV_PACING_CATHODE_ELECTRODE_1: NORMAL
MDC_IDC_SET_LEADCHNL_LV_PACING_CATHODE_LOCATION_1: NORMAL
MDC_IDC_SET_LEADCHNL_LV_PACING_POLARITY: NORMAL
MDC_IDC_SET_LEADCHNL_LV_PACING_PULSEWIDTH: 0.4 MS
MDC_IDC_SET_LEADCHNL_RA_PACING_AMPLITUDE: 1.5 V
MDC_IDC_SET_LEADCHNL_RA_PACING_ANODE_ELECTRODE_1: NORMAL
MDC_IDC_SET_LEADCHNL_RA_PACING_ANODE_LOCATION_1: NORMAL
MDC_IDC_SET_LEADCHNL_RA_PACING_CAPTURE_MODE: NORMAL
MDC_IDC_SET_LEADCHNL_RA_PACING_CATHODE_ELECTRODE_1: NORMAL
MDC_IDC_SET_LEADCHNL_RA_PACING_CATHODE_LOCATION_1: NORMAL
MDC_IDC_SET_LEADCHNL_RA_PACING_POLARITY: NORMAL
MDC_IDC_SET_LEADCHNL_RA_PACING_PULSEWIDTH: 0.4 MS
MDC_IDC_SET_LEADCHNL_RA_SENSING_ANODE_ELECTRODE_1: NORMAL
MDC_IDC_SET_LEADCHNL_RA_SENSING_ANODE_LOCATION_1: NORMAL
MDC_IDC_SET_LEADCHNL_RA_SENSING_CATHODE_ELECTRODE_1: NORMAL
MDC_IDC_SET_LEADCHNL_RA_SENSING_CATHODE_LOCATION_1: NORMAL
MDC_IDC_SET_LEADCHNL_RA_SENSING_POLARITY: NORMAL
MDC_IDC_SET_LEADCHNL_RA_SENSING_SENSITIVITY: 0.3 MV
MDC_IDC_SET_LEADCHNL_RV_PACING_AMPLITUDE: 2 V
MDC_IDC_SET_LEADCHNL_RV_PACING_ANODE_ELECTRODE_1: NORMAL
MDC_IDC_SET_LEADCHNL_RV_PACING_ANODE_LOCATION_1: NORMAL
MDC_IDC_SET_LEADCHNL_RV_PACING_CAPTURE_MODE: NORMAL
MDC_IDC_SET_LEADCHNL_RV_PACING_CATHODE_ELECTRODE_1: NORMAL
MDC_IDC_SET_LEADCHNL_RV_PACING_CATHODE_LOCATION_1: NORMAL
MDC_IDC_SET_LEADCHNL_RV_PACING_POLARITY: NORMAL
MDC_IDC_SET_LEADCHNL_RV_PACING_PULSEWIDTH: 0.4 MS
MDC_IDC_SET_LEADCHNL_RV_SENSING_ANODE_ELECTRODE_1: NORMAL
MDC_IDC_SET_LEADCHNL_RV_SENSING_ANODE_LOCATION_1: NORMAL
MDC_IDC_SET_LEADCHNL_RV_SENSING_CATHODE_ELECTRODE_1: NORMAL
MDC_IDC_SET_LEADCHNL_RV_SENSING_CATHODE_LOCATION_1: NORMAL
MDC_IDC_SET_LEADCHNL_RV_SENSING_POLARITY: NORMAL
MDC_IDC_SET_LEADCHNL_RV_SENSING_SENSITIVITY: 0.9 MV
MDC_IDC_SET_ZONE_DETECTION_INTERVAL: 350 MS
MDC_IDC_SET_ZONE_DETECTION_INTERVAL: 400 MS
MDC_IDC_SET_ZONE_STATUS: NORMAL
MDC_IDC_SET_ZONE_STATUS: NORMAL
MDC_IDC_SET_ZONE_TYPE: NORMAL
MDC_IDC_SET_ZONE_VENDOR_TYPE: NORMAL
MDC_IDC_STAT_AT_BURDEN_PERCENT: 0 %
MDC_IDC_STAT_AT_DTM_END: NORMAL
MDC_IDC_STAT_AT_DTM_START: NORMAL
MDC_IDC_STAT_BRADY_AP_VP_PERCENT: 97.49 %
MDC_IDC_STAT_BRADY_AP_VS_PERCENT: 0.06 %
MDC_IDC_STAT_BRADY_AS_VP_PERCENT: 1.99 %
MDC_IDC_STAT_BRADY_AS_VS_PERCENT: 0.47 %
MDC_IDC_STAT_BRADY_DTM_END: NORMAL
MDC_IDC_STAT_BRADY_DTM_START: NORMAL
MDC_IDC_STAT_BRADY_RA_PERCENT_PACED: 97.86 %
MDC_IDC_STAT_BRADY_RV_PERCENT_PACED: 99.47 %
MDC_IDC_STAT_CRT_DTM_END: NORMAL
MDC_IDC_STAT_CRT_DTM_START: NORMAL
MDC_IDC_STAT_CRT_LV_PERCENT_PACED: 99.45 %
MDC_IDC_STAT_CRT_PERCENT_PACED: 99.44 %
MDC_IDC_STAT_EPISODE_RECENT_COUNT: 0
MDC_IDC_STAT_EPISODE_RECENT_COUNT_DTM_END: NORMAL
MDC_IDC_STAT_EPISODE_RECENT_COUNT_DTM_START: NORMAL
MDC_IDC_STAT_EPISODE_TOTAL_COUNT: 0
MDC_IDC_STAT_EPISODE_TOTAL_COUNT_DTM_END: NORMAL
MDC_IDC_STAT_EPISODE_TOTAL_COUNT_DTM_START: NORMAL
MDC_IDC_STAT_EPISODE_TYPE: NORMAL
MDC_IDC_STAT_TACHYTHERAPY_RECENT_DTM_END: NORMAL
MDC_IDC_STAT_TACHYTHERAPY_RECENT_DTM_START: NORMAL
MDC_IDC_STAT_TACHYTHERAPY_TOTAL_DTM_END: NORMAL
MDC_IDC_STAT_TACHYTHERAPY_TOTAL_DTM_START: NORMAL

## 2024-06-10 DIAGNOSIS — E03.9 HYPOTHYROIDISM, UNSPECIFIED TYPE: ICD-10-CM

## 2024-06-10 DIAGNOSIS — I25.810 CORONARY ARTERY DISEASE INVOLVING CORONARY BYPASS GRAFT OF NATIVE HEART WITHOUT ANGINA PECTORIS: ICD-10-CM

## 2024-06-10 RX ORDER — METOPROLOL SUCCINATE 25 MG/1
25 TABLET, EXTENDED RELEASE ORAL DAILY
Qty: 90 TABLET | Refills: 1 | Status: SHIPPED | OUTPATIENT
Start: 2024-06-10

## 2024-06-10 RX ORDER — LEVOTHYROXINE SODIUM 100 UG/1
100 TABLET ORAL DAILY
Qty: 90 TABLET | Refills: 1 | Status: SHIPPED | OUTPATIENT
Start: 2024-06-10

## 2024-07-23 ENCOUNTER — OFFICE VISIT (OUTPATIENT)
Dept: INTERNAL MEDICINE | Facility: CLINIC | Age: 89
End: 2024-07-23
Payer: COMMERCIAL

## 2024-07-23 VITALS
WEIGHT: 184.3 LBS | HEART RATE: 67 BPM | BODY MASS INDEX: 25.8 KG/M2 | DIASTOLIC BLOOD PRESSURE: 64 MMHG | RESPIRATION RATE: 12 BRPM | HEIGHT: 71 IN | SYSTOLIC BLOOD PRESSURE: 104 MMHG | OXYGEN SATURATION: 99 % | TEMPERATURE: 97.7 F

## 2024-07-23 DIAGNOSIS — G60.9 IDIOPATHIC PERIPHERAL NEUROPATHY: ICD-10-CM

## 2024-07-23 DIAGNOSIS — E03.9 HYPOTHYROIDISM, UNSPECIFIED TYPE: ICD-10-CM

## 2024-07-23 DIAGNOSIS — N18.31 STAGE 3A CHRONIC KIDNEY DISEASE (H): Primary | ICD-10-CM

## 2024-07-23 DIAGNOSIS — E78.5 HYPERLIPIDEMIA LDL GOAL <100: ICD-10-CM

## 2024-07-23 DIAGNOSIS — H61.21 IMPACTED CERUMEN OF RIGHT EAR: ICD-10-CM

## 2024-07-23 DIAGNOSIS — D47.2 MGUS (MONOCLONAL GAMMOPATHY OF UNKNOWN SIGNIFICANCE): ICD-10-CM

## 2024-07-23 DIAGNOSIS — I71.21 ANEURYSM OF ASCENDING AORTA WITHOUT RUPTURE (H): ICD-10-CM

## 2024-07-23 LAB
CREAT UR-MCNC: 177 MG/DL
ERYTHROCYTE [DISTWIDTH] IN BLOOD BY AUTOMATED COUNT: 11.6 % (ref 10–15)
HCT VFR BLD AUTO: 41.8 % (ref 40–53)
HGB BLD-MCNC: 14 G/DL (ref 13.3–17.7)
MCH RBC QN AUTO: 29.5 PG (ref 26.5–33)
MCHC RBC AUTO-ENTMCNC: 33.5 G/DL (ref 31.5–36.5)
MCV RBC AUTO: 88 FL (ref 78–100)
MICROALBUMIN UR-MCNC: <12 MG/L
MICROALBUMIN/CREAT UR: NORMAL MG/G{CREAT}
PLATELET # BLD AUTO: 150 10E3/UL (ref 150–450)
RBC # BLD AUTO: 4.75 10E6/UL (ref 4.4–5.9)
TSH SERPL DL<=0.005 MIU/L-ACNC: 0.96 UIU/ML (ref 0.3–4.2)
WBC # BLD AUTO: 7.2 10E3/UL (ref 4–11)

## 2024-07-23 PROCEDURE — 85027 COMPLETE CBC AUTOMATED: CPT | Performed by: INTERNAL MEDICINE

## 2024-07-23 PROCEDURE — 82570 ASSAY OF URINE CREATININE: CPT | Performed by: INTERNAL MEDICINE

## 2024-07-23 PROCEDURE — 84443 ASSAY THYROID STIM HORMONE: CPT | Performed by: INTERNAL MEDICINE

## 2024-07-23 PROCEDURE — 82043 UR ALBUMIN QUANTITATIVE: CPT | Performed by: INTERNAL MEDICINE

## 2024-07-23 PROCEDURE — 36415 COLL VENOUS BLD VENIPUNCTURE: CPT | Performed by: INTERNAL MEDICINE

## 2024-07-23 PROCEDURE — 69210 REMOVE IMPACTED EAR WAX UNI: CPT | Mod: RT | Performed by: INTERNAL MEDICINE

## 2024-07-23 PROCEDURE — 99214 OFFICE O/P EST MOD 30 MIN: CPT | Mod: 25 | Performed by: INTERNAL MEDICINE

## 2024-07-23 ASSESSMENT — PAIN SCALES - GENERAL: PAINLEVEL: NO PAIN (0)

## 2024-07-23 NOTE — PATIENT INSTRUCTIONS
"     Get a tetanus vaccine booster from a pharmacist in your usual pharmacy (tetanus vaccines are recommended every 10 years).  With Medicare insurance, the tetanus vaccine is cheaper to get in the pharmacy than in the clinic.     Investigate a Shingrix shingles vaccine with your pharmacist .  They can tell you the coverage and cost and then give it to you if the price is acceptable.    Medicare sometimes does not cover these Shingrix shingles vaccines, and with Medicare insurance it is usually cheaper to receive this shingles vaccine from a pharmacist in a pharmacy rather than in our clinic.    At this time, you only need the 2 Shingrix vaccines and then you are done.        Plan to get the annual influenza vaccine each fall for sure by the end of October for the best protection throughout the winter flu season.   Get this from any pharmacy or flu shot clinic.        Plan to get an updated Covid booster each fall at least by the end of October for the best protection throughout the winter season.   Get this from any pharmacy or Covid vaccine clinic.            Continue all medications at the same doses.  Contact your usual pharmacy if you need refills.      Rechecking the blood counts today.  You have had a mild chronic anemia for years and want to make sure that nothgin worse.  I julio cesar send you to the Hematology office again if the blood counts indicate.           5 GOALS TO PREVENT VASCULAR DISEASE:     1.  Aggressive blood pressure control, under 130/80 ideally.  Using medications if needed.    Your blood pressure is under good control    BP Readings from Last 4 Encounters:   07/23/24 104/64   05/21/24 90/57   01/09/24 104/64   01/04/24 96/54       2.  Aggressive LDL cholesterol (\"bad cholesterol\") lowering as indicated.    Your goal is an LDL under 130 for sure, preferably under 100.  (If you have diabetes or previous vascular disease, the the LDL goals would be under 100 for sure, preferably under 70.)    New " guidelines identify four high-risk groups who could benefit from statins:   *people with pre-existing heart disease, such as those who have had a heart attack;   *people ages 40 to 75 who have diabetes of any type  *patients ages 40 to 75 with at least a 7.5% risk of developing cardiovascular disease over the next decade, according to a formula described in the guidelines  *patients with the sort of super-high cholesterol that sometimes runs in families, as evidenced by an LDL of 190 milligrams per deciliter or higher    Your cholesterol levels are well controlled.    Recent Labs   Lab Test 05/14/24  0932 11/20/23  0842   CHOL 102 179   HDL 45 47   LDL 45 113*   TRIG 60 93       3.  Aggressive diabetic prevention, screening and/or management.      You do not have diabetes as of the most recent blood tests.     4.  No smoking    5.  Consider daily preventative aspirin over age 50 if you have enough cardiac risk factors to place you at higher risk for the presence of vascular disease.    If you have any reason not to take aspirin such easy bruising or bleeding, stomach problems, other anticoagulant medications, or any other side effects, then you should not take Aspirin.     --Based on your current cardiac risk factor profile, you should take regular daily Aspirin 81 mg once per day (as long as you do not have any side effects from taking aspirin).           Preventive Health Recommendations:   Male Ages 75 and over    Yearly exam:             See your health care provider every year in order to  o   Review health changes.   o   Discuss preventive care.    o   Review your medicines if your doctor has prescribed any.  Regular screening for diabetes. If you are at risk for diabetes, you should have this test more often.  At least every 5 years, have a cholesterol test. Have this test more often if you are at risk for high cholesterol or heart disease.   Routine colon cancer screening no longer indicated over age 80.   "(If you ever need a colonoscopy over age 80, we would perform it but it would be a \"diagnostic\" colonoscopy)  Routine prostate cancer screening no longer indicated over age 75.  Talk to with your health care provider about screening for Abdominal Aortic Aneurysm if you have a family history of AAA or have a history of smoking.    Shots:   Get an annual influenza vaccine (flu shot) each year.   Get a tetanus shot every 10 years.   Everyone over 65 should make sure to receive pneumonia vaccines to prevent the most common type of bacterial pneumonia: Pneumococcal pneumonia. There are now two you should receive - Pneumovax (PPSV 23) and Prevnar (PCV 20).   Talk to your doctor about a shingles vaccine.   Talk to your doctor about the hepatitis B vaccine.  Covid vaccines are now recommended annually.  Consider getting this at the same time as the annual influenza vaccine.   Nutrition:   Eat at least 5 servings of fruits and vegetables each day.   Eat whole-grain bread, whole-wheat pasta and brown rice instead of white grains and rice.   Talk to your provider about Calcium and Vitamin D.      --Good Grains:  Oats, brown rice, Quinoa (these do not raise the blood sugar as much)     --Bad grains:  Anything made from wheat or white rice     (because these raise the blood sugars significantly, and the possible gluten issue from wheat for some people).      --Proteins:  Aim for \"lean proteins\" including chicken, fish, seafood, pork, turkey, and eggs (in moderation); Eat red meat only occasionally    Lifestyle  Exercise for at least 150 minutes a week (30 minutes a day, 5 days a week). This will help you control your weight and prevent disease.   Limit alcohol to one drink per day.   No smoking.   Wear sunscreen to prevent skin cancer.   See your dentist every six months for an exam and cleaning.   See your eye doctor every 1 to 2 years to screen for conditions such as glaucoma, macular degeneration, cataracts, etc         "

## 2024-07-23 NOTE — PROGRESS NOTES
Assessment & Plan     (N18.31) Stage 3a chronic kidney disease (H)  (primary encounter diagnosis)  Comment: This condition is currently controlled on the current medical regimen.  Continue current therapy.   Continue to manage risk factrors, avoid NSAIDs.   Plan: Albumin Random Urine Quantitative with Creat         Ratio, REVIEW OF HEALTH MAINTENANCE PROTOCOL         ORDERS, CBC with platelets            (D47.2) MGUS (monoclonal gammopathy of unknown significance)  Comment: This condition is currently controlled on the current medical regimen.  Continue current therapy.   Plan: Albumin Random Urine Quantitative with Creat         Ratio, REVIEW OF HEALTH MAINTENANCE PROTOCOL         ORDERS, CBC with platelets            (E03.9) Hypothyroidism, unspecified type  Comment: This condition is currently controlled on the current medical regimen.  Continue current therapy.   Plan: REVIEW OF HEALTH MAINTENANCE PROTOCOL ORDERS,         TSH with free T4 reflex            (E78.5) Hyperlipidemia LDL goal <100  Comment: This condition is currently controlled on the current medical regimen.  Continue current therapy.   Plan: REVIEW OF HEALTH MAINTENANCE PROTOCOL ORDERS            (I71.21) Aneurysm of ascending aorta without rupture (H24)  Comment: no new symptoms  Discussed secondary risk factor modification and recommended continuing aggressive management of these items.   Plan: REVIEW OF HEALTH MAINTENANCE PROTOCOL ORDERS            (G60.9) Idiopathic peripheral neuropathy  Comment: This condition is currently controlled on the current medical regimen.  Continue current therapy.   Plan: REVIEW OF HEALTH MAINTENANCE PROTOCOL ORDERS            (H61.21) Impacted cerumen of right ear  Comment: Impacted cerumen was removed with a combination of loop removal by MD and water irrigation.  Canal and TM clear afterwards.  Discussed strategies for preventing future ear wax buildups.   Plan: REVIEW OF HEALTH MAINTENANCE PROTOCOL ORDERS,  "        REMOVE IMPACTED ARIADNA                     BMI  Estimated body mass index is 25.7 kg/m  as calculated from the following:    Height as of this encounter: 1.803 m (5' 11\").    Weight as of this encounter: 83.6 kg (184 lb 4.8 oz).             Austin Degroot is a 89 year old, presenting for the following health issues:  RECHECK (Follow up multiple conditions)        7/23/2024     7:18 AM   Additional Questions   Roomed by Winifred STERLING CMA     History of Present Illness       Heart Failure:  He presents for follow up of heart failure. He is experiencing shortness of breath with activity only, which is slightly worse. He is experiencing lower extremity edema which is same as usual.   He denies orthopenea and is not coughing at night. Patient is checking weight daily. He has recently had a None.  He has no side effects from medications.  He has has a medical visit for heart failure 1 time since the last visit.    He eats 0-1 servings of fruits and vegetables daily.He consumes 1 sweetened beverage(s) daily.He exercises with enough effort to increase his heart rate 9 or less minutes per day.  He exercises with enough effort to increase his heart rate 3 or less days per week.   He is taking medications regularly.     Last Echo:   Echo result w/o MOPS: Interpretation Summary The left ventricle is normal in size.The visual ejection fraction is 55-60%.Diastolic Doppler findings (E/E' ratio and/or other parameters) suggest leftventricular filling pressures are normal.No regional wall motion abnormalities noted.There is a catheter/pacemaker lead seen in the right ventricle.No hemodynamically significant valvular heart disease within limitation oftest modality.    1.)  History of chronic kidney disease presumed due to HTN .  Reviewed most recent labs:    Lab Results   Component Value Date    CR 1.10 05/14/2024    CR 1.23 01/04/2024    CR 1.25 01/04/2024    CR 1.23 01/03/2024    CR 1.07 01/02/2024    CR 1.41 01/01/2024 " "   CR 1.50 01/01/2024    CR 1.03 12/16/2023    CR 1.42 12/14/2023    CR 1.25 12/01/2023    CR 1.35 11/30/2023    CR 1.25 11/20/2023    CR 1.25 08/02/2023    CR 1.24 05/17/2023    CR 1.31 05/01/2023    CR 1.13 10/17/2022    CR 1.12 06/27/2022    CR 1.30 05/03/2022    CR 1.12 02/04/2022    CR 1.15 01/18/2022    CR 1.26 11/12/2021    CR 1.26 09/16/2021    CR 1.24 08/09/2021    CR 1.23 07/14/2021    CR 1.28 04/07/2021    CR 1.24 03/02/2021    CR 1.14 07/23/2020    CR 1.12 06/20/2019    CR 1.21 03/20/2019    CR 1.22 02/27/2018    CR 1.14 02/21/2017    CR 1.10 02/09/2016    CR 1.19 04/02/2015    CR 1.06 05/15/2014    CR 0.97 05/14/2014    CR 1.16 05/13/2014    CR 1.03 06/28/2013    CR 1.03 06/25/2013    CR 1.07 06/12/2012    CR 1.12 @ 03/08/2010    CR 1.15 @ 09/21/2009    CR 1.22 @ 03/02/2009    CR 1.16 @ 08/06/2008    CR 0.96 07/10/2008         2.)  history of MGUS.     3.)  ,ho mild anemia due to #1 and #2    4.)  overall slowing down    **I reviewed the information recorded in the patient's EPIC chart (including but not limited to medical history, surgical history, family history, problem list, medication list, and allergy list) and updated the information as indicated based on the patients reported information.         Review of Systems  Constitutional, neuro, ENT, endocrine, pulmonary, cardiac, gastrointestinal, genitourinary, musculoskeletal, integument and psychiatric systems are negative, except as otherwise noted.      Objective    /64   Pulse 67   Temp 97.7  F (36.5  C) (Oral)   Resp 12   Ht 1.803 m (5' 11\")   Wt 83.6 kg (184 lb 4.8 oz)   SpO2 99%   BMI 25.70 kg/m    Body mass index is 25.7 kg/m .  Physical Exam   GENERAL alert and no distress  EYES:  Normal sclera,conjunctiva, EOMI  right ear canal shows thick wax occluding most of the ear canal, unable to fully see TM.  The wax was removed with a combination of loop removal and water irrigation.   Canal was normal after, TM normal.  Pt tolerated " well.  HENT: oral and posterior pharynx without lesions or erythema, facies symmetric  NECK: Neck supple. No LAD, without thyroidmegaly.  RESP: Clear to ausculation bilaterally without wheezes or crackles. Normal BS in all fields.  CV: RRR normal S1S2 without murmurs, rubs or gallops.  LYMPH: no cervical lymph adenopathy appreciated  MS: extremities- no gross deformities of the visible extremities noted,   EXT:  no lower extremity edema  PSYCH: Alert and oriented times 3; speech- coherent  SKIN:  No obvious significant skin lesions on visible portions of face       Results for orders placed or performed in visit on 07/23/24   Albumin Random Urine Quantitative with Creat Ratio     Status: None   Result Value Ref Range    Creatinine Urine mg/dL 177.0 mg/dL    Albumin Urine mg/L <12.0 mg/L    Albumin Urine mg/g Cr     CBC with platelets     Status: Normal   Result Value Ref Range    WBC Count 7.2 4.0 - 11.0 10e3/uL    RBC Count 4.75 4.40 - 5.90 10e6/uL    Hemoglobin 14.0 13.3 - 17.7 g/dL    Hematocrit 41.8 40.0 - 53.0 %    MCV 88 78 - 100 fL    MCH 29.5 26.5 - 33.0 pg    MCHC 33.5 31.5 - 36.5 g/dL    RDW 11.6 10.0 - 15.0 %    Platelet Count 150 150 - 450 10e3/uL   TSH with free T4 reflex     Status: Normal   Result Value Ref Range    TSH 0.96 0.30 - 4.20 uIU/mL            The longitudinal plan of care for the diagnosis(es)/condition(s) as documented were addressed during this visit. Due to the added complexity in care, I will continue to support Zane in the subsequent management and with ongoing continuity of care.      Signed Electronically by: Steven Wagoner MD

## 2024-07-24 DIAGNOSIS — I50.42 CHRONIC COMBINED SYSTOLIC AND DIASTOLIC HRT FAIL (H): ICD-10-CM

## 2024-07-24 DIAGNOSIS — G60.9 IDIOPATHIC PERIPHERAL NEUROPATHY: ICD-10-CM

## 2024-07-24 RX ORDER — FUROSEMIDE 20 MG
20 TABLET ORAL EVERY MORNING
Qty: 90 TABLET | Refills: 1 | Status: SHIPPED | OUTPATIENT
Start: 2024-07-24

## 2024-07-24 NOTE — TELEPHONE ENCOUNTER
Prescription approved per [facility] Refill Protocol.  Gladys Akhtar RN  [facility]

## 2024-07-25 RX ORDER — GABAPENTIN 300 MG/1
CAPSULE ORAL
Qty: 90 CAPSULE | Refills: 0 | Status: SHIPPED | OUTPATIENT
Start: 2024-07-25

## 2024-08-25 ENCOUNTER — OFFICE VISIT (OUTPATIENT)
Dept: URGENT CARE | Facility: URGENT CARE | Age: 89
End: 2024-08-25
Payer: COMMERCIAL

## 2024-08-25 VITALS
OXYGEN SATURATION: 95 % | SYSTOLIC BLOOD PRESSURE: 79 MMHG | TEMPERATURE: 98.2 F | HEART RATE: 93 BPM | RESPIRATION RATE: 16 BRPM | DIASTOLIC BLOOD PRESSURE: 42 MMHG | WEIGHT: 185.8 LBS | BODY MASS INDEX: 25.91 KG/M2

## 2024-08-25 DIAGNOSIS — U07.1 INFECTION DUE TO 2019 NOVEL CORONAVIRUS: Primary | ICD-10-CM

## 2024-08-25 PROCEDURE — 99213 OFFICE O/P EST LOW 20 MIN: CPT | Performed by: FAMILY MEDICINE

## 2024-08-25 NOTE — PROGRESS NOTES
"SUBJECTIVE: Moses Elizondo is a 89 year old male presenting with a chief complaint of nasal congestion, \"cold symptoms\", and cough .  Onset of symptoms was 2 day(s) ago.  Positive home covid test    Past Medical History:   Diagnosis Date    Aortic root dilatation (H24)     4.4 cm    Ascending aorta dilatation (H24)     4.4 cm    ASD (atrial septal defect)     Bradycardia     CAD (coronary artery disease) 05/15/2014     YOGESH to RCA(19); YOGESH to OM1(5/15/14)    Cardiac pacemaker 2019    [employer] PPM COMFORT MRI XT DR-implant 19    Chest discomfort     Degeneration of lumbar or lumbosacral intervertebral disc     Familial tremor     Former smoker     Herpes zoster without mention of complication     HTN (hypertension)     Hyperlipidaemia     Idiopathic peripheral neuropathy     MGUS (monoclonal gammopathy of unknown significance)     NSTEMI (non-ST elevated myocardial infarction) (H)     Other and unspecified hyperlipidemia     PFO (patent foramen ovale)     Prostatitis, unspecified     Second degree heart block     Sensorineural hearing loss, unspecified     SSS (sick sinus syndrome) (H)     Status post coronary angiogram 2019    Unspecified hypothyroidism      Allergies   Allergen Reactions    No Known Drug Allergy      Social History     Tobacco Use    Smoking status: Former     Current packs/day: 0.00     Average packs/day: 0.5 packs/day for 17.0 years (8.5 ttl pk-yrs)     Types: Cigarettes     Start date:      Quit date: 1967     Years since quittin.6    Smokeless tobacco: Never   Substance Use Topics    Alcohol use: Yes     Comment: 5-7 drinks week - at most one drink daily       ROS:  SKIN: no rash  GI: no vomiting    OBJECTIVE:  BP (!) 79/42 (BP Location: Right arm, Patient Position: Sitting, Cuff Size: Adult Regular)   Pulse 93   Temp 98.2  F (36.8  C) (Oral)   Resp 16   Wt 84.3 kg (185 lb 12.8 oz)   SpO2 95%   BMI 25.91 kg/m  GENERAL APPEARANCE: healthy, alert and " no distress  EYES: EOMI,  PERRL, conjunctiva clear  HENT: ear canals and TM's normal.  Nose and mouth without ulcers, erythema or lesions  RESP: lungs clear to auscultation - no rales, rhonchi or wheezes  SKIN: no suspicious lesions or rashes      ICD-10-CM    1. Infection due to 2019 novel coronavirus  U07.1 nirmatrelvir and ritonavir (PAXLOVID) 300 mg/100 mg therapy pack        Hold atorvastatin while on paxlovid  Fluids/Rest, f/u if worse/not any better

## 2024-09-10 ENCOUNTER — ANCILLARY PROCEDURE (OUTPATIENT)
Dept: CARDIOLOGY | Facility: CLINIC | Age: 89
End: 2024-09-10
Attending: INTERNAL MEDICINE
Payer: COMMERCIAL

## 2024-09-10 DIAGNOSIS — I44.1 SECOND DEGREE ATRIOVENTRICULAR BLOCK: ICD-10-CM

## 2024-09-10 DIAGNOSIS — Z95.0 BIVENTRICULAR CARDIAC PACEMAKER IN SITU: ICD-10-CM

## 2024-09-10 LAB
MDC_IDC_LEAD_CONNECTION_STATUS: NORMAL
MDC_IDC_LEAD_IMPLANT_DT: NORMAL
MDC_IDC_LEAD_LOCATION: NORMAL
MDC_IDC_LEAD_LOCATION_DETAIL_1: NORMAL
MDC_IDC_LEAD_MFG: NORMAL
MDC_IDC_LEAD_MODEL: NORMAL
MDC_IDC_LEAD_POLARITY_TYPE: NORMAL
MDC_IDC_LEAD_SERIAL: NORMAL
MDC_IDC_MSMT_BATTERY_DTM: NORMAL
MDC_IDC_MSMT_BATTERY_REMAINING_LONGEVITY: 88 MO
MDC_IDC_MSMT_BATTERY_RRT_TRIGGER: 2.6
MDC_IDC_MSMT_BATTERY_STATUS: NORMAL
MDC_IDC_MSMT_BATTERY_VOLTAGE: 3 V
MDC_IDC_MSMT_LEADCHNL_LV_IMPEDANCE_VALUE: 266 OHM
MDC_IDC_MSMT_LEADCHNL_LV_IMPEDANCE_VALUE: 323 OHM
MDC_IDC_MSMT_LEADCHNL_LV_IMPEDANCE_VALUE: 399 OHM
MDC_IDC_MSMT_LEADCHNL_LV_IMPEDANCE_VALUE: 418 OHM
MDC_IDC_MSMT_LEADCHNL_LV_IMPEDANCE_VALUE: 418 OHM
MDC_IDC_MSMT_LEADCHNL_LV_IMPEDANCE_VALUE: 494 OHM
MDC_IDC_MSMT_LEADCHNL_LV_IMPEDANCE_VALUE: 551 OHM
MDC_IDC_MSMT_LEADCHNL_LV_IMPEDANCE_VALUE: 589 OHM
MDC_IDC_MSMT_LEADCHNL_LV_IMPEDANCE_VALUE: 646 OHM
MDC_IDC_MSMT_LEADCHNL_LV_IMPEDANCE_VALUE: 703 OHM
MDC_IDC_MSMT_LEADCHNL_LV_PACING_THRESHOLD_AMPLITUDE: 1 V
MDC_IDC_MSMT_LEADCHNL_LV_PACING_THRESHOLD_PULSEWIDTH: 0.4 MS
MDC_IDC_MSMT_LEADCHNL_RA_IMPEDANCE_VALUE: 361 OHM
MDC_IDC_MSMT_LEADCHNL_RA_IMPEDANCE_VALUE: 456 OHM
MDC_IDC_MSMT_LEADCHNL_RA_PACING_THRESHOLD_AMPLITUDE: 0.38 V
MDC_IDC_MSMT_LEADCHNL_RA_PACING_THRESHOLD_PULSEWIDTH: 0.4 MS
MDC_IDC_MSMT_LEADCHNL_RA_SENSING_INTR_AMPL: 2.62 MV
MDC_IDC_MSMT_LEADCHNL_RA_SENSING_INTR_AMPL: 2.62 MV
MDC_IDC_MSMT_LEADCHNL_RV_IMPEDANCE_VALUE: 361 OHM
MDC_IDC_MSMT_LEADCHNL_RV_IMPEDANCE_VALUE: 437 OHM
MDC_IDC_MSMT_LEADCHNL_RV_PACING_THRESHOLD_AMPLITUDE: 0.38 V
MDC_IDC_MSMT_LEADCHNL_RV_PACING_THRESHOLD_PULSEWIDTH: 0.4 MS
MDC_IDC_MSMT_LEADCHNL_RV_SENSING_INTR_AMPL: 8.25 MV
MDC_IDC_MSMT_LEADCHNL_RV_SENSING_INTR_AMPL: 8.25 MV
MDC_IDC_PG_IMPLANT_DTM: NORMAL
MDC_IDC_PG_MFG: NORMAL
MDC_IDC_PG_MODEL: NORMAL
MDC_IDC_PG_SERIAL: NORMAL
MDC_IDC_PG_TYPE: NORMAL
MDC_IDC_SESS_CLINIC_NAME: NORMAL
MDC_IDC_SESS_DTM: NORMAL
MDC_IDC_SESS_TYPE: NORMAL
MDC_IDC_SET_BRADY_AT_MODE_SWITCH_RATE: 171 {BEATS}/MIN
MDC_IDC_SET_BRADY_LOWRATE: 60 {BEATS}/MIN
MDC_IDC_SET_BRADY_MAX_SENSOR_RATE: 120 {BEATS}/MIN
MDC_IDC_SET_BRADY_MAX_TRACKING_RATE: 120 {BEATS}/MIN
MDC_IDC_SET_BRADY_MODE: NORMAL
MDC_IDC_SET_BRADY_PAV_DELAY_LOW: 170 MS
MDC_IDC_SET_BRADY_SAV_DELAY_LOW: 110 MS
MDC_IDC_SET_CRT_LVRV_DELAY: 0 MS
MDC_IDC_SET_CRT_PACED_CHAMBERS: NORMAL
MDC_IDC_SET_LEADCHNL_LV_PACING_AMPLITUDE: 1.5 V
MDC_IDC_SET_LEADCHNL_LV_PACING_ANODE_ELECTRODE_1: NORMAL
MDC_IDC_SET_LEADCHNL_LV_PACING_ANODE_LOCATION_1: NORMAL
MDC_IDC_SET_LEADCHNL_LV_PACING_CAPTURE_MODE: NORMAL
MDC_IDC_SET_LEADCHNL_LV_PACING_CATHODE_ELECTRODE_1: NORMAL
MDC_IDC_SET_LEADCHNL_LV_PACING_CATHODE_LOCATION_1: NORMAL
MDC_IDC_SET_LEADCHNL_LV_PACING_POLARITY: NORMAL
MDC_IDC_SET_LEADCHNL_LV_PACING_PULSEWIDTH: 0.4 MS
MDC_IDC_SET_LEADCHNL_RA_PACING_AMPLITUDE: 1.5 V
MDC_IDC_SET_LEADCHNL_RA_PACING_ANODE_ELECTRODE_1: NORMAL
MDC_IDC_SET_LEADCHNL_RA_PACING_ANODE_LOCATION_1: NORMAL
MDC_IDC_SET_LEADCHNL_RA_PACING_CAPTURE_MODE: NORMAL
MDC_IDC_SET_LEADCHNL_RA_PACING_CATHODE_ELECTRODE_1: NORMAL
MDC_IDC_SET_LEADCHNL_RA_PACING_CATHODE_LOCATION_1: NORMAL
MDC_IDC_SET_LEADCHNL_RA_PACING_POLARITY: NORMAL
MDC_IDC_SET_LEADCHNL_RA_PACING_PULSEWIDTH: 0.4 MS
MDC_IDC_SET_LEADCHNL_RA_SENSING_ANODE_ELECTRODE_1: NORMAL
MDC_IDC_SET_LEADCHNL_RA_SENSING_ANODE_LOCATION_1: NORMAL
MDC_IDC_SET_LEADCHNL_RA_SENSING_CATHODE_ELECTRODE_1: NORMAL
MDC_IDC_SET_LEADCHNL_RA_SENSING_CATHODE_LOCATION_1: NORMAL
MDC_IDC_SET_LEADCHNL_RA_SENSING_POLARITY: NORMAL
MDC_IDC_SET_LEADCHNL_RA_SENSING_SENSITIVITY: 0.3 MV
MDC_IDC_SET_LEADCHNL_RV_PACING_AMPLITUDE: 2 V
MDC_IDC_SET_LEADCHNL_RV_PACING_ANODE_ELECTRODE_1: NORMAL
MDC_IDC_SET_LEADCHNL_RV_PACING_ANODE_LOCATION_1: NORMAL
MDC_IDC_SET_LEADCHNL_RV_PACING_CAPTURE_MODE: NORMAL
MDC_IDC_SET_LEADCHNL_RV_PACING_CATHODE_ELECTRODE_1: NORMAL
MDC_IDC_SET_LEADCHNL_RV_PACING_CATHODE_LOCATION_1: NORMAL
MDC_IDC_SET_LEADCHNL_RV_PACING_POLARITY: NORMAL
MDC_IDC_SET_LEADCHNL_RV_PACING_PULSEWIDTH: 0.4 MS
MDC_IDC_SET_LEADCHNL_RV_SENSING_ANODE_ELECTRODE_1: NORMAL
MDC_IDC_SET_LEADCHNL_RV_SENSING_ANODE_LOCATION_1: NORMAL
MDC_IDC_SET_LEADCHNL_RV_SENSING_CATHODE_ELECTRODE_1: NORMAL
MDC_IDC_SET_LEADCHNL_RV_SENSING_CATHODE_LOCATION_1: NORMAL
MDC_IDC_SET_LEADCHNL_RV_SENSING_POLARITY: NORMAL
MDC_IDC_SET_LEADCHNL_RV_SENSING_SENSITIVITY: 0.9 MV
MDC_IDC_SET_ZONE_DETECTION_INTERVAL: 350 MS
MDC_IDC_SET_ZONE_DETECTION_INTERVAL: 400 MS
MDC_IDC_SET_ZONE_STATUS: NORMAL
MDC_IDC_SET_ZONE_STATUS: NORMAL
MDC_IDC_SET_ZONE_TYPE: NORMAL
MDC_IDC_SET_ZONE_VENDOR_TYPE: NORMAL
MDC_IDC_STAT_AT_BURDEN_PERCENT: 0 %
MDC_IDC_STAT_AT_DTM_END: NORMAL
MDC_IDC_STAT_AT_DTM_START: NORMAL
MDC_IDC_STAT_BRADY_AP_VP_PERCENT: 97.03 %
MDC_IDC_STAT_BRADY_AP_VS_PERCENT: 0.4 %
MDC_IDC_STAT_BRADY_AS_VP_PERCENT: 1.66 %
MDC_IDC_STAT_BRADY_AS_VS_PERCENT: 0.91 %
MDC_IDC_STAT_BRADY_DTM_END: NORMAL
MDC_IDC_STAT_BRADY_DTM_START: NORMAL
MDC_IDC_STAT_BRADY_RA_PERCENT_PACED: 98.17 %
MDC_IDC_STAT_BRADY_RV_PERCENT_PACED: 98.69 %
MDC_IDC_STAT_CRT_DTM_END: NORMAL
MDC_IDC_STAT_CRT_DTM_START: NORMAL
MDC_IDC_STAT_CRT_LV_PERCENT_PACED: 98.67 %
MDC_IDC_STAT_CRT_PERCENT_PACED: 98.66 %
MDC_IDC_STAT_EPISODE_RECENT_COUNT: 0
MDC_IDC_STAT_EPISODE_RECENT_COUNT_DTM_END: NORMAL
MDC_IDC_STAT_EPISODE_RECENT_COUNT_DTM_START: NORMAL
MDC_IDC_STAT_EPISODE_TOTAL_COUNT: 0
MDC_IDC_STAT_EPISODE_TOTAL_COUNT_DTM_END: NORMAL
MDC_IDC_STAT_EPISODE_TOTAL_COUNT_DTM_START: NORMAL
MDC_IDC_STAT_EPISODE_TYPE: NORMAL
MDC_IDC_STAT_TACHYTHERAPY_RECENT_DTM_END: NORMAL
MDC_IDC_STAT_TACHYTHERAPY_RECENT_DTM_START: NORMAL
MDC_IDC_STAT_TACHYTHERAPY_TOTAL_DTM_END: NORMAL
MDC_IDC_STAT_TACHYTHERAPY_TOTAL_DTM_START: NORMAL

## 2024-09-10 PROCEDURE — 93294 REM INTERROG EVL PM/LDLS PM: CPT | Performed by: INTERNAL MEDICINE

## 2024-09-10 PROCEDURE — 93296 REM INTERROG EVL PM/IDS: CPT | Performed by: INTERNAL MEDICINE

## 2024-09-20 ENCOUNTER — TRANSFERRED RECORDS (OUTPATIENT)
Dept: HEALTH INFORMATION MANAGEMENT | Facility: CLINIC | Age: 89
End: 2024-09-20
Payer: COMMERCIAL

## 2024-10-21 DIAGNOSIS — G60.9 IDIOPATHIC PERIPHERAL NEUROPATHY: ICD-10-CM

## 2024-10-22 RX ORDER — GABAPENTIN 300 MG/1
CAPSULE ORAL
Qty: 90 CAPSULE | Refills: 0 | Status: SHIPPED | OUTPATIENT
Start: 2024-10-22

## 2024-11-15 ENCOUNTER — OFFICE VISIT (OUTPATIENT)
Dept: URGENT CARE | Facility: URGENT CARE | Age: 89
End: 2024-11-15
Payer: COMMERCIAL

## 2024-11-15 VITALS
RESPIRATION RATE: 16 BRPM | OXYGEN SATURATION: 98 % | WEIGHT: 189 LBS | TEMPERATURE: 97.3 F | DIASTOLIC BLOOD PRESSURE: 62 MMHG | BODY MASS INDEX: 26.36 KG/M2 | SYSTOLIC BLOOD PRESSURE: 97 MMHG | HEART RATE: 66 BPM

## 2024-11-15 DIAGNOSIS — R50.9 FEVER AND CHILLS: ICD-10-CM

## 2024-11-15 DIAGNOSIS — N39.0 URINARY TRACT INFECTION WITH HEMATURIA, SITE UNSPECIFIED: ICD-10-CM

## 2024-11-15 DIAGNOSIS — R31.9 URINARY TRACT INFECTION WITH HEMATURIA, SITE UNSPECIFIED: ICD-10-CM

## 2024-11-15 DIAGNOSIS — R35.0 FREQUENT URINATION: Primary | ICD-10-CM

## 2024-11-15 LAB
ALBUMIN UR-MCNC: 100 MG/DL
APPEARANCE UR: ABNORMAL
BACTERIA #/AREA URNS HPF: ABNORMAL /HPF
BILIRUB UR QL STRIP: NEGATIVE
COLOR UR AUTO: YELLOW
GLUCOSE UR STRIP-MCNC: NEGATIVE MG/DL
HGB UR QL STRIP: ABNORMAL
KETONES UR STRIP-MCNC: NEGATIVE MG/DL
LEUKOCYTE ESTERASE UR QL STRIP: ABNORMAL
NITRATE UR QL: POSITIVE
PH UR STRIP: 6.5 [PH] (ref 5–7)
RBC #/AREA URNS AUTO: ABNORMAL /HPF
SP GR UR STRIP: 1.02 (ref 1–1.03)
UROBILINOGEN UR STRIP-ACNC: 1 E.U./DL
WBC #/AREA URNS AUTO: >100 /HPF

## 2024-11-15 PROCEDURE — 96372 THER/PROPH/DIAG INJ SC/IM: CPT | Performed by: FAMILY MEDICINE

## 2024-11-15 PROCEDURE — 81001 URINALYSIS AUTO W/SCOPE: CPT | Performed by: FAMILY MEDICINE

## 2024-11-15 PROCEDURE — 99213 OFFICE O/P EST LOW 20 MIN: CPT | Mod: 25 | Performed by: FAMILY MEDICINE

## 2024-11-15 PROCEDURE — 87186 SC STD MICRODIL/AGAR DIL: CPT | Performed by: FAMILY MEDICINE

## 2024-11-15 RX ORDER — CEFTRIAXONE SODIUM 1 G
1 VIAL (EA) INJECTION ONCE
Status: COMPLETED | OUTPATIENT
Start: 2024-11-15 | End: 2024-11-15

## 2024-11-15 RX ORDER — CEFDINIR 300 MG/1
300 CAPSULE ORAL 2 TIMES DAILY
Qty: 14 CAPSULE | Refills: 0 | Status: SHIPPED | OUTPATIENT
Start: 2024-11-15 | End: 2024-11-22

## 2024-11-15 RX ADMIN — Medication 1 G: at 11:19

## 2024-11-15 NOTE — PROGRESS NOTES
SUBJECTIVE: Moses Elizondo is a 89 year old male who  presents today for a possible UTI.   Symptoms of dysuria and frequency have been going on forday(s).    Hematuria yes.      Past Medical History:   Diagnosis Date    Aortic root dilatation (H)     4.4 cm    Ascending aorta dilatation (H)     4.4 cm    ASD (atrial septal defect)     Bradycardia     CAD (coronary artery disease) 05/15/2014     YOGESH to RCA(19); YOGESH to OM1(5/15/14)    Cardiac pacemaker 2019    [employer] PPM COMFORT MRI XT DR-implant 19    Chest discomfort     Degeneration of lumbar or lumbosacral intervertebral disc     Familial tremor     Former smoker     Herpes zoster without mention of complication     HTN (hypertension)     Hyperlipidaemia     Idiopathic peripheral neuropathy     MGUS (monoclonal gammopathy of unknown significance)     NSTEMI (non-ST elevated myocardial infarction) (H)     Other and unspecified hyperlipidemia     PFO (patent foramen ovale)     Prostatitis, unspecified     Second degree heart block     Sensorineural hearing loss, unspecified     SSS (sick sinus syndrome) (H)     Status post coronary angiogram 2019    Unspecified hypothyroidism      Allergies   Allergen Reactions    No Known Drug Allergy      Social History     Tobacco Use    Smoking status: Former     Current packs/day: 0.00     Average packs/day: 0.5 packs/day for 17.0 years (8.5 ttl pk-yrs)     Types: Cigarettes     Start date:      Quit date: 1967     Years since quittin.9    Smokeless tobacco: Never   Substance Use Topics    Alcohol use: Yes     Comment: 5-7 drinks week - at most one drink daily       ROS: CONSTITUTIONAL:POSITIVE  for fever     OBJECTIVE:  BP 97/62   Pulse 66   Temp 97.3  F (36.3  C) (Tympanic)   Resp 16   Wt 85.7 kg (189 lb)   SpO2 98%   BMI 26.36 kg/m      No Flank/abd pain      ICD-10-CM    1. Frequent urination  R35.0 UA Macroscopic with reflex to Microscopic and Culture - Clinic Collect      Urine Microscopic Exam     Urine Culture      2. Fever and chills  R50.9       3. Urinary tract infection with hematuria, site unspecified  N39.0 cefTRIAXone (ROCEPHIN) in lidocaine 1% (PF) for IM administration 1 g    R31.9 cefdinir (OMNICEF) 300 MG capsule          Drink plenty of fluids.  Prevention and treatment of UTI's discussed.Signs and symptoms of pyelonephritis mentioned.  Follow up with primary care physician if not improving

## 2024-11-17 LAB — BACTERIA UR CULT: ABNORMAL

## 2024-12-09 DIAGNOSIS — I25.810 CORONARY ARTERY DISEASE INVOLVING CORONARY BYPASS GRAFT OF NATIVE HEART WITHOUT ANGINA PECTORIS: ICD-10-CM

## 2024-12-09 DIAGNOSIS — I21.4 NON-STEMI (NON-ST ELEVATED MYOCARDIAL INFARCTION) (H): ICD-10-CM

## 2024-12-09 RX ORDER — ATORVASTATIN CALCIUM 40 MG/1
40 TABLET, FILM COATED ORAL
Qty: 90 TABLET | Refills: 3 | Status: SHIPPED | OUTPATIENT
Start: 2024-12-09

## 2024-12-10 DIAGNOSIS — E03.9 HYPOTHYROIDISM, UNSPECIFIED TYPE: ICD-10-CM

## 2024-12-10 RX ORDER — LEVOTHYROXINE SODIUM 100 UG/1
100 TABLET ORAL DAILY
Qty: 90 TABLET | Refills: 0 | Status: SHIPPED | OUTPATIENT
Start: 2024-12-10

## 2024-12-17 ENCOUNTER — OFFICE VISIT (OUTPATIENT)
Dept: URGENT CARE | Facility: URGENT CARE | Age: 89
End: 2024-12-17
Payer: COMMERCIAL

## 2024-12-17 VITALS
BODY MASS INDEX: 26.08 KG/M2 | TEMPERATURE: 97.1 F | SYSTOLIC BLOOD PRESSURE: 120 MMHG | DIASTOLIC BLOOD PRESSURE: 80 MMHG | WEIGHT: 187 LBS | HEART RATE: 81 BPM | OXYGEN SATURATION: 98 %

## 2024-12-17 DIAGNOSIS — R30.0 DYSURIA: ICD-10-CM

## 2024-12-17 DIAGNOSIS — N39.0 URINARY TRACT INFECTION WITHOUT HEMATURIA, SITE UNSPECIFIED: Primary | ICD-10-CM

## 2024-12-17 LAB
ALBUMIN UR-MCNC: NEGATIVE MG/DL
APPEARANCE UR: CLEAR
BACTERIA #/AREA URNS HPF: ABNORMAL /HPF
BILIRUB UR QL STRIP: NEGATIVE
COLOR UR AUTO: YELLOW
GLUCOSE UR STRIP-MCNC: NEGATIVE MG/DL
HGB UR QL STRIP: ABNORMAL
KETONES UR STRIP-MCNC: NEGATIVE MG/DL
LEUKOCYTE ESTERASE UR QL STRIP: ABNORMAL
NITRATE UR QL: NEGATIVE
PH UR STRIP: 7 [PH] (ref 5–7)
RBC #/AREA URNS AUTO: ABNORMAL /HPF
SP GR UR STRIP: 1.02 (ref 1–1.03)
UROBILINOGEN UR STRIP-ACNC: 0.2 E.U./DL
WBC #/AREA URNS AUTO: >100 /HPF

## 2024-12-17 PROCEDURE — 96372 THER/PROPH/DIAG INJ SC/IM: CPT | Performed by: FAMILY MEDICINE

## 2024-12-17 PROCEDURE — 99213 OFFICE O/P EST LOW 20 MIN: CPT | Mod: 25 | Performed by: FAMILY MEDICINE

## 2024-12-17 PROCEDURE — 87186 SC STD MICRODIL/AGAR DIL: CPT | Performed by: FAMILY MEDICINE

## 2024-12-17 PROCEDURE — 87086 URINE CULTURE/COLONY COUNT: CPT | Performed by: FAMILY MEDICINE

## 2024-12-17 PROCEDURE — 81001 URINALYSIS AUTO W/SCOPE: CPT

## 2024-12-17 RX ORDER — CEFTRIAXONE SODIUM 1 G
1 VIAL (EA) INJECTION ONCE
Status: COMPLETED | OUTPATIENT
Start: 2024-12-17 | End: 2024-12-17

## 2024-12-17 RX ORDER — CEFDINIR 300 MG/1
300 CAPSULE ORAL 2 TIMES DAILY
Qty: 20 CAPSULE | Refills: 0 | Status: SHIPPED | OUTPATIENT
Start: 2024-12-17 | End: 2024-12-27

## 2024-12-17 RX ADMIN — Medication 1 G: at 15:57

## 2024-12-17 NOTE — TELEPHONE ENCOUNTER
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    The bakers cyst has been there a while, it was seen on the knee MRI from March.   The orthopedist at [facility] knows about it.     If it is bothersome (I.;e. becomign larger, more painful, interfering with walking, etc.), he should return to see [facility] orthopedics who injects steroids into the knee joint.      If it is not causing troubles, then it can just be monitored.    He has advanced degenerative joint disease in that knee as seen on the xrays, and discussed the possible need for eventual knee replacement with the orthopedic clinic.      Long story short, return to [facility] - [place] [phone]  If he feels this is an issue    If he has further quetsions or wishes to discuss this in further detail with me, then schedule a virtual appointment.          PAST SURGICAL HISTORY:  Pseudotumor cerebri Erlnida reseveroir placed by Laura    S/P right knee surgery

## 2024-12-17 NOTE — PROGRESS NOTES
SUBJECTIVE: Moses Elizondo is a 89 year old male who  presents today for a possible UTI.   Symptoms of dysuria and frequency have been going on forday(s).    No fevers, n/v    Past Medical History:   Diagnosis Date    Aortic root dilatation (H)     4.4 cm    Ascending aorta dilatation (H)     4.4 cm    ASD (atrial septal defect)     Bradycardia     CAD (coronary artery disease) 05/15/2014     YOGESH to RCA(19); YOGESH to OM1(5/15/14)    Cardiac pacemaker 2019    [employer] PPM COMFORT MRI XT DR-implant 19    Chest discomfort     Degeneration of lumbar or lumbosacral intervertebral disc     Familial tremor     Former smoker     Herpes zoster without mention of complication     HTN (hypertension)     Hyperlipidaemia     Idiopathic peripheral neuropathy     MGUS (monoclonal gammopathy of unknown significance)     NSTEMI (non-ST elevated myocardial infarction) (H)     Other and unspecified hyperlipidemia     PFO (patent foramen ovale)     Prostatitis, unspecified     Second degree heart block     Sensorineural hearing loss, unspecified     SSS (sick sinus syndrome) (H)     Status post coronary angiogram 2019    Unspecified hypothyroidism      Allergies   Allergen Reactions    No Known Drug Allergy      Social History     Tobacco Use    Smoking status: Former     Current packs/day: 0.00     Average packs/day: 0.5 packs/day for 17.0 years (8.5 ttl pk-yrs)     Types: Cigarettes     Start date:      Quit date: 1967     Years since quittin.0    Smokeless tobacco: Never   Substance Use Topics    Alcohol use: Yes     Comment: 5-7 drinks week - at most one drink daily       ROS: CONSTITUTIONAL:NEGATIVE for fever, chills, change in weight    OBJECTIVE:  /80   Pulse 81   Temp 97.1  F (36.2  C) (Temporal)   Wt 84.8 kg (187 lb)   SpO2 98%   BMI 26.08 kg/m    NAD  No Flank/abd pain      ICD-10-CM    1. Urinary tract infection without hematuria, site unspecified  N39.0 cefdinir (OMNICEF)  300 MG capsule     cefTRIAXone (ROCEPHIN) in lidocaine 1% for IM administration 1 g      2. Dysuria  R30.0 UA Macroscopic with reflex to Microscopic and Culture - Clinic Collect     Urine Microscopic Exam     Urine Culture          Drink plenty of fluids.  Prevention and treatment of UTI's discussed.Signs and symptoms of pyelonephritis mentioned.  Follow up with primary care physician if not improving

## 2024-12-17 NOTE — PROGRESS NOTES
Clinic Administered Medication Documentation        Patient was given 1 gram. Prior to medication administration, verified patient's identity using patient s name and date of birth. Please see MAR and medication order for additional information. Patient instructed to remain in clinic for 15 minutes and report any adverse reaction to staff immediately.    Vial/Syringe: Single dose vial. Was entire vial of medication used? Yes

## 2024-12-18 LAB — BACTERIA UR CULT: ABNORMAL

## 2025-01-12 ENCOUNTER — OFFICE VISIT (OUTPATIENT)
Dept: URGENT CARE | Facility: URGENT CARE | Age: OVER 89
End: 2025-01-12
Payer: COMMERCIAL

## 2025-01-12 VITALS
BODY MASS INDEX: 26.08 KG/M2 | DIASTOLIC BLOOD PRESSURE: 71 MMHG | TEMPERATURE: 96.8 F | SYSTOLIC BLOOD PRESSURE: 107 MMHG | RESPIRATION RATE: 16 BRPM | WEIGHT: 187 LBS | OXYGEN SATURATION: 100 % | HEART RATE: 89 BPM

## 2025-01-12 DIAGNOSIS — H69.91 DYSFUNCTION OF RIGHT EUSTACHIAN TUBE: Primary | ICD-10-CM

## 2025-01-12 PROCEDURE — 99213 OFFICE O/P EST LOW 20 MIN: CPT | Performed by: NURSE PRACTITIONER

## 2025-01-12 RX ORDER — OXYMETAZOLINE HYDROCHLORIDE 0.05 G/100ML
2 SPRAY NASAL 2 TIMES DAILY
Qty: 6 ML | Refills: 0 | Status: SHIPPED | OUTPATIENT
Start: 2025-01-12

## 2025-01-12 NOTE — PROGRESS NOTES
Assessment & Plan     Dysfunction of right eustachian tube  - oxymetazoline (AFRIN) 0.05 % nasal spray  Dispense: 6 mL; Refill: 0     Patient Instructions   Afrin twice a day to both nostrils for 3 days only.    Make sure you stay hydrated.      Return in about 1 week (around 1/19/2025) for with regular provider if symptoms persist.    ALPESH Yañez CNP  [facility] IZZY Degroot is a 89 year old male who presents to clinic today for the following health issues:  Chief Complaint   Patient presents with    Plugged Ears     Pt states he needs ears cleaned out      HPI    URI Adult    Onset of symptoms was 1 week(s) ago.  Course of illness is same.    Severity moderate  Current and Associated symptoms: ear pressure and muffled hearing both  Treatment measures tried include Fluids and Rest.  Predisposing factors include None.      Review of Systems  Constitutional, HEENT, cardiovascular, pulmonary, GI, , musculoskeletal, neuro, skin, endocrine and psych systems are negative, except as otherwise noted.      Objective    /71   Pulse 89   Temp 96.8  F (36  C) (Tympanic)   Resp 16   Wt 84.8 kg (187 lb)   SpO2 100%   BMI 26.08 kg/m    Physical Exam   GENERAL: alert and no distress  HENT: normal cephalic/atraumatic, both ears: clear effusion and bulging membrane, nose and mouth without ulcers or lesions, oropharynx clear, and oral mucous membranes moist  NECK: no adenopathy, no asymmetry, masses, or scars  RESP: lungs clear to auscultation - no rales, rhonchi or wheezes  CV: regular rate and rhythm, normal S1 S2, no S3 or S4, no murmur, click or rub, no peripheral edema

## 2025-01-16 DIAGNOSIS — I25.810 CORONARY ARTERY DISEASE INVOLVING CORONARY BYPASS GRAFT OF NATIVE HEART WITHOUT ANGINA PECTORIS: ICD-10-CM

## 2025-01-16 RX ORDER — METOPROLOL SUCCINATE 25 MG/1
25 TABLET, EXTENDED RELEASE ORAL DAILY
Qty: 90 TABLET | Refills: 1 | Status: SHIPPED | OUTPATIENT
Start: 2025-01-16

## 2025-01-21 DIAGNOSIS — G60.9 IDIOPATHIC PERIPHERAL NEUROPATHY: ICD-10-CM

## 2025-01-22 RX ORDER — GABAPENTIN 300 MG/1
CAPSULE ORAL
Qty: 90 CAPSULE | Refills: 0 | Status: SHIPPED | OUTPATIENT
Start: 2025-01-22

## 2025-02-10 ENCOUNTER — TELEPHONE (OUTPATIENT)
Dept: INTERNAL MEDICINE | Facility: CLINIC | Age: OVER 89
End: 2025-02-10
Payer: COMMERCIAL

## 2025-02-10 NOTE — TELEPHONE ENCOUNTER
Order/Referral Request    Who is requesting: Pt    Orders being requested: order for UA to be done at lab.    Reason service is needed/diagnosis: PT had UTI, treated in UC 5-6 weeks ago, wants to verify that it is gone after receiving treatment for it. Pt would like to do this lab today at 2 or 3pm.    When are orders needed by: As soon as possible.    Has this been discussed with Provider: No    Does patient have a preference on a Group/Provider/Facility? [facility] labs    Does patient have an appointment scheduled?: No    Where to send orders: Place orders within Epic    Okay to leave a detailed message?: No at Home number on file [phone] (home)

## 2025-02-10 NOTE — TELEPHONE ENCOUNTER
Dr. Wagoner,    Pt is requesting a UA order to have urine rechecked after being treat for a UTI.   If appropriate, order pended.     Thank you,  Lucinda Hinton RN

## 2025-02-11 ENCOUNTER — LAB (OUTPATIENT)
Dept: LAB | Facility: CLINIC | Age: OVER 89
End: 2025-02-11
Payer: COMMERCIAL

## 2025-02-11 ENCOUNTER — OFFICE VISIT (OUTPATIENT)
Dept: CARDIOLOGY | Facility: CLINIC | Age: OVER 89
End: 2025-02-11
Attending: INTERNAL MEDICINE
Payer: COMMERCIAL

## 2025-02-11 VITALS
HEIGHT: 71 IN | DIASTOLIC BLOOD PRESSURE: 68 MMHG | BODY MASS INDEX: 27.05 KG/M2 | OXYGEN SATURATION: 96 % | SYSTOLIC BLOOD PRESSURE: 120 MMHG | HEART RATE: 83 BPM | WEIGHT: 193.2 LBS | RESPIRATION RATE: 16 BRPM

## 2025-02-11 DIAGNOSIS — I50.42 CHRONIC COMBINED SYSTOLIC AND DIASTOLIC HRT FAIL (H): ICD-10-CM

## 2025-02-11 LAB
ANION GAP SERPL CALCULATED.3IONS-SCNC: 8 MMOL/L (ref 7–15)
BUN SERPL-MCNC: 18.4 MG/DL (ref 8–23)
CALCIUM SERPL-MCNC: 9.1 MG/DL (ref 8.8–10.4)
CHLORIDE SERPL-SCNC: 105 MMOL/L (ref 98–107)
CREAT SERPL-MCNC: 1.07 MG/DL (ref 0.67–1.17)
EGFRCR SERPLBLD CKD-EPI 2021: 66 ML/MIN/1.73M2
GLUCOSE SERPL-MCNC: 104 MG/DL (ref 70–99)
HCO3 SERPL-SCNC: 27 MMOL/L (ref 22–29)
POTASSIUM SERPL-SCNC: 4.4 MMOL/L (ref 3.4–5.3)
SODIUM SERPL-SCNC: 140 MMOL/L (ref 135–145)

## 2025-02-11 PROCEDURE — 99214 OFFICE O/P EST MOD 30 MIN: CPT | Performed by: PHYSICIAN ASSISTANT

## 2025-02-11 NOTE — LETTER
2/11/2025    Steven Wagoner MD  [address] [facility] [address]    RE: Moses Elizondo       Dear Colleague,     I had the pleasure of seeing Moses Elizondo in the [facility].    Cardiology Clinic Progress Note    Moses Elizondo MRN# [id number]   YOB: 1933 Age: 89 year old   Primary cardiologist: Dr. Huggins         Assessment and Plan:     In summary, Moses Elizondo presents today for a routine annual follow up visit.    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    -- GDMT includes Toprol 25, Entresto 12-13 mg BID.    -- Appears well-compensated at goal wt ~190 lbs on lasix 20 mg daily, with stable FC II-III sxs, also limited by knee pain.   Upgrade to CRT-P on 9/16/21 with adequate BiVp. Rare asymptomatic NSVT on device.  Stable BOWMAN. Likely r/t aging, less likely anginal equivalent.  Severe bilateral venous insufficiency. Currently under good control.   CAD s/p PCI to OM in 2014. Negative stress MRI 1/2021. Denies angina.   Stable moderately dilated ascending aorta (4.5 cm per 2023 TTE, size not mentioned on 1/2024 TTE).   Hyperlipidemia, controlled.    Plan:  - Update BMP today.   - He will notify me if his exertional dyspnea acutely worsens or becomes associated with chest pain.  For now, he is comfortable continuing to monitor and remains fairly active for his age.  - Continue current therapies. Not a good SGLT2i candidate d/t UTI's.   - Continue routine device interrogations.  - Follow up with Dr. Huggins in six months with another echocardiogram for assessment of EF and ascending aorta, as well as routine labs (BMP, lipid panel).     It's always a pleasure seeing Zane.    EVANGELISTA Wren [facility] - [facility]        History of Presenting Illness:      Moses Elizondo is a pleasant 89 year old patient who presents today for a routine follow up visit.     His pertinent cardiac history includes:  # CAD, s/p PCI  to OM1 (2014).   # SSS, which has now progressed to CHB. S/p PPM in 2019. Upgrade to CRT-P on 9/16/21. MRI compatible.  # Presumed NICMP, LVEF 38% per routine TTE on 1/6/21. 44% per MRI. Suspect pacemaker-induced. No evidence of ischemia or infiltrative disease on 1/2021 cMRI.  EF did improve to 55 to 60% in January 2024.  # Chronic multifactorial BOWMAN.   # Chronic knee pain, arthritis. Anticipates he may need surgery in the future. Dr. Huggins has recommended a repeat stress cardiac MRI in this case.    # Severe bilateral venous insufficiency with chronic superficial venous thrombosis. Conservative management recommended by Dr. Myles (vein clinic) and hematology unless symptoms worsen.   # Hx of superficial RLE venous thrombus.  # L-sided bakers cyst  # Ascending aortic aneurysm  # Hypothyroidism  # Hyperlipidemia  # Idiopathic peripheral neuropathy  # Chronic facial itching    Today, Zane returns to clinic with his daughter for a six month follow up. He feels well, overall. He does have some slowly progressives dyspnea over the past year which is probably stable over the past six months. He still does his stationary bike intermittently for 5 minutes or so and does OK with it. No chest pain. His swelling remains under good control. Weight is stable ~190 lbs. BP is well-controlled.  We reviewed all his meds in detail, he always likes to ensure that he's taking them correctly.    Device interrogation 12/13/2024 showed 98% atrial paced, greater than 99% biventricular paced, less than 1% mode switch, a 13 beat run of nonsustained VT, and 7 years remaining on battery life.  Most recent echocardiogram in January 2024 shows EF 55 to 60%, no hemodynamically significant valvular heart disease.  The ascending aortic size is not mentioned.  He did not have labs done prior to our visit today, so his most recent BMP is from his last visit in May 2024, showing normal renal function and electrolytes, and a well-controlled LDL  "cholesterol at 45.  CBC in July of last year within normal limits.          Review of Systems:     12-pt ROS is negative except for as noted in the HPI.          Physical Exam:     Vitals: /68 (BP Location: Right arm, Patient Position: Sitting, Cuff Size: Adult Large)   Pulse 83   Resp 16   Ht 1.803 m (5' 11\")   Wt 87.6 kg (193 lb 3.2 oz)   SpO2 96%   BMI 26.95 kg/m    Wt Readings from Last 10 Encounters:   02/11/25 87.6 kg (193 lb 3.2 oz)   01/12/25 84.8 kg (187 lb)   12/17/24 84.8 kg (187 lb)   11/15/24 85.7 kg (189 lb)   08/25/24 84.3 kg (185 lb 12.8 oz)   07/23/24 83.6 kg (184 lb 4.8 oz)   05/21/24 84.7 kg (186 lb 11.2 oz)   01/09/24 85 kg (187 lb 4.8 oz)   01/04/24 84.8 kg (187 lb)   01/01/24 81.6 kg (180 lb)       Constitutional:  Patient is pleasant, alert, cooperative, and in NAD.  HEENT:  NCAT. PERRLA. EOM's intact.   Neck:  CVP appears normal. No carotid bruits.   Pulmonary: Normal respiratory effort. CTAB.   Cardiac: RRR, normal S1/S2, no S3/S4, no murmur or rub.   Abdomen:  Non-tender abdomen, no hepatosplenomegaly appreciated.   Vascular: Pulses in the upper and lower extremities are 2+ and equal bilaterally.  Extremities: 1+ lower extremity edema, erythema, cyanosis or tenderness appreciated.  Skin:  No rashes or lesions appreciated.   Neurological:  No gross motor or sensory deficits.   Psych: Appropriate affect.        Data:     Labs reviewed:  Recent Labs   Lab Test 07/23/24  0921 05/14/24  0932 12/22/23  1211 12/11/23  0902 11/20/23  0842 05/17/23  0834 05/01/23  1401 10/17/22  0845 07/07/22  0806   LDL  --  45  --   --  113*  --   --  41  --    HDL  --  45  --   --  47  --   --  47  --    NHDL  --  57  --   --  132*  --   --  56  --    CHOL  --  102  --   --  179  --   --  103  --    TRIG  --  60  --   --  93  --   --  74  --    TSH 0.96  --   --  1.85  --   --   --   --  1.44   NTBNP  --   --   --   --   --  481 849 407  --    IRON  --   --  48*  --   --   --   --   --   --    FEB  -- "   --  191*  --   --   --   --   --   --    IRONSAT  --   --  25  --   --   --   --   --   --        Lab Results   Component Value Date    WBC 7.2 07/23/2024    WBC 6.2 07/23/2020    RBC 4.75 07/23/2024    RBC 4.58 07/23/2020    HGB 14.0 07/23/2024    HGB 13.2 (L) 03/02/2021    HCT 41.8 07/23/2024    HCT 41.4 07/23/2020    MCV 88 07/23/2024    MCV 90 07/23/2020    MCH 29.5 07/23/2024    MCH 29.9 07/23/2020    MCHC 33.5 07/23/2024    MCHC 33.1 07/23/2020    RDW 11.6 07/23/2024    RDW 12.5 07/23/2020     07/23/2024     07/23/2020       Lab Results   Component Value Date     05/14/2024     04/07/2021    POTASSIUM 4.5 05/14/2024    POTASSIUM 4.3 01/01/2024    POTASSIUM 4.1 04/07/2021    CHLORIDE 108 (H) 05/14/2024    CHLORIDE 105 01/01/2024    CHLORIDE 109 04/07/2021    CO2 24 05/14/2024    CO2 26 01/01/2024    CO2 28 04/07/2021    ANIONGAP 11 05/14/2024    ANIONGAP 2 (L) 01/01/2024    ANIONGAP 2 (L) 04/07/2021     (H) 05/14/2024    GLC 84 01/04/2024     (H) 01/01/2024    GLC 75 04/07/2021    BUN 19.8 05/14/2024    BUN 23 01/01/2024    BUN 25 04/07/2021    CR 1.10 05/14/2024    CR 1.28 (H) 04/07/2021    GFRESTIMATED 64 05/14/2024    GFRESTIMATED 50 (L) 04/07/2021    GFRESTBLACK 58 (L) 04/07/2021    GABRIELA 9.5 05/14/2024    GABRIELA 8.8 04/07/2021      Lab Results   Component Value Date    AST 66 (H) 01/04/2024    AST 32 03/02/2021    ALT 23 05/14/2024    ALT 30 03/02/2021       Lab Results   Component Value Date    A1C 5.6 12/14/2023    A1C 5.6 05/02/2019       Lab Results   Component Value Date    INR 1.00 06/20/2019           Problem List:     Patient Active Problem List   Diagnosis     Hypothyroidism, unspecified type     Degeneration of lumbar or lumbosacral intervertebral disc     MGUS (monoclonal gammopathy of unknown significance)     Familial tremor     Hyperlipidemia LDL goal <100     NSTEMI (non-ST elevated myocardial infarction) (H)     Coronary artery disease     Bradycardia  "    Aneurysm of thoracic aorta     Sinus bradycardia     Atherosclerotic heart disease of native coronary artery with other forms of angina pectoris     Ascending aortic aneurysm     Sick sinus syndrome (H)     Aortic root dilatation     Idiopathic peripheral neuropathy     Second degree AV block     Cardiac pacemaker in situ     Stage 3a chronic kidney disease (H)     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)     Generalized weakness     Unable to ambulate     Leukocytosis, unspecified type     Dehydration     Somnolence     LYSSA (acute kidney injury)     Contusion of scalp, initial encounter     Fever, unspecified fever cause     Slow transit constipation     Venous (peripheral) insufficiency           Medications:     Current Outpatient Medications   Medication Sig Dispense Refill     acetaminophen (TYLENOL) 650 MG CR tablet Take 650 mg by mouth at bedtime       ASPIRIN EC PO Take 81 mg by mouth at bedtime       atorvastatin (LIPITOR) 40 MG tablet Take 1 tablet (40 mg) by mouth daily (with dinner). 90 tablet 3     furosemide (LASIX) 20 MG tablet TAKE 1 TABLET (20 MG) BY MOUTH EVERY MORNING 90 tablet 1     gabapentin (NEURONTIN) 300 MG capsule TAKE 1 CAPSULE(300 MG) BY MOUTH AT BEDTIME 90 capsule 0     levothyroxine (SYNTHROID/LEVOTHROID) 100 MCG tablet TAKE 1 TABLET (100 MCG) BY MOUTH DAILY 90 tablet 0     metoprolol succinate ER (TOPROL XL) 25 MG 24 hr tablet TAKE 1 TABLET (25 MG) BY MOUTH DAILY AT LUNCH 90 tablet 1     nitroGLYcerin (NITROSTAT) 0.4 MG sublingual tablet One tablet under the tongue every 5 minutes if needed for chest pain. May repeat every 5 minutes for a maximum of 3 doses in 15 minutes\" 25 tablet 3     sacubitril-valsartan (ENTRESTO) 24-26 MG per tablet Take 1/2 tab (12/13mg) once daily 90 tablet 3     Aspirin-Caffeine 400-32 MG TABS Take 1 tablet by mouth as needed (Patient not taking: Reported on 2/11/2025)       oxymetazoline (AFRIN) 0.05 % nasal spray Spray 0.2 mLs (2 sprays) into " both nostrils 2 times daily. (Patient not taking: Reported on 2/11/2025) 6 mL 0           Past Medical History:     Past Medical History:   Diagnosis Date     Aortic root dilatation     4.4 cm     Ascending aorta dilatation     4.4 cm     ASD (atrial septal defect)      Bradycardia      CAD (coronary artery disease) 05/15/2014     YOGESH to RCA(6/20/19); YOGESH to OM1(5/15/14)     Cardiac pacemaker 06/20/2019    [employer] PPM COMFORT MRI XT DR-implant 6/20/19     Chest discomfort      Degeneration of lumbar or lumbosacral intervertebral disc      Familial tremor      Former smoker      Herpes zoster without mention of complication      HTN (hypertension)      Hyperlipidaemia      Idiopathic peripheral neuropathy      MGUS (monoclonal gammopathy of unknown significance)      NSTEMI (non-ST elevated myocardial infarction) (H) 2014     Other and unspecified hyperlipidemia      PFO (patent foramen ovale)      Prostatitis, unspecified      Second degree heart block      Sensorineural hearing loss, unspecified      SSS (sick sinus syndrome) (H)      Status post coronary angiogram 06/20/2019     Unspecified hypothyroidism      Past Surgical History:   Procedure Laterality Date     CHOLECYSTECTOMY       CORONARY ANGIOGRAPHY ADULT ORDER  5/14/14    PTCA w/YOGESH to OM1     CV CORONARY ANGIOGRAM N/A 6/20/2019    Procedure: Coronary Angiogram;  Surgeon: Romie Coronado MD;  Location: [facility] CARDIAC CATH LAB     CV INTRAVASULAR ULTRASOUND N/A 6/20/2019    Procedure: Intravascular Ultrasound;  Surgeon: Romie Coronado MD;  Location: [facility] CARDIAC CATH LAB     CV PCI ATHERECTOMY ORBITAL N/A 6/20/2019    Procedure: PCI Atherectomy Orbital;  Surgeon: Romie Coronado MD;  Location:  HEART CARDIAC CATH LAB     CV PCI STENT DRUG ELUTING N/A 6/20/2019    Procedure: PCI Stent Drug Eluting;  Surgeon: Romie Coronado MD;  Location:  HEART CARDIAC CATH LAB     CV TEMPORARY PACEMAKER INSERTION N/A 6/20/2019    Procedure: Temporary  Pacemaker Insertion;  Surgeon: Romie Coronado MD;  Location:  HEART CARDIAC CATH LAB     EP PACEMAKER N/A 2019    Procedure: EP Pacemaker;  Surgeon: Max Dowell MD;  Location:  HEART CARDIAC CATH LAB     EP PPM UPGRADE TO BIVENT N/A 2021    Procedure: EP PPM UPGRADE TO BIVENT;  Surgeon: Tre Miller MD;  Location:  HEART CARDIAC CATH LAB     HEART CATH, ANGIOPLASTY  14    YOGESH to OM1     ZZC NONSPECIFIC PROCEDURE      Laparoscopic cholecystectomy.      Family History   Problem Relation Age of Onset     Cancer Mother      Genitourinary Problems Father 99        complications from surgery     Heart Disease Brother 72        MI     Social History     Socioeconomic History     Marital status:      Spouse name: Not on file     Number of children: Not on file     Years of education: Not on file     Highest education level: Not on file   Occupational History     Not on file   Tobacco Use     Smoking status: Former     Current packs/day: 0.00     Average packs/day: 0.5 packs/day for 17.0 years (8.5 ttl pk-yrs)     Types: Cigarettes     Start date:      Quit date: 1967     Years since quittin.1     Smokeless tobacco: Never   Substance and Sexual Activity     Alcohol use: Yes     Comment: 5-7 drinks week - at most one drink daily     Drug use: No     Sexual activity: Never   Other Topics Concern      Service Not Asked     Blood Transfusions Not Asked     Caffeine Concern Yes     Comment: 2 cups coffee/day     Occupational Exposure Not Asked     Hobby Hazards Not Asked     Sleep Concern No     Stress Concern Not Asked     Weight Concern Yes     Comment: limiting alcohol to watch calories     Special Diet Not Asked     Back Care Not Asked     Exercise Yes     Comment: Stationary bike  weights and aerobics 4 times week     Bike Helmet Not Asked     Seat Belt Yes     Self-Exams Not Asked     Parent/sibling w/ CABG, MI or angioplasty before 65F 55M? No    Social History Narrative     Not on file     Social Drivers of Health     Financial Resource Strain: Low Risk  (1/9/2024)    Financial Resource Strain      Within the past 12 months, have you or your family members you live with been unable to get utilities (heat, electricity) when it was really needed?: No   Food Insecurity: Low Risk  (1/9/2024)    Food Insecurity      Within the past 12 months, did you worry that your food would run out before you got money to buy more?: No      Within the past 12 months, did the food you bought just not last and you didn t have money to get more?: No   Transportation Needs: Low Risk  (1/9/2024)    Transportation Needs      Within the past 12 months, has lack of transportation kept you from medical appointments, getting your medicines, non-medical meetings or appointments, work, or from getting things that you need?: No   Physical Activity: Not on file   Stress: Not on file   Social Connections: Not on file   Interpersonal Safety: Low Risk  (7/23/2024)    Interpersonal Safety      Do you feel physically and emotionally safe where you currently live?: Yes      Within the past 12 months, have you been hit, slapped, kicked or otherwise physically hurt by someone?: No      Within the past 12 months, have you been humiliated or emotionally abused in other ways by your partner or ex-partner?: No   Housing Stability: Low Risk  (1/9/2024)    Housing Stability      Do you have housing? : Yes      Are you worried about losing your housing?: No           Allergies:   No known drug allergy    Today's clinic visit entailed:  I spent a total of 35 minutes on the day of the visit.   Time spent by me today doing chart review, history and exam, documentation and further activities per the note  Provider  Link to [facility] Help Grid     The level of medical decision making during this visit was of moderate complexity.      Thank you for allowing me to participate in the care of your  patient.      Sincerely,     Lacey Puckett PA-C     [facility]  cc:   Unruly Huggins MD  [address]  [address]  [place] [id number]

## 2025-02-11 NOTE — TELEPHONE ENCOUNTER
No need for routine UA after treatment for a UTI if he ia asymptomatic.     The urine culture showed the bacteria sensitive to all antibiotics so he would have had more than adequate treatment.     If he is having UTI symptoms, then he needs to be seen in the Urgent Care where they can recheck the ruine and evalukate him.    Close encounter when done.

## 2025-02-11 NOTE — TELEPHONE ENCOUNTER
Relayed providers response to patient. Patient does not recall calling [facility] clinic yesterday and thought he was talking with Cardiology clinic, asking what time his appointment is scheduled for today. Relayed patient is scheduled to arrive for today's Cardiology appt at 1:50 pm.    Explained Dr. Wagoner received a message yesterday regarding his urine recheck question. Patient then said the cardiology clinic must of sent his question over to [facility], because he called Cardiology yesterday.     Patient is not having any urinary symptoms since treatment has ended.     He has had fevers (100.unknown) for past few days (unsure how many days) that go down with ibuprofen.   This morning he is afebrile, T 97.4

## 2025-02-11 NOTE — PATIENT INSTRUCTIONS
Today, we discussed the following:  Medication changes:   No changes today.   Follow up:   Blood test on the way out today.   See Dr. Huggins in six months with an echo and fasting lab prior.    Please, remember to continue the followin.  Weigh yourself daily. Call if your weight is up > than 2 pounds in one day, or 5 pounds in one week; if you feel more short of breath or have worsening swelling in your legs or abdomen.  2.  Eat a low sodium diet (less than 2,000mg or 2g daily). If you eat less salt, you will retain less fluid.   3.  Avoid alcohol, as this can worsen heart failure.   4.  Avoid NSAIDs as able (For example, Ibuprofen / aleve / advil / naprosen / diclofenac).    Thank you for your visit with the [facility] today.   [facility] stands for [facility]omy[facility]. The [facility] clinic will teach and help you to manage your heart failure and keep you out of the hospital.    Call [facility] nurse for any questions or concerns Mon-Fri 8am-4pm  [phone]: Nurse number   [phone]: After Hours Phone Number

## 2025-02-11 NOTE — PROGRESS NOTES
Cardiology Clinic Progress Note    Moses Elizondo MRN# [id number]   YOB: 1933 Age: 89 year old   Primary cardiologist: Dr. Huggins         Assessment and Plan:     In summary, Moses Elizondo presents today for a routine annual follow up visit.    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    -- GDMT includes Toprol 25, Entresto 12-13 mg BID.    -- Appears well-compensated at goal wt ~190 lbs on lasix 20 mg daily, with stable FC II-III sxs, also limited by knee pain.   Upgrade to CRT-P on 9/16/21 with adequate BiVp. Rare asymptomatic NSVT on device.  Stable BOWMAN. Likely r/t aging, less likely anginal equivalent.  Severe bilateral venous insufficiency. Currently under good control.   CAD s/p PCI to OM in 2014. Negative stress MRI 1/2021. Denies angina.   Stable moderately dilated ascending aorta (4.5 cm per 2023 TTE, size not mentioned on 1/2024 TTE).   Hyperlipidemia, controlled.    Plan:  - Update BMP today.   - He will notify me if his exertional dyspnea acutely worsens or becomes associated with chest pain.  For now, he is comfortable continuing to monitor and remains fairly active for his age.  - Continue current therapies. Not a good SGLT2i candidate d/t UTI's.   - Continue routine device interrogations.  - Follow up with Dr. Huggins in six months with another echocardiogram for assessment of EF and ascending aorta, as well as routine labs (BMP, lipid panel).     It's always a pleasure seeing Zane.    Lacey Puckett PA-C  [facility]        History of Presenting Illness:      Moses Elizondo is a pleasant 89 year old patient who presents today for a routine follow up visit.     His pertinent cardiac history includes:  # CAD, s/p PCI to OM1 (2014).   # SSS, which has now progressed to CHB. S/p PPM in 2019. Upgrade to CRT-P on 9/16/21. MRI compatible.  # Presumed NICMP, LVEF 38% per routine TTE on 1/6/21. 44% per MRI. Suspect pacemaker-induced.  No evidence of ischemia or infiltrative disease on 1/2021 cMRI.  EF did improve to 55 to 60% in January 2024.  # Chronic multifactorial BOWMAN.   # Chronic knee pain, arthritis. Anticipates he may need surgery in the future. Dr. Huggins has recommended a repeat stress cardiac MRI in this case.    # Severe bilateral venous insufficiency with chronic superficial venous thrombosis. Conservative management recommended by Dr. Myles (vein clinic) and hematology unless symptoms worsen.   # Hx of superficial RLE venous thrombus.  # L-sided bakers cyst  # Ascending aortic aneurysm  # Hypothyroidism  # Hyperlipidemia  # Idiopathic peripheral neuropathy  # Chronic facial itching    Today, Zane returns to clinic with his daughter for a six month follow up. He feels well, overall. He does have some slowly progressives dyspnea over the past year which is probably stable over the past six months. He still does his stationary bike intermittently for 5 minutes or so and does OK with it. No chest pain. His swelling remains under good control. Weight is stable ~190 lbs. BP is well-controlled.  We reviewed all his meds in detail, he always likes to ensure that he's taking them correctly.    Device interrogation 12/13/2024 showed 98% atrial paced, greater than 99% biventricular paced, less than 1% mode switch, a 13 beat run of nonsustained VT, and 7 years remaining on battery life.  Most recent echocardiogram in January 2024 shows EF 55 to 60%, no hemodynamically significant valvular heart disease.  The ascending aortic size is not mentioned.  He did not have labs done prior to our visit today, so his most recent BMP is from his last visit in May 2024, showing normal renal function and electrolytes, and a well-controlled LDL cholesterol at 45.  CBC in July of last year within normal limits.          Review of Systems:     12-pt ROS is negative except for as noted in the HPI.          Physical Exam:     Vitals: /68 (BP Location: Right  "arm, Patient Position: Sitting, Cuff Size: Adult Large)   Pulse 83   Resp 16   Ht 1.803 m (5' 11\")   Wt 87.6 kg (193 lb 3.2 oz)   SpO2 96%   BMI 26.95 kg/m    Wt Readings from Last 10 Encounters:   02/11/25 87.6 kg (193 lb 3.2 oz)   01/12/25 84.8 kg (187 lb)   12/17/24 84.8 kg (187 lb)   11/15/24 85.7 kg (189 lb)   08/25/24 84.3 kg (185 lb 12.8 oz)   07/23/24 83.6 kg (184 lb 4.8 oz)   05/21/24 84.7 kg (186 lb 11.2 oz)   01/09/24 85 kg (187 lb 4.8 oz)   01/04/24 84.8 kg (187 lb)   01/01/24 81.6 kg (180 lb)       Constitutional:  Patient is pleasant, alert, cooperative, and in NAD.  HEENT:  NCAT. PERRLA. EOM's intact.   Neck:  CVP appears normal. No carotid bruits.   Pulmonary: Normal respiratory effort. CTAB.   Cardiac: RRR, normal S1/S2, no S3/S4, no murmur or rub.   Abdomen:  Non-tender abdomen, no hepatosplenomegaly appreciated.   Vascular: Pulses in the upper and lower extremities are 2+ and equal bilaterally.  Extremities: 1+ lower extremity edema, erythema, cyanosis or tenderness appreciated.  Skin:  No rashes or lesions appreciated.   Neurological:  No gross motor or sensory deficits.   Psych: Appropriate affect.        Data:     Labs reviewed:  Recent Labs   Lab Test 07/23/24  0921 05/14/24  0932 12/22/23  1211 12/11/23  0902 11/20/23  0842 05/17/23  0834 05/01/23  1401 10/17/22  0845 07/07/22  0806   LDL  --  45  --   --  113*  --   --  41  --    HDL  --  45  --   --  47  --   --  47  --    NHDL  --  57  --   --  132*  --   --  56  --    CHOL  --  102  --   --  179  --   --  103  --    TRIG  --  60  --   --  93  --   --  74  --    TSH 0.96  --   --  1.85  --   --   --   --  1.44   NTBNP  --   --   --   --   --  481 459 407  --    IRON  --   --  48*  --   --   --   --   --   --    FEB  --   --  191*  --   --   --   --   --   --    IRONSAT  --   --  25  --   --   --   --   --   --        Lab Results   Component Value Date    WBC 7.2 07/23/2024    WBC 6.2 07/23/2020    RBC 4.75 07/23/2024    RBC 4.58 " 07/23/2020    HGB 14.0 07/23/2024    HGB 13.2 (L) 03/02/2021    HCT 41.8 07/23/2024    HCT 41.4 07/23/2020    MCV 88 07/23/2024    MCV 90 07/23/2020    MCH 29.5 07/23/2024    MCH 29.9 07/23/2020    MCHC 33.5 07/23/2024    MCHC 33.1 07/23/2020    RDW 11.6 07/23/2024    RDW 12.5 07/23/2020     07/23/2024     07/23/2020       Lab Results   Component Value Date     05/14/2024     04/07/2021    POTASSIUM 4.5 05/14/2024    POTASSIUM 4.3 01/01/2024    POTASSIUM 4.1 04/07/2021    CHLORIDE 108 (H) 05/14/2024    CHLORIDE 105 01/01/2024    CHLORIDE 109 04/07/2021    CO2 24 05/14/2024    CO2 26 01/01/2024    CO2 28 04/07/2021    ANIONGAP 11 05/14/2024    ANIONGAP 2 (L) 01/01/2024    ANIONGAP 2 (L) 04/07/2021     (H) 05/14/2024    GLC 84 01/04/2024     (H) 01/01/2024    GLC 75 04/07/2021    BUN 19.8 05/14/2024    BUN 23 01/01/2024    BUN 25 04/07/2021    CR 1.10 05/14/2024    CR 1.28 (H) 04/07/2021    GFRESTIMATED 64 05/14/2024    GFRESTIMATED 50 (L) 04/07/2021    GFRESTBLACK 58 (L) 04/07/2021    GABRIELA 9.5 05/14/2024    GABRIELA 8.8 04/07/2021      Lab Results   Component Value Date    AST 66 (H) 01/04/2024    AST 32 03/02/2021    ALT 23 05/14/2024    ALT 30 03/02/2021       Lab Results   Component Value Date    A1C 5.6 12/14/2023    A1C 5.6 05/02/2019       Lab Results   Component Value Date    INR 1.00 06/20/2019           Problem List:     Patient Active Problem List   Diagnosis    Hypothyroidism, unspecified type    Degeneration of lumbar or lumbosacral intervertebral disc    MGUS (monoclonal gammopathy of unknown significance)    Familial tremor    Hyperlipidemia LDL goal <100    NSTEMI (non-ST elevated myocardial infarction) (H)    Coronary artery disease    Bradycardia    Aneurysm of thoracic aorta    Sinus bradycardia    Atherosclerotic heart disease of native coronary artery with other forms of angina pectoris    Ascending aortic aneurysm    Sick sinus syndrome (H)    Aortic root  "dilatation    Idiopathic peripheral neuropathy    Second degree AV block    Cardiac pacemaker in situ    Stage 3a chronic kidney disease (H)    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 90 tablet 3    furosemide (LASIX) 20 MG tablet TAKE 1 TABLET (20 MG) BY MOUTH EVERY MORNING 90 tablet 1    gabapentin (NEURONTIN) 300 MG capsule TAKE 1 CAPSULE(300 MG) BY MOUTH AT BEDTIME 90 capsule 0    levothyroxine (SYNTHROID/LEVOTHROID) 100 MCG tablet TAKE 1 TABLET (100 MCG) BY MOUTH DAILY 90 tablet 0    metoprolol succinate ER (TOPROL XL) 25 MG 24 hr tablet TAKE 1 TABLET (25 MG) BY MOUTH DAILY AT LUNCH 90 tablet 1    nitroGLYcerin (NITROSTAT) 0.4 MG sublingual tablet One tablet under the tongue every 5 minutes if needed for chest pain. May repeat every 5 minutes for a maximum of 3 doses in 15 minutes\" 25 tablet 3    sacubitril-valsartan (ENTRESTO) 24-26 MG per tablet Take 1/2 tab (12/13mg) once daily 90 tablet 3    Aspirin-Caffeine 400-32 MG TABS Take 1 tablet by mouth as needed (Patient not taking: Reported on 2/11/2025)      oxymetazoline (AFRIN) 0.05 % nasal spray Spray 0.2 mLs (2 sprays) into both nostrils 2 times daily. (Patient not taking: Reported on 2/11/2025) 6 mL 0           Past Medical History:     Past Medical History:   Diagnosis Date    Aortic root dilatation     4.4 cm    Ascending aorta dilatation     4.4 cm    ASD (atrial " septal defect)     Bradycardia     CAD (coronary artery disease) 05/15/2014     YOGESH to RCA(6/20/19); YOGESH to OM1(5/15/14)    Cardiac pacemaker 06/20/2019    [employer] PPM COMFORT MRI XT DR-implant 6/20/19    Chest discomfort     Degeneration of lumbar or lumbosacral intervertebral disc     Familial tremor     Former smoker     Herpes zoster without mention of complication     HTN (hypertension)     Hyperlipidaemia     Idiopathic peripheral neuropathy     MGUS (monoclonal gammopathy of unknown significance)     NSTEMI (non-ST elevated myocardial infarction) (H) 2014    Other and unspecified hyperlipidemia     PFO (patent foramen ovale)     Prostatitis, unspecified     Second degree heart block     Sensorineural hearing loss, unspecified     SSS (sick sinus syndrome) (H)     Status post coronary angiogram 06/20/2019    Unspecified hypothyroidism      Past Surgical History:   Procedure Laterality Date    CHOLECYSTECTOMY      CORONARY ANGIOGRAPHY ADULT ORDER  5/14/14    PTCA w/YOGESH to OM1    CV CORONARY ANGIOGRAM N/A 6/20/2019    Procedure: Coronary Angiogram;  Surgeon: Romie Coronado MD;  Location:  HEART CARDIAC CATH LAB    CV INTRAVASULAR ULTRASOUND N/A 6/20/2019    Procedure: Intravascular Ultrasound;  Surgeon: Romie Coronado MD;  Location: [facility] CARDIAC CATH LAB    CV PCI ATHERECTOMY ORBITAL N/A 6/20/2019    Procedure: PCI Atherectomy Orbital;  Surgeon: Romie Coronado MD;  Location:  HEART CARDIAC CATH LAB    CV PCI STENT DRUG ELUTING N/A 6/20/2019    Procedure: PCI Stent Drug Eluting;  Surgeon: Romie Coronado MD;  Location:  HEART CARDIAC CATH LAB    CV TEMPORARY PACEMAKER INSERTION N/A 6/20/2019    Procedure: Temporary Pacemaker Insertion;  Surgeon: Romie Coronado MD;  Location:  HEART CARDIAC CATH LAB    EP PACEMAKER N/A 6/20/2019    Procedure: EP Pacemaker;  Surgeon: Max Dowell MD;  Location:  HEART CARDIAC CATH LAB    EP PPM UPGRADE TO BIVENT N/A 9/16/2021     Procedure: EP PPM UPGRADE TO BIVENT;  Surgeon: Tre Miller MD;  Location:  HEART CARDIAC CATH LAB    HEART CATH, ANGIOPLASTY  14    YOGESH to OM1    ZZC NONSPECIFIC PROCEDURE      Laparoscopic cholecystectomy.      Family History   Problem Relation Age of Onset    Cancer Mother     Genitourinary Problems Father 99        complications from surgery    Heart Disease Brother 72        MI     Social History     Socioeconomic History    Marital status:      Spouse name: Not on file    Number of children: Not on file    Years of education: Not on file    Highest education level: Not on file   Occupational History    Not on file   Tobacco Use    Smoking status: Former     Current packs/day: 0.00     Average packs/day: 0.5 packs/day for 17.0 years (8.5 ttl pk-yrs)     Types: Cigarettes     Start date:      Quit date: 1967     Years since quittin.1    Smokeless tobacco: Never   Substance and Sexual Activity    Alcohol use: Yes     Comment: 5-7 drinks week - at most one drink daily    Drug use: No    Sexual activity: Never   Other Topics Concern     Service Not Asked    Blood Transfusions Not Asked    Caffeine Concern Yes     Comment: 2 cups coffee/day    Occupational Exposure Not Asked    Hobby Hazards Not Asked    Sleep Concern No    Stress Concern Not Asked    Weight Concern Yes     Comment: limiting alcohol to watch calories    Special Diet Not Asked    Back Care Not Asked    Exercise Yes     Comment: Stationary bike  weights and aerobics 4 times week    Bike Helmet Not Asked    Seat Belt Yes    Self-Exams Not Asked    Parent/sibling w/ CABG, MI or angioplasty before 65F 55M? No   Social History Narrative    Not on file     Social Drivers of Health     Financial Resource Strain: Low Risk  (2024)    Financial Resource Strain     Within the past 12 months, have you or your family members you live with been unable to get utilities (heat, electricity) when it was really needed?: No    Food Insecurity: Low Risk  (1/9/2024)    Food Insecurity     Within the past 12 months, did you worry that your food would run out before you got money to buy more?: No     Within the past 12 months, did the food you bought just not last and you didn t have money to get more?: No   Transportation Needs: Low Risk  (1/9/2024)    Transportation Needs     Within the past 12 months, has lack of transportation kept you from medical appointments, getting your medicines, non-medical meetings or appointments, work, or from getting things that you need?: No   Physical Activity: Not on file   Stress: Not on file   Social Connections: Not on file   Interpersonal Safety: Low Risk  (7/23/2024)    Interpersonal Safety     Do you feel physically and emotionally safe where you currently live?: Yes     Within the past 12 months, have you been hit, slapped, kicked or otherwise physically hurt by someone?: No     Within the past 12 months, have you been humiliated or emotionally abused in other ways by your partner or ex-partner?: No   Housing Stability: Low Risk  (1/9/2024)    Housing Stability     Do you have housing? : Yes     Are you worried about losing your housing?: No           Allergies:   No known drug allergy    Today's clinic visit entailed:  I spent a total of 35 minutes on the day of the visit.   Time spent by me today doing chart review, history and exam, documentation and further activities per the note  Provider  Link to [facility] Help Grid     The level of medical decision making during this visit was of moderate complexity.

## 2025-02-12 ENCOUNTER — TELEPHONE (OUTPATIENT)
Dept: CARDIOLOGY | Facility: CLINIC | Age: OVER 89
End: 2025-02-12
Payer: COMMERCIAL

## 2025-02-12 DIAGNOSIS — I50.9 CHRONIC HEART FAILURE, UNSPECIFIED HEART FAILURE TYPE (H): Primary | ICD-10-CM

## 2025-02-12 NOTE — TELEPHONE ENCOUNTER
2/12/25 Per FAUZIA from Lacey from yesterday  Follow up with Dr. Huggins in six months with another echocardiogram for assessment of EF and ascending aorta, as well as routine labs (BMP, lipid panel).   Orders placed   KHerroRN 1150 am

## 2025-02-12 NOTE — TELEPHONE ENCOUNTER
Patient confirms he is afebrile today, and will monitor his temp. If he develops a temp he will go to , stating Cardiology advised the same recommendations as well at his appt yesterday.     Patient has no questions at this time.

## 2025-02-12 NOTE — TELEPHONE ENCOUNTER
If the patient continued to have fevers, then he should be seen in Urgent Care to check for the source, whether it be a URI or else urinary tract infection.   If he is feeling well, then nothing else needed.     Close encounter when done.

## 2025-02-19 DIAGNOSIS — I50.42 CHRONIC COMBINED SYSTOLIC AND DIASTOLIC HRT FAIL (H): ICD-10-CM

## 2025-02-19 RX ORDER — FUROSEMIDE 20 MG/1
20 TABLET ORAL EVERY MORNING
Qty: 90 TABLET | Refills: 1 | Status: SHIPPED | OUTPATIENT
Start: 2025-02-19

## 2025-03-10 DIAGNOSIS — E03.9 HYPOTHYROIDISM, UNSPECIFIED TYPE: ICD-10-CM

## 2025-03-10 RX ORDER — LEVOTHYROXINE SODIUM 100 UG/1
100 TABLET ORAL DAILY
Qty: 90 TABLET | Refills: 0 | Status: SHIPPED | OUTPATIENT
Start: 2025-03-10

## 2025-03-21 ENCOUNTER — ANCILLARY PROCEDURE (OUTPATIENT)
Dept: CARDIOLOGY | Facility: CLINIC | Age: OVER 89
End: 2025-03-21
Attending: INTERNAL MEDICINE
Payer: COMMERCIAL

## 2025-03-21 DIAGNOSIS — I44.1 SECOND DEGREE ATRIOVENTRICULAR BLOCK: ICD-10-CM

## 2025-03-21 DIAGNOSIS — Z95.0 BIVENTRICULAR CARDIAC PACEMAKER IN SITU: ICD-10-CM

## 2025-03-21 LAB
MDC_IDC_LEAD_CONNECTION_STATUS: NORMAL
MDC_IDC_LEAD_IMPLANT_DT: NORMAL
MDC_IDC_LEAD_LOCATION: NORMAL
MDC_IDC_LEAD_LOCATION_DETAIL_1: NORMAL
MDC_IDC_LEAD_MFG: NORMAL
MDC_IDC_LEAD_MODEL: NORMAL
MDC_IDC_LEAD_POLARITY_TYPE: NORMAL
MDC_IDC_LEAD_SERIAL: NORMAL
MDC_IDC_MSMT_BATTERY_DTM: NORMAL
MDC_IDC_MSMT_BATTERY_REMAINING_LONGEVITY: 85 MO
MDC_IDC_MSMT_BATTERY_RRT_TRIGGER: 2.6
MDC_IDC_MSMT_BATTERY_STATUS: NORMAL
MDC_IDC_MSMT_BATTERY_VOLTAGE: 2.99 V
MDC_IDC_MSMT_LEADCHNL_LV_IMPEDANCE_VALUE: 304 OHM
MDC_IDC_MSMT_LEADCHNL_LV_IMPEDANCE_VALUE: 361 OHM
MDC_IDC_MSMT_LEADCHNL_LV_IMPEDANCE_VALUE: 418 OHM
MDC_IDC_MSMT_LEADCHNL_LV_IMPEDANCE_VALUE: 456 OHM
MDC_IDC_MSMT_LEADCHNL_LV_IMPEDANCE_VALUE: 456 OHM
MDC_IDC_MSMT_LEADCHNL_LV_IMPEDANCE_VALUE: 551 OHM
MDC_IDC_MSMT_LEADCHNL_LV_IMPEDANCE_VALUE: 627 OHM
MDC_IDC_MSMT_LEADCHNL_LV_IMPEDANCE_VALUE: 646 OHM
MDC_IDC_MSMT_LEADCHNL_LV_IMPEDANCE_VALUE: 684 OHM
MDC_IDC_MSMT_LEADCHNL_LV_IMPEDANCE_VALUE: 760 OHM
MDC_IDC_MSMT_LEADCHNL_LV_PACING_THRESHOLD_AMPLITUDE: 1 V
MDC_IDC_MSMT_LEADCHNL_LV_PACING_THRESHOLD_PULSEWIDTH: 0.4 MS
MDC_IDC_MSMT_LEADCHNL_RA_IMPEDANCE_VALUE: 342 OHM
MDC_IDC_MSMT_LEADCHNL_RA_IMPEDANCE_VALUE: 456 OHM
MDC_IDC_MSMT_LEADCHNL_RA_PACING_THRESHOLD_AMPLITUDE: 0.38 V
MDC_IDC_MSMT_LEADCHNL_RA_PACING_THRESHOLD_PULSEWIDTH: 0.4 MS
MDC_IDC_MSMT_LEADCHNL_RA_SENSING_INTR_AMPL: 2.25 MV
MDC_IDC_MSMT_LEADCHNL_RA_SENSING_INTR_AMPL: 2.25 MV
MDC_IDC_MSMT_LEADCHNL_RV_IMPEDANCE_VALUE: 342 OHM
MDC_IDC_MSMT_LEADCHNL_RV_IMPEDANCE_VALUE: 437 OHM
MDC_IDC_MSMT_LEADCHNL_RV_PACING_THRESHOLD_AMPLITUDE: 0.5 V
MDC_IDC_MSMT_LEADCHNL_RV_PACING_THRESHOLD_PULSEWIDTH: 0.4 MS
MDC_IDC_MSMT_LEADCHNL_RV_SENSING_INTR_AMPL: 8.25 MV
MDC_IDC_MSMT_LEADCHNL_RV_SENSING_INTR_AMPL: 8.25 MV
MDC_IDC_PG_IMPLANT_DTM: NORMAL
MDC_IDC_PG_MFG: NORMAL
MDC_IDC_PG_MODEL: NORMAL
MDC_IDC_PG_SERIAL: NORMAL
MDC_IDC_PG_TYPE: NORMAL
MDC_IDC_SESS_CLINIC_NAME: NORMAL
MDC_IDC_SESS_DTM: NORMAL
MDC_IDC_SESS_TYPE: NORMAL
MDC_IDC_SET_BRADY_AT_MODE_SWITCH_RATE: 171 {BEATS}/MIN
MDC_IDC_SET_BRADY_LOWRATE: 60 {BEATS}/MIN
MDC_IDC_SET_BRADY_MAX_SENSOR_RATE: 120 {BEATS}/MIN
MDC_IDC_SET_BRADY_MAX_TRACKING_RATE: 120 {BEATS}/MIN
MDC_IDC_SET_BRADY_MODE: NORMAL
MDC_IDC_SET_BRADY_PAV_DELAY_LOW: 170 MS
MDC_IDC_SET_BRADY_SAV_DELAY_LOW: 110 MS
MDC_IDC_SET_CRT_LVRV_DELAY: 0 MS
MDC_IDC_SET_CRT_PACED_CHAMBERS: NORMAL
MDC_IDC_SET_LEADCHNL_LV_PACING_AMPLITUDE: 1.5 V
MDC_IDC_SET_LEADCHNL_LV_PACING_ANODE_ELECTRODE_1: NORMAL
MDC_IDC_SET_LEADCHNL_LV_PACING_ANODE_LOCATION_1: NORMAL
MDC_IDC_SET_LEADCHNL_LV_PACING_CAPTURE_MODE: NORMAL
MDC_IDC_SET_LEADCHNL_LV_PACING_CATHODE_ELECTRODE_1: NORMAL
MDC_IDC_SET_LEADCHNL_LV_PACING_CATHODE_LOCATION_1: NORMAL
MDC_IDC_SET_LEADCHNL_LV_PACING_POLARITY: NORMAL
MDC_IDC_SET_LEADCHNL_LV_PACING_PULSEWIDTH: 0.4 MS
MDC_IDC_SET_LEADCHNL_RA_PACING_AMPLITUDE: 1.5 V
MDC_IDC_SET_LEADCHNL_RA_PACING_ANODE_ELECTRODE_1: NORMAL
MDC_IDC_SET_LEADCHNL_RA_PACING_ANODE_LOCATION_1: NORMAL
MDC_IDC_SET_LEADCHNL_RA_PACING_CAPTURE_MODE: NORMAL
MDC_IDC_SET_LEADCHNL_RA_PACING_CATHODE_ELECTRODE_1: NORMAL
MDC_IDC_SET_LEADCHNL_RA_PACING_CATHODE_LOCATION_1: NORMAL
MDC_IDC_SET_LEADCHNL_RA_PACING_POLARITY: NORMAL
MDC_IDC_SET_LEADCHNL_RA_PACING_PULSEWIDTH: 0.4 MS
MDC_IDC_SET_LEADCHNL_RA_SENSING_ANODE_ELECTRODE_1: NORMAL
MDC_IDC_SET_LEADCHNL_RA_SENSING_ANODE_LOCATION_1: NORMAL
MDC_IDC_SET_LEADCHNL_RA_SENSING_CATHODE_ELECTRODE_1: NORMAL
MDC_IDC_SET_LEADCHNL_RA_SENSING_CATHODE_LOCATION_1: NORMAL
MDC_IDC_SET_LEADCHNL_RA_SENSING_POLARITY: NORMAL
MDC_IDC_SET_LEADCHNL_RA_SENSING_SENSITIVITY: 0.3 MV
MDC_IDC_SET_LEADCHNL_RV_PACING_AMPLITUDE: 2 V
MDC_IDC_SET_LEADCHNL_RV_PACING_ANODE_ELECTRODE_1: NORMAL
MDC_IDC_SET_LEADCHNL_RV_PACING_ANODE_LOCATION_1: NORMAL
MDC_IDC_SET_LEADCHNL_RV_PACING_CAPTURE_MODE: NORMAL
MDC_IDC_SET_LEADCHNL_RV_PACING_CATHODE_ELECTRODE_1: NORMAL
MDC_IDC_SET_LEADCHNL_RV_PACING_CATHODE_LOCATION_1: NORMAL
MDC_IDC_SET_LEADCHNL_RV_PACING_POLARITY: NORMAL
MDC_IDC_SET_LEADCHNL_RV_PACING_PULSEWIDTH: 0.4 MS
MDC_IDC_SET_LEADCHNL_RV_SENSING_ANODE_ELECTRODE_1: NORMAL
MDC_IDC_SET_LEADCHNL_RV_SENSING_ANODE_LOCATION_1: NORMAL
MDC_IDC_SET_LEADCHNL_RV_SENSING_CATHODE_ELECTRODE_1: NORMAL
MDC_IDC_SET_LEADCHNL_RV_SENSING_CATHODE_LOCATION_1: NORMAL
MDC_IDC_SET_LEADCHNL_RV_SENSING_POLARITY: NORMAL
MDC_IDC_SET_LEADCHNL_RV_SENSING_SENSITIVITY: 0.9 MV
MDC_IDC_SET_ZONE_DETECTION_INTERVAL: 350 MS
MDC_IDC_SET_ZONE_DETECTION_INTERVAL: 400 MS
MDC_IDC_SET_ZONE_STATUS: NORMAL
MDC_IDC_SET_ZONE_STATUS: NORMAL
MDC_IDC_SET_ZONE_TYPE: NORMAL
MDC_IDC_SET_ZONE_VENDOR_TYPE: NORMAL
MDC_IDC_STAT_AT_BURDEN_PERCENT: 0 %
MDC_IDC_STAT_AT_DTM_END: NORMAL
MDC_IDC_STAT_AT_DTM_START: NORMAL
MDC_IDC_STAT_BRADY_AP_VP_PERCENT: 98.7 %
MDC_IDC_STAT_BRADY_AP_VS_PERCENT: 0.01 %
MDC_IDC_STAT_BRADY_AS_VP_PERCENT: 1.25 %
MDC_IDC_STAT_BRADY_AS_VS_PERCENT: 0.03 %
MDC_IDC_STAT_BRADY_DTM_END: NORMAL
MDC_IDC_STAT_BRADY_DTM_START: NORMAL
MDC_IDC_STAT_BRADY_RA_PERCENT_PACED: 98.68 %
MDC_IDC_STAT_BRADY_RV_PERCENT_PACED: 99.95 %
MDC_IDC_STAT_CRT_DTM_END: NORMAL
MDC_IDC_STAT_CRT_DTM_START: NORMAL
MDC_IDC_STAT_CRT_LV_PERCENT_PACED: 99.92 %
MDC_IDC_STAT_CRT_PERCENT_PACED: 99.92 %
MDC_IDC_STAT_EPISODE_RECENT_COUNT: 0
MDC_IDC_STAT_EPISODE_RECENT_COUNT_DTM_END: NORMAL
MDC_IDC_STAT_EPISODE_RECENT_COUNT_DTM_START: NORMAL
MDC_IDC_STAT_EPISODE_TOTAL_COUNT: 0
MDC_IDC_STAT_EPISODE_TOTAL_COUNT_DTM_END: NORMAL
MDC_IDC_STAT_EPISODE_TOTAL_COUNT_DTM_START: NORMAL
MDC_IDC_STAT_EPISODE_TYPE: NORMAL
MDC_IDC_STAT_TACHYTHERAPY_RECENT_DTM_END: NORMAL
MDC_IDC_STAT_TACHYTHERAPY_RECENT_DTM_START: NORMAL
MDC_IDC_STAT_TACHYTHERAPY_TOTAL_DTM_END: NORMAL
MDC_IDC_STAT_TACHYTHERAPY_TOTAL_DTM_START: NORMAL

## 2025-03-21 PROCEDURE — 93296 REM INTERROG EVL PM/IDS: CPT | Performed by: INTERNAL MEDICINE

## 2025-03-21 PROCEDURE — 93294 REM INTERROG EVL PM/LDLS PM: CPT | Performed by: INTERNAL MEDICINE

## 2025-06-02 DIAGNOSIS — E03.9 HYPOTHYROIDISM, UNSPECIFIED TYPE: ICD-10-CM

## 2025-06-02 RX ORDER — LEVOTHYROXINE SODIUM 100 UG/1
100 TABLET ORAL DAILY
Qty: 90 TABLET | Refills: 1 | Status: SHIPPED | OUTPATIENT
Start: 2025-06-02

## 2025-06-03 DIAGNOSIS — I50.42 CHRONIC COMBINED SYSTOLIC AND DIASTOLIC HRT FAIL (H): ICD-10-CM

## 2025-06-03 RX ORDER — SACUBITRIL AND VALSARTAN 24; 26 MG/1; MG/1
TABLET, FILM COATED ORAL
Qty: 90 TABLET | Refills: 3 | Status: SHIPPED | OUTPATIENT
Start: 2025-06-03

## 2025-06-27 ENCOUNTER — ANCILLARY PROCEDURE (OUTPATIENT)
Dept: CARDIOLOGY | Facility: CLINIC | Age: OVER 89
End: 2025-06-27
Attending: INTERNAL MEDICINE
Payer: COMMERCIAL

## 2025-06-27 DIAGNOSIS — Z95.0 BIVENTRICULAR CARDIAC PACEMAKER IN SITU: ICD-10-CM

## 2025-06-27 DIAGNOSIS — I44.1 SECOND DEGREE ATRIOVENTRICULAR BLOCK: ICD-10-CM

## 2025-06-27 PROCEDURE — 93296 REM INTERROG EVL PM/IDS: CPT | Performed by: INTERNAL MEDICINE

## 2025-06-27 PROCEDURE — 93294 REM INTERROG EVL PM/LDLS PM: CPT | Performed by: INTERNAL MEDICINE

## 2025-06-30 LAB
MDC_IDC_EPISODE_DTM: NORMAL
MDC_IDC_EPISODE_DURATION: 5 S
MDC_IDC_EPISODE_ID: 9
MDC_IDC_EPISODE_TYPE: NORMAL
MDC_IDC_LEAD_CONNECTION_STATUS: NORMAL
MDC_IDC_LEAD_IMPLANT_DT: NORMAL
MDC_IDC_LEAD_LOCATION: NORMAL
MDC_IDC_LEAD_LOCATION_DETAIL_1: NORMAL
MDC_IDC_LEAD_MFG: NORMAL
MDC_IDC_LEAD_MODEL: NORMAL
MDC_IDC_LEAD_POLARITY_TYPE: NORMAL
MDC_IDC_LEAD_SERIAL: NORMAL
MDC_IDC_MSMT_BATTERY_DTM: NORMAL
MDC_IDC_MSMT_BATTERY_REMAINING_LONGEVITY: 82 MO
MDC_IDC_MSMT_BATTERY_RRT_TRIGGER: 2.6
MDC_IDC_MSMT_BATTERY_STATUS: NORMAL
MDC_IDC_MSMT_BATTERY_VOLTAGE: 2.99 V
MDC_IDC_MSMT_LEADCHNL_LV_IMPEDANCE_VALUE: 323 OHM
MDC_IDC_MSMT_LEADCHNL_LV_IMPEDANCE_VALUE: 361 OHM
MDC_IDC_MSMT_LEADCHNL_LV_IMPEDANCE_VALUE: 418 OHM
MDC_IDC_MSMT_LEADCHNL_LV_IMPEDANCE_VALUE: 418 OHM
MDC_IDC_MSMT_LEADCHNL_LV_IMPEDANCE_VALUE: 456 OHM
MDC_IDC_MSMT_LEADCHNL_LV_IMPEDANCE_VALUE: 532 OHM
MDC_IDC_MSMT_LEADCHNL_LV_IMPEDANCE_VALUE: 608 OHM
MDC_IDC_MSMT_LEADCHNL_LV_IMPEDANCE_VALUE: 608 OHM
MDC_IDC_MSMT_LEADCHNL_LV_IMPEDANCE_VALUE: 627 OHM
MDC_IDC_MSMT_LEADCHNL_LV_IMPEDANCE_VALUE: 684 OHM
MDC_IDC_MSMT_LEADCHNL_LV_PACING_THRESHOLD_AMPLITUDE: 0.88 V
MDC_IDC_MSMT_LEADCHNL_LV_PACING_THRESHOLD_PULSEWIDTH: 0.4 MS
MDC_IDC_MSMT_LEADCHNL_RA_IMPEDANCE_VALUE: 361 OHM
MDC_IDC_MSMT_LEADCHNL_RA_IMPEDANCE_VALUE: 456 OHM
MDC_IDC_MSMT_LEADCHNL_RA_PACING_THRESHOLD_AMPLITUDE: 0.38 V
MDC_IDC_MSMT_LEADCHNL_RA_PACING_THRESHOLD_PULSEWIDTH: 0.4 MS
MDC_IDC_MSMT_LEADCHNL_RA_SENSING_INTR_AMPL: 1.88 MV
MDC_IDC_MSMT_LEADCHNL_RA_SENSING_INTR_AMPL: 1.88 MV
MDC_IDC_MSMT_LEADCHNL_RV_IMPEDANCE_VALUE: 361 OHM
MDC_IDC_MSMT_LEADCHNL_RV_IMPEDANCE_VALUE: 456 OHM
MDC_IDC_MSMT_LEADCHNL_RV_PACING_THRESHOLD_AMPLITUDE: 0.62 V
MDC_IDC_MSMT_LEADCHNL_RV_PACING_THRESHOLD_PULSEWIDTH: 0.4 MS
MDC_IDC_MSMT_LEADCHNL_RV_SENSING_INTR_AMPL: 8.25 MV
MDC_IDC_MSMT_LEADCHNL_RV_SENSING_INTR_AMPL: 8.25 MV
MDC_IDC_PG_IMPLANT_DTM: NORMAL
MDC_IDC_PG_MFG: NORMAL
MDC_IDC_PG_MODEL: NORMAL
MDC_IDC_PG_SERIAL: NORMAL
MDC_IDC_PG_TYPE: NORMAL
MDC_IDC_SESS_CLINIC_NAME: NORMAL
MDC_IDC_SESS_DTM: NORMAL
MDC_IDC_SESS_TYPE: NORMAL
MDC_IDC_SET_BRADY_AT_MODE_SWITCH_RATE: 171 {BEATS}/MIN
MDC_IDC_SET_BRADY_LOWRATE: 60 {BEATS}/MIN
MDC_IDC_SET_BRADY_MAX_SENSOR_RATE: 120 {BEATS}/MIN
MDC_IDC_SET_BRADY_MAX_TRACKING_RATE: 120 {BEATS}/MIN
MDC_IDC_SET_BRADY_MODE: NORMAL
MDC_IDC_SET_BRADY_PAV_DELAY_LOW: 170 MS
MDC_IDC_SET_BRADY_SAV_DELAY_LOW: 110 MS
MDC_IDC_SET_CRT_LVRV_DELAY: 0 MS
MDC_IDC_SET_CRT_PACED_CHAMBERS: NORMAL
MDC_IDC_SET_LEADCHNL_LV_PACING_AMPLITUDE: 1.5 V
MDC_IDC_SET_LEADCHNL_LV_PACING_ANODE_ELECTRODE_1: NORMAL
MDC_IDC_SET_LEADCHNL_LV_PACING_ANODE_LOCATION_1: NORMAL
MDC_IDC_SET_LEADCHNL_LV_PACING_CAPTURE_MODE: NORMAL
MDC_IDC_SET_LEADCHNL_LV_PACING_CATHODE_ELECTRODE_1: NORMAL
MDC_IDC_SET_LEADCHNL_LV_PACING_CATHODE_LOCATION_1: NORMAL
MDC_IDC_SET_LEADCHNL_LV_PACING_POLARITY: NORMAL
MDC_IDC_SET_LEADCHNL_LV_PACING_PULSEWIDTH: 0.4 MS
MDC_IDC_SET_LEADCHNL_RA_PACING_AMPLITUDE: 1.5 V
MDC_IDC_SET_LEADCHNL_RA_PACING_ANODE_ELECTRODE_1: NORMAL
MDC_IDC_SET_LEADCHNL_RA_PACING_ANODE_LOCATION_1: NORMAL
MDC_IDC_SET_LEADCHNL_RA_PACING_CAPTURE_MODE: NORMAL
MDC_IDC_SET_LEADCHNL_RA_PACING_CATHODE_ELECTRODE_1: NORMAL
MDC_IDC_SET_LEADCHNL_RA_PACING_CATHODE_LOCATION_1: NORMAL
MDC_IDC_SET_LEADCHNL_RA_PACING_POLARITY: NORMAL
MDC_IDC_SET_LEADCHNL_RA_PACING_PULSEWIDTH: 0.4 MS
MDC_IDC_SET_LEADCHNL_RA_SENSING_ANODE_ELECTRODE_1: NORMAL
MDC_IDC_SET_LEADCHNL_RA_SENSING_ANODE_LOCATION_1: NORMAL
MDC_IDC_SET_LEADCHNL_RA_SENSING_CATHODE_ELECTRODE_1: NORMAL
MDC_IDC_SET_LEADCHNL_RA_SENSING_CATHODE_LOCATION_1: NORMAL
MDC_IDC_SET_LEADCHNL_RA_SENSING_POLARITY: NORMAL
MDC_IDC_SET_LEADCHNL_RA_SENSING_SENSITIVITY: 0.3 MV
MDC_IDC_SET_LEADCHNL_RV_PACING_AMPLITUDE: 2 V
MDC_IDC_SET_LEADCHNL_RV_PACING_ANODE_ELECTRODE_1: NORMAL
MDC_IDC_SET_LEADCHNL_RV_PACING_ANODE_LOCATION_1: NORMAL
MDC_IDC_SET_LEADCHNL_RV_PACING_CAPTURE_MODE: NORMAL
MDC_IDC_SET_LEADCHNL_RV_PACING_CATHODE_ELECTRODE_1: NORMAL
MDC_IDC_SET_LEADCHNL_RV_PACING_CATHODE_LOCATION_1: NORMAL
MDC_IDC_SET_LEADCHNL_RV_PACING_POLARITY: NORMAL
MDC_IDC_SET_LEADCHNL_RV_PACING_PULSEWIDTH: 0.4 MS
MDC_IDC_SET_LEADCHNL_RV_SENSING_ANODE_ELECTRODE_1: NORMAL
MDC_IDC_SET_LEADCHNL_RV_SENSING_ANODE_LOCATION_1: NORMAL
MDC_IDC_SET_LEADCHNL_RV_SENSING_CATHODE_ELECTRODE_1: NORMAL
MDC_IDC_SET_LEADCHNL_RV_SENSING_CATHODE_LOCATION_1: NORMAL
MDC_IDC_SET_LEADCHNL_RV_SENSING_POLARITY: NORMAL
MDC_IDC_SET_LEADCHNL_RV_SENSING_SENSITIVITY: 0.9 MV
MDC_IDC_SET_ZONE_DETECTION_INTERVAL: 350 MS
MDC_IDC_SET_ZONE_DETECTION_INTERVAL: 400 MS
MDC_IDC_SET_ZONE_STATUS: NORMAL
MDC_IDC_SET_ZONE_STATUS: NORMAL
MDC_IDC_SET_ZONE_TYPE: NORMAL
MDC_IDC_SET_ZONE_VENDOR_TYPE: NORMAL
MDC_IDC_STAT_AT_BURDEN_PERCENT: 0 %
MDC_IDC_STAT_AT_DTM_END: NORMAL
MDC_IDC_STAT_AT_DTM_START: NORMAL
MDC_IDC_STAT_BRADY_AP_VP_PERCENT: 99.18 %
MDC_IDC_STAT_BRADY_AP_VS_PERCENT: 0.01 %
MDC_IDC_STAT_BRADY_AS_VP_PERCENT: 0.69 %
MDC_IDC_STAT_BRADY_AS_VS_PERCENT: 0.11 %
MDC_IDC_STAT_BRADY_DTM_END: NORMAL
MDC_IDC_STAT_BRADY_DTM_START: NORMAL
MDC_IDC_STAT_BRADY_RA_PERCENT_PACED: 99.25 %
MDC_IDC_STAT_BRADY_RV_PERCENT_PACED: 99.87 %
MDC_IDC_STAT_CRT_DTM_END: NORMAL
MDC_IDC_STAT_CRT_DTM_START: NORMAL
MDC_IDC_STAT_CRT_LV_PERCENT_PACED: 99.84 %
MDC_IDC_STAT_CRT_PERCENT_PACED: 99.84 %
MDC_IDC_STAT_EPISODE_RECENT_COUNT: 0
MDC_IDC_STAT_EPISODE_RECENT_COUNT_DTM_END: NORMAL
MDC_IDC_STAT_EPISODE_RECENT_COUNT_DTM_START: NORMAL
MDC_IDC_STAT_EPISODE_TOTAL_COUNT: 0
MDC_IDC_STAT_EPISODE_TOTAL_COUNT_DTM_END: NORMAL
MDC_IDC_STAT_EPISODE_TOTAL_COUNT_DTM_START: NORMAL
MDC_IDC_STAT_EPISODE_TYPE: NORMAL
MDC_IDC_STAT_TACHYTHERAPY_RECENT_DTM_END: NORMAL
MDC_IDC_STAT_TACHYTHERAPY_RECENT_DTM_START: NORMAL
MDC_IDC_STAT_TACHYTHERAPY_TOTAL_DTM_END: NORMAL
MDC_IDC_STAT_TACHYTHERAPY_TOTAL_DTM_START: NORMAL

## 2025-07-17 DIAGNOSIS — G60.9 IDIOPATHIC PERIPHERAL NEUROPATHY: ICD-10-CM

## 2025-07-17 RX ORDER — GABAPENTIN 300 MG/1
300 CAPSULE ORAL AT BEDTIME
Qty: 90 CAPSULE | Refills: 0 | Status: SHIPPED | OUTPATIENT
Start: 2025-07-17

## 2025-08-11 ENCOUNTER — TELEPHONE (OUTPATIENT)
Dept: CARDIOLOGY | Facility: CLINIC | Age: OVER 89
End: 2025-08-11
Payer: COMMERCIAL

## 2025-08-27 DIAGNOSIS — I50.42 CHRONIC COMBINED SYSTOLIC AND DIASTOLIC HRT FAIL (H): ICD-10-CM

## 2025-08-27 RX ORDER — FUROSEMIDE 20 MG/1
20 TABLET ORAL EVERY MORNING
Qty: 90 TABLET | Refills: 0 | Status: SHIPPED | OUTPATIENT
Start: 2025-08-27

## (undated) DEVICE — CABLE PACING ALLIGATOR CLIP 12FT 5833SL

## (undated) DEVICE — CLOSURE ANGIOSEAL 6FR 610130

## (undated) DEVICE — GUIDEWIRE VASC 0.014IN DIA 325CML SS

## (undated) DEVICE — CATH GD 50CM ATTAIN COMMAND SU

## (undated) DEVICE — KIT WRENCH 5873W

## (undated) DEVICE — KIT HAND CONTROL ANGIOTOUCH ACIST 65CM AT-P65

## (undated) DEVICE — CATH BALLOON NC EMERGE 4.00X12MM H7493926712400

## (undated) DEVICE — Device

## (undated) DEVICE — DILATOR VASC W/INTRO GW 8FRX19CM .038"

## (undated) DEVICE — CATH DIAG 4FR JL 4.5 538417

## (undated) DEVICE — SMART CAPNOLINE H PLUS, ADULT/INTERMEDIATE O2, LONG

## (undated) DEVICE — NDL PERC ENTRY THINWALL 18GA 7.0" G00166

## (undated) DEVICE — PACK PCMKR PERM SRG PROC LF SAN32PC573

## (undated) DEVICE — SYR ANGIOGRAPHY MULTIUSE KIT ACIST 014612

## (undated) DEVICE — CATH LAUNCHER 6FR JR 4.0 LA6JR40

## (undated) DEVICE — DEFIB PRO-PADZ LVP LQD GEL ADULT 8900-2105-01

## (undated) DEVICE — GUIDEWIRE VASC 0.035INX150CM INQWIRE J TIP IQ35F150J3F/A

## (undated) DEVICE — SLITTER ADJSTBL 6232ADJ

## (undated) DEVICE — SHEATH PRELUDE SNAP 7FRX13CM PLS-1007

## (undated) DEVICE — CATH EP 60CM 5FR 2-8-2MM SPC-

## (undated) DEVICE — CATH ANGIO WEDGE PRESSURE 7FRX110CM DL AI-07127

## (undated) DEVICE — RAD INTRODUCER KIT MICRO 5FRX10CM .018 NITINOL G/W

## (undated) DEVICE — KIT LG BORE TOUHY ACCESS PLUS MAP152

## (undated) DEVICE — INTRO SHEATH 4FRX10CM PINNACLE RSS402

## (undated) DEVICE — MANIFOLD KIT ANGIO AUTOMATED 014613

## (undated) DEVICE — CATH BALLOON NC EMERGE 3.50X15MM H7493926715350

## (undated) DEVICE — CATH BALLOON NC EUPHORA 3.50X8MM NCEUP3508X

## (undated) DEVICE — INTRODUCER SHEATH GREEN 6.5FRX11CM .038IN PSI-6F-11-038ACT

## (undated) DEVICE — TOTE ANGIO CORP PC15AT SAN32CC83O

## (undated) DEVICE — CATH US OD 5FR OD SEC 2.9FR EAGLE EYE PLATINUM 0.014 85900P

## (undated) DEVICE — GUIDEWIRE FLEXINOL .014"X190CM J-TIP MED LVCXT190J

## (undated) DEVICE — INFL DVC KIT W/10CC NITRO IN4530

## (undated) DEVICE — CATH GW COR ARTHRECTOMY 1.25MM DIAMONDBACK DBEC-125

## (undated) DEVICE — CATH ANGIO INFINITI 3DRC 4FRX100CM 538476

## (undated) DEVICE — GUIDEWIRE VASC 0.014INX190CM J TIP CGRXT190HJ

## (undated) DEVICE — CUTTING BALLOON WOLVERINE 3.5X10MM

## (undated) RX ORDER — FENTANYL CITRATE 50 UG/ML
INJECTION, SOLUTION INTRAMUSCULAR; INTRAVENOUS
Status: DISPENSED
Start: 2019-06-20

## (undated) RX ORDER — HEPARIN SODIUM 1000 [USP'U]/ML
INJECTION, SOLUTION INTRAVENOUS; SUBCUTANEOUS
Status: DISPENSED
Start: 2019-06-20

## (undated) RX ORDER — LIDOCAINE HYDROCHLORIDE 10 MG/ML
INJECTION, SOLUTION EPIDURAL; INFILTRATION; INTRACAUDAL; PERINEURAL
Status: DISPENSED
Start: 2019-06-20

## (undated) RX ORDER — POTASSIUM CHLORIDE 1500 MG/1
TABLET, EXTENDED RELEASE ORAL
Status: DISPENSED
Start: 2019-06-20

## (undated) RX ORDER — CEFAZOLIN SODIUM 2 G/100ML
INJECTION, SOLUTION INTRAVENOUS
Status: DISPENSED
Start: 2021-09-16

## (undated) RX ORDER — NITROGLYCERIN 5 MG/ML
VIAL (ML) INTRAVENOUS
Status: DISPENSED
Start: 2019-06-20

## (undated) RX ORDER — CEFAZOLIN SODIUM 2 G/100ML
INJECTION, SOLUTION INTRAVENOUS
Status: DISPENSED
Start: 2019-06-20

## (undated) RX ORDER — CLOPIDOGREL 300 MG/1
TABLET, FILM COATED ORAL
Status: DISPENSED
Start: 2019-06-20

## (undated) RX ORDER — GINSENG 100 MG
CAPSULE ORAL
Status: DISPENSED
Start: 2021-09-16

## (undated) RX ORDER — HEPARIN SODIUM 200 [USP'U]/100ML
INJECTION, SOLUTION INTRAVENOUS
Status: DISPENSED
Start: 2019-06-20

## (undated) RX ORDER — HEPARIN SODIUM 1000 [USP'U]/ML
INJECTION, SOLUTION INTRAVENOUS; SUBCUTANEOUS
Status: DISPENSED
Start: 2021-09-16

## (undated) RX ORDER — LIDOCAINE HYDROCHLORIDE 10 MG/ML
INJECTION, SOLUTION EPIDURAL; INFILTRATION; INTRACAUDAL; PERINEURAL
Status: DISPENSED
Start: 2021-09-16

## (undated) RX ORDER — REGADENOSON 0.08 MG/ML
INJECTION, SOLUTION INTRAVENOUS
Status: DISPENSED
Start: 2021-01-29

## (undated) RX ORDER — BUPIVACAINE HYDROCHLORIDE 2.5 MG/ML
INJECTION, SOLUTION EPIDURAL; INFILTRATION; INTRACAUDAL
Status: DISPENSED
Start: 2019-06-20

## (undated) RX ORDER — ATROPINE SULFATE 0.1 MG/ML
INJECTION INTRAVENOUS
Status: DISPENSED
Start: 2019-06-20

## (undated) RX ORDER — BUPIVACAINE HYDROCHLORIDE 2.5 MG/ML
INJECTION, SOLUTION EPIDURAL; INFILTRATION; INTRACAUDAL
Status: DISPENSED
Start: 2021-09-16

## (undated) RX ORDER — FENTANYL CITRATE 50 UG/ML
INJECTION, SOLUTION INTRAMUSCULAR; INTRAVENOUS
Status: DISPENSED
Start: 2021-09-16